# Patient Record
Sex: FEMALE | Race: WHITE | NOT HISPANIC OR LATINO | Employment: OTHER | URBAN - METROPOLITAN AREA
[De-identification: names, ages, dates, MRNs, and addresses within clinical notes are randomized per-mention and may not be internally consistent; named-entity substitution may affect disease eponyms.]

---

## 2018-11-15 ENCOUNTER — OFFICE VISIT (OUTPATIENT)
Dept: URGENT CARE | Facility: CLINIC | Age: 58
End: 2018-11-15
Payer: COMMERCIAL

## 2018-11-15 VITALS
SYSTOLIC BLOOD PRESSURE: 132 MMHG | WEIGHT: 234 LBS | OXYGEN SATURATION: 94 % | BODY MASS INDEX: 31.69 KG/M2 | DIASTOLIC BLOOD PRESSURE: 88 MMHG | TEMPERATURE: 99 F | HEART RATE: 72 BPM | HEIGHT: 72 IN

## 2018-11-15 DIAGNOSIS — K21.00 GASTROESOPHAGEAL REFLUX DISEASE WITH ESOPHAGITIS: ICD-10-CM

## 2018-11-15 DIAGNOSIS — J45.51 SEVERE PERSISTENT ASTHMA WITH EXACERBATION: ICD-10-CM

## 2018-11-15 PROCEDURE — 99204 OFFICE O/P NEW MOD 45 MIN: CPT | Performed by: NURSE PRACTITIONER

## 2018-11-15 PROCEDURE — 94760 N-INVAS EAR/PLS OXIMETRY 1: CPT | Performed by: NURSE PRACTITIONER

## 2018-11-15 RX ORDER — FOLIC ACID 1 MG/1
1 TABLET ORAL EVERY MORNING
COMMUNITY
Start: 2017-05-16 | End: 2019-04-19 | Stop reason: SDUPTHER

## 2018-11-15 RX ORDER — ROSUVASTATIN CALCIUM 10 MG/1
10 TABLET, COATED ORAL
COMMUNITY
Start: 2017-03-28 | End: 2020-01-13 | Stop reason: SDUPTHER

## 2018-11-15 RX ORDER — IPRATROPIUM BROMIDE AND ALBUTEROL SULFATE 2.5; .5 MG/3ML; MG/3ML
3 SOLUTION RESPIRATORY (INHALATION) ONCE
Status: COMPLETED | OUTPATIENT
Start: 2018-11-15 | End: 2018-11-15

## 2018-11-15 RX ORDER — PREDNISONE 10 MG/1
40 TABLET ORAL DAILY
Qty: 20 TAB | Refills: 0 | Status: SHIPPED | OUTPATIENT
Start: 2018-11-15 | End: 2018-11-20

## 2018-11-15 RX ORDER — MONTELUKAST SODIUM 10 MG/1
10 TABLET ORAL
COMMUNITY
Start: 2017-04-28 | End: 2019-03-27

## 2018-11-15 RX ORDER — IPRATROPIUM BROMIDE AND ALBUTEROL SULFATE 2.5; .5 MG/3ML; MG/3ML
SOLUTION RESPIRATORY (INHALATION)
COMMUNITY
Start: 2017-03-15 | End: 2019-03-27

## 2018-11-15 RX ORDER — ALBUTEROL SULFATE 90 UG/1
2 AEROSOL, METERED RESPIRATORY (INHALATION)
COMMUNITY
End: 2018-11-30

## 2018-11-15 RX ORDER — ETODOLAC 500 MG/1
500 TABLET, FILM COATED ORAL 2 TIMES DAILY
COMMUNITY
Start: 2017-03-29 | End: 2020-01-13 | Stop reason: SDUPTHER

## 2018-11-15 RX ORDER — OMEPRAZOLE 20 MG/1
20 CAPSULE, DELAYED RELEASE ORAL DAILY
Qty: 30 CAP | Refills: 0 | Status: SHIPPED | OUTPATIENT
Start: 2018-11-15 | End: 2018-11-30

## 2018-11-15 RX ORDER — CETIRIZINE HYDROCHLORIDE 10 MG/1
10 TABLET ORAL
Status: ON HOLD | COMMUNITY
End: 2023-07-03

## 2018-11-15 RX ORDER — LAMOTRIGINE 100 MG/1
100 TABLET ORAL EVERY MORNING
COMMUNITY
Start: 2017-05-21 | End: 2019-05-08

## 2018-11-15 RX ORDER — ESCITALOPRAM OXALATE 10 MG/1
10 TABLET ORAL EVERY MORNING
COMMUNITY
Start: 2017-02-26 | End: 2019-03-27

## 2018-11-15 RX ADMIN — IPRATROPIUM BROMIDE AND ALBUTEROL SULFATE 3 ML: 2.5; .5 SOLUTION RESPIRATORY (INHALATION) at 17:39

## 2018-11-15 ASSESSMENT — ENCOUNTER SYMPTOMS
FEVER: 0
CHILLS: 0
MYALGIAS: 0
CHEST TIGHTNESS: 1
SORE THROAT: 0
SHORTNESS OF BREATH: 1
FREQUENT THROAT CLEARING: 1
SPUTUM PRODUCTION: 1
VOMITING: 0
COUGH: 1
WHEEZING: 1
NAUSEA: 0
TROUBLE SWALLOWING: 0
DYSPNEA ON EXERTION: 0
ORTHOPNEA: 0
DIZZINESS: 0
HEADACHES: 0
EYE PAIN: 0
DIFFICULTY BREATHING: 1

## 2018-11-16 NOTE — PROGRESS NOTES
Subjective:     Arely Haynes is a 57 y.o. female who presents for Shortness of Breath (Tightness in chest/asthma x11/12 )  Patient presents clinic today for evaluation of asthma exacerbation with shortness of breath, chest tightness for the past 3 days.  Patient has had been in respiratory failure due to her asthma in the past.  She has been doing breathing treatments with mild relief in symptoms.  She denies any fevers, chills. She denies any chest pain or palpitations.Patient also requesting referral for pulmonologist as [place]  Patient also having increase in acid reflux that she has stopped her omeprazole due to recommendation by the gastroenterologist due to a procedure she was going to have done for evaluation of polyps however patient has now moved to the [facility] and will not be seeing gastroenterology until establishing care here.  Patient will restart omeprazole at this time.      Asthma    She complains of chest tightness, cough, difficulty breathing, frequent throat clearing, shortness of breath, sputum production and wheezing. This is a new problem. Episode onset: 4 daus.  The problem occurs constantly. The problem has been gradually worsening. The cough is productive of sputum. Pertinent negatives include no chest pain, dyspnea on exertion, ear congestion, fever, headaches, malaise/fatigue, myalgias, nasal congestion, orthopnea, postnasal drip, sneezing, sore throat or trouble swallowing. Her symptoms are aggravated by nothing. Her symptoms are alleviated by nothing. She reports no improvement on treatment. Her symptoms are not alleviated by beta-agonist and ipratropium. There are no known risk factors for lung disease. Her past medical history is significant for asthma.     PMH:  has a past medical history of Asthma and GERD (gastroesophageal reflux disease).  MEDS:   Current Outpatient Prescriptions:   •  ipratropium-albuterol (DUONEB) 0.5-2.5 (3)  MG/3ML nebulizer solution, USE 3 ML FOUR TIMES A DAY VIA NEBULIZER BY INHALATION 30 DAYS, Disp: , Rfl:   •  Abatacept 125 MG/ML Solution Auto-injector, by Subdermal route., Disp: , Rfl:   •  fluticasone-salmeterol (ADVAIR DISKUS) 500-50 MCG/DOSE AEROSOL POWDER, BREATH ACTIVATED, 1 Puff by Nebulization route., Disp: , Rfl:   •  albuterol 108 (90 Base) MCG/ACT Aero Soln inhalation aerosol, 2 Puffs by Nebulization route., Disp: , Rfl:   •  cetirizine (ZYRTEC) 10 MG Tab, Take 10 mg by mouth., Disp: , Rfl:   •  escitalopram (LEXAPRO) 10 MG Tab, Take 30 mg by mouth., Disp: , Rfl:   •  etodolac (LODINE) 500 MG tablet, Take 500 mg by mouth., Disp: , Rfl:   •  folic acid (FOLVITE) 1 MG Tab, Take 1 mg by mouth., Disp: , Rfl:   •  lamoTRIgine (LAMICTAL) 100 MG Tab, Take 100 mg by mouth., Disp: , Rfl:   •  montelukast (SINGULAIR) 10 MG Tab, Take 10 mg by mouth., Disp: , Rfl:   •  rosuvastatin (CRESTOR) 10 MG Tab, Take 10 mg by mouth., Disp: , Rfl:   •  predniSONE (DELTASONE) 10 MG Tab, Take 4 Tabs by mouth every day for 5 days., Disp: 20 Tab, Rfl: 0  •  omeprazole (PRILOSEC) 20 MG delayed-release capsule, Take 1 Cap by mouth every day., Disp: 30 Cap, Rfl: 0  •  Methotrexate, Anti-Rheumatic, (METHOTREXATE, PF, SC), 0.8 mg by Intramuscular route., Disp: , Rfl:   ALLERGIES:   Allergies   Allergen Reactions   • Ampicillin-Sulbactam Sodium Hives     Tolerates cefazolin  Pt does not feel this is an active allergy.    • Shellfish-Derived Products Anaphylaxis     lobster   • Iodine      Contrast- Oral And Iv Dye     SURGHX: History reviewed. No pertinent surgical history.  SOCHX:  reports that she has never smoked. She has never used smokeless tobacco. She reports that she drinks alcohol. She reports that she does not use drugs.  FH: Family history was reviewed, no pertinent findings to report         Review of Systems   Constitutional: Negative for chills, fever and malaise/fatigue.   HENT: Negative for postnasal drip, sneezing, sore  throat and trouble swallowing.    Eyes: Negative for pain.   Respiratory: Positive for cough, sputum production, shortness of breath and wheezing.    Cardiovascular: Negative for chest pain and dyspnea on exertion.   Gastrointestinal: Negative for nausea and vomiting.   Genitourinary: Negative for hematuria.   Musculoskeletal: Negative for myalgias.   Skin: Negative for rash.   Neurological: Negative for dizziness and headaches.     Allergies   Allergen Reactions   • Ampicillin-Sulbactam Sodium Hives     Tolerates cefazolin  Pt does not feel this is an active allergy.    • Shellfish-Derived Products Anaphylaxis     lobster   • Iodine      Contrast- Oral And Iv Dye      Objective:   /88   Pulse 72   Temp 37.2 °C (99 °F)   Ht 1.829 m (6')   Wt 106.1 kg (234 lb)   SpO2 94%   BMI 31.74 kg/m²    Physical Exam   Constitutional: She is oriented to person, place, and time. She appears well-developed and well-nourished. No distress.   HENT:   Head: Normocephalic and atraumatic.   Right Ear: Tympanic membrane normal.   Left Ear: Tympanic membrane normal.   Nose: Nose normal. Right sinus exhibits no maxillary sinus tenderness and no frontal sinus tenderness. Left sinus exhibits no maxillary sinus tenderness and no frontal sinus tenderness.   Mouth/Throat: Uvula is midline, oropharynx is clear and moist and mucous membranes are normal. No posterior oropharyngeal edema, posterior oropharyngeal erythema or tonsillar abscesses. No tonsillar exudate.   Eyes: Pupils are equal, round, and reactive to light. Conjunctivae and EOM are normal. Right eye exhibits no discharge. Left eye exhibits no discharge.   Cardiovascular: Normal rate and regular rhythm.    No murmur heard.  Pulmonary/Chest: Effort normal. No respiratory distress. She has decreased breath sounds. She has wheezes. She has no rhonchi. She has no rales.   Abdominal: Soft. She exhibits no distension. There is no tenderness.   Neurological: She is alert and  oriented to person, place, and time. She has normal reflexes. No sensory deficit.   Skin: Skin is warm, dry and intact.   Psychiatric: She has a normal mood and affect.         Assessment/Plan:   Assessment      1. Severe persistent asthma with exacerbation  ipratropium-albuterol (DUONEB) nebulizer solution    predniSONE (DELTASONE) 10 MG Tab    REFERRAL TO PULMONOLOGY    CANCELED: DX-CHEST-2 VIEWS   2. Gastroesophageal reflux disease with esophagitis  REFERRAL TO GASTROENTEROLOGY    omeprazole (PRILOSEC) 20 MG delayed-release capsule     Lung sounds improved with breathing treatment. PO2 reassessed and adequate.  No infectious etiology suspected as patient has no fevers, nonproductive cough.  Significant improvement post breathing treatment.Patient will continue to do breathing treatments at home in combination to oral steroids.  Patient will restart omeprazole at previous dose for acid reflux until she is evaluated by a gastroenterologist.    Patient given precautionary s/sx that mandate immediate follow up and evaluation in the ED. Advised of risks of not doing so.    DDX, Supportive care, and indications for immediate follow-up discussed with patient.    Instructed to return to clinic or nearest emergency department if we are not available for any change in condition, further concerns, or worsening of symptoms.    The patient demonstrated a good understanding and agreed with the treatment plan.

## 2018-11-29 ENCOUNTER — APPOINTMENT (OUTPATIENT)
Dept: RADIOLOGY | Facility: MEDICAL CENTER | Age: 58
DRG: 871 | End: 2018-11-29
Attending: EMERGENCY MEDICINE
Payer: COMMERCIAL

## 2018-11-29 ENCOUNTER — HOSPITAL ENCOUNTER (INPATIENT)
Facility: MEDICAL CENTER | Age: 58
LOS: 1 days | DRG: 871 | End: 2018-12-01
Attending: EMERGENCY MEDICINE | Admitting: HOSPITALIST
Payer: COMMERCIAL

## 2018-11-29 DIAGNOSIS — J18.9 PNEUMONIA OF RIGHT MIDDLE LOBE DUE TO INFECTIOUS ORGANISM: ICD-10-CM

## 2018-11-29 DIAGNOSIS — A41.9 SEPSIS, DUE TO UNSPECIFIED ORGANISM: ICD-10-CM

## 2018-11-29 DIAGNOSIS — J45.41 MODERATE PERSISTENT ASTHMA WITH ACUTE EXACERBATION: ICD-10-CM

## 2018-11-29 LAB
ALBUMIN SERPL BCP-MCNC: 4.6 G/DL (ref 3.2–4.9)
ALBUMIN/GLOB SERPL: 1.4 G/DL
ALP SERPL-CCNC: 89 U/L (ref 30–99)
ALT SERPL-CCNC: 23 U/L (ref 2–50)
ANION GAP SERPL CALC-SCNC: 11 MMOL/L (ref 0–11.9)
AST SERPL-CCNC: 21 U/L (ref 12–45)
BASOPHILS # BLD AUTO: 0.7 % (ref 0–1.8)
BASOPHILS # BLD: 0.08 K/UL (ref 0–0.12)
BILIRUB SERPL-MCNC: 0.8 MG/DL (ref 0.1–1.5)
BUN SERPL-MCNC: 24 MG/DL (ref 8–22)
CALCIUM SERPL-MCNC: 9.6 MG/DL (ref 8.5–10.5)
CHLORIDE SERPL-SCNC: 106 MMOL/L (ref 96–112)
CO2 SERPL-SCNC: 19 MMOL/L (ref 20–33)
CREAT SERPL-MCNC: 0.85 MG/DL (ref 0.5–1.4)
EOSINOPHIL # BLD AUTO: 0.6 K/UL (ref 0–0.51)
EOSINOPHIL NFR BLD: 5.1 % (ref 0–6.9)
ERYTHROCYTE [DISTWIDTH] IN BLOOD BY AUTOMATED COUNT: 44.3 FL (ref 35.9–50)
GLOBULIN SER CALC-MCNC: 3.2 G/DL (ref 1.9–3.5)
GLUCOSE SERPL-MCNC: 124 MG/DL (ref 65–99)
HCT VFR BLD AUTO: 41.9 % (ref 37–47)
HGB BLD-MCNC: 14.1 G/DL (ref 12–16)
IMM GRANULOCYTES # BLD AUTO: 0.09 K/UL (ref 0–0.11)
IMM GRANULOCYTES NFR BLD AUTO: 0.8 % (ref 0–0.9)
LACTATE BLD-SCNC: 1.7 MMOL/L (ref 0.5–2)
LYMPHOCYTES # BLD AUTO: 2.2 K/UL (ref 1–4.8)
LYMPHOCYTES NFR BLD: 18.8 % (ref 22–41)
MCH RBC QN AUTO: 31.6 PG (ref 27–33)
MCHC RBC AUTO-ENTMCNC: 33.7 G/DL (ref 33.6–35)
MCV RBC AUTO: 93.9 FL (ref 81.4–97.8)
MONOCYTES # BLD AUTO: 0.98 K/UL (ref 0–0.85)
MONOCYTES NFR BLD AUTO: 8.4 % (ref 0–13.4)
NEUTROPHILS # BLD AUTO: 7.73 K/UL (ref 2–7.15)
NEUTROPHILS NFR BLD: 66.2 % (ref 44–72)
NRBC # BLD AUTO: 0 K/UL
NRBC BLD-RTO: 0 /100 WBC
PLATELET # BLD AUTO: 335 K/UL (ref 164–446)
PMV BLD AUTO: 8.9 FL (ref 9–12.9)
POTASSIUM SERPL-SCNC: 4 MMOL/L (ref 3.6–5.5)
PROT SERPL-MCNC: 7.8 G/DL (ref 6–8.2)
RBC # BLD AUTO: 4.46 M/UL (ref 4.2–5.4)
SODIUM SERPL-SCNC: 136 MMOL/L (ref 135–145)
WBC # BLD AUTO: 11.7 K/UL (ref 4.8–10.8)

## 2018-11-29 PROCEDURE — 94640 AIRWAY INHALATION TREATMENT: CPT

## 2018-11-29 PROCEDURE — 700111 HCHG RX REV CODE 636 W/ 250 OVERRIDE (IP): Performed by: EMERGENCY MEDICINE

## 2018-11-29 PROCEDURE — 36415 COLL VENOUS BLD VENIPUNCTURE: CPT

## 2018-11-29 PROCEDURE — 71046 X-RAY EXAM CHEST 2 VIEWS: CPT

## 2018-11-29 PROCEDURE — 80053 COMPREHEN METABOLIC PANEL: CPT

## 2018-11-29 PROCEDURE — 83605 ASSAY OF LACTIC ACID: CPT

## 2018-11-29 PROCEDURE — 94760 N-INVAS EAR/PLS OXIMETRY 1: CPT

## 2018-11-29 PROCEDURE — 700101 HCHG RX REV CODE 250: Performed by: EMERGENCY MEDICINE

## 2018-11-29 PROCEDURE — 99285 EMERGENCY DEPT VISIT HI MDM: CPT

## 2018-11-29 PROCEDURE — 87502 INFLUENZA DNA AMP PROBE: CPT

## 2018-11-29 PROCEDURE — 93005 ELECTROCARDIOGRAM TRACING: CPT | Performed by: EMERGENCY MEDICINE

## 2018-11-29 PROCEDURE — 304561 HCHG STAT O2

## 2018-11-29 PROCEDURE — 85025 COMPLETE CBC W/AUTO DIFF WBC: CPT

## 2018-11-29 PROCEDURE — 84145 PROCALCITONIN (PCT): CPT

## 2018-11-29 PROCEDURE — 87040 BLOOD CULTURE FOR BACTERIA: CPT | Mod: 91

## 2018-11-29 RX ORDER — IPRATROPIUM BROMIDE AND ALBUTEROL SULFATE 2.5; .5 MG/3ML; MG/3ML
9 SOLUTION RESPIRATORY (INHALATION) ONCE
Status: COMPLETED | OUTPATIENT
Start: 2018-11-29 | End: 2018-11-29

## 2018-11-29 RX ORDER — PREDNISONE 20 MG/1
40 TABLET ORAL ONCE
Status: COMPLETED | OUTPATIENT
Start: 2018-11-29 | End: 2018-11-29

## 2018-11-29 RX ADMIN — PREDNISONE 40 MG: 20 TABLET ORAL at 23:06

## 2018-11-29 RX ADMIN — IPRATROPIUM BROMIDE AND ALBUTEROL SULFATE 9 ML: .5; 3 SOLUTION RESPIRATORY (INHALATION) at 22:42

## 2018-11-29 ASSESSMENT — ENCOUNTER SYMPTOMS
WHEEZING: 1
COUGH: 1
FEVER: 1
DIARRHEA: 0
VOMITING: 0
SHORTNESS OF BREATH: 1

## 2018-11-29 ASSESSMENT — COPD QUESTIONNAIRES
COPD SCREENING SCORE: 4
DO YOU EVER COUGH UP ANY MUCUS OR PHLEGM?: YES, A FEW DAYS A WEEK OR MONTH
DURING THE PAST 4 WEEKS HOW MUCH DID YOU FEEL SHORT OF BREATH: SOME OF THE TIME
HAVE YOU SMOKED AT LEAST 100 CIGARETTES IN YOUR ENTIRE LIFE: NO/DON'T KNOW

## 2018-11-29 ASSESSMENT — LIFESTYLE VARIABLES: EVER_SMOKED: NEVER

## 2018-11-29 ASSESSMENT — PAIN SCALES - GENERAL: PAINLEVEL_OUTOF10: 0

## 2018-11-30 ENCOUNTER — APPOINTMENT (OUTPATIENT)
Dept: PULMONOLOGY | Facility: HOSPICE | Age: 58
End: 2018-11-30
Payer: COMMERCIAL

## 2018-11-30 PROBLEM — J18.9 CAP (COMMUNITY ACQUIRED PNEUMONIA): Status: ACTIVE | Noted: 2018-11-30

## 2018-11-30 PROBLEM — J45.40 MODERATE PERSISTENT ASTHMA WITHOUT COMPLICATION: Status: ACTIVE | Noted: 2018-11-30

## 2018-11-30 PROBLEM — J96.21 ACUTE ON CHRONIC RESPIRATORY FAILURE WITH HYPOXIA (HCC): Status: ACTIVE | Noted: 2018-11-30

## 2018-11-30 PROBLEM — L40.50 PSORIATIC ARTHRITIS (HCC): Status: ACTIVE | Noted: 2018-11-30

## 2018-11-30 PROBLEM — E78.01 FAMILIAL HYPERCHOLESTEROLEMIA: Status: ACTIVE | Noted: 2018-11-30

## 2018-11-30 PROBLEM — A41.9 SEPSIS (HCC): Status: ACTIVE | Noted: 2018-11-30

## 2018-11-30 PROBLEM — F32.A DEPRESSION: Status: ACTIVE | Noted: 2018-11-30

## 2018-11-30 LAB
FLUAV RNA SPEC QL NAA+PROBE: NEGATIVE
FLUBV RNA SPEC QL NAA+PROBE: NEGATIVE
LACTATE BLD-SCNC: 1.3 MMOL/L (ref 0.5–2)
LACTATE BLD-SCNC: 2.1 MMOL/L (ref 0.5–2)
PROCALCITONIN SERPL-MCNC: <0.05 NG/ML

## 2018-11-30 PROCEDURE — 700111 HCHG RX REV CODE 636 W/ 250 OVERRIDE (IP): Performed by: EMERGENCY MEDICINE

## 2018-11-30 PROCEDURE — 36415 COLL VENOUS BLD VENIPUNCTURE: CPT

## 2018-11-30 PROCEDURE — 700102 HCHG RX REV CODE 250 W/ 637 OVERRIDE(OP)

## 2018-11-30 PROCEDURE — 94640 AIRWAY INHALATION TREATMENT: CPT

## 2018-11-30 PROCEDURE — A9270 NON-COVERED ITEM OR SERVICE: HCPCS | Performed by: HOSPITALIST

## 2018-11-30 PROCEDURE — 83605 ASSAY OF LACTIC ACID: CPT

## 2018-11-30 PROCEDURE — 700102 HCHG RX REV CODE 250 W/ 637 OVERRIDE(OP): Performed by: HOSPITALIST

## 2018-11-30 PROCEDURE — A9270 NON-COVERED ITEM OR SERVICE: HCPCS

## 2018-11-30 PROCEDURE — 99223 1ST HOSP IP/OBS HIGH 75: CPT | Mod: AI | Performed by: HOSPITALIST

## 2018-11-30 PROCEDURE — 700101 HCHG RX REV CODE 250: Performed by: HOSPITALIST

## 2018-11-30 PROCEDURE — 700111 HCHG RX REV CODE 636 W/ 250 OVERRIDE (IP): Performed by: FAMILY MEDICINE

## 2018-11-30 PROCEDURE — 700105 HCHG RX REV CODE 258: Performed by: HOSPITALIST

## 2018-11-30 PROCEDURE — 96365 THER/PROPH/DIAG IV INF INIT: CPT

## 2018-11-30 PROCEDURE — 700105 HCHG RX REV CODE 258: Performed by: EMERGENCY MEDICINE

## 2018-11-30 PROCEDURE — A9270 NON-COVERED ITEM OR SERVICE: HCPCS | Performed by: FAMILY MEDICINE

## 2018-11-30 PROCEDURE — 770001 HCHG ROOM/CARE - MED/SURG/GYN PRIV*

## 2018-11-30 PROCEDURE — 700111 HCHG RX REV CODE 636 W/ 250 OVERRIDE (IP): Performed by: HOSPITALIST

## 2018-11-30 PROCEDURE — 96367 TX/PROPH/DG ADDL SEQ IV INF: CPT

## 2018-11-30 PROCEDURE — 700102 HCHG RX REV CODE 250 W/ 637 OVERRIDE(OP): Performed by: FAMILY MEDICINE

## 2018-11-30 RX ORDER — CLONIDINE HYDROCHLORIDE 0.1 MG/1
0.1 TABLET ORAL EVERY 6 HOURS PRN
Status: DISCONTINUED | OUTPATIENT
Start: 2018-11-30 | End: 2018-12-01 | Stop reason: HOSPADM

## 2018-11-30 RX ORDER — ALBUTEROL SULFATE 90 UG/1
AEROSOL, METERED RESPIRATORY (INHALATION)
Status: COMPLETED
Start: 2018-11-30 | End: 2018-11-30

## 2018-11-30 RX ORDER — MONTELUKAST SODIUM 10 MG/1
10 TABLET ORAL
Status: DISCONTINUED | OUTPATIENT
Start: 2018-11-30 | End: 2018-12-01 | Stop reason: HOSPADM

## 2018-11-30 RX ORDER — CETIRIZINE HYDROCHLORIDE 10 MG/1
10 TABLET ORAL DAILY
Status: DISCONTINUED | OUTPATIENT
Start: 2018-11-30 | End: 2018-12-01 | Stop reason: HOSPADM

## 2018-11-30 RX ORDER — ONDANSETRON 4 MG/1
4 TABLET, ORALLY DISINTEGRATING ORAL EVERY 4 HOURS PRN
Status: DISCONTINUED | OUTPATIENT
Start: 2018-11-30 | End: 2018-12-01 | Stop reason: HOSPADM

## 2018-11-30 RX ORDER — OMEPRAZOLE 40 MG/1
40 CAPSULE, DELAYED RELEASE ORAL 2 TIMES DAILY
COMMUNITY
End: 2019-03-27

## 2018-11-30 RX ORDER — ACETAMINOPHEN 325 MG/1
650 TABLET ORAL EVERY 6 HOURS PRN
Status: DISCONTINUED | OUTPATIENT
Start: 2018-11-30 | End: 2018-12-01 | Stop reason: HOSPADM

## 2018-11-30 RX ORDER — ONDANSETRON 2 MG/ML
4 INJECTION INTRAMUSCULAR; INTRAVENOUS EVERY 4 HOURS PRN
Status: DISCONTINUED | OUTPATIENT
Start: 2018-11-30 | End: 2018-12-01 | Stop reason: HOSPADM

## 2018-11-30 RX ORDER — SODIUM CHLORIDE 9 MG/ML
1000 INJECTION, SOLUTION INTRAVENOUS ONCE
Status: COMPLETED | OUTPATIENT
Start: 2018-11-30 | End: 2018-11-30

## 2018-11-30 RX ORDER — SODIUM CHLORIDE 9 MG/ML
INJECTION, SOLUTION INTRAVENOUS CONTINUOUS
Status: DISCONTINUED | OUTPATIENT
Start: 2018-11-30 | End: 2018-11-30

## 2018-11-30 RX ORDER — OMEPRAZOLE 20 MG/1
40 CAPSULE, DELAYED RELEASE ORAL 2 TIMES DAILY
Status: DISCONTINUED | OUTPATIENT
Start: 2018-11-30 | End: 2018-12-01 | Stop reason: HOSPADM

## 2018-11-30 RX ORDER — ESCITALOPRAM OXALATE 10 MG/1
30 TABLET ORAL EVERY MORNING
Status: DISCONTINUED | OUTPATIENT
Start: 2018-11-30 | End: 2018-12-01 | Stop reason: HOSPADM

## 2018-11-30 RX ORDER — POLYETHYLENE GLYCOL 3350 17 G/17G
1 POWDER, FOR SOLUTION ORAL
Status: DISCONTINUED | OUTPATIENT
Start: 2018-11-30 | End: 2018-12-01 | Stop reason: HOSPADM

## 2018-11-30 RX ORDER — ALBUTEROL SULFATE 90 UG/1
2 AEROSOL, METERED RESPIRATORY (INHALATION) EVERY 6 HOURS PRN
COMMUNITY
End: 2019-03-27

## 2018-11-30 RX ORDER — BISACODYL 10 MG
10 SUPPOSITORY, RECTAL RECTAL
Status: DISCONTINUED | OUTPATIENT
Start: 2018-11-30 | End: 2018-12-01 | Stop reason: HOSPADM

## 2018-11-30 RX ORDER — ESCITALOPRAM OXALATE 20 MG/1
20 TABLET ORAL EVERY MORNING
COMMUNITY
End: 2019-05-08

## 2018-11-30 RX ORDER — PREDNISONE 20 MG/1
40 TABLET ORAL DAILY
Status: DISCONTINUED | OUTPATIENT
Start: 2018-11-30 | End: 2018-12-01 | Stop reason: HOSPADM

## 2018-11-30 RX ORDER — SODIUM CHLORIDE 9 MG/ML
500 INJECTION, SOLUTION INTRAVENOUS
Status: DISCONTINUED | OUTPATIENT
Start: 2018-11-30 | End: 2018-12-01 | Stop reason: HOSPADM

## 2018-11-30 RX ORDER — ALBUTEROL SULFATE 90 UG/1
2 AEROSOL, METERED RESPIRATORY (INHALATION) EVERY 6 HOURS PRN
Status: DISCONTINUED | OUTPATIENT
Start: 2018-11-30 | End: 2018-12-01 | Stop reason: HOSPADM

## 2018-11-30 RX ORDER — LAMOTRIGINE 100 MG/1
100 TABLET ORAL EVERY MORNING
Status: DISCONTINUED | OUTPATIENT
Start: 2018-11-30 | End: 2018-12-01 | Stop reason: HOSPADM

## 2018-11-30 RX ORDER — AMOXICILLIN 250 MG
2 CAPSULE ORAL 2 TIMES DAILY
Status: DISCONTINUED | OUTPATIENT
Start: 2018-11-30 | End: 2018-12-01 | Stop reason: HOSPADM

## 2018-11-30 RX ORDER — PROMETHAZINE HYDROCHLORIDE 25 MG/1
12.5-25 TABLET ORAL EVERY 4 HOURS PRN
Status: DISCONTINUED | OUTPATIENT
Start: 2018-11-30 | End: 2018-12-01 | Stop reason: HOSPADM

## 2018-11-30 RX ORDER — ROSUVASTATIN CALCIUM 10 MG/1
10 TABLET, COATED ORAL
Status: DISCONTINUED | OUTPATIENT
Start: 2018-11-30 | End: 2018-12-01 | Stop reason: HOSPADM

## 2018-11-30 RX ORDER — IPRATROPIUM BROMIDE AND ALBUTEROL SULFATE 2.5; .5 MG/3ML; MG/3ML
3 SOLUTION RESPIRATORY (INHALATION)
Status: DISCONTINUED | OUTPATIENT
Start: 2018-11-30 | End: 2018-12-01 | Stop reason: HOSPADM

## 2018-11-30 RX ORDER — BUDESONIDE AND FORMOTEROL FUMARATE DIHYDRATE 160; 4.5 UG/1; UG/1
2 AEROSOL RESPIRATORY (INHALATION) 2 TIMES DAILY
Status: DISCONTINUED | OUTPATIENT
Start: 2018-11-30 | End: 2018-12-01 | Stop reason: HOSPADM

## 2018-11-30 RX ORDER — AZITHROMYCIN 250 MG/1
500 TABLET, FILM COATED ORAL
Status: DISCONTINUED | OUTPATIENT
Start: 2018-11-30 | End: 2018-12-01 | Stop reason: HOSPADM

## 2018-11-30 RX ORDER — PROMETHAZINE HYDROCHLORIDE 25 MG/1
12.5-25 SUPPOSITORY RECTAL EVERY 4 HOURS PRN
Status: DISCONTINUED | OUTPATIENT
Start: 2018-11-30 | End: 2018-12-01 | Stop reason: HOSPADM

## 2018-11-30 RX ADMIN — ESCITALOPRAM OXALATE 30 MG: 10 TABLET ORAL at 06:45

## 2018-11-30 RX ADMIN — ETODOLAC 500 MG: 300 CAPSULE ORAL at 18:00

## 2018-11-30 RX ADMIN — ENOXAPARIN SODIUM 40 MG: 100 INJECTION SUBCUTANEOUS at 06:45

## 2018-11-30 RX ADMIN — AZITHROMYCIN 500 MG: 250 TABLET, FILM COATED ORAL at 21:11

## 2018-11-30 RX ADMIN — PREDNISONE 40 MG: 20 TABLET ORAL at 11:36

## 2018-11-30 RX ADMIN — ALBUTEROL SULFATE 2 PUFF: 90 AEROSOL, METERED RESPIRATORY (INHALATION) at 14:42

## 2018-11-30 RX ADMIN — BUDESONIDE AND FORMOTEROL FUMARATE DIHYDRATE 2 PUFF: 160; 4.5 AEROSOL RESPIRATORY (INHALATION) at 09:10

## 2018-11-30 RX ADMIN — BUDESONIDE AND FORMOTEROL FUMARATE DIHYDRATE 2 PUFF: 160; 4.5 AEROSOL RESPIRATORY (INHALATION) at 18:04

## 2018-11-30 RX ADMIN — LAMOTRIGINE 100 MG: 100 TABLET ORAL at 06:45

## 2018-11-30 RX ADMIN — AZITHROMYCIN FOR INJECTION INJECTION, POWDER, LYOPHILIZED, FOR SOLUTION 500 MG: 500 INJECTION INTRAVENOUS at 02:41

## 2018-11-30 RX ADMIN — CETIRIZINE HYDROCHLORIDE 10 MG: 10 TABLET, FILM COATED ORAL at 06:43

## 2018-11-30 RX ADMIN — ROSUVASTATIN CALCIUM 10 MG: 10 TABLET, FILM COATED ORAL at 21:11

## 2018-11-30 RX ADMIN — OMEPRAZOLE 40 MG: 20 CAPSULE, DELAYED RELEASE ORAL at 18:05

## 2018-11-30 RX ADMIN — MONTELUKAST SODIUM 10 MG: 10 TABLET, FILM COATED ORAL at 21:11

## 2018-11-30 RX ADMIN — IPRATROPIUM BROMIDE AND ALBUTEROL SULFATE 3 ML: .5; 3 SOLUTION RESPIRATORY (INHALATION) at 10:49

## 2018-11-30 RX ADMIN — ETODOLAC 500 MG: 300 CAPSULE ORAL at 09:00

## 2018-11-30 RX ADMIN — CEFEPIME 2 G: 2 INJECTION, POWDER, FOR SOLUTION INTRAVENOUS at 00:20

## 2018-11-30 RX ADMIN — CEFTRIAXONE SODIUM 2 G: 2 INJECTION, POWDER, FOR SOLUTION INTRAMUSCULAR; INTRAVENOUS at 01:39

## 2018-11-30 RX ADMIN — ALBUTEROL SULFATE 2 PUFF: 90 AEROSOL, METERED RESPIRATORY (INHALATION) at 23:18

## 2018-11-30 RX ADMIN — SODIUM CHLORIDE: 9 INJECTION, SOLUTION INTRAVENOUS at 01:42

## 2018-11-30 RX ADMIN — OMEPRAZOLE 40 MG: 20 CAPSULE, DELAYED RELEASE ORAL at 06:43

## 2018-11-30 RX ADMIN — SODIUM CHLORIDE 1000 ML: 9 INJECTION, SOLUTION INTRAVENOUS at 00:18

## 2018-11-30 ASSESSMENT — LIFESTYLE VARIABLES
ALCOHOL_USE: NO
EVER_SMOKED: NEVER
EVER_SMOKED: NEVER

## 2018-11-30 ASSESSMENT — ENCOUNTER SYMPTOMS
MYALGIAS: 0
SORE THROAT: 0
WHEEZING: 1
NAUSEA: 0
NECK PAIN: 0
BLURRED VISION: 0
VOMITING: 0
FEVER: 0
PALPITATIONS: 0
FEVER: 1
CHILLS: 1
PHOTOPHOBIA: 0
CARDIOVASCULAR NEGATIVE: 1
CHILLS: 0
CLAUDICATION: 0
EYES NEGATIVE: 1
HEADACHES: 0
DIZZINESS: 0
HEARTBURN: 0
WEIGHT LOSS: 0
DEPRESSION: 1
BACK PAIN: 0
NEUROLOGICAL NEGATIVE: 1
SPUTUM PRODUCTION: 1
GASTROINTESTINAL NEGATIVE: 1
HEMOPTYSIS: 0
SHORTNESS OF BREATH: 1
TINGLING: 0
DOUBLE VISION: 0
COUGH: 1

## 2018-11-30 ASSESSMENT — PAIN SCALES - GENERAL
PAINLEVEL_OUTOF10: 0

## 2018-11-30 ASSESSMENT — COPD QUESTIONNAIRES
DO YOU EVER COUGH UP ANY MUCUS OR PHLEGM?: NO/ONLY WITH OCCASIONAL COLDS OR INFECTIONS
COPD SCREENING SCORE: 1
DURING THE PAST 4 WEEKS HOW MUCH DID YOU FEEL SHORT OF BREATH: NONE/LITTLE OF THE TIME
DURING THE PAST 4 WEEKS HOW MUCH DID YOU FEEL SHORT OF BREATH: NONE/LITTLE OF THE TIME
IN THE PAST 12 MONTHS DO YOU DO LESS THAN YOU USED TO BECAUSE OF YOUR BREATHING PROBLEMS: DISAGREE/UNSURE
DO YOU EVER COUGH UP ANY MUCUS OR PHLEGM?: NO/ONLY WITH OCCASIONAL COLDS OR INFECTIONS
COPD SCREENING SCORE: 1
HAVE YOU SMOKED AT LEAST 100 CIGARETTES IN YOUR ENTIRE LIFE: NO/DON'T KNOW
HAVE YOU SMOKED AT LEAST 100 CIGARETTES IN YOUR ENTIRE LIFE: NO/DON'T KNOW

## 2018-11-30 ASSESSMENT — PATIENT HEALTH QUESTIONNAIRE - PHQ9
SUM OF ALL RESPONSES TO PHQ9 QUESTIONS 1 AND 2: 0
1. LITTLE INTEREST OR PLEASURE IN DOING THINGS: NOT AT ALL
2. FEELING DOWN, DEPRESSED, IRRITABLE, OR HOPELESS: NOT AT ALL

## 2018-11-30 NOTE — PROGRESS NOTES
[facility] Hospitalist Progress Note    Date of Service: 2018    Chief Complaint  57 y.o. female admitted 2018 with acute resp failure and   brohnchitis with sepsis and asthma exacerbation.    Interval Problem Update  none    Consultants/Specialty  none    Disposition  pending        Review of Systems   Constitutional: Negative for chills, fever and weight loss.   HENT: Negative for ear pain, hearing loss and tinnitus.    Eyes: Negative for blurred vision, double vision and photophobia.   Respiratory: Positive for cough and sputum production. Negative for hemoptysis.    Cardiovascular: Negative for chest pain, palpitations and claudication.   Gastrointestinal: Negative for heartburn, nausea and vomiting.   Genitourinary: Negative for dysuria, frequency and urgency.   Musculoskeletal: Negative for back pain, myalgias and neck pain.   Skin: Negative for itching and rash.   Neurological: Negative for dizziness, tingling and headaches.      Physical Exam  Laboratory/Imaging   Hemodynamics  Temp (24hrs), Av.6 °C (97.8 °F), Min:36.4 °C (97.6 °F), Max:36.7 °C (98.1 °F)   Temperature: 36.4 °C (97.6 °F)  Pulse  Av.8  Min: 70  Max: 100 Heart Rate (Monitored): 87  Blood Pressure: 136/65, NIBP: 112/57      Respiratory      Respiration: 18, Pulse Oximetry: 93 %, O2 Daily Delivery Respiratory : Silicone Nasal Cannula     Given By:: Mouthpiece, Work Of Breathing / Effort: Mild  RUL Breath Sounds: Clear, RML Breath Sounds: Clear, RLL Breath Sounds: Diminished, MARY ANN Breath Sounds: Clear, LLL Breath Sounds: Diminished    Fluids    Intake/Output Summary (Last 24 hours) at 18 0944  Last data filed at 18 0600   Gross per 24 hour   Intake              790 ml   Output                0 ml   Net              790 ml       Nutrition  Orders Placed This Encounter   Procedures   • Diet Order Regular     Standing Status:   Standing     Number of Occurrences:   1     Order Specific Question:   Diet:     Answer:    Regular [1]     Physical Exam   Constitutional: She is oriented to person, place, and time. She appears well-developed and well-nourished.   HENT:   Head: Normocephalic and atraumatic.   Eyes: Pupils are equal, round, and reactive to light. Conjunctivae are normal.   Neck: Normal range of motion. Neck supple.   Cardiovascular: Normal rate and regular rhythm.    Pulmonary/Chest: No respiratory distress. She has no wheezes.   Abdominal: Soft. Bowel sounds are normal.   Musculoskeletal: She exhibits no edema or tenderness.   Neurological: She is alert and oriented to person, place, and time.   Skin: Skin is warm and dry.       Recent Labs      11/29/18   2210   WBC  11.7*   RBC  4.46   HEMOGLOBIN  14.1   HEMATOCRIT  41.9   MCV  93.9   MCH  31.6   MCHC  33.7   RDW  44.3   PLATELETCT  335   MPV  8.9*     Recent Labs      11/29/18   2210   SODIUM  136   POTASSIUM  4.0   CHLORIDE  106   CO2  19*   GLUCOSE  124*   BUN  24*   CREATININE  0.85   CALCIUM  9.6                      Assessment/Plan     * CAP (community acquired pneumonia)   Assessment & Plan    Is ruled out with negative procalcitonin  Dc rocephin  Most likely pt has brohnchitis  Continue with zitromax     Moderate persistent asthma without complication   Assessment & Plan    Continue current medication regimen, respiratory therapy as needed.      Acute on chronic respiratory failure with hypoxia (HCC)   Assessment & Plan    Oxygen supplementation as needed.   2nd to asthma  On symbicort  On singuliar  On prednisone       Familial hypercholesterolemia   Assessment & Plan    Continue statin therapy      Psoriatic arthritis (HCC)   Assessment & Plan    On injectable medication regimen which is not currently due.  Monitor, outpatient follow-up     Depression   Assessment & Plan    stable     Sepsis (HCC)   Assessment & Plan    This is severe sepsis with the following associated acute organ dysfunction(s): acute respiratory failure.   2nd to brohnchitis  Continue  with zoitromax  Has resolved       Quality-Core Measures   Hu catheter::  No Hu  DVT prophylaxis pharmacological::  Enoxaparin (Lovenox)

## 2018-11-30 NOTE — ED PROVIDER NOTES
ED Provider Note    Scribed for KUSH Seth II* by Radha Bucio. 11/29/2018  10:05 PM    Means of Arrival: walk-in  History obtained by: patient  Limitations: none    CHIEF COMPLAINT  Chief Complaint   Patient presents with   • Shortness of Breath   • Asthma       HPI  Arely Haynes is a 57 y.o. female with a history of asthma who presents to the Emergency Department for evaluation of shortness of breath. She reports being admitted to the ICU before for asthma exacerbation. She states she has had a fever, wheezing, and a cough for the past 5 days.  She is tried using nebulizer at home but no relief.  She endorses being immunocompromised secondary to taking methotrexate and another unspecified immunosuppressant drug for her psoriasis. She denies vomiting or diarrhea.  Denies any chest pain or leg swelling.    REVIEW OF SYSTEMS  Review of Systems   Constitutional: Positive for chills, fever and malaise/fatigue.   HENT: Positive for congestion. Negative for sore throat.    Respiratory: Positive for cough, shortness of breath and wheezing.    Cardiovascular: Negative for chest pain and palpitations.   Gastrointestinal: Negative for diarrhea and vomiting.   Musculoskeletal: Negative for back pain and neck pain.   Skin: Negative for rash.   Neurological: Negative for dizziness and headaches.   All other systems reviewed and are negative.    See HPI for further details.    PAST MEDICAL HISTORY   has a past medical history of Asthma and GERD (gastroesophageal reflux disease).    SOCIAL HISTORY  Social History     Social History Main Topics   • Smoking status: Never Smoker   • Smokeless tobacco: Never Used   • Alcohol use Yes      Comment: once or twice a month    • Drug use: No       SURGICAL HISTORY  patient denies any surgical history    CURRENT MEDICATIONS    Current Outpatient Prescriptions on File Prior to Encounter   Medication Sig Dispense Refill   • ipratropium-albuterol (DUONEB) 0.5-2.5 (3) MG/3ML  nebulizer solution USE 3 ML FOUR TIMES A DAY VIA NEBULIZER BY INHALATION 30 DAYS     • Abatacept 125 MG/ML Solution Auto-injector by Subdermal route.     • fluticasone-salmeterol (ADVAIR DISKUS) 500-50 MCG/DOSE AEROSOL POWDER, BREATH ACTIVATED 1 Puff by Nebulization route.     • albuterol 108 (90 Base) MCG/ACT Aero Soln inhalation aerosol 2 Puffs by Nebulization route.     • cetirizine (ZYRTEC) 10 MG Tab Take 10 mg by mouth.     • escitalopram (LEXAPRO) 10 MG Tab Take 30 mg by mouth.     • etodolac (LODINE) 500 MG tablet Take 500 mg by mouth.     • folic acid (FOLVITE) 1 MG Tab Take 1 mg by mouth.     • lamoTRIgine (LAMICTAL) 100 MG Tab Take 100 mg by mouth.     • Methotrexate, Anti-Rheumatic, (METHOTREXATE, PF, SC) 0.8 mg by Intramuscular route.     • montelukast (SINGULAIR) 10 MG Tab Take 10 mg by mouth.     • rosuvastatin (CRESTOR) 10 MG Tab Take 10 mg by mouth.     • omeprazole (PRILOSEC) 20 MG delayed-release capsule Take 1 Cap by mouth every day. 30 Cap 0       ALLERGIES  Allergies   Allergen Reactions   • Ampicillin-Sulbactam Sodium Hives     Tolerates cefazolin  Pt does not feel this is an active allergy.    • Shellfish-Derived Products Anaphylaxis     lobster   • Iodine      Contrast- Oral And Iv Dye       PHYSICAL EXAM  VITAL SIGNS: BP (!) 167/79   Pulse 100   Temp 36.7 °C (98.1 °F) (Temporal)   Resp 20   Ht 1.829 m (6')   SpO2 94%   BMI 31.74 kg/m²    Pulse ox interpretation: I interpret this pulse ox as normal.  Constitutional: High BMI 57 year old woman. Appears to be in moderate distress.  HENT: Oral mucous membranes moist. No signs of trauma, Bilateral external ears normal, Nose normal.   Eyes: Pupils are equal, Conjunctiva normal, Non-icteric.   Neck: No JVD, no stridor. Normal range of motion, No tenderness, Supple, No stridor.   Cardiovascular: Increased heart rate, no murmurs. Symmetric distal pulses. No cyanosis of extremities. No peripheral edema of extremities.  Thorax & Lungs: Increased  respiratory rate. Diminished breath sounds, No wheezing, No chest tenderness.   Abdomen: Bowel sounds normal, Soft, No tenderness, No masses, No pulsatile masses. No peritoneal signs.  Skin: Warm, Dry, No erythema, diaphoretic, No rash.   Musculoskeletal: Good range of motion in all major joints. No tenderness to palpation or major deformities noted.   Neurologic: Alert , Normal motor function, Normal sensory function, No focal deficits noted.   Psychiatric: Affect normal, Judgment normal, Mood normal.     DIAGNOSTIC STUDIES / PROCEDURES    EKG Interpretation:  Interpreted by me  Rhythm:  Normal sinus rhythm   Rate: 87  Ectopy: none  Conduction: normal  Intervals: normal  ST Segments: no ST elevation or depression  T waves:  flattened T waves diffusely   Q Waves: none  Clinical Impression: Sinus rhythm EKG with nonspecific T wave changes     LABS  Pertinent Labs & Imaging studies reviewed. (See chart for details)    RADIOLOGY  Pertinent Labs & Imaging studies reviewed. (See chart for details)    COURSE & MEDICAL DECISION MAKING  Pertinent Labs & Imaging studies reviewed. (See chart for details)    10:05 PM This is a 57 y.o. female who presents with concerns of fever, cough, worsening dyspnea gradually over the course of 5 days and the differential diagnosis includes but is not limited to pneumonia, asthma exacerbation, influenza, low suspicion for PE or MI. Ordered for chest x-ray, lactic acid, CBC, CMP, blood culture, EKG to evaluate.    10:35 PM - Ordered Duoneb nebulizer solution and prednisone tablet for asthma exacerbation. Ordered influenza by PCR.    11:25 PM - Breathing has improved, still no wheezing, more prominent lung sounds. She is now feeling less anxious.    12:09 AM - Fluid bolus initiated for dehydration, elevated BUN, bicarb 19, and infection . She has a WBC of 11.7, bicarb of 19, BUN of 24, lactic acid of 1.7, influenza is negative, and chest x-ray shows likely right sided pneumonia.     12:10 AM  - I spoke with the patient and informed her of lab results. I explained risks and benefits of staying in the hospital for admission.  I thought hospital admission would be appropriate given she is immunocompromised, has had serious asthma exacerbations in the past which led to ICU admissions, also initially with increased heart rate, white blood cell count 11.7 with left shift, bicarb 19, elevated BUN.  I believe he would benefit from further monitoring and treatment in the hospital.  This is including IV antibiotics.  Patient was comfortable with admission to hospital. On reassessment Ms. Haynes's heart rate has improved to 85.     12:14 I spoke with pharmacist about antibiotics and will treat with cefepime.    12:17 AM - Paged Dr. Worthington.    12:20 AM - I discussed the patient's case and the above findings with Dr. Worthington (hospitalist) who agreed to admit the patient.       DISPOSITION:  Patient will be admitted to Dr. Worthington in guarded condition.    FINAL IMPRESSION  1. Pneumonia of right middle lobe due to infectious organism (HCC)          IRadha (Tano), am scribing for, and in the presence of, SNEHA Seth II.    Electronically signed by: Radha Bucio (Tano), 11/29/2018    ITrevor II, M* personally performed the services described in this documentation, as scribed by Radha Bucio in my presence, and it is both accurate and complete. C.    The note accurately reflects work and decisions made by me.  Trevor Nuñez II  11/30/2018  5:00 AM

## 2018-11-30 NOTE — ED NOTES
Pt states she feel better after breathing treatment, not as tight. Sitting up in bed, less diaphoresis.

## 2018-11-30 NOTE — ED NOTES
Pt resting right side down, moist cough noted. Extra pillow and blanket provided, Pt denies other needs at this time.

## 2018-11-30 NOTE — PROGRESS NOTES
Patient arrived to unit. A&Ox4, denies pain, reports SOB with exertion but oxygen saturation returns to normal with rest.  in place and maintaining O2 on RA.  Lungs clear in all lobes despite CXR results. + productive cough and SOB.  LBM 11/29, + bowel sounds.   Denies numbness and tingling, strength equal bilaterally, PERRLA.  POC discussed, no further needs at this time, call light within reach.

## 2018-11-30 NOTE — ED TRIAGE NOTES
Arely Haynes    Chief Complaint   Patient presents with   • Shortness of Breath   • Asthma       Pt ambulatory to triage with above complaint.  Pt speaking 2-3 word sentences, tripod, audible exp wheezing noted. +cough with clear sputum +fevers at home per pt.  SOB started x4 days, increasing more today.     Pt to Red 6.     Blood Pressure: (!) 167/79, Pulse: 100, Respiration: 20, Temperature: 36.7 °C (98.1 °F), Height: 182.9 cm (6'), Pulse Oximetry: 94 %, O2 (LPM): 0.5, O2 Delivery: None (Room Air)

## 2018-11-30 NOTE — ED NOTES
Med rec completed per pt's weekly pill box- identified by pharmacist. Returned to pt. Agrees to not take her own medications during admission

## 2018-11-30 NOTE — ED NOTES
Pt to room via wheel chair, agree with triage notes. Pt diaphoretic, sitting up in bed, speaking 1-2 word sentences. ERP at bedside.

## 2018-11-30 NOTE — ED NOTES
Pt ambulate to restroom with steady gait. Increased SOB upon exertion, Pt O2 sat after restroom trip 94% on RA.

## 2018-11-30 NOTE — ASSESSMENT & PLAN NOTE
This is severe sepsis with the following associated acute organ dysfunction(s): acute respiratory failure.   2nd to brohnchitis  Continue with zoitromax  Has resolved

## 2018-11-30 NOTE — PROGRESS NOTES
Received bedside report from night shift RN. Assumed care of patient. Pt assessed and stable. VSS. Patient reports 0/10 pain at this time.   Discussed plan of care for day with patient and received verbal understanding. Call light within reach, bed in low position.  Educated pt re fall precautions and received verbal understanding.  However, pt continues to refuse bed alarms.  Pt is alert, oriented, steady on feet and calls appropriately.

## 2018-11-30 NOTE — ASSESSMENT & PLAN NOTE
Is ruled out with negative procalcitonin  Dc rocephin  Most likely pt has brohnchitis  Continue with zitromax

## 2018-11-30 NOTE — H&P
Hospital Medicine History & Physical Note    Date of Service  11/30/2018    Primary Care Physician  Pcp Pt States None    Consultants  none    Code Status  Full code     Chief Complaint  Shortness of breath     History of Presenting Illness  57 y.o. female with history persistent asthma on controlled and as needed medications, familial hypercholesterolemia on statin therapy, and psoriatic arthritis on immunomodulatory therapy, was in her usual state of health until 10 days prior to admission when she began to have shortness of breath.  She initially increased her frequency of nebulizer therapy, and when this did not improve, she went to [facility].  She was given a course of steroids, which she took, and alleviated her symptoms almost entirely.  After the final dose, she again began to have cough, fever, chills, shortness of breath with exertion, and decreased amount of normal activity due to this.  She subsequently presents to the emergency department with these symptoms.  She has no other complaints.     Review of Systems  Review of Systems   Constitutional: Positive for chills and fever.   HENT: Negative.    Eyes: Negative.    Respiratory: Positive for cough, sputum production, shortness of breath and wheezing. Negative for hemoptysis.    Cardiovascular: Negative.    Gastrointestinal: Negative.    Genitourinary: Negative.    Musculoskeletal: Positive for joint pain.   Skin: Negative.    Neurological: Negative.    Endo/Heme/Allergies: Negative.    Psychiatric/Behavioral: Positive for depression.       Past Medical History   has a past medical history of Asthma and GERD (gastroesophageal reflux disease).    Surgical History   has no past surgical history on file.     Family History  family history includes Cancer in her father; Heart Attack in her mother; Hyperlipidemia in her father, mother, and sister; Lung Disease in her father.     Social History   reports that she has never smoked. She has never used  smokeless tobacco. She reports that she drinks alcohol. She reports that she does not use drugs.    Allergies  Allergies   Allergen Reactions   • Ampicillin-Sulbactam Sodium Hives     Tolerates cefazolin  Pt does not feel this is an active allergy.    • Shellfish-Derived Products Anaphylaxis     lobster   • Iodine      Contrast- Oral And Iv Dye       Medications  Prior to Admission Medications   Prescriptions Last Dose Informant Patient Reported? Taking?   Abatacept 125 MG/ML Solution Auto-injector 2018 at Unknown time  Yes No   Si mg by Subdermal route every Saturday.   Methotrexate Sodium (METHOTREXATE PF) 25 MG/ML Solution inj 2018 at Unknown time  Yes Yes   Si mg by Injection route every Wednesday.   albuterol 108 (90 Base) MCG/ACT Aero Soln inhalation aerosol unknown at Unknown time  Yes Yes   Sig: Inhale 2 Puffs by mouth every 6 hours as needed for Shortness of Breath.   cetirizine (ZYRTEC) 10 MG Tab 2018 at Unknown time  Yes No   Sig: Take 10 mg by mouth.   escitalopram (LEXAPRO) 10 MG Tab 2018 at Unknown time  Yes No   Sig: Take 10 mg by mouth every morning. = 30 mg daily   escitalopram (LEXAPRO) 20 MG tablet 2018 at Unknown time  Yes Yes   Sig: Take 20 mg by mouth every morning. = 30 mg daily   etodolac (LODINE) 500 MG tablet 2018 at Unknown time  Yes No   Sig: Take 500 mg by mouth 2 times a day.   fluticasone-salmeterol (ADVAIR DISKUS) 500-50 MCG/DOSE AEROSOL POWDER, BREATH ACTIVATED 2018 at hs  Yes No   Sig: Inhale 1 Puff by mouth 2 times a day.   folic acid (FOLVITE) 1 MG Tab 2018 at Unknown time  Yes No   Sig: Take 1 mg by mouth every morning.   ipratropium-albuterol (DUONEB) 0.5-2.5 (3) MG/3ML nebulizer solution unknown at Unknown time  Yes No   Sig: USE 3 ML FOUR TIMES A DAY VIA NEBULIZER BY INHALATION 30 DAYS   lamoTRIgine (LAMICTAL) 100 MG Tab 2018 at Unknown time  Yes No   Sig: Take 100 mg by mouth every morning.   montelukast  (SINGULAIR) 10 MG Tab 11/29/2018 at Unknown time  Yes No   Sig: Take 10 mg by mouth every bedtime.   omeprazole (PRILOSEC) 40 MG delayed-release capsule 11/29/2018 at Unknown time  Yes Yes   Sig: Take 40 mg by mouth 2 times a day.   rosuvastatin (CRESTOR) 10 MG Tab 11/29/2018 at Unknown time  Yes No   Sig: Take 10 mg by mouth every bedtime.      Facility-Administered Medications: None       Physical Exam  Temp:  [36.7 °C (98.1 °F)] 36.7 °C (98.1 °F)  Pulse:  [] 90  Resp:  [18-24] 18  BP: (167)/(79) 167/79    Physical Exam   Constitutional: She is oriented to person, place, and time. She appears well-developed and well-nourished. No distress.   HENT:   Head: Normocephalic and atraumatic.   Eyes: Pupils are equal, round, and reactive to light. Conjunctivae are normal.   Neck: Normal range of motion. Neck supple. No tracheal deviation present. No thyromegaly present.   Cardiovascular: Normal rate, regular rhythm and normal heart sounds.  Exam reveals no gallop and no friction rub.    No murmur heard.  Pulmonary/Chest: She is in respiratory distress. She has wheezes. She has rales.   Abdominal: Soft. Bowel sounds are normal. She exhibits no distension. There is no tenderness. There is no rebound.   Musculoskeletal: Normal range of motion. She exhibits no edema.   Lymphadenopathy:     She has no cervical adenopathy.   Neurological: She is alert and oriented to person, place, and time. No cranial nerve deficit.   Skin: Skin is warm and dry. She is not diaphoretic.   Psychiatric: She has a normal mood and affect.   Nursing note and vitals reviewed.      Laboratory:  Recent Labs      11/29/18   2210   WBC  11.7*   RBC  4.46   HEMOGLOBIN  14.1   HEMATOCRIT  41.9   MCV  93.9   MCH  31.6   MCHC  33.7   RDW  44.3   PLATELETCT  335   MPV  8.9*     Recent Labs      11/29/18   2210   SODIUM  136   POTASSIUM  4.0   CHLORIDE  106   CO2  19*   GLUCOSE  124*   BUN  24*   CREATININE  0.85   CALCIUM  9.6     Recent Labs       11/29/18   2210   ALTSGPT  23   ASTSGOT  21   ALKPHOSPHAT  89   TBILIRUBIN  0.8   GLUCOSE  124*                 No results for input(s): TROPONINI in the last 72 hours.    Urinalysis:    No results found     Imaging:  DX-CHEST-2 VIEWS   Final Result         1.  Hazy right infrahilar infiltrate            Assessment/Plan:  I anticipate this patient will require at least two midnights for appropriate medical management, necessitating inpatient admission.    * CAP (community acquired pneumonia)   Assessment & Plan    Start IV antibiotic therapy, monitor culture results.  Oxygen supplementation and respiratory therapy as needed.  Monitor.      Moderate persistent asthma without complication   Assessment & Plan    Continue current medication regimen, respiratory therapy as needed.      Acute on chronic respiratory failure with hypoxia (HCC)   Assessment & Plan    Oxygen supplementation as needed.      Familial hypercholesterolemia   Assessment & Plan    Continue statin therapy      Psoriatic arthritis (HCC)   Assessment & Plan    On injectable medication regimen which is not currently due.  Monitor, outpatient follow-up     Depression   Assessment & Plan    Continue current medication regimen, outpatient follow-up     Sepsis (HCC)   Assessment & Plan    This is severe sepsis with the following associated acute organ dysfunction(s): acute respiratory failure.         VTE prophylaxis: SCD, lovenox

## 2018-11-30 NOTE — CARE PLAN
"Problem: Respiratory:  Goal: Respiratory status will improve  Patient states: \"My breathing seems better this morning.\"  Patient on room air saturating at 97%.  Does desaturate in the mid 80's when ambulating but recovers quickly.    Problem: Fluid Volume:  Goal: Will maintain balanced intake and output  Patient receiving IV antibiotics.  Oral intake is less than 50% of meals at this time.  Educated patient to drink water.      "

## 2018-12-01 VITALS
SYSTOLIC BLOOD PRESSURE: 123 MMHG | BODY MASS INDEX: 31.69 KG/M2 | HEIGHT: 72 IN | DIASTOLIC BLOOD PRESSURE: 56 MMHG | RESPIRATION RATE: 20 BRPM | HEART RATE: 69 BPM | OXYGEN SATURATION: 95 % | TEMPERATURE: 98 F | WEIGHT: 234 LBS

## 2018-12-01 LAB
ANION GAP SERPL CALC-SCNC: 10 MMOL/L (ref 0–11.9)
BASOPHILS # BLD AUTO: 0.4 % (ref 0–1.8)
BASOPHILS # BLD: 0.04 K/UL (ref 0–0.12)
BUN SERPL-MCNC: 15 MG/DL (ref 8–22)
CALCIUM SERPL-MCNC: 9.1 MG/DL (ref 8.5–10.5)
CHLORIDE SERPL-SCNC: 110 MMOL/L (ref 96–112)
CO2 SERPL-SCNC: 21 MMOL/L (ref 20–33)
CREAT SERPL-MCNC: 0.58 MG/DL (ref 0.5–1.4)
EOSINOPHIL # BLD AUTO: 0.03 K/UL (ref 0–0.51)
EOSINOPHIL NFR BLD: 0.3 % (ref 0–6.9)
ERYTHROCYTE [DISTWIDTH] IN BLOOD BY AUTOMATED COUNT: 45.6 FL (ref 35.9–50)
GLUCOSE SERPL-MCNC: 121 MG/DL (ref 65–99)
HCT VFR BLD AUTO: 36.9 % (ref 37–47)
HGB BLD-MCNC: 12.3 G/DL (ref 12–16)
IMM GRANULOCYTES # BLD AUTO: 0.06 K/UL (ref 0–0.11)
IMM GRANULOCYTES NFR BLD AUTO: 0.5 % (ref 0–0.9)
LYMPHOCYTES # BLD AUTO: 1.54 K/UL (ref 1–4.8)
LYMPHOCYTES NFR BLD: 13.8 % (ref 22–41)
MCH RBC QN AUTO: 31.7 PG (ref 27–33)
MCHC RBC AUTO-ENTMCNC: 33.3 G/DL (ref 33.6–35)
MCV RBC AUTO: 95.1 FL (ref 81.4–97.8)
MONOCYTES # BLD AUTO: 0.84 K/UL (ref 0–0.85)
MONOCYTES NFR BLD AUTO: 7.5 % (ref 0–13.4)
NEUTROPHILS # BLD AUTO: 8.62 K/UL (ref 2–7.15)
NEUTROPHILS NFR BLD: 77.5 % (ref 44–72)
NRBC # BLD AUTO: 0 K/UL
NRBC BLD-RTO: 0 /100 WBC
PLATELET # BLD AUTO: 299 K/UL (ref 164–446)
PMV BLD AUTO: 9.2 FL (ref 9–12.9)
POTASSIUM SERPL-SCNC: 3.9 MMOL/L (ref 3.6–5.5)
RBC # BLD AUTO: 3.88 M/UL (ref 4.2–5.4)
SODIUM SERPL-SCNC: 141 MMOL/L (ref 135–145)
WBC # BLD AUTO: 11.1 K/UL (ref 4.8–10.8)

## 2018-12-01 PROCEDURE — 99239 HOSP IP/OBS DSCHRG MGMT >30: CPT | Performed by: FAMILY MEDICINE

## 2018-12-01 PROCEDURE — 700111 HCHG RX REV CODE 636 W/ 250 OVERRIDE (IP): Performed by: HOSPITALIST

## 2018-12-01 PROCEDURE — 36415 COLL VENOUS BLD VENIPUNCTURE: CPT

## 2018-12-01 PROCEDURE — 700102 HCHG RX REV CODE 250 W/ 637 OVERRIDE(OP): Performed by: HOSPITALIST

## 2018-12-01 PROCEDURE — 85025 COMPLETE CBC W/AUTO DIFF WBC: CPT

## 2018-12-01 PROCEDURE — 80048 BASIC METABOLIC PNL TOTAL CA: CPT

## 2018-12-01 PROCEDURE — A9270 NON-COVERED ITEM OR SERVICE: HCPCS | Performed by: HOSPITALIST

## 2018-12-01 PROCEDURE — 700111 HCHG RX REV CODE 636 W/ 250 OVERRIDE (IP): Performed by: FAMILY MEDICINE

## 2018-12-01 RX ORDER — PREDNISONE 10 MG/1
10 TABLET ORAL DAILY
Qty: 12 TAB | Refills: 0 | Status: SHIPPED | OUTPATIENT
Start: 2018-12-01 | End: 2019-03-27

## 2018-12-01 RX ORDER — AZITHROMYCIN 500 MG/1
500 TABLET, FILM COATED ORAL DAILY
Qty: 3 TAB | Refills: 0 | Status: SHIPPED | OUTPATIENT
Start: 2018-12-01 | End: 2018-12-11

## 2018-12-01 RX ADMIN — CETIRIZINE HYDROCHLORIDE 10 MG: 10 TABLET, FILM COATED ORAL at 05:29

## 2018-12-01 RX ADMIN — PREDNISONE 40 MG: 20 TABLET ORAL at 05:26

## 2018-12-01 RX ADMIN — ETODOLAC 500 MG: 300 CAPSULE ORAL at 05:28

## 2018-12-01 RX ADMIN — ESCITALOPRAM OXALATE 30 MG: 10 TABLET ORAL at 05:26

## 2018-12-01 RX ADMIN — LAMOTRIGINE 100 MG: 100 TABLET ORAL at 05:26

## 2018-12-01 RX ADMIN — BUDESONIDE AND FORMOTEROL FUMARATE DIHYDRATE 2 PUFF: 160; 4.5 AEROSOL RESPIRATORY (INHALATION) at 05:27

## 2018-12-01 RX ADMIN — ENOXAPARIN SODIUM 40 MG: 100 INJECTION SUBCUTANEOUS at 05:26

## 2018-12-01 ASSESSMENT — PAIN SCALES - GENERAL
PAINLEVEL_OUTOF10: 0

## 2018-12-01 NOTE — PROGRESS NOTES
Discharge orders received.  IV discontinued.  Instructions, prescriptions and education given to patient.  Follow up appointments discussed.  Patient verbalized understanding of dc instructions and prescriptions.  Patient signed discharge instructions.  Patient verbalized she had all belongings with her.  Patient left via walking to ER [employer] parking and then home in her personal vehicle.  Wished patient a pleasant day.

## 2018-12-01 NOTE — RESPIRATORY CARE
" COPD EDUCATION by COPD CLINICAL EDUCATOR  12/1/2018 at 7:01 AM by Ricarda Shepard     Patient reviewed by COPD education team. Per chart the patient denies COPD, states she has \"asthma\", she is a never smoker and does not have a PFT to diagnose COPD. Therefore this patient does not qualify for COPD program.  "

## 2018-12-01 NOTE — DISCHARGE INSTRUCTIONS
Discharge Instructions    Discharged to home by car with self. Discharged via walking, hospital escort: Yes.  Special equipment needed: Not Applicable    Be sure to schedule a follow-up appointment with your primary care doctor or any specialists as instructed.     Discharge Plan:   Diet Plan: Discussed  Activity Level: Discussed  Confirmed Follow up Appointment: Patient to Call and Schedule Appointment  Confirmed Symptoms Management: Discussed  Medication Reconciliation Updated: Yes  Influenza Vaccine Indication: Not indicated: Previously immunized this influenza season and > 8 years of age    I understand that a diet low in cholesterol, fat, and sodium is recommended for good health. Unless I have been given specific instructions below for another diet, I accept this instruction as my diet prescription.   Other diet: Regular, healthy diet    Special Instructions: See Jes Instructions attached.    · Is patient discharged on Warfarin / Coumadin?   No     Depression / Suicide Risk    As you are discharged from this [facility] facility, it is important to learn how to keep safe from harming yourself.    Recognize the warning signs:  · Abrupt changes in personality, positive or negative- including increase in energy   · Giving away possessions  · Change in eating patterns- significant weight changes-  positive or negative  · Change in sleeping patterns- unable to sleep or sleeping all the time   · Unwillingness or inability to communicate  · Depression  · Unusual sadness, discouragement and loneliness  · Talk of wanting to die  · Neglect of personal appearance   · Rebelliousness- reckless behavior  · Withdrawal from people/activities they love  · Confusion- inability to concentrate     If you or a loved one observes any of these behaviors or has concerns about self-harm, here's what you can do:  · Talk about it- your feelings and reasons for harming yourself  · Remove any means that you might use to hurt yourself  (examples: pills, rope, extension cords, firearm)  · Get professional help from the community (Mental Health, Substance Abuse, psychological counseling)  · Do not be alone:Call your Safe Contact- someone whom you trust who will be there for you.  · Call your local CRISIS HOTLINE 761-5296 or 360-406-7426  · Call your local Children's Mobile Crisis Response Team [place] (503) 031-3478 or www.sliceX  · Call the toll free National Suicide Prevention Hotlines   · National Suicide Prevention Lifeline 130-276-LJLV (7926)  · National Hope Line Network 800-SUICIDE (539-7711)

## 2018-12-01 NOTE — DISCHARGE SUMMARY
Discharge Summary    CHIEF COMPLAINT ON ADMISSION  Chief Complaint   Patient presents with   • Shortness of Breath   • Asthma       Reason for Admission  Asthma     Admission Date  11/29/2018    CODE STATUS  Full Code    HPI & HOSPITAL COURSE  This is a 57 y.o. female here with history persistent asthma on controlled and as needed medications, familial hypercholesterolemia on statin therapy, and psoriatic arthritis on immunomodulatory therapy, was in her usual state of health until 10 days prior to admission when she began to have shortness of breath.  She initially increased her frequency of nebulizer therapy, and when this did not improve, she went to [facility].  She was given a course of steroids, which she took, and alleviated her symptoms almost entirely.  After the final dose, she again began to have cough, fever, chills, shortness of breath with exertion, and decreased amount of normal activity due to this.  She subsequently presents to the emergency department with these symptoms.  She has no other complaints.    She was admitted with pneumonia and was started on rocephin and zitromax.however procalcitonin came back and it was negative and pneumonia was ruled out and pt was noted to have brohnchitis with asthma exacerbation.her rocephin was dced and she was started on prednisone.she is seen today at the bed side and feels better and will be discharged with 3 more days of zitromax and 6 days of prednisone taper.       Therefore, she is discharged in good and stable condition to home with close outpatient follow-up.    The patient met 2-midnight criteria for an inpatient stay at the time of discharge.    Discharge Date  12/1/2018    FOLLOW UP ITEMS POST DISCHARGE  pcp next week    DISCHARGE DIAGNOSES  Principal Problem:    CAP (community acquired pneumonia) POA: Unknown  Active Problems:    Sepsis (HCC) POA: Unknown    Depression POA: Unknown    Psoriatic arthritis (HCC) POA: Unknown    Familial  hypercholesterolemia POA: Unknown    Acute on chronic respiratory failure with hypoxia (HCC) POA: Unknown    Moderate persistent asthma without complication POA: Unknown  Resolved Problems:    * No resolved hospital problems. *      FOLLOW UP  No future appointments.  Primary Care Provider    Schedule an appointment as soon as possible for a visit      Rheumatologist    Schedule an appointment as soon as possible for a visit        MEDICATIONS ON DISCHARGE     Medication List      START taking these medications      Instructions   azithromycin 500 MG tablet  Commonly known as:  ZITHROMAX   Take 1 Tab by mouth every day.  Dose:  500 mg     predniSONE 10 MG Tabs  Commonly known as:  DELTASONE   Doctor's comments:  30mg daily 1st 2 days  20mg daily 2nd 2days  10mg daily 3rd 2days  Take 1 Tab by mouth every day.  Dose:  10 mg        CONTINUE taking these medications      Instructions   Abatacept 125 MG/ML Soaj   125 mg by Subdermal route every Saturday.  Dose:  125 mg     ADVAIR DISKUS 500-50 MCG/DOSE Aepb  Generic drug:  fluticasone-salmeterol   Inhale 1 Puff by mouth 2 times a day.  Dose:  1 Puff     albuterol 108 (90 Base) MCG/ACT Aers inhalation aerosol   Inhale 2 Puffs by mouth every 6 hours as needed for Shortness of Breath.  Dose:  2 Puff     cetirizine 10 MG Tabs  Commonly known as:  ZYRTEC   Take 10 mg by mouth.  Dose:  10 mg     * LEXAPRO 20 MG tablet  Generic drug:  escitalopram   Take 20 mg by mouth every morning. = 30 mg daily  Dose:  20 mg     * escitalopram 10 MG Tabs  Commonly known as:  LEXAPRO   Take 10 mg by mouth every morning. = 30 mg daily  Dose:  10 mg     etodolac 500 MG tablet  Commonly known as:  LODINE   Take 500 mg by mouth 2 times a day.  Dose:  500 mg     folic acid 1 MG Tabs  Commonly known as:  FOLVITE   Take 1 mg by mouth every morning.  Dose:  1 mg     ipratropium-albuterol 0.5-2.5 (3) MG/3ML nebulizer solution  Commonly known as:  DUONEB   USE 3 ML FOUR TIMES A DAY VIA NEBULIZER BY  INHALATION 30 DAYS     lamoTRIgine 100 MG Tabs  Commonly known as:  LAMICTAL   Take 100 mg by mouth every morning.  Dose:  100 mg     methotrexate PF 25 MG/ML Soln inj   20 mg by Injection route every Wednesday.  Dose:  20 mg     montelukast 10 MG Tabs  Commonly known as:  SINGULAIR   Take 10 mg by mouth every bedtime.  Dose:  10 mg     PRILOSEC 40 MG delayed-release capsule  Generic drug:  omeprazole   Take 40 mg by mouth 2 times a day.  Dose:  40 mg     rosuvastatin 10 MG Tabs  Commonly known as:  CRESTOR   Take 10 mg by mouth every bedtime.  Dose:  10 mg        * This list has 2 medication(s) that are the same as other medications prescribed for you. Read the directions carefully, and ask your doctor or other care provider to review them with you.                Allergies  Allergies   Allergen Reactions   • Ampicillin-Sulbactam Sodium Hives     Tolerates cefazolin  Pt does not feel this is an active allergy.    • Shellfish-Derived Products Anaphylaxis     lobster   • Iodine      Contrast- Oral And Iv Dye       DIET  Orders Placed This Encounter   Procedures   • Diet Order Regular     Standing Status:   Standing     Number of Occurrences:   1     Order Specific Question:   Diet:     Answer:   Regular [1]       ACTIVITY  As tolerated.  Weight bearing as tolerated    CONSULTATIONS  none    PROCEDURES  none    LABORATORY  Lab Results   Component Value Date    SODIUM 141 12/01/2018    POTASSIUM 3.9 12/01/2018    CHLORIDE 110 12/01/2018    CO2 21 12/01/2018    GLUCOSE 121 (H) 12/01/2018    BUN 15 12/01/2018    CREATININE 0.58 12/01/2018        Lab Results   Component Value Date    WBC 11.1 (H) 12/01/2018    HEMOGLOBIN 12.3 12/01/2018    HEMATOCRIT 36.9 (L) 12/01/2018    PLATELETCT 299 12/01/2018        Total time of the discharge process exceeds 35 minutes.

## 2018-12-01 NOTE — CARE PLAN
Problem: Communication  Goal: The ability to communicate needs accurately and effectively will improve  Patient able to communicate her needs verbally and in English.  Patient is alert and oriented.    Problem: Infection  Goal: Will remain free from infection  Patient admitted with community acquired pneumonia.  Being discharged today with oral antibiotics. No additional s/s of infection at this time.    Problem: Bowel/Gastric:  Goal: Normal bowel function is maintained or improved  Patient reports normal bowel function.    Problem: Discharge Barriers/Planning  Goal: Patient's continuum of care needs will be met  Continuum of care needs are met. Patient does not require additional assistance in the outpatient setting.  Intervention: Assess potential discharge barriers on admission and throughout hospital stay  Potential discharge barriers assessed on admission and throughout hospital stay.  No discharge barriers at this time.  Intervention: Involve patient and significant other/support system in setting and prioritizing goals for hospital stay and discharge  Patient involved in goals.  Intervention: Explain discharge instructions and medication reconcilliation to patient and significant other/support system  Discharge instructions and medications explained to patient.  Received verbal understanding.

## 2018-12-01 NOTE — PROGRESS NOTES
Report received, assumed pt care, assessment complete. VSS, A&O X4, no complaints of pain. Discussed POC, pt verbalizes understanding. No further concerns at this time. Bed in low position, call light in reach.

## 2018-12-02 ENCOUNTER — PATIENT OUTREACH (OUTPATIENT)
Dept: HEALTH INFORMATION MANAGEMENT | Facility: OTHER | Age: 58
End: 2018-12-02

## 2018-12-02 NOTE — PROGRESS NOTES
Placed discharge outreach phone call to pt s/p hospital discharge 12/1/18.  Left voicemail providing my contact information and instructions to call with any questions or concerns.

## 2018-12-05 LAB
BACTERIA BLD CULT: NORMAL
BACTERIA BLD CULT: NORMAL
SIGNIFICANT IND 70042: NORMAL
SIGNIFICANT IND 70042: NORMAL
SITE SITE: NORMAL
SITE SITE: NORMAL
SOURCE SOURCE: NORMAL
SOURCE SOURCE: NORMAL

## 2018-12-11 ENCOUNTER — OFFICE VISIT (OUTPATIENT)
Dept: URGENT CARE | Facility: MEDICAL CENTER | Age: 58
End: 2018-12-11
Payer: COMMERCIAL

## 2018-12-11 ENCOUNTER — HOSPITAL ENCOUNTER (OUTPATIENT)
Dept: RADIOLOGY | Facility: MEDICAL CENTER | Age: 58
End: 2018-12-11
Attending: NURSE PRACTITIONER
Payer: COMMERCIAL

## 2018-12-11 ENCOUNTER — HOSPITAL ENCOUNTER (OUTPATIENT)
Facility: MEDICAL CENTER | Age: 58
End: 2018-12-12
Attending: EMERGENCY MEDICINE | Admitting: HOSPITALIST
Payer: COMMERCIAL

## 2018-12-11 VITALS
HEIGHT: 72 IN | HEART RATE: 69 BPM | DIASTOLIC BLOOD PRESSURE: 68 MMHG | SYSTOLIC BLOOD PRESSURE: 124 MMHG | RESPIRATION RATE: 18 BRPM | WEIGHT: 234 LBS | OXYGEN SATURATION: 98 % | TEMPERATURE: 97.4 F | BODY MASS INDEX: 31.69 KG/M2

## 2018-12-11 DIAGNOSIS — R61 DIAPHORESIS: ICD-10-CM

## 2018-12-11 DIAGNOSIS — R05.9 COUGH: ICD-10-CM

## 2018-12-11 DIAGNOSIS — R07.2 PRECORDIAL PAIN: ICD-10-CM

## 2018-12-11 DIAGNOSIS — R06.02 SHORTNESS OF BREATH: ICD-10-CM

## 2018-12-11 PROBLEM — R07.9 CHEST PAIN: Status: ACTIVE | Noted: 2018-12-11

## 2018-12-11 LAB
ALBUMIN SERPL BCP-MCNC: 4 G/DL (ref 3.2–4.9)
ALBUMIN/GLOB SERPL: 1.2 G/DL
ALP SERPL-CCNC: 77 U/L (ref 30–99)
ALT SERPL-CCNC: 25 U/L (ref 2–50)
ANION GAP SERPL CALC-SCNC: 9 MMOL/L (ref 0–11.9)
APTT PPP: 26 SEC (ref 24.7–36)
AST SERPL-CCNC: 22 U/L (ref 12–45)
BASOPHILS # BLD AUTO: 0.2 % (ref 0–1.8)
BASOPHILS # BLD: 0.03 K/UL (ref 0–0.12)
BILIRUB SERPL-MCNC: 0.8 MG/DL (ref 0.1–1.5)
BNP SERPL-MCNC: 17 PG/ML (ref 0–100)
BUN SERPL-MCNC: 26 MG/DL (ref 8–22)
CALCIUM SERPL-MCNC: 9.2 MG/DL (ref 8.4–10.2)
CHLORIDE SERPL-SCNC: 106 MMOL/L (ref 96–112)
CO2 SERPL-SCNC: 20 MMOL/L (ref 20–33)
CREAT SERPL-MCNC: 0.72 MG/DL (ref 0.5–1.4)
D DIMER PPP IA.FEU-MCNC: 0.43 UG/ML (FEU) (ref 0–0.5)
EOSINOPHIL # BLD AUTO: 0.01 K/UL (ref 0–0.51)
EOSINOPHIL NFR BLD: 0.1 % (ref 0–6.9)
ERYTHROCYTE [DISTWIDTH] IN BLOOD BY AUTOMATED COUNT: 45.5 FL (ref 35.9–50)
GLOBULIN SER CALC-MCNC: 3.4 G/DL (ref 1.9–3.5)
GLUCOSE SERPL-MCNC: 101 MG/DL (ref 65–99)
HCT VFR BLD AUTO: 39.5 % (ref 37–47)
HGB BLD-MCNC: 13.4 G/DL (ref 12–16)
IMM GRANULOCYTES # BLD AUTO: 0.09 K/UL (ref 0–0.11)
IMM GRANULOCYTES NFR BLD AUTO: 0.7 % (ref 0–0.9)
INR PPP: 1.04 (ref 0.87–1.13)
LIPASE SERPL-CCNC: 37 U/L (ref 7–58)
LYMPHOCYTES # BLD AUTO: 2.7 K/UL (ref 1–4.8)
LYMPHOCYTES NFR BLD: 21.7 % (ref 22–41)
MCH RBC QN AUTO: 31.7 PG (ref 27–33)
MCHC RBC AUTO-ENTMCNC: 33.9 G/DL (ref 33.6–35)
MCV RBC AUTO: 93.4 FL (ref 81.4–97.8)
MONOCYTES # BLD AUTO: 0.66 K/UL (ref 0–0.85)
MONOCYTES NFR BLD AUTO: 5.3 % (ref 0–13.4)
NEUTROPHILS # BLD AUTO: 8.97 K/UL (ref 2–7.15)
NEUTROPHILS NFR BLD: 72 % (ref 44–72)
NRBC # BLD AUTO: 0 K/UL
NRBC BLD-RTO: 0 /100 WBC
PLATELET # BLD AUTO: 322 K/UL (ref 164–446)
PMV BLD AUTO: 9 FL (ref 9–12.9)
POTASSIUM SERPL-SCNC: 3.6 MMOL/L (ref 3.6–5.5)
PROT SERPL-MCNC: 7.4 G/DL (ref 6–8.2)
PROTHROMBIN TIME: 13.5 SEC (ref 12–14.6)
RBC # BLD AUTO: 4.23 M/UL (ref 4.2–5.4)
SODIUM SERPL-SCNC: 135 MMOL/L (ref 135–145)
TROPONIN I SERPL-MCNC: <0.02 NG/ML (ref 0–0.04)
WBC # BLD AUTO: 12.5 K/UL (ref 4.8–10.8)

## 2018-12-11 PROCEDURE — 84484 ASSAY OF TROPONIN QUANT: CPT

## 2018-12-11 PROCEDURE — 93000 ELECTROCARDIOGRAM COMPLETE: CPT | Performed by: NURSE PRACTITIONER

## 2018-12-11 PROCEDURE — 93005 ELECTROCARDIOGRAM TRACING: CPT

## 2018-12-11 PROCEDURE — 93005 ELECTROCARDIOGRAM TRACING: CPT | Performed by: EMERGENCY MEDICINE

## 2018-12-11 PROCEDURE — 71046 X-RAY EXAM CHEST 2 VIEWS: CPT

## 2018-12-11 PROCEDURE — A9270 NON-COVERED ITEM OR SERVICE: HCPCS | Performed by: EMERGENCY MEDICINE

## 2018-12-11 PROCEDURE — 99219 PR INITIAL OBSERVATION CARE,LEVL II: CPT | Performed by: HOSPITALIST

## 2018-12-11 PROCEDURE — 99285 EMERGENCY DEPT VISIT HI MDM: CPT

## 2018-12-11 PROCEDURE — 85379 FIBRIN DEGRADATION QUANT: CPT

## 2018-12-11 PROCEDURE — 80053 COMPREHEN METABOLIC PANEL: CPT

## 2018-12-11 PROCEDURE — 700102 HCHG RX REV CODE 250 W/ 637 OVERRIDE(OP): Performed by: EMERGENCY MEDICINE

## 2018-12-11 PROCEDURE — 99214 OFFICE O/P EST MOD 30 MIN: CPT | Performed by: NURSE PRACTITIONER

## 2018-12-11 PROCEDURE — G0378 HOSPITAL OBSERVATION PER HR: HCPCS

## 2018-12-11 PROCEDURE — 85610 PROTHROMBIN TIME: CPT

## 2018-12-11 PROCEDURE — 85730 THROMBOPLASTIN TIME PARTIAL: CPT

## 2018-12-11 PROCEDURE — 85025 COMPLETE CBC W/AUTO DIFF WBC: CPT

## 2018-12-11 PROCEDURE — 83690 ASSAY OF LIPASE: CPT

## 2018-12-11 PROCEDURE — 83880 ASSAY OF NATRIURETIC PEPTIDE: CPT

## 2018-12-11 RX ORDER — POLYETHYLENE GLYCOL 3350 17 G/17G
1 POWDER, FOR SOLUTION ORAL
Status: DISCONTINUED | OUTPATIENT
Start: 2018-12-11 | End: 2018-12-12 | Stop reason: HOSPADM

## 2018-12-11 RX ORDER — ROSUVASTATIN CALCIUM 10 MG/1
10 TABLET, COATED ORAL
Status: DISCONTINUED | OUTPATIENT
Start: 2018-12-11 | End: 2018-12-12 | Stop reason: HOSPADM

## 2018-12-11 RX ORDER — PROMETHAZINE HYDROCHLORIDE 25 MG/1
12.5-25 TABLET ORAL EVERY 4 HOURS PRN
Status: DISCONTINUED | OUTPATIENT
Start: 2018-12-11 | End: 2018-12-12 | Stop reason: HOSPADM

## 2018-12-11 RX ORDER — PROMETHAZINE HYDROCHLORIDE 25 MG/1
12.5-25 SUPPOSITORY RECTAL EVERY 4 HOURS PRN
Status: DISCONTINUED | OUTPATIENT
Start: 2018-12-11 | End: 2018-12-12 | Stop reason: HOSPADM

## 2018-12-11 RX ORDER — ESCITALOPRAM OXALATE 10 MG/1
10 TABLET ORAL EVERY MORNING
Status: DISCONTINUED | OUTPATIENT
Start: 2018-12-12 | End: 2018-12-12 | Stop reason: HOSPADM

## 2018-12-11 RX ORDER — OMEPRAZOLE 20 MG/1
40 CAPSULE, DELAYED RELEASE ORAL 2 TIMES DAILY
Status: DISCONTINUED | OUTPATIENT
Start: 2018-12-12 | End: 2018-12-12 | Stop reason: HOSPADM

## 2018-12-11 RX ORDER — MONTELUKAST SODIUM 10 MG/1
10 TABLET ORAL
Status: DISCONTINUED | OUTPATIENT
Start: 2018-12-12 | End: 2018-12-12 | Stop reason: HOSPADM

## 2018-12-11 RX ORDER — ASPIRIN 81 MG/1
324 TABLET, CHEWABLE ORAL DAILY
Status: DISCONTINUED | OUTPATIENT
Start: 2018-12-12 | End: 2018-12-12 | Stop reason: HOSPADM

## 2018-12-11 RX ORDER — FOLIC ACID 1 MG/1
1 TABLET ORAL EVERY MORNING
Status: DISCONTINUED | OUTPATIENT
Start: 2018-12-12 | End: 2018-12-12 | Stop reason: HOSPADM

## 2018-12-11 RX ORDER — BISACODYL 10 MG
10 SUPPOSITORY, RECTAL RECTAL
Status: DISCONTINUED | OUTPATIENT
Start: 2018-12-11 | End: 2018-12-12 | Stop reason: HOSPADM

## 2018-12-11 RX ORDER — ASPIRIN 600 MG/1
300 SUPPOSITORY RECTAL ONCE
Status: COMPLETED | OUTPATIENT
Start: 2018-12-11 | End: 2018-12-11

## 2018-12-11 RX ORDER — PREDNISONE 20 MG/1
10 TABLET ORAL DAILY
Status: DISCONTINUED | OUTPATIENT
Start: 2018-12-12 | End: 2018-12-12 | Stop reason: HOSPADM

## 2018-12-11 RX ORDER — ASPIRIN 81 MG/1
324 TABLET, CHEWABLE ORAL ONCE
Status: COMPLETED | OUTPATIENT
Start: 2018-12-11 | End: 2018-12-11

## 2018-12-11 RX ORDER — AMOXICILLIN 250 MG
2 CAPSULE ORAL 2 TIMES DAILY
Status: DISCONTINUED | OUTPATIENT
Start: 2018-12-11 | End: 2018-12-12 | Stop reason: HOSPADM

## 2018-12-11 RX ORDER — ASPIRIN 325 MG
325 TABLET ORAL DAILY
Status: DISCONTINUED | OUTPATIENT
Start: 2018-12-12 | End: 2018-12-12 | Stop reason: HOSPADM

## 2018-12-11 RX ORDER — ONDANSETRON 4 MG/1
4 TABLET, ORALLY DISINTEGRATING ORAL EVERY 4 HOURS PRN
Status: DISCONTINUED | OUTPATIENT
Start: 2018-12-11 | End: 2018-12-12 | Stop reason: HOSPADM

## 2018-12-11 RX ORDER — LAMOTRIGINE 100 MG/1
100 TABLET ORAL EVERY MORNING
Status: DISCONTINUED | OUTPATIENT
Start: 2018-12-12 | End: 2018-12-12 | Stop reason: HOSPADM

## 2018-12-11 RX ORDER — ACETAMINOPHEN 325 MG/1
650 TABLET ORAL EVERY 6 HOURS PRN
Status: DISCONTINUED | OUTPATIENT
Start: 2018-12-11 | End: 2018-12-12 | Stop reason: HOSPADM

## 2018-12-11 RX ORDER — ASPIRIN 600 MG/1
300 SUPPOSITORY RECTAL DAILY
Status: DISCONTINUED | OUTPATIENT
Start: 2018-12-12 | End: 2018-12-12 | Stop reason: HOSPADM

## 2018-12-11 RX ORDER — CETIRIZINE HYDROCHLORIDE 10 MG/1
10 TABLET ORAL DAILY
Status: DISCONTINUED | OUTPATIENT
Start: 2018-12-12 | End: 2018-12-12 | Stop reason: HOSPADM

## 2018-12-11 RX ORDER — ONDANSETRON 2 MG/ML
4 INJECTION INTRAMUSCULAR; INTRAVENOUS EVERY 4 HOURS PRN
Status: DISCONTINUED | OUTPATIENT
Start: 2018-12-11 | End: 2018-12-12 | Stop reason: HOSPADM

## 2018-12-11 RX ORDER — ESCITALOPRAM OXALATE 10 MG/1
20 TABLET ORAL EVERY MORNING
Status: DISCONTINUED | OUTPATIENT
Start: 2018-12-12 | End: 2018-12-11

## 2018-12-11 RX ADMIN — ASPIRIN 81 MG CHEWABLE TABLET 324 MG: 81 TABLET CHEWABLE at 20:50

## 2018-12-11 ASSESSMENT — ENCOUNTER SYMPTOMS
FEVER: 0
BACK PAIN: 0
DIZZINESS: 0
SORE THROAT: 0
INSOMNIA: 0
EYE PAIN: 0
COUGH: 1
ABDOMINAL PAIN: 0
HEADACHES: 0
BLURRED VISION: 0
SHORTNESS OF BREATH: 0
PALPITATIONS: 0
VOMITING: 0
DEPRESSION: 0
TINGLING: 0
CHILLS: 0
NECK PAIN: 0
NAUSEA: 0

## 2018-12-11 ASSESSMENT — LIFESTYLE VARIABLES
ALCOHOL_USE: NO
EVER_SMOKED: NEVER

## 2018-12-11 ASSESSMENT — PAIN SCALES - GENERAL
PAINLEVEL_OUTOF10: 0
PAINLEVEL_OUTOF10: 4

## 2018-12-12 ENCOUNTER — PATIENT OUTREACH (OUTPATIENT)
Dept: HEALTH INFORMATION MANAGEMENT | Facility: OTHER | Age: 58
End: 2018-12-12

## 2018-12-12 ENCOUNTER — APPOINTMENT (OUTPATIENT)
Dept: RADIOLOGY | Facility: MEDICAL CENTER | Age: 58
End: 2018-12-12
Attending: HOSPITALIST
Payer: COMMERCIAL

## 2018-12-12 VITALS
TEMPERATURE: 97.7 F | HEART RATE: 52 BPM | BODY MASS INDEX: 33.5 KG/M2 | DIASTOLIC BLOOD PRESSURE: 65 MMHG | SYSTOLIC BLOOD PRESSURE: 116 MMHG | WEIGHT: 247.36 LBS | HEIGHT: 72 IN | RESPIRATION RATE: 18 BRPM | OXYGEN SATURATION: 92 %

## 2018-12-12 PROBLEM — Z96.641 HISTORY OF TOTAL RIGHT HIP ARTHROPLASTY: Status: ACTIVE | Noted: 2017-09-22

## 2018-12-12 PROBLEM — Z96.641 HISTORY OF HIP REPLACEMENT, TOTAL, RIGHT: Status: ACTIVE | Noted: 2017-07-28

## 2018-12-12 PROBLEM — R07.9 CHEST PAIN: Status: RESOLVED | Noted: 2018-12-11 | Resolved: 2018-12-12

## 2018-12-12 LAB
ALBUMIN SERPL BCP-MCNC: 3.5 G/DL (ref 3.2–4.9)
ALBUMIN/GLOB SERPL: 1.3 G/DL
ALP SERPL-CCNC: 67 U/L (ref 30–99)
ALT SERPL-CCNC: 24 U/L (ref 2–50)
ANION GAP SERPL CALC-SCNC: 5 MMOL/L (ref 0–11.9)
AST SERPL-CCNC: 21 U/L (ref 12–45)
BASOPHILS # BLD AUTO: 0.4 % (ref 0–1.8)
BASOPHILS # BLD: 0.04 K/UL (ref 0–0.12)
BILIRUB SERPL-MCNC: 0.8 MG/DL (ref 0.1–1.5)
BUN SERPL-MCNC: 20 MG/DL (ref 8–22)
CALCIUM SERPL-MCNC: 9.2 MG/DL (ref 8.4–10.2)
CHLORIDE SERPL-SCNC: 107 MMOL/L (ref 96–112)
CO2 SERPL-SCNC: 27 MMOL/L (ref 20–33)
CREAT SERPL-MCNC: 0.65 MG/DL (ref 0.5–1.4)
EKG IMPRESSION: NORMAL
EOSINOPHIL # BLD AUTO: 0.09 K/UL (ref 0–0.51)
EOSINOPHIL NFR BLD: 0.9 % (ref 0–6.9)
ERYTHROCYTE [DISTWIDTH] IN BLOOD BY AUTOMATED COUNT: 46.5 FL (ref 35.9–50)
GLOBULIN SER CALC-MCNC: 2.8 G/DL (ref 1.9–3.5)
GLUCOSE SERPL-MCNC: 116 MG/DL (ref 65–99)
HCT VFR BLD AUTO: 38.7 % (ref 37–47)
HGB BLD-MCNC: 13.1 G/DL (ref 12–16)
IMM GRANULOCYTES # BLD AUTO: 0.08 K/UL (ref 0–0.11)
IMM GRANULOCYTES NFR BLD AUTO: 0.8 % (ref 0–0.9)
LYMPHOCYTES # BLD AUTO: 3.7 K/UL (ref 1–4.8)
LYMPHOCYTES NFR BLD: 35 % (ref 22–41)
MCH RBC QN AUTO: 32 PG (ref 27–33)
MCHC RBC AUTO-ENTMCNC: 33.9 G/DL (ref 33.6–35)
MCV RBC AUTO: 94.4 FL (ref 81.4–97.8)
MONOCYTES # BLD AUTO: 0.81 K/UL (ref 0–0.85)
MONOCYTES NFR BLD AUTO: 7.7 % (ref 0–13.4)
NEUTROPHILS # BLD AUTO: 5.86 K/UL (ref 2–7.15)
NEUTROPHILS NFR BLD: 55.2 % (ref 44–72)
NRBC # BLD AUTO: 0 K/UL
NRBC BLD-RTO: 0 /100 WBC
PLATELET # BLD AUTO: 293 K/UL (ref 164–446)
PMV BLD AUTO: 8.9 FL (ref 9–12.9)
POTASSIUM SERPL-SCNC: 3.2 MMOL/L (ref 3.6–5.5)
PROT SERPL-MCNC: 6.3 G/DL (ref 6–8.2)
RBC # BLD AUTO: 4.1 M/UL (ref 4.2–5.4)
SODIUM SERPL-SCNC: 139 MMOL/L (ref 135–145)
TROPONIN I SERPL-MCNC: <0.02 NG/ML (ref 0–0.04)
TROPONIN I SERPL-MCNC: <0.02 NG/ML (ref 0–0.04)
WBC # BLD AUTO: 10.6 K/UL (ref 4.8–10.8)

## 2018-12-12 PROCEDURE — A9502 TC99M TETROFOSMIN: HCPCS

## 2018-12-12 PROCEDURE — 84484 ASSAY OF TROPONIN QUANT: CPT | Mod: 91

## 2018-12-12 PROCEDURE — 93010 ELECTROCARDIOGRAM REPORT: CPT | Performed by: INTERNAL MEDICINE

## 2018-12-12 PROCEDURE — A9270 NON-COVERED ITEM OR SERVICE: HCPCS | Performed by: HOSPITALIST

## 2018-12-12 PROCEDURE — 700102 HCHG RX REV CODE 250 W/ 637 OVERRIDE(OP): Performed by: INTERNAL MEDICINE

## 2018-12-12 PROCEDURE — 85025 COMPLETE CBC W/AUTO DIFF WBC: CPT

## 2018-12-12 PROCEDURE — G0378 HOSPITAL OBSERVATION PER HR: HCPCS

## 2018-12-12 PROCEDURE — 700111 HCHG RX REV CODE 636 W/ 250 OVERRIDE (IP): Performed by: HOSPITALIST

## 2018-12-12 PROCEDURE — A9270 NON-COVERED ITEM OR SERVICE: HCPCS | Performed by: INTERNAL MEDICINE

## 2018-12-12 PROCEDURE — 700111 HCHG RX REV CODE 636 W/ 250 OVERRIDE (IP)

## 2018-12-12 PROCEDURE — 80053 COMPREHEN METABOLIC PANEL: CPT

## 2018-12-12 PROCEDURE — 93005 ELECTROCARDIOGRAM TRACING: CPT | Performed by: HOSPITALIST

## 2018-12-12 PROCEDURE — 93018 CV STRESS TEST I&R ONLY: CPT | Performed by: INTERNAL MEDICINE

## 2018-12-12 PROCEDURE — 700102 HCHG RX REV CODE 250 W/ 637 OVERRIDE(OP): Performed by: HOSPITALIST

## 2018-12-12 PROCEDURE — 78452 HT MUSCLE IMAGE SPECT MULT: CPT | Mod: 26 | Performed by: INTERNAL MEDICINE

## 2018-12-12 PROCEDURE — 99217 PR OBSERVATION CARE DISCHARGE: CPT | Performed by: INTERNAL MEDICINE

## 2018-12-12 RX ORDER — BUDESONIDE AND FORMOTEROL FUMARATE DIHYDRATE 160; 4.5 UG/1; UG/1
2 AEROSOL RESPIRATORY (INHALATION)
Status: DISCONTINUED | OUTPATIENT
Start: 2018-12-12 | End: 2018-12-12 | Stop reason: HOSPADM

## 2018-12-12 RX ORDER — REGADENOSON 0.08 MG/ML
INJECTION, SOLUTION INTRAVENOUS
Status: COMPLETED
Start: 2018-12-12 | End: 2018-12-12

## 2018-12-12 RX ORDER — ETODOLAC 200 MG/1
CAPSULE ORAL
Status: COMPLETED
Start: 2018-12-12 | End: 2018-12-12

## 2018-12-12 RX ORDER — BUDESONIDE AND FORMOTEROL FUMARATE DIHYDRATE 160; 4.5 UG/1; UG/1
2 AEROSOL RESPIRATORY (INHALATION)
Status: DISCONTINUED | OUTPATIENT
Start: 2018-12-12 | End: 2018-12-12

## 2018-12-12 RX ORDER — POTASSIUM CHLORIDE 20 MEQ/1
40 TABLET, EXTENDED RELEASE ORAL ONCE
Status: COMPLETED | OUTPATIENT
Start: 2018-12-12 | End: 2018-12-12

## 2018-12-12 RX ADMIN — ESCITALOPRAM OXALATE 10 MG: 10 TABLET ORAL at 05:39

## 2018-12-12 RX ADMIN — OMEPRAZOLE 40 MG: 20 CAPSULE, DELAYED RELEASE ORAL at 05:39

## 2018-12-12 RX ADMIN — FOLIC ACID 1 MG: 1 TABLET ORAL at 05:39

## 2018-12-12 RX ADMIN — LAMOTRIGINE 100 MG: 100 TABLET ORAL at 05:41

## 2018-12-12 RX ADMIN — ETODOLAC 500 MG: 300 CAPSULE ORAL at 11:43

## 2018-12-12 RX ADMIN — ASPIRIN 325 MG ORAL TABLET 325 MG: 325 PILL ORAL at 05:38

## 2018-12-12 RX ADMIN — POTASSIUM CHLORIDE 40 MEQ: 1500 TABLET, EXTENDED RELEASE ORAL at 11:09

## 2018-12-12 RX ADMIN — PREDNISONE 10 MG: 20 TABLET ORAL at 05:40

## 2018-12-12 RX ADMIN — ROSUVASTATIN CALCIUM 10 MG: 10 TABLET, FILM COATED ORAL at 00:36

## 2018-12-12 RX ADMIN — REGADENOSON 0.4 MG: 0.08 INJECTION, SOLUTION INTRAVENOUS at 09:28

## 2018-12-12 RX ADMIN — CETIRIZINE HYDROCHLORIDE 10 MG: 10 TABLET, FILM COATED ORAL at 05:39

## 2018-12-12 RX ADMIN — MONTELUKAST SODIUM 10 MG: 10 TABLET, FILM COATED ORAL at 00:36

## 2018-12-12 ASSESSMENT — PATIENT HEALTH QUESTIONNAIRE - PHQ9
SUM OF ALL RESPONSES TO PHQ9 QUESTIONS 1 AND 2: 0
2. FEELING DOWN, DEPRESSED, IRRITABLE, OR HOPELESS: NOT AT ALL
2. FEELING DOWN, DEPRESSED, IRRITABLE, OR HOPELESS: NOT AT ALL
1. LITTLE INTEREST OR PLEASURE IN DOING THINGS: NOT AT ALL
1. LITTLE INTEREST OR PLEASURE IN DOING THINGS: NOT AT ALL
SUM OF ALL RESPONSES TO PHQ9 QUESTIONS 1 AND 2: 0

## 2018-12-12 ASSESSMENT — ENCOUNTER SYMPTOMS
VOMITING: 0
SINUS PAIN: 0
MYALGIAS: 0
BLURRED VISION: 0
CONSTIPATION: 0
WHEEZING: 0
PHOTOPHOBIA: 0
WEIGHT LOSS: 0
WEAKNESS: 1
SORE THROAT: 0
PALPITATIONS: 0
DIAPHORESIS: 1
ABDOMINAL PAIN: 0
DOUBLE VISION: 0
DIARRHEA: 0
HEADACHES: 0
FEVER: 0
DIZZINESS: 0
NAUSEA: 0
CHILLS: 0
SHORTNESS OF BREATH: 1
COUGH: 1

## 2018-12-12 ASSESSMENT — COGNITIVE AND FUNCTIONAL STATUS - GENERAL
DAILY ACTIVITIY SCORE: 24
SUGGESTED CMS G CODE MODIFIER DAILY ACTIVITY: CH
SUGGESTED CMS G CODE MODIFIER MOBILITY: CH
MOBILITY SCORE: 24

## 2018-12-12 ASSESSMENT — PAIN SCALES - GENERAL: PAINLEVEL_OUTOF10: 0

## 2018-12-12 NOTE — PROGRESS NOTES
Received report from eSrena REYES. Discussed plan of care and safety measures in place. No further needs at this time.

## 2018-12-12 NOTE — PROGRESS NOTES
Patient presents to NM suite for cardiac stress test with MPI. Nursing goals identified: knowledge deficit, potential for anxiety r/t stress test, potential for compromised cardiac output. Care plan includes educating patient, reassurance and access to ACLS cart/team. Labs and ECG reviewed. No caffeine and NPO confirmed. Resting images attained and patient prepped for pharmacological stress study. Lexiscan given while patient ambulated on TM x 2 mins. Patient reported these symptoms: Dyspnea. Caffeinated beverage provided. Symptoms resolved.

## 2018-12-12 NOTE — PROGRESS NOTES
· 2 RN skin check complete. With Christine REYES. Skin intact.  · Devices in place : None.  · Skin assessed under devices: None.  · Confirmed pressure ulcers found on: None.  · New potential pressure ulcers noted on: None.   · The following interventions in place draw sheet for positioning, encouraged ambulation and moving in bed.

## 2018-12-12 NOTE — PROGRESS NOTES
NPO since MN. Pt medicated. Warm blankets provided for comfort. Denies pain overnight, no diaphoresis. Called  per pt request and transferred to room. Seems less anxious and states she was able to sleep overnight. Declines needs at this time.

## 2018-12-12 NOTE — PROGRESS NOTES
Tele summary  Rhythm: SR/SB  Rate: high 40's-70's  Ectopy: Occ PVC's  NH: 0.20  QRS: 0.08  QT: 0.44

## 2018-12-12 NOTE — ED TRIAGE NOTES
Chief Complaint   Patient presents with   • Shortness of Breath     Recent TX for Pna, pt reports started to feel more short of breath today   • Fatigue     /84   Pulse 67   Temp 37.1 °C (98.8 °F) (Temporal)   Resp 18   Ht 1.829 m (6')   Wt 113.1 kg (249 lb 5.4 oz)   SpO2 92%   BMI 33.82 kg/m²     Pt did c/o CP earlier this AM, feels it may be r/t recent PNA

## 2018-12-12 NOTE — ED PROVIDER NOTES
ED Provider Note    CHIEF COMPLAINT  Chief Complaint   Patient presents with   • Shortness of Breath     Recent TX for Pna, pt reports started to feel more short of breath today   • Fatigue       HPI  Arely Haynes is a 58 y.o. female who presents to the Emergency Department with shortness of breath, cough and diaphoresis.  Recently she was admitted for persistent asthma, she does have psoriatic arthritis on immunomodulatory therapy.  At that time she was admitted with pneumonia and treated with IV Rocephin and Zithromax.  Pneumonia was ruled out and pro-calcitonin was negative.  She was treated with prednisone which was tapered and discharged home in stable condition.  However she returned to the urgent care today secondary to recurrence of shortness of breath.  The urgent care provider was concerned that had EKG changes and appeared diaphoretic.  She does have risk factor for heart disease    CAD Risk factor review: No prior known CAD, no dislipidemia, positive family history with mother and sister with MI in 50's, no diabetes, nosmoking, no obesity, no hypertension, no cocaine or methamphetamines.    Pulmonary Embolist risk factor review:  no recent surgery, no history of DVT or PE, no hemoptysis, no unilateral leg swelling, no malignancy, no BCP or hormone therapy. Prior work-up for PE negative.    REVIEW OF SYSTEMS   As above all other systems are negative.    PAST MEDICAL HISTORY   has a past medical history of Asthma; GERD (gastroesophageal reflux disease); Immunocompromised (HCC); Psoriasis; and Rheumatoid arthritis (HCC).    FAMILY HISTORY  Family History   Problem Relation Age of Onset   • Hyperlipidemia Mother    • Heart Attack Mother    • Lung Disease Father    • Cancer Father    • Hyperlipidemia Father    • Hyperlipidemia Sister         SOCIAL HISTORY  Social History     Social History Main Topics   • Smoking status: Never Smoker   • Smokeless tobacco: Never Used   • Alcohol use Yes   • Drug use: No    • Sexual activity: Not on file       SURGICAL HISTORY   has a past surgical history that includes lumbar fusion posterior and hip replacement, total.    CURRENT MEDICATIONS  Reviewed.  See Encounter Summary.  Include   Current Facility-Administered Medications:     Current Outpatient Prescriptions:   •  azithromycin (ZITHROMAX) 500 MG tablet, Take 1 Tab by mouth every day., Disp: 3 Tab, Rfl: 0  •  predniSONE (DELTASONE) 10 MG Tab, Take 1 Tab by mouth every day., Disp: 12 Tab, Rfl: 0  •  albuterol 108 (90 Base) MCG/ACT Aero Soln inhalation aerosol, Inhale 2 Puffs by mouth every 6 hours as needed for Shortness of Breath., Disp: , Rfl:   •  Methotrexate Sodium (METHOTREXATE PF) 25 MG/ML Solution inj, 20 mg by Injection route every Wednesday., Disp: , Rfl:   •  omeprazole (PRILOSEC) 40 MG delayed-release capsule, Take 40 mg by mouth 2 times a day., Disp: , Rfl:   •  escitalopram (LEXAPRO) 20 MG tablet, Take 20 mg by mouth every morning. = 30 mg daily, Disp: , Rfl:   •  ipratropium-albuterol (DUONEB) 0.5-2.5 (3) MG/3ML nebulizer solution, USE 3 ML FOUR TIMES A DAY VIA NEBULIZER BY INHALATION 30 DAYS, Disp: , Rfl:   •  Abatacept 125 MG/ML Solution Auto-injector, 125 mg by Subdermal route every Saturday., Disp: , Rfl:   •  fluticasone-salmeterol (ADVAIR DISKUS) 500-50 MCG/DOSE AEROSOL POWDER, BREATH ACTIVATED, Inhale 1 Puff by mouth 2 times a day., Disp: , Rfl:   •  cetirizine (ZYRTEC) 10 MG Tab, Take 10 mg by mouth., Disp: , Rfl:   •  escitalopram (LEXAPRO) 10 MG Tab, Take 10 mg by mouth every morning. = 30 mg daily, Disp: , Rfl:   •  etodolac (LODINE) 500 MG tablet, Take 500 mg by mouth 2 times a day., Disp: , Rfl:   •  folic acid (FOLVITE) 1 MG Tab, Take 1 mg by mouth every morning., Disp: , Rfl:   •  lamoTRIgine (LAMICTAL) 100 MG Tab, Take 100 mg by mouth every morning., Disp: , Rfl:   •  montelukast (SINGULAIR) 10 MG Tab, Take 10 mg by mouth every bedtime., Disp: , Rfl:   •  rosuvastatin (CRESTOR) 10 MG Tab, Take 10  mg by mouth every bedtime., Disp: , Rfl:       ALLERGIES  Allergies   Allergen Reactions   • Ampicillin-Sulbactam Sodium Hives     Tolerates cefazolin  Pt does not feel this is an active allergy.    • Shellfish-Derived Products Anaphylaxis     lobster   • Iodine      Contrast- Oral And Iv Dye       PHYSICAL EXAM  VITAL SIGNS: /84   Pulse 67   Temp 37.1 °C (98.8 °F) (Temporal)   Resp 18   Ht 1.829 m (6')   Wt 113.1 kg (249 lb 5.4 oz)   SpO2 92%   BMI 33.82 kg/m²   Constitutional:  Pleasant , able to answer questions  HENT: Nose is normal in appearance, external ears are normal,  moist mucous membranes  Eyes: Anicteric,  pupils are equal round and reactive, there is no conjunctival drainage or pallor   Neck: The trachea is midline, there is no obvious mass or meningeal signs  Cardiovascular: Good perfusion,  regular rate and rhythm without murmurs gallops or rubs  Thorax & Lungs: Respiratory rate and effort are normal. There is normal chest excursion with respiration.  No wheezes rhonchi or rales noted.  Abdomen: Abdomen is normal in appearance, no gross peritoneal signs  normal bowel sounds, no pain with cough  :   No CVA tenderness to palpation  Musculoskeletal: No deformities noted in all 4 extremities.   Skin: Visualized skin is warm without rash.  Neurologic:  Cranial nerves II through XII are intact there is no focal abnormality noted.  Psychiatric: Normal mood and mentation    RADIOLOGY/PROCEDURES  Imaging Studies:  CXR at  normal.    Pertinent Labs:    Results for orders placed or performed during the hospital encounter of 12/11/18   CBC with Differential   Result Value Ref Range    WBC 12.5 (H) 4.8 - 10.8 K/uL    RBC 4.23 4.20 - 5.40 M/uL    Hemoglobin 13.4 12.0 - 16.0 g/dL    Hematocrit 39.5 37.0 - 47.0 %    MCV 93.4 81.4 - 97.8 fL    MCH 31.7 27.0 - 33.0 pg    MCHC 33.9 33.6 - 35.0 g/dL    RDW 45.5 35.9 - 50.0 fL    Platelet Count 322 164 - 446 K/uL    MPV 9.0 9.0 - 12.9 fL     Neutrophils-Polys 72.00 44.00 - 72.00 %    Lymphocytes 21.70 (L) 22.00 - 41.00 %    Monocytes 5.30 0.00 - 13.40 %    Eosinophils 0.10 0.00 - 6.90 %    Basophils 0.20 0.00 - 1.80 %    Immature Granulocytes 0.70 0.00 - 0.90 %    Nucleated RBC 0.00 /100 WBC    Neutrophils (Absolute) 8.97 (H) 2.00 - 7.15 K/uL    Lymphs (Absolute) 2.70 1.00 - 4.80 K/uL    Monos (Absolute) 0.66 0.00 - 0.85 K/uL    Eos (Absolute) 0.01 0.00 - 0.51 K/uL    Baso (Absolute) 0.03 0.00 - 0.12 K/uL    Immature Granulocytes (abs) 0.09 0.00 - 0.11 K/uL    NRBC (Absolute) 0.00 K/uL   Complete Metabolic Panel (CMP)   Result Value Ref Range    Sodium 135 135 - 145 mmol/L    Potassium 3.6 3.6 - 5.5 mmol/L    Chloride 106 96 - 112 mmol/L    Co2 20 20 - 33 mmol/L    Anion Gap 9.0 0.0 - 11.9    Glucose 101 (H) 65 - 99 mg/dL    Bun 26 (H) 8 - 22 mg/dL    Creatinine 0.72 0.50 - 1.40 mg/dL    Calcium 9.2 8.4 - 10.2 mg/dL    AST(SGOT) 22 12 - 45 U/L    ALT(SGPT) 25 2 - 50 U/L    Alkaline Phosphatase 77 30 - 99 U/L    Total Bilirubin 0.8 0.1 - 1.5 mg/dL    Albumin 4.0 3.2 - 4.9 g/dL    Total Protein 7.4 6.0 - 8.2 g/dL    Globulin 3.4 1.9 - 3.5 g/dL    A-G Ratio 1.2 g/dL   Btype Natriuretic Peptide (BNP)   Result Value Ref Range    B Natriuretic Peptide 17 0 - 100 pg/mL   Prothrombin Time (PT/INR)   Result Value Ref Range    PT 13.5 12.0 - 14.6 sec    INR 1.04 0.87 - 1.13   APTT   Result Value Ref Range    APTT 26.0 24.7 - 36.0 sec   Lipase   Result Value Ref Range    Lipase 37 7 - 58 U/L   Troponin STAT   Result Value Ref Range    Troponin I <0.02 0.00 - 0.04 ng/mL   D-Dimer   Result Value Ref Range    D-Dimer Screen 0.43 0.00 - 0.50 ug/mL (FEU)   ESTIMATED GFR   Result Value Ref Range    GFR If African American >60 >60 mL/min/1.73 m 2    GFR If Non African American >60 >60 mL/min/1.73 m 2   EKG   Result Value Ref Range    Report       [facility] Emergency Dept.    Test Date:  2018-12-11  Pt Name:    VIOLA CORONADO               Department: [facility]  MRN:        [id number]                      Room:  Gender:     Female                       Technician: IRA  :        [id number]                   Requested By:ER TRIAGE PROTOCOL  Order #:    [id number]                    Reading MD:    Measurements  Intervals                                Axis  Rate:       60                           P:          53  MN:         172                          QRS:        -19  QRSD:       90                           T:          49  QT:         436  QTc:        436    Interpretive Statements  SINUS RHYTHM  PROBABLE INFERIOR INFARCT, OLD  Compared to ECG 2018 00:26:27  Myocardial infarct finding now present  T-wave abnormality no longer present           I interpreted this EKG myself.  This is a 12-lead study.  The rhythm is sinus with a rate of 60.  There are no ST segment changes, but q wave in lead 3 and t wave inversions noted in septal leads.  Interpretation: No ST segment elevation myocardial infarction.        COURSE & MEDICAL DECISION MAKING  Nursing notes and vital signs were reviewed. (See chart for details)  The patients nursing records were reviewed, history was obtained from the patient;     The patient presents with chest pain, and the differential diagnosis includes but is not limited to  acute coronary syndrome, anxiety disorder, musculoskeletal chest pain,  peptic ulcer disease, pericarditis, pneumothorax, or pulmonary embolism.   I am most concerned about ACS as the patient has EKG changes in septal distribution and shortness of breath with diaphoresis with strong family history and dyslipidemia     Initial orders in the Emergency Department included  CBC, CMP, troponin BNP, PCXR,  Ddimer and initial treatment in the Emergency Department included ASA and the patient received IV  Saline lock    ED testing reveals negative DDimer, elevated DDimer likely from prednisone.    Additional work-up planned includes inpatient admission for stress test.   This was discussed with patient    FINAL IMPRESSION  1. Chest Pain  2. Shortness of Breath     DISPOSITION  Admit    Electronically signed by: Sugey Campbell, 12/11/2018 8:21 PM

## 2018-12-12 NOTE — ASSESSMENT & PLAN NOTE
Atypical and I suspect pleuritic vs chest wall pain from recent pneumonia.   however- She has ekg changes and a significant family history.   She is also immunocompromised.   Admit  Serial trops  Tele  Chemical stress in the am with imaging

## 2018-12-12 NOTE — PROGRESS NOTES
Telemetry Shift Summary    Rhythm SR/SB  HR Range 48-70  Ectopy Rare PVC  Measurements 0.18/0.08/0.42        Normal Values  Rhythm SR  HR Range    Measurements 0.12-0.20 / 0.06-0.10  / 0.30-0.52

## 2018-12-12 NOTE — DISCHARGE SUMMARY
Discharge Summary    CHIEF COMPLAINT ON ADMISSION  Chief Complaint   Patient presents with   • Shortness of Breath     Recent TX for Pna, pt reports started to feel more short of breath today   • Fatigue       Reason for Admission  sent by MD; abd EKG     Admission Date  12/11/2018    CODE STATUS  Full Code    HPI & HOSPITAL COURSE  This is a 58 y.o. female here with chest pain.  Patient stated that it was substernal, started the morning of admission around 10 AM.  Was resolved upon arrival and remained resolved.  Patient does have a history of psoriatic arthritis, does have occasional pains but generally not in her chest.  Patient was admitted, monitored on telemetry, EKGs were repeated, troponins were trended.  No change so patient went for stress test which was normal.  At this point in time patient is asymptomatic and deemed stable for discharge.       Therefore, she is discharged in good and stable condition to home with close outpatient follow-up.      Discharge Date  12/12/18    FOLLOW UP ITEMS POST DISCHARGE  Follow-up with primary care provider within 1 week  Emergency department or 911 in case of emergency    DISCHARGE DIAGNOSES  Active Problems:    Psoriatic arthritis (HCC) POA: Yes    Moderate persistent asthma without complication POA: Yes  Resolved Problems:    Chest pain POA: Unknown      FOLLOW UP  Future Appointments  Date Time Provider Department Center   1/10/2019 3:30 PM Isabel Young M.D. [facility] None     No follow-up provider specified.    MEDICATIONS ON DISCHARGE     Medication List      CHANGE how you take these medications      Instructions   predniSONE 10 MG Tabs  What changed:  · how much to take  · additional instructions  Commonly known as:  DELTASONE   Doctor's comments:  30mg daily 1st 2 days  20mg daily 2nd 2days  10mg daily 3rd 2days  Take 1 Tab by mouth every day.  Dose:  10 mg        CONTINUE taking these medications      Instructions   Abatacept 125 MG/ML Soaj   125 mg by Subdermal  route every Saturday.  Dose:  125 mg     ADVAIR DISKUS 500-50 MCG/DOSE Aepb  Generic drug:  fluticasone-salmeterol   Inhale 1 Puff by mouth 2 times a day.  Dose:  1 Puff     albuterol 108 (90 Base) MCG/ACT Aers inhalation aerosol   Inhale 2 Puffs by mouth every 6 hours as needed for Shortness of Breath.  Dose:  2 Puff     cetirizine 10 MG Tabs  Commonly known as:  ZYRTEC   Take 10 mg by mouth.  Dose:  10 mg     * LEXAPRO 20 MG tablet  Generic drug:  escitalopram   Take 20 mg by mouth every morning. = 30 mg daily  Dose:  20 mg     * escitalopram 10 MG Tabs  Commonly known as:  LEXAPRO   Take 10 mg by mouth every morning. = 30 mg daily  Dose:  10 mg     etodolac 500 MG tablet  Commonly known as:  LODINE   Take 500 mg by mouth 2 times a day.  Dose:  500 mg     folic acid 1 MG Tabs  Commonly known as:  FOLVITE   Take 1 mg by mouth every morning.  Dose:  1 mg     ipratropium-albuterol 0.5-2.5 (3) MG/3ML nebulizer solution  Commonly known as:  DUONEB   USE 3 ML FOUR TIMES A DAY VIA NEBULIZER BY INHALATION 30 DAYS     lamoTRIgine 100 MG Tabs  Commonly known as:  LAMICTAL   Take 100 mg by mouth every morning.  Dose:  100 mg     methotrexate PF 25 MG/ML Soln inj   20 mg by Injection route every Wednesday.  Dose:  20 mg     montelukast 10 MG Tabs  Commonly known as:  SINGULAIR   Take 10 mg by mouth every bedtime.  Dose:  10 mg     PRILOSEC 40 MG delayed-release capsule  Generic drug:  omeprazole   Take 40 mg by mouth 2 times a day.  Dose:  40 mg     rosuvastatin 10 MG Tabs  Commonly known as:  CRESTOR   Take 10 mg by mouth every bedtime.  Dose:  10 mg        * This list has 2 medication(s) that are the same as other medications prescribed for you. Read the directions carefully, and ask your doctor or other care provider to review them with you.                Allergies  Allergies   Allergen Reactions   • Ampicillin-Sulbactam Sodium Hives     Tolerates cefazolin  Pt does not feel this is an active allergy.    •  Shellfish-Derived Products Anaphylaxis     lobster   • Iodine      Contrast- Oral And Iv Dye       DIET  Orders Placed This Encounter   Procedures   • Diet Order Regular     Standing Status:   Standing     Number of Occurrences:   1     Order Specific Question:   Diet:     Answer:   Regular [1]     Order Specific Question:   Miscellaneous modifications:     Answer:   No Decaf, No Caffeine(for test) [11]     Comments:   Protocol 1313 Patient to have no caffeine for 12 hours prior to exam (decaf, coffee, cola, tea, chocolate)       ACTIVITY  As tolerated.  Weight bearing as tolerated    CONSULTATIONS  None    PROCEDURES  None    LABORATORY  Lab Results   Component Value Date    SODIUM 139 12/12/2018    POTASSIUM 3.2 (L) 12/12/2018    CHLORIDE 107 12/12/2018    CO2 27 12/12/2018    GLUCOSE 116 (H) 12/12/2018    BUN 20 12/12/2018    CREATININE 0.65 12/12/2018        Lab Results   Component Value Date    WBC 10.6 12/12/2018    HEMOGLOBIN 13.1 12/12/2018    HEMATOCRIT 38.7 12/12/2018    PLATELETCT 293 12/12/2018

## 2018-12-12 NOTE — PROGRESS NOTES
Pt is back from stress test. Pt is A&Ox4, resting comfortably in bed. Assessment completed. Due medication have been given. Bed is in lowest postion, and side rails up x2, pt calls when needs assistance and call light within reach.

## 2018-12-12 NOTE — PROGRESS NOTES
Assessment complete, medicated per MAR. Discussed POC and pain, allowed time to ask questions. Pt resting in bed, RR even and unlabored, dry non productive cough present. Denies pain at this time. Not diaphoretic. Pt educated to call for assistance, if there is any pain or diaphoresis. Declines needs at this time. Safety precautions in place.

## 2018-12-12 NOTE — H&P
Hospital Medicine History & Physical Note    Date of Service  12/11/2018    Primary Care Physician  Pcp Pt States None    Consultants  none    Code Status  full    Chief Complaint  Chest pain    History of Presenting Illness  58 y.o. female who presented 12/11/2018 with chest pain. It was substernal and started this AM around 10. It is gone now. It did not radiate and she identifies no exacerbating or alleviating factors. She has had a recent pneumonia and was treated with antibiotics and is now being maintained on prednisone given her history of asthma. She says she has had a mild cough now and intermittent chills but has generally been improving.     Review of Systems  Review of Systems   Constitutional: Negative for chills and fever.   HENT: Negative for sore throat.    Eyes: Negative for blurred vision and pain.   Respiratory: Positive for cough. Negative for shortness of breath.    Cardiovascular: Positive for chest pain. Negative for palpitations.   Gastrointestinal: Negative for abdominal pain, nausea and vomiting.   Genitourinary: Negative for dysuria and urgency.   Musculoskeletal: Negative for back pain and neck pain.   Skin: Negative for itching and rash.   Neurological: Negative for dizziness, tingling and headaches.   Psychiatric/Behavioral: Negative for depression. The patient does not have insomnia.    All other systems reviewed and are negative.      Past Medical History   has a past medical history of Asthma; GERD (gastroesophageal reflux disease); Immunocompromised (HCC); Psoriasis; and Rheumatoid arthritis (HCC).    Surgical History   has a past surgical history that includes lumbar fusion posterior and hip replacement, total.     Family History  family history includes Cancer in her father; Heart Attack in her mother; Hyperlipidemia in her father, mother, and sister; Lung Disease in her father.     Social History   reports that she has never smoked. She has never used smokeless tobacco. She  reports that she drinks alcohol. She reports that she does not use drugs.    Allergies  Allergies   Allergen Reactions   • Ampicillin-Sulbactam Sodium Hives     Tolerates cefazolin  Pt does not feel this is an active allergy.    • Shellfish-Derived Products Anaphylaxis     lobster   • Iodine      Contrast- Oral And Iv Dye       Medications  Prior to Admission Medications   Prescriptions Last Dose Informant Patient Reported? Taking?   Abatacept 125 MG/ML Solution Auto-injector 2018 at Unknown time  Yes No   Si mg by Subdermal route every Saturday.   Methotrexate Sodium (METHOTREXATE PF) 25 MG/ML Solution inj 2018 at Unknown time  Yes No   Si mg by Injection route every Wednesday.   albuterol 108 (90 Base) MCG/ACT Aero Soln inhalation aerosol 2018 at Unknown time  Yes No   Sig: Inhale 2 Puffs by mouth every 6 hours as needed for Shortness of Breath.   cetirizine (ZYRTEC) 10 MG Tab 2018 at Unknown time  Yes No   Sig: Take 10 mg by mouth.   escitalopram (LEXAPRO) 10 MG Tab 2018 at Unknown time  Yes No   Sig: Take 10 mg by mouth every morning. = 30 mg daily   escitalopram (LEXAPRO) 20 MG tablet 2018 at Unknown time  Yes No   Sig: Take 20 mg by mouth every morning. = 30 mg daily   etodolac (LODINE) 500 MG tablet 2018 at Unknown time  Yes No   Sig: Take 500 mg by mouth 2 times a day.   fluticasone-salmeterol (ADVAIR DISKUS) 500-50 MCG/DOSE AEROSOL POWDER, BREATH ACTIVATED 2018 at hs  Yes No   Sig: Inhale 1 Puff by mouth 2 times a day.   folic acid (FOLVITE) 1 MG Tab 2018 at Unknown time  Yes No   Sig: Take 1 mg by mouth every morning.   ipratropium-albuterol (DUONEB) 0.5-2.5 (3) MG/3ML nebulizer solution 2018 at Unknown time  Yes No   Sig: USE 3 ML FOUR TIMES A DAY VIA NEBULIZER BY INHALATION 30 DAYS   lamoTRIgine (LAMICTAL) 100 MG Tab 2018 at Unknown time  Yes No   Sig: Take 100 mg by mouth every morning.   montelukast (SINGULAIR) 10 MG Tab  12/11/2018 at Unknown time  Yes No   Sig: Take 10 mg by mouth every bedtime.   omeprazole (PRILOSEC) 40 MG delayed-release capsule 12/11/2018 at Unknown time  Yes No   Sig: Take 40 mg by mouth 2 times a day.   predniSONE (DELTASONE) 10 MG Tab   No No   Sig: Take 1 Tab by mouth every day.   Patient taking differently: Take 20 mg by mouth every day. Med to be stopped on saturday   rosuvastatin (CRESTOR) 10 MG Tab 12/11/2018 at Unknown time  Yes No   Sig: Take 10 mg by mouth every bedtime.      Facility-Administered Medications: None       Physical Exam  Temp:  [37.1 °C (98.8 °F)] 37.1 °C (98.8 °F)  Pulse:  [67] 67  Resp:  [18] 18  BP: (149)/(84) 149/84    Physical Exam   Constitutional: She is oriented to person, place, and time. She appears well-developed and well-nourished. No distress.   Patient seen and examined  Discussed plan with RN   BRITTONT:   Right Ear: External ear normal.   Left Ear: External ear normal.   Nose: Nose normal.   Eyes: Conjunctivae are normal. Right eye exhibits no discharge. Left eye exhibits no discharge.   Neck: No JVD present.   Cardiovascular: Regular rhythm and normal heart sounds.    No murmur heard.  Cap refill 2sec  Pulses 2+ throughout     Pulmonary/Chest: Effort normal and breath sounds normal. No stridor. No respiratory distress. She has no wheezes. She has no rales.   Abdominal: Soft. Bowel sounds are normal. She exhibits no distension. There is no tenderness.   Musculoskeletal: She exhibits no edema or tenderness.   Neurological: She is alert and oriented to person, place, and time.   Skin: Skin is warm and dry. She is not diaphoretic. No erythema.   Normal skin  Color.    Psychiatric: She has a normal mood and affect. Her behavior is normal.   Nursing note and vitals reviewed.      Laboratory:  Recent Labs      12/11/18 2030   WBC  12.5*   RBC  4.23   HEMOGLOBIN  13.4   HEMATOCRIT  39.5   MCV  93.4   MCH  31.7   MCHC  33.9   RDW  45.5   PLATELETCT  322   MPV  9.0     Recent Labs       12/11/18 2030   SODIUM  135   POTASSIUM  3.6   CHLORIDE  106   CO2  20   GLUCOSE  101*   BUN  26*   CREATININE  0.72   CALCIUM  9.2     Recent Labs      12/11/18 2030   ALTSGPT  25   ASTSGOT  22   ALKPHOSPHAT  77   TBILIRUBIN  0.8   LIPASE  37   GLUCOSE  101*     Recent Labs      12/11/18 2030   APTT  26.0   INR  1.04     Recent Labs      12/11/18 2030   BNPBTYPENAT  17         Recent Labs      12/11/18 2030   TROPONINI  <0.02       Urinalysis:    No results found     Imaging:  NM-CARDIAC STRESS TEST    (Results Pending)         Assessment/Plan:  I anticipate this patient is appropriate for observation status at this time.    Chest pain   Assessment & Plan    Atypical and I suspect pleuritic vs chest wall pain from recent pneumonia.   however- She has ekg changes and a significant family history.   She is also immunocompromised.   Admit  Serial trops  Tele  Chemical stress in the am with imaging       Moderate persistent asthma without complication- (present on admission)   Assessment & Plan    On steroids. Continue. No flare. Rt protocol     Psoriatic arthritis (HCC)- (present on admission)   Assessment & Plan    Continue meds  No flare.          VTE prophylaxis: none

## 2018-12-12 NOTE — PROGRESS NOTES
Subjective:   Arely Haynes is a 58 y.o. female who presents for Asthma (diaphoretic, SOB, fatigue, she is immunocompromised from meds from arthritis)        HPI   Patient presents with new onset SOB, cough, diaphoresis and fatigue that started today. She is on arthritis medications and wonders if she has developed pneumonia or bronchitis. She was recently discharged from the hospital earlier this month.  She states the cough is non productive and dry, no worsening with timing. She has had intermittent diaphoresis today. Denies alleviating or aggravating factors. States that she is seen by a Pulmonologist, but recently moved to the area. She has been taking 20mg of Prednisone daily as prescribed by her pulmonologist.  Patient with hx of asthma.  Patient with family hx mother with heart attack.    Review of Systems   Constitutional: Positive for diaphoresis and malaise/fatigue. Negative for chills, fever and weight loss.   HENT: Negative for congestion, ear pain, sinus pain and sore throat.    Eyes: Negative for blurred vision, double vision and photophobia.   Respiratory: Positive for cough and shortness of breath. Negative for wheezing.    Cardiovascular: Negative for chest pain and palpitations.   Gastrointestinal: Negative for abdominal pain, constipation, diarrhea, nausea and vomiting.   Musculoskeletal: Negative for myalgias.   Skin: Negative for itching and rash.   Neurological: Positive for weakness. Negative for dizziness and headaches.     PMH:  has a past medical history of Asthma; GERD (gastroesophageal reflux disease); Immunocompromised (HCC); Psoriasis; and Rheumatoid arthritis (HCC).  MEDS: No current facility-administered medications for this visit.   No current outpatient prescriptions on file.    Facility-Administered Medications Ordered in Other Visits:   •  budesonide-formoterol (SYMBICORT) 160-4.5 MCG/ACT inhaler 2 Puff, 2 Puff, Inhalation, BID (RT), Taye Blackwell M.D., Stopped at 12/12/18  0800  •  cetirizine (ZYRTEC) tablet 10 mg, 10 mg, Oral, DAILY, Taye Blackwell M.D., 10 mg at 12/12/18 0539  •  escitalopram (LEXAPRO) tablet 10 mg, 10 mg, Oral, QAM, Taye Blackwell M.D., 10 mg at 12/12/18 0539  •  etodolac (LODINE) capsule 500 mg, 500 mg, Oral, BID, Taye Blackwell M.D., Stopped at 12/12/18 0600  •  folic acid (FOLVITE) tablet 1 mg, 1 mg, Oral, QAKUSH, Taye Blackwell M.D., 1 mg at 12/12/18 0539  •  lamoTRIgine (LAMICTAL) tablet 100 mg, 100 mg, Oral, QAM, Taye Blackwell M.D., 100 mg at 12/12/18 0541  •  [START ON 12/19/2018] methotrexate PF inj 20 mg, 20 mg, Injection, Q WEDNESDAY, Taye Blackwell M.D.  •  montelukast (SINGULAIR) tablet 10 mg, 10 mg, Oral, QHS, Taye Blackwell M.D., 10 mg at 12/12/18 0036  •  omeprazole (PRILOSEC) capsule 40 mg, 40 mg, Oral, BID, Taye Blackwell M.D., 40 mg at 12/12/18 0539  •  predniSONE (DELTASONE) tablet 10 mg, 10 mg, Oral, DAILY, Taye Blackwell M.D., 10 mg at 12/12/18 0540  •  rosuvastatin (CRESTOR) tablet 10 mg, 10 mg, Oral, QHS, Taye Blackwell M.D., 10 mg at 12/12/18 0036  •  aspirin (ASA) tablet 325 mg, 325 mg, Oral, DAILY, 325 mg at 12/12/18 0538 **OR** aspirin (ASA) chewable tab 324 mg, 324 mg, Oral, DAILY **OR** aspirin (ASA) suppository 300 mg, 300 mg, Rectal, DAILY, Taye Blackwell M.D.  •  senna-docusate (PERICOLACE or SENOKOT S) 8.6-50 MG per tablet 2 Tab, 2 Tab, Oral, BID **AND** polyethylene glycol/lytes (MIRALAX) PACKET 1 Packet, 1 Packet, Oral, QDAY PRN **AND** magnesium hydroxide (MILK OF MAGNESIA) suspension 30 mL, 30 mL, Oral, QDAY PRN **AND** bisacodyl (DULCOLAX) suppository 10 mg, 10 mg, Rectal, QDAY PRN, Taye Blackwell M.D.  •  acetaminophen (TYLENOL) tablet 650 mg, 650 mg, Oral, Q6HRS PRN, Taye Blackwell M.D.  •  ondansetron (ZOFRAN) syringe/vial injection 4 mg, 4 mg, Intravenous, Q4HRS PRN, Taye Blackwell M.D.  •  ondansetron (ZOFRAN ODT) dispertab 4 mg, 4 mg, Oral, Q4HRS PRN, Taye Blackwell M.D.  •  promethazine  (PHENERGAN) tablet 12.5-25 mg, 12.5-25 mg, Oral, Q4HRS PRN, Taye Blackwell M.D.  •  promethazine (PHENERGAN) suppository 12.5-25 mg, 12.5-25 mg, Rectal, Q4HRS PRN, Taye Blackwell M.D.  •  prochlorperazine (COMPAZINE) injection 5-10 mg, 5-10 mg, Intravenous, Q4HRS PRN, Taye Blackwell M.D.  ALLERGIES:   Allergies   Allergen Reactions   • Ampicillin-Sulbactam Sodium Hives     Tolerates cefazolin  Pt does not feel this is an active allergy.    • Shellfish-Derived Products Anaphylaxis     lobster   • Iodine      Contrast- Oral And Iv Dye     SURGHX:   Past Surgical History:   Procedure Laterality Date   • HIP REPLACEMENT, TOTAL      Right   • LUMBAR FUSION POSTERIOR       SOCHX:  reports that she has never smoked. She has never used smokeless tobacco. She reports that she drinks alcohol. She reports that she does not use drugs.  FH: Family history was reviewed, no pertinent findings to report     Objective:   /68   Pulse 69   Temp 36.3 °C (97.4 °F)   Resp 18   Ht 1.829 m (6')   Wt 106.1 kg (234 lb)   SpO2 98%   BMI 31.74 kg/m²   Physical Exam   Constitutional: She is oriented to person, place, and time. She appears well-developed and well-nourished.  Non-toxic appearance. She does not have a sickly appearance. She appears ill. No distress.   HENT:   Head: Normocephalic.   Right Ear: Hearing and tympanic membrane normal.   Left Ear: Hearing and tympanic membrane normal.   Nose: Nose normal. No rhinorrhea. Right sinus exhibits no maxillary sinus tenderness and no frontal sinus tenderness. Left sinus exhibits no maxillary sinus tenderness and no frontal sinus tenderness.   Mouth/Throat: Oropharynx is clear and moist. No posterior oropharyngeal erythema. No tonsillar exudate.   Cardiovascular: Normal rate, regular rhythm and normal heart sounds.    No leg swelling   Pulmonary/Chest: Effort normal and breath sounds normal. No accessory muscle usage. No apnea, no tachypnea and no bradypnea. No respiratory  distress. She has no decreased breath sounds. She has no wheezes. She has no rales.   Abdominal: Soft. Normal appearance and bowel sounds are normal. There is no tenderness.   Lymphadenopathy:        Head (right side): No submandibular and no tonsillar adenopathy present.        Head (left side): No submandibular and no tonsillar adenopathy present.     She has no cervical adenopathy.   Neurological: She is alert and oriented to person, place, and time.   Skin: Skin is warm. No rash noted. She is diaphoretic.   Psychiatric: She has a normal mood and affect. Her speech is normal and behavior is normal. Judgment and thought content normal.   Vitals reviewed.        Assessment/Plan:   Assessment    1. Cough  - EKG - Clinic Performed  - DX-CHEST-2 VIEWS; Future    2. Diaphoresis    3. Shortness of breath    Xray negative; EKG with sinus rhythm    Due to patient's family hx, recent hospital discharge, and complaints of SOB and diaphoresis in office, will send to ER for further evaluation    Spoke to Dr Campbell in ER and aware patient is arriving    Differential diagnosis, natural history, supportive care, and indications for immediate follow-up discussed.

## 2018-12-12 NOTE — DISCHARGE INSTRUCTIONS
Discharge Instructions    Discharged to home by car with friend. Discharged via wheelchair, hospital escort: Yes.  Special equipment needed: Not Applicable    Be sure to schedule a follow-up appointment with your primary care doctor or any specialists as instructed.     Discharge Plan:   Diet Plan: Discussed  Activity Level: Discussed  Confirmed Follow up Appointment: Appointment Scheduled  Confirmed Symptoms Management: Discussed  Medication Reconciliation Updated: Yes  Influenza Vaccine Indication: Not indicated: Previously immunized this influenza season and > 8 years of age    I understand that a diet low in cholesterol, fat, and sodium is recommended for good health. Unless I have been given specific instructions below for another diet, I accept this instruction as my diet prescription.   Other diet: Regular    Special Instructions: None    · Is patient discharged on Warfarin / Coumadin?   No       Angina Pectoris  Angina pectoris is a very bad feeling in the chest, neck, or arm. Your doctor may call it angina. There are four types of angina. Angina is caused by a lack of blood in the middle and thickest layer of the heart wall (myocardium). Angina may feel like a crushing or squeezing pain in the chest. It may feel like tightness or heavy pressure in the chest. Some people say it feels like gas, heartburn, or indigestion. Some people have symptoms other than pain. These include:  · Shortness of breath.  · Cold sweats.  · Feeling sick to your stomach (nausea).  · Feeling light-headed.  Many women have chest discomfort and some of the other symptoms. However, women often have different symptoms, such as:  · Feeling tired (fatigue).  · Feeling nervous for no reason.  · Feeling weak for no reason.  · Dizziness or fainting.  Women may have angina without any symptoms.  Follow these instructions at home:  · Take medicines only as told by your doctor.  · Take care of other health issues as told by your doctor. These  include:  ¨ High blood pressure (hypertension).  ¨ Diabetes.  · Follow a heart-healthy diet. Your doctor can help you to choose healthy food options and make changes.  · Talk to your doctor to learn more about healthy cooking methods and use them. These include:  ¨ Roasting.  ¨ Grilling.  ¨ Broiling.  ¨ Baking.  ¨ Poaching.  ¨ Steaming.  ¨ Stir-frying.  · Follow an exercise program approved by your doctor.  · Keep a healthy weight. Lose weight as told by your doctor.  · Rest when you are tired.  · Learn to manage stress.  · Do not use any tobacco, such as cigarettes, chewing tobacco, or electronic cigarettes. If you need help quitting, ask your doctor.  · If you drink alcohol, and your doctor says it is okay, limit yourself to no more than 1 drink per day. One drink equals 12 ounces of beer, 5 ounces of wine, or 1½ ounces of hard liquor.  · Stop illegal drug use.  · Keep all follow-up visits as told by your doctor. This is important.  Do not take these medicines unless your doctor says that you can:  · Nonsteroidal anti-inflammatory drugs (NSAIDs). These include:  ¨ Ibuprofen.  ¨ Naproxen.  ¨ Celecoxib.  · Vitamin supplements that have vitamin A, vitamin E, or both.  · Hormone therapy that contains estrogen with or without progestin.  Get help right away if:  · You have pain in your chest, neck, arm, jaw, stomach, or back that:  ¨ Lasts more than a few minutes.  ¨ Comes back.  ¨ Does not get better after you take medicine under your tongue (sublingual nitroglycerin).  · You have any of these symptoms for no reason:  ¨ Gas, heartburn, or indigestion.  ¨ Sweating a lot.  ¨ Shortness of breath or trouble breathing.  ¨ Feeling sick to your stomach or throwing up.  ¨ Feeling more tired than usual.  ¨ Feeling nervous or worrying more than usual.  ¨ Feeling weak.  ¨ Diarrhea.  · You are suddenly dizzy or light-headed.  · You faint or pass out.  These symptoms may be an emergency. Do not wait to see if the symptoms will go  away. Get medical help right away. Call your local emergency services (911 in the [place] Do not drive yourself to the hospital.   This information is not intended to replace advice given to you by your health care provider. Make sure you discuss any questions you have with your health care provider.  Document Released: 06/05/2009 Document Revised: 05/25/2017 Document Reviewed: 04/21/2015  [employer] Interactive Patient Education © 2017 [employer]      Depression / Suicide Risk    As you are discharged from this [facility] facility, it is important to learn how to keep safe from harming yourself.    Recognize the warning signs:  · Abrupt changes in personality, positive or negative- including increase in energy   · Giving away possessions  · Change in eating patterns- significant weight changes-  positive or negative  · Change in sleeping patterns- unable to sleep or sleeping all the time   · Unwillingness or inability to communicate  · Depression  · Unusual sadness, discouragement and loneliness  · Talk of wanting to die  · Neglect of personal appearance   · Rebelliousness- reckless behavior  · Withdrawal from people/activities they love  · Confusion- inability to concentrate     If you or a loved one observes any of these behaviors or has concerns about self-harm, here's what you can do:  · Talk about it- your feelings and reasons for harming yourself  · Remove any means that you might use to hurt yourself (examples: pills, rope, extension cords, firearm)  · Get professional help from the community (Mental Health, Substance Abuse, psychological counseling)  · Do not be alone:Call your Safe Contact- someone whom you trust who will be there for you.  · Call your local CRISIS HOTLINE 842-1660 or 040-003-7989  · Call your local Children's Mobile Crisis Response Team [place] (597) 755-3111 or www.[employer]  · Call the toll free National Suicide Prevention Hotlines   · National Suicide Prevention  Lifeline 373-373-SYZG (8102)  · National [place] Line Network 800-SUICIDE (874-7307)

## 2018-12-12 NOTE — PROGRESS NOTES
Pt discharged to home. Discharge instructions provided to pt. Pt verbalizes understanding. Pt states all questions have been answered. Signed copy in chart. Pt states that all personal belongings are in possession. Pt left unit by self and walked out.

## 2018-12-13 ENCOUNTER — PATIENT OUTREACH (OUTPATIENT)
Dept: HEALTH INFORMATION MANAGEMENT | Facility: OTHER | Age: 58
End: 2018-12-13

## 2018-12-13 LAB — EKG IMPRESSION: NORMAL

## 2018-12-22 ENCOUNTER — OFFICE VISIT (OUTPATIENT)
Dept: PULMONOLOGY | Facility: HOSPICE | Age: 58
End: 2018-12-22
Payer: COMMERCIAL

## 2018-12-22 VITALS
HEIGHT: 72 IN | RESPIRATION RATE: 16 BRPM | TEMPERATURE: 96.6 F | BODY MASS INDEX: 32.51 KG/M2 | OXYGEN SATURATION: 97 % | WEIGHT: 240 LBS | DIASTOLIC BLOOD PRESSURE: 74 MMHG | HEART RATE: 69 BPM | SYSTOLIC BLOOD PRESSURE: 130 MMHG

## 2018-12-22 DIAGNOSIS — J45.30 MILD PERSISTENT ASTHMA, UNSPECIFIED WHETHER COMPLICATED: ICD-10-CM

## 2018-12-22 DIAGNOSIS — R06.02 SHORTNESS OF BREATH: ICD-10-CM

## 2018-12-22 DIAGNOSIS — J34.89 POSTERIOR RHINORRHEA: ICD-10-CM

## 2018-12-22 PROCEDURE — 99204 OFFICE O/P NEW MOD 45 MIN: CPT | Performed by: INTERNAL MEDICINE

## 2018-12-22 RX ORDER — IPRATROPIUM BROMIDE 21 UG/1
1-2 SPRAY, METERED NASAL 3 TIMES DAILY PRN
Qty: 1 BOTTLE | Refills: 6 | Status: SHIPPED | OUTPATIENT
Start: 2018-12-22 | End: 2019-01-03 | Stop reason: SDUPTHER

## 2018-12-22 ASSESSMENT — ENCOUNTER SYMPTOMS
SINUS PAIN: 0
BRUISES/BLEEDS EASILY: 0
SENSORY CHANGE: 0
ABDOMINAL PAIN: 0
COUGH: 0
HEARTBURN: 1
SHORTNESS OF BREATH: 0
WHEEZING: 0
ORTHOPNEA: 0
DIZZINESS: 0
DIARRHEA: 0
CHILLS: 0
BACK PAIN: 0
PHOTOPHOBIA: 0
FEVER: 0
SPUTUM PRODUCTION: 0
WEAKNESS: 0
VOMITING: 0
BLURRED VISION: 0
NECK PAIN: 1
CONSTIPATION: 0
STRIDOR: 0
SEIZURES: 0
NAUSEA: 0
WEIGHT LOSS: 0
SORE THROAT: 0
TREMORS: 0
SPEECH CHANGE: 0
NERVOUS/ANXIOUS: 0
INSOMNIA: 0
MEMORY LOSS: 0
HEMOPTYSIS: 0
PALPITATIONS: 0
DOUBLE VISION: 0
HEADACHES: 0

## 2018-12-22 ASSESSMENT — PATIENT HEALTH QUESTIONNAIRE - PHQ9
4. FEELING TIRED OR HAVING LITTLE ENERGY: NOT AT ALL
SUM OF ALL RESPONSES TO PHQ QUESTIONS 1-9: 0
9. THOUGHTS THAT YOU WOULD BE BETTER OFF DEAD, OR OF HURTING YOURSELF: NOT AT ALL
7. TROUBLE CONCENTRATING ON THINGS, SUCH AS READING THE NEWSPAPER OR WATCHING TELEVISION: NOT AT ALL
3. TROUBLE FALLING OR STAYING ASLEEP OR SLEEPING TOO MUCH: NOT AT ALL
SUM OF ALL RESPONSES TO PHQ9 QUESTIONS 1 AND 2: 0
1. LITTLE INTEREST OR PLEASURE IN DOING THINGS: NOT AT ALL
2. FEELING DOWN, DEPRESSED, IRRITABLE, OR HOPELESS: NOT AT ALL
6. FEELING BAD ABOUT YOURSELF - OR THAT YOU ARE A FAILURE OR HAVE LET YOURSELF OR YOUR FAMILY DOWN: NOT AL ALL
5. POOR APPETITE OR OVEREATING: NOT AT ALL
8. MOVING OR SPEAKING SO SLOWLY THAT OTHER PEOPLE COULD HAVE NOTICED. OR THE OPPOSITE, BEING SO FIGETY OR RESTLESS THAT YOU HAVE BEEN MOVING AROUND A LOT MORE THAN USUAL: NOT AT ALL

## 2018-12-22 NOTE — PROGRESS NOTES
Subjective:      Arely Haynes is a 58 y.o. female who presents with Establish Care            HPI  Patient is 58 years old female with past medical history of psoriatic arthritis, mild persistent asthma currently controlled with Advair 500, and GERD with erosive esophagitis presents today to establish pulmonology care.  The patient just moved recently from [place], she had a pulmonologist who has been managing her asthma.  The patient most recently developed right lower lobe/right middle lobe community-acquired pneumonia on November 29 and was admitted to the hospital.  Her pro-calcitonin was negative as well as absence of leukocytosis.  She had cough productive of tannish sputum and fever.  She responded to azithromycin and ceftriaxone.  Her chest x-ray infiltrate improved afterwards.  The patient is back to her baseline level of exercise capacity.  She is able to walk or hike for 4 miles without stopping.  She is a lifetime non-smoker however she was exposed to secondhand smoking as a child.  The patient has no chronic cough or sputum production.  She has no nocturnal symptoms.  She uses pro-air on average around twice a month.  She had PFTs last year.  The obvious triggers for asthma are usually exposure to animals.  She did to have eosinophilia on admission to the hospital at 600.  The patient is on methotrexate 25 mg weekly for several years for psoriasis in addition to biological therapy.  By reviewing her chest x-ray from the hospital it appears that she has mild interstitial lung disease.  The patient states that this was discussed with her before by the rheumatologist and pulmonologist and there was a thought to start decreasing the methotrexate or discontinue it.  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  She complains of associated posterior rhinorrhea that is partially controlled with Nasonex as well as known history of GERD with erosive esophagitis.   Peak flow best is  500, peak flow yesterday was 450.      Review of Systems   Constitutional: Negative for chills, fever, malaise/fatigue and weight loss.   HENT: Positive for congestion. Negative for hearing loss, sinus pain and sore throat.    Eyes: Negative for blurred vision, double vision and photophobia.   Respiratory: Negative for cough, hemoptysis, sputum production, shortness of breath, wheezing and stridor.    Cardiovascular: Negative for chest pain, palpitations and orthopnea.   Gastrointestinal: Positive for heartburn. Negative for abdominal pain, constipation, diarrhea, nausea and vomiting.   Musculoskeletal: Negative for back pain, joint pain and neck pain.   Skin: Negative for itching and rash.   Neurological: Negative for dizziness, tremors, sensory change, speech change, seizures, weakness and headaches.   Endo/Heme/Allergies: Positive for environmental allergies. Does not bruise/bleed easily.   Psychiatric/Behavioral: Negative for memory loss. The patient is not nervous/anxious and does not have insomnia.      Past Medical History:   Diagnosis Date   • Asthma    • GERD (gastroesophageal reflux disease)    • Immunocompromised (HCC)    • Psoriasis    • Rheumatoid arthritis (HCC)      Past Surgical History:   Procedure Laterality Date   • HIP REPLACEMENT, TOTAL      Right   • LUMBAR FUSION POSTERIOR       Social History     Social History   • Marital status:      Spouse name: N/A   • Number of children: N/A   • Years of education: N/A     Occupational History   • Not on file.     Social History Main Topics   • Smoking status: Never Smoker   • Smokeless tobacco: Never Used   • Alcohol use Yes   • Drug use: No   • Sexual activity: Not on file     Other Topics Concern   • Not on file     Social History Narrative   • No narrative on file     Family History   Problem Relation Age of Onset   • Hyperlipidemia Mother    • Heart Attack Mother    • Lung Disease Father    • Cancer Father    • Hyperlipidemia Father    •  Hyperlipidemia Sister             Objective:     /74 (BP Location: Left arm, Patient Position: Sitting, BP Cuff Size: Adult)   Pulse 69   Temp 35.9 °C (96.6 °F) (Temporal)   Resp 16   Ht 1.829 m (6')   Wt 108.9 kg (240 lb)   SpO2 97%   BMI 32.55 kg/m²      Physical Exam   Constitutional: She is oriented to person, place, and time. She appears well-developed and well-nourished. No distress.   HENT:   Head: Normocephalic and atraumatic.   Eyes: Conjunctivae are normal. Right eye exhibits no discharge. Left eye exhibits no discharge. No scleral icterus.   Neck: Normal range of motion. Neck supple. No JVD present. No tracheal deviation present. No thyromegaly present.   Cardiovascular: Normal rate, regular rhythm, normal heart sounds and intact distal pulses.  Exam reveals no gallop and no friction rub.    No murmur heard.  Pulmonary/Chest: Effort normal. No stridor. No respiratory distress. She has no wheezes. She has no rales. She exhibits no tenderness.   Abdominal: Soft. She exhibits no distension. There is no tenderness.   Musculoskeletal: She exhibits no edema, tenderness or deformity.   Lymphadenopathy:     She has no cervical adenopathy.   Neurological: She is alert and oriented to person, place, and time.   Skin: Skin is warm and dry. No rash noted. She is not diaphoretic. No erythema. No pallor.   Psychiatric: She has a normal mood and affect. Her behavior is normal. Judgment and thought content normal.   Nursing note and vitals reviewed.         Chest x-ray 12/11/2018  Mild interstitial markings noted bilaterally    Chest x-ray 11/21/2018  Right middle lobe/right lower lobe infiltrate    Cardiac stress test negative in early December 2018.       Assessment/Plan:     1. Suspected ILD- methotrexate induced vs psoriatic lung  - PULMONARY FUNCTION TESTS -Test requested: Complete Pulmonary Function Test; Future  - CT-CHEST, HIGH RESOLUTION LUNG; Future  - Discuss with [place] rheumatologist who is  still managing the patient's immunosuppressant regimen to either reduce methotrexate dose or discontinue it.    2. Mild persistent asthma, not-complicated  Patient is not back to baseline, reduce Advair from 500 down to 250;  - fluticasone-salmeterol (ADVAIR DISKUS) 250-50 MCG/DOSE AEROSOL POWDER, BREATH ACTIVATED; Inhale 1 Puff by mouth 2 times a day. Rinse mouth after each use.  Dispense: 1 Inhaler; Refill: 0    3. Posterior rhinorrhea  -Rx ipratropium (ATROVENT) 0.03 % Solution; Spray 1-2 Sprays in nose 3 times a day as needed. Each nostril.  Dispense: 1 Bottle; Refill: 6  Continue Nasonex as needed    4.  GERD with erosive esophagitis  Strict GERD precautions provided  Patient is establishing a new gastroenterologist    5.  Recent community acquired pneumonia November 2018- RML/RLL  Clinically and by laboratory testing it appears that it was an episode of viral pneumonia  Resolved both clinically and radiologically

## 2018-12-28 ENCOUNTER — NON-PROVIDER VISIT (OUTPATIENT)
Dept: PULMONOLOGY | Facility: HOSPICE | Age: 58
End: 2018-12-28
Attending: INTERNAL MEDICINE
Payer: COMMERCIAL

## 2018-12-28 VITALS — BODY MASS INDEX: 34.72 KG/M2 | WEIGHT: 248 LBS | HEIGHT: 71 IN

## 2018-12-28 DIAGNOSIS — R06.02 SHORTNESS OF BREATH: ICD-10-CM

## 2018-12-28 PROCEDURE — 94060 EVALUATION OF WHEEZING: CPT | Performed by: INTERNAL MEDICINE

## 2018-12-28 PROCEDURE — 94729 DIFFUSING CAPACITY: CPT | Performed by: INTERNAL MEDICINE

## 2018-12-28 PROCEDURE — 94726 PLETHYSMOGRAPHY LUNG VOLUMES: CPT | Performed by: INTERNAL MEDICINE

## 2018-12-28 ASSESSMENT — PULMONARY FUNCTION TESTS
FEV1_PREDICTED: 3.28
FVC: 4.05
FEV1_LLN: 2.74
FEV1/FVC_PERCENT_CHANGE: 0
FEV1/FVC: 79
FVC_PERCENT_PREDICTED: 97
FEV1/FVC_PERCENT_PREDICTED: 102
FEV1_PERCENT_PREDICTED: 97
FEV1_PERCENT_CHANGE: 0
FVC_PREDICTED: 4.23
FVC_PERCENT_PREDICTED: 95
FEV1/FVC_PERCENT_PREDICTED: 101
FEV1/FVC_PERCENT_PREDICTED: 101
FEV1: 3.21
FEV1/FVC_PERCENT_LLN: 65
FEV1_PERCENT_PREDICTED: 97
FEV1/FVC: 79.26
FEV1: 3.21
FVC_LLN: 3.53
FEV1/FVC: 79
FEV1_PERCENT_CHANGE: 0
FVC: 4.05
FEV1/FVC: 79
FEV1/FVC_PREDICTED: 78
FEV1/FVC_PERCENT_PREDICTED: 78
FEV1/FVC_PERCENT_PREDICTED: 100

## 2018-12-28 NOTE — PROCEDURES
Technician: ALY Fregoso    Technician Comment:  Good patient effort & cooperation.  The results of this test meet the ATS/ERS standards for acceptability & reproducibility.  Test was performed on the [employer] Body Plethysmograph-Elite DX system.  Predicted values were NHanes-3 for spirometry, [place] for DLCO, ITS for Lung Volumes.  The DLCO was uncorrected for Hgb.  A bronchodilator of Ventolin HFA -2puffs via spacer administered.  DLCO performed during dilation period.    Interpretation:    Lung function testing was completed on December 28, 2018.  Spirometry was normal.  No bronchodilator response.  No significant restriction or hyperinflation noted.  Oxygen transfer was normal.  Flow volume loop looks normal as well with good effort noted

## 2018-12-30 LAB — EKG IMPRESSION: NORMAL

## 2019-01-03 ENCOUNTER — HOSPITAL ENCOUNTER (OUTPATIENT)
Dept: RADIOLOGY | Facility: MEDICAL CENTER | Age: 59
End: 2019-01-03
Attending: INTERNAL MEDICINE
Payer: COMMERCIAL

## 2019-01-03 ENCOUNTER — TELEPHONE (OUTPATIENT)
Dept: PULMONOLOGY | Facility: HOSPICE | Age: 59
End: 2019-01-03

## 2019-01-03 DIAGNOSIS — K21.9 GASTROESOPHAGEAL REFLUX DISEASE WITHOUT ESOPHAGITIS: ICD-10-CM

## 2019-01-03 DIAGNOSIS — R06.02 SHORTNESS OF BREATH: ICD-10-CM

## 2019-01-03 DIAGNOSIS — J34.89 POSTERIOR RHINORRHEA: ICD-10-CM

## 2019-01-03 DIAGNOSIS — J45.30 MILD PERSISTENT ASTHMA, UNSPECIFIED WHETHER COMPLICATED: ICD-10-CM

## 2019-01-03 PROCEDURE — 71250 CT THORAX DX C-: CPT

## 2019-01-03 RX ORDER — IPRATROPIUM BROMIDE 21 UG/1
1-2 SPRAY, METERED NASAL 3 TIMES DAILY PRN
Qty: 3 BOTTLE | Refills: 3 | Status: SHIPPED | OUTPATIENT
Start: 2019-01-03 | End: 2019-05-08

## 2019-01-03 RX ORDER — OMEPRAZOLE 40 MG/1
40 CAPSULE, DELAYED RELEASE ORAL 2 TIMES DAILY
Qty: 90 CAP | Refills: 3 | Status: CANCELLED | OUTPATIENT
Start: 2019-01-03

## 2019-01-03 NOTE — TELEPHONE ENCOUNTER
Per Dr. Ruvalcaba - CT looks good, no ILD.    Called and advised patient of his message. She is also needed a 90 day supply of advair, atrovent and Omeprazole sent to mail order [facility] Please sign     Have we ever prescribed this med? Yes.  If yes, what date? 12/22/2018    Last OV: 12/22/2018 - Dr. Ruvalcaba    Next OV: 07/15/2019 - Dr. Ruvalcaba    DX: asthma    Medications: omeprazole, atrovent, advair

## 2019-01-03 NOTE — TELEPHONE ENCOUNTER
Please clarify prilosec dose with Dr Ruvalcaba if he is willing to fill. That is twice the recommended dose and should be prescribed by GI if needed.    I will sign for advair and atrovent

## 2019-01-03 NOTE — TELEPHONE ENCOUNTER
Patient calling requesting CT scan results she had done today, she is pretty anxious, Message sent to Dr. Ruvalcaba.

## 2019-01-04 NOTE — TELEPHONE ENCOUNTER
I will text Dr. Ruvalcaba he is rounding within the ICU. She is pending abppointment from GI. She has  Been referred in November

## 2019-01-29 ENCOUNTER — OFFICE VISIT (OUTPATIENT)
Dept: PULMONOLOGY | Facility: HOSPICE | Age: 59
End: 2019-01-29
Payer: COMMERCIAL

## 2019-01-29 VITALS
HEART RATE: 75 BPM | DIASTOLIC BLOOD PRESSURE: 70 MMHG | TEMPERATURE: 97 F | OXYGEN SATURATION: 98 % | WEIGHT: 258.4 LBS | RESPIRATION RATE: 16 BRPM | HEIGHT: 72 IN | BODY MASS INDEX: 35 KG/M2 | SYSTOLIC BLOOD PRESSURE: 126 MMHG

## 2019-01-29 DIAGNOSIS — L40.50 PSORIATIC ARTHRITIS (HCC): ICD-10-CM

## 2019-01-29 DIAGNOSIS — Z23 NEED FOR PNEUMOCOCCAL VACCINATION: ICD-10-CM

## 2019-01-29 DIAGNOSIS — J45.30 MILD PERSISTENT ASTHMA, UNSPECIFIED WHETHER COMPLICATED: ICD-10-CM

## 2019-01-29 PROCEDURE — 90732 PPSV23 VACC 2 YRS+ SUBQ/IM: CPT | Performed by: INTERNAL MEDICINE

## 2019-01-29 PROCEDURE — 99214 OFFICE O/P EST MOD 30 MIN: CPT | Mod: 25 | Performed by: INTERNAL MEDICINE

## 2019-01-29 PROCEDURE — 90471 IMMUNIZATION ADMIN: CPT | Performed by: INTERNAL MEDICINE

## 2019-01-29 RX ORDER — BUDESONIDE AND FORMOTEROL FUMARATE DIHYDRATE 160; 4.5 UG/1; UG/1
2 AEROSOL RESPIRATORY (INHALATION) 2 TIMES DAILY
Qty: 3 INHALER | Refills: 3 | Status: SHIPPED | OUTPATIENT
Start: 2019-01-29 | End: 2021-04-29

## 2019-01-29 RX ORDER — ESTRADIOL 0.5 MG/1
0.5 TABLET ORAL DAILY
COMMUNITY
End: 2019-07-05

## 2019-01-29 NOTE — PROGRESS NOTES
Chief Complaint   Patient presents with   • Results     CT,PFT results       HPI: This patient is a 58 y.o. Female who returns for follow-up of asthma and psoriatic arthritis.  She is accompanied by her  who is a physician.  They had moved from [place] over the past year, where she was followed by rheumatology for psoriatic arthritis and pulmonology for asthma.  She has been on Orencia and methotrexate.  She was seen by my colleague  last month.  Please refer to consultation dated December 22, 2018 for details.  Briefly she had community-acquired pneumonia requiring hospitalization November 2018.  Follow-up chest x-ray in December 11, 2018 showed no acute process.  She was advised to discontinue her immunosuppressants.  There was concern of potential underlying interstitial lung disease and she consequently had HRCT chest on January 3, 2019, which was personally reviewed and showed no evidence of interstitial lung disease or residual pneumonia.  Her pulmonary function testing and DLCO are normal.  She denies exertional dyspnea, cough, wheezing, chest tightness.  She has been on Advair discus 250/50 for many years.  She works as a [employer] nurse, admits to an annual URI, which will typically trigger her asthma.  Asthma symptoms are otherwise typically under good control.  She has been concerned of being off Orencia, as it has been highly effective fin treating her psoriatic arthritis.  Medical records from her rheumatologist in [place] were reviewed.    Past Medical History:   Diagnosis Date   • Asthma    • GERD (gastroesophageal reflux disease)    • Immunocompromised (HCC)    • Psoriasis    • Rheumatoid arthritis (HCC)        Social History     Social History   • Marital status:      Spouse name: N/A   • Number of children: N/A   • Years of education: N/A     Occupational History   • Not on file.     Social History Main Topics   • Smoking status: Never Smoker   • Smokeless tobacco:  Never Used   • Alcohol use Yes   • Drug use: No   • Sexual activity: Not on file     Other Topics Concern   • Not on file     Social History Narrative   • No narrative on file       Family History   Problem Relation Age of Onset   • Hyperlipidemia Mother    • Heart Attack Mother    • Lung Disease Father    • Cancer Father    • Hyperlipidemia Father    • Hyperlipidemia Sister        Current Outpatient Prescriptions on File Prior to Visit   Medication Sig Dispense Refill   • fluticasone-salmeterol (ADVAIR DISKUS) 250-50 MCG/DOSE AEROSOL POWDER, BREATH ACTIVATED Inhale 1 Puff by mouth 2 times a day. Rinse mouth after each use. 3 Inhaler 3   • ipratropium (ATROVENT) 0.03 % Solution Spray 1-2 Sprays in nose 3 times a day as needed. Each nostril. 3 Bottle 3   • albuterol 108 (90 Base) MCG/ACT Aero Soln inhalation aerosol Inhale 2 Puffs by mouth every 6 hours as needed for Shortness of Breath.     • omeprazole (PRILOSEC) 40 MG delayed-release capsule Take 40 mg by mouth 2 times a day.     • escitalopram (LEXAPRO) 20 MG tablet Take 20 mg by mouth every morning. = 30 mg daily     • ipratropium-albuterol (DUONEB) 0.5-2.5 (3) MG/3ML nebulizer solution USE 3 ML FOUR TIMES A DAY VIA NEBULIZER BY INHALATION 30 DAYS     • cetirizine (ZYRTEC) 10 MG Tab Take 10 mg by mouth.     • escitalopram (LEXAPRO) 10 MG Tab Take 10 mg by mouth every morning. = 30 mg daily     • etodolac (LODINE) 500 MG tablet Take 500 mg by mouth 2 times a day.     • folic acid (FOLVITE) 1 MG Tab Take 1 mg by mouth every morning.     • lamoTRIgine (LAMICTAL) 100 MG Tab Take 100 mg by mouth every morning.     • montelukast (SINGULAIR) 10 MG Tab Take 10 mg by mouth every bedtime.     • rosuvastatin (CRESTOR) 10 MG Tab Take 10 mg by mouth every bedtime.     • predniSONE (DELTASONE) 10 MG Tab Take 1 Tab by mouth every day. (Patient not taking: Reported on 1/29/2019) 12 Tab 0   • Methotrexate Sodium (METHOTREXATE PF) 25 MG/ML Solution inj 20 mg by Injection route  every Wednesday.     • Abatacept 125 MG/ML Solution Auto-injector 125 mg by Subdermal route every Saturday.       No current facility-administered medications on file prior to visit.        Allergies: Ampicillin-sulbactam sodium; Shellfish-derived products; and Iodine    ROS:   Constitutional: Denies fevers, chills, night sweats, fatigue or weight loss  Eyes: Denies vision loss, pain, drainage, double vision  Ears, Nose, Throat: Denies earache, difficulty hearing, tinnitus, nasal congestion, hoarseness  Cardiovascular: Denies chest pain, tightness, palpitations, orthopnea or edema  Respiratory: Denies shortness of breath, cough, wheezing, hemoptysis  Sleep: Denies daytime sleepiness, snoring, apneas, insomnia, morning headaches  GI: Denies heartburn, dysphagia, nausea, abdominal pain, diarrhea or constipation  : Denies frequent urination, hematuria, discharge or painful urination  Musculoskeletal: Denies back pain, painful joints, sore muscles  Neurological: Denies weakness or headaches  Skin: No rashes    Blood pressure 126/70, pulse 75, temperature 36.1 °C (97 °F), temperature source Temporal, resp. rate 16, height 1.829 m (6'), weight 117.2 kg (258 lb 6.4 oz), SpO2 98 %, not currently breastfeeding.    Physical Exam:  Appearance: Well-nourished, well-developed, in no acute distress  HEENT: Normocephalic, atraumatic, white sclera, PERRLA, oropharynx clear  Neck: No adenopathy or masses  Respiratory: no intercostal retractions or accessory muscle use  Lungs auscultation: Clear to auscultation bilaterally  Cardiovascular: Regular rate rhythm. No murmurs, rubs or gallops.  No LE edema  Abdomen: soft, nondistended  Gait: Normal  Digits: No clubbing, cyanosis  Motor: No focal deficits  Orientation: Oriented to time, person and place    Diagnosis:  1. Mild persistent asthma, unspecified whether complicated  budesonide-formoterol (SYMBICORT) 160-4.5 MCG/ACT Aerosol   2. Psoriatic arthritis (HCC)  IMMUNOGLOBULINS  A/E/G/M, SERUM    Pneumococal Polysaccharide Vaccine 23-Valent =>3YO SQ/IM    CBC WITH DIFFERENTIAL    ALT + AST    REFERRAL TO RHEUMATOLOGY    REFERRAL TO FOLLOW-UP WITH PRIMARY CARE   3. Need for pneumococcal vaccination         Plan:  The patient's pneumonia has resolved.  There is no evidence of interstitial lung disease by HRCT Chest.  Her pulmonary function is normal.  She may resume her immunosuppressant therapy with Orencia and methotrexate.  We will continue to monitor.  Pneumococcal vaccine provided.  Establish with local rheumatologist.  Monitor CBC and LFTs every 3 months.  For asthma, switch Advair to Symbicort 160 mcg at 2 puffs twice daily with the use of a spacer.  Use Albuterol HFA as needed.  Verify immunoglobulin levels.  Return in about 3 months (around 4/29/2019) for follow up visit with Caryl Erickson MD.

## 2019-01-31 ENCOUNTER — TELEPHONE (OUTPATIENT)
Dept: SCHEDULING | Facility: IMAGING CENTER | Age: 59
End: 2019-01-31

## 2019-02-22 ENCOUNTER — HOSPITAL ENCOUNTER (OUTPATIENT)
Dept: LAB | Facility: MEDICAL CENTER | Age: 59
End: 2019-02-22
Attending: INTERNAL MEDICINE
Payer: COMMERCIAL

## 2019-02-22 DIAGNOSIS — L40.50 PSORIATIC ARTHRITIS (HCC): ICD-10-CM

## 2019-02-22 PROCEDURE — 82785 ASSAY OF IGE: CPT

## 2019-02-22 PROCEDURE — 84450 TRANSFERASE (AST) (SGOT): CPT

## 2019-02-22 PROCEDURE — 85025 COMPLETE CBC W/AUTO DIFF WBC: CPT

## 2019-02-22 PROCEDURE — 36415 COLL VENOUS BLD VENIPUNCTURE: CPT

## 2019-02-22 PROCEDURE — 82784 ASSAY IGA/IGD/IGG/IGM EACH: CPT

## 2019-02-22 PROCEDURE — 84460 ALANINE AMINO (ALT) (SGPT): CPT

## 2019-02-23 LAB
ALT SERPL-CCNC: 13 U/L (ref 2–50)
AST SERPL-CCNC: 20 U/L (ref 12–45)
BASOPHILS # BLD AUTO: 0.6 % (ref 0–1.8)
BASOPHILS # BLD: 0.06 K/UL (ref 0–0.12)
EOSINOPHIL # BLD AUTO: 0.24 K/UL (ref 0–0.51)
EOSINOPHIL NFR BLD: 2.3 % (ref 0–6.9)
ERYTHROCYTE [DISTWIDTH] IN BLOOD BY AUTOMATED COUNT: 48.3 FL (ref 35.9–50)
HCT VFR BLD AUTO: 40.8 % (ref 37–47)
HGB BLD-MCNC: 13.7 G/DL (ref 12–16)
IMM GRANULOCYTES # BLD AUTO: 0.02 K/UL (ref 0–0.11)
IMM GRANULOCYTES NFR BLD AUTO: 0.2 % (ref 0–0.9)
LYMPHOCYTES # BLD AUTO: 2.13 K/UL (ref 1–4.8)
LYMPHOCYTES NFR BLD: 20.2 % (ref 22–41)
MCH RBC QN AUTO: 32.5 PG (ref 27–33)
MCHC RBC AUTO-ENTMCNC: 33.6 G/DL (ref 33.6–35)
MCV RBC AUTO: 96.7 FL (ref 81.4–97.8)
MONOCYTES # BLD AUTO: 0.58 K/UL (ref 0–0.85)
MONOCYTES NFR BLD AUTO: 5.5 % (ref 0–13.4)
NEUTROPHILS # BLD AUTO: 7.49 K/UL (ref 2–7.15)
NEUTROPHILS NFR BLD: 71.2 % (ref 44–72)
NRBC # BLD AUTO: 0 K/UL
NRBC BLD-RTO: 0 /100 WBC
PLATELET # BLD AUTO: 280 K/UL (ref 164–446)
PMV BLD AUTO: 10.2 FL (ref 9–12.9)
RBC # BLD AUTO: 4.22 M/UL (ref 4.2–5.4)
WBC # BLD AUTO: 10.5 K/UL (ref 4.8–10.8)

## 2019-02-24 LAB
IGA SERPL-MCNC: 279 MG/DL (ref 68–408)
IGE SERPL-ACNC: 17 KU/L
IGG SERPL-MCNC: 893 MG/DL (ref 768–1632)
IGM SERPL-MCNC: 70 MG/DL (ref 35–263)

## 2019-03-26 ENCOUNTER — APPOINTMENT (OUTPATIENT)
Dept: MEDICAL GROUP | Facility: MEDICAL CENTER | Age: 59
End: 2019-03-26
Payer: COMMERCIAL

## 2019-03-26 RX ORDER — OLOPATADINE HYDROCHLORIDE 2 MG/ML
1 SOLUTION/ DROPS OPHTHALMIC
COMMUNITY
Start: 2019-01-25 | End: 2021-01-18

## 2019-03-26 RX ORDER — MEDROXYPROGESTERONE ACETATE 10 MG/1
TABLET ORAL
COMMUNITY
Start: 2019-03-21 | End: 2019-03-27

## 2019-03-26 RX ORDER — METHOTREXATE 25 MG/ML
INJECTION, SOLUTION INTRA-ARTERIAL; INTRAMUSCULAR; INTRAVENOUS
COMMUNITY
Start: 2019-01-31 | End: 2019-06-25

## 2019-03-26 RX ORDER — ABATACEPT 125 MG/ML
INJECTION, SOLUTION SUBCUTANEOUS
COMMUNITY
Start: 2019-01-31 | End: 2019-06-25

## 2019-03-26 RX ORDER — TRETINOIN 0.5 MG/G
CREAM TOPICAL
COMMUNITY
Start: 2018-02-13 | End: 2019-03-27

## 2019-03-26 RX ORDER — EPINEPHRINE 0.3 MG/.3ML
INJECTION SUBCUTANEOUS
COMMUNITY
Start: 2017-03-15 | End: 2019-03-27

## 2019-03-26 RX ORDER — PREDNISONE 10 MG/1
10 TABLET ORAL
COMMUNITY
End: 2019-03-27

## 2019-03-26 RX ORDER — MEDROXYPROGESTERONE ACETATE 10 MG/1
10 TABLET ORAL
COMMUNITY
Start: 2016-08-04 | End: 2019-03-27

## 2019-03-26 RX ORDER — OMEPRAZOLE 40 MG/1
40 CAPSULE, DELAYED RELEASE ORAL
COMMUNITY
Start: 2017-05-11 | End: 2019-03-27

## 2019-03-26 RX ORDER — ESTRADIOL 2 MG/1
2 TABLET ORAL
COMMUNITY
Start: 2017-05-01 | End: 2019-05-08

## 2019-03-26 RX ORDER — CYCLOBENZAPRINE HCL 10 MG
10 TABLET ORAL
COMMUNITY
Start: 2017-07-14 | End: 2019-03-27

## 2019-03-26 RX ORDER — ALBUTEROL SULFATE 90 UG/1
AEROSOL, METERED RESPIRATORY (INHALATION)
COMMUNITY
Start: 2019-03-01 | End: 2019-03-27

## 2019-03-26 RX ORDER — ESTRADIOL 2 MG/1
TABLET ORAL
COMMUNITY
Start: 2019-03-21 | End: 2019-05-08

## 2019-03-26 RX ORDER — FERROUS SULFATE 325(65) MG
325 TABLET ORAL
COMMUNITY
Start: 2017-07-14 | End: 2019-03-27

## 2019-03-27 ENCOUNTER — OFFICE VISIT (OUTPATIENT)
Dept: MEDICAL GROUP | Facility: MEDICAL CENTER | Age: 59
End: 2019-03-27
Payer: COMMERCIAL

## 2019-03-27 VITALS
TEMPERATURE: 97.8 F | HEART RATE: 67 BPM | BODY MASS INDEX: 34.94 KG/M2 | OXYGEN SATURATION: 95 % | SYSTOLIC BLOOD PRESSURE: 130 MMHG | WEIGHT: 257.94 LBS | HEIGHT: 72 IN | DIASTOLIC BLOOD PRESSURE: 70 MMHG

## 2019-03-27 DIAGNOSIS — Z00.00 HEALTH CARE MAINTENANCE: ICD-10-CM

## 2019-03-27 DIAGNOSIS — Z12.31 ENCOUNTER FOR SCREENING MAMMOGRAM FOR MALIGNANT NEOPLASM OF BREAST: ICD-10-CM

## 2019-03-27 DIAGNOSIS — D84.9 IMMUNOSUPPRESSION (HCC): ICD-10-CM

## 2019-03-27 DIAGNOSIS — L40.50 PSORIATIC ARTHRITIS (HCC): ICD-10-CM

## 2019-03-27 DIAGNOSIS — K21.9 GASTROESOPHAGEAL REFLUX DISEASE, ESOPHAGITIS PRESENCE NOT SPECIFIED: ICD-10-CM

## 2019-03-27 DIAGNOSIS — Z76.89 ENCOUNTER TO ESTABLISH CARE: ICD-10-CM

## 2019-03-27 DIAGNOSIS — Z79.890 HORMONE REPLACEMENT THERAPY (HRT): ICD-10-CM

## 2019-03-27 DIAGNOSIS — Z78.0 MENOPAUSE: ICD-10-CM

## 2019-03-27 DIAGNOSIS — E78.01 FAMILIAL HYPERCHOLESTEROLEMIA: ICD-10-CM

## 2019-03-27 DIAGNOSIS — R53.83 FATIGUE, UNSPECIFIED TYPE: ICD-10-CM

## 2019-03-27 DIAGNOSIS — J30.2 SEASONAL ALLERGIES: ICD-10-CM

## 2019-03-27 DIAGNOSIS — N95.1 MENOPAUSAL HOT FLUSHES: ICD-10-CM

## 2019-03-27 DIAGNOSIS — E66.9 OBESITY (BMI 30.0-34.9): ICD-10-CM

## 2019-03-27 DIAGNOSIS — L40.9 PSORIASIS: ICD-10-CM

## 2019-03-27 DIAGNOSIS — J45.40 MODERATE PERSISTENT ASTHMA WITHOUT COMPLICATION: ICD-10-CM

## 2019-03-27 DIAGNOSIS — Z12.11 SCREENING FOR MALIGNANT NEOPLASM OF COLON: ICD-10-CM

## 2019-03-27 PROBLEM — Z96.641 HISTORY OF HIP REPLACEMENT, TOTAL, RIGHT: Status: RESOLVED | Noted: 2017-07-28 | Resolved: 2019-03-27

## 2019-03-27 PROBLEM — A41.9 SEPSIS (HCC): Status: RESOLVED | Noted: 2018-11-30 | Resolved: 2019-03-27

## 2019-03-27 PROBLEM — E66.811 OBESITY (BMI 30.0-34.9): Status: ACTIVE | Noted: 2019-03-27

## 2019-03-27 PROBLEM — J96.21 ACUTE ON CHRONIC RESPIRATORY FAILURE WITH HYPOXIA (HCC): Status: RESOLVED | Noted: 2018-11-30 | Resolved: 2019-03-27

## 2019-03-27 PROBLEM — J18.9 CAP (COMMUNITY ACQUIRED PNEUMONIA): Status: RESOLVED | Noted: 2018-11-30 | Resolved: 2019-03-27

## 2019-03-27 PROCEDURE — 99215 OFFICE O/P EST HI 40 MIN: CPT | Performed by: INTERNAL MEDICINE

## 2019-03-27 NOTE — PROGRESS NOTES
CHIEF COMPLAINT  Chief Complaint   Patient presents with   • Establish Care     new pt   Psoriasis, psoriatic arthritis    HPI  Patient is a 58 y.o. female patient who presents today for the following     Psoriasis,psoriatic arthritis  Immunosuppression  Skin rash since [facility].  Psoriasis: started in mid 50, with plaques on both hands, hands joints swelling / pain.  Controlled with:  Mtx 20 mg sc every week with folic acid, orencia/Abatacept   She has been followed up by rheumatology.    Asthma, allergies  Onset: early 50'  Denies cough, wheezing, chest tightness.  No recent albuterol/duoneb use.  Prophylaxis: Singulair, Advair, Symbicort  FH> nephew.    She moved from [place] in 2018, developed community-acquired pneumonia 6 months ago;    - Evaluated by pulmonology on 1/29/19, note was reviewed with the following:  · No evidence of ILD  · Advised to continue immunosuppressive medications  · To monitor CBC and LFTs every 3 months  · Which from Advair to Symbicort  · Use albuterol as needed  · Verified immunoglobulins levels      Hypercholesterolemia.  Meds:  Rosuvastatin 10 mg QD   - taking daily, as prescribed. No myalgias, muscle cramps or pain.   Diet / Exercise/BMI:  As above  FH:     GERD  On omeprazole, 20 mg QD; takes medication as prescribed, that controls sx.   Denies:   - heartburn, epigastric pain.   -  nausea, vomiting, or diarrhea  - dysphagia  - abnormal cough  - blood in the stool or dark tarry stools.  - early satiety  - tobacco use.    Menopausal hot flashes, HRT, fatigue  Controlled.    The patient has been on hormone replacement therapy with estradiol.  Complains of fatigue.  Denies hot flushes, sweating, vaginal dryness, mood swings.     OBESITY, Body mass index is 34.98 kg/m².  Onset: since mid 50'  Diet: regular  Exercise: decrased  No temperature intolerance. No change in hair/skin quality, BMs.   No HTN, buffalo hump, purple striae, flushing.  FH of obesity: neg    Reviewed PMH, PSH,  FH, SH, ALL, HCM/IMM, no changes  Reviewed MEDS, no changes    Patient Active Problem List    Diagnosis Date Noted   • Psoriasis 03/27/2019   • Immunosuppression ([facility]) 03/27/2019   • Obesity (BMI 30.0-34.9) 03/27/2019   • Depression 11/30/2018   • Psoriatic arthritis ([facility]) 11/30/2018   • Familial hypercholesterolemia 11/30/2018   • Moderate persistent asthma without complication 11/30/2018   • History of total right hip arthroplasty 09/22/2017     CURRENT MEDICATIONS  Current Outpatient Prescriptions   Medication Sig Dispense Refill   • albuterol 108 (90 Base) MCG/ACT Aero Soln inhalation aerosol      • ORENCIA 125 MG/ML Solution Prefilled Syringe      • Crisaborole (EUCRISA) 2 % Ointment Apply  to affected area(s).     • cyclobenzaprine (FLEXERIL) 10 MG Tab Take 10 mg by mouth.     • EPINEPHrine (EPIPEN 2-NATALIA) 0.3 MG/0.3ML Solution Auto-injector solution for injection Use as directed by MD as needed.     • ERGOCALCIFEROL PO Take  by mouth.     • estradiol (ESTRACE) 2 MG Tab      • ferrous sulfate 325 (65 Fe) MG tablet Take 325 mg by mouth.     • fluocinonide (LIDEX) 0.05 % Cream Apply  to affected area(s).     • medroxyPROGESTERone (PROVERA) 10 MG Tab Take 10 mg by mouth.     • Etanercept (ENBREL) 50 MG/ML Solution Prefilled Syringe 50 mg by Subdermal route.     • medroxyPROGESTERone (PROVERA) 10 MG Tab      • Methotrexate Sodium 50 MG/2ML Solution      • Misc. Devices (JOURNEY SERIES ROLLING WALKER) Misc Rolling walker for recovery from left hip replacement     • Neomycin-Polymyxin-Dexameth (MAXITROL) 0.1 % Ointment Place over incision site and in eye three times a day and at bedtime.     • Olopatadine HCl 0.2 % Solution      • tretinoin (RETIN-A) 0.05 % cream Apply nightly to affected areas on the face     • estradiol (ESTRACE) 2 MG Tab Take 2 mg by mouth.     • omeprazole (PRILOSEC) 40 MG delayed-release capsule Take 40 mg by mouth.     • predniSONE (DELTASONE) 10 MG Tab Take 10 mg by mouth.     • estradiol  (ESTRACE) 0.5 MG tablet Take 0.5 mg by mouth every day.     • budesonide-formoterol (SYMBICORT) 160-4.5 MCG/ACT Aerosol Inhale 2 Puffs by mouth 2 Times a Day. Use spacer. Rinse mouth after each use. 3 Inhaler 3   • fluticasone-salmeterol (ADVAIR DISKUS) 250-50 MCG/DOSE AEROSOL POWDER, BREATH ACTIVATED Inhale 1 Puff by mouth 2 times a day. Rinse mouth after each use. 3 Inhaler 3   • ipratropium (ATROVENT) 0.03 % Solution Spray 1-2 Sprays in nose 3 times a day as needed. Each nostril. 3 Bottle 3   • predniSONE (DELTASONE) 10 MG Tab Take 1 Tab by mouth every day. (Patient not taking: Reported on 1/29/2019) 12 Tab 0   • albuterol 108 (90 Base) MCG/ACT Aero Soln inhalation aerosol Inhale 2 Puffs by mouth every 6 hours as needed for Shortness of Breath.     • Methotrexate Sodium (METHOTREXATE PF) 25 MG/ML Solution inj 20 mg by Injection route every Wednesday.     • omeprazole (PRILOSEC) 40 MG delayed-release capsule Take 40 mg by mouth 2 times a day.     • escitalopram (LEXAPRO) 20 MG tablet Take 20 mg by mouth every morning. = 30 mg daily     • ipratropium-albuterol (DUONEB) 0.5-2.5 (3) MG/3ML nebulizer solution USE 3 ML FOUR TIMES A DAY VIA NEBULIZER BY INHALATION 30 DAYS     • Abatacept 125 MG/ML Solution Auto-injector 125 mg by Subdermal route every Saturday.     • cetirizine (ZYRTEC) 10 MG Tab Take 10 mg by mouth.     • escitalopram (LEXAPRO) 10 MG Tab Take 10 mg by mouth every morning. = 30 mg daily     • etodolac (LODINE) 500 MG tablet Take 500 mg by mouth 2 times a day.     • folic acid (FOLVITE) 1 MG Tab Take 1 mg by mouth every morning.     • lamoTRIgine (LAMICTAL) 100 MG Tab Take 100 mg by mouth every morning.     • montelukast (SINGULAIR) 10 MG Tab Take 10 mg by mouth every bedtime.     • rosuvastatin (CRESTOR) 10 MG Tab Take 10 mg by mouth every bedtime.       No current facility-administered medications for this visit.      ALLERGIES  Allergies: Ampicillin-sulbactam sodium; Levofloxacin; Shellfish-derived  products; and Iodine  PAST MEDICAL HISTORY  Past Medical History:   Diagnosis Date   • Asthma    • Depression     Daughter passed few years ago   • Erosive esophagitis    • GERD (gastroesophageal reflux disease)    • Immunocompromised (HCC)    • Psoriasis    • Psoriatic arthritis (HCC)      SURGICAL HISTORY  She  has a past surgical history that includes lumbar fusion posterior; hip replacement, total; and cystectomy.  SOCIAL HISTORY  Social History   Substance Use Topics   • Smoking status: Never Smoker   • Smokeless tobacco: Never Used   • Alcohol use 0.6 - 1.2 oz/week     1 - 2 Glasses of wine per week     Social History     Social History Narrative   • No narrative on file     FAMILY HISTORY  Family History   Problem Relation Age of Onset   • Hyperlipidemia Mother    • Heart Attack Mother    • Lung Disease Father    • Cancer Father    • Hyperlipidemia Father    • Hyperlipidemia Sister      Family Status   Relation Status   • Mo    • Fa    • Sis (Not Specified)     ROS   Constitutional: Negative for fever, chills.  HENT: Negative for congestion, sore throat.  Eyes: Negative for blurred vision.   Respiratory: Negative for cough, shortness of breath.  Cardiovascular: Negative for chest pain, palpitations.   Gastrointestinal: As above.  Genitourinary: As above.  Musculoskeletal: As above.  Skin: Negative for current rash.   Neuro: Negative for dizziness,  headaches.   Endo/Heme/Allergies: Does not bruise/bleed easily.   Psychiatric/Behavioral: On the Lexapro 20 mg daily    PHYSICAL EXAM   Blood pressure 130/70, pulse 67, temperature 36.6 °C (97.8 °F), temperature source Temporal, height 1.829 m (6'), weight 117 kg (257 lb 15 oz), SpO2 95 %, not currently breastfeeding. Body mass index is 34.98 kg/m².  General:  NAD, well appearing  HEENT:   NC/AT, PERRLA, EOMI, TMs are clear. Oropharyngeal mucosa is pink,  without lesions;  no cervical / supraclavicular  lymphadenopathy, no thyromegaly.     Cardiovascular: RRR.   No m/r/g. No carotid bruits .      Lungs:   CTAB, no w/r/r, no respiratory distress.  Abdomen: Soft, NT/ND + BS; unable to palpate liver/spleen due to obesity.  Extremities:  2+ DP and radial pulses bilaterally.  No c/c/e.   Skin:  Warm, dry.  No erythema. No rash.   Neurologic: Alert & oriented x 3. CN II-XII grossly intact. No focal deficits.  Psychiatric:  Affect normal, mood normal, judgment normal.    LABS     Labs are reviewed and discussed with a patient  No results found for: CHOLSTRLTOT, LDL, HDL, TRIGLYCERIDE    Lab Results   Component Value Date/Time    SODIUM 139 12/12/2018 03:05 AM    POTASSIUM 3.2 (L) 12/12/2018 03:05 AM    CHLORIDE 107 12/12/2018 03:05 AM    CO2 27 12/12/2018 03:05 AM    GLUCOSE 116 (H) 12/12/2018 03:05 AM    BUN 20 12/12/2018 03:05 AM    CREATININE 0.65 12/12/2018 03:05 AM     Lab Results   Component Value Date/Time    ALKPHOSPHAT 67 12/12/2018 03:05 AM    ASTSGOT 20 02/22/2019 03:22 PM    ALTSGPT 13 02/22/2019 03:22 PM    TBILIRUBIN 0.8 12/12/2018 03:05 AM      Lab Results   Component Value Date/Time    WBC 10.5 02/22/2019 03:22 PM    RBC 4.22 02/22/2019 03:22 PM    HEMOGLOBIN 13.7 02/22/2019 03:22 PM    HEMATOCRIT 40.8 02/22/2019 03:22 PM    MCV 96.7 02/22/2019 03:22 PM    MCH 32.5 02/22/2019 03:22 PM    MCHC 33.6 02/22/2019 03:22 PM    MPV 10.2 02/22/2019 03:22 PM    NEUTSPOLYS 71.20 02/22/2019 03:22 PM    LYMPHOCYTES 20.20 (L) 02/22/2019 03:22 PM    MONOCYTES 5.50 02/22/2019 03:22 PM    EOSINOPHILS 2.30 02/22/2019 03:22 PM    BASOPHILS 0.60 02/22/2019 03:22 PM      IMAGING     Reviewed the following imaging and discussed with patient:    CT chest, 1/3/19:  Hyperexpanded lungs. Apical blebs.  No evidence of interstitial lung disease.  No airspace consolidation or pleural effusions.    Chest x-ray, 12/11/18:  No acute cardiopulmonary disease.    NM stress test 12/12/18:   NUCLEAR IMAGING INTERPRETATION   Normal myocardial perfusion with no  ischemia.   Normal left ventricular wall motion.  LV ejection fraction = 54%.   ECG INTERPRETATION   Negative stress ECG for ischemia.    ASSESMENT AND PLAN        1. Psoriatic arthritis (HCC)  Controlled, continue current treatment and rheumatology follow-up  - CBC WITH DIFFERENTIAL; Future  - WESTERGREN SED RATE; Future  - CRP QUANTITIVE (NON-CARDIAC); Future    2. Psoriasis  As above    3. Immunosuppression (HCC)  The patient has been on biological agent, immunosuppression, that is a Risk for infection including TB.  Pending the records to be sure that she has had all immunizations.    4. Seasonal allergies  Controlled, continue current treatment    5. Moderate persistent asthma without complication  Controlled, continue current treatment    6. Familial hypercholesterolemia  Pending labs, continue current treatment  - Comp Metabolic Panel; Future  - Lipid Profile; Future    7. Gastroesophageal reflux disease, esophagitis presence not specified  Requested referral for endoscopy  - REFERRAL TO GASTROENTEROLOGY    8. Menopausal hot flushes  9. Hormone replacement therapy (HRT)  Controlled, continue current treatment    10. Fatigue, unspecified type  Advised to take 2000 units of vitamin D daily, OTC  - VITAMIN D,25 HYDROXY; Future    11. Obesity (BMI 30.0-34.9)  - OBESITY COUNSELING (No Charge): Patient identified as having weight management issue.  Appropriate orders and counseling given.    12. Health care maintenance  13. Encounter to establish care  Reviewed PMH, PSH, FH, SH, ALL, MEDS, HCM/IMM.   Release form for old records: signed    14. Encounter for screening mammogram for malignant neoplasm of breast  - MA-SCREEN MAMMO W/CAD-BILAT; Future    15. Screening for malignant neoplasm of colon  - REFERRAL TO GASTROENTEROLOGY    16. Menopause  - DS-BONE DENSITY STUDY (DEXA); Future    Counseling:   - Smoking:  Nonsmoker    Followup: Return in about 3 months (around 6/27/2019), or if symptoms worsen or fail to  improve.    All questions are answered.    Time spent 40 minutes face to face, with > 50% spent counseling and coordinating care.    Please note that this dictation was created using voice recognition software, and that there might be errors of hussain and possibly content.

## 2019-04-19 RX ORDER — FOLIC ACID 1 MG/1
1 TABLET ORAL EVERY MORNING
Qty: 90 TAB | Refills: 0 | Status: SHIPPED | OUTPATIENT
Start: 2019-04-19 | End: 2019-07-07 | Stop reason: SDUPTHER

## 2019-05-03 ENCOUNTER — APPOINTMENT (OUTPATIENT)
Dept: RADIOLOGY | Facility: MEDICAL CENTER | Age: 59
End: 2019-05-03
Attending: INTERNAL MEDICINE
Payer: COMMERCIAL

## 2019-05-06 ENCOUNTER — APPOINTMENT (OUTPATIENT)
Dept: PULMONOLOGY | Facility: HOSPICE | Age: 59
End: 2019-05-06
Payer: COMMERCIAL

## 2019-05-08 ENCOUNTER — OFFICE VISIT (OUTPATIENT)
Dept: MEDICAL GROUP | Facility: MEDICAL CENTER | Age: 59
End: 2019-05-08
Payer: COMMERCIAL

## 2019-05-08 VITALS
HEART RATE: 67 BPM | OXYGEN SATURATION: 97 % | WEIGHT: 252.65 LBS | TEMPERATURE: 97.8 F | SYSTOLIC BLOOD PRESSURE: 142 MMHG | HEIGHT: 72 IN | DIASTOLIC BLOOD PRESSURE: 82 MMHG | BODY MASS INDEX: 34.22 KG/M2

## 2019-05-08 DIAGNOSIS — D84.9 IMMUNOSUPPRESSION (HCC): ICD-10-CM

## 2019-05-08 DIAGNOSIS — Z79.890 HORMONE REPLACEMENT THERAPY (HRT): ICD-10-CM

## 2019-05-08 DIAGNOSIS — R93.89 ABNORMAL MRI: ICD-10-CM

## 2019-05-08 DIAGNOSIS — N83.201 RIGHT OVARIAN CYST: ICD-10-CM

## 2019-05-08 PROCEDURE — 99214 OFFICE O/P EST MOD 30 MIN: CPT | Performed by: INTERNAL MEDICINE

## 2019-05-08 RX ORDER — TRAMADOL HYDROCHLORIDE 50 MG/1
TABLET ORAL
Status: ON HOLD | COMMUNITY
Start: 2018-11-20 | End: 2019-07-11

## 2019-05-08 RX ORDER — ROSUVASTATIN CALCIUM 10 MG/1
TABLET, COATED ORAL
COMMUNITY
Start: 2019-03-27 | End: 2019-05-08

## 2019-05-08 RX ORDER — IPRATROPIUM BROMIDE 21 UG/1
SPRAY, METERED NASAL
COMMUNITY
Start: 2019-04-18 | End: 2019-05-08

## 2019-05-08 RX ORDER — ETODOLAC 500 MG/1
TABLET, FILM COATED ORAL
COMMUNITY
Start: 2019-02-18 | End: 2019-07-05

## 2019-05-08 RX ORDER — OLOPATADINE HYDROCHLORIDE 2 MG/ML
SOLUTION/ DROPS OPHTHALMIC
COMMUNITY
Start: 2019-01-25 | End: 2019-05-08

## 2019-05-08 RX ORDER — PREDNISONE 10 MG/1
10 TABLET ORAL
COMMUNITY
End: 2019-05-08

## 2019-05-08 RX ORDER — ESCITALOPRAM OXALATE 10 MG/1
TABLET ORAL
COMMUNITY
Start: 2019-02-18 | End: 2019-07-05

## 2019-05-08 RX ORDER — IPRATROPIUM BROMIDE AND ALBUTEROL SULFATE 2.5; .5 MG/3ML; MG/3ML
SOLUTION RESPIRATORY (INHALATION)
Status: ON HOLD | COMMUNITY
Start: 2017-03-15 | End: 2019-07-11

## 2019-05-08 RX ORDER — METHOTREXATE 25 MG/ML
INJECTION, SOLUTION INTRA-ARTERIAL; INTRAMUSCULAR; INTRAVENOUS
COMMUNITY
Start: 2019-01-31 | End: 2019-05-08

## 2019-05-08 RX ORDER — ALBUTEROL SULFATE 90 UG/1
AEROSOL, METERED RESPIRATORY (INHALATION)
COMMUNITY
Start: 2019-03-01 | End: 2019-06-25

## 2019-05-08 RX ORDER — ESCITALOPRAM OXALATE 10 MG/1
30 TABLET ORAL
COMMUNITY
Start: 2017-02-26 | End: 2019-07-05

## 2019-05-08 RX ORDER — ROSUVASTATIN CALCIUM 10 MG/1
TABLET, COATED ORAL
COMMUNITY
Start: 2017-03-28 | End: 2019-05-08

## 2019-05-08 RX ORDER — OMEPRAZOLE 40 MG/1
40 CAPSULE, DELAYED RELEASE ORAL DAILY
COMMUNITY
Start: 2018-11-15 | End: 2022-01-06 | Stop reason: SDUPTHER

## 2019-05-08 RX ORDER — ESTRADIOL 0.5 MG/1
0.5 TABLET ORAL
COMMUNITY
End: 2019-07-05

## 2019-05-08 RX ORDER — MEDROXYPROGESTERONE ACETATE 10 MG/1
10 TABLET ORAL
COMMUNITY
Start: 2016-08-04 | End: 2019-07-05

## 2019-05-08 RX ORDER — MONTELUKAST SODIUM 10 MG/1
10 TABLET ORAL
COMMUNITY
Start: 2017-04-28 | End: 2020-01-13 | Stop reason: SDUPTHER

## 2019-05-08 RX ORDER — ESTRADIOL 2 MG/1
TABLET ORAL
COMMUNITY
Start: 2019-03-21 | End: 2019-07-05

## 2019-05-08 RX ORDER — MONTELUKAST SODIUM 10 MG/1
TABLET ORAL
COMMUNITY
Start: 2019-04-19 | End: 2019-07-05

## 2019-05-08 RX ORDER — PREDNISONE 10 MG/1
TABLET ORAL
COMMUNITY
Start: 2018-12-01 | End: 2019-06-25

## 2019-05-08 RX ORDER — EPINEPHRINE 0.3 MG/.3ML
INJECTION SUBCUTANEOUS
Status: ON HOLD | COMMUNITY
Start: 2018-12-18 | End: 2019-07-11

## 2019-05-08 RX ORDER — METHOTREXATE 25 MG/ML
INJECTION, SOLUTION INTRA-ARTERIAL; INTRAMUSCULAR; INTRAVENOUS
COMMUNITY
Start: 2017-07-28 | End: 2019-05-08

## 2019-05-08 RX ORDER — IPRATROPIUM BROMIDE 21 UG/1
1-2 SPRAY, METERED NASAL
COMMUNITY
Start: 2019-01-03 | End: 2019-05-08

## 2019-05-08 RX ORDER — FOLIC ACID 1 MG/1
TABLET ORAL
COMMUNITY
Start: 2019-01-23 | End: 2019-05-08

## 2019-05-08 RX ORDER — EPINEPHRINE 0.3 MG/.3ML
INJECTION SUBCUTANEOUS
COMMUNITY
Start: 2017-03-15 | End: 2019-06-25

## 2019-05-08 RX ORDER — FOLIC ACID 1 MG/1
TABLET ORAL
COMMUNITY
Start: 2019-04-20 | End: 2019-05-08

## 2019-05-08 RX ORDER — ESTRADIOL 2 MG/1
2 TABLET ORAL
COMMUNITY
Start: 2017-05-01 | End: 2019-07-05

## 2019-05-08 RX ORDER — CYCLOBENZAPRINE HCL 10 MG
10 TABLET ORAL
Status: ON HOLD | COMMUNITY
Start: 2017-07-14 | End: 2019-07-11

## 2019-05-08 RX ORDER — ESCITALOPRAM OXALATE 20 MG/1
20 TABLET ORAL
COMMUNITY
End: 2019-05-08

## 2019-05-08 RX ORDER — AZITHROMYCIN 500 MG/1
TABLET, FILM COATED ORAL
COMMUNITY
Start: 2018-12-01 | End: 2019-05-08

## 2019-05-08 RX ORDER — ETODOLAC 500 MG/1
500 TABLET, FILM COATED ORAL
COMMUNITY
Start: 2017-03-29 | End: 2019-07-05

## 2019-05-08 RX ORDER — TRETINOIN 0.5 MG/G
CREAM TOPICAL
COMMUNITY
Start: 2018-02-13 | End: 2019-07-05

## 2019-05-08 RX ORDER — FOLIC ACID 1 MG/1
1 TABLET ORAL
COMMUNITY
Start: 2017-05-16 | End: 2019-05-08

## 2019-05-08 RX ORDER — MEDROXYPROGESTERONE ACETATE 10 MG/1
TABLET ORAL
COMMUNITY
Start: 2019-03-21 | End: 2019-07-05

## 2019-05-08 RX ORDER — LAMOTRIGINE 100 MG/1
100 TABLET ORAL DAILY
COMMUNITY
Start: 2019-01-23 | End: 2020-01-13 | Stop reason: SDUPTHER

## 2019-05-08 RX ORDER — MONTELUKAST SODIUM 10 MG/1
TABLET ORAL
COMMUNITY
Start: 2019-01-23 | End: 2019-07-05

## 2019-05-08 RX ORDER — BUDESONIDE AND FORMOTEROL FUMARATE DIHYDRATE 160; 4.5 UG/1; UG/1
AEROSOL RESPIRATORY (INHALATION)
COMMUNITY
Start: 2019-04-12 | End: 2019-05-08

## 2019-05-08 RX ORDER — LAMOTRIGINE 100 MG/1
100 TABLET ORAL
COMMUNITY
Start: 2017-05-21 | End: 2019-05-08

## 2019-05-08 RX ORDER — BUDESONIDE AND FORMOTEROL FUMARATE DIHYDRATE 160; 4.5 UG/1; UG/1
2 AEROSOL RESPIRATORY (INHALATION)
COMMUNITY
Start: 2019-01-29 | End: 2019-06-25

## 2019-05-08 RX ORDER — CETIRIZINE HYDROCHLORIDE 10 MG/1
10 TABLET ORAL
COMMUNITY
End: 2019-07-05

## 2019-05-08 RX ORDER — LAMOTRIGINE 100 MG/1
TABLET ORAL
COMMUNITY
Start: 2019-04-19 | End: 2019-05-08

## 2019-05-08 RX ORDER — FERROUS SULFATE 325(65) MG
325 TABLET ORAL
COMMUNITY
Start: 2017-07-14 | End: 2019-07-05

## 2019-05-08 RX ORDER — OMEPRAZOLE 40 MG/1
40 CAPSULE, DELAYED RELEASE ORAL
COMMUNITY
Start: 2017-05-11 | End: 2019-07-05

## 2019-05-08 RX ORDER — ESCITALOPRAM OXALATE 20 MG/1
TABLET ORAL
COMMUNITY
Start: 2019-02-18 | End: 2019-07-05

## 2019-05-08 RX ORDER — ALBUTEROL SULFATE 90 UG/1
2 AEROSOL, METERED RESPIRATORY (INHALATION)
Status: ON HOLD | COMMUNITY
End: 2019-07-11

## 2019-05-08 RX ORDER — OLOPATADINE HYDROCHLORIDE 2 MG/ML
SOLUTION/ DROPS OPHTHALMIC
COMMUNITY
Start: 2019-04-11 | End: 2019-05-08

## 2019-05-08 RX ORDER — OMEPRAZOLE 40 MG/1
CAPSULE, DELAYED RELEASE ORAL
COMMUNITY
Start: 2019-02-18 | End: 2019-07-05

## 2019-05-08 NOTE — PROGRESS NOTES
CHIEF COMPLAINT  Chief Complaint   Patient presents with   • Cyst of the R ovary   • Orders Needed     Pelvic Ultrasound     HPI  Patient is a 58 y.o. female patient who presents today for the following     R ovarian cyst/abnormal MRI, Imunosuuppression/HRT  Incidental MRI finding.  The patient is a 58-year-old, female, on immunosuppression with MTX and orencia/Abatacept, hormone replacement therapy  She did MRI of the left hip on 5/6/2019, with incidental finding of the 5.4X 4.4 cm right complex ovarian cyst.  Complains of discomfort in the right lower quadrant for the last month.  She is postmenopausal.  Denies: Anorexia, weight loss, change in BM habits.  Family history of GYN cancer: negative, except sister with in situ breast cancer.      Reviewed PMH, PSH, FH, SH, ALL, HCM/IMM, no changes  Reviewed MEDS, no changes    Patient Active Problem List    Diagnosis Date Noted   • Psoriasis 03/27/2019   • Immunosuppression (HCC) 03/27/2019   • Obesity (BMI 30.0-34.9) 03/27/2019   • Hormone replacement therapy (HRT) 03/27/2019   • Menopausal hot flushes 03/27/2019   • Health care maintenance 03/27/2019   • Depression 11/30/2018   • Psoriatic arthritis (HCC) 11/30/2018   • Familial hypercholesterolemia 11/30/2018   • Moderate persistent asthma without complication 11/30/2018   • History of total right hip arthroplasty 09/22/2017     CURRENT MEDICATIONS  Current Outpatient Prescriptions   Medication Sig Dispense Refill   • albuterol (VENTOLIN HFA) 108 (90 Base) MCG/ACT Aero Soln inhalation aerosol 2 Puffs by Nebulization route.     • medroxyPROGESTERone (PROVERA) 10 MG Tab      • montelukast (SINGULAIR) 10 MG Tab Take 10 mg by mouth.     • omeprazole (PRILOSEC) 40 MG delayed-release capsule Take 40 mg by mouth.     • Abatacept (ORENCIA CLICKJECT) 125 MG/ML Solution Auto-injector 125 mg by Subdermal route.     • escitalopram (LEXAPRO) 10 MG Tab Take 30 mg by mouth.     • estradiol (ESTRACE) 2 MG Tab Take 2 mg by mouth.      • lamoTRIgine (LAMICTAL) 100 MG Tab      • folic acid (FOLVITE) 1 MG Tab Take 1 Tab by mouth every morning. 90 Tab 0   • ERGOCALCIFEROL PO Take  by mouth.     • Methotrexate Sodium 50 MG/2ML Solution      • Olopatadine HCl 0.2 % Solution      • estradiol (ESTRACE) 0.5 MG tablet Take 0.5 mg by mouth every day.     • budesonide-formoterol (SYMBICORT) 160-4.5 MCG/ACT Aerosol Inhale 2 Puffs by mouth 2 Times a Day. Use spacer. Rinse mouth after each use. 3 Inhaler 3   • escitalopram (LEXAPRO) 20 MG tablet Take 20 mg by mouth every morning. = 30 mg daily     • cetirizine (ZYRTEC) 10 MG Tab Take 10 mg by mouth.     • etodolac (LODINE) 500 MG tablet Take 500 mg by mouth 2 times a day.     • rosuvastatin (CRESTOR) 10 MG Tab Take 10 mg by mouth every bedtime.     • albuterol (VENTOLIN HFA) 108 (90 Base) MCG/ACT Aero Soln inhalation aerosol      • azithromycin (ZITHROMAX) 500 MG tablet      • Crisaborole (EUCRISA) 2 % Ointment Apply  to affected area(s).     • cyclobenzaprine (FLEXERIL) 10 MG Tab Take 10 mg by mouth.     • EPINEPHrine (EPIPEN 2-NATALIA) 0.3 MG/0.3ML Solution Auto-injector solution for injection Use as directed by MD as needed.     • EPINEPHrine (EPIPEN 2-NATALIA) 0.3 MG/0.3ML Solution Auto-injector solution for injection      • Etanercept (ENBREL) 50 MG/ML Solution Prefilled Syringe 50 mg by Subdermal route.     • ferrous sulfate 325 (65 Fe) MG tablet Take 325 mg by mouth.     • ipratropium-albuterol (DUONEB) 0.5-2.5 (3) MG/3ML nebulizer solution USE 3 ML FOUR TIMES A DAY VIA NEBULIZER BY INHALATION 30 DAYS     • medroxyPROGESTERone (PROVERA) 10 MG Tab Take 10 mg by mouth.     • montelukast (SINGULAIR) 10 MG Tab      • montelukast (SINGULAIR) 10 MG Tab      • Neomycin-Polymyxin-Dexameth (MAXITROL) 0.1 % Ointment Place over incision site and in eye three times a day and at bedtime.     • omeprazole (PRILOSEC) 20 MG delayed-release capsule      • omeprazole (PRILOSEC) 40 MG delayed-release capsule      • predniSONE  (DELTASONE) 10 MG Tab Take 10 mg by mouth.     • predniSONE (DELTASONE) 10 MG Tab      • tramadol (ULTRAM) 50 MG Tab      • tretinoin (RETIN-A) 0.05 % cream Apply nightly to affected areas on the face     • Abatacept (ORENCIA) 125 MG/ML Solution Prefilled Syringe      • Abatacept (ORENCIA) 125 MG/ML Solution Prefilled Syringe by Subdermal route.     • fluticasone-salmeterol (ADVAIR DISKUS) 250-50 MCG/DOSE AEROSOL POWDER, BREATH ACTIVATED      • budesonide-formoterol (SYMBICORT) 160-4.5 MCG/ACT Aerosol 2 Puffs by Nebulization route.     • cetirizine (ZYRTEC) 10 MG Tab Take 10 mg by mouth.     • escitalopram (LEXAPRO) 10 MG Tab      • escitalopram (LEXAPRO) 20 MG tablet Take 20 mg by mouth.     • escitalopram (LEXAPRO) 20 MG tablet      • estradiol (ESTRACE) 0.5 MG tablet Take 0.5 mg by mouth.     • estradiol (ESTRACE) 2 MG Tab      • etodolac (LODINE) 500 MG tablet Take 500 mg by mouth.     • etodolac (LODINE) 500 MG tablet      • fluticasone-salmeterol (ADVAIR DISKUS) 250-50 MCG/DOSE AEROSOL POWDER, BREATH ACTIVATED 1 Puff by Nebulization route.     • fluticasone-salmeterol (ADVAIR DISKUS) 500-50 MCG/DOSE AEROSOL POWDER, BREATH ACTIVATED 1 Puff by Nebulization route.     • folic acid (FOLVITE) 1 MG Tab Take 1 mg by mouth.     • folic acid (FOLVITE) 1 MG Tab      • folic acid (FOLVITE) 1 MG Tab TAKE 1 TABLET DAILY     • ipratropium (ATROVENT) 0.03 % Solution Spray 1-2 Sprays in nose.     • ipratropium (ATROVENT) 0.03 % Solution      • lamoTRIgine (LAMICTAL) 100 MG Tab TAKE 1 TABLET DAILY     • lamoTRIgine (LAMICTAL) 100 MG Tab Take 100 mg by mouth.     • Methotrexate Sodium 50 MG/2ML Solution Indications: 20 mg.      • Methotrexate Sodium 50 MG/2ML Solution      • Misc. Devices (JOURNEY SERIES ROLLING WALKER) Misc Rolling walker for recovery from left hip replacement     • Olopatadine HCl 0.2 % Solution      • Olopatadine HCl 0.2 % Solution      • Abatacept (ORENCIA) 125 MG/ML Solution Prefilled Syringe      •  rosuvastatin (CRESTOR) 10 MG Tab TAKE 1 TABLET DAILY     • rosuvastatin (CRESTOR) 10 MG Tab TAKE 1 TABLET DAILY     • rosuvastatin (CRESTOR) 10 MG Tab      • budesonide-formoterol (SYMBICORT) 160-4.5 MCG/ACT Aerosol      • ORENCIA 125 MG/ML Solution Prefilled Syringe      • estradiol (ESTRACE) 2 MG Tab      • Misc. Devices (JOURNEY SERIES ROLLING WALKER) Misc Rolling walker for recovery from left hip replacement     • estradiol (ESTRACE) 2 MG Tab Take 2 mg by mouth.     • fluticasone-salmeterol (ADVAIR DISKUS) 250-50 MCG/DOSE AEROSOL POWDER, BREATH ACTIVATED Inhale 1 Puff by mouth 2 times a day. Rinse mouth after each use. 3 Inhaler 3   • ipratropium (ATROVENT) 0.03 % Solution Spray 1-2 Sprays in nose 3 times a day as needed. Each nostril. 3 Bottle 3   • Abatacept 125 MG/ML Solution Auto-injector 125 mg by Subdermal route every Saturday.     • lamoTRIgine (LAMICTAL) 100 MG Tab Take 100 mg by mouth every morning.       No current facility-administered medications for this visit.      ALLERGIES  Allergies: Ampicillin-sulbactam sodium; Levofloxacin; Shellfish-derived products; and Iodine  PAST MEDICAL HISTORY  Past Medical History:   Diagnosis Date   • Asthma    • Depression     Daughter passed few years ago   • Erosive esophagitis    • GERD (gastroesophageal reflux disease)    • Immunocompromised (HCC)    • Psoriasis    • Psoriatic arthritis (HCC)      SURGICAL HISTORY  She  has a past surgical history that includes lumbar fusion posterior; hip replacement, total; and cystectomy.  SOCIAL HISTORY  Social History   Substance Use Topics   • Smoking status: Never Smoker   • Smokeless tobacco: Never Used   • Alcohol use 0.6 - 1.2 oz/week     1 - 2 Glasses of wine per week     Social History     Social History Narrative   • No narrative on file     FAMILY HISTORY  Family History   Problem Relation Age of Onset   • Hyperlipidemia Mother    • Heart Attack Mother    • Psychiatry Mother    • Heart Disease Mother    • Arthritis  Mother    • Lung Disease Father    • Cancer Father    • Hyperlipidemia Father    • Hyperlipidemia Sister    • Cancer Sister         breast   • Heart Disease Paternal Grandfather    • Hypertension Neg Hx    • Diabetes Neg Hx      Family Status   Relation Status   • Mo    • Fa    • Sis (Not Specified)   • PGFa (Not Specified)   • Neg Hx (Not Specified)       ROS   Constitutional: As above.  Respiratory: Negative for cough, shortness of breath.  Cardiovascular: Negative for chest pain, palpitations.   Gastrointestinal: Negative for heartburn, and as above.   Genitourinary: as above.  Musculoskeletal: He has hip pain.   Skin: Negative for rash.   Neuro: Negative for dizziness, weakness and headaches.   Endo/Heme/Allergies: Does not bruise/bleed easily.   Psychiatric/Behavioral: Negative for depression.    PHYSICAL EXAM   /82 (BP Location: Left arm, Patient Position: Sitting, BP Cuff Size: Adult)   Pulse 67   Temp 36.6 °C (97.8 °F) (Temporal)   Ht 1.829 m (6')   Wt 114.6 kg (252 lb 10.4 oz)   SpO2 97%  Body mass index is 34.27 kg/m².  General:  NAD, well appearing  HEENT:   NC/AT, PERRLA, EOMI.    Cardiovascular: RRR.   No m/r/g. No carotid bruits .      Lungs:   CTAB, no w/r/r, no respiratory distress.  Abdomen: Soft, NT/ND; unable to palpate liver/spleen due to WT.  Extremities:  2+ DP and radial pulses bilaterally.  No c/c/e.   Skin:  Warm, dry.  No erythema. No rash.   Neurologic: Alert & oriented x 3.   No focal deficits.  Psychiatric:  Affect normal, mood normal, judgment normal.    LABS     Labs are reviewed and discussed with a patient  No results found for: CHOLSTRLTOT, LDL, HDL, TRIGLYCERIDE    Lab Results   Component Value Date/Time    SODIUM 139 2018 03:05 AM    POTASSIUM 3.2 (L) 2018 03:05 AM    CHLORIDE 107 2018 03:05 AM    CO2 27 2018 03:05 AM    GLUCOSE 116 (H) 2018 03:05 AM    BUN 20 2018 03:05 AM    CREATININE 0.65 2018 03:05 AM      Lab Results   Component Value Date/Time    ALKPHOSPHAT 67 12/12/2018 03:05 AM    ASTSGOT 20 02/22/2019 03:22 PM    ALTSGPT 13 02/22/2019 03:22 PM    TBILIRUBIN 0.8 12/12/2018 03:05 AM      Lab Results   Component Value Date/Time    WBC 10.5 02/22/2019 03:22 PM    RBC 4.22 02/22/2019 03:22 PM    HEMOGLOBIN 13.7 02/22/2019 03:22 PM    HEMATOCRIT 40.8 02/22/2019 03:22 PM    MCV 96.7 02/22/2019 03:22 PM    MCH 32.5 02/22/2019 03:22 PM    MCHC 33.6 02/22/2019 03:22 PM    MPV 10.2 02/22/2019 03:22 PM    NEUTSPOLYS 71.20 02/22/2019 03:22 PM    LYMPHOCYTES 20.20 (L) 02/22/2019 03:22 PM    MONOCYTES 5.50 02/22/2019 03:22 PM    EOSINOPHILS 2.30 02/22/2019 03:22 PM    BASOPHILS 0.60 02/22/2019 03:22 PM      IMAGING     Reviewed and discuss MRI of the left hip from 5/6/2019:  Complex cystic mass in the right adnexa, measuring 5.4X 4.4 cm.  No free fluid or lymphadenopathy    ASSESMENT AND PLAN        1. Right ovarian cyst  - US-PELVIC TRANSVAGINAL ONLY; Future  2. Abnormal MRI  - US-PELVIC TRANSVAGINAL ONLY; Future    3. Immunosuppression (HCC)  - US-PELVIC TRANSVAGINAL ONLY; Future  4. Hormone replacement therapy (HRT)  Risk for malignancy    Counseling:   - Smoking:  Nonsmoker    Followup: Return if symptoms worsen or fail to improve.    All questions are answered.    Please note that this dictation was created using voice recognition software, and that there might be errors of hussain and possibly content.

## 2019-05-14 ENCOUNTER — TELEPHONE (OUTPATIENT)
Dept: MEDICAL GROUP | Facility: MEDICAL CENTER | Age: 59
End: 2019-05-14

## 2019-05-15 ENCOUNTER — TELEPHONE (OUTPATIENT)
Dept: MEDICAL GROUP | Facility: MEDICAL CENTER | Age: 59
End: 2019-05-15

## 2019-05-15 DIAGNOSIS — N83.8 OVARIAN MASS: ICD-10-CM

## 2019-05-22 ENCOUNTER — APPOINTMENT (OUTPATIENT)
Dept: RADIOLOGY | Facility: MEDICAL CENTER | Age: 59
End: 2019-05-22
Attending: INTERNAL MEDICINE
Payer: COMMERCIAL

## 2019-06-04 ENCOUNTER — HOSPITAL ENCOUNTER (OUTPATIENT)
Dept: RADIOLOGY | Facility: MEDICAL CENTER | Age: 59
End: 2019-06-04

## 2019-06-21 ENCOUNTER — APPOINTMENT (OUTPATIENT)
Dept: MEDICAL GROUP | Facility: MEDICAL CENTER | Age: 59
End: 2019-06-21
Payer: COMMERCIAL

## 2019-06-25 ENCOUNTER — OFFICE VISIT (OUTPATIENT)
Dept: PULMONOLOGY | Facility: HOSPICE | Age: 59
End: 2019-06-25
Payer: COMMERCIAL

## 2019-06-25 VITALS
HEIGHT: 72 IN | RESPIRATION RATE: 17 BRPM | HEART RATE: 71 BPM | SYSTOLIC BLOOD PRESSURE: 122 MMHG | BODY MASS INDEX: 33.21 KG/M2 | WEIGHT: 245.2 LBS | OXYGEN SATURATION: 95 % | DIASTOLIC BLOOD PRESSURE: 72 MMHG

## 2019-06-25 DIAGNOSIS — L40.50 PSORIATIC ARTHRITIS (HCC): ICD-10-CM

## 2019-06-25 DIAGNOSIS — J45.40 MODERATE PERSISTENT ASTHMA WITHOUT COMPLICATION: ICD-10-CM

## 2019-06-25 DIAGNOSIS — N83.209 CYST OF OVARY, UNSPECIFIED LATERALITY: ICD-10-CM

## 2019-06-25 DIAGNOSIS — D84.9 IMMUNOSUPPRESSION (HCC): ICD-10-CM

## 2019-06-25 PROCEDURE — 99214 OFFICE O/P EST MOD 30 MIN: CPT | Performed by: NURSE PRACTITIONER

## 2019-06-25 RX ORDER — OMEPRAZOLE 40 MG/1
40 CAPSULE, DELAYED RELEASE ORAL
COMMUNITY
Start: 2017-05-11 | End: 2019-07-05

## 2019-06-25 RX ORDER — ETODOLAC 500 MG/1
500 TABLET, FILM COATED ORAL
COMMUNITY
Start: 2019-02-18 | End: 2019-07-05

## 2019-06-25 RX ORDER — TRIAMCINOLONE ACETONIDE 1 MG/G
1 CREAM TOPICAL 2 TIMES DAILY PRN
COMMUNITY
Start: 2019-05-30 | End: 2021-01-18

## 2019-06-25 RX ORDER — ETODOLAC 500 MG/1
TABLET, FILM COATED ORAL
COMMUNITY
Start: 2019-05-20 | End: 2019-07-05

## 2019-06-25 RX ORDER — BUDESONIDE AND FORMOTEROL FUMARATE DIHYDRATE 160; 4.5 UG/1; UG/1
2 AEROSOL RESPIRATORY (INHALATION)
COMMUNITY
Start: 2019-01-29 | End: 2019-06-25

## 2019-06-25 RX ORDER — OLOPATADINE HYDROCHLORIDE 2 MG/ML
1 SOLUTION/ DROPS OPHTHALMIC
COMMUNITY
Start: 2019-01-25 | End: 2019-07-05

## 2019-06-25 RX ORDER — OMEPRAZOLE 40 MG/1
CAPSULE, DELAYED RELEASE ORAL
COMMUNITY
Start: 2019-05-20 | End: 2019-07-05

## 2019-06-25 RX ORDER — ESCITALOPRAM OXALATE 20 MG/1
TABLET ORAL
COMMUNITY
Start: 2019-05-20 | End: 2020-01-07 | Stop reason: SDUPTHER

## 2019-06-25 RX ORDER — TRAMADOL HYDROCHLORIDE 50 MG/1
50 TABLET ORAL
COMMUNITY
Start: 2018-11-20 | End: 2019-07-05

## 2019-06-25 RX ORDER — OLOPATADINE HYDROCHLORIDE 2 MG/ML
1 SOLUTION/ DROPS OPHTHALMIC DAILY
Qty: 3 BOTTLE | Refills: 1 | Status: SHIPPED | OUTPATIENT
Start: 2019-06-25 | End: 2019-07-05

## 2019-06-25 RX ORDER — TRIAMCINOLONE ACETONIDE 1 MG/G
OINTMENT TOPICAL
Qty: 3 TUBE | Refills: 0 | Status: SHIPPED | OUTPATIENT
Start: 2019-06-25 | End: 2019-07-05

## 2019-06-25 RX ORDER — TRIAMCINOLONE ACETONIDE 1 MG/ML
LOTION TOPICAL
Qty: 3 BOTTLE | Refills: 0 | Status: SHIPPED | OUTPATIENT
Start: 2019-06-25 | End: 2019-12-30

## 2019-06-25 RX ORDER — DOXYCYCLINE HYCLATE 100 MG/1
CAPSULE ORAL
COMMUNITY
Start: 2019-05-30 | End: 2019-06-25

## 2019-06-25 RX ORDER — ESCITALOPRAM OXALATE 10 MG/1
TABLET ORAL
COMMUNITY
Start: 2019-05-20 | End: 2020-01-07 | Stop reason: SDUPTHER

## 2019-06-25 ASSESSMENT — ENCOUNTER SYMPTOMS
EYE DISCHARGE: 0
GASTROINTESTINAL NEGATIVE: 1
RESPIRATORY NEGATIVE: 1
CONSTITUTIONAL NEGATIVE: 1
BRUISES/BLEEDS EASILY: 0
NEUROLOGICAL NEGATIVE: 1
MUSCULOSKELETAL NEGATIVE: 1
EYE PAIN: 0
PSYCHIATRIC NEGATIVE: 1
CARDIOVASCULAR NEGATIVE: 1

## 2019-06-25 NOTE — PROGRESS NOTES
Chief Complaint   Patient presents with   • Follow-Up     Asthma         HPI: This patient is a 58 y.o. female, who presents for follow-up asthma.  She has a history of psoriatic arthritis.  She is accompanied by her  who is a physician.      They had moved from [place] over the past year, where she was followed by rheumatology for psoriatic arthritis and pulmonology for asthma.  She has been on Orencia and methotrexate.  To reiterate, she had community-acquired pneumonia requiring hospitalization November 2018.  Follow-up chest x-ray in December 11, 2018 showed no acute process.  She was advised to discontinue her immunosuppressants.  There was concern of potential underlying interstitial lung disease and she consequently had HRCT chest on January 3, 2019, which was personally reviewed by Dr Erickson and showed no evidence of interstitial lung disease or residual pneumonia.  Her pulmonary function testing and DLCO are normal.    She works as a [employer] nurse, admits to an annual URI, which will typically trigger her asthma.    Immunoglobulin levels were checked and are normal.  LFTs are normal.    She is using Symbicort 160/82 puffs twice a day.  She was referred to rheumatology at her last visit.  She was unable to get in with rheumatology here in [place] and she has seen a rheumatologist in [place].  She was briefly restarted on immunosuppression but had incidental finding on MRI of 5.4 x 4.4 right complex ovarian cyst.  She is been referred to Dr. Bernal and is pending surgery on 11 July.  She has been taken back off of immunosuppression.  Her psoriatic arthritis is flaring up and she is requesting a refill of triamcinolone cream and Pataday.  Refill provided.  Her breathing's been stable.  She denies use of her YONAS.  She has been feeling well.    Past Medical History:   Diagnosis Date   • Asthma    • Depression     Daughter passed few years ago   • Erosive esophagitis    • GERD (gastroesophageal reflux  disease)    • Immunocompromised (HCC)    • Psoriasis    • Psoriatic arthritis (HCC)        Social History   Substance Use Topics   • Smoking status: Never Smoker   • Smokeless tobacco: Never Used   • Alcohol use 0.6 - 1.2 oz/week     1 - 2 Glasses of wine per week       Family History   Problem Relation Age of Onset   • Hyperlipidemia Mother    • Heart Attack Mother    • Psychiatry Mother    • Heart Disease Mother    • Arthritis Mother    • Lung Disease Father    • Cancer Father    • Hyperlipidemia Father    • Hyperlipidemia Sister    • Cancer Sister         breast   • Heart Disease Paternal Grandfather    • Hypertension Neg Hx    • Diabetes Neg Hx        Immunization History   Administered Date(s) Administered   • Influenza Vaccine Quad Inj (Pf) 10/15/2018   • Pneumococcal Conjugate Vaccine (Prevnar/PCV-13) 07/16/2018   • Pneumococcal Vaccine (PCV7) Historical Data 02/22/2016   • Pneumococcal polysaccharide vaccine (PPSV-23) 01/29/2019   • Tdap Vaccine 01/01/2014       Current medications as of today   Current Outpatient Prescriptions   Medication Sig Dispense Refill   • escitalopram (LEXAPRO) 10 MG Tab TAKE 1 TABLET DAILY (TAKEN TOGETHER WITH 20MG FOR A   TOTAL OF 30MG DAILY)     • escitalopram (LEXAPRO) 20 MG tablet TAKE 1 TABLET DAILY (TAKEN TOGETHER WITH 10MG FOR A   TOTAL OF 30MG DAILY)     • etodolac (LODINE) 500 MG tablet TAKE 1 TABLET TWICE A DAY     • omeprazole (PRILOSEC) 40 MG delayed-release capsule TAKE 1 CAPSULE AT BEDTIME     • etodolac (LODINE) 500 MG tablet Take 500 mg by mouth.     • Olopatadine HCl 0.2 % Solution 1 Drop by Ophthalmic route.     • omeprazole (PRILOSEC) 40 MG delayed-release capsule Take 40 mg by mouth.     • tramadol (ULTRAM) 50 MG Tab Take 50 mg by mouth.     • Diclofenac Sodium 1 % Gel      • triamcinolone acetonide (KENALOG) 0.1 % Cream      • albuterol (VENTOLIN HFA) 108 (90 Base) MCG/ACT Aero Soln inhalation aerosol 2 Puffs by Nebulization route.     • cyclobenzaprine  (FLEXERIL) 10 MG Tab Take 10 mg by mouth.     • Etanercept (ENBREL) 50 MG/ML Solution Prefilled Syringe 50 mg by Subdermal route.     • ferrous sulfate 325 (65 Fe) MG tablet Take 325 mg by mouth.     • ipratropium-albuterol (DUONEB) 0.5-2.5 (3) MG/3ML nebulizer solution USE 3 ML FOUR TIMES A DAY VIA NEBULIZER BY INHALATION 30 DAYS     • medroxyPROGESTERone (PROVERA) 10 MG Tab Take 10 mg by mouth.     • montelukast (SINGULAIR) 10 MG Tab Take 10 mg by mouth.     • Neomycin-Polymyxin-Dexameth (MAXITROL) 0.1 % Ointment Place over incision site and in eye three times a day and at bedtime.     • omeprazole (PRILOSEC) 20 MG delayed-release capsule      • tramadol (ULTRAM) 50 MG Tab      • tretinoin (RETIN-A) 0.05 % cream Apply nightly to affected areas on the face     • escitalopram (LEXAPRO) 10 MG Tab Take 30 mg by mouth.     • lamoTRIgine (LAMICTAL) 100 MG Tab      • folic acid (FOLVITE) 1 MG Tab Take 1 Tab by mouth every morning. 90 Tab 0   • ERGOCALCIFEROL PO Take  by mouth.     • Olopatadine HCl 0.2 % Solution      • estradiol (ESTRACE) 0.5 MG tablet Take 0.5 mg by mouth every day.     • budesonide-formoterol (SYMBICORT) 160-4.5 MCG/ACT Aerosol Inhale 2 Puffs by mouth 2 Times a Day. Use spacer. Rinse mouth after each use. 3 Inhaler 3   • cetirizine (ZYRTEC) 10 MG Tab Take 10 mg by mouth.     • etodolac (LODINE) 500 MG tablet Take 500 mg by mouth 2 times a day.     • rosuvastatin (CRESTOR) 10 MG Tab Take 10 mg by mouth every bedtime.     • budesonide-formoterol (SYMBICORT) 160-4.5 MCG/ACT Aerosol 2 Puffs by Nebulization route.     • doxycycline (VIBRAMYCIN) 100 MG Cap      • albuterol (VENTOLIN HFA) 108 (90 Base) MCG/ACT Aero Soln inhalation aerosol      • Crisaborole (EUCRISA) 2 % Ointment Apply  to affected area(s).     • EPINEPHrine (EPIPEN 2-NATALIA) 0.3 MG/0.3ML Solution Auto-injector solution for injection Use as directed by MD as needed.     • EPINEPHrine (EPIPEN 2-NATALIA) 0.3 MG/0.3ML Solution Auto-injector solution  for injection      • medroxyPROGESTERone (PROVERA) 10 MG Tab      • montelukast (SINGULAIR) 10 MG Tab      • montelukast (SINGULAIR) 10 MG Tab      • omeprazole (PRILOSEC) 40 MG delayed-release capsule Take 40 mg by mouth.     • omeprazole (PRILOSEC) 40 MG delayed-release capsule      • predniSONE (DELTASONE) 10 MG Tab      • Abatacept (ORENCIA) 125 MG/ML Solution Prefilled Syringe      • Abatacept (ORENCIA CLICKJECT) 125 MG/ML Solution Auto-injector 125 mg by Subdermal route.     • fluticasone-salmeterol (ADVAIR DISKUS) 250-50 MCG/DOSE AEROSOL POWDER, BREATH ACTIVATED      • budesonide-formoterol (SYMBICORT) 160-4.5 MCG/ACT Aerosol 2 Puffs by Nebulization route.     • cetirizine (ZYRTEC) 10 MG Tab Take 10 mg by mouth.     • escitalopram (LEXAPRO) 10 MG Tab      • escitalopram (LEXAPRO) 20 MG tablet      • estradiol (ESTRACE) 0.5 MG tablet Take 0.5 mg by mouth.     • estradiol (ESTRACE) 2 MG Tab Take 2 mg by mouth.     • estradiol (ESTRACE) 2 MG Tab      • etodolac (LODINE) 500 MG tablet Take 500 mg by mouth.     • etodolac (LODINE) 500 MG tablet      • fluticasone-salmeterol (ADVAIR DISKUS) 250-50 MCG/DOSE AEROSOL POWDER, BREATH ACTIVATED 1 Puff by Nebulization route.     • ORENCIA 125 MG/ML Solution Prefilled Syringe      • Methotrexate Sodium 50 MG/2ML Solution        No current facility-administered medications for this visit.        Allergies: Ampicillin-sulbactam sodium; Levofloxacin; Shellfish-derived products; and Iodine    /72 (BP Location: Left arm, Patient Position: Sitting, BP Cuff Size: Adult)   Pulse 71   Resp 17   Ht 1.829 m (6')   Wt 111.2 kg (245 lb 3.2 oz)   SpO2 95%       Review of Systems   Constitutional: Negative.    HENT: Negative.    Eyes: Negative for pain and discharge.   Respiratory: Negative.    Cardiovascular: Negative.    Gastrointestinal: Negative.    Musculoskeletal: Negative.    Skin: Positive for itching and rash.   Neurological: Negative.    Endo/Heme/Allergies: Negative  for environmental allergies. Does not bruise/bleed easily.   Psychiatric/Behavioral: Negative.        Physical Exam   Constitutional: She is oriented to person, place, and time and well-developed, well-nourished, and in no distress.   HENT:   Head: Normocephalic and atraumatic.   Mouth/Throat: Oropharynx is clear and moist.   Eyes: Pupils are equal, round, and reactive to light.   Neck: Normal range of motion. Neck supple. No tracheal deviation present.   Cardiovascular: Normal rate, regular rhythm and normal heart sounds.    Pulmonary/Chest: Effort normal and breath sounds normal. No respiratory distress. She has no wheezes. She has no rales.   Musculoskeletal: Normal range of motion.   Neurological: She is alert and oriented to person, place, and time. Gait normal.   Skin: Skin is warm and dry. Rash noted.   Psychiatric: Mood, memory, affect and judgment normal.   Vitals reviewed.      Diagnoses/Plan:    1. Psoriatic arthritis (HCC)  She will follow-up with rheumatology as scheduled.  - Olopatadine HCl 0.2 % Solution; 1 Drop by Ophthalmic route every day.  Dispense: 3 Bottle; Refill: 1  - triamcinolone acetonide (KENALOG) 0.1 % Ointment; Apply cream to affected area twice daily  Dispense: 3 Tube; Refill: 0  - triamcinolone (KENALOG) 0.1 % lotion; Apply to affected area daily  Dispense: 3 Bottle; Refill: 0    2. Moderate persistent asthma without complication  Stable, continue current bronchodilators including Symbicort twice daily and albuterol as needed.    3. Immunosuppression (HCC)  We will restart immunosuppressive therapy after surgery with Dr. Bernal next week.  This is managed by rheumatology.    4. Cyst of ovary, unspecified laterality      She will follow-up in 6 months at the pulmonary clinic, sooner if necessary      This dictation was created using voice recognition software. The accuracy of the dictation is limited to the abilities of the software. I expect there may be some errors of grammar and  possibly content.

## 2019-07-05 ENCOUNTER — HOSPITAL ENCOUNTER (OUTPATIENT)
Dept: RADIOLOGY | Facility: MEDICAL CENTER | Age: 59
End: 2019-07-05
Attending: SPECIALIST | Admitting: SPECIALIST
Payer: COMMERCIAL

## 2019-07-05 DIAGNOSIS — Z01.811 PRE-OPERATIVE RESPIRATORY EXAMINATION: ICD-10-CM

## 2019-07-05 DIAGNOSIS — Z01.812 PRE-OPERATIVE LABORATORY EXAMINATION: ICD-10-CM

## 2019-07-05 DIAGNOSIS — Z01.810 PRE-OPERATIVE CARDIOVASCULAR EXAMINATION: ICD-10-CM

## 2019-07-05 LAB
ABO GROUP BLD: NORMAL
ALBUMIN SERPL BCP-MCNC: 4.3 G/DL (ref 3.2–4.9)
ALBUMIN/GLOB SERPL: 1.4 G/DL
ALP SERPL-CCNC: 71 U/L (ref 30–99)
ALT SERPL-CCNC: 20 U/L (ref 2–50)
ANION GAP SERPL CALC-SCNC: 8 MMOL/L (ref 0–11.9)
APTT PPP: 29.3 SEC (ref 24.7–36)
AST SERPL-CCNC: 24 U/L (ref 12–45)
BASOPHILS # BLD AUTO: 0.7 % (ref 0–1.8)
BASOPHILS # BLD: 0.05 K/UL (ref 0–0.12)
BILIRUB SERPL-MCNC: 0.7 MG/DL (ref 0.1–1.5)
BLD GP AB SCN SERPL QL: NORMAL
BUN SERPL-MCNC: 33 MG/DL (ref 8–22)
CALCIUM SERPL-MCNC: 10 MG/DL (ref 8.5–10.5)
CHLORIDE SERPL-SCNC: 107 MMOL/L (ref 96–112)
CO2 SERPL-SCNC: 23 MMOL/L (ref 20–33)
CREAT SERPL-MCNC: 0.64 MG/DL (ref 0.5–1.4)
EKG IMPRESSION: NORMAL
EOSINOPHIL # BLD AUTO: 0.3 K/UL (ref 0–0.51)
EOSINOPHIL NFR BLD: 4 % (ref 0–6.9)
ERYTHROCYTE [DISTWIDTH] IN BLOOD BY AUTOMATED COUNT: 41.3 FL (ref 35.9–50)
GLOBULIN SER CALC-MCNC: 3 G/DL (ref 1.9–3.5)
GLUCOSE SERPL-MCNC: 111 MG/DL (ref 65–99)
HCT VFR BLD AUTO: 41.2 % (ref 37–47)
HGB BLD-MCNC: 14 G/DL (ref 12–16)
IMM GRANULOCYTES # BLD AUTO: 0.02 K/UL (ref 0–0.11)
IMM GRANULOCYTES NFR BLD AUTO: 0.3 % (ref 0–0.9)
INR PPP: 1.04 (ref 0.87–1.13)
LYMPHOCYTES # BLD AUTO: 2.51 K/UL (ref 1–4.8)
LYMPHOCYTES NFR BLD: 33.8 % (ref 22–41)
MCH RBC QN AUTO: 32.1 PG (ref 27–33)
MCHC RBC AUTO-ENTMCNC: 34 G/DL (ref 33.6–35)
MCV RBC AUTO: 94.5 FL (ref 81.4–97.8)
MONOCYTES # BLD AUTO: 0.56 K/UL (ref 0–0.85)
MONOCYTES NFR BLD AUTO: 7.5 % (ref 0–13.4)
NEUTROPHILS # BLD AUTO: 3.99 K/UL (ref 2–7.15)
NEUTROPHILS NFR BLD: 53.7 % (ref 44–72)
NRBC # BLD AUTO: 0 K/UL
NRBC BLD-RTO: 0 /100 WBC
PLATELET # BLD AUTO: 276 K/UL (ref 164–446)
PMV BLD AUTO: 10.3 FL (ref 9–12.9)
POTASSIUM SERPL-SCNC: 3.9 MMOL/L (ref 3.6–5.5)
PROT SERPL-MCNC: 7.3 G/DL (ref 6–8.2)
PROTHROMBIN TIME: 13.8 SEC (ref 12–14.6)
RBC # BLD AUTO: 4.36 M/UL (ref 4.2–5.4)
RH BLD: NORMAL
SODIUM SERPL-SCNC: 138 MMOL/L (ref 135–145)
WBC # BLD AUTO: 7.4 K/UL (ref 4.8–10.8)

## 2019-07-05 PROCEDURE — 85025 COMPLETE CBC W/AUTO DIFF WBC: CPT

## 2019-07-05 PROCEDURE — 86850 RBC ANTIBODY SCREEN: CPT

## 2019-07-05 PROCEDURE — 71045 X-RAY EXAM CHEST 1 VIEW: CPT

## 2019-07-05 PROCEDURE — 86900 BLOOD TYPING SEROLOGIC ABO: CPT

## 2019-07-05 PROCEDURE — 85610 PROTHROMBIN TIME: CPT

## 2019-07-05 PROCEDURE — 93010 ELECTROCARDIOGRAM REPORT: CPT | Performed by: INTERNAL MEDICINE

## 2019-07-05 PROCEDURE — 86901 BLOOD TYPING SEROLOGIC RH(D): CPT

## 2019-07-05 PROCEDURE — 36415 COLL VENOUS BLD VENIPUNCTURE: CPT

## 2019-07-05 PROCEDURE — 80053 COMPREHEN METABOLIC PANEL: CPT

## 2019-07-05 PROCEDURE — 85730 THROMBOPLASTIN TIME PARTIAL: CPT

## 2019-07-05 PROCEDURE — 93005 ELECTROCARDIOGRAM TRACING: CPT

## 2019-07-08 RX ORDER — FOLIC ACID 1 MG/1
TABLET ORAL
Qty: 90 TAB | Refills: 0 | Status: ON HOLD | OUTPATIENT
Start: 2019-07-08 | End: 2019-07-11

## 2019-07-11 ENCOUNTER — ANESTHESIA (OUTPATIENT)
Dept: SURGERY | Facility: MEDICAL CENTER | Age: 59
End: 2019-07-11
Payer: COMMERCIAL

## 2019-07-11 ENCOUNTER — HOSPITAL ENCOUNTER (OUTPATIENT)
Facility: MEDICAL CENTER | Age: 59
End: 2019-07-11
Attending: SPECIALIST | Admitting: SPECIALIST
Payer: COMMERCIAL

## 2019-07-11 ENCOUNTER — APPOINTMENT (OUTPATIENT)
Dept: RADIOLOGY | Facility: MEDICAL CENTER | Age: 59
End: 2019-07-11
Attending: SPECIALIST
Payer: COMMERCIAL

## 2019-07-11 ENCOUNTER — ANESTHESIA EVENT (OUTPATIENT)
Dept: SURGERY | Facility: MEDICAL CENTER | Age: 59
End: 2019-07-11
Payer: COMMERCIAL

## 2019-07-11 VITALS
OXYGEN SATURATION: 94 % | HEART RATE: 64 BPM | TEMPERATURE: 98.8 F | RESPIRATION RATE: 16 BRPM | HEIGHT: 72 IN | WEIGHT: 239.86 LBS | BODY MASS INDEX: 32.49 KG/M2

## 2019-07-11 DIAGNOSIS — G89.18 POST-OPERATIVE PAIN: ICD-10-CM

## 2019-07-11 LAB
ABO GROUP BLD: NORMAL
ANION GAP SERPL CALC-SCNC: 13 MMOL/L (ref 0–11.9)
BLD GP AB SCN SERPL QL: NORMAL
BUN SERPL-MCNC: 16 MG/DL (ref 8–22)
CALCIUM SERPL-MCNC: 8.8 MG/DL (ref 8.5–10.5)
CHLORIDE SERPL-SCNC: 107 MMOL/L (ref 96–112)
CO2 SERPL-SCNC: 24 MMOL/L (ref 20–33)
CREAT SERPL-MCNC: 0.81 MG/DL (ref 0.5–1.4)
GLUCOSE SERPL-MCNC: 116 MG/DL (ref 65–99)
MAGNESIUM SERPL-MCNC: 1.9 MG/DL (ref 1.5–2.5)
POTASSIUM SERPL-SCNC: 3.6 MMOL/L (ref 3.6–5.5)
RH BLD: NORMAL
SODIUM SERPL-SCNC: 144 MMOL/L (ref 135–145)

## 2019-07-11 PROCEDURE — 502779 HCHG SUTURE, QUILL: Performed by: SPECIALIST

## 2019-07-11 PROCEDURE — 500854 HCHG NEEDLE, INSUFFLATION FOR STEP: Performed by: SPECIALIST

## 2019-07-11 PROCEDURE — 700101 HCHG RX REV CODE 250: Performed by: SPECIALIST

## 2019-07-11 PROCEDURE — 83735 ASSAY OF MAGNESIUM: CPT

## 2019-07-11 PROCEDURE — 700105 HCHG RX REV CODE 258: Performed by: SPECIALIST

## 2019-07-11 PROCEDURE — A4338 INDWELLING CATHETER LATEX: HCPCS | Performed by: SPECIALIST

## 2019-07-11 PROCEDURE — 160036 HCHG PACU - EA ADDL 30 MINS PHASE I: Performed by: SPECIALIST

## 2019-07-11 PROCEDURE — 88305 TISSUE EXAM BY PATHOLOGIST: CPT

## 2019-07-11 PROCEDURE — 86901 BLOOD TYPING SEROLOGIC RH(D): CPT

## 2019-07-11 PROCEDURE — 88307 TISSUE EXAM BY PATHOLOGIST: CPT

## 2019-07-11 PROCEDURE — 700111 HCHG RX REV CODE 636 W/ 250 OVERRIDE (IP): Performed by: ANESTHESIOLOGY

## 2019-07-11 PROCEDURE — 501838 HCHG SUTURE GENERAL: Performed by: SPECIALIST

## 2019-07-11 PROCEDURE — 74018 RADEX ABDOMEN 1 VIEW: CPT

## 2019-07-11 PROCEDURE — 160048 HCHG OR STATISTICAL LEVEL 1-5: Performed by: SPECIALIST

## 2019-07-11 PROCEDURE — 86850 RBC ANTIBODY SCREEN: CPT

## 2019-07-11 PROCEDURE — 80048 BASIC METABOLIC PNL TOTAL CA: CPT

## 2019-07-11 PROCEDURE — 500864 HCHG NEEDLE, SPINAL 18G: Performed by: SPECIALIST

## 2019-07-11 PROCEDURE — 160031 HCHG SURGERY MINUTES - 1ST 30 MINS LEVEL 5: Performed by: SPECIALIST

## 2019-07-11 PROCEDURE — 86900 BLOOD TYPING SEROLOGIC ABO: CPT

## 2019-07-11 PROCEDURE — 160035 HCHG PACU - 1ST 60 MINS PHASE I: Performed by: SPECIALIST

## 2019-07-11 PROCEDURE — 700102 HCHG RX REV CODE 250 W/ 637 OVERRIDE(OP): Performed by: ANESTHESIOLOGY

## 2019-07-11 PROCEDURE — 36415 COLL VENOUS BLD VENIPUNCTURE: CPT

## 2019-07-11 PROCEDURE — 501582 HCHG TROCAR, THRD BLADED: Performed by: SPECIALIST

## 2019-07-11 PROCEDURE — A9270 NON-COVERED ITEM OR SERVICE: HCPCS | Performed by: ANESTHESIOLOGY

## 2019-07-11 PROCEDURE — 160002 HCHG RECOVERY MINUTES (STAT): Performed by: SPECIALIST

## 2019-07-11 PROCEDURE — 502714 HCHG ROBOTIC SURGERY SERVICES: Performed by: SPECIALIST

## 2019-07-11 PROCEDURE — 700101 HCHG RX REV CODE 250: Performed by: ANESTHESIOLOGY

## 2019-07-11 PROCEDURE — 160042 HCHG SURGERY MINUTES - EA ADDL 1 MIN LEVEL 5: Performed by: SPECIALIST

## 2019-07-11 PROCEDURE — 160009 HCHG ANES TIME/MIN: Performed by: SPECIALIST

## 2019-07-11 RX ORDER — OXYCODONE HCL 5 MG/5 ML
5 SOLUTION, ORAL ORAL
Status: COMPLETED | OUTPATIENT
Start: 2019-07-11 | End: 2019-07-11

## 2019-07-11 RX ORDER — HALOPERIDOL 5 MG/ML
1 INJECTION INTRAMUSCULAR
Status: DISCONTINUED | OUTPATIENT
Start: 2019-07-11 | End: 2019-07-11 | Stop reason: HOSPADM

## 2019-07-11 RX ORDER — BUPIVACAINE HYDROCHLORIDE AND EPINEPHRINE 2.5; 5 MG/ML; UG/ML
INJECTION, SOLUTION EPIDURAL; INFILTRATION; INTRACAUDAL; PERINEURAL
Status: DISCONTINUED | OUTPATIENT
Start: 2019-07-11 | End: 2019-07-11 | Stop reason: HOSPADM

## 2019-07-11 RX ORDER — ONDANSETRON 2 MG/ML
INJECTION INTRAMUSCULAR; INTRAVENOUS PRN
Status: DISCONTINUED | OUTPATIENT
Start: 2019-07-11 | End: 2019-07-11 | Stop reason: SURG

## 2019-07-11 RX ORDER — MEPERIDINE HYDROCHLORIDE 25 MG/ML
25 INJECTION INTRAMUSCULAR; INTRAVENOUS; SUBCUTANEOUS
Status: DISCONTINUED | OUTPATIENT
Start: 2019-07-11 | End: 2019-07-11 | Stop reason: HOSPADM

## 2019-07-11 RX ORDER — LABETALOL HYDROCHLORIDE 5 MG/ML
5 INJECTION, SOLUTION INTRAVENOUS
Status: DISCONTINUED | OUTPATIENT
Start: 2019-07-11 | End: 2019-07-11 | Stop reason: HOSPADM

## 2019-07-11 RX ORDER — OXYCODONE HCL 5 MG/5 ML
10 SOLUTION, ORAL ORAL
Status: COMPLETED | OUTPATIENT
Start: 2019-07-11 | End: 2019-07-11

## 2019-07-11 RX ORDER — DEXAMETHASONE SODIUM PHOSPHATE 4 MG/ML
INJECTION, SOLUTION INTRA-ARTICULAR; INTRALESIONAL; INTRAMUSCULAR; INTRAVENOUS; SOFT TISSUE PRN
Status: DISCONTINUED | OUTPATIENT
Start: 2019-07-11 | End: 2019-07-11 | Stop reason: SURG

## 2019-07-11 RX ORDER — SODIUM CHLORIDE, SODIUM LACTATE, POTASSIUM CHLORIDE, CALCIUM CHLORIDE 600; 310; 30; 20 MG/100ML; MG/100ML; MG/100ML; MG/100ML
INJECTION, SOLUTION INTRAVENOUS CONTINUOUS
Status: DISCONTINUED | OUTPATIENT
Start: 2019-07-11 | End: 2019-07-11 | Stop reason: HOSPADM

## 2019-07-11 RX ORDER — ACETAMINOPHEN 500 MG
1000 TABLET ORAL ONCE
Status: COMPLETED | OUTPATIENT
Start: 2019-07-11 | End: 2019-07-11

## 2019-07-11 RX ORDER — ONDANSETRON 2 MG/ML
4 INJECTION INTRAMUSCULAR; INTRAVENOUS
Status: DISCONTINUED | OUTPATIENT
Start: 2019-07-11 | End: 2019-07-11 | Stop reason: HOSPADM

## 2019-07-11 RX ORDER — OXYCODONE HYDROCHLORIDE AND ACETAMINOPHEN 5; 325 MG/1; MG/1
1 TABLET ORAL EVERY 4 HOURS PRN
Qty: 28 TAB | Refills: 0 | Status: SHIPPED | OUTPATIENT
Start: 2019-07-11 | End: 2019-07-18

## 2019-07-11 RX ORDER — MEPERIDINE HYDROCHLORIDE 25 MG/ML
INJECTION INTRAMUSCULAR; INTRAVENOUS; SUBCUTANEOUS PRN
Status: DISCONTINUED | OUTPATIENT
Start: 2019-07-11 | End: 2019-07-11 | Stop reason: SURG

## 2019-07-11 RX ORDER — KETOROLAC TROMETHAMINE 30 MG/ML
INJECTION, SOLUTION INTRAMUSCULAR; INTRAVENOUS PRN
Status: DISCONTINUED | OUTPATIENT
Start: 2019-07-11 | End: 2019-07-11 | Stop reason: SURG

## 2019-07-11 RX ORDER — MIDAZOLAM HYDROCHLORIDE 1 MG/ML
2 INJECTION INTRAMUSCULAR; INTRAVENOUS
Status: DISCONTINUED | OUTPATIENT
Start: 2019-07-11 | End: 2019-07-11 | Stop reason: HOSPADM

## 2019-07-11 RX ORDER — ACETAMINOPHEN 500 MG
500 TABLET ORAL ONCE
COMMUNITY
End: 2021-04-29

## 2019-07-11 RX ORDER — GABAPENTIN 300 MG/1
300 CAPSULE ORAL ONCE
Status: COMPLETED | OUTPATIENT
Start: 2019-07-11 | End: 2019-07-11

## 2019-07-11 RX ORDER — ROSUVASTATIN CALCIUM 10 MG/1
10 TABLET, COATED ORAL EVERY EVENING
COMMUNITY
End: 2021-01-18

## 2019-07-11 RX ORDER — ONDANSETRON 4 MG/1
4 TABLET, FILM COATED ORAL EVERY 6 HOURS PRN
Qty: 30 TAB | Refills: 0 | Status: SHIPPED | OUTPATIENT
Start: 2019-07-11 | End: 2019-12-30

## 2019-07-11 RX ORDER — POLYETHYLENE GLYCOL-3350 AND ELECTROLYTES WITH FLAVOR PACK 240; 5.84; 2.98; 6.72; 22.72 G/278.26G; G/278.26G; G/278.26G; G/278.26G; G/278.26G
4000 POWDER, FOR SOLUTION ORAL ONCE
Status: ON HOLD | COMMUNITY
Start: 2019-07-03 | End: 2019-09-19

## 2019-07-11 RX ORDER — DIPHENHYDRAMINE HYDROCHLORIDE 50 MG/ML
12.5 INJECTION INTRAMUSCULAR; INTRAVENOUS
Status: DISCONTINUED | OUTPATIENT
Start: 2019-07-11 | End: 2019-07-11 | Stop reason: HOSPADM

## 2019-07-11 RX ADMIN — DEXAMETHASONE SODIUM PHOSPHATE 8 MG: 4 INJECTION, SOLUTION INTRA-ARTICULAR; INTRALESIONAL; INTRAMUSCULAR; INTRAVENOUS; SOFT TISSUE at 17:54

## 2019-07-11 RX ADMIN — MEPERIDINE HYDROCHLORIDE 12.5 MG: 25 INJECTION INTRAMUSCULAR; INTRAVENOUS; SUBCUTANEOUS at 18:03

## 2019-07-11 RX ADMIN — MEPERIDINE HYDROCHLORIDE 25 MG: 25 INJECTION INTRAMUSCULAR; INTRAVENOUS; SUBCUTANEOUS at 19:26

## 2019-07-11 RX ADMIN — PROPOFOL 20 MG: 10 INJECTION, EMULSION INTRAVENOUS at 17:48

## 2019-07-11 RX ADMIN — ROCURONIUM BROMIDE 50 MG: 10 INJECTION, SOLUTION INTRAVENOUS at 17:50

## 2019-07-11 RX ADMIN — EPHEDRINE SULFATE 10 MG: 50 INJECTION INTRAMUSCULAR; INTRAVENOUS; SUBCUTANEOUS at 18:02

## 2019-07-11 RX ADMIN — MEPERIDINE HYDROCHLORIDE 12.5 MG: 25 INJECTION INTRAMUSCULAR; INTRAVENOUS; SUBCUTANEOUS at 18:05

## 2019-07-11 RX ADMIN — CEFOTETAN DISODIUM 2 G: 2 INJECTION, POWDER, FOR SOLUTION INTRAMUSCULAR; INTRAVENOUS at 17:45

## 2019-07-11 RX ADMIN — MEPERIDINE HYDROCHLORIDE 25 MG: 25 INJECTION INTRAMUSCULAR; INTRAVENOUS; SUBCUTANEOUS at 18:49

## 2019-07-11 RX ADMIN — OXYCODONE HYDROCHLORIDE 5 MG: 5 SOLUTION ORAL at 19:46

## 2019-07-11 RX ADMIN — KETOROLAC TROMETHAMINE 30 MG: 30 INJECTION, SOLUTION INTRAMUSCULAR at 17:53

## 2019-07-11 RX ADMIN — PROPOFOL 20 MG: 10 INJECTION, EMULSION INTRAVENOUS at 18:52

## 2019-07-11 RX ADMIN — LIDOCAINE HYDROCHLORIDE 160 MG: 20 INJECTION, SOLUTION INTRAVENOUS at 17:50

## 2019-07-11 RX ADMIN — ONDANSETRON 4 MG: 2 INJECTION INTRAMUSCULAR; INTRAVENOUS at 18:49

## 2019-07-11 RX ADMIN — ACETAMINOPHEN 1000 MG: 500 TABLET ORAL at 15:19

## 2019-07-11 RX ADMIN — SUGAMMADEX 150 MG: 100 INJECTION, SOLUTION INTRAVENOUS at 18:57

## 2019-07-11 RX ADMIN — ROCURONIUM BROMIDE 5 MG: 10 INJECTION, SOLUTION INTRAVENOUS at 17:48

## 2019-07-11 RX ADMIN — ALFENTANIL HYDROCHLORIDE 1000 MCG: 500 INJECTION, SOLUTION INTRAVENOUS at 17:48

## 2019-07-11 RX ADMIN — PROPOFOL 80 MG: 10 INJECTION, EMULSION INTRAVENOUS at 17:50

## 2019-07-11 RX ADMIN — GABAPENTIN 300 MG: 300 CAPSULE ORAL at 15:19

## 2019-07-11 RX ADMIN — SODIUM CHLORIDE, POTASSIUM CHLORIDE, SODIUM LACTATE AND CALCIUM CHLORIDE: 600; 310; 30; 20 INJECTION, SOLUTION INTRAVENOUS at 15:20

## 2019-07-11 ASSESSMENT — PAIN SCALES - GENERAL: PAIN_LEVEL: 0

## 2019-07-11 NOTE — ANESTHESIA PREPROCEDURE EVALUATION
Relevant Problems   (+) Moderate persistent asthma without complication       Physical Exam    Airway   Mallampati: II  TM distance: >3 FB  Neck ROM: full       Cardiovascular - normal exam  Rhythm: regular  Rate: normal     Dental - normal exam         Pulmonary - normal exam  Breath sounds clear to auscultation     Abdominal    Neurological - normal exam                 Anesthesia Plan    ASA 2       Plan - general       Airway plan will be ETT        Induction: intravenous    Postoperative Plan: Postoperative administration of opioids is intended.    Pertinent diagnostic labs and testing reviewed    Informed Consent:    Anesthetic plan and risks discussed with patient.    Use of blood products discussed with: patient whom consented to blood products.

## 2019-07-12 LAB — PATHOLOGY CONSULT NOTE: NORMAL

## 2019-07-12 NOTE — OP REPORT
DATE OF SERVICE:  07/11/2019    PREOPERATIVE DIAGNOSIS:  Cervical fibroid.    PREOPERATIVE DIAGNOSIS:  Cervical fibroid.    PROCEDURE PERFORMED:  Robotic-assisted hysterectomy with bilateral   salpingo-oophorectomy.    SURGEON:  Curly Bernal MD    ASSISTANT:  Deisy Ladd PA-C    ANESTHESIA:  General.    ANESTHESIOLOGIST:  Taye Abel MD    ESTIMATED BLOOD LOSS:  50 mL    FLUIDS:  Per Dr. Abel.    URINE OUTPUT:  As per Dr. Abel.    COMPLICATIONS:  None.    COUNTS:  Final sponge and needle counts correct.    INDICATIONS FOR SURGERY:  The patient is a pleasant 58-year-old female   referred to me because of a pelvic mass.  It was felt to be a cervical   fibroid.  The patient desires definitive surgical therapy.  Thus patient was   advised to undergo a surgical pathological evaluation.  Risks, benefits, and   rationale for procedures were reviewed with the patient in detail.  The   patient is understanding of these risks and wished to proceed with surgery as   planned.    INTRAOPERATIVE FINDINGS:  1.  No evidence of ascites.  2.  Normal right and left diaphragm.  Liver capsule smooth.  Stomach appeared   grossly normal.  Abdominal peritoneal surfaces were unremarkable.  Omentum   appeared grossly normal.  3.  In the pelvis, uterus was of normal size.  However, there was a 4 x 4 cm   attach to the cervix extending out to the right parametria.  It was not   compressing on the ureter.  It had displaced the ureter laterally.  There was   also noted an endometriosis implant along the epiploica of the sigmoid colon,   which was ultimately biopsied.  Otherwise, the adnexal structures were   unremarkable.  There was no seeding along the bladder, pelvic and cul-de-sac   peritoneum.  There was no evidence of endometriosis.    PROCEDURE NOTE:  Patient was given IV antibiotics prior to procedure.  The   patient was prepped and draped and placed in modified dorsal lithotomy   position.  We proceeded on with the robotic  portion of the procedure.    A 1 cm supraumbilical incision was made.  A Veress needle was inserted without   difficulty.  Pneumoperitoneum was achieved to the abdominal pressure of 15   mmHg.  A 12 mm trocar was inserted without difficulty.  After completion of   this, a 12 mm trocar was placed in the left lower quadrant two fingerbreadths   medial to the anterior superior iliac spine under direct laparoscopic   visualization.  After completion of this, a laparoscope was then placed in the   left lower quadrant port to assist in the placement of the remainder of the   da Hermilo ports.  Two 8 mm ports were placed in the right upper quadrant 8 cm   apart while one 8 mm port was placed in the left upper quadrant 8 cm apart.    After completion of this, the patient was placed in steep Trendelenburg   position.  The robotic system was then docked and after docking the robotic   system, the instrumentation was inserted under direct laparoscopic   visualization to insure that there was no injury to the abdominal contents.    Once this was completed, the robotic camera was then docked.  We then   proceeded with our da Hermilo portion of the procedure.    We then proceeded on with the hysterectomy with bilateral   salpingo-oophorectomy.    Initially, I performed the endometriosis implants along the epiploica.  This   was biopsied without compromising the colon.  It was sent for pathological   analysis.  After completion of this, a hysterectomy and bilateral   salpingo-oophorectomy was undertaken.    Initially, the right and left posterior broad ligament was incised.  The   ureters were identified.  The ovarian vessels were skeletonized and isolated.    Bipolar cautery was used to cauterize the ovarian vessels.  Subsequently,   divided on both the right and left side.  The medial leaf of the peritoneum   was incised down to the level of the uterosacral ligament.  The round ligament   was divided followed by the anterior broad  ligament followed by the bladder   peritoneum.  On the right side, the cervical fibroid was noted.  I easily   dissected the cervical fibroid mobilizing it out and mobilizing the right   ureter laterally without compromise.  The uterine vessels were skeletonized   and isolated.  They were cauterized just positioned to the fundus of the   uterus, subsequently divided.  The remainder of the low uterine segment was   subsequently cauterized with bipolar cautery and divided down to the level of   the uterosacral ligament.  The uterosacral ligament was then transected.    Bladder was further dissected away.  After assuring that the bladder was   mobilized off the paracervical fascia, anterior colpotomy was made without   compromising the bladder and the vagina was circumferentially incised to   complete the hysterectomy with bilateral salpingo-oophorectomy.  The specimen   was removed through the vaginal canal without difficulty.  The vaginal cuff   was then closed with 0 Quill PDS suture in a 2-layer standard closure fashion.    After completion of this, I checked the integrity of the sigmoid colon to   ensure that there was no evidence of compromise to the sigmoid colon where I   had performed the biopsy.  Thus 30 mL Hu catheter was placed in the rectal   reservoir and the proximal portion of the sigmoid colon was clamped up.  I   then injected methylene blue to the sigmoid rectum allowing the sigmoid rectum   to distend.  There was no evidence of spillage.  Once this was assured, we   counted for sponges, needles, and instrument counts.  Once this was accounted   for, pelvis was copiously irrigated with water.  Hemostasis established.  Once   this was established and the integrity of the ureters were intact, robotic   instrumentation was removed.  Robotic system was then de-docked.  The   pneumoperitoneum was allowed to escape through the 8 mm port.  Subcutaneous   fat was copiously irrigated with water.  The skin  was reapproximated with 3-0   Monocryl sutures.    Patient tolerated the procedure well without any difficulties.  Subsequently,   transferred to the PACU in stable condition, extubated.       ____________________________________     MD NAHID LOZANO / ESTEVAN    DD:  07/11/2019 19:18:49  DT:  07/12/2019 01:27:25    D#:  [id number]  Job#:  [id number]

## 2019-07-12 NOTE — OR NURSING
Pt ambulated to br. Steady.Voided. Dressed. returned to [facility].  Tolerated well. Denies pain. Denies nausea.     Waiting for labs results prior discharge. Lab called. Still running.

## 2019-07-12 NOTE — ANESTHESIA PROCEDURE NOTES
Airway  Date/Time: 7/11/2019 5:51 PM  Performed by: SUZI URIAS  Authorized by: SUZI URIAS     Location:  OR  Urgency:  Elective  Indications for Airway Management:  Anesthesia  Spontaneous Ventilation: absent    Sedation Level:  Deep  Preoxygenated: Yes    Patient Position:  Sniffing  Final Airway Type:  Endotracheal airway  Final Endotracheal Airway:  ETT  Cuffed: Yes    Technique Used for Successful ETT Placement:  Direct laryngoscopy  Insertion Site:  Oral  Blade Type:  Chris  Laryngoscope Blade/Videolaryngoscope Blade Size:  2  ETT Size (mm):  7.5  Measured from:  Lips  ETT to Lips (cm):  22  Placement Verified by: auscultation and capnometry    Cormack-Lehane Classification:  Grade I - full view of glottis  Number of Attempts at Approach:  1

## 2019-07-12 NOTE — DISCHARGE INSTRUCTIONS
ACTIVITY: Rest and take it easy for the first 24 hours.  A responsible adult is recommended to remain with you during that time.  It is normal to feel sleepy.  We encourage you to not do anything that requires balance, judgment or coordination.    MILD FLU-LIKE SYMPTOMS ARE NORMAL. YOU MAY EXPERIENCE GENERALIZED MUSCLE ACHES, THROAT IRRITATION, HEADACHE AND/OR SOME NAUSEA.    FOR 24 HOURS DO NOT:  Drive, operate machinery or run household appliances.  Drink beer or alcoholic beverages.   Make important decisions or sign legal documents.    SPECIAL INSTRUCTIONS:   1. No heavy lifting greater than 10 pounds for minimum 6 weeks  2. Nothing in vagina (ie no tampons, douching, intercourse) for minimum of 6 weeks  3. No driving while taking narcotics   4. Return to our office as directed and call to confirm appointment  Call our office [phone] if you develop any fevers, chills, nausea/vomiting, heavy vaginal bleeding, or redness, tenderness, and/or drainage from your wound, if you have persistent watery discharge while ambulating or stool draining from the vagina .  5. Showering is ok after shower make sure wound is dry.   6. You may keep the wound dressing and change everyday. After 2 weeks from surgery you may keep the wound dressing off.   7. You may have vaginal spotting or light bleeding which is normal.  8. If you have not had a bowel movement for 2 days, please take over the counter Milk of Magnesium, 1 tablespoon every 4 hours. After 4 doses and if you still have not had a bowel movement, please call your doctor.   9. You may eat soft diet, such as soup, liquid, for day #1 and if tolerating you may resume your regular diet.      DIET: To avoid nausea, slowly advance diet as tolerated, avoiding spicy or greasy foods for the first day.  Add more substantial food to your diet according to your physician's instructions. INCREASE FLUIDS AND FIBER TO AVOID CONSTIPATION.        FOLLOW-UP APPOINTMENT:  A follow-up  appointment should be arranged with your doctor in 1-2 weeks ; call to schedule.    You should CALL YOUR PHYSICIAN if you develop:  Fever greater than 101 degrees F.  Pain not relieved by medication, or persistent nausea or vomiting.  Excessive bleeding (blood soaking through dressing) or unexpected drainage from the wound.  Extreme redness or swelling around the incision site, drainage of pus or foul smelling drainage.  Inability to urinate or empty your bladder within 8 hours.  Problems with breathing or chest pain.    You should call 911 if you develop problems with breathing or chest pain.  If you are unable to contact your doctor or surgical center, you should go to the nearest emergency room or urgent care center.  Physician's telephone #: [phone]    If any questions arise, call your doctor.  If your doctor is not available, please feel free to call the Surgical Center at {Surgical Dept Numbers:[id number]}.  The Center is open Monday through Friday from 7AM to 7PM.  You can also call the [employer] HOTLINE open 24 hours/day, 7 days/week and speak to a nurse at [phone], or toll free at [phone].    A registered nurse may call you a few days after your surgery to see how you are doing after your procedure.    MEDICATIONS: Resume taking daily medication.  Take prescribed pain medication with food.  If no medication is prescribed, you may take non-aspirin pain medication if needed.  PAIN MEDICATION CAN BE VERY CONSTIPATING.  Take a stool softener or laxative such as senokot, pericolace, or milk of magnesia if needed.    Prescription given pre op.  Last pain medication given at 7:45.    If your physician has prescribed pain medication that includes Acetaminophen (Tylenol), do not take additional Acetaminophen (Tylenol) while taking the prescribed medication.    Depression / Suicide Risk    As you are discharged from this [facility] facility, it is important to learn how to keep safe from harming  yourself.    Recognize the warning signs:  · Abrupt changes in personality, positive or negative- including increase in energy   · Giving away possessions  · Change in eating patterns- significant weight changes-  positive or negative  · Change in sleeping patterns- unable to sleep or sleeping all the time   · Unwillingness or inability to communicate  · Depression  · Unusual sadness, discouragement and loneliness  · Talk of wanting to die  · Neglect of personal appearance   · Rebelliousness- reckless behavior  · Withdrawal from people/activities they love  · Confusion- inability to concentrate     If you or a loved one observes any of these behaviors or has concerns about self-harm, here's what you can do:  · Talk about it- your feelings and reasons for harming yourself  · Remove any means that you might use to hurt yourself (examples: pills, rope, extension cords, firearm)  · Get professional help from the community (Mental Health, Substance Abuse, psychological counseling)  · Do not be alone:Call your Safe Contact- someone whom you trust who will be there for you.  · Call your local CRISIS HOTLINE 718-2107 or 394-125-6547  · Call your local Children's Mobile Crisis Response Team [place] (418) 894-8959 or www.kSARIA  · Call the toll free National Suicide Prevention Hotlines   · National Suicide Prevention Lifeline 352-838-HBUE (7715)  · National Hope Line Network 800-SUICIDE (606-1077)

## 2019-07-12 NOTE — ANESTHESIA QCDR
2019 [facility] Clinical Data Registry (for Quality Improvement)     Postoperative nausea/vomiting risk protocol (Adult = 18 yrs and Pediatric 3-17 yrs)- (430 and 463)  General inhalation anesthetic (NOT TIVA) with PONV risk factors: Yes  Provision of anti-emetic therapy with at least 2 different classes of agents: Yes   Patient DID NOT receive anti-emetic therapy and reason is documented in Medical Record:  N/A    Multimodal Pain Management- (AQI59)  Patient undergoing Elective Surgery (i.e. Outpatient, or ASC, or Prescheduled Surgery prior to Hospital Admission): Yes  Use of Multimodal Pain Management, two or more drugs and/or interventions, NOT including systemic opioids: Yes   Exception: Documented allergy to multiple classes of analgesics:  N/A    PACU assessment of acute postoperative pain prior to Anesthesia Care End- Applies to Patients Age = 18- (ABG7)  Initial PACU pain score is which of the following: < 7/10  Patient unable to report pain score: N/A    Post-anesthetic transfer of care checklist/protocol to PACU/ICU- (426 and 427)  Upon conclusion of case, patient transferred to which of the following locations: PACU/Non-ICU  Use of transfer checklist/protocol: Yes  Exclusion: Service Performed in Patient Hospital Room (and thus did not require transfer): N/A    PACU Reintubation- (AQI31)  General anesthesia requiring endotracheal intubation (ETT) along with subsequent extubation in OR or PACU: Yes  Required reintubation in the PACU: No   Extubation was a planned trial documented in the medical record prior to removal of the original airway device:  N/A    Unplanned admission to ICU related to anesthesia service up through end of PACU care- (MD51)  Unplanned admission to ICU (not initially anticipated at anesthesia start time): No

## 2019-07-12 NOTE — ANESTHESIA POSTPROCEDURE EVALUATION
Patient: Arely Haynes    Procedure Summary     Date:  07/11/19 Room / Location:  [place] [address] / SURGERY [facility]    Anesthesia Start:  1745 Anesthesia Stop:  1906    Procedures:       HYSTERECTOMY, ROBOT-ASSISTED, USING DA SABINO XI (N/A Abdomen)      SALPINGO-OOPHORECTOMY (Bilateral )      SALPINGECTOMY Diagnosis:  (CERVICAL FIBROID)    Surgeon:  Curly Bernal M.D. Responsible Provider:  Taye Abel M.D.    Anesthesia Type:  general ASA Status:  2          Final Anesthesia Type: general  Last vitals  BP   NIBP: 102/74    Temp   36.6 °C (97.9 °F)    Pulse   Pulse: 68   Resp   17    SpO2   95 %      Anesthesia Post Evaluation    Patient location during evaluation: PACU  Patient participation: complete - patient participated  Level of consciousness: awake and alert  Pain score: 0    Airway patency: patent  Anesthetic complications: no  Cardiovascular status: hemodynamically stable  Respiratory status: acceptable  Hydration status: euvolemic    PONV: none           Nurse Pain Score: 2 (NPRS)

## 2019-07-12 NOTE — OR SURGEON
Immediate Post OP Note    PreOp Diagnosis: cervical fibroid    PostOp Diagnosis: same    Procedure(s):  HYSTERECTOMY, ROBOT-ASSISTED, USING DA SABINO XI  SALPINGO-OOPHORECTOMY  SALPINGECTOMY    Surgeon(s):  Curly Bernal M.D.    Anesthesiologist/Type of Anesthesia:  Anesthesiologist: Taye Abel M.D./* No anesthesia type entered *    Surgical Staff:  Assistant: QUEENIE Plasencia  Circulator: Ramón Escobar R.N.  Scrub Person: Ivon Petit; Mary Sorenson  Count Glasgow: Michael Montemayor R.N.    Specimens removed if any: uterus tubes and ovaries     Estimated Blood Loss: 50 cc    Findings: 4x4 cm cervical fibroid    Complications: none      7/11/2019 7:07 PM Curly Bernal M.D.

## 2019-07-12 NOTE — OR NURSING
Labs result within normal range.   VSS. AXOX4. Rivera. Strong equal. denies pain. Denies nausea.   staes he has prescription from dr dill.  Reviewed discharge  Instructions. All questions answered.    Pt transported to car via wheelchair

## 2019-07-12 NOTE — ANESTHESIA TIME REPORT
Anesthesia Start and Stop Event Times     Date Time Event    7/11/2019 1745 Anesthesia Start     1906 Anesthesia Stop        Responsible Staff  07/11/19    Name Role Begin End    Taye Abel M.D. Anesth 1745 1906        Preop Diagnosis (Free Text):  Pre-op Diagnosis     CERVICAL FIBROID        Preop Diagnosis (Codes):  Diagnosis Information     Diagnosis Code(s):         Post op Diagnosis  Fibroids      Premium Reason  A. 3PM - 7AM    Comments:

## 2019-09-16 ENCOUNTER — APPOINTMENT (OUTPATIENT)
Dept: RADIOLOGY | Facility: MEDICAL CENTER | Age: 59
End: 2019-09-16
Attending: EMERGENCY MEDICINE
Payer: COMMERCIAL

## 2019-09-16 ENCOUNTER — HOSPITAL ENCOUNTER (EMERGENCY)
Facility: MEDICAL CENTER | Age: 59
End: 2019-09-16
Attending: EMERGENCY MEDICINE
Payer: COMMERCIAL

## 2019-09-16 VITALS
HEART RATE: 56 BPM | WEIGHT: 240 LBS | BODY MASS INDEX: 32.51 KG/M2 | RESPIRATION RATE: 18 BRPM | SYSTOLIC BLOOD PRESSURE: 140 MMHG | HEIGHT: 72 IN | OXYGEN SATURATION: 94 % | DIASTOLIC BLOOD PRESSURE: 66 MMHG | TEMPERATURE: 98.5 F

## 2019-09-16 DIAGNOSIS — S89.92XA INJURY OF LEFT KNEE, INITIAL ENCOUNTER: ICD-10-CM

## 2019-09-16 PROCEDURE — 99285 EMERGENCY DEPT VISIT HI MDM: CPT

## 2019-09-16 PROCEDURE — 700111 HCHG RX REV CODE 636 W/ 250 OVERRIDE (IP): Performed by: EMERGENCY MEDICINE

## 2019-09-16 PROCEDURE — A9270 NON-COVERED ITEM OR SERVICE: HCPCS | Performed by: EMERGENCY MEDICINE

## 2019-09-16 PROCEDURE — 700102 HCHG RX REV CODE 250 W/ 637 OVERRIDE(OP): Performed by: EMERGENCY MEDICINE

## 2019-09-16 PROCEDURE — 73560 X-RAY EXAM OF KNEE 1 OR 2: CPT | Mod: LT

## 2019-09-16 RX ORDER — OXYCODONE HYDROCHLORIDE AND ACETAMINOPHEN 5; 325 MG/1; MG/1
1-2 TABLET ORAL EVERY 4 HOURS PRN
Qty: 15 TAB | Refills: 0 | Status: ON HOLD | OUTPATIENT
Start: 2019-09-16 | End: 2019-09-19

## 2019-09-16 RX ORDER — OXYCODONE HYDROCHLORIDE AND ACETAMINOPHEN 5; 325 MG/1; MG/1
1 TABLET ORAL ONCE
Status: COMPLETED | OUTPATIENT
Start: 2019-09-16 | End: 2019-09-16

## 2019-09-16 RX ADMIN — FENTANYL CITRATE 100 MCG: 50 INJECTION, SOLUTION INTRAMUSCULAR; INTRAVENOUS at 10:17

## 2019-09-16 RX ADMIN — OXYCODONE HYDROCHLORIDE AND ACETAMINOPHEN 1 TABLET: 5; 325 TABLET ORAL at 11:05

## 2019-09-16 NOTE — ED NOTES
Pt medicated as ordered.  Splint readjusted, swelling noted in Left Thigh.  [facility].

## 2019-09-16 NOTE — ED PROVIDER NOTES
ED Provider Note    CHIEF COMPLAINT  Chief Complaint   Patient presents with   • Knee Injury     Pt tripped and fell down three stairs, landed on Left Knee, (+) deformity noted.         HPI  Arely Haynes is a 58 y.o. female who presents with left knee pain.  The patient fell down approximately 3 stairs.  She presents with her  who is a physician and he felt like her kneecap was subluxed.  He reduce the kneecap but she presents with a large hematoma to the left knee.  She has diffuse swelling and pain to the left knee.  She does not have any left ankle nor left hip pain.  She did not strike her head.  She is unaware of any trauma to the torso.  The patient states the pain is moderate to severe in intensity.  It is worse with any attempted manipulation or movement of the left knee.    REVIEW OF SYSTEMS  No other musculoskeletal complaints    PHYSICAL EXAM  VITAL SIGNS: /64   Pulse (!) 55   Temp 36.9 °C (98.5 °F) (Temporal)   Resp 18   Ht 1.829 m (6')   Wt 108.9 kg (240 lb)   LMP  (LMP Unknown)   SpO2 95%   BMI 32.55 kg/m²   In general the patient appears uncomfortable but nontoxic    Extremities patient does have a large hematoma to the anterior aspect of the left knee.  She does not have any obvious deformities.  Ligamental stability was difficult to evaluate due to pain as well as the swelling.  She has a normal left ankle and left hip exam.    Skin no erythema nor induration    Neurovascular examination is intact of the left lower extremity    RADIOLOGY/PROCEDURES  DX-KNEE 2- LEFT   Final Result      Thickened quadriceps tendon with an ossific fragment superior to the superior patella. Findings are concerning for avulsion injury involving the quadriceps tendon. Knee MRI may be helpful for further evaluation.            COURSE & MEDICAL DECISION MAKING  Pertinent Labs & Imaging studies reviewed. (See chart for details)  This a 58-year-old female who presents the [facility] department with a  Spoke to pt and she is stopping premarin cream and will use vagifem tabs. Med sent in   knee injury after a fall.  I suspect that hematoma is mostly from a contusion.  There was some question if she had a subluxation or patellar dislocation in the field.  Her x-ray does show possible small avulsion fracture to the patella.  The patient does have significant difficulty with raising the left leg while keeping it straight which is concerning for a significant avulsion injury that may require surgical intervention.  The patient is aware she needs to follow-up with orthopedics in 5 to 7 days.  The patient will be placed in a knee immobilizer and she will be discharged home with crutches.  She received Percocet for acute pain control.  We did discuss the risk of narcotics and a narcotic check will be performed prior to discharge.  She will receive 2 days of the Percocet.  At the time of discharge she continued neurovascular intact to the left lower extremity.  The patient does not have any other evidence of injury    FINAL IMPRESSION  1.  Left knee contusion  2.  Avulsion fracture left patella         Disposition  The patient will be discharged in stable condition      Electronically signed by: Clyde Wolfe, 9/16/2019 10:13 AM

## 2019-09-16 NOTE — DISCHARGE INSTRUCTIONS
Utilize ice and Motrin as discussed    Utilize the knee immobilizer while ambulatory and use crutches and do not weight-bear on the left lower extremity    Follow-up with orthopedics in 4 to 5 days

## 2019-09-16 NOTE — ED NOTES
Reviewed discharge instructions and printed prescription x 1 w/ pt, verbalized understanding to information provided including follow up care, return precautions, Narcotic and use of splint and crutches.  Pt denied questions/concerns.  Narcotic consent signed and placed on chart.  Pt taken from ED by [facility] crew to home.

## 2019-09-16 NOTE — ED TRIAGE NOTES
Chief Complaint   Patient presents with   • Knee Injury     Pt tripped and fell down three stairs, landed on Left Knee, (+) deformity noted.       /64   Pulse (!) 55   Temp 36.9 °C (98.5 °F) (Temporal)   Resp 18   Ht 1.829 m (6')   Wt 108.9 kg (240 lb)   LMP  (LMP Unknown)   SpO2 95%   BMI 32.55 kg/m²     Pt BIB [facility] from home s/p trip and fall down three steps this am.  Pt denies LOC.  Pt declined PIV or pain medications PTA

## 2019-09-18 DIAGNOSIS — L40.50 PSORIATIC ARTHRITIS (HCC): ICD-10-CM

## 2019-09-18 NOTE — TELEPHONE ENCOUNTER
Have we ever prescribed this med? Yes.  If yes, what date? 06/25/19 Nagi APRN    Last OV: 06/25/19 Nagi KIRBY    Next OV: 12/30/19 Nagi KIRBY    DX: Psoriatic arthritis (HCC)     Medications: triamcinolone (KENALOG) 0.1 % lotion

## 2019-09-19 ENCOUNTER — ANESTHESIA EVENT (OUTPATIENT)
Dept: SURGERY | Facility: MEDICAL CENTER | Age: 59
End: 2019-09-19
Payer: COMMERCIAL

## 2019-09-19 ENCOUNTER — ANESTHESIA (OUTPATIENT)
Dept: SURGERY | Facility: MEDICAL CENTER | Age: 59
End: 2019-09-19
Payer: COMMERCIAL

## 2019-09-19 ENCOUNTER — HOSPITAL ENCOUNTER (OUTPATIENT)
Facility: MEDICAL CENTER | Age: 59
End: 2019-09-19
Attending: ORTHOPAEDIC SURGERY | Admitting: ORTHOPAEDIC SURGERY
Payer: COMMERCIAL

## 2019-09-19 VITALS
TEMPERATURE: 98.8 F | WEIGHT: 244.49 LBS | BODY MASS INDEX: 33.12 KG/M2 | RESPIRATION RATE: 18 BRPM | HEIGHT: 72 IN | OXYGEN SATURATION: 95 %

## 2019-09-19 DIAGNOSIS — J45.40 MODERATE PERSISTENT ASTHMA WITHOUT COMPLICATION: ICD-10-CM

## 2019-09-19 PROBLEM — S76.112A QUADRICEPS TENDON RUPTURE, LEFT, INITIAL ENCOUNTER: Status: ACTIVE | Noted: 2019-09-19

## 2019-09-19 PROCEDURE — 700111 HCHG RX REV CODE 636 W/ 250 OVERRIDE (IP): Performed by: ANESTHESIOLOGY

## 2019-09-19 PROCEDURE — 160022 HCHG BLOCK: Performed by: ORTHOPAEDIC SURGERY

## 2019-09-19 PROCEDURE — 160048 HCHG OR STATISTICAL LEVEL 1-5: Performed by: ORTHOPAEDIC SURGERY

## 2019-09-19 PROCEDURE — 700105 HCHG RX REV CODE 258: Performed by: ORTHOPAEDIC SURGERY

## 2019-09-19 PROCEDURE — 160035 HCHG PACU - 1ST 60 MINS PHASE I: Performed by: ORTHOPAEDIC SURGERY

## 2019-09-19 PROCEDURE — 160028 HCHG SURGERY MINUTES - 1ST 30 MINS LEVEL 3: Performed by: ORTHOPAEDIC SURGERY

## 2019-09-19 PROCEDURE — 700101 HCHG RX REV CODE 250: Performed by: ANESTHESIOLOGY

## 2019-09-19 PROCEDURE — 160009 HCHG ANES TIME/MIN: Performed by: ORTHOPAEDIC SURGERY

## 2019-09-19 PROCEDURE — A9270 NON-COVERED ITEM OR SERVICE: HCPCS | Performed by: ANESTHESIOLOGY

## 2019-09-19 PROCEDURE — 700111 HCHG RX REV CODE 636 W/ 250 OVERRIDE (IP)

## 2019-09-19 PROCEDURE — 160002 HCHG RECOVERY MINUTES (STAT): Performed by: ORTHOPAEDIC SURGERY

## 2019-09-19 PROCEDURE — 500589 HCHG GAUZE, OWENS 3X8: Performed by: ORTHOPAEDIC SURGERY

## 2019-09-19 PROCEDURE — 160036 HCHG PACU - EA ADDL 30 MINS PHASE I: Performed by: ORTHOPAEDIC SURGERY

## 2019-09-19 PROCEDURE — 700102 HCHG RX REV CODE 250 W/ 637 OVERRIDE(OP): Performed by: ANESTHESIOLOGY

## 2019-09-19 PROCEDURE — 500881 HCHG PACK, EXTREMITY: Performed by: ORTHOPAEDIC SURGERY

## 2019-09-19 PROCEDURE — 160046 HCHG PACU - 1ST 60 MINS PHASE II: Performed by: ORTHOPAEDIC SURGERY

## 2019-09-19 PROCEDURE — 501838 HCHG SUTURE GENERAL: Performed by: ORTHOPAEDIC SURGERY

## 2019-09-19 PROCEDURE — 160039 HCHG SURGERY MINUTES - EA ADDL 1 MIN LEVEL 3: Performed by: ORTHOPAEDIC SURGERY

## 2019-09-19 PROCEDURE — 500562 HCHG FIBERWIRE: Performed by: ORTHOPAEDIC SURGERY

## 2019-09-19 PROCEDURE — 160025 RECOVERY II MINUTES (STATS): Performed by: ORTHOPAEDIC SURGERY

## 2019-09-19 RX ORDER — OXYCODONE HCL 5 MG/5 ML
10 SOLUTION, ORAL ORAL
Status: COMPLETED | OUTPATIENT
Start: 2019-09-19 | End: 2019-09-19

## 2019-09-19 RX ORDER — CEFAZOLIN SODIUM 1 G/3ML
INJECTION, POWDER, FOR SOLUTION INTRAMUSCULAR; INTRAVENOUS PRN
Status: DISCONTINUED | OUTPATIENT
Start: 2019-09-19 | End: 2019-09-19 | Stop reason: SURG

## 2019-09-19 RX ORDER — ACETAMINOPHEN 500 MG
1000 TABLET ORAL ONCE
Status: COMPLETED | OUTPATIENT
Start: 2019-09-19 | End: 2019-09-19

## 2019-09-19 RX ORDER — HYDROMORPHONE HYDROCHLORIDE 1 MG/ML
0.4 INJECTION, SOLUTION INTRAMUSCULAR; INTRAVENOUS; SUBCUTANEOUS
Status: DISCONTINUED | OUTPATIENT
Start: 2019-09-19 | End: 2019-09-19 | Stop reason: HOSPADM

## 2019-09-19 RX ORDER — TRIAMCINOLONE ACETONIDE 1 MG/ML
LOTION TOPICAL
Qty: 3 BOTTLE | Refills: 0 | OUTPATIENT
Start: 2019-09-19

## 2019-09-19 RX ORDER — MEPERIDINE HYDROCHLORIDE 25 MG/ML
6.25 INJECTION INTRAMUSCULAR; INTRAVENOUS; SUBCUTANEOUS
Status: DISCONTINUED | OUTPATIENT
Start: 2019-09-19 | End: 2019-09-19 | Stop reason: HOSPADM

## 2019-09-19 RX ORDER — TRIAMCINOLONE ACETONIDE 1 MG/G
1 CREAM TOPICAL 2 TIMES DAILY PRN
Qty: 1 TUBE | Refills: 0 | OUTPATIENT
Start: 2019-09-19

## 2019-09-19 RX ORDER — DEXAMETHASONE SODIUM PHOSPHATE 4 MG/ML
INJECTION, SOLUTION INTRA-ARTICULAR; INTRALESIONAL; INTRAMUSCULAR; INTRAVENOUS; SOFT TISSUE PRN
Status: DISCONTINUED | OUTPATIENT
Start: 2019-09-19 | End: 2019-09-19 | Stop reason: SURG

## 2019-09-19 RX ORDER — MAGNESIUM OXIDE 400 MG/1
400 TABLET ORAL EVERY MORNING
COMMUNITY
End: 2022-01-06 | Stop reason: SDUPTHER

## 2019-09-19 RX ORDER — GABAPENTIN 300 MG/1
300 CAPSULE ORAL ONCE
Status: COMPLETED | OUTPATIENT
Start: 2019-09-19 | End: 2019-09-19

## 2019-09-19 RX ORDER — ONDANSETRON 2 MG/ML
INJECTION INTRAMUSCULAR; INTRAVENOUS PRN
Status: DISCONTINUED | OUTPATIENT
Start: 2019-09-19 | End: 2019-09-19 | Stop reason: SURG

## 2019-09-19 RX ORDER — SODIUM CHLORIDE, SODIUM LACTATE, POTASSIUM CHLORIDE, CALCIUM CHLORIDE 600; 310; 30; 20 MG/100ML; MG/100ML; MG/100ML; MG/100ML
INJECTION, SOLUTION INTRAVENOUS CONTINUOUS
Status: CANCELLED | OUTPATIENT
Start: 2019-09-19 | End: 2019-09-19

## 2019-09-19 RX ORDER — LIDOCAINE HYDROCHLORIDE 20 MG/ML
INJECTION, SOLUTION EPIDURAL; INFILTRATION; INTRACAUDAL; PERINEURAL PRN
Status: DISCONTINUED | OUTPATIENT
Start: 2019-09-19 | End: 2019-09-19 | Stop reason: SURG

## 2019-09-19 RX ORDER — MIDAZOLAM HYDROCHLORIDE 1 MG/ML
INJECTION INTRAMUSCULAR; INTRAVENOUS PRN
Status: DISCONTINUED | OUTPATIENT
Start: 2019-09-19 | End: 2019-09-19 | Stop reason: SURG

## 2019-09-19 RX ORDER — CELECOXIB 200 MG/1
200 CAPSULE ORAL ONCE
Status: COMPLETED | OUTPATIENT
Start: 2019-09-19 | End: 2019-09-19

## 2019-09-19 RX ORDER — HYDROMORPHONE HYDROCHLORIDE 1 MG/ML
0.2 INJECTION, SOLUTION INTRAMUSCULAR; INTRAVENOUS; SUBCUTANEOUS
Status: DISCONTINUED | OUTPATIENT
Start: 2019-09-19 | End: 2019-09-19 | Stop reason: HOSPADM

## 2019-09-19 RX ORDER — SODIUM CHLORIDE, SODIUM LACTATE, POTASSIUM CHLORIDE, CALCIUM CHLORIDE 600; 310; 30; 20 MG/100ML; MG/100ML; MG/100ML; MG/100ML
INJECTION, SOLUTION INTRAVENOUS CONTINUOUS
Status: DISCONTINUED | OUTPATIENT
Start: 2019-09-19 | End: 2019-09-19 | Stop reason: HOSPADM

## 2019-09-19 RX ORDER — BUPIVACAINE HYDROCHLORIDE 2.5 MG/ML
INJECTION, SOLUTION EPIDURAL; INFILTRATION; INTRACAUDAL PRN
Status: DISCONTINUED | OUTPATIENT
Start: 2019-09-19 | End: 2019-09-19 | Stop reason: SURG

## 2019-09-19 RX ORDER — ONDANSETRON 2 MG/ML
INJECTION INTRAMUSCULAR; INTRAVENOUS
Status: COMPLETED
Start: 2019-09-19 | End: 2019-09-19

## 2019-09-19 RX ORDER — HYDROMORPHONE HYDROCHLORIDE 1 MG/ML
0.1 INJECTION, SOLUTION INTRAMUSCULAR; INTRAVENOUS; SUBCUTANEOUS
Status: DISCONTINUED | OUTPATIENT
Start: 2019-09-19 | End: 2019-09-19 | Stop reason: HOSPADM

## 2019-09-19 RX ORDER — ONDANSETRON 2 MG/ML
4 INJECTION INTRAMUSCULAR; INTRAVENOUS
Status: COMPLETED | OUTPATIENT
Start: 2019-09-19 | End: 2019-09-19

## 2019-09-19 RX ORDER — OXYCODONE HCL 5 MG/5 ML
5 SOLUTION, ORAL ORAL
Status: COMPLETED | OUTPATIENT
Start: 2019-09-19 | End: 2019-09-19

## 2019-09-19 RX ORDER — HALOPERIDOL 5 MG/ML
1 INJECTION INTRAMUSCULAR
Status: DISCONTINUED | OUTPATIENT
Start: 2019-09-19 | End: 2019-09-19 | Stop reason: HOSPADM

## 2019-09-19 RX ORDER — DIPHENHYDRAMINE HYDROCHLORIDE 50 MG/ML
12.5 INJECTION INTRAMUSCULAR; INTRAVENOUS
Status: DISCONTINUED | OUTPATIENT
Start: 2019-09-19 | End: 2019-09-19 | Stop reason: HOSPADM

## 2019-09-19 RX ADMIN — ONDANSETRON 4 MG: 2 INJECTION INTRAMUSCULAR; INTRAVENOUS at 13:50

## 2019-09-19 RX ADMIN — DEXAMETHASONE SODIUM PHOSPHATE 8 MG: 4 INJECTION, SOLUTION INTRAMUSCULAR; INTRAVENOUS at 13:01

## 2019-09-19 RX ADMIN — FENTANYL CITRATE 25 MCG: 50 INJECTION, SOLUTION INTRAMUSCULAR; INTRAVENOUS at 14:27

## 2019-09-19 RX ADMIN — PROPOFOL 200 MG: 10 INJECTION, EMULSION INTRAVENOUS at 12:53

## 2019-09-19 RX ADMIN — FENTANYL CITRATE 50 MCG: 50 INJECTION, SOLUTION INTRAMUSCULAR; INTRAVENOUS at 12:52

## 2019-09-19 RX ADMIN — OXYCODONE HYDROCHLORIDE 10 MG: 5 SOLUTION ORAL at 14:57

## 2019-09-19 RX ADMIN — ONDANSETRON 4 MG: 2 INJECTION INTRAMUSCULAR; INTRAVENOUS at 14:33

## 2019-09-19 RX ADMIN — GABAPENTIN 300 MG: 300 CAPSULE ORAL at 11:02

## 2019-09-19 RX ADMIN — FENTANYL CITRATE 50 MCG: 50 INJECTION, SOLUTION INTRAMUSCULAR; INTRAVENOUS at 13:52

## 2019-09-19 RX ADMIN — FENTANYL CITRATE 50 MCG: 50 INJECTION, SOLUTION INTRAMUSCULAR; INTRAVENOUS at 12:34

## 2019-09-19 RX ADMIN — BUPIVACAINE HYDROCHLORIDE 25 ML: 2.5 INJECTION, SOLUTION EPIDURAL; INFILTRATION; INTRACAUDAL; PERINEURAL at 12:35

## 2019-09-19 RX ADMIN — LIDOCAINE HYDROCHLORIDE 60 MG: 20 INJECTION, SOLUTION EPIDURAL; INFILTRATION; INTRACAUDAL; PERINEURAL at 12:52

## 2019-09-19 RX ADMIN — SODIUM CHLORIDE, POTASSIUM CHLORIDE, SODIUM LACTATE AND CALCIUM CHLORIDE: 600; 310; 30; 20 INJECTION, SOLUTION INTRAVENOUS at 13:49

## 2019-09-19 RX ADMIN — FENTANYL CITRATE 50 MCG: 50 INJECTION, SOLUTION INTRAMUSCULAR; INTRAVENOUS at 13:48

## 2019-09-19 RX ADMIN — CEFAZOLIN 2 G: 1 INJECTION, POWDER, FOR SOLUTION INTRAVENOUS at 12:55

## 2019-09-19 RX ADMIN — FENTANYL CITRATE 25 MCG: 50 INJECTION, SOLUTION INTRAMUSCULAR; INTRAVENOUS at 15:15

## 2019-09-19 RX ADMIN — FENTANYL CITRATE 50 MCG: 50 INJECTION, SOLUTION INTRAMUSCULAR; INTRAVENOUS at 13:09

## 2019-09-19 RX ADMIN — EPHEDRINE SULFATE 10 MG: 50 INJECTION INTRAMUSCULAR; INTRAVENOUS; SUBCUTANEOUS at 12:59

## 2019-09-19 RX ADMIN — SODIUM CHLORIDE, POTASSIUM CHLORIDE, SODIUM LACTATE AND CALCIUM CHLORIDE: 600; 310; 30; 20 INJECTION, SOLUTION INTRAVENOUS at 11:06

## 2019-09-19 RX ADMIN — LIDOCAINE HYDROCHLORIDE 10 MG: 20 INJECTION, SOLUTION EPIDURAL; INFILTRATION; INTRACAUDAL; PERINEURAL at 12:35

## 2019-09-19 RX ADMIN — ACETAMINOPHEN 1000 MG: 500 TABLET, FILM COATED ORAL at 11:01

## 2019-09-19 RX ADMIN — CELECOXIB 200 MG: 200 CAPSULE ORAL at 11:02

## 2019-09-19 RX ADMIN — MIDAZOLAM HYDROCHLORIDE 2 MG: 1 INJECTION, SOLUTION INTRAMUSCULAR; INTRAVENOUS at 12:34

## 2019-09-19 RX ADMIN — FENTANYL CITRATE 25 MCG: 50 INJECTION, SOLUTION INTRAMUSCULAR; INTRAVENOUS at 14:39

## 2019-09-19 NOTE — ANESTHESIA TIME REPORT
Anesthesia Start and Stop Event Times     Date Time Event    9/19/2019 1233 Ready for Procedure     1247 Anesthesia Start     1410 Anesthesia Stop        Responsible Staff  09/19/19    Name Role Begin End    Forrest Post M.D. Anesth 1247 1410        Preop Diagnosis (Free Text):  Pre-op Diagnosis     TRAUMATIC RUPTURE OF QUADRICEPS TENDON        Preop Diagnosis (Codes):    Post op Diagnosis  Quadriceps tendon rupture      Premium Reason  Non-Premium    Comments: adductor canal block

## 2019-09-19 NOTE — ANESTHESIA PREPROCEDURE EVALUATION
Left quadricepts tendon rupture    Relevant Problems   PULMONARY   (+) Moderate persistent asthma without complication      Other   (+) Depression   (+) Obesity (BMI 30.0-34.9)   DLD  GERD-no symptoms    Cousin with history of MH, patient reports no issues with GA during any of her other surgeries.     Physical Exam    Airway   Mallampati: II  TM distance: >3 FB  Neck ROM: full       Cardiovascular - normal exam  Rhythm: regular  Rate: normal  (-) murmur     Dental - normal exam         Pulmonary - normal exam  Breath sounds clear to auscultation     Abdominal    Neurological - normal exam                 Anesthesia Plan    ASA 2       Plan - general and peripheral nerve block     Peripheral nerve block will be post-op pain control  Airway plan will be LMA        Induction: intravenous    Postoperative Plan: Postoperative administration of opioids is intended.    Pertinent diagnostic labs and testing reviewed    Informed Consent:    Anesthetic plan and risks discussed with patient.    Use of blood products discussed with: patient whom consented to blood products.

## 2019-09-19 NOTE — TELEPHONE ENCOUNTER
Per Meme Lorenz MA she denied Rx as she had previously advised pt it would be a one time fill. Pt was advised via vm to try and get a refill through Dermatology or PCP as she uses cream for psoriasis. Pt advised to call back with any questions or concerns.

## 2019-09-19 NOTE — TELEPHONE ENCOUNTER
Have we ever prescribed this med? Yes.  If yes, what date? 6/25/19    Last OV: 6/25/19 AIDE KIRBY     Next OV: 12/30/19 AIDE KIRBY     DX: Psoriatic arthritis (HCC) (L40.50)    Medications: triamcinolone (KENALOG) 0.1 % lotion      We received this Rx refill via fax from [facility]

## 2019-09-19 NOTE — OP REPORT
DATE OF SERVICE:  09/19/2019    PREOPERATIVE DIAGNOSIS:  Left quadriceps tendon rupture.    POSTOPERATIVE DIAGNOSIS:  Left quadriceps tendon rupture.    PROCEDURE:  Open left quadriceps tendon repair.    SURGEON:  Jayden Velázquez MD    ASSISTANT:  PIERO Paris.  Of note, the assistant played a crucial   role in this procedure by helping with the exposure and instrumentation for   this complex tendon repair.    ANESTHESIA:  General per LMA with adductor canal block.    ANESTHESIOLOGIST:  Forrest Post MD    OPERATIVE INDICATIONS:  The patient is a 58-year-old white female who   presented to the office yesterday after falling down some steps on Monday and   landing either on her left knee or with her left leg bent beneath her.  She   presented to the emergency room, was placed in a knee immobilizer and   presented to the office yesterday with an obvious quadriceps tendon rupture.    It was felt that a quadriceps tendon repair was indicated in this active   woman.    PROCEDURE IN DETAIL:  The patient was brought to the operating room and placed   on the table in supine position where a satisfactory general anesthetic agent   was administered per LMA.  Of note, an adductor canal block was performed by   Dr. Post in preoperative holding for perioperative pain control.  The   patient was given 2 g of Ancef IV prior to beginning the procedure.  Left   lower extremity was prepped with gel prep, draped free in a sterile fashion.    Leg was slightly flexed and a 10 cm longitudinal incision was made beginning   at the distal pole of the patella and extending proximally.  This was carried   sharply through skin and subcutaneous tissue and through the prepatellar   bursa.  The quadriceps tendon was immediately identified and was a complete   rupture with about 2 cm of extension down to medial and lateral retinaculums.    The patella was elevated and the joint was irrigated of the hematoma.  The   margins of  the quad tendon were resected to clean viable tissue with a knife.    The attachment of the quad tendon on the superior pole of the patella was   roughened with a curette.  Two #5 FiberWire sutures were placed using [place]   suture, one beginning along the medial edge to the center with double sutures   being woven through the tendon in a [place] stitch, the other beginning on the   lateral edge to the middle with the 2 limbs being woven to the end in a   [place]-type suture.  This gave an excellent bite on the tendon.  This gave us   4 suture ends.  A Beath needle was then drilled through the central portion of   the patella beginning at the attachment site of the quad tendon and exiting   at the anterior edge of the patellar tendon.  The 2 central sutures were   placed through the patella in this fashion.  The Beath needle was passed a   second time along the lateral aspect of the patella with a lateral suture   placed and finally was passed through the medial aspect of the patella with a   medial suture placed.  A longitudinal incision was made in the patellar tendon   for about 1.5 cm and the sutures were brought out through this incision   directly against the inferior pole of the patella.  The knee was placed in   full extension and the sutures were tied over the distal end of the patella   giving an excellent reapproximation of the quad tendon to the patella.  The   knee could be flexed to approximately 80 degrees with no significant stress on   the repair.  The wound was again copiously irrigated with normal saline.  The   medial and lateral retinaculum rents were closed with #1 Vicryl.  The   anterior fascia was closed over the patella with 2-0 Vicryl, subcutaneous   tissue closed with 2-0 Monocryl and skin with staples.  Sterile dressing of an   Aquacel dressing followed by cast padding and Ace wrap was applied.  Patient   was placed into a hinged knee brace locked in extension.  She was awakened,    extubated in the operating room, taken to recovery room in stable condition.    Sponge and needle counts were correct.  There were no identified   intraoperative complications.       ____________________________________     MD LEE Whitmore / ESTEVAN    DD:  09/19/2019 14:08:36  DT:  09/19/2019 15:41:57    D#:  [id number]  Job#:  [id number]    cc: Leela HARRY

## 2019-09-19 NOTE — DISCHARGE INSTRUCTIONS
ACTIVITY: Rest and take it easy for the first 24 hours.  A responsible adult is recommended to remain with you during that time.  It is normal to feel sleepy.  We encourage you to not do anything that requires balance, judgment or coordination.    MILD FLU-LIKE SYMPTOMS ARE NORMAL. YOU MAY EXPERIENCE GENERALIZED MUSCLE ACHES, THROAT IRRITATION, HEADACHE AND/OR SOME NAUSEA.    FOR 24 HOURS DO NOT:  Drive, operate machinery or run household appliances.  Drink beer or alcoholic beverages.   Make important decisions or sign legal documents.    SPECIAL INSTRUCTIONS:   Non weight bearing on left leg.  Hinged knee brace locked in extension.  May unlock and range from 0-30 several times daily.  Polar care to left knee.  Follow up in 2 weeks.    DIET: To avoid nausea, slowly advance diet as tolerated, avoiding spicy or greasy foods for the first day.  Add more substantial food to your diet according to your physician's instructions.  Babies can be fed formula or breast milk as soon as they are hungry.  INCREASE FLUIDS AND FIBER TO AVOID CONSTIPATION.    SURGICAL DRESSING/BATHING:   May shower post op day #2 (Saturday) with surgical site/dressing covered.    FOLLOW-UP APPOINTMENT:  A follow-up appointment should be arranged with your doctor in 2 weeks call to schedule.    You should CALL YOUR PHYSICIAN if you develop:  Fever greater than 101 degrees F.  Pain not relieved by medication, or persistent nausea or vomiting.  Excessive bleeding (blood soaking through dressing) or unexpected drainage from the wound.  Extreme redness or swelling around the incision site, drainage of pus or foul smelling drainage.  Inability to urinate or empty your bladder within 8 hours.  Problems with breathing or chest pain.    You should call 911 if you develop problems with breathing or chest pain.  If you are unable to contact your doctor or surgical center, you should go to the nearest emergency room or urgent care center.  Physician's  telephone #: [place]ber [phone]    If any questions arise, call your doctor.  If your doctor is not available, please feel free to call the Surgical Center at [phone].  The Center is open Monday through Friday from 7AM to 7PM.  You can also call the HEALTH HOTLINE open 24 hours/day, 7 days/week and speak to a nurse at [phone], or toll free at [phone].    A registered nurse may call you a few days after your surgery to see how you are doing after your procedure.    MEDICATIONS: Resume taking daily medication.  Take prescribed pain medication with food.  If no medication is prescribed, you may take non-aspirin pain medication if needed.  PAIN MEDICATION CAN BE VERY CONSTIPATING.  Take a stool softener or laxative such as senokot, pericolace, or milk of magnesia if needed.    Prescription given preoperatively.  Last pain medication given at  3:00 pm (10 mg oxycodone) .    If your physician has prescribed pain medication that includes Acetaminophen (Tylenol), do not take additional Acetaminophen (Tylenol) while taking the prescribed medication.      Depression / Suicide Risk    As you are discharged from this [facility] facility, it is important to learn how to keep safe from harming yourself.    Recognize the warning signs:  · Abrupt changes in personality, positive or negative- including increase in energy   · Giving away possessions  · Change in eating patterns- significant weight changes-  positive or negative  · Change in sleeping patterns- unable to sleep or sleeping all the time   · Unwillingness or inability to communicate  · Depression  · Unusual sadness, discouragement and loneliness  · Talk of wanting to die  · Neglect of personal appearance   · Rebelliousness- reckless behavior  · Withdrawal from people/activities they love  · Confusion- inability to concentrate     If you or a loved one observes any of these behaviors or has concerns about self-harm, here's what you can do:  · Talk  about it- your feelings and reasons for harming yourself  · Remove any means that you might use to hurt yourself (examples: pills, rope, extension cords, firearm)  · Get professional help from the community (Mental Health, Substance Abuse, psychological counseling)  · Do not be alone:Call your Safe Contact- someone whom you trust who will be there for you.  · Call your local CRISIS HOTLINE 122-4736 or 898-211-7789  · Call your local Children's Mobile Crisis Response Team [place] (066) 675-5225 or [employer]  · Call the toll free National Suicide Prevention Hotlines   · National Suicide Prevention Lifeline 221-058-UNPA (0780)  National [employer] Line Network 800-SUICIDE (514-3087)    Peripheral Nerve Block Discharge Instructions from Same Day Surgery and Inpatient :    What to Expect - Lower Extremity  · The block may cause you to experience numbness and weakness in your thigh and knee on the same side as your surgery  · Numbness, tingling and / or weakness are all normal. For some people, this may be an unpleasant sensation  · These issues will be resolved when the local anesthetic wears off   · You may experience numbness and tingling in your thigh on the same side as your surgery if the block medicine was injected at your groin area  · Numbness will make it difficult to walk  · You may have problems with balance and walking so be very careful   · Follow your surgeon's direction regarding weight bearing on your surgical limb  · Be very careful with your numb limb  Precautions  · The numbness may affect your balance  · Be careful when walking or moving around  · Your leg may be weak: be very careful putting weight on it  · If your surgeon did not specify a time, you should not bear weight for 24 hours  · Be sure to ask for help when you need it  · It is better to have help than to fall and hurt yourself

## 2019-09-19 NOTE — ANESTHESIA PROCEDURE NOTES
Airway  Date/Time: 9/19/2019 12:54 PM  Performed by: Forrest Post M.D.  Authorized by: Forrest Post M.D.     Location:  OR  Urgency:  Elective  Difficult Airway: No    Indications for Airway Management:  Anesthesia  Spontaneous Ventilation: absent    Sedation Level:  Deep  Preoxygenated: Yes    Mask Difficulty Assessment:  0 - not attempted  Final Airway Type:  Supraglottic airway  Final Supraglottic Airway:  Standard LMA  SGA Size:  4  Number of Attempts at Approach:  1

## 2019-09-19 NOTE — ANESTHESIA QCDR
2019 [facility] Clinical Data Registry (for Quality Improvement)     Postoperative nausea/vomiting risk protocol (Adult = 18 yrs and Pediatric 3-17 yrs)- (430 and 463)  General inhalation anesthetic (NOT TIVA) with PONV risk factors: Yes  Provision of anti-emetic therapy with at least 2 different classes of agents: Yes   Patient DID NOT receive anti-emetic therapy and reason is documented in Medical Record:  N/A    Multimodal Pain Management- (AQI59)  Patient undergoing Elective Surgery (i.e. Outpatient, or ASC, or Prescheduled Surgery prior to Hospital Admission): Yes  Use of Multimodal Pain Management, two or more drugs and/or interventions, NOT including systemic opioids: Yes   Exception: Documented allergy to multiple classes of analgesics:  N/A    PACU assessment of acute postoperative pain prior to Anesthesia Care End- Applies to Patients Age = 18- (ABG7)  Initial PACU pain score is which of the following: < 7/10  Patient unable to report pain score: N/A    Post-anesthetic transfer of care checklist/protocol to PACU/ICU- (426 and 427)  Upon conclusion of case, patient transferred to which of the following locations: PACU/Non-ICU  Use of transfer checklist/protocol: Yes  Exclusion: Service Performed in Patient Hospital Room (and thus did not require transfer): N/A    PACU Reintubation- (AQI31)  General anesthesia requiring endotracheal intubation (ETT) along with subsequent extubation in OR or PACU: No  Required reintubation in the PACU: N/A  Extubation was a planned trial documented in the medical record prior to removal of the original airway device: N/A    Unplanned admission to ICU related to anesthesia service up through end of PACU care- (MD51)  Unplanned admission to ICU (not initially anticipated at anesthesia start time): No

## 2019-09-19 NOTE — OR NURSING
1407: Pt arrived post L quadricep tendon repair, LMA in use/respirations unlabored/sats approp on blow by, Anesthesia report/OR RN hand off, Pt had add canal nerve block by Anesthesia, Immobilizer to LLE in place/dressing with ace wrap intact/+L pedal pulse, IVF infusing, SCD's in use  1412: LMA dc'd, NC oxygen  1420: Pt more awake/slightly groggy, Polar Care placed in PACU, Pt denies nausea/pain rating  1435: Pt having some nausea-medicated/see MAR  1455: Nausea improved, Pain cont-medicated with oral meds, Weaning oxygen as tolerated  1511: No nausea, Pain moderate currently-medicated again for pain, RA trial  1535: Pain is tolerable, No nausea, Using IS-sats drop to 86% and then rebound, Pt able to pull 3000  1540: Pt meets criteria for Stg II

## 2019-09-19 NOTE — ANESTHESIA PROCEDURE NOTES
Peripheral Block  Date/Time: 9/19/2019 12:34 PM  Performed by: Forrest Post M.D.  Authorized by: Forrest Post M.D.     Patient Location:  Pre-op  Start Time:  9/19/2019 12:34 PM  End Time:  9/19/2019 12:38 PM  Reason for Block: at surgeon's request and post-op pain management    patient identified, IV checked, site marked, risks and benefits discussed, surgical consent, monitors and equipment checked, pre-op evaluation and timeout performed    Patient Position:  Supine  Prep: ChloraPrep    Monitoring:  Heart rate and continuous pulse ox  Block Region:  Lower Extremity  Lower Extremity - Block Type:  Selective FEMORAL nerve block at the Adductor Canal    Laterality:  Left  Procedures: ultrasound guided  Image captured, interpreted and electronically stored.  Local Infiltration:  Lidocaine  Strength:  1 %  Dose:  3 ml  Block Type:  Single-shot  Needle Length:  100mm  Needle Gauge:  21 G  Needle Localization:  Ultrasound guidance  Injection Assessment:  Negative aspiration for heme, no paresthesia on injection, incremental injection and local visualized surrounding nerve on ultrasound  Evidence of intravascular injection: No     US Guided Selective Femoral Nerve Block at Adductor Canal:   US probe placed at mid-thigh level on externally rotated leg and femur identified.  Probe directed medially until Sartorius Muscle (SM), Femoral Artery (FA) and Saphenous Nerve (SN) identified in Adductor Canal (AC).  Needle inserted anterolateral to probe in an in plane approach into a subsartorial perivascular perineural position.  After negative aspiration LA injected with ease and visualized spreading within the AC.

## 2019-09-19 NOTE — OR SURGEON
Immediate Post OP Note    PreOp Diagnosis: Left quadriceps tendon rupture    PostOp Diagnosis: same    Procedure(s):  REPAIR, TENDON- QUADRICEPS RUTURE REPAIR AND MEYERS - Wound Class: Clean    Surgeon(s):  Jayden Velázquez M.D.  Assist [place]    Anesthesiologist/Type of Anesthesia:  Anesthesiologist: Forrest Post M.D./General    Surgical Staff:  Circulator: [facility] Educator  Scrub Person: Dayton Reyes Assist: Leela Golden R.N.    Specimens removed if any:  * No specimens in log *    Estimated Blood Loss: 100 cc    Findings: quad tendon rupture    Complications: none        9/19/2019 2:02 PM Jayden Velázquez M.D.

## 2019-09-20 NOTE — ANESTHESIA POSTPROCEDURE EVALUATION
Patient: Arely Haynes    Procedure Summary     Date:  09/19/19 Room / Location:   OR 02 / SURGERY [facility]    Anesthesia Start:  1247 Anesthesia Stop:  1410    Procedure:  REPAIR, TENDON- QUADRICEPS RUTURE REPAIR AND MEYERS (Left Knee) Diagnosis:  (TRAUMATIC RUPTURE OF QUADRICEPS TENDON)    Surgeon:  Jayden Velázquez M.D. Responsible Provider:  Forrest Post M.D.    Anesthesia Type:  general, peripheral nerve block ASA Status:  2          Final Anesthesia Type: general, peripheral nerve block  Last vitals  BP   NIBP: 138/69    Temp   37.1 °C (98.8 °F)    Pulse   Heart Rate (Monitored): 67   Resp   18    SpO2   95 %      Anesthesia Post Evaluation    Patient location during evaluation: PACU  Patient participation: complete - patient participated  Level of consciousness: awake and alert    Airway patency: patent  Anesthetic complications: no  Cardiovascular status: hemodynamically stable  Respiratory status: acceptable  Hydration status: euvolemic    PONV: none           Nurse Pain Score: 2 (NPRS)

## 2019-10-02 ENCOUNTER — OFFICE VISIT (OUTPATIENT)
Dept: MEDICAL GROUP | Facility: MEDICAL CENTER | Age: 59
End: 2019-10-02
Payer: COMMERCIAL

## 2019-10-02 VITALS
WEIGHT: 240 LBS | HEIGHT: 72 IN | HEART RATE: 70 BPM | SYSTOLIC BLOOD PRESSURE: 92 MMHG | OXYGEN SATURATION: 97 % | TEMPERATURE: 97.5 F | BODY MASS INDEX: 32.51 KG/M2 | DIASTOLIC BLOOD PRESSURE: 60 MMHG

## 2019-10-02 DIAGNOSIS — Z00.00 HEALTH CARE MAINTENANCE: ICD-10-CM

## 2019-10-02 DIAGNOSIS — Z23 NEED FOR VACCINATION: ICD-10-CM

## 2019-10-02 DIAGNOSIS — Z90.710 H/O: HYSTERECTOMY: ICD-10-CM

## 2019-10-02 DIAGNOSIS — Z12.12 SCREENING FOR COLORECTAL CANCER: ICD-10-CM

## 2019-10-02 DIAGNOSIS — Z12.11 SCREENING FOR COLORECTAL CANCER: ICD-10-CM
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
## 2019-10-02 DIAGNOSIS — Z11.59 NEED FOR HEPATITIS C SCREENING TEST: ICD-10-CM

## 2019-10-02 DIAGNOSIS — D25.9 UTERINE LEIOMYOMA, UNSPECIFIED LOCATION: ICD-10-CM

## 2019-10-02 DIAGNOSIS — Z98.890 STATUS POST TENDON REPAIR: ICD-10-CM

## 2019-10-02 DIAGNOSIS — S76.112S QUADRICEPS TENDON RUPTURE, LEFT, SEQUELA: ICD-10-CM

## 2019-10-02 PROCEDURE — 99214 OFFICE O/P EST MOD 30 MIN: CPT | Performed by: INTERNAL MEDICINE

## 2019-10-02 NOTE — PROGRESS NOTES
CHIEF COMPLAINT  Chief Complaint   Patient presents with   •  Hospital discharge follow-up     patella fx sugery  1 1/2 week ago   • Other     status post hysterectomy  1 month      HPI  Arely Haynes is a 58 y.o. female who presents today for the following    Hospital discharge follow-up, fall, left quadriceps tendon rupture/status post repair  The patient fell over 4 stairs, developed left quadriceps tendon rupture.  Repair was done on 09/19/2019, details of operation     PREOPERATIVE DIAGNOSIS:  Left quadriceps tendon rupture.  POSTOPERATIVE DIAGNOSIS:  Left quadriceps tendon rupture.  PROCEDURE:  Open left quadriceps tendon repair.     SURGEON:  Jayden Velázquez MD     ASSISTANT:  PIERO Paris.  Of note, the assistant played a crucial   role in this procedure by helping with the exposure and instrumentation for   this complex tendon repair.     ANESTHESIA:  General per LMA with adductor canal block.     ANESTHESIOLOGIST:  Forrest Post MD     OPERATIVE INDICATIONS:  The patient is a 58-year-old white female who   presented to the office yesterday after falling down some steps on Monday and   landing either on her left knee or with her left leg bent beneath her.  She   presented to the emergency room, was placed in a knee immobilizer and   presented to the office yesterday with an obvious quadriceps tendon rupture.    It was felt that a quadriceps tendon repair was indicated in this active   woman.     PROCEDURE IN DETAIL:  The patient was brought to the operating room and placed   on the table in supine position where a satisfactory general anesthetic agent   was administered per LMA.  Of note, an adductor canal block was performed by   Dr. Post in preoperative holding for perioperative pain control.  The   patient was given 2 g of Ancef IV prior to beginning the procedure.  Left   lower extremity was prepped with gel prep, draped free in a sterile fashion.    Leg was slightly flexed and  a 10 cm longitudinal incision was made beginning   at the distal pole of the patella and extending proximally.  This was carried   sharply through skin and subcutaneous tissue and through the prepatellar   bursa.  The quadriceps tendon was immediately identified and was a complete   rupture with about 2 cm of extension down to medial and lateral retinaculums.    The patella was elevated and the joint was irrigated of the hematoma.  The   margins of the quad tendon were resected to clean viable tissue with a knife.    The attachment of the quad tendon on the superior pole of the patella was   roughened with a curette.  Two #5 FiberWire sutures were placed using [place]   suture, one beginning along the medial edge to the center with double sutures   being woven through the tendon in a [place] stitch, the other beginning on the   lateral edge to the middle with the 2 limbs being woven to the end in a   [place]-type suture.  This gave an excellent bite on the tendon.  This gave us   4 suture ends.  A Beath needle was then drilled through the central portion of   the patella beginning at the attachment site of the quad tendon and exiting   at the anterior edge of the patellar tendon.  The 2 central sutures were   placed through the patella in this fashion.  The Beath needle was passed a   second time along the lateral aspect of the patella with a lateral suture   placed and finally was passed through the medial aspect of the patella with a   medial suture placed.  A longitudinal incision was made in the patellar tendon   for about 1.5 cm and the sutures were brought out through this incision   directly against the inferior pole of the patella.  The knee was placed in   full extension and the sutures were tied over the distal end of the patella   giving an excellent reapproximation of the quad tendon to the patella.  The   knee could be flexed to approximately 80 degrees with no significant stress on   the repair.  The  wound was again copiously irrigated with normal saline.  The   medial and lateral retinaculum rents were closed with #1 Vicryl.  The   anterior fascia was closed over the patella with 2-0 Vicryl, subcutaneous   tissue closed with 2-0 Monocryl and skin with staples.  Sterile dressing of an   Aquacel dressing followed by cast padding and Ace wrap was applied.  Patient   was placed into a hinged knee brace locked in extension.  She was awakened,   extubated in the operating room, taken to recovery room in stable condition.    Sponge and needle counts were correct.  There were no identified   intraoperative complications.    After the procedure, she was placed in the boot.   Complaints of improving pain, started to work.    Uterine fibroids, hysterectomy  The patient had uneventful hysterectomy on 7/11/2019 due to uterine fibroids;  -No postoperative complications.    Reviewed PMH, PSH, FH, SH, ALL, HCM/IMM, no changes  Reviewed MEDS, no changes    Patient Active Problem List    Diagnosis Date Noted   • Quadriceps tendon rupture, left, initial encounter 09/19/2019   • Ovarian cyst 06/25/2019   • Psoriasis 03/27/2019   • Immunosuppression (HCC) 03/27/2019   • Obesity (BMI 30.0-34.9) 03/27/2019   • Hormone replacement therapy (HRT) 03/27/2019   • Menopausal hot flushes 03/27/2019   • Health care maintenance 03/27/2019   • Depression 11/30/2018   • Psoriatic arthritis (HCC) 11/30/2018   • Familial hypercholesterolemia 11/30/2018   • Moderate persistent asthma without complication 11/30/2018   • History of total right hip arthroplasty 09/22/2017     CURRENT MEDICATIONS  Current Outpatient Medications   Medication Sig Dispense Refill   • magnesium oxide (MAG-OX) 400 MG Tab Take 400 mg by mouth every day.     • acetaminophen (TYLENOL) 500 MG Tab Take 500 mg by mouth Once.     • rosuvastatin (CRESTOR) 10 MG Tab Take 10 mg by mouth every evening.     • ondansetron (ZOFRAN) 4 MG Tab tablet Take 1 Tab by mouth every 6 hours as  needed. 30 Tab 0   • TURMERIC PO Take 1 Tab by mouth every day.     • escitalopram (LEXAPRO) 10 MG Tab TAKE 1 TABLET DAILY (TAKEN TOGETHER WITH 20MG FOR A   TOTAL OF 30MG DAILY)     • escitalopram (LEXAPRO) 20 MG tablet TAKE 1 TABLET DAILY (TAKEN TOGETHER WITH 10MG FOR A   TOTAL OF 30MG DAILY)     • montelukast (SINGULAIR) 10 MG Tab Take 10 mg by mouth.     • omeprazole (PRILOSEC) 40 MG delayed-release capsule Take 40 mg by mouth every day.     • lamoTRIgine (LAMICTAL) 100 MG Tab Take 100 mg by mouth every day.     • Olopatadine HCl 0.2 % Solution Place 1 Drop in both eyes 1 time daily as needed.     • budesonide-formoterol (SYMBICORT) 160-4.5 MCG/ACT Aerosol Inhale 2 Puffs by mouth 2 Times a Day. Use spacer. Rinse mouth after each use. 3 Inhaler 3   • cetirizine (ZYRTEC) 10 MG Tab Take 10 mg by mouth every evening.     • etodolac (LODINE) 500 MG tablet Take 500 mg by mouth 2 times a day.     • triamcinolone acetonide (KENALOG) 0.1 % Cream Apply 1 Each to affected area(s) 2 times a day as needed.     • triamcinolone (KENALOG) 0.1 % lotion Apply to affected area daily (Patient not taking: Reported on 10/2/2019) 3 Bottle 0   • rosuvastatin (CRESTOR) 10 MG Tab Take 10 mg by mouth every bedtime.       No current facility-administered medications for this visit.      ALLERGIES  Allergies: Ampicillin-sulbactam sodium; Iodine; Levofloxacin; and Shellfish-derived products  PAST MEDICAL HISTORY  Past Medical History:   Diagnosis Date   • Pneumonia 2018   • Anesthesia     1st cousin has malignant hyperthermia/pt has never had an issue or her sisters   • Arthritis     hips knees hands spine   • Asthma     prn inhalers   • Bowel habit changes     diarrhea   • Depression     Daughter passed few years ago   • Erosive esophagitis    • GERD (gastroesophageal reflux disease)    • Heart burn    • High cholesterol    • Immunocompromised (HCC)    • Pain     hips knees   • Psoriasis    • Psoriatic arthritis (HCC)      SURGICAL  HISTORY  She  has a past surgical history that includes lumbar fusion posterior; hip replacement, total; cystectomy; other abdominal surgery; gyn surgery (); other orthopedic surgery; other orthopedic surgery; hysterectomy robotic xi (N/A, 2019); salpingo oophorectomy (Bilateral, 2019); and tendon repair (Left, 2019).  SOCIAL HISTORY  Social History     Tobacco Use   • Smoking status: Never Smoker   • Smokeless tobacco: Never Used   Substance Use Topics   • Alcohol use: Yes     Alcohol/week: 0.6 - 1.2 oz     Types: 1 - 2 Glasses of wine per week   • Drug use: No     Social History     Social History Narrative   • Not on file     FAMILY HISTORY  Family History   Problem Relation Age of Onset   • Hyperlipidemia Mother    • Heart Attack Mother    • Psychiatric Illness Mother    • Heart Disease Mother    • Arthritis Mother    • Lung Disease Father    • Cancer Father    • Hyperlipidemia Father    • Hyperlipidemia Sister    • Cancer Sister         breast   • Heart Disease Paternal Grandfather    • Hypertension Neg Hx    • Diabetes Neg Hx      Family Status   Relation Name Status   • Mo     • Fa     • Sis  (Not Specified)   • PGFa  (Not Specified)   • Neg Hx  (Not Specified)     ROS   Constitutional: Negative for fever, chills, fatigue.  HENT: Negative for congestion, sore throat.  Eyes: Negative for vision problems.   Respiratory: Negative for cough, shortness of breath.  Cardiovascular: Negative for chest pain, palpitations.   Gastrointestinal: Negative for heartburn, nausea, abdominal pain.   Genitourinary: As above.  Musculoskeletal: As above.   Skin: Negative for rash.   Neuro: Negative for dizziness, weakness and headaches.   Endo/Heme/Allergies: Does not bruise/bleed easily.   Psychiatric/Behavioral: Negative for depression.    PHYSICAL EXAM   Blood Pressure (Abnormal) 92/60   Pulse 70   Temperature 36.4 °C (97.5 °F) (Temporal)   Height 1.829 m (6')   Weight 108.9 kg (240 lb)    Oxygen Saturation 97%  Body mass index is 32.55 kg/m².  General:  NAD, well appearing  HEENT:   NC/AT, PERRLA, EOMI, TMs are clear. Oropharyngeal mucosa is pink,  without lesions;  no cervical / supraclavicular  lymphadenopathy, no thyromegaly.    Cardiovascular: RRR.   No m/r/g.       Lungs:   CTAB, no w/r/r, no respiratory distress.  Abdomen: Soft, NT/ND; no hepatosplenomegaly.  Extremities: Left lower extremity in the boot.  Skin:  Warm, dry.  No erythema. No rash.   Neurologic: Alert & oriented x 3. CN II-XII grossly intact. No focal deficits.  Psychiatric:  Affect normal, mood normal, judgment normal.    Labs     Labs are reviewed and discussed with a patient  No results found for: CHOLSTRLTOT, LDL, HDL, TRIGLYCERIDE    Lab Results   Component Value Date/Time    SODIUM 144 07/11/2019 08:01 PM    POTASSIUM 3.6 07/11/2019 08:01 PM    CHLORIDE 107 07/11/2019 08:01 PM    CO2 24 07/11/2019 08:01 PM    GLUCOSE 116 (H) 07/11/2019 08:01 PM    BUN 16 07/11/2019 08:01 PM    CREATININE 0.81 07/11/2019 08:01 PM     Lab Results   Component Value Date/Time    ALKPHOSPHAT 71 07/05/2019 02:00 PM    ASTSGOT 24 07/05/2019 02:00 PM    ALTSGPT 20 07/05/2019 02:00 PM    TBILIRUBIN 0.7 07/05/2019 02:00 PM      Lab Results   Component Value Date/Time    WBC 7.4 07/05/2019 02:00 PM    RBC 4.36 07/05/2019 02:00 PM    HEMOGLOBIN 14.0 07/05/2019 02:00 PM    HEMATOCRIT 41.2 07/05/2019 02:00 PM    MCV 94.5 07/05/2019 02:00 PM    MCH 32.1 07/05/2019 02:00 PM    MCHC 34.0 07/05/2019 02:00 PM    MPV 10.3 07/05/2019 02:00 PM    NEUTSPOLYS 53.70 07/05/2019 02:00 PM    LYMPHOCYTES 33.80 07/05/2019 02:00 PM    MONOCYTES 7.50 07/05/2019 02:00 PM    EOSINOPHILS 4.00 07/05/2019 02:00 PM    BASOPHILS 0.70 07/05/2019 02:00 PM      Imaging     None    Assessment and Plan     Arely Haynes is a 58 y.o. female    1. Fall, subsequent encounter  2. Quadriceps tendon rupture, left, sequela  3. Status post tendon repair  Uneventful surgery, patient  will continue to have wound of the left lower extremity, follow-up by orthopedics    4. Uterine leiomyoma, unspecified location  5. H/O: hysterectomy  Uneventful hysterectomy    6. Need for hepatitis C screening test  - Hep C Virus Antibody; Future    7. Screening for colorectal cancer  - REFERRAL TO GI FOR COLONOSCOPY    8. Need for vaccination  - Zoster Vac Recomb Adjuvanted (SHINGRIX) 50 MCG/0.5ML Recon Susp; 0.5 mL by Intramuscular route Once for 1 dose.  Dispense: 0.5 mL; Refill: 0    9. Health care maintenance  As above.  Declined flu shot.    Counseling:   - Smoking:  Nonsmoker    Followup: Return in about 3 months (around 1/2/2020).    All questions are answered.    Please note that this dictation was created using voice recognition software, and that there might be errors of hussain and possibly content.

## 2019-10-07 ENCOUNTER — HOSPITAL ENCOUNTER (OUTPATIENT)
Dept: RADIOLOGY | Facility: MEDICAL CENTER | Age: 59
End: 2019-10-07

## 2019-10-11 ENCOUNTER — APPOINTMENT (OUTPATIENT)
Dept: RADIOLOGY | Facility: MEDICAL CENTER | Age: 59
End: 2019-10-11
Attending: INTERNAL MEDICINE
Payer: COMMERCIAL

## 2019-12-06 ENCOUNTER — APPOINTMENT (OUTPATIENT)
Dept: MEDICAL GROUP | Facility: MEDICAL CENTER | Age: 59
End: 2019-12-06
Payer: COMMERCIAL

## 2019-12-30 ENCOUNTER — OFFICE VISIT (OUTPATIENT)
Dept: PULMONOLOGY | Facility: HOSPICE | Age: 59
End: 2019-12-30
Payer: COMMERCIAL

## 2019-12-30 VITALS
RESPIRATION RATE: 16 BRPM | WEIGHT: 246.4 LBS | SYSTOLIC BLOOD PRESSURE: 132 MMHG | HEART RATE: 78 BPM | HEIGHT: 72 IN | OXYGEN SATURATION: 96 % | DIASTOLIC BLOOD PRESSURE: 64 MMHG | BODY MASS INDEX: 33.38 KG/M2

## 2019-12-30 DIAGNOSIS — J30.2 SEASONAL ALLERGIES: ICD-10-CM

## 2019-12-30 DIAGNOSIS — J45.40 MODERATE PERSISTENT ASTHMA WITHOUT COMPLICATION: ICD-10-CM

## 2019-12-30 PROCEDURE — 99213 OFFICE O/P EST LOW 20 MIN: CPT | Performed by: NURSE PRACTITIONER

## 2019-12-30 ASSESSMENT — ENCOUNTER SYMPTOMS
PSYCHIATRIC NEGATIVE: 1
BACK PAIN: 0
CONSTITUTIONAL NEGATIVE: 1
FALLS: 0
BRUISES/BLEEDS EASILY: 0
CARDIOVASCULAR NEGATIVE: 1
NEUROLOGICAL NEGATIVE: 1
EYE PAIN: 0
EYE DISCHARGE: 0
RESPIRATORY NEGATIVE: 1
NECK PAIN: 0
GASTROINTESTINAL NEGATIVE: 1
MYALGIAS: 0

## 2019-12-30 NOTE — PROGRESS NOTES
Chief Complaint   Patient presents with   • Follow-Up     Asthma         HPI: This patient is a 59 y.o. female, who presents for 6 month follow up moderate persistant asthma.     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  Her  is a physician.  Her and her  moved to [place] from [place] about a year ago.  She has a history of psoriatic arthritis.  She was unable to get in with a rheumatologist here in [place] so she now goes to [facility] for care.  Symptoms have been well controlled with etodolac. She has a follow up apt in March.    In regards to pulmonary history, she was initially seen after she had community-acquired pneumonia requiring hospitalization November 2018. Follow-up chest x-ray in December 11, 2018 showed no acute process. There was concern of potential underlying interstitial lung disease and she consequently had HRCT chest on January 3, 2019, which was personally reviewed by Dr Erickson and showed no evidence of interstitial lung disease or residual pneumonia.  Her pulmonary function testing and DLCO are normal.    Prior Immunoglobulin levels were checked and are normal.  LFTs are normal.   She is using Symbicort 160/4.5, 2 puffs twice a day.  She reports no asthma symptoms.  She has not required use of her albuterol.  She has not had any exacerbations or or URIs since last being seen.  She is wondering if she can taper off maintenance inhaler.    MRI  earlier this year showed 5.4 x 4.4 right complex ovarian cyst.    She had surgery with Dr. Bernal for removal of cyst.  She had recent GL F injured her left leg.  Now in a walking brace.  Because of this she was unable to follow-up with her rheumatologist in [facility].  Her prescription for etodolac has not been refilled. She has not received response from her PCP for refill. She is concerned about running out of this.     Past Medical History:   Diagnosis Date   • Anesthesia     1st cousin has malignant hyperthermia/pt has never had an issue or her  sisters   • Arthritis     hips knees hands spine   • Asthma     prn inhalers   • Bowel habit changes     diarrhea   • Depression     Daughter passed few years ago   • Erosive esophagitis    • GERD (gastroesophageal reflux disease)    • Heart burn    • High cholesterol    • Immunocompromised (HCC)    • Pain     hips knees   • Pneumonia 2018   • Psoriasis    • Psoriatic arthritis (HCC)        Social History     Tobacco Use   • Smoking status: Never Smoker   • Smokeless tobacco: Never Used   Substance Use Topics   • Alcohol use: Yes     Alcohol/week: 0.6 - 1.2 oz     Types: 1 - 2 Glasses of wine per week   • Drug use: No       Family History   Problem Relation Age of Onset   • Hyperlipidemia Mother    • Heart Attack Mother    • Psychiatric Illness Mother    • Heart Disease Mother    • Arthritis Mother    • Lung Disease Father    • Cancer Father    • Hyperlipidemia Father    • Hyperlipidemia Sister    • Cancer Sister         breast   • Heart Disease Paternal Grandfather    • Hypertension Neg Hx    • Diabetes Neg Hx        Immunization History   Administered Date(s) Administered   • Influenza TIV (IM) 09/20/2019   • Influenza Vaccine Quad Inj (Pf) 10/15/2018   • Influenza, Unspecified - Historical Data 09/18/2019   • Pneumococcal Conjugate Vaccine (Prevnar/PCV-13) 07/16/2018   • Pneumococcal Vaccine (PCV7) Historical Data 02/22/2016   • Pneumococcal polysaccharide vaccine (PPSV-23) 01/29/2019   • Tdap Vaccine 01/01/2014       Current medications as of today   Current Outpatient Medications   Medication Sig Dispense Refill   • magnesium oxide (MAG-OX) 400 MG Tab Take 400 mg by mouth every day.     • escitalopram (LEXAPRO) 10 MG Tab TAKE 1 TABLET DAILY (TAKEN TOGETHER WITH 20MG FOR A   TOTAL OF 30MG DAILY)     • escitalopram (LEXAPRO) 20 MG tablet TAKE 1 TABLET DAILY (TAKEN TOGETHER WITH 10MG FOR A   TOTAL OF 30MG DAILY)     • montelukast (SINGULAIR) 10 MG Tab Take 10 mg by mouth.     • omeprazole (PRILOSEC) 40 MG  delayed-release capsule Take 40 mg by mouth every day.     • lamoTRIgine (LAMICTAL) 100 MG Tab Take 100 mg by mouth every day.     • budesonide-formoterol (SYMBICORT) 160-4.5 MCG/ACT Aerosol Inhale 2 Puffs by mouth 2 Times a Day. Use spacer. Rinse mouth after each use. 3 Inhaler 3   • cetirizine (ZYRTEC) 10 MG Tab Take 10 mg by mouth every evening.     • etodolac (LODINE) 500 MG tablet Take 500 mg by mouth 2 times a day.     • rosuvastatin (CRESTOR) 10 MG Tab Take 10 mg by mouth every bedtime.     • acetaminophen (TYLENOL) 500 MG Tab Take 500 mg by mouth Once.     • rosuvastatin (CRESTOR) 10 MG Tab Take 10 mg by mouth every evening.     • ondansetron (ZOFRAN) 4 MG Tab tablet Take 1 Tab by mouth every 6 hours as needed. (Patient not taking: Reported on 12/30/2019) 30 Tab 0   • TURMERIC PO Take 1 Tab by mouth every day.     • triamcinolone acetonide (KENALOG) 0.1 % Cream Apply 1 Each to affected area(s) 2 times a day as needed.     • Olopatadine HCl 0.2 % Solution Place 1 Drop in both eyes 1 time daily as needed.       No current facility-administered medications for this visit.        Allergies: Ampicillin-sulbactam sodium; Iodine; Levofloxacin; and Shellfish-derived products    /64 (BP Location: Left arm, Patient Position: Sitting, BP Cuff Size: Adult)   Pulse 78   Resp 16   Ht 1.829 m (6')   Wt 111.8 kg (246 lb 6.4 oz)   SpO2 96%       Review of Systems   Constitutional: Negative.    HENT: Negative.    Eyes: Negative for pain and discharge.   Respiratory: Negative.    Cardiovascular: Negative.    Gastrointestinal: Negative.    Musculoskeletal: Positive for joint pain. Negative for back pain, falls, myalgias and neck pain.   Skin: Negative.    Neurological: Negative.    Endo/Heme/Allergies: Negative for environmental allergies. Does not bruise/bleed easily.   Psychiatric/Behavioral: Negative.        Physical Exam   Constitutional: She is oriented to person, place, and time and well-developed,  well-nourished, and in no distress.   HENT:   Head: Normocephalic and atraumatic.   Eyes: Pupils are equal, round, and reactive to light.   Neck: Normal range of motion. Neck supple. No tracheal deviation present.   Pulmonary/Chest: Effort normal.   Musculoskeletal:      Comments: Left leg in brace   Neurological: She is alert and oriented to person, place, and time.   Skin: Skin is warm and dry.   Psychiatric: Mood, memory, affect and judgment normal.   Vitals reviewed.      Diagnoses/Plan:    1. Moderate persistent asthma without complication  Clinically stable.  She will wean ICS/LABA as tolerated.  I recommended 1 puff of Symbicort twice daily.  If no symptoms she can discontinue Symbicort and use albuterol alone.  If she requires use of albuterol several times per week or has increased symptoms and dyspnea, cough or wheeze she is encouraged to restart Symbicort.    2. Seasonal allergies  Clinically stable, she continues with a daily Zyrtec and Singulair.    She will follow-up at the pulmonary clinic annually, sooner if needed.  She is trying to reestablish with a new primary care provider to get her medications refilled.  If she is unable to do so I have agreed to refill her etodolac for a 30 day supply until she can been seen again by rheumatology.      This dictation was created using voice recognition software. The accuracy of the dictation is limited to the abilities of the software. I expect there may be some errors of grammar and possibly content.

## 2020-01-07 ENCOUNTER — OFFICE VISIT (OUTPATIENT)
Dept: MEDICAL GROUP | Facility: MEDICAL CENTER | Age: 60
End: 2020-01-07
Payer: COMMERCIAL

## 2020-01-07 VITALS
HEIGHT: 72 IN | BODY MASS INDEX: 33.53 KG/M2 | TEMPERATURE: 98.7 F | WEIGHT: 247.58 LBS | HEART RATE: 77 BPM | SYSTOLIC BLOOD PRESSURE: 138 MMHG | DIASTOLIC BLOOD PRESSURE: 62 MMHG | OXYGEN SATURATION: 96 %

## 2020-01-07 DIAGNOSIS — F41.1 GAD (GENERALIZED ANXIETY DISORDER): ICD-10-CM

## 2020-01-07 DIAGNOSIS — Z00.00 HEALTH CARE MAINTENANCE: ICD-10-CM

## 2020-01-07 DIAGNOSIS — E66.9 OBESITY (BMI 30.0-34.9): ICD-10-CM

## 2020-01-07 DIAGNOSIS — L40.9 PSORIASIS: ICD-10-CM

## 2020-01-07 DIAGNOSIS — L40.50 PSORIATIC ARTHRITIS (HCC): ICD-10-CM

## 2020-01-07 DIAGNOSIS — D84.9 IMMUNOSUPPRESSION (HCC): ICD-10-CM

## 2020-01-07 DIAGNOSIS — F33.1 MAJOR DEPRESSIVE DISORDER, RECURRENT, MODERATE (HCC): ICD-10-CM

## 2020-01-07 DIAGNOSIS — E55.9 HYPOVITAMINOSIS D: ICD-10-CM

## 2020-01-07 DIAGNOSIS — Z12.11 SCREENING FOR MALIGNANT NEOPLASM OF COLON: ICD-10-CM

## 2020-01-07 PROBLEM — F32.A DEPRESSION: Status: RESOLVED | Noted: 2018-11-30 | Resolved: 2020-01-07

## 2020-01-07 PROBLEM — Z79.890 HORMONE REPLACEMENT THERAPY (HRT): Status: RESOLVED | Noted: 2019-03-27 | Resolved: 2020-01-07

## 2020-01-07 PROCEDURE — 99214 OFFICE O/P EST MOD 30 MIN: CPT | Performed by: INTERNAL MEDICINE

## 2020-01-07 RX ORDER — ESCITALOPRAM OXALATE 10 MG/1
TABLET ORAL
Qty: 90 TAB | Refills: 1 | Status: SHIPPED | OUTPATIENT
Start: 2020-01-07 | End: 2021-07-06

## 2020-01-07 RX ORDER — ESCITALOPRAM OXALATE 20 MG/1
TABLET ORAL
Qty: 90 TAB | Refills: 1 | Status: SHIPPED | OUTPATIENT
Start: 2020-01-07 | End: 2021-07-06

## 2020-01-07 ASSESSMENT — ANXIETY QUESTIONNAIRES
3. WORRYING TOO MUCH ABOUT DIFFERENT THINGS: NOT AT ALL
GAD7 TOTAL SCORE: 2
6. BECOMING EASILY ANNOYED OR IRRITABLE: NOT AT ALL
4. TROUBLE RELAXING: SEVERAL DAYS
2. NOT BEING ABLE TO STOP OR CONTROL WORRYING: NOT AT ALL
7. FEELING AFRAID AS IF SOMETHING AWFUL MIGHT HAPPEN: NOT AT ALL
5. BEING SO RESTLESS THAT IT IS HARD TO SIT STILL: NOT AT ALL
1. FEELING NERVOUS, ANXIOUS, OR ON EDGE: SEVERAL DAYS

## 2020-01-07 ASSESSMENT — PATIENT HEALTH QUESTIONNAIRE - PHQ9: CLINICAL INTERPRETATION OF PHQ2 SCORE: 0

## 2020-01-07 NOTE — PROGRESS NOTES
CC: Arely Haynes is a 59 y.o. female who presents for follow up of the following     Anxiety, depression  Onset:  Since her daughter passed away  Course: stable  Mood/anxiety currently does not affect: daily activities/sleep.  Current treatment: lexapro, 30 mg QD     Risk factors:   · Depression, anxiety  · H/o phobia: no  · H/o panic attacks: no  · H/o hypomanic or manic episode: no  · Substance abuse  (alcohol,  prescription drugs caffeine, tobacco): no  · Family support: yes  · Living alone:  no  · Family history of psych disorders: no  · Stress: no  · PMH of abuse (sexual, physical, emotional abuse; neglect): no   CHRISTY-7 Questionnaire  Feeling nervous, anxious, or on edge: Several days  Not being able to sop or control worrying: Not at all  Worrying too much about different things: Not at all  Trouble relaxing: Several days  Being so restless that it's hard to sit still: Not at all  Becoming easily annoyed or irritable: Not at all  Feeling afraid as if something awful might happen: Not at all  Total: 2    Depression Screen (PHQ-2/PHQ-9) 12/12/2018 12/22/2018 1/7/2020   PHQ-2 Total Score 0 0 -   PHQ-2 Total Score - - 0   PHQ-9 Total Score - 0 -     Psoriasis,psoriatic arthritis  Immunosuppression  Skin rash since [facility].  Psoriasis: started in mid 50, with plaques on both hands, hands joints swelling / pain.  Controlled with:  Mtx 20 mg sc every week with folic acid, orencia/Abatacept   She has been followed up by rheumatology.     Hypovitaminosis D  The patient had low vitamin D level.  Vitamin D supplement: OTC.    OBESITY, Body mass index is 33.58 kg/m².  Onset: mid 40'  Diet: regular  Exercise: limited due to LT leg pain/fall  No temperature intolerance. No change in hair/skin quality, BMs.   No HTN, buffalo hump, purple striae, flushing.  FH of obesity: neg    Current Outpatient Medications   Medication Sig Dispense Refill   • escitalopram (LEXAPRO) 10 MG Tab TAKE 1 TABLET DAILY (TAKEN TOGETHER WITH  20MG FOR A   TOTAL OF 30MG DAILY) 90 Tab 1   • escitalopram (LEXAPRO) 20 MG tablet TAKE 1 TABLET DAILY (TAKEN TOGETHER WITH 10MG FOR A   TOTAL OF 30MG DAILY) 90 Tab 1   • magnesium oxide (MAG-OX) 400 MG Tab Take 400 mg by mouth every day.     • montelukast (SINGULAIR) 10 MG Tab Take 10 mg by mouth.     • omeprazole (PRILOSEC) 40 MG delayed-release capsule Take 40 mg by mouth every day.     • lamoTRIgine (LAMICTAL) 100 MG Tab Take 100 mg by mouth every day.     • cetirizine (ZYRTEC) 10 MG Tab Take 10 mg by mouth every evening.     • etodolac (LODINE) 500 MG tablet Take 500 mg by mouth 2 times a day.     • rosuvastatin (CRESTOR) 10 MG Tab Take 10 mg by mouth every bedtime.     • acetaminophen (TYLENOL) 500 MG Tab Take 500 mg by mouth Once.     • rosuvastatin (CRESTOR) 10 MG Tab Take 10 mg by mouth every evening.     • TURMERIC PO Take 1 Tab by mouth every day.     • triamcinolone acetonide (KENALOG) 0.1 % Cream Apply 1 Each to affected area(s) 2 times a day as needed.     • Olopatadine HCl 0.2 % Solution Place 1 Drop in both eyes 1 time daily as needed.     • budesonide-formoterol (SYMBICORT) 160-4.5 MCG/ACT Aerosol Inhale 2 Puffs by mouth 2 Times a Day. Use spacer. Rinse mouth after each use. (Patient not taking: Reported on 1/7/2020) 3 Inhaler 3     No current facility-administered medications for this visit.      She  has a past medical history of Anesthesia, Arthritis, Asthma, Bowel habit changes, Depression, Erosive esophagitis, GERD (gastroesophageal reflux disease), Heart burn, High cholesterol, Immunocompromised (HCC), Pain, Pneumonia (2018), Psoriasis, and Psoriatic arthritis ([facility]).  She  has a past surgical history that includes lumbar fusion posterior; hip replacement, total; cystectomy; other abdominal surgery; gyn surgery (1999); other orthopedic surgery; other orthopedic surgery; hysterectomy robotic xi (N/A, 7/11/2019); salpingo oophorectomy (Bilateral, 7/11/2019); and tendon repair (Left,  2019).  Social History     Tobacco Use   • Smoking status: Never Smoker   • Smokeless tobacco: Never Used   Substance Use Topics   • Alcohol use: Yes     Alcohol/week: 0.6 - 1.2 oz     Types: 1 - 2 Glasses of wine per week   • Drug use: No     Social History     Patient does not qualify to have social determinant information on file (likely too young).   Social History Narrative   • Not on file     Family History   Problem Relation Age of Onset   • Hyperlipidemia Mother    • Heart Attack Mother    • Psychiatric Illness Mother    • Heart Disease Mother    • Arthritis Mother    • Lung Disease Father    • Cancer Father    • Hyperlipidemia Father    • Hyperlipidemia Sister    • Cancer Sister         breast   • Heart Disease Paternal Grandfather    • Hypertension Neg Hx    • Diabetes Neg Hx      Family Status   Relation Name Status   • Mo     • Fa     • Sis  (Not Specified)   • PGFa  (Not Specified)   • Neg Hx  (Not Specified)       ROS   Taking supplements to help mood or symptoms: yes  Using alcohol or other substances to help mood or symptoms: no  No polydipsia, polyuria.  No temperature intolerance.  No bowel changes  Denies symptoms of kathie: grandiosity, euphoria, need for little or no sleep, rapid pressured speech, spending sprees, reckless or risky behavior, hypersexual behavior.   No visual or auditory hallucinations.    Labs     None.     PHYSICAL EXAM   Blood Pressure 138/62 (BP Location: Left arm)   Pulse 77   Temperature 37.1 °C (98.7 °F) (Temporal)   Height 1.829 m (6')   Weight 112.3 kg (247 lb 9.2 oz)   Oxygen Saturation 96%   General: normal speech pattern and content   Clothing and grooming normal.  Behavior: no psychomotor abnormalities or impulsivity  Eye contact: good  Affect: normal  Though content: normal insight and reasoning, no evidence of psychotic process.   Neck/Thyroid: No adenopathy, no palpable thyroid nodules.  Lungs: CTAB  Heart: RRR no ectopy  Neuro: Gait  normal. Reflexes normal and symmetric. Sensation grossly intact        Abnormal findings: none      Labs     Labs are reviewed and discussed with a patient  No results found for: CHOLSTRLTOT, LDL, HDL, TRIGLYCERIDE    Lab Results   Component Value Date/Time    SODIUM 144 07/11/2019 08:01 PM    POTASSIUM 3.6 07/11/2019 08:01 PM    CHLORIDE 107 07/11/2019 08:01 PM    CO2 24 07/11/2019 08:01 PM    GLUCOSE 116 (H) 07/11/2019 08:01 PM    BUN 16 07/11/2019 08:01 PM    CREATININE 0.81 07/11/2019 08:01 PM     Lab Results   Component Value Date/Time    ALKPHOSPHAT 71 07/05/2019 02:00 PM    ASTSGOT 24 07/05/2019 02:00 PM    ALTSGPT 20 07/05/2019 02:00 PM    TBILIRUBIN 0.7 07/05/2019 02:00 PM      Lab Results   Component Value Date/Time    WBC 7.4 07/05/2019 02:00 PM    RBC 4.36 07/05/2019 02:00 PM    HEMOGLOBIN 14.0 07/05/2019 02:00 PM    HEMATOCRIT 41.2 07/05/2019 02:00 PM    MCV 94.5 07/05/2019 02:00 PM    MCH 32.1 07/05/2019 02:00 PM    MCHC 34.0 07/05/2019 02:00 PM    MPV 10.3 07/05/2019 02:00 PM    NEUTSPOLYS 53.70 07/05/2019 02:00 PM    LYMPHOCYTES 33.80 07/05/2019 02:00 PM    MONOCYTES 7.50 07/05/2019 02:00 PM    EOSINOPHILS 4.00 07/05/2019 02:00 PM    BASOPHILS 0.70 07/05/2019 02:00 PM      Imaging     None    ASSESMENT AND PLAN     1. CHRISTY (generalized anxiety disorder)  Controlled, continue current treatment  - escitalopram (LEXAPRO) 10 MG Tab; TAKE 1 TABLET DAILY (TAKEN TOGETHER WITH 20MG FOR A   TOTAL OF 30MG DAILY)  Dispense: 90 Tab; Refill: 1  - escitalopram (LEXAPRO) 20 MG tablet; TAKE 1 TABLET DAILY (TAKEN TOGETHER WITH 10MG FOR A   TOTAL OF 30MG DAILY)  Dispense: 90 Tab; Refill: 1  - Comp Metabolic Panel; Future  2. Major depressive disorder, recurrent, moderate (HCC)  - escitalopram (LEXAPRO) 10 MG Tab; TAKE 1 TABLET DAILY (TAKEN TOGETHER WITH 20MG FOR A   TOTAL OF 30MG DAILY)  Dispense: 90 Tab; Refill: 1  - escitalopram (LEXAPRO) 20 MG tablet; TAKE 1 TABLET DAILY (TAKEN TOGETHER WITH 10MG FOR A   TOTAL OF  30MG DAILY)  Dispense: 90 Tab; Refill: 1  - Comp Metabolic Panel; Future    3. Psoriasis  Controlled, continue current treatment, dermatology follow-up  - CBC WITH DIFFERENTIAL; Future  4. Psoriatic arthritis (HCC)  - CBC WITH DIFFERENTIAL; Future  5. Immunosuppression (HCC)  - CBC WITH DIFFERENTIAL; Future  - Comp Metabolic Panel; Future    6. Hypovitaminosis D  Pending labs, advised 2000 units of vitamin D daily, OTC  - VITAMIN D,25 HYDROXY; Future    7. Obesity (BMI 30.0-34.9)  - OBESITY COUNSELING (No Charge): Patient identified as having weight management issue.  Appropriate orders and counseling given.    8. Screening for malignant neoplasm of colon  - REFERRAL TO GASTROENTEROLOGY    9. Health care maintenance  Due Shingrix        - Reviewed effects and side effects of medication, the patient verbalized understanding     25 minutes face to face with 15 spent counseling and coordinating care. Reviewed the  pathophysiology, etiology, risks and principles of treatment.    Smoking Counseling: Nonsmoker    Follow up: in 3 months    Please note that this dictation was created using voice recognition software. Despite all efforts, some minor grammar mistakes are possible.

## 2020-01-13 ENCOUNTER — PATIENT MESSAGE (OUTPATIENT)
Dept: MEDICAL GROUP | Facility: MEDICAL CENTER | Age: 60
End: 2020-01-13

## 2020-01-13 RX ORDER — LAMOTRIGINE 100 MG/1
100 TABLET ORAL DAILY
Qty: 90 TAB | Refills: 0 | Status: SHIPPED | OUTPATIENT
Start: 2020-01-13 | End: 2020-04-13

## 2020-01-13 RX ORDER — MONTELUKAST SODIUM 10 MG/1
10 TABLET ORAL DAILY
Qty: 90 TAB | Refills: 1 | Status: SHIPPED | OUTPATIENT
Start: 2020-01-13 | End: 2022-01-06 | Stop reason: SDUPTHER

## 2020-01-13 RX ORDER — ETODOLAC 500 MG/1
500 TABLET, FILM COATED ORAL 2 TIMES DAILY
Qty: 90 TAB | Refills: 0 | Status: SHIPPED | OUTPATIENT
Start: 2020-01-13 | End: 2020-02-25

## 2020-01-13 RX ORDER — ROSUVASTATIN CALCIUM 10 MG/1
10 TABLET, COATED ORAL
Qty: 90 TAB | Refills: 0 | Status: SHIPPED | OUTPATIENT
Start: 2020-01-13 | End: 2022-01-06 | Stop reason: SDUPTHER

## 2020-01-13 NOTE — TELEPHONE ENCOUNTER
From: Arely Haynes  To: Corby Bacon M.D.  Sent: 1/13/2020 7:16 AM [facility]  Subject: Prescription Question    When I had my visit on 1/7/2020 I requested a new prescription for all of my medications as I have a new insurance in 2020. However, only my escitalopram was refilled. I need a new prescription for the following medications to be sent to [facility] or to [facility] on [address]. I would like a 90 day supply with 3 refills for a one year supply for the following medications:    Etodolac 500mg 1 tab BID #180  Lamotrignine 100 mg 1 tab QD. #90  Rosuvastatin 10 mg 1 tab QD. #90  Montelukast 10mg 1 tab QD. #90    If for some reason you are unwilling to prescribe these medications, please let me know. Also, please let me know where the prescriptions are sent.    Thank you    Arely Haynes

## 2020-02-25 RX ORDER — ETODOLAC 500 MG/1
TABLET, FILM COATED ORAL
Qty: 90 TAB | Refills: 0 | Status: SHIPPED | OUTPATIENT
Start: 2020-02-25 | End: 2020-04-06 | Stop reason: SDUPTHER

## 2020-03-04 ENCOUNTER — TELEPHONE (OUTPATIENT)
Dept: MEDICAL GROUP | Facility: MEDICAL CENTER | Age: 60
End: 2020-03-04

## 2020-03-04 NOTE — TELEPHONE ENCOUNTER
VOICEMAIL  1. Caller Name: Arely Haynes                        Call Back Number: [phone] (home)      2. Message: Patient called stating that she not heard from GI. I have called her and given her the phone number for GI. And I have stated I will fax over the referral again. She understood.     3. Patient approves office to leave a detailed voicemail/[place] message: N\A

## 2020-04-06 RX ORDER — ETODOLAC 500 MG/1
TABLET, FILM COATED ORAL
Qty: 60 TAB | Refills: 0 | Status: SHIPPED | OUTPATIENT
Start: 2020-04-06 | End: 2021-07-06

## 2020-04-08 DIAGNOSIS — J45.40 MODERATE PERSISTENT ASTHMA WITHOUT COMPLICATION: ICD-10-CM

## 2020-04-08 DIAGNOSIS — J45.30 MILD PERSISTENT ASTHMA, UNSPECIFIED WHETHER COMPLICATED: ICD-10-CM

## 2020-04-08 DIAGNOSIS — R06.2 WHEEZING: ICD-10-CM

## 2020-04-08 DIAGNOSIS — R06.00 DYSPNEA, UNSPECIFIED TYPE: ICD-10-CM

## 2020-04-08 NOTE — TELEPHONE ENCOUNTER
Have we ever prescribed this med? No.  If yes, what date? 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    Last OV: 12/30/19    Next OV: no pending    DX: Asthma    Medications: Duo Neb

## 2020-04-09 RX ORDER — IPRATROPIUM BROMIDE AND ALBUTEROL SULFATE 2.5; .5 MG/3ML; MG/3ML
3 SOLUTION RESPIRATORY (INHALATION) EVERY 4 HOURS PRN
Qty: 360 ML | Refills: 3 | Status: SHIPPED | OUTPATIENT
Start: 2020-04-09 | End: 2021-04-29

## 2020-04-13 RX ORDER — LAMOTRIGINE 100 MG/1
TABLET ORAL
Qty: 90 TAB | Refills: 0 | Status: SHIPPED | OUTPATIENT
Start: 2020-04-13 | End: 2021-07-06

## 2020-08-31 ENCOUNTER — HOSPITAL ENCOUNTER (OUTPATIENT)
Dept: RADIOLOGY | Facility: MEDICAL CENTER | Age: 60
End: 2020-08-31
Attending: FAMILY MEDICINE
Payer: COMMERCIAL

## 2020-08-31 DIAGNOSIS — R06.02 SHORTNESS OF BREATH: ICD-10-CM

## 2020-08-31 PROCEDURE — 71046 X-RAY EXAM CHEST 2 VIEWS: CPT

## 2020-10-28 ENCOUNTER — NURSE TRIAGE (OUTPATIENT)
Dept: HEALTH INFORMATION MANAGEMENT | Facility: OTHER | Age: 60
End: 2020-10-28

## 2020-10-28 NOTE — TELEPHONE ENCOUNTER
1. Caller Name: Arely Haynes                Call Back Number:   [facility] PCP or Specialty Provider: Yes         2.  In the last two weeks, has the patient had any new or worsening symptoms (not explained by alternative diagnosis)? No.    3.  Does patient have any comoribidities? None     4.  Has the patient traveled in the last 14 days OR had any known contact with someone who is suspected or confirmed to have COVID-19?  Yes, possible exposure, to family members    5. POTENTIAL PUI (LOW): Advised to perform self care, monitor for worsening symptoms and to call back in 3 days if no improvement. Instructed to self isolate and contact [facility] at [phone]         Note routed to [facility] Provider: [facility] only.

## 2020-12-09 ENCOUNTER — HOSPITAL ENCOUNTER (OUTPATIENT)
Facility: MEDICAL CENTER | Age: 60
End: 2020-12-09
Attending: PHYSICIAN ASSISTANT
Payer: COMMERCIAL

## 2020-12-09 ENCOUNTER — OFFICE VISIT (OUTPATIENT)
Dept: URGENT CARE | Facility: CLINIC | Age: 60
End: 2020-12-09

## 2020-12-09 VITALS
BODY MASS INDEX: 32.51 KG/M2 | DIASTOLIC BLOOD PRESSURE: 72 MMHG | TEMPERATURE: 97.3 F | SYSTOLIC BLOOD PRESSURE: 114 MMHG | WEIGHT: 240 LBS | OXYGEN SATURATION: 95 % | RESPIRATION RATE: 18 BRPM | HEIGHT: 72 IN | HEART RATE: 86 BPM

## 2020-12-09 DIAGNOSIS — J02.9 PHARYNGITIS, UNSPECIFIED ETIOLOGY: ICD-10-CM

## 2020-12-09 DIAGNOSIS — Z71.89 EDUCATED ABOUT COVID-19 VIRUS INFECTION: ICD-10-CM

## 2020-12-09 DIAGNOSIS — R43.2 LOSS OF TASTE: ICD-10-CM

## 2020-12-09 DIAGNOSIS — Z20.822 SUSPECTED COVID-19 VIRUS INFECTION: ICD-10-CM

## 2020-12-09 PROCEDURE — U0003 INFECTIOUS AGENT DETECTION BY NUCLEIC ACID (DNA OR RNA); SEVERE ACUTE RESPIRATORY SYNDROME CORONAVIRUS 2 (SARS-COV-2) (CORONAVIRUS DISEASE [COVID-19]), AMPLIFIED PROBE TECHNIQUE, MAKING USE OF HIGH THROUGHPUT TECHNOLOGIES AS DESCRIBED BY CMS-2020-01-R: HCPCS

## 2020-12-09 PROCEDURE — 99214 OFFICE O/P EST MOD 30 MIN: CPT | Mod: CS | Performed by: PHYSICIAN ASSISTANT

## 2020-12-09 RX ORDER — METHOTREXATE SODIUM 25 MG/ML
25 INJECTION, SOLUTION INTRA-ARTERIAL; INTRAMUSCULAR; INTRAVENOUS
Status: ON HOLD | COMMUNITY
Start: 2020-04-30 | End: 2021-07-10

## 2020-12-09 RX ORDER — PREDNISONE 10 MG/1
30 TABLET ORAL DAILY
COMMUNITY
Start: 2020-11-18 | End: 2021-01-18

## 2020-12-09 RX ORDER — ALBUTEROL SULFATE 90 UG/1
2 AEROSOL, METERED RESPIRATORY (INHALATION) PRN
COMMUNITY
End: 2021-04-29

## 2020-12-09 RX ORDER — APREMILAST 30 MG/1
TABLET, FILM COATED ORAL
COMMUNITY
End: 2021-04-29

## 2020-12-09 ASSESSMENT — FIBROSIS 4 INDEX: FIB4 SCORE: 1.19

## 2020-12-10 DIAGNOSIS — Z20.822 SUSPECTED COVID-19 VIRUS INFECTION: ICD-10-CM

## 2020-12-10 LAB
COVID ORDER STATUS COVID19: NORMAL
SARS-COV-2 RNA RESP QL NAA+PROBE: DETECTED
SPECIMEN SOURCE: ABNORMAL

## 2020-12-10 ASSESSMENT — ENCOUNTER SYMPTOMS
COUGH: 0
CHILLS: 1
BACK PAIN: 0
EYE PAIN: 0
FEVER: 0
SORE THROAT: 1
EYE REDNESS: 0
MYALGIAS: 1
WHEEZING: 0
DIZZINESS: 0
EYE DISCHARGE: 0
VOMITING: 0
NAUSEA: 1
DIARRHEA: 1
HEADACHES: 1
PALPITATIONS: 0
ABDOMINAL PAIN: 0
SPUTUM PRODUCTION: 0
NECK PAIN: 0
SHORTNESS OF BREATH: 0
CONSTIPATION: 0

## 2020-12-10 NOTE — PATIENT INSTRUCTIONS
INSTRUCTIONS FOR COVID-19 OR ANY OTHER INFECTIOUS RESPIRATORY ILLNESSES    The Centers for Disease Control and Prevention (CDC) states that early indications for COVID-19 include cough, shortness of breath, difficulty breathing, or at least two of the following symptoms: chills, shaking with chills, muscle pain, headache, sore throat, and loss of taste or smell. Symptoms can range from mild to severe and may appear up to two weeks after exposure to the virus.    The practice of self-isolation and quarantine helps protect the public and your family by  preventing exposure to people who have or may have a contagious disease. Please follow the prevention steps below as based on CDC guidelines:    WHEN TO STOP ISOLATION: Persons with COVID-19 or any other infectious respiratory illness who have symptoms and were advised to care for themselves at home may discontinue home isolation under the following conditions:  · At least 24 hours have passed since recovery defined as resolution of fever without the use of fever-reducing medications; AND,  · Improvement in respiratory symptoms (e.g., cough, shortness of breath); AND,  · At least 10 days have passed since symptoms first appeared and have had no subsequent illness.    MONITOR YOUR SYMPTOMS: If your illness is worsening, seek prompt medical attention. If you have a medical emergency and need to call 911, notify the dispatch personnel that you have, or are being evaluated for confirmed or suspected COVID-19 or another infectious respiratory illness. Wear a facemask if possible.    ACTIVITY RESTRICTION: restrict activities outside your home, except for getting medical care. Do not go to work, school, or public areas. Avoid using public transportation, ride-sharing, or taxis.    SCHEDULED MEDICAL APPOINTMENTS: Notify your provider that you have, or are being evaluated for, confirmed or suspected COVID-19 or another infectious respiratory. This will help the healthcare  provider’s office safely take care of you and keep other people from getting exposed or infected.    FACEMASKS, when to wear: Anytime you are away from your home or around other people or pets. If you are unable to wear one, maintain a minimum of 6 feet distancing from others.    LIVING ENVIRONMENT: Stay in a separate room from other people and pets. If possible, use a separate bathroom, have someone else care for your pets and avoid sharing household items. Any items used should be washed thoroughly with soap and water. Clean all “high-touch” surfaces every day. Use a household cleaning spray or wipe, according to the label instructions. High touch surfaces include (but are not limited to) counters, tabletops, doorknobs, bathroom fixtures, toilets, phones, keyboards, tablets, and bedside tables.     HAND WASHING: Frequently wash hands with soap and water for at least 20 seconds,  especially after blowing your nose, coughing, or sneezing; going to the bathroom; before and after interacting with pets; and before and after eating or preparing food. If hands are visibly dirty use soap and water. If soap and water are not available, use an alcohol-based hand  with at least 60% alcohol. Avoid touching your eyes, nose, and mouth with unwashed hands. Cover your coughs and sneezes with a tissue. Throw used tissues in a lined trash can. Immediately wash your hands.    ACTIVE/FACILITATED SELF-MONITORING: Follow instructions provided by your local health department or health professionals, as appropriate. When working with your local health department check their available hours.    [facility]   Phone Number   [facility] [phone]   [facility] [place] [phone]   [place] Call 211   [place] [phone]     IF YOU HAVE CONFIRMED POSITIVE COVID-19:    Those who have completely recovered from COVID-19 may have immune-boosting antibodies in their plasma--called “convalescent plasma”--that could be  used to treat critically ill COVID19 patients.    [facility] is excited to begin working with [facility] on collecting convalescent plasma from  people who have recovered from COVID-19 as part of a program to treat patients infected with the virus. This FDA-approved “emergency investigational new drug” is a special blood product containing antibodies that may give patients an extra boost to fight the virus.    To be eligible to donate convalescent plasma, you must have a prior COVID-19 diagnosis documented by a laboratory test (or a positive test result for SARS-CoV-2 antibodies) and meet additional eligibility requirements.    If you are interested in donating convalescent plasma or have any additional questions, please contact the [facility] Convalescent Plasma  at [phone] or via e-mail at [email or web address].

## 2020-12-10 NOTE — PROGRESS NOTES
Subjective:   Arely Haynes is a 60 y.o. female who presents for Sore Throat (x2 days), Headache, and Other (loss of taste )      HPI  60 y.o. female with history of psoriatic arthritis and immunocompromise secondary medication presents to urgent care with new problem to provider of throat, lost taste, fatigue, diarrhea, body aches, and headaches onset 2 to 3 days ago.  Patient denies confirmed exposure to COVID-19. 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  She denies cough, shortness of breath, or chest pain. Denies other associated aggravating or alleviating factors.     Review of Systems   Constitutional: Positive for chills and malaise/fatigue. Negative for fever.   HENT: Positive for sore throat. Negative for congestion.         Loss of taste   Eyes: Negative for pain, discharge and redness.   Respiratory: Negative for cough, sputum production, shortness of breath and wheezing.    Cardiovascular: Negative for chest pain and palpitations.   Gastrointestinal: Positive for diarrhea and nausea. Negative for abdominal pain, constipation and vomiting.   Genitourinary: Negative for dysuria.   Musculoskeletal: Positive for myalgias. Negative for back pain and neck pain.   Skin: Negative for rash.   Neurological: Positive for headaches. Negative for dizziness.   Endo/Heme/Allergies: Negative for environmental allergies.       Patient Active Problem List   Diagnosis   • Psoriatic arthritis (HCC)   • Familial hypercholesterolemia   • Moderate persistent asthma without complication   • History of total right hip arthroplasty   • Psoriasis   • Immunosuppression (HCC)   • Obesity (BMI 30.0-34.9)   • Menopausal hot flushes   • Health care maintenance   • Ovarian cyst   • Quadriceps tendon rupture, left, initial encounter   • H/O: hysterectomy   • Seasonal allergies   • Major depressive disorder, recurrent, moderate (HCC)   • CHRISTY (generalized anxiety disorder)     Past Surgical History:    Procedure Laterality Date   • TENDON REPAIR Left 9/19/2019    Procedure: REPAIR, TENDON- QUADRICEPS RUTURE REPAIR AND MEYERS;  Surgeon: Jayden Velázquez M.D.;  Location: SURGERY [facility];  Service: Orthopedics   • HYSTERECTOMY ROBOTIC XI N/A 7/11/2019    Procedure: HYSTERECTOMY, ROBOT-ASSISTED, USING DA SABINO XI;  Surgeon: Curly Bernal M.D.;  Location: SURGERY [facility];  Service: Gynecology   • SALPINGO OOPHORECTOMY Bilateral 7/11/2019    Procedure: SALPINGO-OOPHORECTOMY;  Surgeon: Curly Bernal M.D.;  Location: SURGERY [facility];  Service: Gynecology   • GYN SURGERY  1999    c sec   • CYSTECTOMY     • HIP REPLACEMENT, TOTAL      Right   • LUMBAR FUSION POSTERIOR     • OTHER ABDOMINAL SURGERY      appendix   • OTHER ORTHOPEDIC SURGERY      left knee   • OTHER ORTHOPEDIC SURGERY      right rotator cuff     Social History     Tobacco Use   • Smoking status: Never Smoker   • Smokeless tobacco: Never Used   Substance Use Topics   • Alcohol use: Yes     Alcohol/week: 0.6 - 1.2 oz     Types: 1 - 2 Glasses of wine per week   • Drug use: No      Family History   Problem Relation Age of Onset   • Hyperlipidemia Mother    • Heart Attack Mother    • Psychiatric Illness Mother    • Heart Disease Mother    • Arthritis Mother    • Lung Disease Father    • Cancer Father    • Hyperlipidemia Father    • Hyperlipidemia Sister    • Cancer Sister         breast   • Heart Disease Paternal Grandfather    • Hypertension Neg Hx    • Diabetes Neg Hx       (Allergies, Medications, & Tobacco/Substance Use were reconciled by the Medical Assistant and reviewed by myself. The family history is prepopulated)     Objective:     /72   Pulse 86   Temp 36.3 °C (97.3 °F) (Temporal)   Resp 18   Ht 1.829 m (6')   Wt 108.9 kg (240 lb)   LMP  (LMP Unknown)   SpO2 95%   BMI 32.55 kg/m²     Physical Exam  Vitals signs reviewed.   Constitutional:       General: She is not in acute distress.     Appearance: Normal  appearance. She is well-developed. She is not ill-appearing or diaphoretic.   HENT:      Head: Normocephalic and atraumatic.      Nose: Congestion present.      Mouth/Throat:      Mouth: Mucous membranes are moist.      Pharynx: Oropharynx is clear. Posterior oropharyngeal erythema present. No oropharyngeal exudate.   Eyes:      General: No scleral icterus.     Conjunctiva/sclera: Conjunctivae normal.   Neck:      Musculoskeletal: Normal range of motion and neck supple.   Cardiovascular:      Rate and Rhythm: Normal rate and regular rhythm.      Heart sounds: Murmur present.   Pulmonary:      Effort: Pulmonary effort is normal. No respiratory distress.      Breath sounds: Normal breath sounds. No wheezing, rhonchi or rales.   Musculoskeletal: Normal range of motion.   Lymphadenopathy:      Cervical: No cervical adenopathy.   Skin:     General: Skin is warm and dry.      Findings: No rash.   Neurological:      General: No focal deficit present.      Mental Status: She is alert and oriented to person, place, and time.   Psychiatric:         Mood and Affect: Mood normal.         Behavior: Behavior normal.         Thought Content: Thought content normal.         Judgment: Judgment normal.         Assessment/Plan:     1. Suspected COVID-19 virus infection  COVID/SARS COV-2 PCR   2. Loss of taste     3. Pharyngitis, unspecified etiology     4. Educated about COVID-19 virus infection       Patient instructed to self-isolate/quarantine per CDC guidelines.  I will follow-up pending COVID-19 testing. Discussed with patient may obtain hard copy of results on MyChart.   Advised patient symptoms are most likely viral in etiology. Increased fluids and rest. Discussed use of OTC cough and cold medication and Tylenol/Motrin for symptomatic relief.  Return for reevaluation or proceed to ED if symptoms persist or worsen. Supportive care, differential diagnoses, and indications for immediate follow-up discussed with patient. Patient  should to proceed to ED for development of symptoms including but not limited to shortness of breath breath, difficulty breathing, or worsening symptoms not manageable at home.   The patient demonstrated a good understanding and agreed with the treatment plan and has no further questions regarding care.   Vital signs stable, patient in no acute respiratory distress.  Discussed with patient at length importance of communal effort to help decrease the infection rate of COVID 19. Patient advised to avoid large gatherings of people and practice good hand hygiene and respiratory precautions. COVID-19 discharge instructions and CDC guidelines provided to patient in AVS.      This patient is evaluated under [facility] isolation protocols in urgent care.  Out of an abundance of caution I am wearing a N95 mask, protective eye gear, gloves and gown through all interaction with patient.    Please note that this dictation was created using voice recognition software. I have made a reasonable attempt to correct obvious errors, but I expect that there are errors of grammar and possibly content that I did not discover before finalizing the note.    This note was electronically signed by Bettina Rodriguez PA-C

## 2021-01-18 ENCOUNTER — OFFICE VISIT (OUTPATIENT)
Dept: CARDIOLOGY | Facility: MEDICAL CENTER | Age: 61
End: 2021-01-18
Payer: COMMERCIAL

## 2021-01-18 VITALS
OXYGEN SATURATION: 95 % | HEART RATE: 75 BPM | SYSTOLIC BLOOD PRESSURE: 130 MMHG | RESPIRATION RATE: 18 BRPM | HEIGHT: 72 IN | WEIGHT: 254 LBS | BODY MASS INDEX: 34.4 KG/M2 | DIASTOLIC BLOOD PRESSURE: 78 MMHG

## 2021-01-18 DIAGNOSIS — E66.9 OBESITY (BMI 30.0-34.9): ICD-10-CM

## 2021-01-18 DIAGNOSIS — R06.02 SHORTNESS OF BREATH: ICD-10-CM

## 2021-01-18 DIAGNOSIS — E78.01 FAMILIAL HYPERCHOLESTEROLEMIA: ICD-10-CM

## 2021-01-18 LAB — EKG IMPRESSION: NORMAL

## 2021-01-18 PROCEDURE — 93000 ELECTROCARDIOGRAM COMPLETE: CPT | Performed by: INTERNAL MEDICINE

## 2021-01-18 PROCEDURE — 99203 OFFICE O/P NEW LOW 30 MIN: CPT | Performed by: INTERNAL MEDICINE

## 2021-01-18 RX ORDER — FOLIC ACID 1 MG/1
1 TABLET ORAL DAILY
COMMUNITY
Start: 2020-04-30 | End: 2021-04-30

## 2021-01-18 SDOH — HEALTH STABILITY: MENTAL HEALTH: HOW OFTEN DO YOU HAVE 6 OR MORE DRINKS ON ONE OCCASION?: NEVER

## 2021-01-18 SDOH — HEALTH STABILITY: MENTAL HEALTH: HOW MANY STANDARD DRINKS CONTAINING ALCOHOL DO YOU HAVE ON A TYPICAL DAY?: 1 OR 2

## 2021-01-18 SDOH — HEALTH STABILITY: MENTAL HEALTH: HOW OFTEN DO YOU HAVE A DRINK CONTAINING ALCOHOL?: 4 OR MORE TIMES A WEEK

## 2021-01-18 ASSESSMENT — ENCOUNTER SYMPTOMS
PALPITATIONS: 0
ABDOMINAL PAIN: 0
CHILLS: 0
PND: 0
DIZZINESS: 0
CLAUDICATION: 0
BLURRED VISION: 0
HEARTBURN: 1
WEAKNESS: 0
COUGH: 0
SORE THROAT: 0
BRUISES/BLEEDS EASILY: 0
SHORTNESS OF BREATH: 1
FEVER: 0
FOCAL WEAKNESS: 0
NAUSEA: 0
DEPRESSION: 1
FALLS: 0
DIARRHEA: 1

## 2021-01-18 ASSESSMENT — FIBROSIS 4 INDEX: FIB4 SCORE: 1.19

## 2021-01-18 NOTE — PATIENT INSTRUCTIONS
A - Aspirin - Aspirin 81 mg daily or other antiplatelets (clopidogrel (PLAVIX), prasrugrel (EFFIENT), ticagrelor (BRILINTA)) reduce your risk   B - Blood Pressure Control - reduces your risk  C - Cholesterol Management - statins reduce your risk, for those that are intolerant to statins there are alternatives  D - Diet - Cardiac rehab diets, Cardiosmart.org  E - Exercise - at least 5 hours of moderate exercise weekly  (typical brisk walking or similar activity)  F - Fats - VASCEPA,  or Fish oil (if Vascepa too expensive) for elevated triglycerides (REDUCE IT trial showed reduction from 22% 5 year MACE to 17%).  G - Good Vibes - meditation, exercise, yoga, mindfulness, stress reduction  H - Heart Failure - betablockers, sucubatril (ENTRESTO) 16% less risk of dying over 3 years, spironolactone, dapagliflozin (FARXIGA) 17% less risk of dying over 2 years, CRT +/- ICD  R - Rehab - Cardiac rehab reduces risk of dying by 13-24% and need to go to the hospital by 30% within the first year  S - Smoking - never smoke, if you do smoke ask for help to quit for good  T - Type II Diabetes - pills empagliflozin (JARDIANCE) 38% less risk of dying over 4 years, and/or weekly injections liraglutide (Victoza), semaglutide (Ozempic), dulaglutide (Trulicity) ~26% less risk of MACE in 2 years  W - Weight - maintain a healthy weight    Work on at least 1.5 - 5 hours a week of moderate exercise    Please look into the following diets and incorporate them into your diet  LOW SALT DIET   KEEP YOUR SODIUM EQUAL TO CALORIES AND NO MORE THAN DOUBLE THE CALORIES FOR A LOW SALT DIET    Cardiosmart.org - great resource for American College of Cardiology on heart disease prevention and treatment    FOR TREATMENT OR PREVENTION OF CORONARY ARTERY DISEASE  These three programs are approved by Medicare/Insurers for those with heart disease  [facility] - [facility]  Dr. Bryant's Program for Reversing Heart Disease - Luis Daniel Salas's  Cardiologist vegetarian-based  [facility] - [facility]    This is a commonly referenced Program  Dr Brianda Ellis (book and documentary) - vegetarian-based    FOR TREATMENT OF BLOOD PRESSURE  DASH DIET - American Heart Association for treatment of HYPERTENSION    FOR TREATMENT OF BAD CHOLESTEROL/FATS  REDUCE PROCESSED SUGAR AS MUCH AS POSSIBLE  INCREASE WHOLE GRAINS/VEGETABLES  INCREASE FIBER    Lowering total cholesterol and LDL (bad) cholesterol:  - Eat leaner cuts of meat, or eliminate altogether if possible red meat, and frequently substitute fish or chicken.  - Limit saturated fat to no more than 7-10% of total calories no more than 10 g per day is recommended. Some sources of saturated fat include butter, animal fats, hydrogenated vegetable fats and oils, many desserts, whole milk dairy products.  - Replaced saturated fats with polyunsaturated fats and monounsaturated fats. Foods high in monounsaturated fat include nuts, canola oil, avocados, and olives.  - Limit trans fat (processed foods) and replaced with fresh fruits and vegetables  - Recommend nonfat dairy products  - Increase substantially the amount of soluble fiber intake (legumes such as beans, fruit, whole grains).  - Consider nutritional supplements: plant sterile spreads such as Benecol, fish oil,  flaxseed oil, omega-3 acids capsules 1000 mg twice a day, or viscous fiber such as Metamucil  - Attain ideal weight and regular exercise (at least 30 minutes per day of moderate exercise)  ASK ABOUT STATIN OR NON STATIN MEDICATION TO REDUCE YOUR LDL AND HEART RISK    Lowering triglycerides:  - Reduce intake of simple sugar: Desserts, candy, pastries, honey, sodas, sugared cereals, yogurt, Gatorade, sports bars, canned fruit, smoothies, fruit juice, coffee drinks  - Reduced intake of refined starches: Refined Pasta, most bread  - Reduce or abstain from alcohol  - Increase omega-3  fatty acids: [place], Trout, Mackerel, [place], [place] tuna and supplements  - Attain ideal weight and regular exercise (at least 30 minutes per day of moderate exercise)  ASK ABOUT PURIFIED OMEGA 3 EPA or FISH OIL TO REDUCE YOUR TG AND HEART RISK    Elevating HDL (good) cholesterol:  - Increase physical activity  - Increase omega-3 fatty acids and supplements as listed above  - Incorporating appropriate amounts of monounsaturated fats such as nuts, olive oil, canola oil, avocados, olives  - Stop smoking  - Attain ideal weight and regular exercise (at least 30 minutes per day of moderate exercise)

## 2021-01-19 NOTE — PROGRESS NOTES
Chief Complaint   Patient presents with   • Shortness of Breath       Subjective:   Arely Haynes is a 60 y.o. female who presents today in consultation from Dr. Kaz Sin M.D.  For evaluation of her heart history.  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[employer] and other places her  is a internist in primary care they have retired and moved to [place] from [place] where they have a farm she has long-term been involved in horses she is having some chest discomfort which she was not sure if it was due to arthritis in her back and has had multiple other health issues recent weight gain with Covid restrictions      Past Medical History:   Diagnosis Date   • Anesthesia     1st cousin has malignant hyperthermia/pt has never had an issue or her sisters   • Arthritis     hips knees hands spine   • Asthma     prn inhalers   • Bowel habit changes     diarrhea   • Depression     Daughter passed few years ago   • Erosive esophagitis    • GERD (gastroesophageal reflux disease)    • Heart burn    • High cholesterol    • Immunocompromised (HCC)    • Pain     hips knees   • Pneumonia 2018   • Psoriasis    • Psoriatic arthritis (HCC)      Past Surgical History:   Procedure Laterality Date   • TENDON REPAIR Left 9/19/2019    Procedure: REPAIR, TENDON- QUADRICEPS RUTURE REPAIR AND MEYERS;  Surgeon: Jayden Velázquez M.D.;  Location: SURGERY [facility];  Service: Orthopedics   • HYSTERECTOMY ROBOTIC XI N/A 7/11/2019    Procedure: HYSTERECTOMY, ROBOT-ASSISTED, USING DA SABINO XI;  Surgeon: Curly Bernal M.D.;  Location: SURGERY [facility];  Service: Gynecology   • SALPINGO OOPHORECTOMY Bilateral 7/11/2019    Procedure: SALPINGO-OOPHORECTOMY;  Surgeon: Curly Bernal M.D.;  Location: SURGERY [facility];  Service: Gynecology   • GYN SURGERY  1999 c sec   • CYSTECTOMY     • HIP REPLACEMENT, TOTAL      Right   • LUMBAR FUSION POSTERIOR     •  OTHER ABDOMINAL SURGERY      appendix   • OTHER ORTHOPEDIC SURGERY      left knee   • OTHER ORTHOPEDIC SURGERY      right rotator cuff     Family History   Problem Relation Age of Onset   • Hyperlipidemia Mother    • Heart Attack Mother    • Psychiatric Illness Mother    • Heart Disease Mother    • Arthritis Mother    • Lung Disease Father    • Cancer Father    • Hyperlipidemia Father    • Hyperlipidemia Sister    • Cancer Sister         breast   • Heart Disease Paternal Grandfather    • Hypertension Neg Hx    • Diabetes Neg Hx      Social History     Socioeconomic History   • Marital status:      Spouse name: Not on file   • Number of children: Not on file   • Years of education: Not on file   • Highest education level: Not on file   Occupational History   • Occupation: Flight Nurse/Educator   Social Needs   • Financial resource strain: Not on file   • Food insecurity     Worry: Not on file     Inability: Not on file   • Transportation needs     Medical: Not on file     Non-medical: Not on file   Tobacco Use   • Smoking status: Never Smoker   • Smokeless tobacco: Never Used   Substance and Sexual Activity   • Alcohol use: Yes     Alcohol/week: 0.6 - 1.2 oz     Types: 1 - 2 Glasses of wine per week     Frequency: 4 or more times a week     Drinks per session: 1 or 2     Binge frequency: Never     Comment: one glss of wine at night    • Drug use: No   • Sexual activity: Yes     Comment: Tubal ligation   Lifestyle   • Physical activity     Days per week: Not on file     Minutes per session: Not on file   • Stress: Not on file   Relationships   • Social connections     Talks on phone: Not on file     Gets together: Not on file     Attends Bahai service: Not on file     Active member of club or organization: Not on file     Attends meetings of clubs or organizations: Not on file     Relationship status: Not on file   • Intimate partner violence     Fear of current or ex partner: Not on file     Emotionally  abused: Not on file     Physically abused: Not on file     Forced sexual activity: Not on file   Other Topics Concern   • Not on file   Social History Narrative   • Not on file     Allergies   Allergen Reactions   • Ampicillin-Sulbactam Sodium Hives     Tolerates cefazolin  Pt does not feel this is an active allergy.    • Iodine Anaphylaxis     Contrast- Oral, topical, And Iv Dye   • Levofloxacin Anaphylaxis   • Shellfish-Derived Products Anaphylaxis     lobster     Outpatient Encounter Medications as of 1/18/2021   Medication Sig Dispense Refill   • folic acid (FOLVITE) 1 MG Tab Take 1 mg by mouth every day.     • albuterol 108 (90 Base) MCG/ACT Aero Soln inhalation aerosol Inhale 2 Puffs as needed.     • Methotrexate Sodium 250 MG/10ML Solution methotrexate sodium 25 mg/mL injection solution     • Apremilast (OTEZLA) 30 MG Tab Take  by mouth.     • lamoTRIgine (LAMICTAL) 100 MG Tab TAKE 1 TABLET BY MOUTH EVERY DAY 90 Tab 0   • ipratropium-albuterol (DUONEB) 0.5-2.5 (3) MG/3ML nebulizer solution 3 mL by Nebulization route every four hours as needed for Shortness of Breath. 360 mL 3   • etodolac (LODINE) 500 MG tablet TAKE 1 TABLET BY MOUTH TWICE A DAY (Patient taking differently: 400 mg. TAKE 1 TABLET BY MOUTH TWICE A DAY) 60 Tab 0   • rosuvastatin (CRESTOR) 10 MG Tab Take 1 Tab by mouth every bedtime. 90 Tab 0   • montelukast (SINGULAIR) 10 MG Tab Take 1 Tab by mouth every day. 90 Tab 1   • escitalopram (LEXAPRO) 10 MG Tab TAKE 1 TABLET DAILY (TAKEN TOGETHER WITH 20MG FOR A   TOTAL OF 30MG DAILY) 90 Tab 1   • escitalopram (LEXAPRO) 20 MG tablet TAKE 1 TABLET DAILY (TAKEN TOGETHER WITH 10MG FOR A   TOTAL OF 30MG DAILY) 90 Tab 1   • magnesium oxide (MAG-OX) 400 MG Tab Take 400 mg by mouth every day.     • acetaminophen (TYLENOL) 500 MG Tab Take 500 mg by mouth Once.     • omeprazole (PRILOSEC) 40 MG delayed-release capsule Take 40 mg by mouth every day.     • budesonide-formoterol (SYMBICORT) 160-4.5 MCG/ACT Aerosol  Inhale 2 Puffs by mouth 2 Times a Day. Use spacer. Rinse mouth after each use. 3 Inhaler 3   • cetirizine (ZYRTEC) 10 MG Tab Take 10 mg by mouth every evening.     • [DISCONTINUED] predniSONE (DELTASONE) 10 MG Tab Take 30 mg by mouth every day.     • [DISCONTINUED] rosuvastatin (CRESTOR) 10 MG Tab Take 10 mg by mouth every evening.     • [DISCONTINUED] TURMERIC PO Take 1 Tab by mouth every day.     • [DISCONTINUED] triamcinolone acetonide (KENALOG) 0.1 % Cream Apply 1 Each to affected area(s) 2 times a day as needed.     • [DISCONTINUED] Olopatadine HCl 0.2 % Solution Place 1 Drop in both eyes 1 time daily as needed.       No facility-administered encounter medications on file as of 1/18/2021.      Review of Systems   Constitutional: Negative for chills and fever.   HENT: Negative for sore throat.    Eyes: Negative for blurred vision.   Respiratory: Positive for shortness of breath. Negative for cough.    Cardiovascular: Positive for chest pain. Negative for palpitations, claudication, leg swelling and PND.   Gastrointestinal: Positive for diarrhea and heartburn. Negative for abdominal pain and nausea.   Musculoskeletal: Positive for joint pain. Negative for falls.   Skin: Negative for rash.   Neurological: Negative for dizziness, focal weakness and weakness.   Endo/Heme/Allergies: Positive for environmental allergies. Does not bruise/bleed easily.   Psychiatric/Behavioral: Positive for depression.        Objective:   /78 (BP Location: Left arm, Patient Position: Sitting, BP Cuff Size: Adult)   Pulse 75   Resp 18   Ht 1.829 m (6')   Wt 115.2 kg (254 lb)   LMP  (LMP Unknown)   SpO2 95%   BMI 34.45 kg/m²     Physical Exam   Constitutional: No distress.   HENT:   Patient wearing a mask due to COVID precautions   Eyes: No scleral icterus.   Neck: No JVD present.   Cardiovascular: Normal rate and normal heart sounds. Exam reveals no gallop and no friction rub.   No murmur heard.  Pulmonary/Chest: No  respiratory distress. She has no wheezes. She has no rales.   Abdominal: Soft. Bowel sounds are normal.   Musculoskeletal:         General: No edema.   Neurological: She is alert.   Skin: No rash noted. She is not diaphoretic.   Psychiatric: She has a normal mood and affect.     We reviewed in person the most recent labs  Recent Results (from the past 4032 hour(s))   COVID/SARS COV-2 PCR    Collection Time: 20  7:26 PM    Specimen: Nasal; Respirate   Result Value Ref Range    COVID Order Status Received    SARS-CoV-2, PCR (In-House)    Collection Time: 20  7:26 PM   Result Value Ref Range    SARS-CoV-2 Source Nasal Swab     SARS-CoV-2 by PCR DETECTED (AA)    EKG    Collection Time: 21 10:41 AM   Result Value Ref Range    Report       [facility]    Test Date:  2021  Pt Name:    VIOLA CORONADO              Department: [facility]  MRN:        [id number]                      Room:  Gender:     Female                       Technician:   :        1960                   Requested By:ALMA DELIA GALLO  Order #:    [id number]                    Reading MD: Alma Delia Gallo MD    Measurements  Intervals                                Axis  Rate:       64                           P:          48  RI:         180                          QRS:        49  QRSD:       86                           T:          81  QT:         412  QTc:        425    Interpretive Statements  SINUS RHYTHM  BORDERLINE T ABNORMALITIES, ANT-LAT LEADS  Compared to ECG 2019 14:00:39  NO SIGNIFICANT CHANGES  Electronically Signed On 2021 18:11:47 PST by Alma Delia Gallo MD       We reviewed the images of her CT scan which shows no coronary artery calcification  Assessment:     1. Shortness of breath  EKG    EC-ECHOCARDIOGRAM COMPLETE W/O CONT   2. Obesity (BMI 30.0-34.9)     3. Familial hypercholesterolemia         Medical Decision Making:  Today's Assessment / Status / Plan:     It was my  pleasure to meet with Ms. Haynes.    Blood pressure is well controlled.  We specifically assessed the labs on hypertension treatment    She is on appropriate statin.    Reasonable doing echocardiogram make sure there is no unusual effect    Long history of psoriatic arthritis fortunately no coronary calcifications which is good so do not think she needs any further stress testing    We discussed the importance of meaningful weight loss.    We will follow up with Ms. Haynes on the results of the testing over the phone. We will determine further follow-up from there.    It is my pleasure to participate in the care of Ms. Haynes.  Please do not hesitate to contact me with questions or concerns.    Stevo Gardner MD PhD FAC  Cardiologist [facility]    Please note that this dictation was created using voice recognition software. There may be errors I did not discover before finalizing the note.     1/18/2021  6:31 PM

## 2021-01-20 ENCOUNTER — HOSPITAL ENCOUNTER (OUTPATIENT)
Dept: CARDIOLOGY | Facility: MEDICAL CENTER | Age: 61
End: 2021-01-20
Attending: INTERNAL MEDICINE
Payer: COMMERCIAL

## 2021-01-20 DIAGNOSIS — R06.02 SHORTNESS OF BREATH: ICD-10-CM

## 2021-01-20 PROCEDURE — 93306 TTE W/DOPPLER COMPLETE: CPT

## 2021-01-21 LAB
LV EJECT FRACT  99904: 65
LV EJECT FRACT MOD 2C 99903: 45.4
LV EJECT FRACT MOD 4C 99902: 72.14
LV EJECT FRACT MOD BP 99901: 62.15

## 2021-01-21 PROCEDURE — 93306 TTE W/DOPPLER COMPLETE: CPT | Mod: 26 | Performed by: INTERNAL MEDICINE

## 2021-01-26 ENCOUNTER — HOSPITAL ENCOUNTER (OUTPATIENT)
Dept: RADIOLOGY | Facility: MEDICAL CENTER | Age: 61
End: 2021-01-26
Attending: FAMILY MEDICINE
Payer: COMMERCIAL

## 2021-01-26 DIAGNOSIS — Z12.31 VISIT FOR SCREENING MAMMOGRAM: ICD-10-CM

## 2021-01-26 PROCEDURE — 77063 BREAST TOMOSYNTHESIS BI: CPT

## 2021-02-12 ENCOUNTER — HOSPITAL ENCOUNTER (OUTPATIENT)
Dept: RADIOLOGY | Facility: MEDICAL CENTER | Age: 61
End: 2021-02-12
Payer: COMMERCIAL

## 2021-03-23 ENCOUNTER — TELEPHONE (OUTPATIENT)
Dept: SCHEDULING | Facility: IMAGING CENTER | Age: 61
End: 2021-03-23

## 2021-04-22 ENCOUNTER — TELEPHONE (OUTPATIENT)
Dept: MEDICAL GROUP | Facility: LAB | Age: 61
End: 2021-04-22

## 2021-04-22 RX ORDER — HYDROCODONE BITARTRATE AND ACETAMINOPHEN 10; 325 MG/1; MG/1
TABLET ORAL
COMMUNITY
End: 2021-04-29

## 2021-04-22 RX ORDER — APREMILAST 30 MG/1
TABLET, FILM COATED ORAL
COMMUNITY
Start: 2021-02-01 | End: 2021-04-29

## 2021-04-22 RX ORDER — HYDROCODONE BITARTRATE AND ACETAMINOPHEN 10; 325 MG/1; MG/1
TABLET ORAL
COMMUNITY
Start: 2021-02-03 | End: 2021-04-29

## 2021-04-22 RX ORDER — CELECOXIB 200 MG/1
CAPSULE ORAL
COMMUNITY
End: 2021-04-29

## 2021-04-22 NOTE — TELEPHONE ENCOUNTER
NEW PATIENT VISIT PRE-VISIT PLANNING    1.  EpicCare Patient is checked in Patient Demographics?Yes    2.  Immunizations were updated in Epic using Reconcile Outside Information activity? Yes         3.  Is this appointment scheduled as a Hospital Follow-Up? No    4.  Patient is due for the following Health Maintenance Topics:   Health Maintenance Due   Topic Date Due   • HEPATITIS C SCREENING  Never done   • IMM ZOSTER VACCINES (1 of 2) Never done       5.  Reviewed/Updated the following with patient:       •   Preferred Pharmacy? Yes       •   Preferred Lab? Yes       •   Preferred Communication? Yes       •   Allergies? Yes       •   Medications? YES. Was Abstract Encounter opened and chart updated? YES       6.  Updated Care Team?       •   DME Company (gait device, O2, CPAP, etc.) N\A       •   Other Specialists (eye doctor, derm, GYN, cardiology, endo, etc): YES    7.  AHA (Puls8) form printed for Provider? N/A

## 2021-04-29 ENCOUNTER — OFFICE VISIT (OUTPATIENT)
Dept: MEDICAL GROUP | Facility: LAB | Age: 61
End: 2021-04-29
Payer: COMMERCIAL

## 2021-04-29 VITALS
HEART RATE: 64 BPM | HEIGHT: 72 IN | TEMPERATURE: 97.8 F | BODY MASS INDEX: 32.78 KG/M2 | SYSTOLIC BLOOD PRESSURE: 134 MMHG | RESPIRATION RATE: 12 BRPM | DIASTOLIC BLOOD PRESSURE: 72 MMHG | WEIGHT: 242 LBS | OXYGEN SATURATION: 95 %

## 2021-04-29 DIAGNOSIS — F33.1 MAJOR DEPRESSIVE DISORDER, RECURRENT, MODERATE (HCC): ICD-10-CM

## 2021-04-29 DIAGNOSIS — L40.50 PSORIATIC ARTHRITIS (HCC): ICD-10-CM

## 2021-04-29 DIAGNOSIS — D84.9 IMMUNOSUPPRESSION (HCC): ICD-10-CM

## 2021-04-29 DIAGNOSIS — E66.9 OBESITY (BMI 30.0-34.9): ICD-10-CM

## 2021-04-29 DIAGNOSIS — J45.40 MODERATE PERSISTENT ASTHMA WITHOUT COMPLICATION: ICD-10-CM

## 2021-04-29 DIAGNOSIS — E78.01 FAMILIAL HYPERCHOLESTEROLEMIA: ICD-10-CM

## 2021-04-29 PROBLEM — Z00.00 HEALTH CARE MAINTENANCE: Status: RESOLVED | Noted: 2019-03-27 | Resolved: 2021-04-29

## 2021-04-29 PROCEDURE — 99214 OFFICE O/P EST MOD 30 MIN: CPT | Performed by: FAMILY MEDICINE

## 2021-04-29 RX ORDER — ADALIMUMAB 40MG/0.4ML
40 KIT SUBCUTANEOUS
Status: ON HOLD | COMMUNITY
Start: 2021-04-08 | End: 2021-07-10

## 2021-04-29 ASSESSMENT — FIBROSIS 4 INDEX: FIB4 SCORE: 1.19

## 2021-04-29 NOTE — PROGRESS NOTES
Arely Haynes is a 60 y.o. female here to establish care and discuss chronic conditions.    HPI:      Psoriatic arthritis  Follows with rheumatologist in [place].  She is currently on Humira and methotrexate.    Weight   Recently lost 20 pounds with Nutrisystem and increasing veggies.    Asthma  Has been well controlled recently with no inhalers, just using Singulair.    Depression  Stable on Lexapro and lamotrigine.    Cholesterol  Recently saw cardiology, plan to increase statin dose.  She has not done this yet but plans to.  She was holding off as she was having an arthritis flare.    Current medicines (including changes today)  Current Outpatient Medications   Medication Sig Dispense Refill   • Adalimumab (HUMIRA PEN) 40 MG/0.4ML Pen-injector Kit Inject 40 mg under the skin.     • folic acid (FOLVITE) 1 MG Tab Take 1 mg by mouth every day.     • Methotrexate Sodium 250 MG/10ML Solution methotrexate sodium 25 mg/mL injection solution     • lamoTRIgine (LAMICTAL) 100 MG Tab TAKE 1 TABLET BY MOUTH EVERY DAY 90 Tab 0   • etodolac (LODINE) 500 MG tablet TAKE 1 TABLET BY MOUTH TWICE A DAY (Patient taking differently: 400 mg. TAKE 1 TABLET BY MOUTH TWICE A DAY) 60 Tab 0   • rosuvastatin (CRESTOR) 10 MG Tab Take 1 Tab by mouth every bedtime. 90 Tab 0   • montelukast (SINGULAIR) 10 MG Tab Take 1 Tab by mouth every day. 90 Tab 1   • escitalopram (LEXAPRO) 10 MG Tab TAKE 1 TABLET DAILY (TAKEN TOGETHER WITH 20MG FOR A   TOTAL OF 30MG DAILY) 90 Tab 1   • escitalopram (LEXAPRO) 20 MG tablet TAKE 1 TABLET DAILY (TAKEN TOGETHER WITH 10MG FOR A   TOTAL OF 30MG DAILY) 90 Tab 1   • magnesium oxide (MAG-OX) 400 MG Tab Take 400 mg by mouth every day.     • omeprazole (PRILOSEC) 40 MG delayed-release capsule Take 40 mg by mouth every day.     • cetirizine (ZYRTEC) 10 MG Tab Take 10 mg by mouth every evening.       No current facility-administered medications for this visit.     She  has a past medical history of  Anesthesia, Arthritis, Asthma, Bowel habit changes, Depression, Erosive esophagitis, GERD (gastroesophageal reflux disease), Heart burn, High cholesterol, Immunocompromised (HCC), Pain, Pneumonia (2018), Psoriasis, and Psoriatic arthritis (HCC).  She  has a past surgical history that includes lumbar fusion posterior; hip replacement, total; cystectomy; other abdominal surgery; gyn surgery (); other orthopedic surgery; other orthopedic surgery; hysterectomy robotic xi (N/A, 2019); salpingo oophorectomy (Bilateral, 2019); and tendon repair (Left, 2019).  Social History     Tobacco Use   • Smoking status: Never Smoker   • Smokeless tobacco: Never Used   Substance Use Topics   • Alcohol use: Yes     Alcohol/week: 0.6 - 1.2 oz     Types: 1 - 2 Glasses of wine per week     Comment: one glss of wine at night    • Drug use: No     Social History     Social History Narrative   • Not on file     Family History   Problem Relation Age of Onset   • Hyperlipidemia Mother    • Heart Attack Mother    • Psychiatric Illness Mother    • Heart Disease Mother    • Arthritis Mother    • Lung Disease Father    • Cancer Father    • Hyperlipidemia Father    • Hyperlipidemia Sister    • Cancer Sister         breast   • Heart Disease Paternal Grandfather    • Hypertension Neg Hx    • Diabetes Neg Hx      Family Status   Relation Name Status   • Mo     • Fa     • Sis  (Not Specified)   • PGFa  (Not Specified)   • Neg Hx  (Not Specified)       ROS  See HPI     Objective:     Physical Exam:  /72 (BP Location: Right arm, Patient Position: Sitting, BP Cuff Size: Adult)   Pulse 64   Temp 36.6 °C (97.8 °F)   Resp 12   Ht 1.829 m (6')   Wt 110 kg (242 lb)   SpO2 95%  Body mass index is 32.82 kg/m².  Constitutional: Alert. Well appearing. No distress.  Skin: Warm, dry, good turgor, no visible rashes.  Eye: Equal, round and reactive to light, conjunctiva clear, lids normal.  ENMT: Moist mucous membranes.    Neck: Trachea midline, no masses, no thyromegaly.   Respiratory: Normal effort. Lungs are clear to auscultation bilaterally.  Cardiovascular: Regular rate and rhythm. Normal S1/S2. No murmurs, rubs or gallops.   Neuro: Moves all four extremities. No facial droop.  Psych: Answers questions appropriately. Normal affect and mood.    Assessment and Plan:     1. Psoriatic arthritis (HCC)  2. Immunosuppression (HCC  Follows with rheumatology in [place].  She is currently on Humira and methotrexate.  Labs as below.  - CBC WITH DIFFERENTIAL; Future  - Comp Metabolic Panel; Future     3. Obesity (BMI 30.0-34.9)  Recent 20 pound weight loss and she plans to continue with Nutrisystem and increased veggies.    4. Moderate persistent asthma without complication  Stable on Singulair.  She was using inhalers but has had very limited symptoms since moving to [place] so we will hold off on restarting anything.    5. Major depressive disorder, recurrent, moderate (HCC)  Stable on Lamictal and Lexapro.    6. Familial hypercholesterolemia  She plans to increase statin dose per cardiology recommendations.    Follow up: Return in about 6 months (around 10/29/2021).         PLEASE NOTE: This note was created using voice recognition software.

## 2021-06-07 ENCOUNTER — HOSPITAL ENCOUNTER (OUTPATIENT)
Dept: LAB | Facility: MEDICAL CENTER | Age: 61
End: 2021-06-07
Attending: FAMILY MEDICINE
Payer: COMMERCIAL

## 2021-06-07 ENCOUNTER — OFFICE VISIT (OUTPATIENT)
Dept: MEDICAL GROUP | Facility: LAB | Age: 61
End: 2021-06-07
Payer: COMMERCIAL

## 2021-06-07 ENCOUNTER — HOSPITAL ENCOUNTER (OUTPATIENT)
Dept: LAB | Facility: MEDICAL CENTER | Age: 61
End: 2021-06-07
Attending: PHYSICIAN ASSISTANT
Payer: COMMERCIAL

## 2021-06-07 VITALS
BODY MASS INDEX: 33.29 KG/M2 | DIASTOLIC BLOOD PRESSURE: 82 MMHG | OXYGEN SATURATION: 95 % | HEIGHT: 72 IN | WEIGHT: 245.8 LBS | RESPIRATION RATE: 16 BRPM | TEMPERATURE: 98.5 F | HEART RATE: 63 BPM | SYSTOLIC BLOOD PRESSURE: 132 MMHG

## 2021-06-07 DIAGNOSIS — R21 RASH AND NONSPECIFIC SKIN ERUPTION: ICD-10-CM

## 2021-06-07 DIAGNOSIS — Z11.59 SPECIAL SCREENING EXAMINATION FOR VIRAL DISEASE: ICD-10-CM

## 2021-06-07 DIAGNOSIS — L40.50 PSORIATIC ARTHRITIS (HCC): ICD-10-CM

## 2021-06-07 LAB
ALBUMIN SERPL BCP-MCNC: 4.6 G/DL (ref 3.2–4.9)
ALBUMIN/GLOB SERPL: 1.5 G/DL
ALP SERPL-CCNC: 99 U/L (ref 30–99)
ALT SERPL-CCNC: 35 U/L (ref 2–50)
ANION GAP SERPL CALC-SCNC: 13 MMOL/L (ref 7–16)
AST SERPL-CCNC: 43 U/L (ref 12–45)
BASOPHILS # BLD AUTO: 1 % (ref 0–1.8)
BASOPHILS # BLD: 0.05 K/UL (ref 0–0.12)
BILIRUB SERPL-MCNC: 0.9 MG/DL (ref 0.1–1.5)
BUN SERPL-MCNC: 27 MG/DL (ref 8–22)
CALCIUM SERPL-MCNC: 9.7 MG/DL (ref 8.5–10.5)
CHLORIDE SERPL-SCNC: 108 MMOL/L (ref 96–112)
CO2 SERPL-SCNC: 20 MMOL/L (ref 20–33)
CREAT SERPL-MCNC: 0.68 MG/DL (ref 0.5–1.4)
EOSINOPHIL # BLD AUTO: 0.32 K/UL (ref 0–0.51)
EOSINOPHIL NFR BLD: 6.3 % (ref 0–6.9)
ERYTHROCYTE [DISTWIDTH] IN BLOOD BY AUTOMATED COUNT: 47.5 FL (ref 35.9–50)
GLOBULIN SER CALC-MCNC: 3 G/DL (ref 1.9–3.5)
GLUCOSE SERPL-MCNC: 93 MG/DL (ref 65–99)
HCT VFR BLD AUTO: 42.4 % (ref 37–47)
HGB BLD-MCNC: 14.5 G/DL (ref 12–16)
IMM GRANULOCYTES # BLD AUTO: 0.01 K/UL (ref 0–0.11)
IMM GRANULOCYTES NFR BLD AUTO: 0.2 % (ref 0–0.9)
LYMPHOCYTES # BLD AUTO: 2.45 K/UL (ref 1–4.8)
LYMPHOCYTES NFR BLD: 48.1 % (ref 22–41)
MCH RBC QN AUTO: 32.3 PG (ref 27–33)
MCHC RBC AUTO-ENTMCNC: 34.2 G/DL (ref 33.6–35)
MCV RBC AUTO: 94.4 FL (ref 81.4–97.8)
MONOCYTES # BLD AUTO: 0.36 K/UL (ref 0–0.85)
MONOCYTES NFR BLD AUTO: 7.1 % (ref 0–13.4)
NEUTROPHILS # BLD AUTO: 1.9 K/UL (ref 2–7.15)
NEUTROPHILS NFR BLD: 37.3 % (ref 44–72)
NRBC # BLD AUTO: 0 K/UL
NRBC BLD-RTO: 0 /100 WBC
PLATELET # BLD AUTO: 296 K/UL (ref 164–446)
PMV BLD AUTO: 10.3 FL (ref 9–12.9)
POTASSIUM SERPL-SCNC: 4.1 MMOL/L (ref 3.6–5.5)
PROT SERPL-MCNC: 7.6 G/DL (ref 6–8.2)
RBC # BLD AUTO: 4.49 M/UL (ref 4.2–5.4)
SARS-COV-2 AB SERPL QL IA: REACTIVE
SODIUM SERPL-SCNC: 141 MMOL/L (ref 135–145)
WBC # BLD AUTO: 5.1 K/UL (ref 4.8–10.8)

## 2021-06-07 PROCEDURE — 80053 COMPREHEN METABOLIC PANEL: CPT

## 2021-06-07 PROCEDURE — 85025 COMPLETE CBC W/AUTO DIFF WBC: CPT

## 2021-06-07 PROCEDURE — 99213 OFFICE O/P EST LOW 20 MIN: CPT | Performed by: PHYSICIAN ASSISTANT

## 2021-06-07 PROCEDURE — 86769 SARS-COV-2 COVID-19 ANTIBODY: CPT

## 2021-06-07 PROCEDURE — 36415 COLL VENOUS BLD VENIPUNCTURE: CPT

## 2021-06-07 RX ORDER — ADALIMUMAB 40MG/0.4ML
40 KIT SUBCUTANEOUS
COMMUNITY
Start: 2021-05-14 | End: 2021-06-25

## 2021-06-07 RX ORDER — METHYLPREDNISOLONE 4 MG/1
TABLET ORAL
Qty: 21 TABLET | Refills: 0 | Status: SHIPPED | OUTPATIENT
Start: 2021-06-07 | End: 2021-06-07 | Stop reason: SDUPTHER

## 2021-06-07 RX ORDER — METHYLPREDNISOLONE 4 MG/1
TABLET ORAL
Qty: 21 TABLET | Refills: 0 | Status: SHIPPED | OUTPATIENT
Start: 2021-06-07 | End: 2021-06-25

## 2021-06-07 RX ORDER — TRIAMCINOLONE ACETONIDE 1 MG/G
CREAM TOPICAL
Qty: 80 G | Refills: 0 | Status: SHIPPED | OUTPATIENT
Start: 2021-06-07 | End: 2021-06-07 | Stop reason: SDUPTHER

## 2021-06-07 RX ORDER — TRIAMCINOLONE ACETONIDE 1 MG/G
CREAM TOPICAL
Qty: 80 G | Refills: 0 | Status: SHIPPED | OUTPATIENT
Start: 2021-06-07 | End: 2021-06-25 | Stop reason: SDUPTHER

## 2021-06-07 ASSESSMENT — PATIENT HEALTH QUESTIONNAIRE - PHQ9
6. FEELING BAD ABOUT YOURSELF - OR THAT YOU ARE A FAILURE OR HAVE LET YOURSELF OR YOUR FAMILY DOWN: NOT AL ALL
1. LITTLE INTEREST OR PLEASURE IN DOING THINGS: NOT AT ALL
SUM OF ALL RESPONSES TO PHQ QUESTIONS 1-9: 0
2. FEELING DOWN, DEPRESSED, IRRITABLE, OR HOPELESS: NOT AT ALL
4. FEELING TIRED OR HAVING LITTLE ENERGY: NOT AT ALL
5. POOR APPETITE OR OVEREATING: NOT AT ALL
3. TROUBLE FALLING OR STAYING ASLEEP OR SLEEPING TOO MUCH: NOT AT ALL
8. MOVING OR SPEAKING SO SLOWLY THAT OTHER PEOPLE COULD HAVE NOTICED. OR THE OPPOSITE, BEING SO FIGETY OR RESTLESS THAT YOU HAVE BEEN MOVING AROUND A LOT MORE THAN USUAL: NOT AT ALL
9. THOUGHTS THAT YOU WOULD BE BETTER OFF DEAD, OR OF HURTING YOURSELF: NOT AT ALL
SUM OF ALL RESPONSES TO PHQ9 QUESTIONS 1 AND 2: 0
7. TROUBLE CONCENTRATING ON THINGS, SUCH AS READING THE NEWSPAPER OR WATCHING TELEVISION: NOT AT ALL

## 2021-06-07 ASSESSMENT — FIBROSIS 4 INDEX: FIB4 SCORE: 1.19

## 2021-06-07 NOTE — PROGRESS NOTES
Subjective:   Arely Haynes is a 60 y.o. female here today for rash     Rash and nonspecific skin eruption  New concern, pt presents with a new rash on her arms, legs and buttocks x2 days s/p hiking.   She denies any known bites, was wearing long sleeves and pants.   Her  does have a similar rash   Unsure if her dogs have fleas/ticks, though they both take flea and tick medications regularly.     Pt has been using Clobetasol cream on areas that are the most itchy.        Current medicines (including changes today)  Current Outpatient Medications   Medication Sig Dispense Refill   • Adalimumab (HUMIRA PEN) 40 MG/0.4ML Pen-injector Kit Inject 40 mg under the skin.     • methylPREDNISolone (MEDROL DOSEPAK) 4 MG Tablet Therapy Pack As directed on the packaging label. 21 tablet 0   • triamcinolone acetonide (KENALOG) 0.1 % Cream Apply to affected area BID x14 days 80 g 0   • Adalimumab (HUMIRA PEN) 40 MG/0.4ML Pen-injector Kit Inject 40 mg under the skin.     • Methotrexate Sodium 250 MG/10ML Solution methotrexate sodium 25 mg/mL injection solution     • lamoTRIgine (LAMICTAL) 100 MG Tab TAKE 1 TABLET BY MOUTH EVERY DAY 90 Tab 0   • etodolac (LODINE) 500 MG tablet TAKE 1 TABLET BY MOUTH TWICE A DAY (Patient taking differently: 400 mg. TAKE 1 TABLET BY MOUTH TWICE A DAY) 60 Tab 0   • rosuvastatin (CRESTOR) 10 MG Tab Take 1 Tab by mouth every bedtime. 90 Tab 0   • montelukast (SINGULAIR) 10 MG Tab Take 1 Tab by mouth every day. 90 Tab 1   • escitalopram (LEXAPRO) 10 MG Tab TAKE 1 TABLET DAILY (TAKEN TOGETHER WITH 20MG FOR A   TOTAL OF 30MG DAILY) 90 Tab 1   • escitalopram (LEXAPRO) 20 MG tablet TAKE 1 TABLET DAILY (TAKEN TOGETHER WITH 10MG FOR A   TOTAL OF 30MG DAILY) 90 Tab 1   • magnesium oxide (MAG-OX) 400 MG Tab Take 400 mg by mouth every day.     • omeprazole (PRILOSEC) 40 MG delayed-release capsule Take 40 mg by mouth every day.     • cetirizine (ZYRTEC) 10 MG Tab Take 10 mg by mouth every evening.        No current facility-administered medications for this visit.     She  has a past medical history of Anesthesia, Arthritis, Asthma, Bowel habit changes, Depression, Erosive esophagitis, GERD (gastroesophageal reflux disease), Heart burn, High cholesterol, Immunocompromised (HCC), Pain, Pneumonia (2018), Psoriasis, and Psoriatic arthritis ([facility]).    ROS   +rash   No chest pain, no shortness of breath, no abdominal pain       Objective:     /82 (BP Location: Left arm, Patient Position: Sitting, BP Cuff Size: Adult)   Pulse 63   Temp 36.9 °C (98.5 °F) (Temporal)   Resp 16   Ht 1.829 m (6')   Wt 111 kg (245 lb 12.8 oz)   SpO2 95%  Body mass index is 33.34 kg/m².   Physical Exam:  Constitutional: Alert, no distress.  Skin: Warm, dry, good turgor, circular, raised erythematous rashes on b/l UE, LE.   See images   Eye: Equal, round. conjunctiva clear, lids normal.  Neck: Trachea midline,   Respiratory: Unlabored respiratory effort,               Assessment and Plan:   The following treatment plan was discussed    1. Rash and nonspecific skin eruption  Unclear etiology at this time.   Rx as written.   Pt was educated on use and SEs of medication.  Continue to monitor symptoms.   Stop clobetasol, start Triamcinolone as written.   Pt was educated on use and SEs of medication.  F/u prn.   - methylPREDNISolone (MEDROL DOSEPAK) 4 MG Tablet Therapy Pack; As directed on the packaging label.  Dispense: 21 tablet; Refill: 0  - triamcinolone acetonide (KENALOG) 0.1 % Cream; Apply to affected area BID x14 days  Dispense: 80 g; Refill: 0    2. Special screening examination for viral disease  Had COVID 12/2020  To check antibodies.   - SARS CoV-2 Ab, Total; Future      Followup: Return if symptoms worsen or fail to improve.       Tiffany Yanes P.A.-C.  Supervising MD: Dr. Joe Espinal MD  06/07/21

## 2021-06-07 NOTE — ASSESSMENT & PLAN NOTE
New concern, pt presents with a new rash on her arms, legs and buttocks x2 days s/p hiking.   She denies any known bites, was wearing long sleeves and pants.   Her  does have a similar rash   Unsure if her dogs have fleas/ticks, though they both take flea and tick medications regularly.     Pt has been using Clobetasol cream on areas that are the most itchy.

## 2021-06-08 DIAGNOSIS — L40.50 PSORIATIC ARTHRITIS (HCC): ICD-10-CM

## 2021-06-18 ENCOUNTER — OFFICE VISIT (OUTPATIENT)
Dept: MEDICAL GROUP | Facility: LAB | Age: 61
End: 2021-06-18
Payer: COMMERCIAL

## 2021-06-18 VITALS
DIASTOLIC BLOOD PRESSURE: 62 MMHG | TEMPERATURE: 97.2 F | OXYGEN SATURATION: 96 % | WEIGHT: 247 LBS | BODY MASS INDEX: 33.46 KG/M2 | SYSTOLIC BLOOD PRESSURE: 128 MMHG | RESPIRATION RATE: 14 BRPM | HEIGHT: 72 IN | HEART RATE: 62 BPM

## 2021-06-18 DIAGNOSIS — K64.8 BLEEDING INTERNAL HEMORRHOIDS: ICD-10-CM

## 2021-06-18 DIAGNOSIS — L21.9 SEBORRHEIC DERMATITIS: ICD-10-CM

## 2021-06-18 DIAGNOSIS — K52.9 CHRONIC DIARRHEA: ICD-10-CM

## 2021-06-18 PROCEDURE — 99214 OFFICE O/P EST MOD 30 MIN: CPT | Performed by: FAMILY MEDICINE

## 2021-06-18 RX ORDER — KETOCONAZOLE 20 MG/G
1 CREAM TOPICAL 2 TIMES DAILY
Qty: 30 G | Refills: 0 | Status: SHIPPED | OUTPATIENT
Start: 2021-06-18 | End: 2021-07-06

## 2021-06-18 ASSESSMENT — FIBROSIS 4 INDEX: FIB4 SCORE: 1.5

## 2021-06-18 NOTE — PROGRESS NOTES
Subjective:     CC: Hemorrhoids, diarrhea    HPI:   Arely presents today with:    Hemorrhoids  Intermittently painful and bleeding hemorrhoids present for years.  These were noted on colonoscopy in 2020.  Her colonoscopy was otherwise normal besides about benign-appearing polyp (do not have path records but she was told to repeat in 10 years).  The bleeding is most often painless has worsened recently and she will have some dripping into the toilet after bowel movements.    She has had chronic diarrhea for many years.  Reports watery diarrhea most days of the week along with significant gas.  She is worked on higher fiber diet, fatty foods do seem to worsen this.  She did previously discuss this with gastroenterology, they did think her magnesium supplement could be contributing.  She ended up following up with a colonoscopy but did not work-up further after that.  No fevers or chills.  No nausea or vomiting.    Rash on nose  Erythema to the left side of her nose, she did try some triamcinolone cream for this and it seemed to slightly improved. Not painful or itchy.    Health Maintenance: Completed    Medications, past medical history, allergies, and social history have been reviewed and updated.    ROS:  See HPI    Objective:       Exam:  /62   Pulse 62   Temp 36.2 °C (97.2 °F)   Resp 14   Ht 1.829 m (6')   Wt 112 kg (247 lb)   LMP  (LMP Unknown)   SpO2 96%   BMI 33.50 kg/m²  Body mass index is 33.5 kg/m².    Constitutional: Alert. Well appearing. No distress.  Skin: Warm, dry, good turgor, no visible rashes.  To the left of the nose and into the left nasolabial fold there is an erythematous rash  Eye: Equal, round and reactive to light, conjunctiva clear, lids normal.  ENMT: Moist mucous membranes.   Respiratory: Normal effort.   Neuro: Moves all four extremities. No facial droop.  Psych: Answers questions appropriately. Normal affect and mood.      Assessment & Plan:     60 y.o. female with the  following -     1. Seborrheic dermatitis  Erythematous rash to the left nose and nasolabial fold.  We will try ketoconazole rash, could add topical corticosteroid if not improving.  - ketoconazole (NIZORAL) 2 % Cream; Apply 1 Application topically 2 times a day.  Dispense: 30 g; Refill: 0    2. Chronic diarrhea  Going on for years.  Reassuring colonoscopy last year.  She previously saw gastroenterology but follow-up has been fragmented.  They did think that magnesium supplement was possibly contributing.  Will check labs as below.  Referral to reestablish with gastroenterology.  - TSH WITH REFLEX TO FT4; Future  - CELIAC DISEASE AB PANEL; Future  - REFERRAL TO GASTROENTEROLOGY  - MAGNESIUM; Future    3. Bleeding internal hemorrhoids  Chronic, internal hemorrhoids visualized on colonoscopy last year.  Bleeding is worsening.  She will discuss treatment with gastroenterology.  - REFERRAL TO GASTROENTEROLOGY      Please note that this note was created using voice recognition software.

## 2021-06-25 ENCOUNTER — OFFICE VISIT (OUTPATIENT)
Dept: MEDICAL GROUP | Facility: LAB | Age: 61
End: 2021-06-25
Payer: COMMERCIAL

## 2021-06-25 ENCOUNTER — TELEPHONE (OUTPATIENT)
Dept: MEDICAL GROUP | Facility: LAB | Age: 61
End: 2021-06-25

## 2021-06-25 VITALS
DIASTOLIC BLOOD PRESSURE: 66 MMHG | BODY MASS INDEX: 33.05 KG/M2 | TEMPERATURE: 98.2 F | RESPIRATION RATE: 12 BRPM | HEART RATE: 62 BPM | SYSTOLIC BLOOD PRESSURE: 140 MMHG | WEIGHT: 244 LBS | HEIGHT: 72 IN | OXYGEN SATURATION: 95 %

## 2021-06-25 DIAGNOSIS — R21 RASH AND NONSPECIFIC SKIN ERUPTION: ICD-10-CM

## 2021-06-25 DIAGNOSIS — B00.1 RECURRENT COLD SORES: ICD-10-CM

## 2021-06-25 PROCEDURE — 99214 OFFICE O/P EST MOD 30 MIN: CPT | Performed by: FAMILY MEDICINE

## 2021-06-25 RX ORDER — DOCOSANOL 100 MG/G
1 CREAM TOPICAL
Qty: 2 G | Refills: 1 | Status: SHIPPED | OUTPATIENT
Start: 2021-06-25 | End: 2021-07-06

## 2021-06-25 RX ORDER — ACYCLOVIR 50 MG/G
1 OINTMENT TOPICAL
Qty: 15 G | Refills: 1 | Status: SHIPPED | OUTPATIENT
Start: 2021-06-25 | End: 2021-07-06

## 2021-06-25 RX ORDER — TRIAMCINOLONE ACETONIDE 1 MG/G
CREAM TOPICAL
Qty: 80 G | Refills: 0 | Status: SHIPPED | OUTPATIENT
Start: 2021-06-25 | End: 2021-07-06

## 2021-06-25 RX ORDER — PREDNISONE 20 MG/1
40 TABLET ORAL DAILY
Qty: 10 TABLET | Refills: 0 | Status: SHIPPED | OUTPATIENT
Start: 2021-06-25 | End: 2021-06-30

## 2021-06-25 RX ORDER — FOLIC ACID 1 MG/1
2 TABLET ORAL EVERY MORNING
COMMUNITY
End: 2022-01-06 | Stop reason: SDUPTHER

## 2021-06-25 ASSESSMENT — FIBROSIS 4 INDEX: FIB4 SCORE: 1.5

## 2021-06-25 ASSESSMENT — ENCOUNTER SYMPTOMS
SHORTNESS OF BREATH: 0
DIARRHEA: 0
COUGH: 0
CHILLS: 0
FEVER: 0
VOMITING: 0
NAUSEA: 0

## 2021-06-25 NOTE — TELEPHONE ENCOUNTER
1. Caller Name: Arely Haynes                          Call Back Number: [phone] (home)         How would the patient prefer to be contacted with a response:     Pt LVM stating she requested Aciclovir during her appt. Please send to pharmacy.

## 2021-06-30 ENCOUNTER — PATIENT MESSAGE (OUTPATIENT)
Dept: MEDICAL GROUP | Facility: LAB | Age: 61
End: 2021-06-30

## 2021-06-30 DIAGNOSIS — R21 RASH AND NONSPECIFIC SKIN ERUPTION: ICD-10-CM

## 2021-07-01 ENCOUNTER — OFFICE VISIT (OUTPATIENT)
Dept: MEDICAL GROUP | Facility: LAB | Age: 61
End: 2021-07-01
Payer: COMMERCIAL

## 2021-07-01 ENCOUNTER — HOSPITAL ENCOUNTER (OUTPATIENT)
Facility: MEDICAL CENTER | Age: 61
End: 2021-07-01
Attending: FAMILY MEDICINE
Payer: COMMERCIAL

## 2021-07-01 VITALS
OXYGEN SATURATION: 96 % | RESPIRATION RATE: 12 BRPM | HEIGHT: 72 IN | DIASTOLIC BLOOD PRESSURE: 62 MMHG | BODY MASS INDEX: 33.09 KG/M2 | SYSTOLIC BLOOD PRESSURE: 134 MMHG | HEART RATE: 68 BPM | TEMPERATURE: 97 F

## 2021-07-01 DIAGNOSIS — R21 RASH AND NONSPECIFIC SKIN ERUPTION: ICD-10-CM

## 2021-07-01 LAB — PATHOLOGY CONSULT NOTE: NORMAL

## 2021-07-01 PROCEDURE — 88312 SPECIAL STAINS GROUP 1: CPT

## 2021-07-01 PROCEDURE — 99213 OFFICE O/P EST LOW 20 MIN: CPT | Mod: 25 | Performed by: FAMILY MEDICINE

## 2021-07-01 PROCEDURE — 11104 PUNCH BX SKIN SINGLE LESION: CPT | Performed by: FAMILY MEDICINE

## 2021-07-01 PROCEDURE — 88305 TISSUE EXAM BY PATHOLOGIST: CPT

## 2021-07-01 NOTE — LETTER
[facility]  Joe Espinal M.D.  [address]  [place] [address]  Fax: [phone]   Authorization for Release/Disclosure of   Protected Health Information   Name: ARELY CORONADO : 1960 SSN: [id number]   Address: [address] [place] [address] Phone:    [phone] (home)    I authorize the entity listed below to release/disclose the PHI below to:   [facility]/Joe Espinal M.D. and Joe Espinal M.D.   Provider or Entity Name:  Dr. Joyce Pacheco    Address   City, State, Zip   Phone:  [phone]    Fax:     Reason for request: continuity of care   Information to be released:    [  ] LAST COLONOSCOPY,  including any PATH REPORT and follow-up  [  ] LAST FIT/COLOGUARD RESULT [  ] LAST DEXA  [  ] LAST MAMMOGRAM  [  ] LAST PAP  [  ] LAST LABS [  ] RETINA EXAM REPORT  [  ] IMMUNIZATION RECORDS  [  ] Release all info      [  ] Check here and initial the line next to each item to release ALL health information INCLUDING  _____ Care and treatment for drug and / or alcohol abuse  _____ HIV testing, infection status, or AIDS  _____ Genetic Testing    DATES OF SERVICE OR TIME PERIOD TO BE DISCLOSED: _____________  I understand and acknowledge that:  * This Authorization may be revoked at any time by you in writing, except if your health information has already been used or disclosed.  * Your health information that will be used or disclosed as a result of you signing this authorization could be re-disclosed by the recipient. If this occurs, your re-disclosed health information may no longer be protected by State or Federal laws.  * You may refuse to sign this Authorization. Your refusal will not affect your ability to obtain treatment.  * This Authorization becomes effective upon signing and will  on (date) __________.      If no date is indicated, this Authorization will  one (1) year from the signature date.    Name: Arely HERNANDEZ  Ivy    Signature:   Date:     7/1/2021       PLEASE FAX REQUESTED RECORDS BACK TO: [phone]

## 2021-07-01 NOTE — PROGRESS NOTES
Subjective:     CC: Rash    HPI:   Arely presents today to follow-up on rash.  Initially seen seen for new diffuse, pruritic, maculopapular rash to her trunk and arms about 1 week ago.  Concerned about a possible drug reaction secondary to Humira which she had recently started.  She has since seen her rheumatologist who recommended punch biopsy and dermatology follow-up.  She has seen significant improvement with prednisone burst and topical triamcinolone.  Rash is now mostly located to the trunk and is more papular or pustular.    She did have some labs drawn with rheumatology which were notable for mild elevation of eosinophils.    Medications, past medical history, allergies, and social history have been reviewed and updated.    ROS:  See HPI      Objective:       Exam:  /62 (BP Location: Right arm, Patient Position: Sitting, BP Cuff Size: Large adult)   Pulse 68   Temp 36.1 °C (97 °F)   Resp 12   Ht 1.829 m (6')   LMP  (LMP Unknown)   SpO2 96%   BMI 33.09 kg/m²  Body mass index is 33.09 kg/m².    Constitutional: Alert. Well appearing. No distress.  Skin: Warm, dry, good turgor.  Scattered erythematous, papulopustular lesions to the trunk.  Psych: Answers questions appropriately. Normal affect and mood.      Assessment & Plan:     60 y.o. female with the following -     1. Rash and nonspecific skin eruption  Unclear etiology, this was initially diffuse, pruritic, and maculopapular and has improved with mostly papulopustular lesions to the trunk.  Did complete a prednisone burst and has been using topical triamcinolone cream.  Recently started on Humira for psoriatic arthritis, unclear if this could possibly related to this.  Rheumatology did recommend punch biopsy and referral to Derm.  Punch biopsy completed today and referral was previously placed to dermatology.  - Pathology Specimen; Future  - Skin Lesn Bx       Please note that this note was created using voice recognition software.

## 2021-07-01 NOTE — PROCEDURES
Skin Lesn Bx    Date/Time: 7/1/2021 3:27 PM  Performed by: Joe Espinal M.D.  Authorized by: Joe Espinal M.D.     Procedure Details - Skin Biopsy:     Body area: trunk    Trunk location: L flank    Initial size (mm): 4    Destruction method: punch biopsy      Comments:   Closed with two 4-0 Ethilon sutures.

## 2021-07-04 ENCOUNTER — HOSPITAL ENCOUNTER (EMERGENCY)
Facility: MEDICAL CENTER | Age: 61
End: 2021-07-04
Attending: EMERGENCY MEDICINE
Payer: COMMERCIAL

## 2021-07-04 ENCOUNTER — APPOINTMENT (OUTPATIENT)
Dept: RADIOLOGY | Facility: MEDICAL CENTER | Age: 61
End: 2021-07-04
Attending: EMERGENCY MEDICINE
Payer: COMMERCIAL

## 2021-07-04 VITALS
SYSTOLIC BLOOD PRESSURE: 122 MMHG | DIASTOLIC BLOOD PRESSURE: 69 MMHG | RESPIRATION RATE: 14 BRPM | BODY MASS INDEX: 32.7 KG/M2 | HEIGHT: 72 IN | OXYGEN SATURATION: 93 % | TEMPERATURE: 99.7 F | WEIGHT: 241.4 LBS | HEART RATE: 67 BPM

## 2021-07-04 DIAGNOSIS — R21 RASH: ICD-10-CM

## 2021-07-04 DIAGNOSIS — R50.81 FEVER IN OTHER DISEASES: ICD-10-CM

## 2021-07-04 DIAGNOSIS — R10.11 RUQ PAIN: ICD-10-CM

## 2021-07-04 LAB
ALBUMIN SERPL BCP-MCNC: 4.3 G/DL (ref 3.2–4.9)
ALBUMIN/GLOB SERPL: 1.4 G/DL
ALP SERPL-CCNC: 111 U/L (ref 30–99)
ALT SERPL-CCNC: 21 U/L (ref 2–50)
ANION GAP SERPL CALC-SCNC: 12 MMOL/L (ref 7–16)
AST SERPL-CCNC: 24 U/L (ref 12–45)
BASOPHILS # BLD AUTO: 0.9 % (ref 0–1.8)
BASOPHILS # BLD: 0.07 K/UL (ref 0–0.12)
BILIRUB SERPL-MCNC: 0.5 MG/DL (ref 0.1–1.5)
BUN SERPL-MCNC: 20 MG/DL (ref 8–22)
CALCIUM SERPL-MCNC: 9.3 MG/DL (ref 8.4–10.2)
CHLORIDE SERPL-SCNC: 104 MMOL/L (ref 96–112)
CO2 SERPL-SCNC: 18 MMOL/L (ref 20–33)
CREAT SERPL-MCNC: 0.72 MG/DL (ref 0.5–1.4)
CRP SERPL HS-MCNC: 0.62 MG/DL (ref 0–0.75)
EOSINOPHIL # BLD AUTO: 0.59 K/UL (ref 0–0.51)
EOSINOPHIL NFR BLD: 7.5 % (ref 0–6.9)
ERYTHROCYTE [DISTWIDTH] IN BLOOD BY AUTOMATED COUNT: 46.7 FL (ref 35.9–50)
GLOBULIN SER CALC-MCNC: 3 G/DL (ref 1.9–3.5)
GLUCOSE SERPL-MCNC: 117 MG/DL (ref 65–99)
HCT VFR BLD AUTO: 39.2 % (ref 37–47)
HGB BLD-MCNC: 13.5 G/DL (ref 12–16)
IMM GRANULOCYTES # BLD AUTO: 0.05 K/UL (ref 0–0.11)
IMM GRANULOCYTES NFR BLD AUTO: 0.6 % (ref 0–0.9)
LACTATE BLD-SCNC: 2.1 MMOL/L (ref 0.5–2)
LIPASE SERPL-CCNC: 26 U/L (ref 7–58)
LYMPHOCYTES # BLD AUTO: 1.95 K/UL (ref 1–4.8)
LYMPHOCYTES NFR BLD: 24.8 % (ref 22–41)
MCH RBC QN AUTO: 32.5 PG (ref 27–33)
MCHC RBC AUTO-ENTMCNC: 34.4 G/DL (ref 33.6–35)
MCV RBC AUTO: 94.2 FL (ref 81.4–97.8)
MONOCYTES # BLD AUTO: 0.66 K/UL (ref 0–0.85)
MONOCYTES NFR BLD AUTO: 8.4 % (ref 0–13.4)
NEUTROPHILS # BLD AUTO: 4.54 K/UL (ref 2–7.15)
NEUTROPHILS NFR BLD: 57.8 % (ref 44–72)
NRBC # BLD AUTO: 0 K/UL
NRBC BLD-RTO: 0 /100 WBC
PLATELET # BLD AUTO: 276 K/UL (ref 164–446)
PMV BLD AUTO: 8.8 FL (ref 9–12.9)
POTASSIUM SERPL-SCNC: 4.2 MMOL/L (ref 3.6–5.5)
PROT SERPL-MCNC: 7.3 G/DL (ref 6–8.2)
RBC # BLD AUTO: 4.16 M/UL (ref 4.2–5.4)
S PYO DNA SPEC NAA+PROBE: NOT DETECTED
SARS-COV-2 RNA RESP QL NAA+PROBE: NOTDETECTED
SODIUM SERPL-SCNC: 134 MMOL/L (ref 135–145)
SPECIMEN SOURCE: NORMAL
TROPONIN T SERPL-MCNC: <6 NG/L (ref 6–19)
WBC # BLD AUTO: 7.9 K/UL (ref 4.8–10.8)

## 2021-07-04 PROCEDURE — 83605 ASSAY OF LACTIC ACID: CPT

## 2021-07-04 PROCEDURE — 86140 C-REACTIVE PROTEIN: CPT

## 2021-07-04 PROCEDURE — 85025 COMPLETE CBC W/AUTO DIFF WBC: CPT

## 2021-07-04 PROCEDURE — 36415 COLL VENOUS BLD VENIPUNCTURE: CPT

## 2021-07-04 PROCEDURE — 700105 HCHG RX REV CODE 258: Performed by: EMERGENCY MEDICINE

## 2021-07-04 PROCEDURE — 71045 X-RAY EXAM CHEST 1 VIEW: CPT

## 2021-07-04 PROCEDURE — 84484 ASSAY OF TROPONIN QUANT: CPT

## 2021-07-04 PROCEDURE — U0005 INFEC AGEN DETEC AMPLI PROBE: HCPCS

## 2021-07-04 PROCEDURE — 99285 EMERGENCY DEPT VISIT HI MDM: CPT

## 2021-07-04 PROCEDURE — 83690 ASSAY OF LIPASE: CPT

## 2021-07-04 PROCEDURE — 80053 COMPREHEN METABOLIC PANEL: CPT

## 2021-07-04 PROCEDURE — 700111 HCHG RX REV CODE 636 W/ 250 OVERRIDE (IP): Performed by: EMERGENCY MEDICINE

## 2021-07-04 PROCEDURE — 96374 THER/PROPH/DIAG INJ IV PUSH: CPT

## 2021-07-04 PROCEDURE — U0003 INFECTIOUS AGENT DETECTION BY NUCLEIC ACID (DNA OR RNA); SEVERE ACUTE RESPIRATORY SYNDROME CORONAVIRUS 2 (SARS-COV-2) (CORONAVIRUS DISEASE [COVID-19]), AMPLIFIED PROBE TECHNIQUE, MAKING USE OF HIGH THROUGHPUT TECHNOLOGIES AS DESCRIBED BY CMS-2020-01-R: HCPCS

## 2021-07-04 PROCEDURE — 87651 STREP A DNA AMP PROBE: CPT

## 2021-07-04 RX ORDER — SODIUM CHLORIDE, SODIUM LACTATE, POTASSIUM CHLORIDE, CALCIUM CHLORIDE 600; 310; 30; 20 MG/100ML; MG/100ML; MG/100ML; MG/100ML
1000 INJECTION, SOLUTION INTRAVENOUS ONCE
Status: COMPLETED | OUTPATIENT
Start: 2021-07-04 | End: 2021-07-04

## 2021-07-04 RX ORDER — ONDANSETRON 2 MG/ML
4 INJECTION INTRAMUSCULAR; INTRAVENOUS ONCE
Status: COMPLETED | OUTPATIENT
Start: 2021-07-04 | End: 2021-07-04

## 2021-07-04 RX ADMIN — ONDANSETRON 4 MG: 2 INJECTION INTRAMUSCULAR; INTRAVENOUS at 01:59

## 2021-07-04 RX ADMIN — SODIUM CHLORIDE, POTASSIUM CHLORIDE, SODIUM LACTATE AND CALCIUM CHLORIDE 1000 ML: 600; 310; 30; 20 INJECTION, SOLUTION INTRAVENOUS at 01:59

## 2021-07-04 ASSESSMENT — FIBROSIS 4 INDEX: FIB4 SCORE: 1.5

## 2021-07-04 ASSESSMENT — PAIN DESCRIPTION - PAIN TYPE
TYPE: ACUTE PAIN
TYPE: ACUTE PAIN

## 2021-07-04 NOTE — ED PROVIDER NOTES
"ED Provider Note    CHIEF COMPLAINT  Chief Complaint   Patient presents with   • Fever     Patient states fever yesterday evening of 101.3 F, it lowered at night but then today fose again to 102.9 F.    • Rash     Patient states rash on trunk & bilateral arms, was biopsied by Dr. Espinal on Friday.   • Cold Sores     In mouth & R nare. Patient on acyclovir.   • RUQ Pain     Intermittent RUQ pain x3 weeks.       HPI  Arely Haynes is a 60 y.o. female who presents presents for evaluation of a fever which started sometime yesterday and continued throughout the night but went away during the morning hours.  Patient notes he came back later this afternoon around three but was higher and the patient became concerned as she is on both methotrexate and Humira.  She also notes incidentally that she has been treated recently for cold sores on the right lower lip and notes that she has had intermittent right upper quadrant pain which is been rather indolent not associated with any nausea, vomiting, or radiation of symptoms.    REVIEW OF SYSTEMS  Constitutional: Fevers and chills noted  Skin: Recent rash biopsy however rash has \"disappeared.\"  HEENT: Mild sore throat.  No runny nose, sores, trouble swallowing, or trouble speaking.  Neck: No neck pain, stiffness, or masses.  Chest: No pain   Pulm: No shortness of breath, cough, wheezing, stridor, or pain with inspiration/expiration  Gastrointestinal: No nausea, vomiting, diarrhea, constipation, bloating, melena, or hematochezia  Genitourinary: No dysuria or hematuria  Musculoskeletal: No pain, swelling, weakness  Neurologic: No sensory or motor changes. No confusion or disorientation.  Heme: No bleeding or bruising problems.   Immuno: On Humira and methotrexate for psoriatic arthritis      PAST MEDICAL HISTORY   has a past medical history of Anesthesia, Arthritis, Asthma, Bowel habit changes, Depression, Erosive esophagitis, GERD (gastroesophageal reflux disease), Heart " burn, High cholesterol, Immunocompromised (HCC), Pain, Pneumonia (2018), Psoriasis, and Psoriatic arthritis (HCC).    SOCIAL HISTORY  Social History     Tobacco Use   • Smoking status: Never Smoker   • Smokeless tobacco: Never Used   Vaping Use   • Vaping Use: Never used   Substance and Sexual Activity   • Alcohol use: Yes     Alcohol/week: 0.6 - 1.2 oz     Types: 1 - 2 Glasses of wine per week     Comment: one glss of wine at night    • Drug use: No   • Sexual activity: Yes     Comment: Tubal ligation       SURGICAL HISTORY   has a past surgical history that includes lumbar fusion posterior; hip replacement, total; cystectomy; other abdominal surgery; gyn surgery (1999); other orthopedic surgery; other orthopedic surgery; hysterectomy robotic xi (N/A, 7/11/2019); salpingo oophorectomy (Bilateral, 7/11/2019); and tendon repair (Left, 9/19/2019).    CURRENT MEDICATIONS  Home Medications     Reviewed by Gena Esposito R.N. (Registered Nurse) on 07/04/21 at 0106  Med List Status: Complete   Medication Last Dose Status   acyclovir (ZOVIRAX) 5 % Ointment  Active   Adalimumab (HUMIRA PEN) 40 MG/0.4ML Pen-injector Kit  Active   cetirizine (ZYRTEC) 10 MG Tab  Active   docosanol (ABREVA) 10 % Cream  Active   escitalopram (LEXAPRO) 10 MG Tab  Active   escitalopram (LEXAPRO) 20 MG tablet  Active   etodolac (LODINE) 500 MG tablet  Active   folic acid (FOLVITE) 1 MG Tab  Active   ketoconazole (NIZORAL) 2 % Cream  Active   lamoTRIgine (LAMICTAL) 100 MG Tab  Active   magnesium oxide (MAG-OX) 400 MG Tab  Active   Methotrexate Sodium 250 MG/10ML Solution  Active   montelukast (SINGULAIR) 10 MG Tab  Active   omeprazole (PRILOSEC) 40 MG delayed-release capsule  Active   rosuvastatin (CRESTOR) 10 MG Tab  Active   triamcinolone acetonide (KENALOG) 0.1 % Cream  Active                ALLERGIES  Allergies   Allergen Reactions   • Ampicillin-Sulbactam Sodium Hives     Tolerates cefazolin  Pt does not feel this is an active allergy.    •  Iodine Anaphylaxis     Contrast- Oral, topical, And Iv Dye   • Levofloxacin Anaphylaxis   • Shellfish-Derived Products Anaphylaxis     lobster   • Hydrocodone Itching   • Unipen [Nafcillin] Hives       PHYSICAL EXAM  VITAL SIGNS: /64   Pulse 67   Temp (!) 35.8 °C (96.4 °F) (Temporal)   Resp 14   Ht 1.829 m (6')   Wt 110 kg (241 lb 6.5 oz)   LMP  (LMP Unknown)   SpO2 92%   BMI 32.74 kg/m²    Gen: Appears tired, otherwise no apparent distress.  HEENT: No signs of trauma, Bilateral external ears normal, Nose normal. Conjunctiva normal, Non-icteric.  Pupils equal.  Mild to moderate posterior pharynx erythema without edema or asymmetry.  There is no exudate noted.  No other intraoral lesions.  Neck:  No tenderness, Supple, No masses.  No distress or limitation with range of motion in rotation, flexion, or extension.  Lymphatic: No cervical lymphadenopathy noted.   Cardiovascular: Regular rate and rhythm, no murmurs.  Capillary refill less than 3 seconds to all extremities, 2+ distal pulses.  Thorax & Lungs: Normal breath sounds, No respiratory distress, No wheezing bilateral chest rise  Abdomen: Bowel sounds normal, Soft, No significant tenderness, No masses, No pulsatile masses. No Guarding or rebound  Skin: Warm, Dry, No erythema, No rash noted to exposed areas.   Back: No bony tenderness, No CVA tenderness.   Extremities: Intact distal pulses, No pitting edema      LABS  Results for orders placed or performed during the hospital encounter of 07/04/21   CBC WITH DIFFERENTIAL   Result Value Ref Range    WBC 7.9 4.8 - 10.8 K/uL    RBC 4.16 (L) 4.20 - 5.40 M/uL    Hemoglobin 13.5 12.0 - 16.0 g/dL    Hematocrit 39.2 37.0 - 47.0 %    MCV 94.2 81.4 - 97.8 fL    MCH 32.5 27.0 - 33.0 pg    MCHC 34.4 33.6 - 35.0 g/dL    RDW 46.7 35.9 - 50.0 fL    Platelet Count 276 164 - 446 K/uL    MPV 8.8 (L) 9.0 - 12.9 fL    Neutrophils-Polys 57.80 44.00 - 72.00 %    Lymphocytes 24.80 22.00 - 41.00 %    Monocytes 8.40 0.00 -  13.40 %    Eosinophils 7.50 (H) 0.00 - 6.90 %    Basophils 0.90 0.00 - 1.80 %    Immature Granulocytes 0.60 0.00 - 0.90 %    Nucleated RBC 0.00 /100 WBC    Neutrophils (Absolute) 4.54 2.00 - 7.15 K/uL    Lymphs (Absolute) 1.95 1.00 - 4.80 K/uL    Monos (Absolute) 0.66 0.00 - 0.85 K/uL    Eos (Absolute) 0.59 (H) 0.00 - 0.51 K/uL    Baso (Absolute) 0.07 0.00 - 0.12 K/uL    Immature Granulocytes (abs) 0.05 0.00 - 0.11 K/uL    NRBC (Absolute) 0.00 K/uL   COMP METABOLIC PANEL   Result Value Ref Range    Sodium 134 (L) 135 - 145 mmol/L    Potassium 4.2 3.6 - 5.5 mmol/L    Chloride 104 96 - 112 mmol/L    Co2 18 (L) 20 - 33 mmol/L    Anion Gap 12.0 7.0 - 16.0    Glucose 117 (H) 65 - 99 mg/dL    Bun 20 8 - 22 mg/dL    Creatinine 0.72 0.50 - 1.40 mg/dL    Calcium 9.3 8.4 - 10.2 mg/dL    AST(SGOT) 24 12 - 45 U/L    ALT(SGPT) 21 2 - 50 U/L    Alkaline Phosphatase 111 (H) 30 - 99 U/L    Total Bilirubin 0.5 0.1 - 1.5 mg/dL    Albumin 4.3 3.2 - 4.9 g/dL    Total Protein 7.3 6.0 - 8.2 g/dL    Globulin 3.0 1.9 - 3.5 g/dL    A-G Ratio 1.4 g/dL   LIPASE   Result Value Ref Range    Lipase 26 7 - 58 U/L   TROPONIN   Result Value Ref Range    Troponin T <6 6 - 19 ng/L   LACTIC ACID   Result Value Ref Range    Lactic Acid 2.1 (H) 0.5 - 2.0 mmol/L   CRP QUANTITIVE (NON-CARDIAC)   Result Value Ref Range    Stat C-Reactive Protein 0.62 0.00 - 0.75 mg/dL   Group A Strep by PCR    Specimen: Throat   Result Value Ref Range    Group A Strep by PCR Not Detected Not Detected   ESTIMATED GFR   Result Value Ref Range    GFR If African American >60 >60 mL/min/1.73 m 2    GFR If Non African American >60 >60 mL/min/1.73 m 2       RADIOLOGY  DX-CHEST-PORTABLE (1 VIEW)   Final Result      No acute cardiac or pulmonary abnormalities are identified.          HYDRATION: Based on the patient's presentation of Abnormal Fluid Loss the patient was given IV fluids. IV Hydration was used because oral hydration was not adequate alone. Upon recheck following  hydration, the patient was stable.    COURSE & MEDICAL DECISION MAKING  Patient arrives for evaluation of reported fever at home in the setting of immunosuppression for her psoriatic arthritis.  Patient notes intermittent fever for 2 days but no other localizing symptoms to suggest a source.  She does not have any respiratory difficulty and no neck stiffness or pain.  She does note a headache however this was gradual in onset and not associated with any new neurologic changes.  She notes no significant nausea or vomiting but feels very thirsty and thinks she may be dehydrated due to the fever.  Will empirically hydrate her as she cannot orally rehydrate in an adequate period of time.  She declined pain medications at this time.  We will perform a broad spectrum screening evaluation including CBC, CMP, CRP, troponin, EKG, and chest x-ray.  Additionally, although she is fully immunized, I will swab her for Covid.  Likely the patient will be dischargeable provided there is no significant evidence to suggest a neutropenic fever.  Patient states understanding of this.    Patient work-up was reassuring and is notable that she does not have an elevated or decreased white blood cell count and her CRP appears reassuring.  She does have a mild eosinophilia however this is not new and is not specific.  There are no findings to suggest a bacterial source or the need for antibiotics and I feel she is safe for watchful waiting at home as both she and her  are medically trained.  Did state understanding of the symptoms worsen or change she would need to return for reevaluation otherwise treat symptoms at home.    FINAL IMPRESSION  1. Fever in other diseases    2. RUQ pain    3. Rash        Electronically signed by: Alec Adams M.D., 7/4/2021 2:40 AM

## 2021-07-04 NOTE — ED NOTES
Discharge instructions reviewed with patient. AVS signed by patient. PIV removed. No new prescriptions. Patient understands need for follow-up appointment with healthcare team and to return to ED for worsening symptoms. All questions answered at this time. Patient ambulated to exit with belongings & spouse. Patient in stable condition with no signs of distress. Patient agreeable to discharge instructions.

## 2021-07-04 NOTE — ED TRIAGE NOTES
Chief Complaint   Patient presents with   • Fever     Patient states fever yesterday evening of 101.3 F, it lowered at night but then today fose again to 102.9 F.    • Rash     Patient states rash on trunk & bilateral arms, was biopsied by Dr. Espinal on Friday.   • Cold Sores     In mouth & R nare. Patient on acyclovir.   • RUQ Pain     Intermittent RUQ pain x3 weeks.

## 2021-07-05 ENCOUNTER — NURSE TRIAGE (OUTPATIENT)
Dept: HEALTH INFORMATION MANAGEMENT | Facility: OTHER | Age: 61
End: 2021-07-05

## 2021-07-05 NOTE — TELEPHONE ENCOUNTER
Pt began with fever and rigors on Friday 101 temp. Saturday 102.9 temp, Sunday 103.9, Monday temp 101.4.  Pt was seen in ED on Sunday morning for symptoms.  Pt has history of psoriatic arthritis and is taking humira and methotrexate.  Also has c/o intermittent RIGHT upper quadrant pain  Diarrhea on Friday x1.  Treated for oral herpes a few weeks ago.    Pt  is requesting Blood and urine cultures please.    Pt to be seen in ED again if symptoms continue or worsen.      PCP appt scheduled for 0730 on 07/07.    Reason for Disposition  • [1] Fever > 101 F (38.3 C) AND [2] age > 60    Additional Information  • Negative: Shock suspected (e.g., cold/pale/clammy skin, too weak to stand, low BP, rapid pulse)  • Negative: Difficult to awaken or acting confused (e.g., disoriented, slurred speech)  • Negative: [1] Difficulty breathing AND [2] bluish lips, tongue or face  • Negative: New onset rash with multiple purple (or blood-colored) spots or dots  • Negative: Sounds like a life-threatening emergency to the triager  • Negative: Fever in a cancer patient who is currently (or recently) receiving chemotherapy or radiation therapy, or cancer patient who has metastatic or end-stage cancer and is receiving palliative care  • Negative: Pregnant  • Negative: Postpartum (from 0 to 6 weeks after delivery)  • Negative: Fever onset within 24 hours of receiving vaccine  • Negative: [1] Fever AND [2] within 21 days of Ebola EXPOSURE  • Negative: Other symptom is present, see that guideline  (e.g., symptoms of cough, runny nose, sore throat, earache, abdominal pain, diarrhea, vomiting)  • Negative: [1] Headache AND [2] stiff neck (can't touch chin to chest)  • Negative: Difficulty breathing  • Negative: IV drug abuse  • Negative: [1] Drinking very little AND [2] dehydration suspected (e.g., no urine > 12 hours, very dry mouth, very lightheaded)  • Negative: Patient sounds very sick or weak to the triager  (Exception: mild weakness  "and hasn't taken fever medicine)  • Negative: Fever > 104 F (40 C)    Answer Assessment - Initial Assessment Questions  1. TEMPERATURE: \"What is the most recent temperature?\"  \"How was it measured?\"       digital  2. ONSET: \"When did the fever start?\"    Friday  3. SYMPTOMS: \"Do you have any other symptoms besides the fever?\"  (e.g., colds, headache, sore throat, earache, cough, rash, diarrhea, vomiting, abdominal pain)      Headache, nausea, fever, Diarrhea x 1 on Friday  4. CAUSE: If there are no symptoms, ask: \"What do you think is causing the fever?\"       unknown  5. CONTACTS: \"Does anyone else in the family have an infection?\"      no  6. TREATMENT: \"What have you done so far to treat this fever?\" (e.g., medications)      Acetaminophen 1000 mg every 4 to 6 hours  7. IMMUNOCOMPROMISE: \"Do you have of the following: diabetes, HIV positive, splenectomy, cancer chemotherapy, chronic steroid treatment, transplant patient, etc.\"      Yes, humira and methotrexate.  8. PREGNANCY: \"Is there any chance you are pregnant?\" \"When was your last menstrual period?\"      no  9. TRAVEL: \"Have you traveled out of the country in the last month?\" (e.g., travel history, exposures)      no    Protocols used: FEVER-A-AH      "

## 2021-07-06 ENCOUNTER — HOSPITAL ENCOUNTER (EMERGENCY)
Facility: MEDICAL CENTER | Age: 61
End: 2021-07-06
Attending: EMERGENCY MEDICINE
Payer: COMMERCIAL

## 2021-07-06 ENCOUNTER — OFFICE VISIT (OUTPATIENT)
Dept: MEDICAL GROUP | Facility: LAB | Age: 61
End: 2021-07-06
Payer: COMMERCIAL

## 2021-07-06 ENCOUNTER — APPOINTMENT (OUTPATIENT)
Dept: RADIOLOGY | Facility: MEDICAL CENTER | Age: 61
End: 2021-07-06
Attending: EMERGENCY MEDICINE
Payer: COMMERCIAL

## 2021-07-06 VITALS
HEART RATE: 78 BPM | TEMPERATURE: 99.4 F | HEIGHT: 72 IN | BODY MASS INDEX: 31.97 KG/M2 | DIASTOLIC BLOOD PRESSURE: 58 MMHG | WEIGHT: 236 LBS | OXYGEN SATURATION: 91 % | SYSTOLIC BLOOD PRESSURE: 102 MMHG | RESPIRATION RATE: 12 BRPM

## 2021-07-06 VITALS
DIASTOLIC BLOOD PRESSURE: 67 MMHG | HEART RATE: 65 BPM | BODY MASS INDEX: 32.28 KG/M2 | TEMPERATURE: 100.8 F | SYSTOLIC BLOOD PRESSURE: 134 MMHG | HEIGHT: 72 IN | OXYGEN SATURATION: 92 % | WEIGHT: 238.32 LBS | RESPIRATION RATE: 20 BRPM

## 2021-07-06 DIAGNOSIS — R50.9 FEVER, UNSPECIFIED FEVER CAUSE: ICD-10-CM

## 2021-07-06 DIAGNOSIS — R10.816 EPIGASTRIC ABDOMINAL TENDERNESS WITHOUT REBOUND TENDERNESS: ICD-10-CM

## 2021-07-06 LAB
ALBUMIN SERPL BCP-MCNC: 4.3 G/DL (ref 3.2–4.9)
ALBUMIN/GLOB SERPL: 1.2 G/DL
ALP SERPL-CCNC: 119 U/L (ref 30–99)
ALT SERPL-CCNC: 17 U/L (ref 2–50)
ANION GAP SERPL CALC-SCNC: 14 MMOL/L (ref 7–16)
APPEARANCE UR: ABNORMAL
AST SERPL-CCNC: 22 U/L (ref 12–45)
BACTERIA #/AREA URNS HPF: NEGATIVE /HPF
BASOPHILS # BLD AUTO: 0.7 % (ref 0–1.8)
BASOPHILS # BLD: 0.05 K/UL (ref 0–0.12)
BILIRUB SERPL-MCNC: 0.8 MG/DL (ref 0.1–1.5)
BILIRUB UR QL STRIP.AUTO: ABNORMAL
BUN SERPL-MCNC: 18 MG/DL (ref 8–22)
CALCIUM SERPL-MCNC: 9.6 MG/DL (ref 8.4–10.2)
CHLORIDE SERPL-SCNC: 101 MMOL/L (ref 96–112)
CO2 SERPL-SCNC: 21 MMOL/L (ref 20–33)
COLOR UR: ABNORMAL
CREAT SERPL-MCNC: 0.83 MG/DL (ref 0.5–1.4)
EOSINOPHIL # BLD AUTO: 0.71 K/UL (ref 0–0.51)
EOSINOPHIL NFR BLD: 9.6 % (ref 0–6.9)
EPI CELLS #/AREA URNS HPF: NORMAL /HPF
ERYTHROCYTE [DISTWIDTH] IN BLOOD BY AUTOMATED COUNT: 46.8 FL (ref 35.9–50)
GLOBULIN SER CALC-MCNC: 3.6 G/DL (ref 1.9–3.5)
GLUCOSE SERPL-MCNC: 103 MG/DL (ref 65–99)
GLUCOSE UR STRIP.AUTO-MCNC: NEGATIVE MG/DL
HCT VFR BLD AUTO: 41.7 % (ref 37–47)
HGB BLD-MCNC: 14.4 G/DL (ref 12–16)
HYALINE CASTS #/AREA URNS LPF: NORMAL /LPF
IMM GRANULOCYTES # BLD AUTO: 0.05 K/UL (ref 0–0.11)
IMM GRANULOCYTES NFR BLD AUTO: 0.7 % (ref 0–0.9)
KETONES UR STRIP.AUTO-MCNC: ABNORMAL MG/DL
LACTATE BLD-SCNC: 1.6 MMOL/L (ref 0.5–2)
LEUKOCYTE ESTERASE UR QL STRIP.AUTO: ABNORMAL
LYMPHOCYTES # BLD AUTO: 1.53 K/UL (ref 1–4.8)
LYMPHOCYTES NFR BLD: 20.6 % (ref 22–41)
MCH RBC QN AUTO: 33 PG (ref 27–33)
MCHC RBC AUTO-ENTMCNC: 34.5 G/DL (ref 33.6–35)
MCV RBC AUTO: 95.4 FL (ref 81.4–97.8)
MICRO URNS: ABNORMAL
MONOCYTES # BLD AUTO: 0.61 K/UL (ref 0–0.85)
MONOCYTES NFR BLD AUTO: 8.2 % (ref 0–13.4)
MUCOUS THREADS #/AREA URNS HPF: NORMAL /HPF
NEUTROPHILS # BLD AUTO: 4.46 K/UL (ref 2–7.15)
NEUTROPHILS NFR BLD: 60.2 % (ref 44–72)
NITRITE UR QL STRIP.AUTO: NEGATIVE
NRBC # BLD AUTO: 0 K/UL
NRBC BLD-RTO: 0 /100 WBC
PH UR STRIP.AUTO: 5 [PH] (ref 5–8)
PLATELET # BLD AUTO: 266 K/UL (ref 164–446)
PMV BLD AUTO: 9 FL (ref 9–12.9)
POTASSIUM SERPL-SCNC: 3.9 MMOL/L (ref 3.6–5.5)
PROT SERPL-MCNC: 7.9 G/DL (ref 6–8.2)
PROT UR QL STRIP: 30 MG/DL
RBC # BLD AUTO: 4.37 M/UL (ref 4.2–5.4)
RBC # URNS HPF: NORMAL /HPF
RBC UR QL AUTO: NEGATIVE
SODIUM SERPL-SCNC: 136 MMOL/L (ref 135–145)
SP GR UR STRIP.AUTO: 1.02
WBC # BLD AUTO: 7.4 K/UL (ref 4.8–10.8)
WBC #/AREA URNS HPF: NORMAL /HPF

## 2021-07-06 PROCEDURE — 81001 URINALYSIS AUTO W/SCOPE: CPT

## 2021-07-06 PROCEDURE — 83605 ASSAY OF LACTIC ACID: CPT

## 2021-07-06 PROCEDURE — 80053 COMPREHEN METABOLIC PANEL: CPT

## 2021-07-06 PROCEDURE — 99284 EMERGENCY DEPT VISIT MOD MDM: CPT

## 2021-07-06 PROCEDURE — 700105 HCHG RX REV CODE 258: Performed by: EMERGENCY MEDICINE

## 2021-07-06 PROCEDURE — 76705 ECHO EXAM OF ABDOMEN: CPT

## 2021-07-06 PROCEDURE — 700111 HCHG RX REV CODE 636 W/ 250 OVERRIDE (IP): Performed by: EMERGENCY MEDICINE

## 2021-07-06 PROCEDURE — 85025 COMPLETE CBC W/AUTO DIFF WBC: CPT

## 2021-07-06 PROCEDURE — 87086 URINE CULTURE/COLONY COUNT: CPT

## 2021-07-06 PROCEDURE — 36415 COLL VENOUS BLD VENIPUNCTURE: CPT

## 2021-07-06 PROCEDURE — 96374 THER/PROPH/DIAG INJ IV PUSH: CPT

## 2021-07-06 PROCEDURE — 74176 CT ABD & PELVIS W/O CONTRAST: CPT

## 2021-07-06 PROCEDURE — 87040 BLOOD CULTURE FOR BACTERIA: CPT | Mod: 91

## 2021-07-06 PROCEDURE — 99213 OFFICE O/P EST LOW 20 MIN: CPT | Performed by: FAMILY MEDICINE

## 2021-07-06 RX ORDER — VALGANCICLOVIR 450 MG/1
450 TABLET, FILM COATED ORAL DAILY
Status: SHIPPED | COMMUNITY
End: 2021-07-06

## 2021-07-06 RX ORDER — LAMOTRIGINE 100 MG/1
100 TABLET ORAL EVERY MORNING
Status: SHIPPED | COMMUNITY
End: 2022-01-06 | Stop reason: SDUPTHER

## 2021-07-06 RX ORDER — AZITHROMYCIN 250 MG/1
TABLET, FILM COATED ORAL
Qty: 6 TABLET | Refills: 0 | Status: ON HOLD | OUTPATIENT
Start: 2021-07-06 | End: 2021-07-10

## 2021-07-06 RX ORDER — ACETAMINOPHEN 325 MG/1
650 TABLET ORAL EVERY 6 HOURS PRN
Status: SHIPPED | COMMUNITY
End: 2023-06-08

## 2021-07-06 RX ORDER — ONDANSETRON 2 MG/ML
4 INJECTION INTRAMUSCULAR; INTRAVENOUS ONCE
Status: COMPLETED | OUTPATIENT
Start: 2021-07-06 | End: 2021-07-06

## 2021-07-06 RX ORDER — ESCITALOPRAM OXALATE 20 MG/1
20 TABLET ORAL EVERY MORNING
Status: SHIPPED | COMMUNITY
End: 2022-01-06 | Stop reason: SDUPTHER

## 2021-07-06 RX ORDER — ETODOLAC 500 MG/1
500 TABLET, FILM COATED ORAL 2 TIMES DAILY
Status: SHIPPED | COMMUNITY
End: 2021-07-29

## 2021-07-06 RX ORDER — SODIUM CHLORIDE, SODIUM LACTATE, POTASSIUM CHLORIDE, AND CALCIUM CHLORIDE .6; .31; .03; .02 G/100ML; G/100ML; G/100ML; G/100ML
30 INJECTION, SOLUTION INTRAVENOUS ONCE
Status: COMPLETED | OUTPATIENT
Start: 2021-07-06 | End: 2021-07-06

## 2021-07-06 RX ORDER — ESCITALOPRAM OXALATE 10 MG/1
10 TABLET ORAL EVERY MORNING
Status: SHIPPED | COMMUNITY
End: 2022-01-06 | Stop reason: SDUPTHER

## 2021-07-06 RX ORDER — VALACYCLOVIR HYDROCHLORIDE 500 MG/1
500 TABLET, FILM COATED ORAL 2 TIMES DAILY
Status: ON HOLD | COMMUNITY
Start: 2021-06-25 | End: 2021-07-10

## 2021-07-06 RX ADMIN — ONDANSETRON 4 MG: 2 INJECTION INTRAMUSCULAR; INTRAVENOUS at 11:32

## 2021-07-06 RX ADMIN — SODIUM CHLORIDE, POTASSIUM CHLORIDE, SODIUM LACTATE AND CALCIUM CHLORIDE 1000 ML: 600; 310; 30; 20 INJECTION, SOLUTION INTRAVENOUS at 11:31

## 2021-07-06 ASSESSMENT — FIBROSIS 4 INDEX
FIB4 SCORE: 1.16
FIB4 SCORE: 1.16

## 2021-07-06 NOTE — ED TRIAGE NOTES
Arely Haynes  60 y.o.  Chief Complaint   Patient presents with   • Fever   • Shortness of Breath   • Abdominal Pain   • Sent by MD     Pt sent to the ER by her PCP.  Pt was seen in the ER on Saturday night with the same complaints.  Pt continues to have fever, is having upper right abdominal pain and is feeling fatigued.

## 2021-07-06 NOTE — PROGRESS NOTES
Subjective:     CC: Fevers    HPI:   Arely is a 61 yo female with a history of psoriatic arthritis on Humira and methotrexate who presents today with concern for continued fevers.  She has had fevers for 5 days, initially up to 103-104F, have responded to Tylenol and decreased.  Preceded by watery diarrhea 5 days ago.  She has continued severe nausea and intermittent, mild RUQ pain as well. .  She reports poor p.o. intake secondary to the nausea, is using Zofran at home.    She was seen in the ER for this 2 days ago with reassuring labs and unremarkable chest x-ray.  Covid testing was negative.    Intermittent headache but no neck stiffness.  She reports generalized weakness and fatigue, as well as rigors. No chest pain or shortness of breath.    She recently had a punch biopsy done due to a rash on extremities and trunk.  This was most consistent with allergic contact dermatitis.  She has had elevated eosinophils on recent blood counts.    Medications, past medical history, allergies, and social history have been reviewed and updated.    ROS:  See HPI    Objective:       Exam:  /58 (BP Location: Left arm, Patient Position: Sitting, BP Cuff Size: Adult)   Pulse 78   Temp 37.4 °C (99.4 °F)   Resp 12   Ht 1.829 m (6')   Wt 107 kg (236 lb)   LMP  (LMP Unknown)   SpO2 91%   BMI 32.01 kg/m²  Body mass index is 32.01 kg/m².    Constitutional: Alert.  Ill-appearing.  Skin: Warm, dry.  Eye: Equal, round and reactive to light, conjunctiva clear, lids normal.  ENMT: Moist mucous membranes. Normal dentition.  Oropharynx is clear.  Bilateral tympanic membranes are clear.  No cervical adenopathy.  Respiratory: Normal effort. Lungs are clear to auscultation bilaterally.  Cardiovascular: Regular rate and rhythm. Normal S1/S2. No murmurs, rubs or gallops.   Abdomen: Soft, nondistended.  There is tenderness with guarding present to the epigastrium and right upper quadrant.  No rebound tenderness.  Positive bowel  sounds.  Neuro: Moves all four extremities. No facial droop.  Psych: Answers questions appropriately. Normal affect and mood.    Assessment & Plan:     60 y.o. female with a history of psoriatic arthritis on methotrexate and Humira who presents with following -     1. Fever, unspecified fever cause  2. Epigastric abdominal tenderness without rebound tenderness  Now with 5 days of continued fevers and general malaise.  ER visit 2 days ago with reassuring labs, normal chest x-ray and negative Covid testing.  However, she has significant epigastric and right upper quadrant tenderness with guarding present on exam today.  She also reports very little p.o. intake secondary to nausea.  Concern for infectious intra-abdominal process including cholecystitis.  Discussed options with patient and  and do feel that further work-up in the ER is warranted, they will plan to go directly to the emergency department.      Please note that this note was created using voice recognition software.

## 2021-07-06 NOTE — DISCHARGE INSTRUCTIONS
"Fever   Fever is a higher-than-normal body temperature. A normal temperature varies with:  · Age.   · How it is measured (mouth, underarm, rectal, or ear).   · Time of day.   In an adult, an oral temperature around 98.6° Fahrenheit (F) or 37° Celsius (C) is considered normal. A rise in temperature of about 1.8° F or 1° C is generally considered a fever (100.4° F or 38° C). In an infant age 28 days or less, a rectal temperature of 100.4° F (38° C) generally is regarded as fever. Fever is not a disease but can be a symptom of illness.  CAUSES   · Fever is most commonly caused by infection.   · Some non-infectious problems can cause fever. For example:   · Some arthritis problems.   · Problems with the thyroid or adrenal glands.   · Immune system problems.   · Some kinds of cancer.   · A reaction to certain medicines.   · Occasionally, the source of a fever cannot be determined. This is sometimes called a \"Fever of Unknown Origin\" (FUO).   · Some situations may lead to a temporary rise in body temperature that may go away on its own. Examples are:   · Childbirth.   · Surgery.   · Some situations may cause a rise in body temperature but these are not considered \"true fever\". Examples are:   · Intense exercise.   · Dehydration.   · Exposure to high outside or room temperatures.   SYMPTOMS   · Feeling warm or hot.   · Fatigue or feeling exhausted.   · Aching all over.   · Chills.   · Shivering.   · Sweats.   DIAGNOSIS   A fever can be suspected by your caregiver feeling that your skin is unusually warm. The fever is confirmed by taking a temperature with a thermometer. Temperatures can be taken different ways. Some methods are accurate and some are not:  With adults, adolescents, and children:   · An oral temperature is used most commonly.   · An ear thermometer will only be accurate if it is positioned as recommended by the .   · Under the arm temperatures are not accurate and not recommended.   · Most " electronic thermometers are fast and accurate.   Infants and Toddlers:  · Rectal temperatures are recommended and most accurate.   · Ear temperatures are not accurate in this age group and are not recommended.   · Skin thermometers are not accurate.   RISKS AND COMPLICATIONS   · During a fever, the body uses more oxygen, so a person with a fever may develop rapid breathing or shortness of breath. This can be dangerous especially in people with heart or lung disease.   · The sweats that occur following a fever can cause dehydration.   · High fever can cause seizures in infants and children.   · Older persons can develop confusion during a fever.   TREATMENT   · Medications may be used to control temperature.   · Do not give aspirin to children with fevers. There is an association with Reye's syndrome. Reye's syndrome is a rare but potentially deadly disease.   · If an infection is present and medications have been prescribed, take them as directed. Finish the full course of medications until they are gone.   · Sponging or bathing with room-temperature water may help reduce body temperature. Do not use ice water or alcohol sponge baths.   · Do not over-bundle children in blankets or heavy clothes.   · Drinking adequate fluids during an illness with fever is important to prevent dehydration.   HOME CARE INSTRUCTIONS   · For adults, rest and adequate fluid intake are important. Dress according to how you feel, but do not over-bundle.   · Drink enough water and/or fluids to keep your urine clear or pale yellow.   · For infants over 3 months and children, giving medication as directed by your caregiver to control fever can help with comfort. The amount to be given is based on the child's weight. Do NOT give more than is recommended.   SEEK MEDICAL CARE IF:   · You or your child are unable to keep fluids down.   · Vomiting or diarrhea develops.   · You develop a skin rash.   · An oral temperature above 102° F (38.9° C)  develops, or a fever which persists for over 3 days.   · You develop excessive weakness, dizziness, fainting or extreme thirst.   · Fevers keep coming back after 3 days.   SEEK IMMEDIATE MEDICAL CARE IF:   · Shortness of breath or trouble breathing develops   · You pass out.   · You feel you are making little or no urine.   · New pain develops that was not there before (such as in the head, neck, chest, back, or abdomen).   · You cannot hold down fluids.   · Vomiting and diarrhea persist for more than a day or two.   · You develop a stiff neck and/or your eyes become sensitive to light.   · An unexplained temperature above 102° F (38.9° C) develops.   Document Released: 12/18/2006 Document Revised: 03/11/2013 Document Reviewed: 12/03/2009  [employer]® Patient Information ©2013 [employer].

## 2021-07-06 NOTE — ED NOTES
Dc instructions and prescription reviewed with pt. To f/u with pcp, start po antibx today, return for worsening s/s

## 2021-07-06 NOTE — ED NOTES
Assumed care of pt from lio downing s/p iv with blood draw. Dr canas aware of wbc and lactic results. Orders modified per same

## 2021-07-06 NOTE — ED NOTES
ivf and meds given after second blood culture drawn. Pt states 3/10 pain while laying on right side. Vs as charted, call lightin reach, aware of pending u/s

## 2021-07-06 NOTE — ED NOTES
Med rec updated and complete  Allergies reviewed  Pt reports no antibiotics in the last 30 days.    No current facility-administered medications on file prior to encounter.     Current Outpatient Medications on File Prior to Encounter   Medication Sig Dispense Refill   • valACYclovir (VALTREX) 500 MG Tab Take 500 mg by mouth 2 times a day. Pt started on 6/25/2021 for 4 day course     • escitalopram (LEXAPRO) 10 MG Tab Take 10 mg by mouth every day. Pt also has an RX for 20MG, pt takes 30MG total Qday     • escitalopram (LEXAPRO) 20 MG tablet Take 20 mg by mouth every day. Pt also has an RX for 10MG, pt takes 30MG total Qday     • etodolac (LODINE) 500 MG tablet Take 500 mg by mouth 2 times a day.     • lamoTRIgine (LAMICTAL) 100 MG Tab Take 100 mg by mouth every day.     • acetaminophen (TYLENOL) 500 MG Tab Take 1,000 mg by mouth every 6 hours as needed for Moderate Pain.     • folic acid (FOLVITE) 1 MG Tab Take 2 mg by mouth every day.     • Adalimumab (HUMIRA PEN) 40 MG/0.4ML Pen-injector Kit Inject 40 mg under the skin every 14 days.     • Methotrexate Sodium 250 MG/10ML Solution Inject 25 mg into the shoulder, thigh, or buttocks every 7 days. On Sat     • rosuvastatin (CRESTOR) 10 MG Tab Take 1 Tab by mouth every bedtime. 90 Tab 0   • montelukast (SINGULAIR) 10 MG Tab Take 1 Tab by mouth every day. 90 Tab 1   • magnesium oxide (MAG-OX) 400 MG Tab Take 400 mg by mouth every day.     • omeprazole (PRILOSEC) 40 MG delayed-release capsule Take 40 mg by mouth every day.     • cetirizine (ZYRTEC) 10 MG Tab Take 10 mg by mouth every day.

## 2021-07-07 ENCOUNTER — HOSPITAL ENCOUNTER (INPATIENT)
Facility: MEDICAL CENTER | Age: 61
LOS: 3 days | DRG: 189 | End: 2021-07-11
Attending: EMERGENCY MEDICINE | Admitting: INTERNAL MEDICINE
Payer: COMMERCIAL

## 2021-07-07 ENCOUNTER — APPOINTMENT (OUTPATIENT)
Dept: RADIOLOGY | Facility: MEDICAL CENTER | Age: 61
DRG: 189 | End: 2021-07-07
Attending: EMERGENCY MEDICINE
Payer: COMMERCIAL

## 2021-07-07 DIAGNOSIS — J96.01 ACUTE RESPIRATORY FAILURE WITH HYPOXIA (HCC): ICD-10-CM

## 2021-07-07 DIAGNOSIS — R50.9 FEVER, UNSPECIFIED FEVER CAUSE: ICD-10-CM

## 2021-07-07 DIAGNOSIS — R09.02 HYPOXIA: ICD-10-CM

## 2021-07-07 DIAGNOSIS — R93.89 ABNORMAL CT OF THE CHEST: ICD-10-CM

## 2021-07-07 DIAGNOSIS — R06.00 DYSPNEA, UNSPECIFIED TYPE: ICD-10-CM

## 2021-07-07 LAB
ALBUMIN SERPL BCP-MCNC: 4.4 G/DL (ref 3.2–4.9)
ALBUMIN/GLOB SERPL: 1.2 G/DL
ALP SERPL-CCNC: 121 U/L (ref 30–99)
ALT SERPL-CCNC: 15 U/L (ref 2–50)
ANION GAP SERPL CALC-SCNC: 15 MMOL/L (ref 7–16)
APPEARANCE UR: ABNORMAL
AST SERPL-CCNC: 21 U/L (ref 12–45)
BACTERIA #/AREA URNS HPF: ABNORMAL /HPF
BASOPHILS # BLD AUTO: 0.6 % (ref 0–1.8)
BASOPHILS # BLD: 0.05 K/UL (ref 0–0.12)
BILIRUB SERPL-MCNC: 0.9 MG/DL (ref 0.1–1.5)
BILIRUB UR QL STRIP.AUTO: ABNORMAL
BUN SERPL-MCNC: 18 MG/DL (ref 8–22)
CALCIUM SERPL-MCNC: 9.7 MG/DL (ref 8.4–10.2)
CAOX CRY #/AREA URNS HPF: ABNORMAL /HPF
CHLORIDE SERPL-SCNC: 103 MMOL/L (ref 96–112)
CO2 SERPL-SCNC: 21 MMOL/L (ref 20–33)
COLOR UR: ABNORMAL
CREAT SERPL-MCNC: 0.77 MG/DL (ref 0.5–1.4)
EOSINOPHIL # BLD AUTO: 0.9 K/UL (ref 0–0.51)
EOSINOPHIL NFR BLD: 10.6 % (ref 0–6.9)
EPI CELLS #/AREA URNS HPF: ABNORMAL /HPF
ERYTHROCYTE [DISTWIDTH] IN BLOOD BY AUTOMATED COUNT: 45 FL (ref 35.9–50)
FLUAV RNA SPEC QL NAA+PROBE: NEGATIVE
FLUBV RNA SPEC QL NAA+PROBE: NEGATIVE
GLOBULIN SER CALC-MCNC: 3.7 G/DL (ref 1.9–3.5)
GLUCOSE SERPL-MCNC: 106 MG/DL (ref 65–99)
GLUCOSE UR STRIP.AUTO-MCNC: NEGATIVE MG/DL
HCT VFR BLD AUTO: 41.2 % (ref 37–47)
HGB BLD-MCNC: 14.2 G/DL (ref 12–16)
IMM GRANULOCYTES # BLD AUTO: 0.06 K/UL (ref 0–0.11)
IMM GRANULOCYTES NFR BLD AUTO: 0.7 % (ref 0–0.9)
KETONES UR STRIP.AUTO-MCNC: ABNORMAL MG/DL
LACTATE BLD-SCNC: 2 MMOL/L (ref 0.5–2)
LEUKOCYTE ESTERASE UR QL STRIP.AUTO: NEGATIVE
LYMPHOCYTES # BLD AUTO: 1.52 K/UL (ref 1–4.8)
LYMPHOCYTES NFR BLD: 17.9 % (ref 22–41)
MCH RBC QN AUTO: 32.1 PG (ref 27–33)
MCHC RBC AUTO-ENTMCNC: 34.5 G/DL (ref 33.6–35)
MCV RBC AUTO: 93.2 FL (ref 81.4–97.8)
MICRO URNS: ABNORMAL
MONOCYTES # BLD AUTO: 0.49 K/UL (ref 0–0.85)
MONOCYTES NFR BLD AUTO: 5.8 % (ref 0–13.4)
MUCOUS THREADS #/AREA URNS HPF: ABNORMAL /HPF
NEUTROPHILS # BLD AUTO: 5.45 K/UL (ref 2–7.15)
NEUTROPHILS NFR BLD: 64.4 % (ref 44–72)
NITRITE UR QL STRIP.AUTO: NEGATIVE
NRBC # BLD AUTO: 0 K/UL
NRBC BLD-RTO: 0 /100 WBC
PH UR STRIP.AUTO: 5 [PH] (ref 5–8)
PLATELET # BLD AUTO: 282 K/UL (ref 164–446)
PMV BLD AUTO: 9.2 FL (ref 9–12.9)
POTASSIUM SERPL-SCNC: 3.7 MMOL/L (ref 3.6–5.5)
PROCALCITONIN SERPL-MCNC: 0.09 NG/ML
PROT SERPL-MCNC: 8.1 G/DL (ref 6–8.2)
PROT UR QL STRIP: 30 MG/DL
RBC # BLD AUTO: 4.42 M/UL (ref 4.2–5.4)
RBC UR QL AUTO: NEGATIVE
RSV RNA SPEC QL NAA+PROBE: NEGATIVE
SARS-COV-2 RNA RESP QL NAA+PROBE: NOTDETECTED
SODIUM SERPL-SCNC: 139 MMOL/L (ref 135–145)
SP GR UR STRIP.AUTO: >=1.03
SPECIMEN SOURCE: NORMAL
TRANS CELLS URNS QL MICRO: ABNORMAL /HPF
TROPONIN T SERPL-MCNC: 8 NG/L (ref 6–19)
WBC # BLD AUTO: 8.5 K/UL (ref 4.8–10.8)
WBC #/AREA URNS HPF: ABNORMAL /HPF

## 2021-07-07 PROCEDURE — 700111 HCHG RX REV CODE 636 W/ 250 OVERRIDE (IP): Performed by: EMERGENCY MEDICINE

## 2021-07-07 PROCEDURE — 87486 CHLMYD PNEUM DNA AMP PROBE: CPT

## 2021-07-07 PROCEDURE — 84145 PROCALCITONIN (PCT): CPT

## 2021-07-07 PROCEDURE — 83605 ASSAY OF LACTIC ACID: CPT

## 2021-07-07 PROCEDURE — 71275 CT ANGIOGRAPHY CHEST: CPT

## 2021-07-07 PROCEDURE — 84484 ASSAY OF TROPONIN QUANT: CPT

## 2021-07-07 PROCEDURE — 87798 DETECT AGENT NOS DNA AMP: CPT

## 2021-07-07 PROCEDURE — 93005 ELECTROCARDIOGRAM TRACING: CPT | Performed by: EMERGENCY MEDICINE

## 2021-07-07 PROCEDURE — 81001 URINALYSIS AUTO W/SCOPE: CPT

## 2021-07-07 PROCEDURE — 96374 THER/PROPH/DIAG INJ IV PUSH: CPT

## 2021-07-07 PROCEDURE — 87186 SC STD MICRODIL/AGAR DIL: CPT

## 2021-07-07 PROCEDURE — 700102 HCHG RX REV CODE 250 W/ 637 OVERRIDE(OP)

## 2021-07-07 PROCEDURE — 80053 COMPREHEN METABOLIC PANEL: CPT

## 2021-07-07 PROCEDURE — 0241U HCHG SARS-COV-2 COVID-19 NFCT DS RESP RNA 4 TRGT MIC: CPT

## 2021-07-07 PROCEDURE — 700105 HCHG RX REV CODE 258: Performed by: EMERGENCY MEDICINE

## 2021-07-07 PROCEDURE — 71045 X-RAY EXAM CHEST 1 VIEW: CPT

## 2021-07-07 PROCEDURE — C9803 HOPD COVID-19 SPEC COLLECT: HCPCS | Performed by: EMERGENCY MEDICINE

## 2021-07-07 PROCEDURE — 87077 CULTURE AEROBIC IDENTIFY: CPT

## 2021-07-07 PROCEDURE — 87581 M.PNEUMON DNA AMP PROBE: CPT

## 2021-07-07 PROCEDURE — 87040 BLOOD CULTURE FOR BACTERIA: CPT

## 2021-07-07 PROCEDURE — A9270 NON-COVERED ITEM OR SERVICE: HCPCS

## 2021-07-07 PROCEDURE — 85025 COMPLETE CBC W/AUTO DIFF WBC: CPT

## 2021-07-07 PROCEDURE — 87086 URINE CULTURE/COLONY COUNT: CPT

## 2021-07-07 PROCEDURE — 96375 TX/PRO/DX INJ NEW DRUG ADDON: CPT

## 2021-07-07 PROCEDURE — 87633 RESP VIRUS 12-25 TARGETS: CPT

## 2021-07-07 PROCEDURE — 700117 HCHG RX CONTRAST REV CODE 255: Performed by: EMERGENCY MEDICINE

## 2021-07-07 PROCEDURE — 99285 EMERGENCY DEPT VISIT HI MDM: CPT

## 2021-07-07 RX ORDER — SODIUM CHLORIDE, SODIUM LACTATE, POTASSIUM CHLORIDE, CALCIUM CHLORIDE 600; 310; 30; 20 MG/100ML; MG/100ML; MG/100ML; MG/100ML
1000 INJECTION, SOLUTION INTRAVENOUS ONCE
Status: COMPLETED | OUTPATIENT
Start: 2021-07-07 | End: 2021-07-08

## 2021-07-07 RX ORDER — METHYLPREDNISOLONE SODIUM SUCCINATE 125 MG/2ML
125 INJECTION, POWDER, LYOPHILIZED, FOR SOLUTION INTRAMUSCULAR; INTRAVENOUS ONCE
Status: COMPLETED | OUTPATIENT
Start: 2021-07-07 | End: 2021-07-07

## 2021-07-07 RX ORDER — DIPHENHYDRAMINE HYDROCHLORIDE 50 MG/ML
25 INJECTION INTRAMUSCULAR; INTRAVENOUS ONCE
Status: COMPLETED | OUTPATIENT
Start: 2021-07-07 | End: 2021-07-07

## 2021-07-07 RX ORDER — ACETAMINOPHEN 325 MG/1
650 TABLET ORAL ONCE
Status: COMPLETED | OUTPATIENT
Start: 2021-07-07 | End: 2021-07-07

## 2021-07-07 RX ADMIN — DIPHENHYDRAMINE HYDROCHLORIDE 25 MG: 50 INJECTION INTRAMUSCULAR; INTRAVENOUS at 21:32

## 2021-07-07 RX ADMIN — ACETAMINOPHEN 650 MG: 325 TABLET, FILM COATED ORAL at 23:11

## 2021-07-07 RX ADMIN — IOHEXOL 64 ML: 350 INJECTION, SOLUTION INTRAVENOUS at 23:37

## 2021-07-07 RX ADMIN — SODIUM CHLORIDE, POTASSIUM CHLORIDE, SODIUM LACTATE AND CALCIUM CHLORIDE 1000 ML: 600; 310; 30; 20 INJECTION, SOLUTION INTRAVENOUS at 21:32

## 2021-07-07 RX ADMIN — METHYLPREDNISOLONE SODIUM SUCCINATE 125 MG: 125 INJECTION, POWDER, FOR SOLUTION INTRAMUSCULAR; INTRAVENOUS at 21:32

## 2021-07-07 ASSESSMENT — FIBROSIS 4 INDEX: FIB4 SCORE: 1.22

## 2021-07-08 PROBLEM — J96.01 ACUTE RESPIRATORY FAILURE WITH HYPOXIA (HCC): Status: ACTIVE | Noted: 2018-11-30

## 2021-07-08 PROBLEM — R50.9 FEVER: Status: ACTIVE | Noted: 2021-07-08

## 2021-07-08 PROBLEM — K21.9 GERD (GASTROESOPHAGEAL REFLUX DISEASE): Status: ACTIVE | Noted: 2021-07-08

## 2021-07-08 PROBLEM — R93.89 ABNORMAL CT OF THE CHEST: Status: ACTIVE | Noted: 2021-07-08

## 2021-07-08 LAB
ANION GAP SERPL CALC-SCNC: 12 MMOL/L (ref 7–16)
BACTERIA UR CULT: NORMAL
BASOPHILS # BLD AUTO: 0.3 % (ref 0–1.8)
BASOPHILS # BLD: 0.02 K/UL (ref 0–0.12)
BUN SERPL-MCNC: 17 MG/DL (ref 8–22)
CALCIUM SERPL-MCNC: 9.2 MG/DL (ref 8.4–10.2)
CHLORIDE SERPL-SCNC: 105 MMOL/L (ref 96–112)
CO2 SERPL-SCNC: 22 MMOL/L (ref 20–33)
CREAT SERPL-MCNC: 0.62 MG/DL (ref 0.5–1.4)
CRP SERPL HS-MCNC: 5.83 MG/DL (ref 0–0.75)
EKG IMPRESSION: NORMAL
EOSINOPHIL # BLD AUTO: 0 K/UL (ref 0–0.51)
EOSINOPHIL NFR BLD: 0 % (ref 0–6.9)
ERYTHROCYTE [DISTWIDTH] IN BLOOD BY AUTOMATED COUNT: 44.6 FL (ref 35.9–50)
ERYTHROCYTE [DISTWIDTH] IN BLOOD BY AUTOMATED COUNT: 44.6 FL (ref 35.9–50)
ERYTHROCYTE [SEDIMENTATION RATE] IN BLOOD BY WESTERGREN METHOD: 62 MM/HOUR (ref 0–25)
GLUCOSE SERPL-MCNC: 163 MG/DL (ref 65–99)
HCT VFR BLD AUTO: 34.8 % (ref 37–47)
HCT VFR BLD AUTO: 36.1 % (ref 37–47)
HGB BLD-MCNC: 12 G/DL (ref 12–16)
HGB BLD-MCNC: 12.5 G/DL (ref 12–16)
IMM GRANULOCYTES # BLD AUTO: 0.04 K/UL (ref 0–0.11)
IMM GRANULOCYTES NFR BLD AUTO: 0.6 % (ref 0–0.9)
LACTATE BLD-SCNC: 1.3 MMOL/L (ref 0.5–2)
LDH SERPL L TO P-CCNC: 420 U/L (ref 107–266)
LYMPHOCYTES # BLD AUTO: 1.65 K/UL (ref 1–4.8)
LYMPHOCYTES NFR BLD: 25.4 % (ref 22–41)
MCH RBC QN AUTO: 32 PG (ref 27–33)
MCH RBC QN AUTO: 32.3 PG (ref 27–33)
MCHC RBC AUTO-ENTMCNC: 34.5 G/DL (ref 33.6–35)
MCHC RBC AUTO-ENTMCNC: 34.6 G/DL (ref 33.6–35)
MCV RBC AUTO: 92.3 FL (ref 81.4–97.8)
MCV RBC AUTO: 93.5 FL (ref 81.4–97.8)
MONOCYTES # BLD AUTO: 0.25 K/UL (ref 0–0.85)
MONOCYTES NFR BLD AUTO: 3.8 % (ref 0–13.4)
NEUTROPHILS # BLD AUTO: 4.54 K/UL (ref 2–7.15)
NEUTROPHILS NFR BLD: 69.9 % (ref 44–72)
NRBC # BLD AUTO: 0 K/UL
NRBC BLD-RTO: 0 /100 WBC
NT-PROBNP SERPL IA-MCNC: 48 PG/ML (ref 0–125)
PLATELET # BLD AUTO: 262 K/UL (ref 164–446)
PLATELET # BLD AUTO: 278 K/UL (ref 164–446)
PMV BLD AUTO: 9.2 FL (ref 9–12.9)
PMV BLD AUTO: 9.2 FL (ref 9–12.9)
POTASSIUM SERPL-SCNC: 4.2 MMOL/L (ref 3.6–5.5)
RBC # BLD AUTO: 3.72 M/UL (ref 4.2–5.4)
RBC # BLD AUTO: 3.91 M/UL (ref 4.2–5.4)
SIGNIFICANT IND 70042: NORMAL
SITE SITE: NORMAL
SODIUM SERPL-SCNC: 139 MMOL/L (ref 135–145)
SOURCE SOURCE: NORMAL
WBC # BLD AUTO: 5.1 K/UL (ref 4.8–10.8)
WBC # BLD AUTO: 6.5 K/UL (ref 4.8–10.8)

## 2021-07-08 PROCEDURE — 700111 HCHG RX REV CODE 636 W/ 250 OVERRIDE (IP): Performed by: INTERNAL MEDICINE

## 2021-07-08 PROCEDURE — 99223 1ST HOSP IP/OBS HIGH 75: CPT | Performed by: INTERNAL MEDICINE

## 2021-07-08 PROCEDURE — 87641 MR-STAPH DNA AMP PROBE: CPT

## 2021-07-08 PROCEDURE — 700102 HCHG RX REV CODE 250 W/ 637 OVERRIDE(OP): Performed by: HOSPITALIST

## 2021-07-08 PROCEDURE — 85652 RBC SED RATE AUTOMATED: CPT

## 2021-07-08 PROCEDURE — 700101 HCHG RX REV CODE 250: Performed by: INTERNAL MEDICINE

## 2021-07-08 PROCEDURE — 87899 AGENT NOS ASSAY W/OPTIC: CPT

## 2021-07-08 PROCEDURE — 700102 HCHG RX REV CODE 250 W/ 637 OVERRIDE(OP): Performed by: INTERNAL MEDICINE

## 2021-07-08 PROCEDURE — 83516 IMMUNOASSAY NONANTIBODY: CPT

## 2021-07-08 PROCEDURE — 82306 VITAMIN D 25 HYDROXY: CPT

## 2021-07-08 PROCEDURE — 94640 AIRWAY INHALATION TREATMENT: CPT

## 2021-07-08 PROCEDURE — 770001 HCHG ROOM/CARE - MED/SURG/GYN PRIV*

## 2021-07-08 PROCEDURE — 83615 LACTATE (LD) (LDH) ENZYME: CPT

## 2021-07-08 PROCEDURE — 85025 COMPLETE CBC W/AUTO DIFF WBC: CPT

## 2021-07-08 PROCEDURE — A9270 NON-COVERED ITEM OR SERVICE: HCPCS | Performed by: HOSPITALIST

## 2021-07-08 PROCEDURE — 94760 N-INVAS EAR/PLS OXIMETRY 1: CPT

## 2021-07-08 PROCEDURE — 700105 HCHG RX REV CODE 258: Performed by: INTERNAL MEDICINE

## 2021-07-08 PROCEDURE — 86200 CCP ANTIBODY: CPT

## 2021-07-08 PROCEDURE — A9270 NON-COVERED ITEM OR SERVICE: HCPCS | Performed by: INTERNAL MEDICINE

## 2021-07-08 PROCEDURE — 87449 NOS EACH ORGANISM AG IA: CPT | Mod: 91

## 2021-07-08 PROCEDURE — 86431 RHEUMATOID FACTOR QUANT: CPT

## 2021-07-08 PROCEDURE — 80048 BASIC METABOLIC PNL TOTAL CA: CPT

## 2021-07-08 PROCEDURE — 83605 ASSAY OF LACTIC ACID: CPT

## 2021-07-08 PROCEDURE — 86235 NUCLEAR ANTIGEN ANTIBODY: CPT | Mod: 91

## 2021-07-08 PROCEDURE — 85027 COMPLETE CBC AUTOMATED: CPT

## 2021-07-08 PROCEDURE — 87389 HIV-1 AG W/HIV-1&-2 AB AG IA: CPT

## 2021-07-08 PROCEDURE — 86140 C-REACTIVE PROTEIN: CPT

## 2021-07-08 PROCEDURE — 83880 ASSAY OF NATRIURETIC PEPTIDE: CPT

## 2021-07-08 PROCEDURE — 86635 COCCIDIOIDES ANTIBODY: CPT | Mod: 91

## 2021-07-08 RX ORDER — CETIRIZINE HYDROCHLORIDE 10 MG/1
10 TABLET ORAL DAILY
Status: DISCONTINUED | OUTPATIENT
Start: 2021-07-08 | End: 2021-07-11 | Stop reason: HOSPADM

## 2021-07-08 RX ORDER — DEXAMETHASONE 4 MG/1
4 TABLET ORAL EVERY 12 HOURS
Status: DISCONTINUED | OUTPATIENT
Start: 2021-07-08 | End: 2021-07-08

## 2021-07-08 RX ORDER — LAMOTRIGINE 100 MG/1
100 TABLET ORAL DAILY
Status: DISCONTINUED | OUTPATIENT
Start: 2021-07-08 | End: 2021-07-11 | Stop reason: HOSPADM

## 2021-07-08 RX ORDER — CLONIDINE HYDROCHLORIDE 0.1 MG/1
0.1 TABLET ORAL EVERY 6 HOURS PRN
Status: DISCONTINUED | OUTPATIENT
Start: 2021-07-08 | End: 2021-07-11 | Stop reason: HOSPADM

## 2021-07-08 RX ORDER — LORAZEPAM 2 MG/ML
0.5 INJECTION INTRAMUSCULAR EVERY 4 HOURS PRN
Status: DISCONTINUED | OUTPATIENT
Start: 2021-07-08 | End: 2021-07-11 | Stop reason: HOSPADM

## 2021-07-08 RX ORDER — PROCHLORPERAZINE EDISYLATE 5 MG/ML
5-10 INJECTION INTRAMUSCULAR; INTRAVENOUS EVERY 4 HOURS PRN
Status: DISCONTINUED | OUTPATIENT
Start: 2021-07-08 | End: 2021-07-11 | Stop reason: HOSPADM

## 2021-07-08 RX ORDER — ONDANSETRON 2 MG/ML
4 INJECTION INTRAMUSCULAR; INTRAVENOUS EVERY 4 HOURS PRN
Status: DISCONTINUED | OUTPATIENT
Start: 2021-07-08 | End: 2021-07-11 | Stop reason: HOSPADM

## 2021-07-08 RX ORDER — ROSUVASTATIN CALCIUM 10 MG/1
10 TABLET, COATED ORAL
Status: DISCONTINUED | OUTPATIENT
Start: 2021-07-08 | End: 2021-07-11 | Stop reason: HOSPADM

## 2021-07-08 RX ORDER — OMEPRAZOLE 20 MG/1
40 CAPSULE, DELAYED RELEASE ORAL DAILY
Status: DISCONTINUED | OUTPATIENT
Start: 2021-07-08 | End: 2021-07-11 | Stop reason: HOSPADM

## 2021-07-08 RX ORDER — PROMETHAZINE HYDROCHLORIDE 25 MG/1
12.5-25 TABLET ORAL EVERY 4 HOURS PRN
Status: DISCONTINUED | OUTPATIENT
Start: 2021-07-08 | End: 2021-07-11 | Stop reason: HOSPADM

## 2021-07-08 RX ORDER — ESCITALOPRAM OXALATE 10 MG/1
30 TABLET ORAL DAILY
Status: DISCONTINUED | OUTPATIENT
Start: 2021-07-08 | End: 2021-07-11 | Stop reason: HOSPADM

## 2021-07-08 RX ORDER — IPRATROPIUM BROMIDE AND ALBUTEROL SULFATE 2.5; .5 MG/3ML; MG/3ML
3 SOLUTION RESPIRATORY (INHALATION)
Status: DISCONTINUED | OUTPATIENT
Start: 2021-07-08 | End: 2021-07-11 | Stop reason: HOSPADM

## 2021-07-08 RX ORDER — LINEZOLID 600 MG/1
600 TABLET, FILM COATED ORAL EVERY 12 HOURS
Status: DISCONTINUED | OUTPATIENT
Start: 2021-07-08 | End: 2021-07-10

## 2021-07-08 RX ORDER — FOLIC ACID 1 MG/1
2 TABLET ORAL DAILY
Status: DISCONTINUED | OUTPATIENT
Start: 2021-07-08 | End: 2021-07-11 | Stop reason: HOSPADM

## 2021-07-08 RX ORDER — SODIUM CHLORIDE AND POTASSIUM CHLORIDE 150; 900 MG/100ML; MG/100ML
INJECTION, SOLUTION INTRAVENOUS CONTINUOUS
Status: DISCONTINUED | OUTPATIENT
Start: 2021-07-08 | End: 2021-07-08

## 2021-07-08 RX ORDER — MONTELUKAST SODIUM 10 MG/1
10 TABLET ORAL DAILY
Status: DISCONTINUED | OUTPATIENT
Start: 2021-07-08 | End: 2021-07-11 | Stop reason: HOSPADM

## 2021-07-08 RX ORDER — ENALAPRILAT 1.25 MG/ML
1.25 INJECTION INTRAVENOUS EVERY 6 HOURS PRN
Status: DISCONTINUED | OUTPATIENT
Start: 2021-07-08 | End: 2021-07-11 | Stop reason: HOSPADM

## 2021-07-08 RX ORDER — DOXYCYCLINE 100 MG/1
100 TABLET ORAL EVERY 12 HOURS
Status: DISCONTINUED | OUTPATIENT
Start: 2021-07-08 | End: 2021-07-11

## 2021-07-08 RX ORDER — ONDANSETRON 4 MG/1
4 TABLET, ORALLY DISINTEGRATING ORAL EVERY 4 HOURS PRN
Status: DISCONTINUED | OUTPATIENT
Start: 2021-07-08 | End: 2021-07-11 | Stop reason: HOSPADM

## 2021-07-08 RX ORDER — ESCITALOPRAM OXALATE 20 MG/1
20 TABLET ORAL DAILY
Status: DISCONTINUED | OUTPATIENT
Start: 2021-07-08 | End: 2021-07-08

## 2021-07-08 RX ORDER — ACETAMINOPHEN 325 MG/1
650 TABLET ORAL EVERY 6 HOURS PRN
Status: DISCONTINUED | OUTPATIENT
Start: 2021-07-08 | End: 2021-07-11 | Stop reason: HOSPADM

## 2021-07-08 RX ORDER — FUROSEMIDE 10 MG/ML
10 INJECTION INTRAMUSCULAR; INTRAVENOUS ONCE
Status: COMPLETED | OUTPATIENT
Start: 2021-07-08 | End: 2021-07-08

## 2021-07-08 RX ORDER — SULFAMETHOXAZOLE AND TRIMETHOPRIM 800; 160 MG/1; MG/1
1 TABLET ORAL EVERY 12 HOURS
Status: DISCONTINUED | OUTPATIENT
Start: 2021-07-08 | End: 2021-07-08

## 2021-07-08 RX ORDER — VITAMIN B COMPLEX
1000 TABLET ORAL DAILY
Status: DISCONTINUED | OUTPATIENT
Start: 2021-07-08 | End: 2021-07-11 | Stop reason: HOSPADM

## 2021-07-08 RX ORDER — VALACYCLOVIR HYDROCHLORIDE 500 MG/1
500 TABLET, FILM COATED ORAL 2 TIMES DAILY
Status: DISCONTINUED | OUTPATIENT
Start: 2021-07-08 | End: 2021-07-08

## 2021-07-08 RX ORDER — ETODOLAC 500 MG/1
500 TABLET, FILM COATED ORAL 2 TIMES DAILY
Status: DISCONTINUED | OUTPATIENT
Start: 2021-07-08 | End: 2021-07-08

## 2021-07-08 RX ORDER — LABETALOL HYDROCHLORIDE 5 MG/ML
10 INJECTION, SOLUTION INTRAVENOUS EVERY 4 HOURS PRN
Status: DISCONTINUED | OUTPATIENT
Start: 2021-07-08 | End: 2021-07-11 | Stop reason: HOSPADM

## 2021-07-08 RX ORDER — PROMETHAZINE HYDROCHLORIDE 25 MG/1
12.5-25 SUPPOSITORY RECTAL EVERY 4 HOURS PRN
Status: DISCONTINUED | OUTPATIENT
Start: 2021-07-08 | End: 2021-07-11 | Stop reason: HOSPADM

## 2021-07-08 RX ADMIN — MAGNESIUM OXIDE TAB 400 MG (241.3 MG ELEMENTAL MG) 400 MG: 400 (241.3 MG) TAB at 05:09

## 2021-07-08 RX ADMIN — ROSUVASTATIN 10 MG: 10 TABLET, FILM COATED ORAL at 03:20

## 2021-07-08 RX ADMIN — ENOXAPARIN SODIUM 40 MG: 40 INJECTION SUBCUTANEOUS at 05:08

## 2021-07-08 RX ADMIN — CEFEPIME 2 G: 2 INJECTION, POWDER, FOR SOLUTION INTRAVENOUS at 17:49

## 2021-07-08 RX ADMIN — MONTELUKAST 10 MG: 10 TABLET, FILM COATED ORAL at 05:09

## 2021-07-08 RX ADMIN — ROSUVASTATIN 10 MG: 10 TABLET, FILM COATED ORAL at 20:40

## 2021-07-08 RX ADMIN — ESCITALOPRAM OXALATE 30 MG: 10 TABLET ORAL at 05:09

## 2021-07-08 RX ADMIN — ONDANSETRON 4 MG: 4 TABLET, ORALLY DISINTEGRATING ORAL at 05:15

## 2021-07-08 RX ADMIN — CETIRIZINE HYDROCHLORIDE 10 MG: 10 TABLET, FILM COATED ORAL at 05:09

## 2021-07-08 RX ADMIN — POTASSIUM CHLORIDE AND SODIUM CHLORIDE: 900; 150 INJECTION, SOLUTION INTRAVENOUS at 13:03

## 2021-07-08 RX ADMIN — FUROSEMIDE 10 MG: 10 INJECTION, SOLUTION INTRAMUSCULAR; INTRAVENOUS at 15:32

## 2021-07-08 RX ADMIN — DOXYCYCLINE 100 MG: 100 TABLET, FILM COATED ORAL at 17:49

## 2021-07-08 RX ADMIN — OMEPRAZOLE 40 MG: 20 CAPSULE, DELAYED RELEASE ORAL at 05:09

## 2021-07-08 RX ADMIN — LAMOTRIGINE 100 MG: 100 TABLET ORAL at 05:09

## 2021-07-08 RX ADMIN — FOLIC ACID 2 MG: 1 TABLET ORAL at 05:09

## 2021-07-08 RX ADMIN — POTASSIUM CHLORIDE AND SODIUM CHLORIDE: 900; 150 INJECTION, SOLUTION INTRAVENOUS at 03:26

## 2021-07-08 RX ADMIN — IPRATROPIUM BROMIDE AND ALBUTEROL SULFATE 3 ML: .5; 2.5 SOLUTION RESPIRATORY (INHALATION) at 13:30

## 2021-07-08 RX ADMIN — LINEZOLID 600 MG: 600 TABLET, FILM COATED ORAL at 17:49

## 2021-07-08 RX ADMIN — Medication 1000 UNITS: at 13:02

## 2021-07-08 ASSESSMENT — PATIENT HEALTH QUESTIONNAIRE - PHQ9
2. FEELING DOWN, DEPRESSED, IRRITABLE, OR HOPELESS: NOT AT ALL
1. LITTLE INTEREST OR PLEASURE IN DOING THINGS: NOT AT ALL
SUM OF ALL RESPONSES TO PHQ9 QUESTIONS 1 AND 2: 0

## 2021-07-08 ASSESSMENT — ENCOUNTER SYMPTOMS
STRIDOR: 0
ORTHOPNEA: 1
DIAPHORESIS: 0
WHEEZING: 0
VOMITING: 0
DIARRHEA: 0
COUGH: 1
NAUSEA: 1
FEVER: 1
SHORTNESS OF BREATH: 1
BRUISES/BLEEDS EASILY: 0
TINGLING: 0
CHILLS: 0
WEAKNESS: 0
MYALGIAS: 0
CLAUDICATION: 0
MUSCULOSKELETAL NEGATIVE: 1
FOCAL WEAKNESS: 0
DIARRHEA: 1
BLURRED VISION: 0
CONSTIPATION: 0
NEUROLOGICAL NEGATIVE: 1
SORE THROAT: 0
SPUTUM PRODUCTION: 0
HEADACHES: 0
LOSS OF CONSCIOUSNESS: 0
WEIGHT LOSS: 0
HEMOPTYSIS: 0
GASTROINTESTINAL NEGATIVE: 1
COUGH: 0
ABDOMINAL PAIN: 0
NAUSEA: 0
PSYCHIATRIC NEGATIVE: 1
CHILLS: 1
CARDIOVASCULAR NEGATIVE: 1
FALLS: 0
DEPRESSION: 0
MYALGIAS: 1
EYES NEGATIVE: 1
PALPITATIONS: 0
DIZZINESS: 0

## 2021-07-08 ASSESSMENT — COGNITIVE AND FUNCTIONAL STATUS - GENERAL
DAILY ACTIVITIY SCORE: 24
SUGGESTED CMS G CODE MODIFIER MOBILITY: CH
MOBILITY SCORE: 24
SUGGESTED CMS G CODE MODIFIER DAILY ACTIVITY: CH

## 2021-07-08 ASSESSMENT — LIFESTYLE VARIABLES
SUBSTANCE_ABUSE: 0
CONSUMPTION TOTAL: NEGATIVE
ON A TYPICAL DAY WHEN YOU DRINK ALCOHOL HOW MANY DRINKS DO YOU HAVE: 1
EVER_SMOKED: NEVER
ALCOHOL_USE: YES
TOTAL SCORE: 0
EVER FELT BAD OR GUILTY ABOUT YOUR DRINKING: NO
EVER HAD A DRINK FIRST THING IN THE MORNING TO STEADY YOUR NERVES TO GET RID OF A HANGOVER: NO
TOTAL SCORE: 0
TOTAL SCORE: 0
HOW MANY TIMES IN THE PAST YEAR HAVE YOU HAD 5 OR MORE DRINKS IN A DAY: 0
HAVE PEOPLE ANNOYED YOU BY CRITICIZING YOUR DRINKING: NO
AVERAGE NUMBER OF DAYS PER WEEK YOU HAVE A DRINK CONTAINING ALCOHOL: 5
HAVE YOU EVER FELT YOU SHOULD CUT DOWN ON YOUR DRINKING: NO

## 2021-07-08 ASSESSMENT — COPD QUESTIONNAIRES
DO YOU EVER COUGH UP ANY MUCUS OR PHLEGM?: NO/ONLY WITH OCCASIONAL COLDS OR INFECTIONS
DURING THE PAST 4 WEEKS HOW MUCH DID YOU FEEL SHORT OF BREATH: SOME OF THE TIME
COPD SCREENING SCORE: 3
HAVE YOU SMOKED AT LEAST 100 CIGARETTES IN YOUR ENTIRE LIFE: NO/DON'T KNOW

## 2021-07-08 ASSESSMENT — PAIN DESCRIPTION - PAIN TYPE: TYPE: ACUTE PAIN;CHRONIC PAIN

## 2021-07-08 NOTE — ASSESSMENT & PLAN NOTE
Followed by rheum in [place]  See discussion above, will stop linezolid  No acute flair at this time  Off of methotrexate

## 2021-07-08 NOTE — ASSESSMENT & PLAN NOTE
-- Secondary to aforementioned differentials in abnormal CT chest section   -- Cont gentle diuresis to seek net negative fluid balance    -- Will stop abx given negative culture for 48 hours   -- Goal SpO2 > 88%   -- Encourage PT/OT and OOB as tolerated

## 2021-07-08 NOTE — FLOWSHEET NOTE
07/08/21 0255   Vital Signs   Pulse 60   Respiration 16   Pulse Oximetry 97 %   $ Pulse Oximetry (Spot Check) Yes   Chest Exam   Work Of Breathing / Effort Within Normal Limits   Secretions   Cough Dry   Oxygen   O2 (LPM) 3   O2 Delivery Device Nasal Cannula   Smoking History   Have you ever smoked Never

## 2021-07-08 NOTE — ED NOTES
Pt rounded on, aware that we are awaiting CT results and for admitting physician to assess her for bed assignment.

## 2021-07-08 NOTE — ASSESSMENT & PLAN NOTE
None overnight  No further diarrhea  Immunocompromised with Uti on empiric broad spec abx  Possible viral pneumonia - CT consistent with this  following  Procalcitonin is normal  BAL results pending

## 2021-07-08 NOTE — ASSESSMENT & PLAN NOTE
Inpatient with diffuse bilateral interstitial opacities, Covid is negative, procalcitonin is normal   Legionella pending  Query if autoimmune process contributing   Bronch with BAL  Clinically stable  See above  Continue supportive care

## 2021-07-08 NOTE — ED NOTES
Pt reports having 103 degree fever at home and taking tylenol to keep fever down. Pts temp normal on arrival but still has rigors and feels hot to the touch. Pt wrapped in a thick wool blanket. Pt educated and agreed to take wool blanket off in order to prevent fevers, sheet provided to pt.

## 2021-07-08 NOTE — ASSESSMENT & PLAN NOTE
-- Ddx includes drug induced pneumonitis vs inflammatory (psoriatic arthritis related ILD vs HP) vs infectious (ie, PCP vs fungal vs atypical vs bacterial) vs pulmonary edema   -- MRSA swab negative and resp cx negative to date   -- CRP and ESR mildly elevated   -- Pending connective tissue panel   -- Negative beta-D glucan, urinary legionella/strep   -- Pending cocci serology     -- TBBX showed chronic interstitial fibrosis with no evidence of malignancy   -- Discussed about bronchoscopy results finding with patient. Given negative culture data, infectious appears to be unlikely at this time. PCP PCR pending and will need close follow up upon discharge. Given ongoing hypoxemia with concern for inflammatory related to CT-ILD vs drug induced pneumonitis, will start prednisone 40 mg daily with 10 mg taper weekly.   -- Will need repeat CT chest in 4-6 weeks, PFTs and 6 minute walk test as outpatient

## 2021-07-08 NOTE — ED PROVIDER NOTES
ED Provider Note    CHIEF COMPLAINT  Chief Complaint   Patient presents with   • Shortness of Breath     was seen here yesterday for same  not getting better    • Nausea   • Diarrhea        [facility]    Primary care provider: Joe Espinal M.D.   History obtained from: Patient and   History limited by: None     Arely Haynes is a 60 y.o. female who presents to the ED with  complaining of continued fever and chills.  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  She reports her fever started on July 3 and has been ongoing since.  She has chills and diffuse body aches.  She reports continued shortness of breath and slight cough at times.  She has had negative COVID-19 tests and also has received her COVID-19 vaccinations.  She has had very poor appetite and oral intake.  She reports continued nausea without vomiting.  She also has had diarrhea described as liquid stools without gross blood noted.  She denies dysuria.  She continues to have intermittent right upper quadrant abdominal pain.  She was seen in this ED on July 4 and yesterday for her symptoms.  She was placed on Z-Samir without improvement and  reports that the Z-Samir may be causing patient to have worsening diarrhea.  Blood cultures from July 6, 2021 are negative so far.  Right upper quadrant ultrasound on July 6, 2021 with findings consistent with fatty infiltration.  CT abdomen/pelvis on July 6, 2021 without acute findings in the abdomen or pelvis but did show extensive groundglass and interstitial opacities in bilateral lungs most in the lung bases.  She also reports that she had rash on her arms and had biopsy performed which was unremarkable and her rash has resolved.   is concerned because patient is immunocompromised since she is on methotrexate and Humira for history of psoriatic arthritis.    REVIEW OF SYSTEMS  Please see HPI for pertinent positives/negatives.  All other systems reviewed and are  negative.     PAST MEDICAL HISTORY  Past Medical History:   Diagnosis Date   • Pneumonia 2018   • Anesthesia     1st cousin has malignant hyperthermia/pt has never had an issue or her sisters   • Arthritis     hips knees hands spine   • Asthma     prn inhalers   • Bowel habit changes     diarrhea   • Depression     Daughter passed few years ago   • Erosive esophagitis    • GERD (gastroesophageal reflux disease)    • Heart burn    • High cholesterol    • Immunocompromised (HCC)    • Pain     hips knees   • Psoriasis    • Psoriatic arthritis (HCC)         SURGICAL HISTORY  Past Surgical History:   Procedure Laterality Date   • TENDON REPAIR Left 9/19/2019    Procedure: REPAIR, TENDON- QUADRICEPS RUTURE REPAIR AND MEYERS;  Surgeon: Jayden Velázquez M.D.;  Location: SURGERY [facility];  Service: Orthopedics   • HYSTERECTOMY ROBOTIC XI N/A 7/11/2019    Procedure: HYSTERECTOMY, ROBOT-ASSISTED, USING DA SABINO XI;  Surgeon: Curly Bernal M.D.;  Location: SURGERY [facility];  Service: Gynecology   • SALPINGO OOPHORECTOMY Bilateral 7/11/2019    Procedure: SALPINGO-OOPHORECTOMY;  Surgeon: Curly Bernal M.D.;  Location: SURGERY [facility];  Service: Gynecology   • GYN SURGERY  1999 c sec   • CYSTECTOMY     • HIP REPLACEMENT, TOTAL      Right   • LUMBAR FUSION POSTERIOR     • OTHER ABDOMINAL SURGERY      appendix   • OTHER ORTHOPEDIC SURGERY      left knee   • OTHER ORTHOPEDIC SURGERY      right rotator cuff        SOCIAL HISTORY  Social History     Tobacco Use   • Smoking status: Never Smoker   • Smokeless tobacco: Never Used   Vaping Use   • Vaping Use: Never used   Substance and Sexual Activity   • Alcohol use: Yes     Alcohol/week: 0.6 - 1.2 oz     Types: 1 - 2 Glasses of wine per week     Comment: one glss of wine at night    • Drug use: No   • Sexual activity: Yes     Comment: Tubal ligation        FAMILY HISTORY  Family History   Problem Relation Age of Onset   • Hyperlipidemia Mother    • Heart Attack  Mother    • Psychiatric Illness Mother    • Heart Disease Mother    • Arthritis Mother    • Lung Disease Father    • Cancer Father    • Hyperlipidemia Father    • Hyperlipidemia Sister    • Cancer Sister         breast   • Heart Disease Paternal Grandfather    • Hypertension Neg Hx    • Diabetes Neg Hx         CURRENT MEDICATIONS  Home Medications     Reviewed by Citlalli Hernández R.N. (Registered Nurse) on 07/08/21 at 0211  Med List Status: Complete   Medication Last Dose Status   acetaminophen (TYLENOL) 500 MG Tab 7/8/2021 Active   Adalimumab (HUMIRA PEN) 40 MG/0.4ML Pen-injector Kit 7/1/2021 Active   azithromycin (ZITHROMAX) 250 MG Tab 7/7/2021 Active   cetirizine (ZYRTEC) 10 MG Tab 7/7/2021 Active   escitalopram (LEXAPRO) 10 MG Tab 7/7/2021 Active   escitalopram (LEXAPRO) 20 MG tablet 7/7/2021 Active   etodolac (LODINE) 500 MG tablet 7/7/2021 Active   folic acid (FOLVITE) 1 MG Tab 7/7/2021 Active   lamoTRIgine (LAMICTAL) 100 MG Tab 7/7/2021 Active   magnesium oxide (MAG-OX) 400 MG Tab 7/7/2021 Active   Methotrexate Sodium 250 MG/10ML Solution  Active   montelukast (SINGULAIR) 10 MG Tab 7/7/2021 Active   omeprazole (PRILOSEC) 40 MG delayed-release capsule 7/7/2021 Active   rosuvastatin (CRESTOR) 10 MG Tab 7/6/2021 Active   valACYclovir (VALTREX) 500 MG Tab Not Taking Active                 ALLERGIES  Allergies   Allergen Reactions   • Ampicillin-Sulbactam Sodium Hives     Tolerates cefazolin  Pt does not feel this is an active allergy.    • Iodine Anaphylaxis     Contrast- Oral, topical, And Iv Dye   • Levofloxacin Anaphylaxis   • Shellfish-Derived Products Anaphylaxis     lobster   • Hydrocodone Itching   • Unipen [Nafcillin] Hives        PHYSICAL EXAM  VITAL SIGNS: /53   Pulse 60   Temp 36.9 °C (98.5 °F) (Oral)   Resp 16   Ht 1.829 m (6')   Wt 108 kg (237 lb 4.5 oz)   LMP  (LMP Unknown)   SpO2 97%   BMI 32.18 kg/m²  @DAMIEN[[id number]::@     Pulse ox interpretation: 94% I interpret this pulse ox as normal      Cardiac monitor interpretation: Sinus rhythm with heart rate in the 70s as interpreted by me.  The patient presented with dyspnea and cardiac monitor was ordered to monitor for dysrhythmia.    Constitutional: Well developed, well nourished, alert in discomfort and mildly ill-appearing  HENT: No external signs of trauma, normocephalic, mask on due to COVID-19 pandemic  Eyes: PERRL, conjunctiva without erythema, no discharge, no icterus    Neck: Soft and supple, trachea midline, no stridor, no tenderness, no LAD, no JVD, good ROM    Cardiovascular: Regular rate and rhythm, no murmurs/rubs/gallops, strong distal pulses and good perfusion    Thorax & Lungs: No respiratory distress, CTAB   Abdomen: Soft, mild right upper quadrant tenderness to palpation, nondistended, no guarding, no rebound, normal BS    Back: No CVAT    Extremities: No cyanosis, no edema, no gross deformity, good ROM, no tenderness, intact distal pulses with brisk cap refill    Skin: Warm, dry, no pallor/cyanosis, no rash noted    Lymphatic: No lymphadenopathy noted    Neuro: A/O times 3, no focal deficits noted    Psychiatric: Cooperative, normal mood and affect, normal judgement, appropriate for clinical situation        DIAGNOSTIC STUDIES / PROCEDURES    EKG  12 Lead EKG obtained at 2059 and interpreted by me:   Rate: 67   Rhythm: Sinus rhythm   Ectopy: None  Intervals: Normal   Axis: Normal   QRS: Normal   ST segments: Minimal ST depression inferior and lateral leads  T Waves: Normal    Clinical Impression: Sinus rhythm with nonspecific ST changes  Compared to January 18, 2021      LABS  All labs reviewed by me.     Results for orders placed or performed during the hospital encounter of 07/07/21   CBC WITH DIFFERENTIAL   Result Value Ref Range    WBC 8.5 4.8 - 10.8 K/uL    RBC 4.42 4.20 - 5.40 M/uL    Hemoglobin 14.2 12.0 - 16.0 g/dL    Hematocrit 41.2 37.0 - 47.0 %    MCV 93.2 81.4 - 97.8 fL    MCH 32.1 27.0 - 33.0 pg    MCHC 34.5 33.6 - 35.0  g/dL    RDW 45.0 35.9 - 50.0 fL    Platelet Count 282 164 - 446 K/uL    MPV 9.2 9.0 - 12.9 fL    Neutrophils-Polys 64.40 44.00 - 72.00 %    Lymphocytes 17.90 (L) 22.00 - 41.00 %    Monocytes 5.80 0.00 - 13.40 %    Eosinophils 10.60 (H) 0.00 - 6.90 %    Basophils 0.60 0.00 - 1.80 %    Immature Granulocytes 0.70 0.00 - 0.90 %    Nucleated RBC 0.00 /100 WBC    Neutrophils (Absolute) 5.45 2.00 - 7.15 K/uL    Lymphs (Absolute) 1.52 1.00 - 4.80 K/uL    Monos (Absolute) 0.49 0.00 - 0.85 K/uL    Eos (Absolute) 0.90 (H) 0.00 - 0.51 K/uL    Baso (Absolute) 0.05 0.00 - 0.12 K/uL    Immature Granulocytes (abs) 0.06 0.00 - 0.11 K/uL    NRBC (Absolute) 0.00 K/uL   COMP METABOLIC PANEL   Result Value Ref Range    Sodium 139 135 - 145 mmol/L    Potassium 3.7 3.6 - 5.5 mmol/L    Chloride 103 96 - 112 mmol/L    Co2 21 20 - 33 mmol/L    Anion Gap 15.0 7.0 - 16.0    Glucose 106 (H) 65 - 99 mg/dL    Bun 18 8 - 22 mg/dL    Creatinine 0.77 0.50 - 1.40 mg/dL    Calcium 9.7 8.4 - 10.2 mg/dL    AST(SGOT) 21 12 - 45 U/L    ALT(SGPT) 15 2 - 50 U/L    Alkaline Phosphatase 121 (H) 30 - 99 U/L    Total Bilirubin 0.9 0.1 - 1.5 mg/dL    Albumin 4.4 3.2 - 4.9 g/dL    Total Protein 8.1 6.0 - 8.2 g/dL    Globulin 3.7 (H) 1.9 - 3.5 g/dL    A-G Ratio 1.2 g/dL   URINALYSIS    Specimen: Urine   Result Value Ref Range    Color Dark Yellow     Character Hazy (A)     Specific Gravity >=1.030 <1.035    Ph 5.0 5.0 - 8.0    Glucose Negative Negative mg/dL    Ketones Trace (A) Negative mg/dL    Protein 30 (A) Negative mg/dL    Bilirubin Large (A) Negative    Nitrite Negative Negative    Leukocyte Esterase Negative Negative    Occult Blood Negative Negative    Micro Urine Req Microscopic    LACTIC ACID   Result Value Ref Range    Lactic Acid 2.0 0.5 - 2.0 mmol/L   ESTIMATED GFR   Result Value Ref Range    GFR If African American >60 >60 mL/min/1.73 m 2    GFR If Non African American >60 >60 mL/min/1.73 m 2   PROCALCITONIN   Result Value Ref Range    Procalcitonin  0.09 <0.25 ng/mL   TROPONIN   Result Value Ref Range    Troponin T 8 6 - 19 ng/L   COV-2, FLU A/B, AND RSV BY PCR (2-4 HOURS CEPHEID): Collect NP swab in [facility]    Specimen: Nasopharyngeal; Respirate   Result Value Ref Range    Influenza virus A RNA Negative Negative    Influenza virus B, PCR Negative Negative    RSV, PCR Negative Negative    SARS-CoV-2 by PCR NotDetected     SARS-CoV-2 Source NP Swab    URINE MICROSCOPIC (W/UA)   Result Value Ref Range    WBC 10-20 (A) /hpf    Bacteria Few (A) None /hpf    Epithelial Cells Few Few /hpf    Trans Epithelial Cells Few /hpf    Mucous Threads Moderate /hpf    Ca Oxalate Crystal Few /hpf   EKG (NOW)   Result Value Ref Range    Report       [facility] Emergency Dept.    Test Date:  2021  Pt Name:    VIOLA CORONADO              Department: [facility]  MRN:        [id number]                      Room:       3307  Gender:     Female                       Technician: TCM  :        1960                   Requested By:GAEL DEVRIES  Order #:    [id number]                    Reading MD: Gael Devries    Measurements  Intervals                                Axis  Rate:       67                           P:          74  SC:         161                          QRS:        37  QRSD:       114                          T:          97  QT:         404  QTc:        427    Interpretive Statements  Sinus rhythm  Borderline intraventricular conduction delay  Borderline repolarization abnormality  Compared to ECG 2021 10:41:15  T-wave abnormality no longer present  Electronically Signed On 2021 2:50:18 PDT by Gael Devries          RADIOLOGY  The radiologist's interpretation of all radiological studies have been reviewed by me.     CT-CTA CHEST PULMONARY ARTERY W/ RECONS   Final Result      1.  No evidence of pulmonary embolus.      2.  Bilateral diffuse interstitial prominence with groundglass opacities could be due to infection such as Covid 19 pneumonia,  pulmonary edema or interstitial lung disease.      3.  Mildly prominent mediastinal hilar lymph nodes could be reactive if airspace disease is due to infection or perhaps related interstitial lung disease.            DX-CHEST-PORTABLE (1 VIEW)   Final Result      1.  Interstitial opacities could be due to pulmonary edema or pneumonitis possibly due to Covid 19.             COURSE & MEDICAL DECISION MAKING  Nursing notes, VS, PMSFHx reviewed in chart.     Review of past medical records shows the patient was seen in this ED July 4, 2021 and yesterday for same complaint.      Differential diagnoses considered include but are not limited to: Sepsis, pneumonia, pneumonitis, biliary colic, infectious diarrhea      0045: D/W Dr. Lund, hospitalist, who will admit patient      History and physical exam as above.  This is patient's third recent visit to this ED for her symptoms.  Patient is not feeling any better.  EKG showed sinus rhythm with nonspecific ST changes with a negative troponin and patient's symptoms are atypical for ACS.  This is unlikely to be myocarditis or pericarditis.  Unlikely to be bacterial sepsis given normal white count, lactic acid and procalcitonin.  COVID-19 testing again returned negative and patient has received full immunization against COVID-19.  UA without overt signs of infection.  Stool culture and C. difficile testing ordered.  Chest x-ray continues to show bilateral interstitial opacities and given concern for PE, CTPA was obtained with findings as above.  I discussed the findings with the patient.  She is noted to require supplemental oxygen via nasal cannula due to hypoxia in room air and is agreeable to admission for further evaluation.  She declined pain medicine during her ED stay but did receive acetaminophen for her fever.  She also requested Solu-Medrol and Benadryl prior to IV contrast for her CT scan due to past reaction.  She did not develop any significant adverse reaction  after the scan.  Discussed with Dr. Lund who graciously agreed to admit patient for further care.      HYDRATION: Based on the patient's presentation of Acute Diarrhea and Dehydration the patient was given IV fluids. IV Hydration was used because oral hydration was not as rapid as required. Upon recheck following hydration, the patient was unchanged.      FINAL IMPRESSION  1. Fever, unspecified fever cause Active   2. Hypoxia Active   3. Dyspnea, unspecified type Active          DISPOSITION  Patient will be admitted by hospitalist for further care      Electronically signed by: Dong Devries D.O., 7/7/2021 8:52 PM      Portions of this record were made with voice recognition software.  Despite my review, spelling/grammar/context errors may still remain.  Interpretation of this chart should be taken in this context.

## 2021-07-08 NOTE — PROGRESS NOTES
[facility] Medicine Daily Progress Note    Date of Service  7/8/2021    Chief Complaint  Arely Haynes is a 60 y.o. female admitted 7/7/2021 with shortness of breath, nausea and diarrhea    Hospital Course  Patient is a 60 year old female with history of asthma, depression, erosive esophagitis, gerd, chronic pain and psoriatic arthritis for which she is on methotrexate and humira and immunocompromised.  She presented to the ER on 7/7/21 with myalgias, fevers, chills, nausea and loose stool.  She also reported generalized fatigue and mild shortness of breath.  She is fully vaccinated for COVID and two recent tests were negative. She was found to have acute hypoxic respiratory failure with a room air saturation of 86%.  Procalcitonin is negative.    Interval Problem Update  She is axox3, speaking in full sentences without difficulty, states her breathing feels much better now that she is on oxygen, afebrile this am, no further diarrhea today, no abdominal pain.  She reports she is overdue for remicaide and has been off of methotrexate for several days.  No chest pain no dizziness.  ROS otherwise negative.  I updated her  per her request and consulted pulmonary    I have personally seen and examined the patient at bedside. I discussed the plan of care with patient, family, pulm, bedside nursing and case mgt    Consultants/Specialty  pulmonary    Code Status  Full Code    Disposition  Patient is not medically cleared.   Anticipate discharge to be determined    Review of Systems  Review of Systems   Constitutional: Positive for fever and malaise/fatigue. Negative for chills, diaphoresis and weight loss.   HENT: Negative.  Negative for sore throat.    Eyes: Negative.  Negative for blurred vision.   Respiratory: Positive for shortness of breath. Negative for cough.    Cardiovascular: Negative.  Negative for chest pain, palpitations and leg swelling.   Gastrointestinal: Negative.  Negative for abdominal pain,  nausea and vomiting.   Genitourinary: Negative.  Negative for dysuria.   Musculoskeletal: Negative.  Negative for myalgias.   Skin: Negative.  Negative for itching and rash.   Neurological: Negative.  Negative for dizziness, focal weakness, weakness and headaches.   Endo/Heme/Allergies: Negative.  Does not bruise/bleed easily.   Psychiatric/Behavioral: Negative.  Negative for depression, substance abuse and suicidal ideas.   All other systems reviewed and are negative.       Physical Exam  Temp:  [36.6 °C (97.8 °F)-39.1 °C (102.4 °F)] 36.7 °C (98 °F)  Pulse:  [53-75] 56  Resp:  [16-23] 16  BP: (100-151)/(48-75) 100/48  SpO2:  [87 %-97 %] 94 %    Physical Exam  Vitals and nursing note reviewed. Exam conducted with a chaperone present.   Constitutional:       General: She is not in acute distress.     Appearance: Normal appearance. She is not diaphoretic.   HENT:      Head: Normocephalic.      Nose: Nose normal.      Mouth/Throat:      Mouth: Mucous membranes are moist.   Eyes:      Pupils: Pupils are equal, round, and reactive to light.   Cardiovascular:      Rate and Rhythm: Normal rate and regular rhythm.      Pulses: Normal pulses.      Heart sounds: Normal heart sounds.   Pulmonary:      Effort: Pulmonary effort is normal.      Breath sounds: Normal breath sounds.      Comments: Good air movement  Abdominal:      General: Abdomen is flat. Bowel sounds are normal.      Palpations: Abdomen is soft.   Musculoskeletal:         General: No swelling or deformity. Normal range of motion.   Skin:     General: Skin is warm and dry.      Capillary Refill: Capillary refill takes less than 2 seconds.   Neurological:      General: No focal deficit present.      Mental Status: She is alert and oriented to person, place, and time.      Cranial Nerves: No cranial nerve deficit.   Psychiatric:         Mood and Affect: Mood normal.         Behavior: Behavior normal.         Fluids  No intake or output data in the 24 hours ending  07/08/21 1312    Laboratory  Recent Labs     07/06/21  1100 07/07/21 2013 07/08/21  0530   WBC 7.4 8.5 5.1   RBC 4.37 4.42 3.91*   HEMOGLOBIN 14.4 14.2 12.5   HEMATOCRIT 41.7 41.2 36.1*   MCV 95.4 93.2 92.3   MCH 33.0 32.1 32.0   MCHC 34.5 34.5 34.6   RDW 46.8 45.0 44.6   PLATELETCT 266 282 262   MPV 9.0 9.2 9.2     Recent Labs     07/06/21  1100 07/07/21 2013 07/08/21  0530   SODIUM 136 139 139   POTASSIUM 3.9 3.7 4.2   CHLORIDE 101 103 105   CO2 21 21 22   GLUCOSE 103* 106* 163*   BUN 18 18 17   CREATININE 0.83 0.77 0.62   CALCIUM 9.6 9.7 9.2                   Imaging  CT-CTA CHEST PULMONARY ARTERY W/ RECONS   Final Result      1.  No evidence of pulmonary embolus.      2.  Bilateral diffuse interstitial prominence with groundglass opacities could be due to infection such as Covid 19 pneumonia, pulmonary edema or interstitial lung disease.      3.  Mildly prominent mediastinal hilar lymph nodes could be reactive if airspace disease is due to infection or perhaps related interstitial lung disease.            DX-CHEST-PORTABLE (1 VIEW)   Final Result      1.  Interstitial opacities could be due to pulmonary edema or pneumonitis possibly due to Covid 19.           Assessment/Plan  Fever  Assessment & Plan  C dif and stool culture pending, however no further diarrhea today - following  Immunocompromised with Uti j- ceftriaxone started  Possible viral pneumonia - CT consistent with this  following  Procalcitonin is normal    GERD (gastroesophageal reflux disease)- (present on admission)  Assessment & Plan  Continue home PPI    Major depressive disorder, recurrent, moderate (HCC)- (present on admission)  Assessment & Plan  Continue home meds    Asthma- (present on admission)  Assessment & Plan  No evidence of acute exacerbation, lungs clear, good air movement    Acute respiratory failure with hypoxia (HCC)- (present on admission)  Assessment & Plan  Inpatient with diffuse bilateral interstitial opacities, Covid is  negative, procalcitonin is normal   Legionella pending  Query if autoimmune process contributing - empiric steroids started  Pulmonary to eval  following    Familial hypercholesterolemia- (present on admission)  Assessment & Plan  Continue home statin    Psoriatic arthritis (HCC)- (present on admission)  Assessment & Plan  Followed by rheum in [place]  Off of methotrexate, discussed with pulmonary - steorids started  No acute flair at this time       VTE prophylaxis: enoxaparin ppx    I have performed a physical exam and reviewed and updated ROS and Plan today (7/8/2021). In review of yesterday's note (7/7/2021), there are no changes except as documented above.

## 2021-07-08 NOTE — FLOWSHEET NOTE
07/08/21 0700   Events/Summary/Plan   Skin Inspection Respiratory Device Intact   Vital Signs   Pulse 64   Respiration 16   Pulse Oximetry 96 %   $ Pulse Oximetry (Spot Check) Yes   Breath Sounds   RUL Breath Sounds Clear   RML Breath Sounds Diminished;Fine Crackles   RLL Breath Sounds Diminished   MARY ANN Breath Sounds Clear   LLL Breath Sounds Diminished   Secretions   Cough Strong;Dry;Non Productive   How Sputum Obtained Spontaneous   Oxygen   O2 (LPM) 3   O2 Delivery Device Silicone Nasal Cannula

## 2021-07-08 NOTE — H&P
Hospital Medicine History & Physical Note    Date of Service  7/8/2021    Primary Care Physician  Joe Espinal M.D.    Consultants  None    Specialist Names: None    Code Status  Full Code    Chief Complaint  Chief Complaint   Patient presents with   • Shortness of Breath     was seen here yesterday for same  not getting better    • Nausea   • Diarrhea       History of Presenting Illness  Arely Haynes is a 60 y.o. female who presented 7/7/2021 with myalgias.  Patient states she had an episode loose stool once on Friday, that evening began having fever and chills.  She also complained of nausea.  She states she had no more episodes of loose stool until today after starting azithromycin.  Patient states she continued to have myalgias, fever, chills and nausea.  Because of the significant nausea, she has had decreased oral intake however she has been able to drink fluids.  She does complain of some mild shortness of breath but it is more significant fatigue.  Patient was recently working in [place] and spent a week in a hotel.  She has had multiple imaging studies, both have shown extensive groundglass and interstitial opacities, Covid has been negative twice and she is fully vaccinated.  Patient is immunocompromised, on methotrexate and Humira for psoriatic arthritis.   is a physician.  I did discuss the case including labs and imaging with the ER physician.    I discussed the plan of care with ERP.    Review of Systems  Review of Systems   Constitutional: Positive for chills, fever and malaise/fatigue.   HENT: Negative for congestion.    Respiratory: Positive for shortness of breath. Negative for cough, sputum production and stridor.    Cardiovascular: Negative for chest pain, palpitations and leg swelling.   Gastrointestinal: Positive for diarrhea and nausea. Negative for abdominal pain, constipation and vomiting.   Genitourinary: Negative for dysuria and urgency.   Musculoskeletal:  Positive for myalgias. Negative for falls.   Neurological: Negative for dizziness, tingling, loss of consciousness and headaches.   Psychiatric/Behavioral: Negative for depression and suicidal ideas.   All other systems reviewed and are negative.      Past Medical History   has a past medical history of Anesthesia, Arthritis, Asthma, Bowel habit changes, Depression, Erosive esophagitis, GERD (gastroesophageal reflux disease), Heart burn, High cholesterol, Immunocompromised (HCC), Pain, Pneumonia (2018), Psoriasis, and Psoriatic arthritis (HCC).    Surgical History   has a past surgical history that includes lumbar fusion posterior; hip replacement, total; cystectomy; other abdominal surgery; gyn surgery (1999); other orthopedic surgery; other orthopedic surgery; hysterectomy robotic xi (N/A, 7/11/2019); salpingo oophorectomy (Bilateral, 7/11/2019); and tendon repair (Left, 9/19/2019).     Family History  family history includes Arthritis in her mother; Cancer in her father and sister; Heart Attack in her mother; Heart Disease in her mother and paternal grandfather; Hyperlipidemia in her father, mother, and sister; Lung Disease in her father; Psychiatric Illness in her mother.   Family history reviewed with patient. There is no family history that is pertinent to the chief complaint.     Social History   reports that she has never smoked. She has never used smokeless tobacco. She reports current alcohol use of about 0.6 - 1.2 oz of alcohol per week. She reports that she does not use drugs.    Allergies  Allergies   Allergen Reactions   • Ampicillin-Sulbactam Sodium Hives     Tolerates cefazolin  Pt does not feel this is an active allergy.    • Iodine Anaphylaxis     Contrast- Oral, topical, And Iv Dye   • Levofloxacin Anaphylaxis   • Shellfish-Derived Products Anaphylaxis     lobster   • Hydrocodone Itching   • Unipen [Nafcillin] Hives       Medications  Prior to Admission Medications   Prescriptions Last Dose  Informant Patient Reported? Taking?   Adalimumab (HUMIRA PEN) 40 MG/0.4ML Pen-injector Kit  Patient Yes No   Sig: Inject 40 mg under the skin every 14 days.   Methotrexate Sodium 250 MG/10ML Solution  Patient Yes No   Sig: Inject 25 mg into the shoulder, thigh, or buttocks every 7 days. On Sat   acetaminophen (TYLENOL) 500 MG Tab  Patient Yes No   Sig: Take 1,000 mg by mouth every 6 hours as needed for Moderate Pain.   azithromycin (ZITHROMAX) 250 MG Tab   No No   Sig: As directed   cetirizine (ZYRTEC) 10 MG Tab  Patient Yes No   Sig: Take 10 mg by mouth every day.   escitalopram (LEXAPRO) 10 MG Tab  Patient Yes No   Sig: Take 10 mg by mouth every day. Pt also has an RX for 20MG, pt takes 30MG total Qday   escitalopram (LEXAPRO) 20 MG tablet  Patient Yes No   Sig: Take 20 mg by mouth every day. Pt also has an RX for 10MG, pt takes 30MG total Qday   etodolac (LODINE) 500 MG tablet  Patient Yes No   Sig: Take 500 mg by mouth 2 times a day.   folic acid (FOLVITE) 1 MG Tab  Patient Yes No   Sig: Take 2 mg by mouth every day.   lamoTRIgine (LAMICTAL) 100 MG Tab  Patient Yes No   Sig: Take 100 mg by mouth every day.   magnesium oxide (MAG-OX) 400 MG Tab  Patient Yes No   Sig: Take 400 mg by mouth every day.   montelukast (SINGULAIR) 10 MG Tab  Patient No No   Sig: Take 1 Tab by mouth every day.   omeprazole (PRILOSEC) 40 MG delayed-release capsule  Patient Yes No   Sig: Take 40 mg by mouth every day.   rosuvastatin (CRESTOR) 10 MG Tab  Patient No No   Sig: Take 1 Tab by mouth every bedtime.   valACYclovir (VALTREX) 500 MG Tab  Patient Yes No   Sig: Take 500 mg by mouth 2 times a day. Pt started on 6/25/2021 for 4 day course      Facility-Administered Medications: None       Physical Exam  Temp:  [36.6 °C (97.8 °F)-39.1 °C (102.4 °F)] 37.4 °C (99.4 °F)  Pulse:  [62-75] 72  Resp:  [17-23] 18  BP: (117-151)/(64-75) 151/67  SpO2:  [87 %-96 %] 94 %    Physical Exam  Vitals and nursing note reviewed.   Constitutional:        General: She is not in acute distress.     Appearance: She is well-developed. She is ill-appearing. She is not diaphoretic.   HENT:      Head: Normocephalic and atraumatic.      Right Ear: External ear normal.      Left Ear: External ear normal.      Nose: Nose normal. No congestion or rhinorrhea.      Mouth/Throat:      Mouth: Mucous membranes are dry.      Pharynx: No oropharyngeal exudate.   Eyes:      General:         Right eye: No discharge.         Left eye: No discharge.   Neck:      Trachea: No tracheal deviation.   Cardiovascular:      Rate and Rhythm: Normal rate and regular rhythm.      Heart sounds: Murmur heard.   No friction rub. No gallop.    Pulmonary:      Effort: Pulmonary effort is normal. No respiratory distress.      Breath sounds: No stridor. Rales present. No wheezing.   Chest:      Chest wall: No tenderness.   Abdominal:      General: Bowel sounds are normal. There is no distension.      Palpations: Abdomen is soft.      Tenderness: There is no abdominal tenderness.   Musculoskeletal:         General: No tenderness. Normal range of motion.      Cervical back: Normal range of motion and neck supple.      Right lower leg: No edema.      Left lower leg: No edema.   Lymphadenopathy:      Cervical: No cervical adenopathy.   Skin:     General: Skin is warm and dry.      Coloration: Skin is not jaundiced.      Findings: No erythema or rash.   Neurological:      General: No focal deficit present.      Mental Status: She is alert and oriented to person, place, and time.      Cranial Nerves: No cranial nerve deficit.   Psychiatric:         Mood and Affect: Mood normal.         Behavior: Behavior normal.         Thought Content: Thought content normal.         Judgment: Judgment normal.         Laboratory:  Recent Labs     07/06/21  1100 07/07/21 2013   WBC 7.4 8.5   RBC 4.37 4.42   HEMOGLOBIN 14.4 14.2   HEMATOCRIT 41.7 41.2   MCV 95.4 93.2   MCH 33.0 32.1   MCHC 34.5 34.5   RDW 46.8 45.0   PLATELETCT  266 282   MPV 9.0 9.2     Recent Labs     07/06/21  1100 07/07/21 2013   SODIUM 136 139   POTASSIUM 3.9 3.7   CHLORIDE 101 103   CO2 21 21   GLUCOSE 103* 106*   BUN 18 18   CREATININE 0.83 0.77   CALCIUM 9.6 9.7     Recent Labs     07/06/21  1100 07/07/21 2013   ALTSGPT 17 15   ASTSGOT 22 21   ALKPHOSPHAT 119* 121*   TBILIRUBIN 0.8 0.9   GLUCOSE 103* 106*         No results for input(s): NTPROBNP in the last 72 hours.      Recent Labs     07/07/21 2013   TROPONINT 8       Imaging:  CT-CTA CHEST PULMONARY ARTERY W/ RECONS   Final Result      1.  No evidence of pulmonary embolus.      2.  Bilateral diffuse interstitial prominence with groundglass opacities could be due to infection such as Covid 19 pneumonia, pulmonary edema or interstitial lung disease.      3.  Mildly prominent mediastinal hilar lymph nodes could be reactive if airspace disease is due to infection or perhaps related interstitial lung disease.            DX-CHEST-PORTABLE (1 VIEW)   Final Result      1.  Interstitial opacities could be due to pulmonary edema or pneumonitis possibly due to Covid 19.          I did personally review the CT chest, did note significant bilateral opacities    Assessment/Plan:  I anticipate this patient will require at least two midnights for appropriate medical management, necessitating inpatient admission.    Acute respiratory failure with hypoxia (HCC)- (present on admission)  Assessment & Plan  -Inpatient with diffuse bilateral interstitial opacities, Covid is negative, procalcitonin is normal  - was concerned about legionnaires, this will be ruled out  -No antibiotics at this time  -Start incentive spirometry  -Patient was satting 86% on room air, now improved with low flow oxygen    Fever  Assessment & Plan  -As well as multiple episodes of loose stool however today was post antibiotic, ERP did order C. difficile however it was quickly after initiating azithromycin, more likely due to the antibiotic or  gastroenteritis  -She also has persistent nausea, potentially just gastroenteritis, symptoms started on Friday  -She is however immunocompromise  -I am unsure why she has bilateral groundglass opacities, legionnaires is pending  -Procalcitonin is normal, as is WBC, not septic    GERD (gastroesophageal reflux disease)- (present on admission)  Assessment & Plan  -Continue home PPI    Major depressive disorder, recurrent, moderate (HCC)- (present on admission)  Assessment & Plan  -Continue home meds    Asthma- (present on admission)  Assessment & Plan  -While she is requiring oxygen, it is not an acute asthma exacerbation, she does move a good amount of air    Familial hypercholesterolemia- (present on admission)  Assessment & Plan  -Continue home statin    Psoriatic arthritis (HCC)- (present on admission)  Assessment & Plan  -Continue home meds      VTE prophylaxis: enoxaparin ppx

## 2021-07-08 NOTE — CARE PLAN
The patient is Stable - Low risk of patient condition declining or worsening    Shift Goals  Clinical Goals: monitor temperature  Patient Goals: sleep    Progress made toward(s) clinical / shift goals:    Problem: Hemodynamics  Goal: Patient's hemodynamics, fluid balance and neurologic status will be stable or improve  Outcome: Progressing  Note: Pt is currently afebrile. RN will continue to monitor temperature and give medication and or ice packs if indicated.      Problem: Bowel Elimination  Goal: Establish and maintain regular bowel function  Outcome: Progressing  Note: Pt has not had any loose bowel movements or n/v since being admitted. Pt is on a clear liquid diet. RN will continue to monitor bowel elimination.

## 2021-07-08 NOTE — ED TRIAGE NOTES
Pt comes in c/o continues illness  Fevers, SOB, diarrhea and generalized weakness   Is immunocompromised due to medication she is on    O2 87% on RA  Placed on O2 3L NC  Placed on oxygen in triage

## 2021-07-08 NOTE — CARE PLAN
The patient is Watcher - Medium risk of patient condition declining or worsening    Shift Goals  Clinical Goals: wean O2 demands, control fever  Patient Goals: trial decadron, sit in chair    Progress made toward(s) clinical / shift goals:  cardiac chair set up for pt use; wean O2 and plan for walking O2 today      Problem: Respiratory:  Goal: Respiratory status will improve  Outcome: Progressing  Note: Weaning O2 as deemed appropriate. Will discuss with pt the need for walking O2 for potential home O2 use.     Problem: Communication  Goal: The ability to communicate needs accurately and effectively will improve  Outcome: Progressing  Note: Pt communicates needs appropriately and demonstrates effective use of call light.

## 2021-07-08 NOTE — PROGRESS NOTES
4 Eyes Skin Assessment Completed by Citlalli RN and Whitney RN.    Head WDL  Ears WDL  Nose WDL  Mouth WDL  Neck WDL  Breast/Chest WDL  Shoulder Blades WDL  Spine WDL  (R) Arm/Elbow/Hand WDL  (L) Arm/Elbow/Hand WDL  Abdomen WDL  Groin WDL  Scrotum/Coccyx/Buttocks WDL  (R) Leg WDL  (L) Leg Scar  (R) Heel/Foot/Toe WDL  (L) Heel/Foot/Toe WDL          Devices In Places Nasal Cannula, PIV      Interventions In Place Pressure Redistribution Mattress    Possible Skin Injury No    Pictures Uploaded Into Epic N/A  Wound Consult Placed N/A  RN Wound Prevention Protocol Ordered No

## 2021-07-08 NOTE — CONSULTS
Pulmonary Consultation    Date of consult: 7/8/2021    Referring Physician  Linda Finley M.D.    Reason for Consultation  Abnormal CT chest      History of Presenting Illness  60 y.o. female who presented 7/7/2021 with acute hypoxemia respiratory failure. Patient with significant history GERD, asthma, and psoriatic arthritis on humira and methotrexate presents with worsening shortness of breath. She was out working in a [place] town close to [place], [place] three weeks ago and developed a rash around her abdomen and back. She had a skin biopsy which came back positive for eczema dermatitis. She then completed a course of steroids and noted resolution of her rash. Two weeks ago she developed progressive worsening shortness of breath and cough. Last Friday she spiked a temp up to 103. Associated with a nonproductive cough, orthopnea, and nausea. No recent sick contact. On presentation she was found to be hypoxemia requiring 3 liters NC. CTA chest personally reviewed -> diffuse reticular thickening with patchy GGO and mildly enlarged right hilar adenopathy. Pulmonary consulted for further evaluation.     Subjectively patient reports feeling about the same. She was started on bactrim for possible UTI. Patient is a never smoker. She has a history of asthma which she follow up in [facility] with Dr. Ericksno. In terms of her psoriatic arthritis she has been on methotrexate for over 8 hours and was recently started on humira 10 weeks ago due to worsening joint pain. She has two dogs at home. Had a remote history of chemical exposure back in [place].      Code Status  Full Code    Review of Systems  Review of Systems   Constitutional: Positive for chills, fever and malaise/fatigue. Negative for weight loss.   Respiratory: Positive for cough and shortness of breath. Negative for hemoptysis, sputum production and wheezing.    Cardiovascular: Positive for orthopnea and leg swelling. Negative for chest pain,  palpitations and claudication.   Gastrointestinal: Positive for nausea. Negative for abdominal pain, constipation, diarrhea and vomiting.   Genitourinary: Negative.    Musculoskeletal: Negative.    All other systems reviewed and are negative.      Past Medical History   has a past medical history of Anesthesia, Arthritis, Asthma, Bowel habit changes, Depression, Erosive esophagitis, GERD (gastroesophageal reflux disease), Heart burn, High cholesterol, Immunocompromised (HCC), Pain, Pneumonia (2018), Psoriasis, and Psoriatic arthritis (HCC).    Surgical History   has a past surgical history that includes lumbar fusion posterior; hip replacement, total; cystectomy; other abdominal surgery; gyn surgery (1999); other orthopedic surgery; other orthopedic surgery; hysterectomy robotic xi (N/A, 7/11/2019); salpingo oophorectomy (Bilateral, 7/11/2019); and tendon repair (Left, 9/19/2019).    Family History  family history includes Arthritis in her mother; Cancer in her father and sister; Heart Attack in her mother; Heart Disease in her mother and paternal grandfather; Hyperlipidemia in her father, mother, and sister; Lung Disease in her father; Psychiatric Illness in her mother.    Social History   reports that she has never smoked. She has never used smokeless tobacco. She reports current alcohol use of about 0.6 - 1.2 oz of alcohol per week. She reports that she does not use drugs.    Medications  Home Medications     Reviewed by Citlalli Hernández R.N. (Registered Nurse) on 07/08/21 at 0211  Med List Status: Complete   Medication Last Dose Status   acetaminophen (TYLENOL) 500 MG Tab 7/8/2021 Active   Adalimumab (HUMIRA PEN) 40 MG/0.4ML Pen-injector Kit 7/1/2021 Active   azithromycin (ZITHROMAX) 250 MG Tab 7/7/2021 Active   cetirizine (ZYRTEC) 10 MG Tab 7/7/2021 Active   escitalopram (LEXAPRO) 10 MG Tab 7/7/2021 Active   escitalopram (LEXAPRO) 20 MG tablet 7/7/2021 Active   etodolac (LODINE) 500 MG tablet 7/7/2021 Active    folic acid (FOLVITE) 1 MG Tab 7/7/2021 Active   lamoTRIgine (LAMICTAL) 100 MG Tab 7/7/2021 Active   magnesium oxide (MAG-OX) 400 MG Tab 7/7/2021 Active   Methotrexate Sodium 250 MG/10ML Solution  Active   montelukast (SINGULAIR) 10 MG Tab 7/7/2021 Active   omeprazole (PRILOSEC) 40 MG delayed-release capsule 7/7/2021 Active   rosuvastatin (CRESTOR) 10 MG Tab 7/6/2021 Active   valACYclovir (VALTREX) 500 MG Tab Not Taking Active              Current Facility-Administered Medications   Medication Dose Route Frequency Provider Last Rate Last Admin   • cetirizine (ZYRTEC) tablet 10 mg  10 mg Oral DAILY BHAVANI Lora.O.   10 mg at 07/08/21 0509   • escitalopram (Lexapro) tablet 30 mg  30 mg Oral DAILY NUHA LoraO.   30 mg at 07/08/21 0509   • folic acid (FOLVITE) tablet 2 mg  2 mg Oral DAILY BHAVANI Loar.O.   2 mg at 07/08/21 0509   • lamoTRIgine (LAMICTAL) tablet 100 mg  100 mg Oral DAILY BHAVANI Lora.O.   100 mg at 07/08/21 0509   • magnesium oxide (MAG-OX) tablet 400 mg  400 mg Oral DAILY BHAVANI Lora.O.   400 mg at 07/08/21 0509   • montelukast (SINGULAIR) tablet 10 mg  10 mg Oral DAILY NUHA LoraO.   10 mg at 07/08/21 0509   • omeprazole (PRILOSEC) capsule 40 mg  40 mg Oral DAILY BHAVANI Lora.O.   40 mg at 07/08/21 0509   • rosuvastatin (CRESTOR) tablet 10 mg  10 mg Oral QHS BHAVANI Lora.O.   10 mg at 07/08/21 0320   • Respiratory Therapy Consult   Nebulization Continuous RT NUHA LoraO.       • 0.9 % NaCl with KCl 20 mEq infusion   Intravenous Continuous NUHA LoraO. 100 mL/hr at 07/08/21 1303 New Bag at 07/08/21 1303   • enoxaparin (LOVENOX) inj 40 mg  40 mg Subcutaneous DAILY BHAVANI Lora.O.   40 mg at 07/08/21 0508   • acetaminophen (Tylenol) tablet 650 mg  650 mg Oral Q6HRS PRN Zachary Lund D.O.       • cloNIDine (CATAPRES) tablet 0.1 mg  0.1 mg Oral Q6HRS PRN NUHA LoraO.       • enalaprilat (VASOTEC) injection 1.25 mg   1.25 mg Intravenous Q6HRS PRN Zachary Lund D.O.       • labetalol (NORMODYNE/TRANDATE) injection 10 mg  10 mg Intravenous Q4HRS PRN Zachary Lund D.O.       • ondansetron (ZOFRAN) syringe/vial injection 4 mg  4 mg Intravenous Q4HRS PRN Zachary Lund D.O.       • ondansetron (ZOFRAN ODT) dispertab 4 mg  4 mg Oral Q4HRS PRN NUHA LoraO.   4 mg at 07/08/21 0515   • promethazine (PHENERGAN) tablet 12.5-25 mg  12.5-25 mg Oral Q4HRS PRN Zachary Lund D.O.       • promethazine (PHENERGAN) suppository 12.5-25 mg  12.5-25 mg Rectal Q4HRS PRN Zachary Lund D.O.       • prochlorperazine (COMPAZINE) injection 5-10 mg  5-10 mg Intravenous Q4HRS PRN Zachary Lund D.O.       • LORazepam (ATIVAN) injection 0.5 mg  0.5 mg Intravenous Q4HRS PRN Zachary Lund D.O.       • ipratropium-albuterol (DUONEB) nebulizer solution  3 mL Nebulization Q4H PRN (RT) Zachary Lund D.O.       • dexamethasone (DECADRON) tablet 4 mg  4 mg Oral Q12HRS Linda Finley M.D.       • sulfamethoxazole-trimethoprim (BACTRIM DS) 800-160 MG tablet 1 tablet  1 tablet Oral Q12HRS Linda Finley M.D.       • vitamin D (cholecalciferol) tablet 1,000 Units  1,000 Units Oral DAILY Linda Finley M.D.   1,000 Units at 07/08/21 1302   • Pharmacy Consult Request  1 Each Other PHARMACY TO DOSE Dong Devries D.O.           Allergies  Allergies   Allergen Reactions   • Ampicillin-Sulbactam Sodium Hives     Tolerates cefazolin  Pt does not feel this is an active allergy.    • Iodine Anaphylaxis     Contrast- Oral, topical, And Iv Dye   • Levofloxacin Anaphylaxis   • Shellfish-Derived Products Anaphylaxis     lobster   • Hydrocodone Itching   • Unipen [Nafcillin] Hives       Vital Signs last 24 hours  Temp:  [36.6 °C (97.8 °F)-39.1 °C (102.4 °F)] 36.7 °C (98 °F)  Pulse:  [53-75] 56  Resp:  [16-23] 16  BP: (100-151)/(48-75) 100/48  SpO2:  [87 %-97 %] 94 %    Physical Exam  Physical Exam  Constitutional:       Appearance: She is ill-appearing.    HENT:      Head: Normocephalic.      Nose: Nose normal.      Mouth/Throat:      Mouth: Mucous membranes are moist.   Eyes:      Extraocular Movements: Extraocular movements intact.      Pupils: Pupils are equal, round, and reactive to light.   Cardiovascular:      Rate and Rhythm: Normal rate and regular rhythm.      Pulses: Normal pulses.   Pulmonary:      Comments: Decrease BS bilaterally     Abdominal:      General: Abdomen is flat. Bowel sounds are normal.      Palpations: Abdomen is soft.   Musculoskeletal:         General: Normal range of motion.      Cervical back: Normal range of motion.      Comments: Trace of pitting edema      Skin:     General: Skin is warm.      Capillary Refill: Capillary refill takes more than 3 seconds.   Neurological:      Mental Status: She is alert and oriented to person, place, and time.      Cranial Nerves: No cranial nerve deficit.      Sensory: No sensory deficit.   Psychiatric:         Mood and Affect: Mood normal.         Fluids  No intake or output data in the 24 hours ending 07/08/21 1305    Laboratory  Recent Results (from the past 48 hour(s))   CBC WITH DIFFERENTIAL    Collection Time: 07/07/21  8:13 PM   Result Value Ref Range    WBC 8.5 4.8 - 10.8 K/uL    RBC 4.42 4.20 - 5.40 M/uL    Hemoglobin 14.2 12.0 - 16.0 g/dL    Hematocrit 41.2 37.0 - 47.0 %    MCV 93.2 81.4 - 97.8 fL    MCH 32.1 27.0 - 33.0 pg    MCHC 34.5 33.6 - 35.0 g/dL    RDW 45.0 35.9 - 50.0 fL    Platelet Count 282 164 - 446 K/uL    MPV 9.2 9.0 - 12.9 fL    Neutrophils-Polys 64.40 44.00 - 72.00 %    Lymphocytes 17.90 (L) 22.00 - 41.00 %    Monocytes 5.80 0.00 - 13.40 %    Eosinophils 10.60 (H) 0.00 - 6.90 %    Basophils 0.60 0.00 - 1.80 %    Immature Granulocytes 0.70 0.00 - 0.90 %    Nucleated RBC 0.00 /100 WBC    Neutrophils (Absolute) 5.45 2.00 - 7.15 K/uL    Lymphs (Absolute) 1.52 1.00 - 4.80 K/uL    Monos (Absolute) 0.49 0.00 - 0.85 K/uL    Eos (Absolute) 0.90 (H) 0.00 - 0.51 K/uL    Baso  (Absolute) 0.05 0.00 - 0.12 K/uL    Immature Granulocytes (abs) 0.06 0.00 - 0.11 K/uL    NRBC (Absolute) 0.00 K/uL   COMP METABOLIC PANEL    Collection Time: 07/07/21  8:13 PM   Result Value Ref Range    Sodium 139 135 - 145 mmol/L    Potassium 3.7 3.6 - 5.5 mmol/L    Chloride 103 96 - 112 mmol/L    Co2 21 20 - 33 mmol/L    Anion Gap 15.0 7.0 - 16.0    Glucose 106 (H) 65 - 99 mg/dL    Bun 18 8 - 22 mg/dL    Creatinine 0.77 0.50 - 1.40 mg/dL    Calcium 9.7 8.4 - 10.2 mg/dL    AST(SGOT) 21 12 - 45 U/L    ALT(SGPT) 15 2 - 50 U/L    Alkaline Phosphatase 121 (H) 30 - 99 U/L    Total Bilirubin 0.9 0.1 - 1.5 mg/dL    Albumin 4.4 3.2 - 4.9 g/dL    Total Protein 8.1 6.0 - 8.2 g/dL    Globulin 3.7 (H) 1.9 - 3.5 g/dL    A-G Ratio 1.2 g/dL   BLOOD CULTURE    Collection Time: 07/07/21  8:13 PM    Specimen: Peripheral; Blood   Result Value Ref Range    Significant Indicator NEG     Source BLD     Site PERIPHERAL     Culture Result       No Growth  Note: Blood cultures are incubated for 5 days and  are monitored continuously.Positive blood cultures  are called to the RN and reported as soon as  they are identified.     BLOOD CULTURE    Collection Time: 07/07/21  8:13 PM    Specimen: Peripheral; Blood   Result Value Ref Range    Significant Indicator NEG     Source BLD     Site PERIPHERAL     Culture Result       No Growth  Note: Blood cultures are incubated for 5 days and  are monitored continuously.Positive blood cultures  are called to the RN and reported as soon as  they are identified.     LACTIC ACID    Collection Time: 07/07/21  8:13 PM   Result Value Ref Range    Lactic Acid 2.0 0.5 - 2.0 mmol/L   ESTIMATED GFR    Collection Time: 07/07/21  8:13 PM   Result Value Ref Range    GFR If African American >60 >60 mL/min/1.73 m 2    GFR If Non African American >60 >60 mL/min/1.73 m 2   PROCALCITONIN    Collection Time: 07/07/21  8:13 PM   Result Value Ref Range    Procalcitonin 0.09 <0.25 ng/mL   TROPONIN    Collection Time:  21  8:13 PM   Result Value Ref Range    Troponin T 8 6 - 19 ng/L   EKG (NOW)    Collection Time: 21  8:59 PM   Result Value Ref Range    Report       [facility] Emergency Dept[facility]    Test Date:  2021  Pt Name:    VIOLA CORONADO              Department: [facility]  MRN:        [id number]                      Room:       3307  Gender:     Female                       Technician: TCM  :        1960                   Requested By:GAEL DEVRIES  Order #:    [id number]                    Reading MD: Gael Devries    Measurements  Intervals                                Axis  Rate:       67                           P:          74  AR:         161                          QRS:        37  QRSD:       114                          T:          97  QT:         404  QTc:        427    Interpretive Statements  Sinus rhythm  Borderline intraventricular conduction delay  Borderline repolarization abnormality  Compared to ECG 2021 10:41:15  T-wave abnormality no longer present  Electronically Signed On 2021 2:50:18 PDT by Gael Devries     COV-2, FLU A/B, AND RSV BY PCR (2-4 HOURS CEPHEID): Collect NP swab in VTM    Collection Time: 21  9:48 PM    Specimen: Nasopharyngeal; Respirate   Result Value Ref Range    Influenza virus A RNA Negative Negative    Influenza virus B, PCR Negative Negative    RSV, PCR Negative Negative    SARS-CoV-2 by PCR NotDetected     SARS-CoV-2 Source NP Swab    URINALYSIS    Collection Time: 21 11:04 PM    Specimen: Urine   Result Value Ref Range    Color Dark Yellow     Character Hazy (A)     Specific Gravity >=1.030 <1.035    Ph 5.0 5.0 - 8.0    Glucose Negative Negative mg/dL    Ketones Trace (A) Negative mg/dL    Protein 30 (A) Negative mg/dL    Bilirubin Large (A) Negative    Nitrite Negative Negative    Leukocyte Esterase Negative Negative    Occult Blood Negative Negative    Micro Urine Req Microscopic    URINE MICROSCOPIC (W/UA)    Collection  Time: 07/07/21 11:04 PM   Result Value Ref Range    WBC 10-20 (A) /hpf    Bacteria Few (A) None /hpf    Epithelial Cells Few Few /hpf    Trans Epithelial Cells Few /hpf    Mucous Threads Moderate /hpf    Ca Oxalate Crystal Few /hpf   CBC without Differential    Collection Time: 07/08/21  5:30 AM   Result Value Ref Range    WBC 5.1 4.8 - 10.8 K/uL    RBC 3.91 (L) 4.20 - 5.40 M/uL    Hemoglobin 12.5 12.0 - 16.0 g/dL    Hematocrit 36.1 (L) 37.0 - 47.0 %    MCV 92.3 81.4 - 97.8 fL    MCH 32.0 27.0 - 33.0 pg    MCHC 34.6 33.6 - 35.0 g/dL    RDW 44.6 35.9 - 50.0 fL    Platelet Count 262 164 - 446 K/uL    MPV 9.2 9.0 - 12.9 fL   Basic Metabolic Panel (BMP)    Collection Time: 07/08/21  5:30 AM   Result Value Ref Range    Sodium 139 135 - 145 mmol/L    Potassium 4.2 3.6 - 5.5 mmol/L    Chloride 105 96 - 112 mmol/L    Co2 22 20 - 33 mmol/L    Glucose 163 (H) 65 - 99 mg/dL    Bun 17 8 - 22 mg/dL    Creatinine 0.62 0.50 - 1.40 mg/dL    Calcium 9.2 8.4 - 10.2 mg/dL    Anion Gap 12.0 7.0 - 16.0   LACTIC ACID    Collection Time: 07/08/21  5:30 AM   Result Value Ref Range    Lactic Acid 1.3 0.5 - 2.0 mmol/L   ESTIMATED GFR    Collection Time: 07/08/21  5:30 AM   Result Value Ref Range    GFR If African American >60 >60 mL/min/1.73 m 2    GFR If Non African American >60 >60 mL/min/1.73 m 2       Imaging  CT-CTA CHEST PULMONARY ARTERY W/ RECONS   Final Result      1.  No evidence of pulmonary embolus.      2.  Bilateral diffuse interstitial prominence with groundglass opacities could be due to infection such as Covid 19 pneumonia, pulmonary edema or interstitial lung disease.      3.  Mildly prominent mediastinal hilar lymph nodes could be reactive if airspace disease is due to infection or perhaps related interstitial lung disease.            DX-CHEST-PORTABLE (1 VIEW)   Final Result      1.  Interstitial opacities could be due to pulmonary edema or pneumonitis possibly due to Covid 19.          Assessment/Plan  Abnormal CT of the  chest  Assessment & Plan  -- Ddx includes drug induced pneumonitis vs infectious (ie, PCP vs fungal vs atypical vs bacterial) vs pulmonary edema vs inflammatory (psoriatic arthritis related ILD vs CEP vs HP)    -- Recommend starting linezolid/cefepime   -- Check respiratory culture    -- Check HIV, LDH, CRP, and ESR   -- Check connective tissue panel, CCP, and RF   -- Check Beta-D glucan, urinary legionella/strep, and cocci serology     -- Discussed about risks and benefits of undergoing bronchoscopy with TBBX and BAL. Risks discussed include but were not limited to bleeding, pneumothorax, respiratory failure requiring ventilatory support, infection, and possible death. Questions were encouraged and answered. Patient endorsed understanding and agreed to proceed with it.   -- Further recommended pending bronchoscopy results            Asthma- (present on admission)  Assessment & Plan  -- Currently not in acute flare   -- Cont duoneb and singulair        Acute respiratory failure with hypoxia (HCC)- (present on admission)  Assessment & Plan  -- Secondary to aforementioned differentials in abnormal CT chest section   -- Start gentle diuresis to seek net negative fluid balance    -- On abx   -- Needs PT/OT and OOB as tolerated   -- Goal SpO2 > 88%         Discussed patient condition and risk of morbidity and/or mortality with Hospitalist, Family, RN, RT, Therapies, Pharmacy and Patient.

## 2021-07-08 NOTE — FLOWSHEET NOTE
07/08/21 0256   Patient History   Pulmonary Diagnosis asthma   Home O2 No   Nocturnal CPAP No   Home Treatments/Frequency Yes   SVN 1/Frequency Duoneb PRN   Sleep Apnea Screening   Have you had a sleep study? No   Have you been diagnosed with sleep apnea? No   S - Have you been told that you SNORE? 1   T - Are you often TIRED during the day? 0   O - Do you know if you stop breathing or has anyone witnessed you stop breathing while you were asleep? (OBSTRUCTION) 0   P - Do you have high blood PRESSURE or on medication to control high blood pressure? 0   B - Is your Body Mass Index greater than 35? (BMI) 0   A - Are you 50 years old or older? (AGE) 1   N - Are you a male with a NECK circumference greater than 17 inches, or a female with a neck circumference greater than 16 inches? 0   G - Are you male? (GENDER) 0   Stop Bang Total Score 2   COPD Risk Screening   Do you have a history of COPD? No   COPD Population Screener   During the past 4 weeks, how much did you feel short of breath? 1   Do you ever cough up any mucus or phlegm? 0   In the past 12 months, you do less than you used to because of your breathing problems 0   Have you smoked at least 100 cigarettes in your entire life? 0   How old are you? 2   COPD Screening Score 3   Protocol Pathways   Protocol Pathways Hyperinflation Protocol   Hyperinflation Protocol   Hyperinflation Protocol Indications Pneumonia

## 2021-07-08 NOTE — ASSESSMENT & PLAN NOTE
No evidence of acute exacerbation, lungs clear, good air movement  Discussed with pulmonology - continue to hold off steroids for now

## 2021-07-08 NOTE — PROGRESS NOTES
Assumed care of pt. Received report from ERIC Roth. Pt transported to unit via wheelchair. Bed locked and in lowest position, call light within reach.

## 2021-07-09 ENCOUNTER — APPOINTMENT (OUTPATIENT)
Dept: RADIOLOGY | Facility: MEDICAL CENTER | Age: 61
DRG: 189 | End: 2021-07-09
Attending: INTERNAL MEDICINE
Payer: COMMERCIAL

## 2021-07-09 ENCOUNTER — ANESTHESIA EVENT (OUTPATIENT)
Dept: SURGERY | Facility: MEDICAL CENTER | Age: 61
DRG: 189 | End: 2021-07-09
Payer: COMMERCIAL

## 2021-07-09 ENCOUNTER — ANESTHESIA (OUTPATIENT)
Dept: SURGERY | Facility: MEDICAL CENTER | Age: 61
DRG: 189 | End: 2021-07-09
Payer: COMMERCIAL

## 2021-07-09 LAB
25(OH)D3 SERPL-MCNC: 35 NG/ML (ref 30–100)
APPEARANCE FLD: CLEAR
B PARAP IS1001 DNA NPH QL NAA+NON-PROBE: NOT DETECTED
B PERT.PT PRMT NPH QL NAA+NON-PROBE: NOT DETECTED
BODY FLD TYPE: NORMAL
C PNEUM DNA NPH QL NAA+NON-PROBE: NOT DETECTED
COLOR FLD: NORMAL
CSF COMMENTS 1658: NORMAL
CYTOLOGY REG CYTOL: NORMAL
FLUAV RNA NPH QL NAA+NON-PROBE: NOT DETECTED
FLUBV RNA NPH QL NAA+NON-PROBE: NOT DETECTED
HADV DNA NPH QL NAA+NON-PROBE: NOT DETECTED
HCOV 229E RNA NPH QL NAA+NON-PROBE: NOT DETECTED
HCOV HKU1 RNA NPH QL NAA+NON-PROBE: NOT DETECTED
HCOV NL63 RNA NPH QL NAA+NON-PROBE: NOT DETECTED
HCOV OC43 RNA NPH QL NAA+NON-PROBE: NOT DETECTED
HIV 1+2 AB+HIV1 P24 AG SERPL QL IA: NORMAL
HMPV RNA NPH QL NAA+NON-PROBE: NOT DETECTED
HPIV1 RNA NPH QL NAA+NON-PROBE: NOT DETECTED
HPIV2 RNA NPH QL NAA+NON-PROBE: NOT DETECTED
HPIV3 RNA NPH QL NAA+NON-PROBE: NOT DETECTED
HPIV4 RNA NPH QL NAA+NON-PROBE: NOT DETECTED
LYMPHOCYTES NFR FLD: 40 %
M PNEUMO DNA NPH QL NAA+NON-PROBE: NOT DETECTED
MONONUC CELLS NFR FLD: 2 %
MRSA DNA SPEC QL NAA+PROBE: NORMAL
NEUTROPHILS NFR FLD: 50 %
PATHOLOGY CONSULT NOTE: NORMAL
PATHOLOGY CONSULT NOTE: NORMAL
RBC # FLD: NORMAL CELLS/UL
RHEUMATOID FACT SER IA-ACNC: <10 IU/ML (ref 0–14)
RSV RNA NPH QL NAA+NON-PROBE: NOT DETECTED
RV+EV RNA NPH QL NAA+NON-PROBE: NOT DETECTED
SARS-COV-2 RNA NPH QL NAA+NON-PROBE: NOTDETECTED
SIGNIFICANT IND 70042: NORMAL
SITE SITE: NORMAL
SOURCE SOURCE: NORMAL
WBC # FLD: NORMAL CELLS/UL
WBC OTHER NFR FLD: 8 %

## 2021-07-09 PROCEDURE — 99232 SBSQ HOSP IP/OBS MODERATE 35: CPT | Performed by: HOSPITALIST

## 2021-07-09 PROCEDURE — 87070 CULTURE OTHR SPECIMN AEROBIC: CPT

## 2021-07-09 PROCEDURE — 160041 HCHG SURGERY MINUTES - EA ADDL 1 MIN LEVEL 4: Performed by: INTERNAL MEDICINE

## 2021-07-09 PROCEDURE — 160048 HCHG OR STATISTICAL LEVEL 1-5: Performed by: INTERNAL MEDICINE

## 2021-07-09 PROCEDURE — 87206 SMEAR FLUORESCENT/ACID STAI: CPT

## 2021-07-09 PROCEDURE — 87281 PNEUMOCYSTIS CARINII AG IF: CPT

## 2021-07-09 PROCEDURE — 86356 MONONUCLEAR CELL ANTIGEN: CPT

## 2021-07-09 PROCEDURE — 700105 HCHG RX REV CODE 258: Performed by: INTERNAL MEDICINE

## 2021-07-09 PROCEDURE — 87205 SMEAR GRAM STAIN: CPT | Mod: 91

## 2021-07-09 PROCEDURE — 700102 HCHG RX REV CODE 250 W/ 637 OVERRIDE(OP): Performed by: HOSPITALIST

## 2021-07-09 PROCEDURE — 87116 MYCOBACTERIA CULTURE: CPT

## 2021-07-09 PROCEDURE — 160002 HCHG RECOVERY MINUTES (STAT): Performed by: INTERNAL MEDICINE

## 2021-07-09 PROCEDURE — 0BDD8ZX EXTRACTION OF RIGHT MIDDLE LUNG LOBE, VIA NATURAL OR ARTIFICIAL OPENING ENDOSCOPIC, DIAGNOSTIC: ICD-10-PCS | Performed by: INTERNAL MEDICINE

## 2021-07-09 PROCEDURE — 160029 HCHG SURGERY MINUTES - 1ST 30 MINS LEVEL 4: Performed by: INTERNAL MEDICINE

## 2021-07-09 PROCEDURE — A9270 NON-COVERED ITEM OR SERVICE: HCPCS | Performed by: INTERNAL MEDICINE

## 2021-07-09 PROCEDURE — 700111 HCHG RX REV CODE 636 W/ 250 OVERRIDE (IP): Performed by: ANESTHESIOLOGY

## 2021-07-09 PROCEDURE — 0B9D8ZX DRAINAGE OF RIGHT MIDDLE LUNG LOBE, VIA NATURAL OR ARTIFICIAL OPENING ENDOSCOPIC, DIAGNOSTIC: ICD-10-PCS | Performed by: INTERNAL MEDICINE

## 2021-07-09 PROCEDURE — 89051 BODY FLUID CELL COUNT: CPT

## 2021-07-09 PROCEDURE — 88305 TISSUE EXAM BY PATHOLOGIST: CPT

## 2021-07-09 PROCEDURE — 700102 HCHG RX REV CODE 250 W/ 637 OVERRIDE(OP): Performed by: INTERNAL MEDICINE

## 2021-07-09 PROCEDURE — 87102 FUNGUS ISOLATION CULTURE: CPT

## 2021-07-09 PROCEDURE — 770006 HCHG ROOM/CARE - MED/SURG/GYN SEMI*

## 2021-07-09 PROCEDURE — 31624 DX BRONCHOSCOPE/LAVAGE: CPT | Performed by: INTERNAL MEDICINE

## 2021-07-09 PROCEDURE — 160009 HCHG ANES TIME/MIN: Performed by: INTERNAL MEDICINE

## 2021-07-09 PROCEDURE — 700111 HCHG RX REV CODE 636 W/ 250 OVERRIDE (IP): Performed by: INTERNAL MEDICINE

## 2021-07-09 PROCEDURE — 94760 N-INVAS EAR/PLS OXIMETRY 1: CPT

## 2021-07-09 PROCEDURE — 31645 BRNCHSC W/THER ASPIR 1ST: CPT | Performed by: INTERNAL MEDICINE

## 2021-07-09 PROCEDURE — 87015 SPECIMEN INFECT AGNT CONCNTJ: CPT

## 2021-07-09 PROCEDURE — 88112 CYTOPATH CELL ENHANCE TECH: CPT

## 2021-07-09 PROCEDURE — 87305 ASPERGILLUS AG IA: CPT

## 2021-07-09 PROCEDURE — 87798 DETECT AGENT NOS DNA AMP: CPT

## 2021-07-09 PROCEDURE — 160035 HCHG PACU - 1ST 60 MINS PHASE I: Performed by: INTERNAL MEDICINE

## 2021-07-09 PROCEDURE — 700101 HCHG RX REV CODE 250: Performed by: ANESTHESIOLOGY

## 2021-07-09 PROCEDURE — 71045 X-RAY EXAM CHEST 1 VIEW: CPT

## 2021-07-09 PROCEDURE — A9270 NON-COVERED ITEM OR SERVICE: HCPCS | Performed by: HOSPITALIST

## 2021-07-09 RX ORDER — OXYCODONE HCL 5 MG/5 ML
10 SOLUTION, ORAL ORAL
Status: DISCONTINUED | OUTPATIENT
Start: 2021-07-09 | End: 2021-07-09 | Stop reason: HOSPADM

## 2021-07-09 RX ORDER — OXYCODONE HCL 5 MG/5 ML
5 SOLUTION, ORAL ORAL
Status: DISCONTINUED | OUTPATIENT
Start: 2021-07-09 | End: 2021-07-09 | Stop reason: HOSPADM

## 2021-07-09 RX ORDER — ONDANSETRON 2 MG/ML
4 INJECTION INTRAMUSCULAR; INTRAVENOUS
Status: DISCONTINUED | OUTPATIENT
Start: 2021-07-09 | End: 2021-07-09 | Stop reason: HOSPADM

## 2021-07-09 RX ORDER — HYDROMORPHONE HYDROCHLORIDE 1 MG/ML
0.4 INJECTION, SOLUTION INTRAMUSCULAR; INTRAVENOUS; SUBCUTANEOUS
Status: DISCONTINUED | OUTPATIENT
Start: 2021-07-09 | End: 2021-07-09 | Stop reason: HOSPADM

## 2021-07-09 RX ORDER — SODIUM CHLORIDE, SODIUM LACTATE, POTASSIUM CHLORIDE, CALCIUM CHLORIDE 600; 310; 30; 20 MG/100ML; MG/100ML; MG/100ML; MG/100ML
INJECTION, SOLUTION INTRAVENOUS CONTINUOUS
Status: DISCONTINUED | OUTPATIENT
Start: 2021-07-09 | End: 2021-07-09 | Stop reason: HOSPADM

## 2021-07-09 RX ORDER — LIDOCAINE HYDROCHLORIDE 20 MG/ML
INJECTION, SOLUTION EPIDURAL; INFILTRATION; INTRACAUDAL; PERINEURAL PRN
Status: DISCONTINUED | OUTPATIENT
Start: 2021-07-09 | End: 2021-07-09 | Stop reason: SURG

## 2021-07-09 RX ORDER — SODIUM CHLORIDE, SODIUM LACTATE, POTASSIUM CHLORIDE, CALCIUM CHLORIDE 600; 310; 30; 20 MG/100ML; MG/100ML; MG/100ML; MG/100ML
INJECTION, SOLUTION INTRAVENOUS CONTINUOUS
Status: DISCONTINUED | OUTPATIENT
Start: 2021-07-09 | End: 2021-07-09

## 2021-07-09 RX ORDER — HYDROMORPHONE HYDROCHLORIDE 1 MG/ML
0.2 INJECTION, SOLUTION INTRAMUSCULAR; INTRAVENOUS; SUBCUTANEOUS
Status: DISCONTINUED | OUTPATIENT
Start: 2021-07-09 | End: 2021-07-09 | Stop reason: HOSPADM

## 2021-07-09 RX ORDER — PHENYLEPHRINE HYDROCHLORIDE 10 MG/ML
INJECTION, SOLUTION INTRAMUSCULAR; INTRAVENOUS; SUBCUTANEOUS PRN
Status: DISCONTINUED | OUTPATIENT
Start: 2021-07-09 | End: 2021-07-09 | Stop reason: SURG

## 2021-07-09 RX ORDER — HALOPERIDOL 5 MG/ML
1 INJECTION INTRAMUSCULAR
Status: DISCONTINUED | OUTPATIENT
Start: 2021-07-09 | End: 2021-07-09 | Stop reason: HOSPADM

## 2021-07-09 RX ORDER — MIDAZOLAM HYDROCHLORIDE 1 MG/ML
1 INJECTION INTRAMUSCULAR; INTRAVENOUS
Status: DISCONTINUED | OUTPATIENT
Start: 2021-07-09 | End: 2021-07-09 | Stop reason: HOSPADM

## 2021-07-09 RX ORDER — DIPHENHYDRAMINE HYDROCHLORIDE 50 MG/ML
12.5 INJECTION INTRAMUSCULAR; INTRAVENOUS
Status: DISCONTINUED | OUTPATIENT
Start: 2021-07-09 | End: 2021-07-09 | Stop reason: HOSPADM

## 2021-07-09 RX ORDER — HYDROMORPHONE HYDROCHLORIDE 1 MG/ML
0.1 INJECTION, SOLUTION INTRAMUSCULAR; INTRAVENOUS; SUBCUTANEOUS
Status: DISCONTINUED | OUTPATIENT
Start: 2021-07-09 | End: 2021-07-09 | Stop reason: HOSPADM

## 2021-07-09 RX ORDER — FUROSEMIDE 10 MG/ML
10 INJECTION INTRAMUSCULAR; INTRAVENOUS
Status: DISCONTINUED | OUTPATIENT
Start: 2021-07-09 | End: 2021-07-11

## 2021-07-09 RX ADMIN — FOLIC ACID 2 MG: 1 TABLET ORAL at 06:15

## 2021-07-09 RX ADMIN — SODIUM CHLORIDE, POTASSIUM CHLORIDE, SODIUM LACTATE AND CALCIUM CHLORIDE: 600; 310; 30; 20 INJECTION, SOLUTION INTRAVENOUS at 07:42

## 2021-07-09 RX ADMIN — LINEZOLID 600 MG: 600 TABLET, FILM COATED ORAL at 06:16

## 2021-07-09 RX ADMIN — CETIRIZINE HYDROCHLORIDE 10 MG: 10 TABLET, FILM COATED ORAL at 06:17

## 2021-07-09 RX ADMIN — CEFEPIME 2 G: 2 INJECTION, POWDER, FOR SOLUTION INTRAVENOUS at 17:52

## 2021-07-09 RX ADMIN — MAGNESIUM OXIDE TAB 400 MG (241.3 MG ELEMENTAL MG) 400 MG: 400 (241.3 MG) TAB at 06:15

## 2021-07-09 RX ADMIN — CEFEPIME 2 G: 2 INJECTION, POWDER, FOR SOLUTION INTRAVENOUS at 09:33

## 2021-07-09 RX ADMIN — LAMOTRIGINE 100 MG: 100 TABLET ORAL at 06:16

## 2021-07-09 RX ADMIN — BENZOCAINE AND MENTHOL 1 LOZENGE: 15; 3.6 LOZENGE ORAL at 09:42

## 2021-07-09 RX ADMIN — FENTANYL CITRATE 50 MCG: 50 INJECTION, SOLUTION INTRAMUSCULAR; INTRAVENOUS at 07:50

## 2021-07-09 RX ADMIN — DOXYCYCLINE 100 MG: 100 TABLET, FILM COATED ORAL at 17:51

## 2021-07-09 RX ADMIN — OMEPRAZOLE 40 MG: 20 CAPSULE, DELAYED RELEASE ORAL at 06:17

## 2021-07-09 RX ADMIN — DOXYCYCLINE 100 MG: 100 TABLET, FILM COATED ORAL at 06:17

## 2021-07-09 RX ADMIN — PHENYLEPHRINE HYDROCHLORIDE 100 MCG: 10 INJECTION INTRAVENOUS at 08:00

## 2021-07-09 RX ADMIN — PROPOFOL 200 MG: 10 INJECTION, EMULSION INTRAVENOUS at 07:50

## 2021-07-09 RX ADMIN — FUROSEMIDE 10 MG: 10 INJECTION, SOLUTION INTRAMUSCULAR; INTRAVENOUS at 12:55

## 2021-07-09 RX ADMIN — MONTELUKAST 10 MG: 10 TABLET, FILM COATED ORAL at 06:19

## 2021-07-09 RX ADMIN — ACETAMINOPHEN 650 MG: 325 TABLET, FILM COATED ORAL at 21:17

## 2021-07-09 RX ADMIN — BENZOCAINE AND MENTHOL 1 LOZENGE: 15; 3.6 LOZENGE ORAL at 14:45

## 2021-07-09 RX ADMIN — ESCITALOPRAM OXALATE 30 MG: 10 TABLET ORAL at 06:16

## 2021-07-09 RX ADMIN — ROCURONIUM BROMIDE 50 MG: 10 INJECTION INTRAVENOUS at 07:50

## 2021-07-09 RX ADMIN — EPHEDRINE SULFATE 10 MG: 50 INJECTION INTRAMUSCULAR; INTRAVENOUS; SUBCUTANEOUS at 07:56

## 2021-07-09 RX ADMIN — LIDOCAINE HYDROCHLORIDE 80 MG: 20 INJECTION, SOLUTION EPIDURAL; INFILTRATION; INTRACAUDAL; PERINEURAL at 07:50

## 2021-07-09 RX ADMIN — ROSUVASTATIN 10 MG: 10 TABLET, FILM COATED ORAL at 21:15

## 2021-07-09 RX ADMIN — LINEZOLID 600 MG: 600 TABLET, FILM COATED ORAL at 17:52

## 2021-07-09 RX ADMIN — Medication 1000 UNITS: at 06:15

## 2021-07-09 ASSESSMENT — ENCOUNTER SYMPTOMS
COUGH: 0
GASTROINTESTINAL NEGATIVE: 1
PSYCHIATRIC NEGATIVE: 1
CARDIOVASCULAR NEGATIVE: 1
BLURRED VISION: 0
CHILLS: 0
BRUISES/BLEEDS EASILY: 0
WEIGHT LOSS: 0
MYALGIAS: 0
DEPRESSION: 0
HEADACHES: 0
PALPITATIONS: 0
NAUSEA: 0
SHORTNESS OF BREATH: 1
ABDOMINAL PAIN: 0
SORE THROAT: 0
FOCAL WEAKNESS: 0
VOMITING: 0
FEVER: 0
DIZZINESS: 0
DIAPHORESIS: 0
MUSCULOSKELETAL NEGATIVE: 1
NEUROLOGICAL NEGATIVE: 1
EYES NEGATIVE: 1
WEAKNESS: 0

## 2021-07-09 ASSESSMENT — COGNITIVE AND FUNCTIONAL STATUS - GENERAL
MOBILITY SCORE: 22
DAILY ACTIVITIY SCORE: 24
SUGGESTED CMS G CODE MODIFIER MOBILITY: CJ
SUGGESTED CMS G CODE MODIFIER DAILY ACTIVITY: CH
WALKING IN HOSPITAL ROOM: A LITTLE
CLIMB 3 TO 5 STEPS WITH RAILING: A LITTLE

## 2021-07-09 ASSESSMENT — FIBROSIS 4 INDEX: FIB4 SCORE: 1.19

## 2021-07-09 ASSESSMENT — PAIN DESCRIPTION - PAIN TYPE: TYPE: ACUTE PAIN

## 2021-07-09 ASSESSMENT — LIFESTYLE VARIABLES: SUBSTANCE_ABUSE: 0
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
## 2021-07-09 NOTE — ANESTHESIA PREPROCEDURE EVALUATION
No problems with anesthesia in the in past.    Relevant Problems   PULMONARY   (positive) Asthma   (positive) Moderate persistent asthma without complication      GI   (positive) GERD (gastroesophageal reflux disease)      Other   (positive) Psoriatic arthritis (HCC)       Physical Exam    Airway   Mallampati: II  TM distance: >3 FB  Neck ROM: full       Cardiovascular - normal exam  Rhythm: regular  Rate: normal  (-) murmur     Dental - normal exam           Pulmonary - normal exam  Breath sounds clear to auscultation     Abdominal    Neurological - normal exam                 Anesthesia Plan    ASA 2       Plan - general       Airway plan will be ETT          Induction: intravenous    Postoperative Plan: Postoperative administration of opioids is intended.    Pertinent diagnostic labs and testing reviewed    Informed Consent:    Anesthetic plan and risks discussed with patient.    Use of blood products discussed with: patient whom consented to blood products.

## 2021-07-09 NOTE — PROCEDURES
Fiberoptic Bronchoscopy/Endotracheal Ultrasound(EBUS)    Date/Time of Procedure: 7/9/2021    Indication(s): Abnormal CT chest     Consent: Informed, written consent was obtained prior to the procedure; risks, benefits, & alternatives were discussed.    Universal Protocol/Time Out: A Time Out with checklist was performed prior to the procedure to ensure correct identification of the patient and procedure.    Pre-Procedure Diagnosis: Abnormal CT chest     Sedation/Analgesia/Anesthesia: Sedation as per anesthesia.    Additional Modalities:    [X] Fluoroscopy  [_] Radial Ultrasound  [_] Linear Endobronchial Ultrasound  [_] Rapid On-Site Evaluation  [_] Other: _    Route of Entry:  [_] Nasal [_] Oral [X] ET tube [_] Trach [_] LMA     Findings: After the patient is adequately anesthetized (see procedure note from anesthesia), the flexible bronchoscope was passed through the endotracheal tube visualizing the distal trachea which is of normal caliber. The jackie is sharp. The tracheobronchial tree of the right lung was examined to at least the first subsegment level. Bronchial mucousa and anatomy in the right lung is normal; there are no endobronchial lesions or secretions seen. The bronchoscope was retracted back to the jackie and the left was examined to at least the first subsegment level. Bronchial mucousa and anatomy in the left lung is normal; there are no endobronchial lesions or secretions seen. The bronchoscope is wedged into RML and instilled 60 mL of sterile saline. Retrieved about 20 mL of cloudy BAL fluids. Transbronchial biopsy under fluoroscopy guidance was performed using forceps in RML X 6. Mild bleeding noted but hemostasis achieved without any interventions. Old blood suctioned and no active bleed noted.      Post procedure time out performed to confirmed specimen A (RML BAL) and specimen B (Transbonchial biopsy). BAL fluids will be send for cell counts, AFB, respiratory culture, fungal, cytology, CD4:CD8  ratio, PCP DFA, galactomannan, and PCP PCR.      Specimens: _  [X] Bronchoalveolar Lavage (BAL): RML   [_] Wash: _  [_] Brush: _  [_] Transbronchial Needle Aspiration (TBNA): _  [_] Endobronchial Biopsy (Endo): _  [X] Transbronchial Biopsy (TBBx): RML   [_] Other: _  [_] Veran Navigational Bronchoscopy:   [_] Endobronchial Ultrasound (EBUS) TBNA: _      Estimated Blood Loss in mL: Minimum     Patient Response to Procedure: [X] Patient tolerated the procedure well. [_] Other    Complications: None; pending post CXR procedure to rule out pneumothorax.     Post-Procedure Diagnosis: Abnormal CT chest     Post Procedure Follow Up/Comments: Pending BAL and transbronchial results. Continue current abx regimen. Called and updated Cedrick regarding bronchoscopy findings. Answered all questions.      Myles Vidal MD   Pulmonary Critical Care   [facility]

## 2021-07-09 NOTE — ANESTHESIA PROCEDURE NOTES
Airway    Date/Time: 7/9/2021 7:53 AM  Performed by: Stan Diggs M.D.  Authorized by: Stan Diggs M.D.     Location:  OR  Urgency:  Elective  Indications for Airway Management:  Anesthesia      Spontaneous Ventilation: absent    Sedation Level:  Deep  Preoxygenated: Yes    Patient Position:  Sniffing  Mask Difficulty Assessment:  2 - vent by mask + OA or adjuvant +/- NMBA  Final Airway Type:  Endotracheal airway  Final Endotracheal Airway:  ETT  Cuffed: Yes    Technique Used for Successful ETT Placement:  Direct laryngoscopy    Insertion Site:  Oral  Blade Type:  Robb  Laryngoscope Blade/Videolaryngoscope Blade Size:  3  ETT Size (mm):  8.0  Measured from:  Lips  ETT to Lips (cm):  22  Placement Verified by: auscultation and capnometry    Cormack-Lehane Classification:  Grade I - full view of glottis  Number of Attempts at Approach:  1

## 2021-07-09 NOTE — CARE PLAN
The patient is Watcher - Medium risk of patient condition declining or worsening    Shift Goals  Clinical Goals: wean o2  Patient Goals: wean o2    Progress made toward(s) clinical / shift goals:  obtain walking O2 today; continuous pulse ox      Problem: Fluid Volume  Goal: Fluid volume balance will be maintained  Outcome: Progressing  Note: IV fluids discontinued, s/p IV lasix administration yesterday

## 2021-07-09 NOTE — CARE PLAN
The patient is Stable - Low risk of patient condition declining or worsening    Shift Goals  Clinical Goals: Wean O2, BM   Patient Goals: Wean O2, improve I & O's     Progress made toward(s) clinical / shift goals:      Patient is not progressing towards the following goals:      Problem: Respiratory:  Goal: Respiratory status will improve  7/8/2021 2353 by Whitney Figueroa R.N.  Outcome: Not Progressing  Problem: Respiratory  Goal: Patient will achieve/maintain optimum respiratory ventilation and gas exchange  7/8/2021 2353 by Whitney Figueroa R.N.  Outcome: Not Progressing     Attempted to wean pt O2 to 2L. Pt initially was sating in the mid 90s. Once pt started to mobilize, pt de-sat to 88% and was dyspneic with exertion. Pt placed back to 3L, pt now sating upper 90s.  Problem: Fluid Volume  Goal: Fluid volume balance will be maintained  7/8/2021 2353 by Whitney Figueroa, R.N.  Outcome: Not Progressing     Pt reported being worried about output being less than intake. MD notified and D/C IV fluids.   Will continue to monitor I&Os.

## 2021-07-09 NOTE — OR NURSING
0732 Patient allergies and NPO status verified, home medication reconciliation completed and belongings secured. Surgical site verified with patient. Patient verbalizes understanding of pain scale, expected course of stay and plan of care; patient states verbal understanding at this time. IV access verified. Pt up to BR and becomes SOB quickly which is not baseline for her per her report. Remains on 3L NC. .

## 2021-07-09 NOTE — ANESTHESIA TIME REPORT
Anesthesia Start and Stop Event Times     Date Time Event    7/9/2021 0729 Ready for Procedure     0742 Anesthesia Start     0822 Anesthesia Stop        Responsible Staff  07/09/21    Name Role Begin End    Stan Diggs M.D. Anesth 0742 0822        Preop Diagnosis (Free Text):  Pre-op Diagnosis     Interstitial opacities        Preop Diagnosis (Codes):    Post op Diagnosis  Acute respiratory failure with hypoxia (HCC)      Premium Reason  Non-Premium    Comments:

## 2021-07-09 NOTE — FLOWSHEET NOTE
07/08/21 1930   Incentive Spirometry Treatment   Incentive Spirometer Volume 2000 mL  (self use)

## 2021-07-09 NOTE — PROGRESS NOTES
Ambulated with patient in hallways. Walking O2 done - 3L at rest and 3L ambulating. See flowsheet.

## 2021-07-10 PROBLEM — R50.9 FEVER: Status: RESOLVED | Noted: 2021-07-08 | Resolved: 2021-07-10

## 2021-07-10 PROBLEM — E87.6 HYPOKALEMIA: Status: ACTIVE | Noted: 2021-07-10

## 2021-07-10 LAB
ANION GAP SERPL CALC-SCNC: 11 MMOL/L (ref 7–16)
BACTERIA UR CULT: ABNORMAL
BACTERIA UR CULT: ABNORMAL
BUN SERPL-MCNC: 14 MG/DL (ref 8–22)
CALCIUM SERPL-MCNC: 8.9 MG/DL (ref 8.4–10.2)
CHLORIDE SERPL-SCNC: 98 MMOL/L (ref 96–112)
CO2 SERPL-SCNC: 25 MMOL/L (ref 20–33)
CREAT SERPL-MCNC: 0.75 MG/DL (ref 0.5–1.4)
FUNGUS SPEC FUNGUS STN: NORMAL
GLUCOSE SERPL-MCNC: 93 MG/DL (ref 65–99)
GRAM STN SPEC: NORMAL
L PNEUMO AG UR QL IA: NEGATIVE
MAGNESIUM SERPL-MCNC: 1.9 MG/DL (ref 1.5–2.5)
POTASSIUM SERPL-SCNC: 3.5 MMOL/L (ref 3.6–5.5)
RHODAMINE-AURAMINE STN SPEC: NORMAL
S PNEUM AG UR QL: NEGATIVE
SIGNIFICANT IND 70042: ABNORMAL
SIGNIFICANT IND 70042: NORMAL
SITE SITE: ABNORMAL
SITE SITE: NORMAL
SODIUM SERPL-SCNC: 134 MMOL/L (ref 135–145)
SOURCE SOURCE: ABNORMAL
SOURCE SOURCE: NORMAL

## 2021-07-10 PROCEDURE — 83735 ASSAY OF MAGNESIUM: CPT

## 2021-07-10 PROCEDURE — 94760 N-INVAS EAR/PLS OXIMETRY 1: CPT

## 2021-07-10 PROCEDURE — 86606 ASPERGILLUS ANTIBODY: CPT | Mod: 91

## 2021-07-10 PROCEDURE — 700105 HCHG RX REV CODE 258: Performed by: INTERNAL MEDICINE

## 2021-07-10 PROCEDURE — 80048 BASIC METABOLIC PNL TOTAL CA: CPT

## 2021-07-10 PROCEDURE — A9270 NON-COVERED ITEM OR SERVICE: HCPCS | Performed by: INTERNAL MEDICINE

## 2021-07-10 PROCEDURE — A9270 NON-COVERED ITEM OR SERVICE: HCPCS | Performed by: HOSPITALIST

## 2021-07-10 PROCEDURE — 770001 HCHG ROOM/CARE - MED/SURG/GYN PRIV*

## 2021-07-10 PROCEDURE — 700102 HCHG RX REV CODE 250 W/ 637 OVERRIDE(OP): Performed by: HOSPITALIST

## 2021-07-10 PROCEDURE — 700102 HCHG RX REV CODE 250 W/ 637 OVERRIDE(OP): Performed by: INTERNAL MEDICINE

## 2021-07-10 PROCEDURE — 99233 SBSQ HOSP IP/OBS HIGH 50: CPT | Performed by: INTERNAL MEDICINE

## 2021-07-10 PROCEDURE — 700111 HCHG RX REV CODE 636 W/ 250 OVERRIDE (IP): Performed by: INTERNAL MEDICINE

## 2021-07-10 PROCEDURE — 86331 IMMUNODIFFUSION OUCHTERLONY: CPT | Mod: 91

## 2021-07-10 PROCEDURE — 99232 SBSQ HOSP IP/OBS MODERATE 35: CPT | Performed by: HOSPITALIST

## 2021-07-10 PROCEDURE — 36415 COLL VENOUS BLD VENIPUNCTURE: CPT

## 2021-07-10 RX ORDER — POTASSIUM CHLORIDE 20 MEQ/1
20 TABLET, EXTENDED RELEASE ORAL DAILY
Status: DISCONTINUED | OUTPATIENT
Start: 2021-07-10 | End: 2021-07-11 | Stop reason: HOSPADM

## 2021-07-10 RX ORDER — FUROSEMIDE 40 MG/1
10 TABLET ORAL DAILY
Qty: 30 TABLET | Refills: 0 | Status: SHIPPED | OUTPATIENT
Start: 2021-07-10 | End: 2021-07-11

## 2021-07-10 RX ADMIN — CETIRIZINE HYDROCHLORIDE 10 MG: 10 TABLET, FILM COATED ORAL at 05:12

## 2021-07-10 RX ADMIN — DOXYCYCLINE 100 MG: 100 TABLET, FILM COATED ORAL at 17:22

## 2021-07-10 RX ADMIN — POTASSIUM CHLORIDE 20 MEQ: 1500 TABLET, EXTENDED RELEASE ORAL at 17:22

## 2021-07-10 RX ADMIN — MONTELUKAST 10 MG: 10 TABLET, FILM COATED ORAL at 05:11

## 2021-07-10 RX ADMIN — OMEPRAZOLE 40 MG: 20 CAPSULE, DELAYED RELEASE ORAL at 05:12

## 2021-07-10 RX ADMIN — LINEZOLID 600 MG: 600 TABLET, FILM COATED ORAL at 05:12

## 2021-07-10 RX ADMIN — CEFEPIME 2 G: 2 INJECTION, POWDER, FOR SOLUTION INTRAVENOUS at 05:11

## 2021-07-10 RX ADMIN — MAGNESIUM OXIDE TAB 400 MG (241.3 MG ELEMENTAL MG) 400 MG: 400 (241.3 MG) TAB at 05:13

## 2021-07-10 RX ADMIN — LAMOTRIGINE 100 MG: 100 TABLET ORAL at 05:12

## 2021-07-10 RX ADMIN — ESCITALOPRAM OXALATE 30 MG: 10 TABLET ORAL at 05:12

## 2021-07-10 RX ADMIN — Medication 1000 UNITS: at 05:12

## 2021-07-10 RX ADMIN — CEFEPIME 2 G: 2 INJECTION, POWDER, FOR SOLUTION INTRAVENOUS at 17:23

## 2021-07-10 RX ADMIN — FUROSEMIDE 10 MG: 10 INJECTION, SOLUTION INTRAMUSCULAR; INTRAVENOUS at 05:11

## 2021-07-10 RX ADMIN — ROSUVASTATIN 10 MG: 10 TABLET, FILM COATED ORAL at 20:33

## 2021-07-10 RX ADMIN — DOXYCYCLINE 100 MG: 100 TABLET, FILM COATED ORAL at 05:12

## 2021-07-10 RX ADMIN — FOLIC ACID 2 MG: 1 TABLET ORAL at 05:12

## 2021-07-10 ASSESSMENT — ENCOUNTER SYMPTOMS
COUGH: 0
CARDIOVASCULAR NEGATIVE: 1
CHILLS: 1
HEMOPTYSIS: 0
PSYCHIATRIC NEGATIVE: 1
WHEEZING: 0
WEAKNESS: 0
BLURRED VISION: 0
MYALGIAS: 0
SHORTNESS OF BREATH: 1
GASTROINTESTINAL NEGATIVE: 1
FEVER: 0
VOMITING: 0
WEIGHT LOSS: 0
DIZZINESS: 0
PALPITATIONS: 0
COUGH: 1
DEPRESSION: 0
DIAPHORESIS: 0
HEADACHES: 0
NEUROLOGICAL NEGATIVE: 1
BRUISES/BLEEDS EASILY: 0
CHILLS: 0
ABDOMINAL PAIN: 0
EYES NEGATIVE: 1
FOCAL WEAKNESS: 0
MUSCULOSKELETAL NEGATIVE: 1
SORE THROAT: 0
NAUSEA: 0
SPUTUM PRODUCTION: 0

## 2021-07-10 ASSESSMENT — PAIN DESCRIPTION - PAIN TYPE
TYPE: ACUTE PAIN
TYPE: ACUTE PAIN

## 2021-07-10 ASSESSMENT — LIFESTYLE VARIABLES: SUBSTANCE_ABUSE: 0

## 2021-07-10 ASSESSMENT — FIBROSIS 4 INDEX: FIB4 SCORE: 1.19

## 2021-07-10 NOTE — PROGRESS NOTES
Received bedside report from ERIC Olson. Assumed pt care. Pt sitting up at edge of bed, pt's  at bedside. Pt needs met at this time. POC discussed and pt verbalizes understanding. Call light within reach and all safety precautions in place.

## 2021-07-10 NOTE — DISCHARGE PLANNING
Anticipated Discharge Disposition: Home with O2    Action: Called [facility] to f/u on pt's O2. Spoke with Sharon who states referral was accepted and will be out for delivery today.     Pt's case discussed during IDT rounds. Pt anticipated to be cleared tomorrow 7/11.    Barriers to Discharge: None    Plan: D/C home when cleared

## 2021-07-10 NOTE — PROGRESS NOTES
Report given to Alexandr REYES POC and orders reviewed. Plan discussed for discharge tomorrow pending cx results from Bronchoscopy. Pt transferred via w/c on 3 liters n/c

## 2021-07-10 NOTE — DISCHARGE PLANNING
Anticipated Discharge Disposition: Home    Action: Met with pt at bedside to obtain choice for home O2.   Choice form signed and faxed to [facility]    Barriers to Discharge: DME acceptance/ delivery    Plan: F/U with [facility]

## 2021-07-10 NOTE — DISCHARGE PLANNING
Received Choice form at 1014  Agency/Facility Name: [facility]  Referral sent per Choice form @ 1014    LSW informed

## 2021-07-10 NOTE — FACE TO FACE
Face to Face Note  -  Durable Medical Equipment    Linda Finley M.D. - NPI: [id number]  I certify that this patient is under my care and that they had a durable medical equipment(DME)face to face encounter by myself that meets the physician DME face-to-face encounter requirements with this patient on:    Date of encounter:   Patient:                    MRN:                       YOB: 2022  Arely Haynes  [id number]  1960     The encounter with the patient was in whole, or in part, for the following medical condition, which is the primary reason for durable medical equipment:  Other - asthma, pneumonia    I certify that, based on my findings, the following durable medical equipment is medically necessary:  Oxygen.    HOME O2 Saturation Measurements:(Values must be present for Home Oxygen orders)  Room air sat at rest: 83  Room air sat with amb: 80  With liters of O2: 3, O2 sat at rest with O2: 93  With Liters of O2: 3, O2 sat with amb with O2 : 92  Is the patient mobile?: Yes    My Clinical findings support the need for the above equipment due to:  Hypoxia    Supporting Symptoms: no improvement despite IS and ambulation    If patient feels more short of breath, they can go up to 6 liters per minute and contact healthcare provider.

## 2021-07-10 NOTE — PROGRESS NOTES
Pulmonary Progress Note    Date of admission  7/7/2021    Chief Complaint  60 y.o. female admitted 7/7/2021 with acute hypoxemia respiratory failure.     Hospital Course  60 y.o. female who presented 7/7/2021 with acute hypoxemia respiratory failure. Patient with significant history GERD, asthma, and psoriatic arthritis on humira and methotrexate presents with worsening shortness of breath. She was out working in a [place] town close to [place], [place] three weeks ago and developed a rash around her abdomen and back. She had a skin biopsy which came back positive for eczema dermatitis. She then completed a course of steroids and noted resolution of her rash. Two weeks ago she developed progressive worsening shortness of breath and cough. Last Friday she spiked a temp up to 103. Associated with a nonproductive cough, orthopnea, and nausea. No recent sick contact. On presentation she was found to be hypoxemia requiring 3 liters NC. CTA chest personally reviewed -> diffuse reticular thickening with patchy GGO and mildly enlarged right hilar adenopathy. Pulmonary consulted for further evaluation.      Subjectively patient reports feeling about the same. She was started on bactrim for possible UTI. Patient is a never smoker. She has a history of asthma which she follow up in [facility] with Dr. Erickson. In terms of her psoriatic arthritis she has been on methotrexate for over 8 hours and was recently started on humira 10 weeks ago due to worsening joint pain. She has two dogs at home. Had a remote history of chemical exposure back in [place].    7/9: Underwent bronchoscopy w/ BAL/TBBX. BAL cell counts -> no eosinophils seen. ESR 62, , HIV negative, RF <10, and CRP 5.83.      7/10: Cx negative to date. MRSA swab negative. Had a low grade fever overnight. Net negative 1.8 liters. Remains on 3 liters NC. Subjectively patient reports feeling better overall. Denies chest pain, N/V, worsening cough or SOB.  Walking pulse oximetry remains above 91% on 3 liters NC.      Interval Problem Update  Reviewed last 24 hour events:  As above     Review of Systems  Review of Systems   Constitutional: Positive for chills and malaise/fatigue. Negative for fever and weight loss.   Respiratory: Positive for cough and shortness of breath. Negative for hemoptysis, sputum production and wheezing.    Cardiovascular: Negative.    Gastrointestinal: Negative.    Genitourinary: Negative.    Musculoskeletal: Negative.    All other systems reviewed and are negative.       Vital Signs for last 24 hours   Temp:  [36.9 °C (98.4 °F)-38.5 °C (101.3 °F)] 37.7 °C (99.9 °F)  Pulse:  [51-65] 65  Resp:  [16-20] 18  BP: ()/(38-57) 115/54  SpO2:  [92 %-97 %] 92 %    Hemodynamic parameters for last 24 hours       Respiratory Information for the last 24 hours       Physical Exam   Physical Exam  Constitutional:       Appearance: Normal appearance.   HENT:      Head: Normocephalic.      Nose: Nose normal.   Eyes:      Extraocular Movements: Extraocular movements intact.      Pupils: Pupils are equal, round, and reactive to light.   Cardiovascular:      Rate and Rhythm: Normal rate and regular rhythm.      Pulses: Normal pulses.   Pulmonary:      Comments: No crackles or wheezes noted. Good air entry.   Abdominal:      Comments: Obese, NT to palpation, + BS       Musculoskeletal:         General: Normal range of motion.      Cervical back: Normal range of motion.      Right lower leg: No edema.      Left lower leg: No edema.   Skin:     General: Skin is warm.   Neurological:      General: No focal deficit present.      Mental Status: She is alert and oriented to person, place, and time.   Psychiatric:         Mood and Affect: Mood normal.         Medications  Current Facility-Administered Medications   Medication Dose Route Frequency Provider Last Rate Last Admin   • benzocaine-menthol (CEPACOL) lozenge 1 Lozenge  1 Lozenge Mouth/Throat Q2HRS PRN Linda CURRIE  REGGIE Finley   1 Lozenge at 07/09/21 1445   • furosemide (LASIX) injection 10 mg  10 mg Intravenous Q DAY Myles Vidal M.D.   10 mg at 07/10/21 0511   • cetirizine (ZYRTEC) tablet 10 mg  10 mg Oral DAILY BHAVANI Lora.O.   10 mg at 07/10/21 0512   • escitalopram (Lexapro) tablet 30 mg  30 mg Oral DAILY BHAVANI Lora.O.   30 mg at 07/10/21 0512   • folic acid (FOLVITE) tablet 2 mg  2 mg Oral DAILY BHAVANI Lora.O.   2 mg at 07/10/21 0512   • lamoTRIgine (LAMICTAL) tablet 100 mg  100 mg Oral DAILY BHAVANI Lora.O.   100 mg at 07/10/21 0512   • magnesium oxide (MAG-OX) tablet 400 mg  400 mg Oral DAILY BHAVANI Lora.O.   400 mg at 07/10/21 0513   • montelukast (SINGULAIR) tablet 10 mg  10 mg Oral DAILY BHAVANI Lora.O.   10 mg at 07/10/21 0511   • omeprazole (PRILOSEC) capsule 40 mg  40 mg Oral DAILY BHAVANI Lora.O.   40 mg at 07/10/21 0512   • rosuvastatin (CRESTOR) tablet 10 mg  10 mg Oral QHS BHAVANI Lora.O.   10 mg at 07/09/21 2115   • Respiratory Therapy Consult   Nebulization Continuous RT BHAVANI Lora.O.       • acetaminophen (Tylenol) tablet 650 mg  650 mg Oral Q6HRS PRN BHAVANI Lora.O.   650 mg at 07/09/21 2117   • cloNIDine (CATAPRES) tablet 0.1 mg  0.1 mg Oral Q6HRS PRN BHAVANI Lora.O.       • enalaprilat (VASOTEC) injection 1.25 mg  1.25 mg Intravenous Q6HRS PRN BHAVANI Lora.O.       • labetalol (NORMODYNE/TRANDATE) injection 10 mg  10 mg Intravenous Q4HRS PRN BHAVANI Lora.O.       • ondansetron (ZOFRAN) syringe/vial injection 4 mg  4 mg Intravenous Q4HRS PRN Zachary Lund D.O.       • ondansetron (ZOFRAN ODT) dispertab 4 mg  4 mg Oral Q4HRS PRN Zachary Lund D.O.   4 mg at 07/08/21 0515   • promethazine (PHENERGAN) tablet 12.5-25 mg  12.5-25 mg Oral Q4HRS PRN Zachary Lund D.O.       • promethazine (PHENERGAN) suppository 12.5-25 mg  12.5-25 mg Rectal Q4HRS PRN Zachary Lund D.O.       • prochlorperazine (COMPAZINE) injection  5-10 mg  5-10 mg Intravenous Q4HRS PRN Zachary Lund D.O.       • LORazepam (ATIVAN) injection 0.5 mg  0.5 mg Intravenous Q4HRS PRN Zachary Lund D.O.       • ipratropium-albuterol (DUONEB) nebulizer solution  3 mL Nebulization Q4H PRN (RT) Zachary Lund D.O.   3 mL at 07/08/21 1330   • vitamin D (cholecalciferol) tablet 1,000 Units  1,000 Units Oral DAILY Linda Finley M.D.   1,000 Units at 07/10/21 0512   • linezolid (ZYVOX) tablet 600 mg  600 mg Oral Q12HRS Myles Vidal M.D.   600 mg at 07/10/21 0512   • doxycycline monohydrate (ADOXA) tablet 100 mg  100 mg Oral Q12HRS Myles Vidal M.D.   100 mg at 07/10/21 0512   • cefepime (Maxipime) 2 g in  mL IVPB  2 g Intravenous Q12HRS Myles Vidal M.D.   Stopped at 07/10/21 0541   • Pharmacy Consult Request  1 Each Other PHARMACY TO DOSE Dong Devries D.O.           Fluids    Intake/Output Summary (Last 24 hours) at 7/10/2021 0859  Last data filed at 7/10/2021 0800  Gross per 24 hour   Intake 896.67 ml   Output 4000 ml   Net -3103.33 ml       Laboratory          Recent Labs     07/07/21 2013 07/08/21  0530   SODIUM 139 139   POTASSIUM 3.7 4.2   CHLORIDE 103 105   CO2 21 22   BUN 18 17   CREATININE 0.77 0.62   CALCIUM 9.7 9.2     Recent Labs     07/07/21 2013 07/08/21  0530   ALTSGPT 15  --    ASTSGOT 21  --    ALKPHOSPHAT 121*  --    TBILIRUBIN 0.9  --    GLUCOSE 106* 163*     Recent Labs     07/07/21 2013 07/08/21  0530 07/08/21  1436   WBC 8.5 5.1 6.5   NEUTSPOLYS 64.40  --  69.90   LYMPHOCYTES 17.90*  --  25.40   MONOCYTES 5.80  --  3.80   EOSINOPHILS 10.60*  --  0.00   BASOPHILS 0.60  --  0.30   ASTSGOT 21  --   --    ALTSGPT 15  --   --    ALKPHOSPHAT 121*  --   --    TBILIRUBIN 0.9  --   --      Recent Labs     07/07/21 2013 07/08/21  0530 07/08/21  1436   RBC 4.42 3.91* 3.72*   HEMOGLOBIN 14.2 12.5 12.0   HEMATOCRIT 41.2 36.1* 34.8*   PLATELETCT 282 262 278       Imaging  X-Ray:  My impression is: Bilateral reticualr changes with no obvious PNTX      Assessment/Plan  Abnormal CT of the chest  Assessment & Plan  -- Ddx includes drug induced pneumonitis vs infectious (ie, PCP vs fungal vs atypical vs bacterial) vs pulmonary edema vs inflammatory (psoriatic arthritis related ILD vs CEP vs HP)    -- Consider CEP ruled out given no eosinophils seen on BAL cell counts    -- MRSA swab negative and resp cx negative to date   -- CRP and ESR mildly elevated   -- Pending connective tissue panel, CCP, and RF   -- Pending beta-D glucan, urinary legionella/strep, and cocci serology     -- Discussed about preliminary bronchoscopy results finding with patient. Differentials remain broad as above and results will take 3-5 days to come back. Patient requested to be discharge home with close follow up on results. Advised patient to seek ER visits if her respiratory or oxygen requirements worsen. In the meantime, I will follow up on BAL results and notify patient of final path report. Also discussed about the utility of empiric steroids if bronchoscopy results are negative for infectious causes with ongoing hypoxemia concerning for inflammatory vs drug induced pneumonitis. She verbalized understanding and agree with plan.     Asthma- (present on admission)  Assessment & Plan  -- Currently not in acute flare   -- Cont duoneb and singulair        Acute respiratory failure with hypoxia (HCC)- (present on admission)  Assessment & Plan  -- Secondary to aforementioned differentials in abnormal CT chest section   -- Cont gentle diuresis to seek net negative fluid balance    -- Recommend switching to oral abx (ie, moxifloxacin) when ready for discharge    -- Goal SpO2 > 88%   -- Need amb pulse oximetry when stable for discharge          VTE:  Lovenox  Ulcer: Not Indicated  Lines: None    I have performed a physical exam and reviewed and updated ROS and Plan today (7/10/2021). In review of yesterday's note (7/9/2021), there are no changes except as documented above.     Discussed  patient condition and risk of morbidity and/or mortality with Hospitalist, Family, RN, RT, Pharmacy and Patient

## 2021-07-10 NOTE — PROGRESS NOTES
Hospital Medicine Daily Progress Note    Date of Service  7/10/2021    Chief Complaint  Arely Haynes is a 60 y.o. female admitted 7/7/2021 with shortness of breath, nausea and diarrhea    Hospital Course  Patient is a 60 year old female with history of asthma, depression, erosive esophagitis, gerd, chronic pain and psoriatic arthritis for which she is on methotrexate and humira and immunocompromised.  She presented to the ER on 7/7/21 with myalgias, fevers, chills, nausea and loose stool.  She also reported generalized fatigue and mild shortness of breath.  She is fully vaccinated for COVID and two recent tests were negative. She was found to have acute hypoxic respiratory failure with a room air saturation of 86%.  Procalcitonin is negative.    Interval Problem Update  02 turned up to 5L last night but no reports of desaturation, communicated with nursing requesting that oxygen not be increased unless documented desaturations.  Currently stable on 3L NC. She states she is feeling better, some loose stool this am, no abdominal pain.  Discussed with pulm - was planning to d/c her home on oral quinolone however this unfortunately was not possible due to h/o anaphylaxis - now plan to d/c linezolid  - continue other meds and if cultures remain negative - possible d/c tomorrow off abx. ROS otherwise negative.  Vitamin D in low normal range - supplementation started    I have personally seen and examined the patient at bedside. I discussed the plan of care with patient, family, pulm, bedside nursing and case mgt    Consultants/Specialty  pulmonary    Code Status  Full Code    Disposition  Patient is not medically cleared.   Anticipate discharge to be determined    Review of Systems  Review of Systems   Constitutional: Positive for malaise/fatigue. Negative for chills, diaphoresis, fever and weight loss.   HENT: Negative.  Negative for sore throat.    Eyes: Negative.  Negative for blurred vision.   Respiratory:  Positive for shortness of breath. Negative for cough.    Cardiovascular: Negative.  Negative for chest pain, palpitations and leg swelling.   Gastrointestinal: Negative.  Negative for abdominal pain, nausea and vomiting.   Genitourinary: Negative.  Negative for dysuria.   Musculoskeletal: Negative.  Negative for myalgias.   Skin: Negative.  Negative for itching and rash.   Neurological: Negative.  Negative for dizziness, focal weakness, weakness and headaches.   Endo/Heme/Allergies: Negative.  Does not bruise/bleed easily.   Psychiatric/Behavioral: Negative.  Negative for depression, substance abuse and suicidal ideas.   All other systems reviewed and are negative.       Physical Exam  Temp:  [36.9 °C (98.4 °F)-38.5 °C (101.3 °F)] 37.7 °C (99.9 °F)  Pulse:  [51-65] 65  Resp:  [16-20] 18  BP: (110-124)/(52-57) 115/54  SpO2:  [92 %-97 %] 92 %    Physical Exam  Vitals and nursing note reviewed. Exam conducted with a chaperone present.   Constitutional:       General: She is not in acute distress.     Appearance: Normal appearance. She is not diaphoretic.   HENT:      Head: Normocephalic.      Nose: Nose normal.      Mouth/Throat:      Mouth: Mucous membranes are moist.   Eyes:      Pupils: Pupils are equal, round, and reactive to light.   Cardiovascular:      Rate and Rhythm: Normal rate and regular rhythm.      Pulses: Normal pulses.      Heart sounds: Normal heart sounds.   Pulmonary:      Effort: Pulmonary effort is normal.      Breath sounds: Normal breath sounds.      Comments: Good air movement  Abdominal:      General: Abdomen is flat. Bowel sounds are normal.      Palpations: Abdomen is soft.   Musculoskeletal:         General: No swelling or deformity. Normal range of motion.   Skin:     General: Skin is warm and dry.      Capillary Refill: Capillary refill takes less than 2 seconds.   Neurological:      General: No focal deficit present.      Mental Status: She is alert and oriented to person, place, and  time.      Cranial Nerves: No cranial nerve deficit.   Psychiatric:         Mood and Affect: Mood normal.         Behavior: Behavior normal.         Fluids    Intake/Output Summary (Last 24 hours) at 7/10/2021 1315  Last data filed at 7/10/2021 1245  Gross per 24 hour   Intake 1136.67 ml   Output 4000 ml   Net -2863.33 ml       Laboratory  Recent Labs     07/07/21 2013 07/08/21  0530 07/08/21  1436   WBC 8.5 5.1 6.5   RBC 4.42 3.91* 3.72*   HEMOGLOBIN 14.2 12.5 12.0   HEMATOCRIT 41.2 36.1* 34.8*   MCV 93.2 92.3 93.5   MCH 32.1 32.0 32.3   MCHC 34.5 34.6 34.5   RDW 45.0 44.6 44.6   PLATELETCT 282 262 278   MPV 9.2 9.2 9.2     Recent Labs     07/07/21 2013 07/08/21  0530 07/10/21  1028   SODIUM 139 139 134*   POTASSIUM 3.7 4.2 3.5*   CHLORIDE 103 105 98   CO2 21 22 25   GLUCOSE 106* 163* 93   BUN 18 17 14   CREATININE 0.77 0.62 0.75   CALCIUM 9.7 9.2 8.9                   Imaging  DX-CHEST-LIMITED (1 VIEW)   Final Result      No pneumothorax identified following bronchoscopy      Stable diffuse interstitial opacity could be from edema or interstitial lung disease favored over infection      DX-PORTABLE FLUOROSCOPY < 1 HOUR Is the patient pregnant? Performing stat test regardless of pregnancy status   Final Result      Indeterminate metallic density over the right hemidiaphragm, possibly representing the metal support for a face mask      CT-CTA CHEST PULMONARY ARTERY W/ RECONS   Final Result      1.  No evidence of pulmonary embolus.      2.  Bilateral diffuse interstitial prominence with groundglass opacities could be due to infection such as Covid 19 pneumonia, pulmonary edema or interstitial lung disease.      3.  Mildly prominent mediastinal hilar lymph nodes could be reactive if airspace disease is due to infection or perhaps related interstitial lung disease.            DX-CHEST-PORTABLE (1 VIEW)   Final Result      1.  Interstitial opacities could be due to pulmonary edema or pneumonitis possibly due to Covid  19.           Assessment/Plan  Hypokalemia  Assessment & Plan  Mild  Replacing  Will check mag    GERD (gastroesophageal reflux disease)- (present on admission)  Assessment & Plan  Continue home PPI    Major depressive disorder, recurrent, moderate (HCC)- (present on admission)  Assessment & Plan  Continue home meds    Asthma- (present on admission)  Assessment & Plan  No evidence of acute exacerbation, lungs clear, good air movement  Discussed with pulmonology - continue to hold off steroids for now    Acute respiratory failure with hypoxia (HCC)- (present on admission)  Assessment & Plan  Inpatient with diffuse bilateral interstitial opacities, Covid is negative, procalcitonin is normal   Legionella pending  Query if autoimmune process contributing   Bronch with BAL  Clinically stable  See above  Continue supportive care    Familial hypercholesterolemia- (present on admission)  Assessment & Plan  Continue home statin    Psoriatic arthritis (HCC)- (present on admission)  Assessment & Plan  Followed by rheum in [place]  See discussion above, will stop linezolid  No acute flair at this time  Off of methotrexate       VTE prophylaxis: enoxaparin ppx    I have performed a physical exam and reviewed and updated ROS and Plan today (7/10/2021). In review of yesterday's note (7/9/2021), there are no changes except as documented above.

## 2021-07-10 NOTE — CARE PLAN
The patient is Stable - Low risk of patient condition declining or worsening    Shift Goals  Clinical Goals: wean O2, monitor breath sounds  Patient Goals: wean o2    Progress made toward(s) clinical / shift goals:  Continuing to monitor oxygen therapy and breath sounds. Pt motivated to do incentive spirometry throughout shift    Patient is not progressing towards the following goals:      Problem: Knowledge Deficit - Standard  Goal: Patient and family/care givers will demonstrate understanding of plan of care, disease process/condition, diagnostic tests and medications  Outcome: Progressing     Problem: Respiratory:  Goal: Respiratory status will improve  Outcome: Progressing     Problem: Psychosocial  Goal: Patient's level of anxiety will decrease  Outcome: Progressing  Goal: Patient's ability to verbalize feelings about condition will improve  Outcome: Progressing  Goal: Patient's ability to re-evaluate and adapt role responsibilities will improve  Outcome: Progressing  Goal: Patient and family will demonstrate ability to cope with life altering diagnosis and/or procedure  Outcome: Progressing  Goal: Spiritual and cultural needs incorporated into hospitalization  Outcome: Progressing     Problem: Communication  Goal: The ability to communicate needs accurately and effectively will improve  Outcome: Progressing     Problem: Discharge Barriers/Planning  Goal: Patient's continuum of care needs are met  Outcome: Progressing     Problem: Hemodynamics  Goal: Patient's hemodynamics, fluid balance and neurologic status will be stable or improve  Outcome: Progressing     Problem: Respiratory  Goal: Patient will achieve/maintain optimum respiratory ventilation and gas exchange  Outcome: Progressing     Problem: Chest Tube Management  Goal: Complications related to chest tube will be avoided or minimized  Outcome: Progressing     Problem: Fluid Volume  Goal: Fluid volume balance will be maintained  Outcome: Progressing      Problem: Mechanical Ventilation  Goal: Safe management of artificial airway and ventilation  Outcome: Progressing  Goal: Successful weaning off mechanical ventilator, spontaneously maintains adequate gas exchange  Outcome: Progressing  Goal: Patient will be able to express needs and understand communication  Outcome: Progressing     Problem: Dysphagia  Goal: Dysphagia will improve  Outcome: Progressing     Problem: Risk for Aspiration  Goal: Patient's risk for aspiration will be absent or decrease  Outcome: Progressing     Problem: Nutrition  Goal: Patient's nutritional and fluid intake will be adequate or improve  Outcome: Progressing  Goal: Enteral nutrition will be maintained or improve  Outcome: Progressing  Goal: Enteral nutrition will be maintained or improve  Outcome: Progressing     Problem: Urinary Elimination  Goal: Establish and maintain regular urinary output  Outcome: Progressing     Problem: Bowel Elimination  Goal: Establish and maintain regular bowel function  Outcome: Progressing     Problem: Gastrointestinal Irritability  Goal: Nausea and vomiting will be absent or improve  Outcome: Progressing  Goal: Diarrhea will be absent or improved  Outcome: Progressing     Problem: Rectal Tube  Goal: Fecal output will be contained and skin will remain free from irritation  Outcome: Progressing     Problem: Mobility  Goal: Patient's capacity to carry out activities will improve  Outcome: Progressing     Problem: Self Care  Goal: Patient will have the ability to perform ADLs independently or with assistance (bathe, groom, dress, toilet and feed)  Outcome: Progressing     Problem: Infection - Standard  Goal: Patient will remain free from infection  Outcome: Progressing     Problem: Wound/ / Incision Healing  Goal: Patient's wound/surgical incision will decrease in size and heals properly  Outcome: Progressing     Problem: Pain - Standard  Goal: Alleviation of pain or a reduction in pain to the patient’s  comfort goal  Outcome: Progressing

## 2021-07-10 NOTE — CARE PLAN
The patient is tolerating O2 titration and on 3 liters    Shift Goals  Clinical Goals: wean O2, monitor breath sounds  Patient Goals: wean o2    Progress made toward(s) clinical / shift goals: decreasing O2 demand     Patient is not progressing towards the following goals:

## 2021-07-10 NOTE — FLOWSHEET NOTE
07/09/21 2000   Events/Summary/Plan   Events/Summary/Plan IS tx and eval   Vital Signs   Pulse 60   Respiration 16   Pulse Oximetry 97 %   $ Pulse Oximetry (Spot Check) Yes   O2 Alarms Set & Reviewed Yes   Respiratory Assessment   Respiratory Pattern Within Normal Limits   Level of Consciousness Alert   Chest Exam   Work Of Breathing / Effort Within Normal Limits   Breath Sounds   RUL Breath Sounds Clear   RML Breath Sounds Clear   RLL Breath Sounds Clear   MARY ANN Breath Sounds Clear   LLL Breath Sounds Clear   Secretions   Cough Dry;Non Productive   How Sputum Obtained Spontaneous   Sputum Amount Unable to Evaluate   Sputum Color Unable to Evaluate   Sputum Consistency Unable to Evaluate

## 2021-07-11 VITALS
HEIGHT: 72 IN | RESPIRATION RATE: 16 BRPM | HEART RATE: 52 BPM | SYSTOLIC BLOOD PRESSURE: 108 MMHG | TEMPERATURE: 98.2 F | OXYGEN SATURATION: 93 % | WEIGHT: 239.2 LBS | DIASTOLIC BLOOD PRESSURE: 62 MMHG | BODY MASS INDEX: 32.4 KG/M2

## 2021-07-11 PROBLEM — E87.6 HYPOKALEMIA: Status: RESOLVED | Noted: 2021-07-10 | Resolved: 2021-07-11

## 2021-07-11 LAB
1 3 BETA D GLUCAN INTERP Q4483: NEGATIVE
1,3 BETA GLUCAN SER-MCNC: <31 PG/ML
ANION GAP SERPL CALC-SCNC: 13 MMOL/L (ref 7–16)
BACTERIA BLD CULT: NORMAL
BACTERIA BLD CULT: NORMAL
BACTERIA SPEC RESP CULT: NORMAL
BASOPHILS # BLD AUTO: 0 % (ref 0–1.8)
BASOPHILS # BLD: 0 K/UL (ref 0–0.12)
BUN SERPL-MCNC: 13 MG/DL (ref 8–22)
CALCIUM SERPL-MCNC: 9.2 MG/DL (ref 8.4–10.2)
CCP IGG SERPL-ACNC: 4 UNITS (ref 0–19)
CHLORIDE SERPL-SCNC: 105 MMOL/L (ref 96–112)
CO2 SERPL-SCNC: 22 MMOL/L (ref 20–33)
CREAT SERPL-MCNC: 0.58 MG/DL (ref 0.5–1.4)
EOSINOPHIL # BLD AUTO: 0.92 K/UL (ref 0–0.51)
EOSINOPHIL NFR BLD: 14 % (ref 0–6.9)
ERYTHROCYTE [DISTWIDTH] IN BLOOD BY AUTOMATED COUNT: 44.7 FL (ref 35.9–50)
GLUCOSE SERPL-MCNC: 97 MG/DL (ref 65–99)
GRAM STN SPEC: NORMAL
HCT VFR BLD AUTO: 35 % (ref 37–47)
HGB BLD-MCNC: 12 G/DL (ref 12–16)
LYMPHOCYTES # BLD AUTO: 2.11 K/UL (ref 1–4.8)
LYMPHOCYTES NFR BLD: 32 % (ref 22–41)
MAGNESIUM SERPL-MCNC: 2.1 MG/DL (ref 1.5–2.5)
MANUAL DIFF BLD: NORMAL
MCH RBC QN AUTO: 32.3 PG (ref 27–33)
MCHC RBC AUTO-ENTMCNC: 34.3 G/DL (ref 33.6–35)
MCV RBC AUTO: 94.1 FL (ref 81.4–97.8)
MISCELLANEOUS LAB RESULT MISCLAB: NORMAL
MONOCYTES # BLD AUTO: 0.59 K/UL (ref 0–0.85)
MONOCYTES NFR BLD AUTO: 9 % (ref 0–13.4)
NEUTROPHILS # BLD AUTO: 2.97 K/UL (ref 2–7.15)
NEUTROPHILS NFR BLD: 45 % (ref 44–72)
NRBC # BLD AUTO: 0 K/UL
NRBC BLD-RTO: 0 /100 WBC
PLATELET # BLD AUTO: 324 K/UL (ref 164–446)
PLATELET BLD QL SMEAR: NORMAL
PMV BLD AUTO: 9.1 FL (ref 9–12.9)
POTASSIUM SERPL-SCNC: 3.8 MMOL/L (ref 3.6–5.5)
RBC # BLD AUTO: 3.72 M/UL (ref 4.2–5.4)
RBC BLD AUTO: NORMAL
SIGNIFICANT IND 70042: NORMAL
SITE SITE: NORMAL
SODIUM SERPL-SCNC: 140 MMOL/L (ref 135–145)
SOURCE SOURCE: NORMAL
VARIANT LYMPHS BLD QL SMEAR: NORMAL
WBC # BLD AUTO: 6.6 K/UL (ref 4.8–10.8)

## 2021-07-11 PROCEDURE — A9270 NON-COVERED ITEM OR SERVICE: HCPCS | Performed by: HOSPITALIST

## 2021-07-11 PROCEDURE — 36415 COLL VENOUS BLD VENIPUNCTURE: CPT

## 2021-07-11 PROCEDURE — A9270 NON-COVERED ITEM OR SERVICE: HCPCS | Performed by: INTERNAL MEDICINE

## 2021-07-11 PROCEDURE — 99239 HOSP IP/OBS DSCHRG MGMT >30: CPT | Performed by: HOSPITALIST

## 2021-07-11 PROCEDURE — 85007 BL SMEAR W/DIFF WBC COUNT: CPT

## 2021-07-11 PROCEDURE — 94760 N-INVAS EAR/PLS OXIMETRY 1: CPT

## 2021-07-11 PROCEDURE — 99233 SBSQ HOSP IP/OBS HIGH 50: CPT | Performed by: INTERNAL MEDICINE

## 2021-07-11 PROCEDURE — 700111 HCHG RX REV CODE 636 W/ 250 OVERRIDE (IP): Performed by: INTERNAL MEDICINE

## 2021-07-11 PROCEDURE — 700105 HCHG RX REV CODE 258: Performed by: INTERNAL MEDICINE

## 2021-07-11 PROCEDURE — 85027 COMPLETE CBC AUTOMATED: CPT

## 2021-07-11 PROCEDURE — 80048 BASIC METABOLIC PNL TOTAL CA: CPT

## 2021-07-11 PROCEDURE — 700102 HCHG RX REV CODE 250 W/ 637 OVERRIDE(OP): Performed by: INTERNAL MEDICINE

## 2021-07-11 PROCEDURE — 83735 ASSAY OF MAGNESIUM: CPT

## 2021-07-11 PROCEDURE — 700102 HCHG RX REV CODE 250 W/ 637 OVERRIDE(OP): Performed by: HOSPITALIST

## 2021-07-11 RX ORDER — PREDNISONE 10 MG/1
10 TABLET ORAL
Status: DISCONTINUED | OUTPATIENT
Start: 2021-08-01 | End: 2021-07-11 | Stop reason: HOSPADM

## 2021-07-11 RX ORDER — PREDNISONE 20 MG/1
20 TABLET ORAL
Status: DISCONTINUED | OUTPATIENT
Start: 2021-07-25 | End: 2021-07-11 | Stop reason: HOSPADM

## 2021-07-11 RX ORDER — FUROSEMIDE 20 MG/1
10 TABLET ORAL
Qty: 60 TABLET | Refills: 0 | Status: SHIPPED | OUTPATIENT
Start: 2021-07-11 | End: 2021-07-22

## 2021-07-11 RX ORDER — FUROSEMIDE 20 MG/1
10 TABLET ORAL
Status: DISCONTINUED | OUTPATIENT
Start: 2021-07-11 | End: 2021-07-11

## 2021-07-11 RX ORDER — PREDNISONE 20 MG/1
40 TABLET ORAL
Status: DISCONTINUED | OUTPATIENT
Start: 2021-07-11 | End: 2021-07-11 | Stop reason: HOSPADM

## 2021-07-11 RX ORDER — PREDNISONE 10 MG/1
10 TABLET ORAL DAILY
Qty: 70 TABLET | Refills: 0 | Status: SHIPPED | OUTPATIENT
Start: 2021-07-11 | End: 2021-11-23

## 2021-07-11 RX ORDER — FUROSEMIDE 20 MG/1
10 TABLET ORAL
Status: DISCONTINUED | OUTPATIENT
Start: 2021-07-11 | End: 2021-07-11 | Stop reason: HOSPADM

## 2021-07-11 RX ADMIN — FOLIC ACID 2 MG: 1 TABLET ORAL at 06:42

## 2021-07-11 RX ADMIN — Medication 1000 UNITS: at 06:45

## 2021-07-11 RX ADMIN — MAGNESIUM OXIDE TAB 400 MG (241.3 MG ELEMENTAL MG) 400 MG: 400 (241.3 MG) TAB at 06:42

## 2021-07-11 RX ADMIN — DOXYCYCLINE 100 MG: 100 TABLET, FILM COATED ORAL at 06:41

## 2021-07-11 RX ADMIN — ESCITALOPRAM OXALATE 30 MG: 10 TABLET ORAL at 06:41

## 2021-07-11 RX ADMIN — OMEPRAZOLE 40 MG: 20 CAPSULE, DELAYED RELEASE ORAL at 06:42

## 2021-07-11 RX ADMIN — MONTELUKAST 10 MG: 10 TABLET, FILM COATED ORAL at 06:46

## 2021-07-11 RX ADMIN — FUROSEMIDE 10 MG: 10 INJECTION, SOLUTION INTRAMUSCULAR; INTRAVENOUS at 06:32

## 2021-07-11 RX ADMIN — POTASSIUM CHLORIDE 20 MEQ: 1500 TABLET, EXTENDED RELEASE ORAL at 06:41

## 2021-07-11 RX ADMIN — CETIRIZINE HYDROCHLORIDE 10 MG: 10 TABLET, FILM COATED ORAL at 06:41

## 2021-07-11 RX ADMIN — LAMOTRIGINE 100 MG: 100 TABLET ORAL at 06:41

## 2021-07-11 RX ADMIN — PREDNISONE 40 MG: 20 TABLET ORAL at 08:47

## 2021-07-11 RX ADMIN — CEFEPIME 2 G: 2 INJECTION, POWDER, FOR SOLUTION INTRAVENOUS at 06:32

## 2021-07-11 ASSESSMENT — PAIN DESCRIPTION - PAIN TYPE
TYPE: ACUTE PAIN
TYPE: ACUTE PAIN

## 2021-07-11 ASSESSMENT — ENCOUNTER SYMPTOMS
WEIGHT LOSS: 0
COUGH: 1
CHILLS: 1
FEVER: 0
MUSCULOSKELETAL NEGATIVE: 1
WHEEZING: 0
GASTROINTESTINAL NEGATIVE: 1
CARDIOVASCULAR NEGATIVE: 1
SHORTNESS OF BREATH: 1
SPUTUM PRODUCTION: 0
HEMOPTYSIS: 0

## 2021-07-11 NOTE — PROGRESS NOTES
Pulmonary Progress Note    Date of admission  7/7/2021    Chief Complaint  60 y.o. female admitted 7/7/2021 with acute hypoxemia respiratory failure.     Hospital Course  60 y.o. female who presented 7/7/2021 with acute hypoxemia respiratory failure. Patient with significant history GERD, asthma, and psoriatic arthritis on humira and methotrexate presents with worsening shortness of breath. She was out working in a [place] town close to [place], [place] three weeks ago and developed a rash around her abdomen and back. She had a skin biopsy which came back positive for eczema dermatitis. She then completed a course of steroids and noted resolution of her rash. Two weeks ago she developed progressive worsening shortness of breath and cough. Last Friday she spiked a temp up to 103. Associated with a nonproductive cough, orthopnea, and nausea. No recent sick contact. On presentation she was found to be hypoxemia requiring 3 liters NC. CTA chest personally reviewed -> diffuse reticular thickening with patchy GGO and mildly enlarged right hilar adenopathy. Pulmonary consulted for further evaluation.      Subjectively patient reports feeling about the same. She was started on bactrim for possible UTI. Patient is a never smoker. She has a history of asthma which she follow up in [facility] with Dr. Erickson. In terms of her psoriatic arthritis she has been on methotrexate for over 8 hours and was recently started on humira 10 weeks ago due to worsening joint pain. She has two dogs at home. Had a remote history of chemical exposure back in [place].    7/9: Underwent bronchoscopy w/ BAL/TBBX. BAL cell counts -> no eosinophils seen. ESR 62, , HIV negative, RF <10, and CRP 5.83.      7/10: Cx negative to date. MRSA swab negative. Had a low grade fever overnight. Net negative 1.8 liters. Remains on 3 liters NC. Subjectively patient reports feeling better overall. Denies chest pain, N/V, worsening cough or SOB.  Walking pulse oximetry remains above 91% on 3 liters NC.    7/11: Cx negative to date. BAL/TBBX -> mild interstitial fibrosis. Beta D glucan negative. Urinary legionella/strep negative. On 3 liters NC. Anxious to go home today. Denies cough, fever/chills, chest pain, N/V, or abdominal pain.       Interval Problem Update  Reviewed last 24 hour events:  As above     Review of Systems  Review of Systems   Constitutional: Positive for chills and malaise/fatigue. Negative for fever and weight loss.   Respiratory: Positive for cough and shortness of breath. Negative for hemoptysis, sputum production and wheezing.    Cardiovascular: Negative.    Gastrointestinal: Negative.    Genitourinary: Negative.    Musculoskeletal: Negative.    All other systems reviewed and are negative.       Vital Signs for last 24 hours   Temp:  [36.9 °C (98.4 °F)-37.7 °C (99.9 °F)] 36.9 °C (98.4 °F)  Pulse:  [56-60] 60  Resp:  [17-20] 20  BP: (113-127)/(55-62) 119/55  SpO2:  [92 %-96 %] 96 %    Hemodynamic parameters for last 24 hours       Respiratory Information for the last 24 hours       Physical Exam   Physical Exam  Constitutional:       Appearance: Normal appearance.   HENT:      Head: Normocephalic.      Nose: Nose normal.   Eyes:      Extraocular Movements: Extraocular movements intact.      Pupils: Pupils are equal, round, and reactive to light.   Cardiovascular:      Rate and Rhythm: Normal rate and regular rhythm.      Pulses: Normal pulses.   Pulmonary:      Comments: No crackles or wheezes noted. Good air entry.   Abdominal:      Comments: Obese, NT to palpation, + BS       Musculoskeletal:         General: Normal range of motion.      Cervical back: Normal range of motion.      Right lower leg: No edema.      Left lower leg: No edema.   Skin:     General: Skin is warm.   Neurological:      General: No focal deficit present.      Mental Status: She is alert and oriented to person, place, and time.   Psychiatric:         Mood and  Affect: Mood normal.         Medications  Current Facility-Administered Medications   Medication Dose Route Frequency Provider Last Rate Last Admin   • potassium chloride SA (Kdur) tablet 20 mEq  20 mEq Oral DAILY Linda Finley M.D.   20 mEq at 07/11/21 0641   • benzocaine-menthol (CEPACOL) lozenge 1 Lozenge  1 Lozenge Mouth/Throat Q2HRS PRN Linda Finley M.D.   1 Lozenge at 07/09/21 1445   • furosemide (LASIX) injection 10 mg  10 mg Intravenous Q DAY Myles Vidal M.D.   10 mg at 07/11/21 0632   • cetirizine (ZYRTEC) tablet 10 mg  10 mg Oral DAILY Zachary Lund D.O.   10 mg at 07/11/21 0641   • escitalopram (Lexapro) tablet 30 mg  30 mg Oral DAILY Zachary Lund D.O.   30 mg at 07/11/21 0641   • folic acid (FOLVITE) tablet 2 mg  2 mg Oral DAILY Zachary Lund D.O.   2 mg at 07/11/21 0642   • lamoTRIgine (LAMICTAL) tablet 100 mg  100 mg Oral DAILY Zachary Lund D.O.   100 mg at 07/11/21 0641   • magnesium oxide (MAG-OX) tablet 400 mg  400 mg Oral DAILY Zachary Lund D.O.   400 mg at 07/11/21 0642   • montelukast (SINGULAIR) tablet 10 mg  10 mg Oral DAILY BHAVANI Lora.O.   10 mg at 07/11/21 0646   • omeprazole (PRILOSEC) capsule 40 mg  40 mg Oral DAILY Zachary Lund D.O.   40 mg at 07/11/21 0642   • rosuvastatin (CRESTOR) tablet 10 mg  10 mg Oral QHS BHAVANI Lora.O.   10 mg at 07/10/21 2033   • Respiratory Therapy Consult   Nebulization Continuous RT BHAVANI Lora.O.       • acetaminophen (Tylenol) tablet 650 mg  650 mg Oral Q6HRS PRN BHAVANI Lora.O.   650 mg at 07/09/21 2117   • cloNIDine (CATAPRES) tablet 0.1 mg  0.1 mg Oral Q6HRS PRN BHAVANI Lora.O.       • enalaprilat (VASOTEC) injection 1.25 mg  1.25 mg Intravenous Q6HRS PRN Zachary Lund D.O.       • labetalol (NORMODYNE/TRANDATE) injection 10 mg  10 mg Intravenous Q4HRS PRN NUHA LoraO.       • ondansetron (ZOFRAN) syringe/vial injection 4 mg  4 mg Intravenous Q4HRS PRN Zachary Lund D.O.       •  ondansetron (ZOFRAN ODT) dispertab 4 mg  4 mg Oral Q4HRS PRN NUHA LoraOCami   4 mg at 07/08/21 0515   • promethazine (PHENERGAN) tablet 12.5-25 mg  12.5-25 mg Oral Q4HRS PRN Zachary Lund D.O.       • promethazine (PHENERGAN) suppository 12.5-25 mg  12.5-25 mg Rectal Q4HRS PRN Zachary Lund D.O.       • prochlorperazine (COMPAZINE) injection 5-10 mg  5-10 mg Intravenous Q4HRS PRN Zachary Lund D.O.       • LORazepam (ATIVAN) injection 0.5 mg  0.5 mg Intravenous Q4HRS PRN Zachary Lund D.O.       • ipratropium-albuterol (DUONEB) nebulizer solution  3 mL Nebulization Q4H PRN (RT) Zachary Lund D.O.   3 mL at 07/08/21 1330   • vitamin D (cholecalciferol) tablet 1,000 Units  1,000 Units Oral DAILY Linda Finley M.D.   1,000 Units at 07/11/21 0645   • doxycycline monohydrate (ADOXA) tablet 100 mg  100 mg Oral Q12HRS Myles Vidal M.D.   100 mg at 07/11/21 0641   • cefepime (Maxipime) 2 g in  mL IVPB  2 g Intravenous Q12HRS Myles Vidal M.D. 200 mL/hr at 07/11/21 0632 2 g at 07/11/21 0632   • Pharmacy Consult Request  1 Each Other PHARMACY TO DOSE Dong Devries D.O.           Fluids    Intake/Output Summary (Last 24 hours) at 7/11/2021 0755  Last data filed at 7/10/2021 1245  Gross per 24 hour   Intake 580 ml   Output --   Net 580 ml       Laboratory          Recent Labs     07/10/21  1028 07/11/21  0324   SODIUM 134* 140   POTASSIUM 3.5* 3.8   CHLORIDE 98 105   CO2 25 22   BUN 14 13   CREATININE 0.75 0.58   MAGNESIUM 1.9 2.1   CALCIUM 8.9 9.2     Recent Labs     07/10/21  1028 07/11/21  0324   GLUCOSE 93 97     Recent Labs     07/08/21  1436 07/11/21  0324   WBC 6.5 6.6   NEUTSPOLYS 69.90 45.00   LYMPHOCYTES 25.40 32.00   MONOCYTES 3.80 9.00   EOSINOPHILS 0.00 14.00*   BASOPHILS 0.30 0.00     Recent Labs     07/08/21  1436 07/11/21  0324   RBC 3.72* 3.72*   HEMOGLOBIN 12.0 12.0   HEMATOCRIT 34.8* 35.0*   PLATELETCT 278 324       Imaging  X-Ray:  My impression is: Bilateral reticualr changes with  no obvious PNTX     Assessment/Plan  Abnormal CT of the chest  Assessment & Plan  -- Ddx includes drug induced pneumonitis vs inflammatory (psoriatic arthritis related ILD vs HP) vs infectious (ie, PCP vs fungal vs atypical vs bacterial) vs pulmonary edema   -- MRSA swab negative and resp cx negative to date   -- CRP and ESR mildly elevated   -- Pending connective tissue panel   -- Negative beta-D glucan, urinary legionella/strep   -- Pending cocci serology     -- TBBX showed chronic interstitial fibrosis with no evidence of malignancy   -- Discussed about bronchoscopy results finding with patient. Given negative culture data, infectious appears to be unlikely at this time. PCP PCR pending and will need close follow up upon discharge. Given ongoing hypoxemia with concern for inflammatory related to CT-ILD vs drug induced pneumonitis, will start prednisone 40 mg daily with 10 mg taper weekly.   -- Will need repeat CT chest in 4-6 weeks, PFTs and 6 minute walk test as outpatient       Asthma- (present on admission)  Assessment & Plan  -- Currently not in acute flare   -- Cont duoneb and singulair        Acute respiratory failure with hypoxia (HCC)- (present on admission)  Assessment & Plan  -- Secondary to aforementioned differentials in abnormal CT chest section   -- Cont gentle diuresis to seek net negative fluid balance    -- Will stop abx given negative culture for 48 hours   -- Goal SpO2 > 88%   -- Encourage PT/OT and OOB as tolerated          VTE:  Lovenox  Ulcer: Not Indicated  Lines: None    I have performed a physical exam and reviewed and updated ROS and Plan today (7/11/2021). In review of yesterday's note (7/10/2021), there are no changes except as documented above.     Discussed patient condition and risk of morbidity and/or mortality with Hospitalist, Family, RN, RT, Pharmacy and Patient

## 2021-07-11 NOTE — DISCHARGE SUMMARY
Discharge Summary    CHIEF COMPLAINT ON ADMISSION  Chief Complaint   Patient presents with   • Shortness of Breath     was seen here yesterday for same  not getting better    • Nausea   • Diarrhea       Reason for Admission  Fever; Shortness of Breath     Admission Date  7/7/2021    CODE STATUS  Full Code    HPI & HOSPITAL COURSE  Patient is a 60 year old female with history of asthma, depression, erosive esophagitis, gerd, chronic pain and psoriatic arthritis for which she is on methotrexate and humira and immunocompromised.  She presented to the ER on 7/7/21 with myalgias, fevers, chills, nausea and loose stool.  She also reported generalized fatigue and mild shortness of breath.  She is fully vaccinated for COVID and two recent tests were negative. She was found to have acute hypoxic respiratory failure with a room air saturation of 86% and febrile up to a fever or 103. Procalcitonin was negative.  Non specific findings were noted on a CTA of her chest with mild right sided hilar adenopathy and diffuse ground glass appearance.  Her GI symptoms resolved on their own.  She was seen by pulmonology and they started her on broad spectrum antibiotics.  These were discontinued after BAL from diagnostic broch were found to be unremarkable.    Her fevers resolved and her hypoxia improved some.   She will be discharged to home with 3L of supplemental oxygen on a slow prednisone taper.    Legionella, HIV, Pneumococcal ag, strep and beta glucan are negative.  MRSA swab was also negative.  Her chronic methotrexate was held and she will coordinate with her rheumatologist in [place].  Cocci serology and connective tissue panel results are pending at this time and will be reviewed in pulmonary clinic.  She agrees to return to the Er if needed.    Therefore, she is discharged in good and stable condition to home with close outpatient follow-up.    The patient met 2-midnight criteria for an inpatient stay at the time of  discharge.    Discharge Date  7/11/21    FOLLOW UP ITEMS POST DISCHARGE  Pcp  Rheumatology  Pulmonology    DISCHARGE DIAGNOSES  Active Problems:    Psoriatic arthritis (HCC) POA: Yes    Familial hypercholesterolemia POA: Yes    Acute respiratory failure with hypoxia (HCC) POA: Yes    Asthma POA: Yes    Major depressive disorder, recurrent, moderate (HCC) POA: Yes    GERD (gastroesophageal reflux disease) POA: Yes    Abnormal CT of the chest POA: Unknown  Resolved Problems:    Fever POA: Unknown    Hypokalemia POA: Unknown      FOLLOW UP  Future Appointments   Date Time Provider Department Center   7/23/2021  1:00 PM Lauren Angela P.A.-C. [facility] None   11/5/2021  7:00 AM Joe Espinal M.D. [facility] None     Myles Vidal M.D.  [address] [facility] [address]  [facility] [address]  [phone]    In 1 week        MEDICATIONS ON DISCHARGE     Medication List      START taking these medications      Instructions   furosemide 20 MG Tabs  Commonly known as: LASIX   Take 0.5 Tablets by mouth 1 time a day as needed (swelling or weight gain).  Dose: 10 mg     predniSONE 10 MG Tabs  Commonly known as: DELTASONE   Take 1 tablet by mouth every day. 40 mg po daily x 7 days, then 30 mg po daily x 7 days, then 20 mg po x 7 days, then 10 mg po daily x 7 days then stop  Dose: 10 mg     vitamin D 1000 UNIT Tabs  Commonly known as: VITAMIND D3   Take 1 tablet by mouth 2 times a day.  Dose: 1,000 Units        CONTINUE taking these medications      Instructions   acetaminophen 500 MG Tabs  Commonly known as: TYLENOL   Take 1,000 mg by mouth every 6 hours as needed for Moderate Pain.  Dose: 1,000 mg     cetirizine 10 MG Tabs  Commonly known as: ZYRTEC   Take 10 mg by mouth every day.  Dose: 10 mg     * escitalopram 10 MG Tabs  Commonly known as: Lexapro   Take 10 mg by mouth every day. Pt also has an RX for 20MG, pt takes 30MG total Qday  Dose: 10 mg     * Lexapro 20 MG tablet  Generic drug: escitalopram   Take 20 mg by mouth every day. Pt also  has an RX for 10MG, pt takes 30MG total Qday  Dose: 20 mg     etodolac 500 MG tablet  Commonly known as: LODINE   Take 500 mg by mouth 2 times a day.  Dose: 500 mg     folic acid 1 MG Tabs  Commonly known as: FOLVITE   Take 2 mg by mouth every day.  Dose: 2 mg     lamoTRIgine 100 MG Tabs  Commonly known as: LAMICTAL   Take 100 mg by mouth every day.  Dose: 100 mg     magnesium oxide 400 MG Tabs tablet  Commonly known as: MAG-OX   Take 400 mg by mouth every day.  Dose: 400 mg     montelukast 10 MG Tabs  Commonly known as: SINGULAIR   Take 1 Tab by mouth every day.  Dose: 10 mg     omeprazole 40 MG delayed-release capsule  Commonly known as: PRILOSEC   Take 40 mg by mouth every day.  Dose: 40 mg     rosuvastatin 10 MG Tabs  Commonly known as: CRESTOR   Take 1 Tab by mouth every bedtime.  Dose: 10 mg         * This list has 2 medication(s) that are the same as other medications prescribed for you. Read the directions carefully, and ask your doctor or other care provider to review them with you.            STOP taking these medications    azithromycin 250 MG Tabs  Commonly known as: ZITHROMAX     Humira Pen 40 MG/0.4ML Pnkt  Generic drug: Adalimumab     Methotrexate Sodium 250 MG/10ML Soln     valACYclovir 500 MG Tabs  Commonly known as: VALTREX            Allergies  Allergies   Allergen Reactions   • Ampicillin-Sulbactam Sodium Hives     Tolerates cefazolin  Pt does not feel this is an active allergy.    • Iodine Anaphylaxis     Contrast- Oral, topical, And Iv Dye   • Levofloxacin Anaphylaxis   • Shellfish-Derived Products Anaphylaxis     lobster   • Hydrocodone Itching   • Unipen [Nafcillin] Hives       DIET  Orders Placed This Encounter   Procedures   • Diet Order Diet: Regular (NON DAIRY)     Standing Status:   Standing     Number of Occurrences:   1     Order Specific Question:   Diet:     Answer:   Regular [1]     Comments:   NON DAIRY       ACTIVITY  As tolerated.  Weight bearing as  tolerated    CONSULTATIONS  Pulmonology     PROCEDURES  DX-CHEST-LIMITED (1 VIEW)   Final Result      No pneumothorax identified following bronchoscopy      Stable diffuse interstitial opacity could be from edema or interstitial lung disease favored over infection      DX-PORTABLE FLUOROSCOPY < 1 HOUR Is the patient pregnant? Performing stat test regardless of pregnancy status   Final Result      Indeterminate metallic density over the right hemidiaphragm, possibly representing the metal support for a face mask      CT-CTA CHEST PULMONARY ARTERY W/ RECONS   Final Result      1.  No evidence of pulmonary embolus.      2.  Bilateral diffuse interstitial prominence with groundglass opacities could be due to infection such as Covid 19 pneumonia, pulmonary edema or interstitial lung disease.      3.  Mildly prominent mediastinal hilar lymph nodes could be reactive if airspace disease is due to infection or perhaps related interstitial lung disease.            DX-CHEST-PORTABLE (1 VIEW)   Final Result      1.  Interstitial opacities could be due to pulmonary edema or pneumonitis possibly due to Covid 19.            LABORATORY  Lab Results   Component Value Date    SODIUM 140 07/11/2021    POTASSIUM 3.8 07/11/2021    CHLORIDE 105 07/11/2021    CO2 22 07/11/2021    GLUCOSE 97 07/11/2021    BUN 13 07/11/2021    CREATININE 0.58 07/11/2021        Lab Results   Component Value Date    WBC 6.6 07/11/2021    HEMOGLOBIN 12.0 07/11/2021    HEMATOCRIT 35.0 (L) 07/11/2021    PLATELETCT 324 07/11/2021        Total time of the discharge process exceeds 45 minutes.

## 2021-07-11 NOTE — DISCHARGE PLANNING
Anticipated Discharge Disposition: Home with O2    Action: PT discussed in morning rounds, Per MD Pt's O2 was delivered at bedside and she is medically clear to d/c.     Barriers to Discharge: None    Plan: Pt to d/c today.

## 2021-07-11 NOTE — PROGRESS NOTES
Discharging patient home per MD orders. Patient demonstrated understanding of discharge instructions and educational materials, as well as, follow up appointments, medications, prescriptions and home care with oxygen. Patient is voiding without difficulty, pain is well controlled, tolerating oral medications. O2 is greater than 90%. All questions have been answered. Patient has been discharged off the unit with a hospital escort.

## 2021-07-11 NOTE — DISCHARGE INSTRUCTIONS
Acute Respiratory Failure, Adult    Acute respiratory failure occurs when there is not enough oxygen passing from your lungs to your body. When this happens, your lungs have trouble removing carbon dioxide from the blood. This causes your blood oxygen level to drop too low as carbon dioxide builds up.  Acute respiratory failure is a medical emergency. It can develop quickly, but it is temporary if treated promptly. Your lung capacity, or how much air your lungs can hold, may improve with time, exercise, and treatment.  What are the causes?  There are many possible causes of acute respiratory failure, including:  · Lung injury.  · Chest injury or damage to the ribs or tissues near the lungs.  · Lung conditions that affect the flow of air and blood into and out of the lungs, such as pneumonia, acute respiratory distress syndrome, and cystic fibrosis.  · Medical conditions, such as strokes or spinal cord injuries, that affect the muscles and nerves that control breathing.  · Blood infection (sepsis).  · Inflammation of the pancreas (pancreatitis).  · A blood clot in the lungs (pulmonary embolism).  · A large-volume blood transfusion.  · Burns.  · Near-drowning.  · Seizure.  · Smoke inhalation.  · Reaction to medicines.  · Alcohol or drug overdose.  What increases the risk?  This condition is more likely to develop in people who have:  · A blocked airway.  · Asthma.  · A condition or disease that damages or weakens the muscles, nerves, bones, or tissues that are involved in breathing.  · A serious infection.  · A health problem that blocks the unconscious reflex that is involved in breathing, such as hypothyroidism or sleep apnea.  · A lung injury or trauma.  What are the signs or symptoms?  Trouble breathing is the main symptom of acute respiratory failure. Symptoms may also include:  · Rapid breathing.  · Restlessness or anxiety.  · Skin, lips, or fingernails that appear blue (cyanosis).  · Rapid heart  rate.  · Abnormal heart rhythms (arrhythmias).  · Confusion or changes in behavior.  · Tiredness or loss of energy.  · Feeling sleepy or having a loss of consciousness.  How is this diagnosed?  Your health care provider can diagnose acute respiratory failure with a medical history and physical exam. During the exam, your health care provider will listen to your heart and check for crackling or wheezing sounds in your lungs. Your may also have tests to confirm the diagnosis and determine what is causing respiratory failure. These tests may include:  · Measuring the amount of oxygen in your blood (pulse oximetry). The measurement comes from a small device that is placed on your finger, earlobe, or toe.  · Other blood tests to measure blood gases and to look for signs of infection.  · Sampling your cerebral spinal fluid or tracheal fluid to check for infections.  · Chest X-ray to look for fluid in spaces that should be filled with air.  · Electrocardiogram (ECG) to look at the heart's electrical activity.  How is this treated?  Treatment for this condition usually takes places in a hospital intensive care unit (ICU). Treatment depends on what is causing the condition. It may include one or more treatments until your symptoms improve. Treatment may include:  · Supplemental oxygen. Extra oxygen is given through a tube in the nose, a face mask, or a landeros.  · A device such as a continuous positive airway pressure (CPAP) or bi-level positive airway pressure (BiPAP or BPAP) machine. This treatment uses mild air pressure to keep the airways open. A mask or other device will be placed over your nose or mouth. A tube that is connected to a motor will deliver oxygen through the mask.  · Ventilator. This treatment helps move air into and out of the lungs. This may be done with a bag and mask or a machine. For this treatment, a tube is placed in your windpipe (trachea) so air and oxygen can flow to the lungs.  · Extracorporeal  membrane oxygenation (ECMO). This treatment temporarily takes over the function of the heart and lungs, supplying oxygen and removing carbon dioxide. ECMO gives the lungs a chance to recover. It may be used if a ventilator is not effective.  · Tracheostomy. This is a procedure that creates a hole in the neck to insert a breathing tube.  · Receiving fluids and medicines.  · Rocking the bed to help breathing.  Follow these instructions at home:  · Take over-the-counter and prescription medicines only as told by your health care provider.  · Return to normal activities as told by your health care provider. Ask your health care provider what activities are safe for you.  · Keep all follow-up visits as told by your health care provider. This is important.  How is this prevented?  Treating infections and medical conditions that may lead to acute respiratory failure can help prevent the condition from developing.  Contact a health care provider if:  · You have a fever.  · Your symptoms do not improve or they get worse.  Get help right away if:  · You are having trouble breathing.  · You lose consciousness.  · Your have cyanosis or turn blue.  · You develop a rapid heart rate.  · You are confused.  These symptoms may represent a serious problem that is an emergency. Do not wait to see if the symptoms will go away. Get medical help right away. Call your local emergency services (911 in the [place] Do not drive yourself to the hospital.  This information is not intended to replace advice given to you by your health care provider. Make sure you discuss any questions you have with your health care provider.  Document Released: 12/23/2014 Document Revised: 11/30/2018 Document Reviewed: 07/05/2017  Elsevier Patient Education © 2020 [employer]    Community-Acquired Pneumonia, Adult  Pneumonia is an infection of the lungs. It causes swelling in the airways of the lungs. Mucus and fluid may also build up inside the airways.  One  type of pneumonia can happen while a person is in a hospital. A different type can happen when a person is not in a hospital (community-acquired pneumonia).   What are the causes?    This condition is caused by germs (viruses, bacteria, or fungi). Some types of germs can be passed from one person to another. This can happen when you breathe in droplets from the cough or sneeze of an infected person.  What increases the risk?  You are more likely to develop this condition if you:  · Have a long-term (chronic) disease, such as:  ? Chronic obstructive pulmonary disease (COPD).  ? Asthma.  ? Cystic fibrosis.  ? Congestive heart failure.  ? Diabetes.  ? Kidney disease.  · Have HIV.  · Have sickle cell disease.  · Have had your spleen removed.  · Do not take good care of your teeth and mouth (poor dental hygiene).  · Have a medical condition that increases the risk of breathing in droplets from your own mouth and nose.  · Have a weakened body defense system (immune system).  · Are a smoker.  · Travel to areas where the germs that cause this illness are common.  · Are around certain animals or the places they live.  What are the signs or symptoms?  · A dry cough.  · A wet (productive) cough.  · Fever.  · Sweating.  · Chest pain. This often happens when breathing deeply or coughing.  · Fast breathing or trouble breathing.  · Shortness of breath.  · Shaking chills.  · Feeling tired (fatigue).  · Muscle aches.  How is this treated?  Treatment for this condition depends on many things. Most adults can be treated at home. In some cases, treatment must happen in a hospital. Treatment may include:  · Medicines given by mouth or through an IV tube.  · Being given extra oxygen.  · Respiratory therapy.  In rare cases, treatment for very bad pneumonia may include:  · Using a machine to help you breathe.  · Having a procedure to remove fluid from around your lungs.  Follow these instructions at home:  Medicines  · Take  over-the-counter and prescription medicines only as told by your doctor.  ? Only take cough medicine if you are losing sleep.  · If you were prescribed an antibiotic medicine, take it as told by your doctor. Do not stop taking the antibiotic even if you start to feel better.  General instructions    · Sleep with your head and neck raised (elevated). You can do this by sleeping in a recliner or by putting a few pillows under your head.  · Rest as needed. Get at least 8 hours of sleep each night.  · Drink enough water to keep your pee (urine) pale yellow.  · Eat a healthy diet that includes plenty of vegetables, fruits, whole grains, low-fat dairy products, and lean protein.  · Do not use any products that contain nicotine or tobacco. These include cigarettes, e-cigarettes, and chewing tobacco. If you need help quitting, ask your doctor.  · Keep all follow-up visits as told by your doctor. This is important.  How is this prevented?  A shot (vaccine) can help prevent pneumonia. Shots are often suggested for:  · People older than 65 years of age.  · People older than 19 years of age who:  ? Are having cancer treatment.  ? Have long-term (chronic) lung disease.  ? Have problems with their body's defense system.  You may also prevent pneumonia if you take these actions:  · Get the flu (influenza) shot every year.  · Go to the dentist as often as told.  · Wash your hands often. If you cannot use soap and water, use hand .  Contact a doctor if:  · You have a fever.  · You lose sleep because your cough medicine does not help.  Get help right away if:  · You are short of breath and it gets worse.  · You have more chest pain.  · Your sickness gets worse. This is very serious if:  ? You are an older adult.  ? Your body's defense system is weak.  · You cough up blood.  Summary  · Pneumonia is an infection of the lungs.  · Most adults can be treated at home. Some will need treatment in a hospital.  · Drink enough water  to keep your pee pale yellow.  · Get at least 8 hours of sleep each night.  This information is not intended to replace advice given to you by your health care provider. Make sure you discuss any questions you have with your health care provider.  Document Released: 06/05/2009 Document Revised: 04/08/2020 Document Reviewed: 08/15/2019  [employer] Patient Education © 2020 [employer]    Discharge Instructions    Discharged to home by car with relative. Discharged via wheelchair, hospital escort: Yes.  Special equipment needed: Oxygen    Be sure to schedule a follow-up appointment with your primary care doctor or any specialists as instructed.     Discharge Plan:   Diet Plan: Discussed  Activity Level: Discussed  Confirmed Follow up Appointment: Patient to Call and Schedule Appointment  Confirmed Symptoms Management: Discussed  Medication Reconciliation Updated: Yes    I understand that a diet low in cholesterol, fat, and sodium is recommended for good health. Unless I have been given specific instructions below for another diet, I accept this instruction as my diet prescription.   Other diet: Prior to arrival    Special Instructions: None    · Is patient discharged on Warfarin / Coumadin?   No     Depression / Suicide Risk    As you are discharged from this [facility] facility, it is important to learn how to keep safe from harming yourself.    Recognize the warning signs:  · Abrupt changes in personality, positive or negative- including increase in energy   · Giving away possessions  · Change in eating patterns- significant weight changes-  positive or negative  · Change in sleeping patterns- unable to sleep or sleeping all the time   · Unwillingness or inability to communicate  · Depression  · Unusual sadness, discouragement and loneliness  · Talk of wanting to die  · Neglect of personal appearance   · Rebelliousness- reckless behavior  · Withdrawal from people/activities they love  · Confusion- inability to  concentrate     If you or a loved one observes any of these behaviors or has concerns about self-harm, here's what you can do:  · Talk about it- your feelings and reasons for harming yourself  · Remove any means that you might use to hurt yourself (examples: pills, rope, extension cords, firearm)  · Get professional help from the community (Mental Health, Substance Abuse, psychological counseling)  · Do not be alone:Call your Safe Contact- someone whom you trust who will be there for you.  · Call your local CRISIS HOTLINE 445-7194 or 890-116-6871  · Call your local Children's Mobile Crisis Response Team [place] (989) 309-6583 or www.[employer]  · Call the toll free National Suicide Prevention Hotlines   · National Suicide Prevention Lifeline 161-601-LLJX (2879)  · National Hope Line Network 800-SUICIDE (911-9266)

## 2021-07-11 NOTE — PROGRESS NOTES
Received report from night shift RN. Patient A&Ox4, denies pain, N/V, SOB. Patient on 3L O2 via NC. Chart check complete. VSS. Call light and belongings in reach. Patient denies further needs at this time.

## 2021-07-11 NOTE — CARE PLAN
The patient is Watcher - Medium risk of patient condition declining or worsening    Shift Goals  Clinical Goals: Wean O2, monitor oxygen sats, breath sounds  Patient Goals: Wean O2  Family Goals: N/A    Progress made toward(s) clinical / shift goals:  yes    Patient is not progressing towards the following goals:

## 2021-07-11 NOTE — CARE PLAN
The patient is Stable - Low risk of patient condition declining or worsening    Shift Goals  Clinical Goals: Monitor temperature  Patient Goals: Wean O2  Family Goals: N/A    Progress made toward(s) clinical / shift goals: Patient's temperature is stable at this time.     Patient is not progressing towards the following goals: N/A

## 2021-07-12 LAB
% CD3 - BAL Q5871: 86 %
% CD4 - BAL Q5872: 63 %
% CD8 - BAL Q5873: 22 %
BACTERIA BLD CULT: NORMAL
BACTERIA BLD CULT: NORMAL
CD4:CD8 RATIO - BAL Q5874: 2.86 RATIO
P JIROVECII AG SPEC QL IF: NEGATIVE
SIGNIFICANT IND 70042: NORMAL
SIGNIFICANT IND 70042: NORMAL
SITE SITE: NORMAL
SITE SITE: NORMAL
SOURCE SOURCE: NORMAL
SOURCE SOURCE: NORMAL
SPECIMEN SOURCE: NORMAL
SPECIMEN SOURCE: NORMAL

## 2021-07-13 ENCOUNTER — PATIENT MESSAGE (OUTPATIENT)
Dept: MEDICAL GROUP | Facility: LAB | Age: 61
End: 2021-07-13

## 2021-07-13 ENCOUNTER — HOSPITAL ENCOUNTER (OUTPATIENT)
Facility: MEDICAL CENTER | Age: 61
End: 2021-07-13
Attending: FAMILY MEDICINE
Payer: COMMERCIAL

## 2021-07-13 ENCOUNTER — OFFICE VISIT (OUTPATIENT)
Dept: MEDICAL GROUP | Facility: LAB | Age: 61
End: 2021-07-13
Payer: COMMERCIAL

## 2021-07-13 VITALS
WEIGHT: 233 LBS | SYSTOLIC BLOOD PRESSURE: 102 MMHG | BODY MASS INDEX: 31.56 KG/M2 | HEART RATE: 57 BPM | HEIGHT: 72 IN | OXYGEN SATURATION: 96 % | TEMPERATURE: 97 F | DIASTOLIC BLOOD PRESSURE: 58 MMHG | RESPIRATION RATE: 12 BRPM

## 2021-07-13 DIAGNOSIS — J96.01 ACUTE RESPIRATORY FAILURE WITH HYPOXIA (HCC): ICD-10-CM

## 2021-07-13 DIAGNOSIS — L40.50 PSORIATIC ARTHRITIS (HCC): ICD-10-CM

## 2021-07-13 DIAGNOSIS — R35.0 URINARY FREQUENCY: ICD-10-CM

## 2021-07-13 DIAGNOSIS — R00.1 BRADYCARDIA: ICD-10-CM

## 2021-07-13 LAB
APPEARANCE UR: NORMAL
BILIRUB UR STRIP-MCNC: NORMAL MG/DL
CENTROMERE IGG TITR SER IF: 0 AU/ML (ref 0–40)
COLOR UR AUTO: NORMAL
ENA JO1 AB TITR SER: 0 AU/ML (ref 0–40)
ENA SCL70 IGG SER QL: 0 AU/ML (ref 0–40)
ENA SM IGG SER-ACNC: 1 AU/ML (ref 0–40)
ENA SS-B IGG SER IA-ACNC: 0 AU/ML (ref 0–40)
GLUCOSE UR STRIP.AUTO-MCNC: NORMAL MG/DL
KETONES UR STRIP.AUTO-MCNC: NORMAL MG/DL
LEUKOCYTE ESTERASE UR QL STRIP.AUTO: NORMAL
NITRITE UR QL STRIP.AUTO: NORMAL
P JIROVECII DNA SPEC QL NAA+PROBE: DETECTED
PH UR STRIP.AUTO: 5.5 [PH] (ref 5–8)
PROT UR QL STRIP: NORMAL MG/DL
RBC UR QL AUTO: NORMAL
RIBOSOMAL P AB SER-ACNC: 1 AU/ML (ref 0–40)
SP GR UR STRIP.AUTO: 1.02
SPECIMEN SOURCE: ABNORMAL
SSA52 R0ENA AB IGG Q0420: 5 AU/ML (ref 0–40)
SSA60 R0ENA AB IGG Q0419: 0 AU/ML (ref 0–40)
U1 SNRNP IGG SER QL: NORMAL
UROBILINOGEN UR STRIP-MCNC: 0.2 MG/DL

## 2021-07-13 PROCEDURE — 99214 OFFICE O/P EST MOD 30 MIN: CPT | Performed by: FAMILY MEDICINE

## 2021-07-13 PROCEDURE — 81002 URINALYSIS NONAUTO W/O SCOPE: CPT | Performed by: FAMILY MEDICINE

## 2021-07-13 PROCEDURE — 87086 URINE CULTURE/COLONY COUNT: CPT

## 2021-07-13 ASSESSMENT — PAIN SCALES - GENERAL: PAIN_LEVEL: 0

## 2021-07-13 ASSESSMENT — FIBROSIS 4 INDEX: FIB4 SCORE: 1.02

## 2021-07-13 NOTE — ANESTHESIA POSTPROCEDURE EVALUATION
Patient: Arely Haynes    Procedure Summary     Date: 07/09/21 Room / Location:  PROCEDURE ROOM / SURGERY [facility]    Anesthesia Start: 0742 Anesthesia Stop: 0822    Procedure: BRONCHOSCOPY (Chest) Diagnosis: (Interstitial opacities; pending pathology)    Surgeons: Myles Vidal M.D. Responsible Provider: Stan Diggs M.D.    Anesthesia Type: general ASA Status: 2          Final Anesthesia Type: general  Last vitals  BP WNL       Temp WNL   Pulse WNL   Resp WNL   SPO2 WNL     Anesthesia Post Evaluation    Patient location during evaluation: PACU  Patient participation: complete - patient participated  Level of consciousness: awake and alert  Pain score: 0    Airway patency: patent  Anesthetic complications: no  Cardiovascular status: hemodynamically stable  Respiratory status: acceptable  Hydration status: euvolemic    PONV: none          There were no known complications for this encounter.     Nurse Pain Score: 0 (NPRS)

## 2021-07-13 NOTE — PROGRESS NOTES
Subjective:     Chief Complaint   Patient presents with   • Follow-Up     from \[facility] [place] [facility]\"" Placard         HPI:   Arely presents today for hospital follow up. Started with fevers for 6 days, couldn't drink enough, couldn't breathe, then went to ED twice before she was admitted.  Extremely extensive testing was conducted to rule out source for lung infection and nothing has been found.  She reports the current belief is that this might be a reaction with the methotrexate and Humira that she was taking for psoriatic arthritis.  CT scan does show diffuse severe ground glass opacities.  Covid's been negative.  She did have an interesting history of some shortness of breath randomly in the past without much seen on chest x-ray except for 1 in 2019 with mention of a hazy opacity in the right lobe.    Holding on MTX and humira right now. May be humira as well. Psoriatic arthritis.     Has pulm and Rheum follow ups.     On O2 now. On 2 L now, goes up to 3 L at night, pulse dropping at night, possibly due to steroids.   Vision a bit fuzzy.   She is working on weaning down on her oxygen use but would really like a portable so that she does not have to pull the giant tank around with her while she is try to go to appointments and be active.        Current Outpatient Medications Ordered in Epic   Medication Sig Dispense Refill   • furosemide (LASIX) 20 MG Tab Take 0.5 Tablets by mouth 1 time a day as needed (swelling or weight gain). 60 tablet 0   • predniSONE (DELTASONE) 10 MG Tab Take 1 tablet by mouth every day. 40 mg po daily x 7 days, then 30 mg po daily x 7 days, then 20 mg po x 7 days, then 10 mg po daily x 7 days then stop 70 tablet 0   • vitamin D (VITAMIND D3) 1000 UNIT Tab Take 1 tablet by mouth 2 times a day. 60 tablet 0   • escitalopram (LEXAPRO) 10 MG Tab Take 10 mg by mouth every day. Pt also has an RX for 20MG, pt takes 30MG total Qday     • escitalopram (LEXAPRO) 20 MG tablet Take 20 mg by mouth every day. Pt  also has an RX for 10MG, pt takes 30MG total Qday     • etodolac (LODINE) 500 MG tablet Take 500 mg by mouth 2 times a day.     • lamoTRIgine (LAMICTAL) 100 MG Tab Take 100 mg by mouth every day.     • acetaminophen (TYLENOL) 500 MG Tab Take 1,000 mg by mouth every 6 hours as needed for Moderate Pain.     • folic acid (FOLVITE) 1 MG Tab Take 2 mg by mouth every day.     • rosuvastatin (CRESTOR) 10 MG Tab Take 1 Tab by mouth every bedtime. 90 Tab 0   • montelukast (SINGULAIR) 10 MG Tab Take 1 Tab by mouth every day. 90 Tab 1   • magnesium oxide (MAG-OX) 400 MG Tab Take 400 mg by mouth every day.     • omeprazole (PRILOSEC) 40 MG delayed-release capsule Take 40 mg by mouth every day.     • cetirizine (ZYRTEC) 10 MG Tab Take 10 mg by mouth every day.       No current [facility]-ordered facility-administered medications on file.           ROS:  Gen: no fevers/chills, no changes in weight  Eyes: no changes in vision  ENT: no sore throat, no hearing loss, no bloody nose  Pulm: no sob, no cough  CV: no chest pain, no palpitations  GI: no nausea/vomiting, no diarrhea  : no dysuria  MSk: no myalgias  Skin: no rash        Objective:     Exam:  /58 (BP Location: Right arm, Patient Position: Sitting, BP Cuff Size: Adult)   Pulse (!) 57   Temp 36.1 °C (97 °F)   Resp 12   Ht 1.829 m (6')   Wt 106 kg (233 lb)   LMP  (LMP Unknown)   SpO2 96%   BMI 31.60 kg/m²  Body mass index is 31.6 kg/m².    Gen: Alert and oriented, No apparent distress.  Neck: Neck is supple without lymphadenopathy.  Lungs: Normal effort, CTA bilaterally, no wheezes, rhonchi, or rales  CV: Regular rate and rhythm. No murmurs, rubs, or gallops.  Ext: No clubbing, cyanosis, edema.      Labs: Reviewed her hospital course, labs, imaging.  CT of her lungs is shown to her today.  She is a nurse relatively familiar with radiological studies.    Assessment & Plan:     60 y.o. female with the following -     1. Psoriatic arthritis (HCC)  Chronic, stable at this  point on prednisone.  Continue to follow with rheumatology per recommendations.  She is thinking potentially about visiting [facility] to see if they have a different opinion or could recommend other treatments.  Prior to this issue she was very active hiking biking swimming running etc.    2. Urinary frequency  We will check a urine today.  She was treated for UTI in the hospital but was not sent home on any antibiotics.  We will treat as necessary.  - POCT urinalysis docked device    3. Bradycardia  Bradycardia seems to be a new symptom for her, worsening overnight into the 40s.  We will get an echo.  She does report this was supposed to be done in the hospital but just never wants.  - EC-ECHOCARDIOGRAM COMPLETE W/O CONT; Future    4. Acute respiratory failure with hypoxia (HCC)  We will try to get a home oxygen portable for her so that she is not having to drag of the large tank around with her.  Disability placard is filled out for her today as well.  - REFERRAL TO HOME OXYGEN  - DME Portable Oxygen Concentrator        No follow-ups on file.    Please note that this dictation was created using voice recognition software. I have made every reasonable attempt to correct obvious errors, but I expect that there are errors of grammar and possibly content that I did not discover before finalizing the note.

## 2021-07-14 ENCOUNTER — PATIENT MESSAGE (OUTPATIENT)
Dept: MEDICAL GROUP | Facility: LAB | Age: 61
End: 2021-07-14

## 2021-07-14 DIAGNOSIS — R35.0 URINARY FREQUENCY: ICD-10-CM

## 2021-07-14 DIAGNOSIS — J96.01 ACUTE RESPIRATORY FAILURE WITH HYPOXIA (HCC): ICD-10-CM

## 2021-07-14 LAB
C IMMITIS AB SER QL ID: NORMAL
COCCIDIOIDES AB TITR SER CF: NORMAL {TITER}
COCCIDIOIDES IGG SER-ACNC: 0.5 IV
COCCIDIOIDES IGM SERPL-ACNC: 0.1 IV

## 2021-07-14 NOTE — PATIENT COMMUNICATION
Set up script for home O2. Routing to available provider to sign as PCP out today.     ERIC Mason

## 2021-07-15 ENCOUNTER — APPOINTMENT (OUTPATIENT)
Dept: CARDIOLOGY | Facility: MEDICAL CENTER | Age: 61
DRG: 177 | End: 2021-07-15
Attending: HOSPITALIST
Payer: COMMERCIAL

## 2021-07-15 ENCOUNTER — HOSPITAL ENCOUNTER (INPATIENT)
Facility: MEDICAL CENTER | Age: 61
LOS: 1 days | DRG: 177 | End: 2021-07-15
Attending: EMERGENCY MEDICINE | Admitting: HOSPITALIST
Payer: COMMERCIAL

## 2021-07-15 ENCOUNTER — APPOINTMENT (OUTPATIENT)
Dept: RADIOLOGY | Facility: MEDICAL CENTER | Age: 61
DRG: 177 | End: 2021-07-15
Attending: EMERGENCY MEDICINE
Payer: COMMERCIAL

## 2021-07-15 VITALS
WEIGHT: 237.44 LBS | HEART RATE: 56 BPM | RESPIRATION RATE: 16 BRPM | BODY MASS INDEX: 32.16 KG/M2 | DIASTOLIC BLOOD PRESSURE: 55 MMHG | HEIGHT: 72 IN | OXYGEN SATURATION: 93 % | SYSTOLIC BLOOD PRESSURE: 107 MMHG | TEMPERATURE: 98.6 F

## 2021-07-15 DIAGNOSIS — R07.9 ACUTE CHEST PAIN: ICD-10-CM

## 2021-07-15 PROBLEM — R00.1 BRADYCARDIA: Status: ACTIVE | Noted: 2021-07-15

## 2021-07-15 PROBLEM — B59 PCP (PNEUMOCYSTIS CARINII PNEUMONIA) (HCC): Status: ACTIVE | Noted: 2021-07-15

## 2021-07-15 LAB
ALBUMIN SERPL BCP-MCNC: 4 G/DL (ref 3.2–4.9)
ALBUMIN/GLOB SERPL: 1.1 G/DL
ALP SERPL-CCNC: 108 U/L (ref 30–99)
ALT SERPL-CCNC: 21 U/L (ref 2–50)
ANION GAP SERPL CALC-SCNC: 12 MMOL/L (ref 7–16)
AST SERPL-CCNC: 24 U/L (ref 12–45)
BASOPHILS # BLD AUTO: 0.5 % (ref 0–1.8)
BASOPHILS # BLD: 0.05 K/UL (ref 0–0.12)
BILIRUB SERPL-MCNC: 0.3 MG/DL (ref 0.1–1.5)
BUN SERPL-MCNC: 23 MG/DL (ref 8–22)
CALCIUM SERPL-MCNC: 9.4 MG/DL (ref 8.4–10.2)
CHLORIDE SERPL-SCNC: 102 MMOL/L (ref 96–112)
CO2 SERPL-SCNC: 22 MMOL/L (ref 20–33)
CREAT SERPL-MCNC: 0.56 MG/DL (ref 0.5–1.4)
EOSINOPHIL # BLD AUTO: 0.03 K/UL (ref 0–0.51)
EOSINOPHIL NFR BLD: 0.3 % (ref 0–6.9)
ERYTHROCYTE [DISTWIDTH] IN BLOOD BY AUTOMATED COUNT: 45.6 FL (ref 35.9–50)
GLOBULIN SER CALC-MCNC: 3.5 G/DL (ref 1.9–3.5)
GLUCOSE SERPL-MCNC: 114 MG/DL (ref 65–99)
HCT VFR BLD AUTO: 37.2 % (ref 37–47)
HGB BLD-MCNC: 12.6 G/DL (ref 12–16)
IMM GRANULOCYTES # BLD AUTO: 0.17 K/UL (ref 0–0.11)
IMM GRANULOCYTES NFR BLD AUTO: 1.6 % (ref 0–0.9)
LV EJECT FRACT  99904: 65
LV EJECT FRACT MOD 2C 99903: 63.26
LV EJECT FRACT MOD 4C 99902: 61.3
LV EJECT FRACT MOD BP 99901: 61.18
LYMPHOCYTES # BLD AUTO: 3.05 K/UL (ref 1–4.8)
LYMPHOCYTES NFR BLD: 28.3 % (ref 22–41)
MCH RBC QN AUTO: 32.1 PG (ref 27–33)
MCHC RBC AUTO-ENTMCNC: 33.9 G/DL (ref 33.6–35)
MCV RBC AUTO: 94.9 FL (ref 81.4–97.8)
MONOCYTES # BLD AUTO: 0.93 K/UL (ref 0–0.85)
MONOCYTES NFR BLD AUTO: 8.6 % (ref 0–13.4)
NEUTROPHILS # BLD AUTO: 6.54 K/UL (ref 2–7.15)
NEUTROPHILS NFR BLD: 60.7 % (ref 44–72)
NRBC # BLD AUTO: 0 K/UL
NRBC BLD-RTO: 0 /100 WBC
PLATELET # BLD AUTO: 431 K/UL (ref 164–446)
PMV BLD AUTO: 9.1 FL (ref 9–12.9)
POTASSIUM SERPL-SCNC: 4.1 MMOL/L (ref 3.6–5.5)
PROT SERPL-MCNC: 7.5 G/DL (ref 6–8.2)
RBC # BLD AUTO: 3.92 M/UL (ref 4.2–5.4)
SODIUM SERPL-SCNC: 136 MMOL/L (ref 135–145)
TROPONIN T SERPL-MCNC: <6 NG/L (ref 6–19)
WBC # BLD AUTO: 10.8 K/UL (ref 4.8–10.8)

## 2021-07-15 PROCEDURE — 700105 HCHG RX REV CODE 258: Performed by: HOSPITALIST

## 2021-07-15 PROCEDURE — 700102 HCHG RX REV CODE 250 W/ 637 OVERRIDE(OP): Performed by: EMERGENCY MEDICINE

## 2021-07-15 PROCEDURE — 93306 TTE W/DOPPLER COMPLETE: CPT | Mod: 26 | Performed by: INTERNAL MEDICINE

## 2021-07-15 PROCEDURE — 99223 1ST HOSP IP/OBS HIGH 75: CPT | Performed by: INTERNAL MEDICINE

## 2021-07-15 PROCEDURE — 99235 HOSP IP/OBS SAME DATE MOD 70: CPT | Performed by: HOSPITALIST

## 2021-07-15 PROCEDURE — 700101 HCHG RX REV CODE 250: Performed by: HOSPITALIST

## 2021-07-15 PROCEDURE — A9270 NON-COVERED ITEM OR SERVICE: HCPCS | Performed by: EMERGENCY MEDICINE

## 2021-07-15 PROCEDURE — 770001 HCHG ROOM/CARE - MED/SURG/GYN PRIV*

## 2021-07-15 PROCEDURE — 84484 ASSAY OF TROPONIN QUANT: CPT

## 2021-07-15 PROCEDURE — 71045 X-RAY EXAM CHEST 1 VIEW: CPT

## 2021-07-15 PROCEDURE — 93005 ELECTROCARDIOGRAM TRACING: CPT | Performed by: EMERGENCY MEDICINE

## 2021-07-15 PROCEDURE — 700102 HCHG RX REV CODE 250 W/ 637 OVERRIDE(OP): Performed by: INTERNAL MEDICINE

## 2021-07-15 PROCEDURE — 93306 TTE W/DOPPLER COMPLETE: CPT

## 2021-07-15 PROCEDURE — 85025 COMPLETE CBC W/AUTO DIFF WBC: CPT

## 2021-07-15 PROCEDURE — A9270 NON-COVERED ITEM OR SERVICE: HCPCS | Performed by: INTERNAL MEDICINE

## 2021-07-15 PROCEDURE — 80053 COMPREHEN METABOLIC PANEL: CPT

## 2021-07-15 PROCEDURE — 99285 EMERGENCY DEPT VISIT HI MDM: CPT

## 2021-07-15 PROCEDURE — 36415 COLL VENOUS BLD VENIPUNCTURE: CPT

## 2021-07-15 RX ORDER — PREDNISONE 20 MG/1
40 TABLET ORAL 2 TIMES DAILY
Status: DISCONTINUED | OUTPATIENT
Start: 2021-07-15 | End: 2021-07-15 | Stop reason: HOSPADM

## 2021-07-15 RX ORDER — PREDNISONE 20 MG/1
20 TABLET ORAL DAILY
Status: DISCONTINUED | OUTPATIENT
Start: 2021-07-23 | End: 2021-07-15 | Stop reason: HOSPADM

## 2021-07-15 RX ORDER — BISACODYL 10 MG
10 SUPPOSITORY, RECTAL RECTAL
Status: DISCONTINUED | OUTPATIENT
Start: 2021-07-15 | End: 2021-07-15 | Stop reason: HOSPADM

## 2021-07-15 RX ORDER — ESCITALOPRAM OXALATE 10 MG/1
10 TABLET ORAL DAILY
Status: DISCONTINUED | OUTPATIENT
Start: 2021-07-15 | End: 2021-07-15 | Stop reason: HOSPADM

## 2021-07-15 RX ORDER — VITAMIN B COMPLEX
1000 TABLET ORAL 2 TIMES DAILY
Status: DISCONTINUED | OUTPATIENT
Start: 2021-07-15 | End: 2021-07-15 | Stop reason: HOSPADM

## 2021-07-15 RX ORDER — PROMETHAZINE HYDROCHLORIDE 25 MG/1
12.5-25 TABLET ORAL EVERY 4 HOURS PRN
Status: DISCONTINUED | OUTPATIENT
Start: 2021-07-15 | End: 2021-07-15 | Stop reason: HOSPADM

## 2021-07-15 RX ORDER — ASPIRIN 81 MG/1
324 TABLET, CHEWABLE ORAL ONCE
Status: COMPLETED | OUTPATIENT
Start: 2021-07-15 | End: 2021-07-15

## 2021-07-15 RX ORDER — PREDNISONE 20 MG/1
40 TABLET ORAL DAILY
Status: DISCONTINUED | OUTPATIENT
Start: 2021-07-18 | End: 2021-07-15 | Stop reason: HOSPADM

## 2021-07-15 RX ORDER — FUROSEMIDE 20 MG/1
10 TABLET ORAL
Status: DISCONTINUED | OUTPATIENT
Start: 2021-07-15 | End: 2021-07-15 | Stop reason: HOSPADM

## 2021-07-15 RX ORDER — CETIRIZINE HYDROCHLORIDE 10 MG/1
10 TABLET ORAL DAILY
Status: DISCONTINUED | OUTPATIENT
Start: 2021-07-15 | End: 2021-07-15 | Stop reason: HOSPADM

## 2021-07-15 RX ORDER — PROMETHAZINE HYDROCHLORIDE 25 MG/1
12.5-25 SUPPOSITORY RECTAL EVERY 4 HOURS PRN
Status: DISCONTINUED | OUTPATIENT
Start: 2021-07-15 | End: 2021-07-15 | Stop reason: HOSPADM

## 2021-07-15 RX ORDER — ESCITALOPRAM OXALATE 20 MG/1
20 TABLET ORAL DAILY
Status: DISCONTINUED | OUTPATIENT
Start: 2021-07-15 | End: 2021-07-15

## 2021-07-15 RX ORDER — AZITHROMYCIN 250 MG/1
250-500 TABLET, FILM COATED ORAL DAILY
Status: ON HOLD | COMMUNITY
End: 2021-07-15

## 2021-07-15 RX ORDER — AMOXICILLIN 250 MG
2 CAPSULE ORAL 2 TIMES DAILY
Status: DISCONTINUED | OUTPATIENT
Start: 2021-07-15 | End: 2021-07-15 | Stop reason: HOSPADM

## 2021-07-15 RX ORDER — GUAIFENESIN/DEXTROMETHORPHAN 100-10MG/5
10 SYRUP ORAL EVERY 6 HOURS PRN
Qty: 840 ML | Refills: 1 | Status: SHIPPED | OUTPATIENT
Start: 2021-07-15 | End: 2021-07-29

## 2021-07-15 RX ORDER — ACYCLOVIR 50 MG/G
1 OINTMENT TOPICAL 3 TIMES DAILY PRN
COMMUNITY
End: 2022-01-06 | Stop reason: SDUPTHER

## 2021-07-15 RX ORDER — POLYETHYLENE GLYCOL 3350 17 G/17G
1 POWDER, FOR SOLUTION ORAL
Status: DISCONTINUED | OUTPATIENT
Start: 2021-07-15 | End: 2021-07-15 | Stop reason: HOSPADM

## 2021-07-15 RX ORDER — SULFAMETHOXAZOLE AND TRIMETHOPRIM 800; 160 MG/1; MG/1
2 TABLET ORAL EVERY 8 HOURS
Status: DISCONTINUED | OUTPATIENT
Start: 2021-07-15 | End: 2021-07-15 | Stop reason: HOSPADM

## 2021-07-15 RX ORDER — PREDNISONE 10 MG/1
40 TABLET ORAL DAILY
Status: DISCONTINUED | OUTPATIENT
Start: 2021-07-15 | End: 2021-07-15

## 2021-07-15 RX ORDER — GUAIFENESIN/DEXTROMETHORPHAN 100-10MG/5
10 SYRUP ORAL EVERY 6 HOURS PRN
Status: DISCONTINUED | OUTPATIENT
Start: 2021-07-15 | End: 2021-07-15 | Stop reason: HOSPADM

## 2021-07-15 RX ORDER — ACETAMINOPHEN 325 MG/1
650 TABLET ORAL EVERY 6 HOURS PRN
Status: DISCONTINUED | OUTPATIENT
Start: 2021-07-15 | End: 2021-07-15 | Stop reason: HOSPADM

## 2021-07-15 RX ORDER — ROSUVASTATIN CALCIUM 10 MG/1
10 TABLET, COATED ORAL
Status: DISCONTINUED | OUTPATIENT
Start: 2021-07-15 | End: 2021-07-15 | Stop reason: HOSPADM

## 2021-07-15 RX ORDER — LAMOTRIGINE 100 MG/1
100 TABLET ORAL DAILY
Status: DISCONTINUED | OUTPATIENT
Start: 2021-07-15 | End: 2021-07-15 | Stop reason: HOSPADM

## 2021-07-15 RX ORDER — ONDANSETRON 2 MG/ML
4 INJECTION INTRAMUSCULAR; INTRAVENOUS EVERY 4 HOURS PRN
Status: DISCONTINUED | OUTPATIENT
Start: 2021-07-15 | End: 2021-07-15 | Stop reason: HOSPADM

## 2021-07-15 RX ORDER — ONDANSETRON 4 MG/1
4 TABLET, ORALLY DISINTEGRATING ORAL EVERY 4 HOURS PRN
Status: DISCONTINUED | OUTPATIENT
Start: 2021-07-15 | End: 2021-07-15 | Stop reason: HOSPADM

## 2021-07-15 RX ORDER — ADALIMUMAB 40MG/0.4ML
40 KIT SUBCUTANEOUS
COMMUNITY
End: 2021-07-22

## 2021-07-15 RX ORDER — SULFAMETHOXAZOLE AND TRIMETHOPRIM 800; 160 MG/1; MG/1
2 TABLET ORAL EVERY 8 HOURS
Qty: 378 TABLET | Refills: 0 | Status: SHIPPED | OUTPATIENT
Start: 2021-07-15 | End: 2021-07-22

## 2021-07-15 RX ORDER — PROCHLORPERAZINE EDISYLATE 5 MG/ML
5-10 INJECTION INTRAMUSCULAR; INTRAVENOUS EVERY 4 HOURS PRN
Status: DISCONTINUED | OUTPATIENT
Start: 2021-07-15 | End: 2021-07-15 | Stop reason: HOSPADM

## 2021-07-15 RX ORDER — FOLIC ACID 1 MG/1
2 TABLET ORAL DAILY
Status: DISCONTINUED | OUTPATIENT
Start: 2021-07-15 | End: 2021-07-15 | Stop reason: HOSPADM

## 2021-07-15 RX ORDER — OMEPRAZOLE 20 MG/1
40 CAPSULE, DELAYED RELEASE ORAL DAILY
Status: DISCONTINUED | OUTPATIENT
Start: 2021-07-15 | End: 2021-07-15 | Stop reason: HOSPADM

## 2021-07-15 RX ORDER — MONTELUKAST SODIUM 10 MG/1
10 TABLET ORAL DAILY
Status: DISCONTINUED | OUTPATIENT
Start: 2021-07-15 | End: 2021-07-15 | Stop reason: HOSPADM

## 2021-07-15 RX ADMIN — SULFAMETHOXAZOLE AND TRIMETHOPRIM 544 MG OF TRIMETHOPRIM: 80; 16 INJECTION, SOLUTION, CONCENTRATE INTRAVENOUS at 07:38

## 2021-07-15 RX ADMIN — SULFAMETHOXAZOLE AND TRIMETHOPRIM 2 TABLET: 800; 160 TABLET ORAL at 13:43

## 2021-07-15 RX ADMIN — ASPIRIN 324 MG: 81 TABLET, CHEWABLE ORAL at 04:33

## 2021-07-15 ASSESSMENT — ENCOUNTER SYMPTOMS
DOUBLE VISION: 0
BACK PAIN: 0
VOMITING: 0
SPUTUM PRODUCTION: 0
HALLUCINATIONS: 0
GASTROINTESTINAL NEGATIVE: 1
HEADACHES: 0
CHILLS: 0
DIZZINESS: 0
COUGH: 1
EYES NEGATIVE: 1
CONSTITUTIONAL NEGATIVE: 1
ORTHOPNEA: 0
COUGH: 0
BLURRED VISION: 0
NECK PAIN: 0
FEVER: 0
NAUSEA: 0
FLANK PAIN: 0
CLAUDICATION: 0
WEIGHT LOSS: 0
ABDOMINAL PAIN: 0
DEPRESSION: 0
SHORTNESS OF BREATH: 1
SEIZURES: 0
FOCAL WEAKNESS: 0
PALPITATIONS: 0
SORE THROAT: 0
EYE PAIN: 0
MYALGIAS: 0
HEMOPTYSIS: 0
BRUISES/BLEEDS EASILY: 0
WEAKNESS: 0
BLOOD IN STOOL: 0
INSOMNIA: 0
MUSCULOSKELETAL NEGATIVE: 1

## 2021-07-15 ASSESSMENT — PATIENT HEALTH QUESTIONNAIRE - PHQ9
1. LITTLE INTEREST OR PLEASURE IN DOING THINGS: NOT AT ALL
SUM OF ALL RESPONSES TO PHQ9 QUESTIONS 1 AND 2: 0
2. FEELING DOWN, DEPRESSED, IRRITABLE, OR HOPELESS: NOT AT ALL

## 2021-07-15 ASSESSMENT — LIFESTYLE VARIABLES
TOTAL SCORE: 0
ALCOHOL_USE: YES
HOW MANY TIMES IN THE PAST YEAR HAVE YOU HAD 5 OR MORE DRINKS IN A DAY: 0
EVER HAD A DRINK FIRST THING IN THE MORNING TO STEADY YOUR NERVES TO GET RID OF A HANGOVER: NO
HAVE PEOPLE ANNOYED YOU BY CRITICIZING YOUR DRINKING: NO
EVER FELT BAD OR GUILTY ABOUT YOUR DRINKING: NO
HAVE YOU EVER FELT YOU SHOULD CUT DOWN ON YOUR DRINKING: NO
ON A TYPICAL DAY WHEN YOU DRINK ALCOHOL HOW MANY DRINKS DO YOU HAVE: 1
TOTAL SCORE: 0
CONSUMPTION TOTAL: NEGATIVE
TOTAL SCORE: 0
AVERAGE NUMBER OF DAYS PER WEEK YOU HAVE A DRINK CONTAINING ALCOHOL: 4

## 2021-07-15 ASSESSMENT — FIBROSIS 4 INDEX: FIB4 SCORE: 1.02

## 2021-07-15 NOTE — DISCHARGE INSTRUCTIONS
Pneumocystis Pneumonia    Pneumocystis pneumonia is an infection that affects the lungs. The infection is extremely rare in healthy people. It most frequently occurs in people whose natural disease-fighting system (immune system) is weak.  What are the causes?  This condition is caused by breathing in a fungus called Pneumocystis jiroveci.  What are the signs or symptoms?  Common symptoms of this condition include:  · Fever.  · Mild or dry cough.  · Shortness of breath, especially with any physical activity.  · Rapid breathing.  How is this diagnosed?  This condition may be diagnosed with tests, such as:  · A test in which a sample of fluid or tissue from your lungs is looked at under a microscope (immunologic stain). To get the sample, you may be asked to cough into a tissue. Or, a small, flexible tube will be guided to your lungs through your nose or mouth to get the sample (bronchoscopy).  · A chest X-ray.  · Blood tests.  How is this treated?  This condition is treated with antibiotic medicine. Sometimes it is also treated with a medicine to reduce inflammation (steroid). Treatment may begin before test results confirm your diagnosis. This is because the condition is very serious. Treatment may take several weeks.  Follow these instructions at home:    · Take over-the-counter and prescription medicines only as told by your health care provider.  · Take your antibiotic medicine as told by your health care provider. Do not stop taking the antibiotic even if you start to feel better.  · Do not smoke. Smoking can make your condition worse.  · Keep all follow-up visits as told by your health care provider. This is important.  How is this prevented?  If you are at high risk for getting this condition again, your health care provider may prescribe an antibiotic to prevent this from happening.  Contact a health care provider if:  · Any of your symptoms are getting worse instead of better.  · You have nausea or  vomiting.  · You have diarrhea.  · You have a skin rash.  This information is not intended to replace advice given to you by your health care provider. Make sure you discuss any questions you have with your health care provider.  Document Released: 03/09/2004 Document Revised: 04/10/2020 Document Reviewed: 09/29/2017  [employer] Patient Education © 2020 [employer]    Discharge Instructions    Discharged to home by car with relative. Discharged via wheelchair, hospital escort: Yes.  Special equipment needed: Not Applicable    Be sure to schedule a follow-up appointment with your primary care doctor or any specialists as instructed.     Discharge Plan:   Diet Plan: Discussed  Activity Level: Discussed  Confirmed Follow up Appointment: Patient to Call and Schedule Appointment  Confirmed Symptoms Management: Discussed    I understand that a diet low in cholesterol, fat, and sodium is recommended for good health. Unless I have been given specific instructions below for another diet, I accept this instruction as my diet prescription.   Other diet: resume home diet    Special Instructions: None    · Is patient discharged on Warfarin / Coumadin?   No     Depression / Suicide Risk    As you are discharged from this [facility] facility, it is important to learn how to keep safe from harming yourself.    Recognize the warning signs:  · Abrupt changes in personality, positive or negative- including increase in energy   · Giving away possessions  · Change in eating patterns- significant weight changes-  positive or negative  · Change in sleeping patterns- unable to sleep or sleeping all the time   · Unwillingness or inability to communicate  · Depression  · Unusual sadness, discouragement and loneliness  · Talk of wanting to die  · Neglect of personal appearance   · Rebelliousness- reckless behavior  · Withdrawal from people/activities they love  · Confusion- inability to concentrate     If you or a loved one observes any of  these behaviors or has concerns about self-harm, here's what you can do:  · Talk about it- your feelings and reasons for harming yourself  · Remove any means that you might use to hurt yourself (examples: pills, rope, extension cords, firearm)  · Get professional help from the community (Mental Health, Substance Abuse, psychological counseling)  · Do not be alone:Call your Safe Contact- someone whom you trust who will be there for you.  · Call your local CRISIS HOTLINE 367-9761 or 976-722-4897  · Call your local Children's Mobile Crisis Response Team [place] (245) 684-3294 or www.[employer]  · Call the toll free National Suicide Prevention Hotlines   · National Suicide Prevention Lifeline 956-908-OUVZ (0782)  · National Hope Line Network 800-SUICIDE (695-7961)

## 2021-07-15 NOTE — PROGRESS NOTES
Med tele informed this RN that pt is sustaining HR 47-48. Pt denies any chest pain, sob, lightheadedness or dizziness. Vitals taken BP: 110/46, HR 49 SPO2 91% on 2L. Dr. Garcia updated and aware. No new orders at this time.

## 2021-07-15 NOTE — PROGRESS NOTES
Received report from ER RN  0612 Pt on floor  Patient is currently AOx4, reporting no pain at this time. No N/V. VSS. Reporting no chest pain at this time, reports that it happened last night when she was sleeping. POC reviewed with patient, patient verbalized understanding. Pt reports being immunocompromised. Private room assigned. Bed locked and in lowest position, call light and belongings within reach. Chart check complete.

## 2021-07-15 NOTE — ASSESSMENT & PLAN NOTE
-On admission, not wheezing.  Give regular dose of Singulair and as needed albuterol for wheezing.

## 2021-07-15 NOTE — ASSESSMENT & PLAN NOTE
-- Secondary to PCP pneumonia given BAL results   -- Start PCP rx as above   -- Will need repeat CT chest in 4-6 weeks

## 2021-07-15 NOTE — PROGRESS NOTES
Telemetry called to inform me about patient's HR down to 43. Average HR noted to be between 43-49 at this time.

## 2021-07-15 NOTE — DISCHARGE PLANNING
Anticipated Discharge Disposition: Home     Action:  D/c order in place. Pt discussed during IDT rounds. No d/c needs.    Barriers to Discharge: None     Plan: LSW to assist as needed

## 2021-07-15 NOTE — PROGRESS NOTES
Med rec completed per Pt at bedside.  Allergies reviewed with Pt.  Pt started a Z-NATALIA on 7/6/2021; Pt was subsequently admitted to [facility] on 7/7/2021 and as a result did not finish this course of azithromycin.  Pt is currently on a prednisone taper started 7/12/2021 (Pt had also received prednisone while inpatient during previous admission).  Pt's pharmacy: [facility] [place], [place].

## 2021-07-15 NOTE — ED TRIAGE NOTES
Pt presents to the ED for CP. Pt states she has had a recent dx of acute respiratory failure. Pt on 2-3 lpm at home.

## 2021-07-15 NOTE — ASSESSMENT & PLAN NOTE
-- Confirmed on BAL PCP PCR    -- Start bactrim/prednisone therapy X 21 days    -- Will need repeat CT chest in 4-6 weeks

## 2021-07-15 NOTE — H&P
Hospital Medicine History & Physical Note    Date of Service  7/15/2021    Primary Care Physician  Joe Espinal M.D.    Consultants  pulmonary    Specialist Names: Dr. Vidal - will discuss case with him in am as he was following this patient on prior admission    Code Status  Full Code    Chief Complaint  Chief Complaint   Patient presents with   • Chest Pain       History of Presenting Illness  Arely Haynes is a 60 y.o. female, who is a retired flight nurse, with h/o Asthma, immunosuppression on Humira, methotrexate and prednisone due to Psoriatic Arthritis, fully vaccinated for COVID,  admitted to the hospital from 7/7/2021 and discharged 4 days ago on 7/11/2021, treated for hypoxic respiratory failure due to diffuse groundglass opacity pneumonia in right side hilar adenopathy.  She has been followed by pulmonology, Dr. Vidal who treated her with broad-spectrum antibiotic and had a Diagnostic BAL fone on 7/9/21.  She was discharged home on 3 L of oxygen.  Yesterday results on My Chart came positive for P. Jirovecii.  Patient reports that overnight, she woke up with shortness of breath and mild chest pain.  She noted having bradycardia and a heart rate around 40 bpm.  Her chest discomfort lasted about 10 minutes, and she describes more like a pressure type of pain.  She woke up her  and they decided to come to the ER on 7/15/2021 for further evaluation.    I discussed the plan of care with patient.    Review of Systems  Review of Systems   Constitutional: Positive for malaise/fatigue. Negative for fever.   HENT: Negative for congestion and sore throat.    Eyes: Negative for blurred vision and double vision.   Respiratory: Positive for shortness of breath. Negative for cough.    Cardiovascular: Positive for chest pain. Negative for palpitations.   Gastrointestinal: Negative for nausea and vomiting.   Genitourinary: Negative for dysuria and urgency.   Musculoskeletal: Negative for myalgias and  neck pain.   Skin: Negative for itching and rash.   Neurological: Negative for dizziness, weakness and headaches.   Endo/Heme/Allergies: Does not bruise/bleed easily.   Psychiatric/Behavioral: Negative for depression. The patient does not have insomnia.        Past Medical History   has a past medical history of Anesthesia, Arthritis, Asthma, Bowel habit changes, Depression, Erosive esophagitis, GERD (gastroesophageal reflux disease), Heart burn, High cholesterol, Immunocompromised (HCC), Pain, Pneumonia (2018), Psoriasis, and Psoriatic arthritis (HCC).    Surgical History   has a past surgical history that includes lumbar fusion posterior; hip replacement, total; cystectomy; other abdominal surgery; gyn surgery (1999); other orthopedic surgery; other orthopedic surgery; hysterectomy robotic xi (N/A, 7/11/2019); salpingo oophorectomy (Bilateral, 7/11/2019); tendon repair (Left, 9/19/2019); and pr bronchoscopy,diagnostic (7/9/2021).     Family History  family history includes Arthritis in her mother; Cancer in her father and sister; Heart Attack in her mother; Heart Disease in her mother and paternal grandfather; Hyperlipidemia in her father, mother, and sister; Lung Disease in her father; Psychiatric Illness in her mother.   Family history reviewed with patient. There is no family history that is pertinent to the chief complaint.     Social History   reports that she has never smoked. She has never used smokeless tobacco. She reports current alcohol use of about 0.6 - 1.2 oz of alcohol per week. She reports that she does not use drugs.    Allergies  Allergies   Allergen Reactions   • Ampicillin-Sulbactam Sodium Hives     Tolerates cefazolin  Pt does not feel this is an active allergy.    • Iodine Anaphylaxis     Contrast- Oral, topical, And Iv Dye   • Levofloxacin Anaphylaxis   • Shellfish-Derived Products Anaphylaxis     lobster   • Hydrocodone Itching   • Unipen [Nafcillin] Hives       Medications  Prior to  Admission Medications   Prescriptions Last Dose Informant Patient Reported? Taking?   NON SPECIFIED   No No   Sig: Portable home oxygen concentrator   acetaminophen (TYLENOL) 500 MG Tab  Patient Yes No   Sig: Take 1,000 mg by mouth every 6 hours as needed for Moderate Pain.   cetirizine (ZYRTEC) 10 MG Tab  Patient Yes No   Sig: Take 10 mg by mouth every day.   escitalopram (LEXAPRO) 10 MG Tab  Patient Yes No   Sig: Take 10 mg by mouth every day. Pt also has an RX for 20MG, pt takes 30MG total Qday   escitalopram (LEXAPRO) 20 MG tablet  Patient Yes No   Sig: Take 20 mg by mouth every day. Pt also has an RX for 10MG, pt takes 30MG total Qday   etodolac (LODINE) 500 MG tablet  Patient Yes No   Sig: Take 500 mg by mouth 2 times a day.   folic acid (FOLVITE) 1 MG Tab  Patient Yes No   Sig: Take 2 mg by mouth every day.   furosemide (LASIX) 20 MG Tab   No No   Sig: Take 0.5 Tablets by mouth 1 time a day as needed (swelling or weight gain).   lamoTRIgine (LAMICTAL) 100 MG Tab  Patient Yes No   Sig: Take 100 mg by mouth every day.   magnesium oxide (MAG-OX) 400 MG Tab  Patient Yes No   Sig: Take 400 mg by mouth every day.   montelukast (SINGULAIR) 10 MG Tab  Patient No No   Sig: Take 1 Tab by mouth every day.   omeprazole (PRILOSEC) 40 MG delayed-release capsule  Patient Yes No   Sig: Take 40 mg by mouth every day.   predniSONE (DELTASONE) 10 MG Tab   No No   Sig: Take 1 tablet by mouth every day. 40 mg po daily x 7 days, then 30 mg po daily x 7 days, then 20 mg po x 7 days, then 10 mg po daily x 7 days then stop   rosuvastatin (CRESTOR) 10 MG Tab  Patient No No   Sig: Take 1 Tab by mouth every bedtime.   vitamin D (VITAMIND D3) 1000 UNIT Tab   No No   Sig: Take 1 tablet by mouth 2 times a day.      Facility-Administered Medications: None       Physical Exam  Temp:  [36 °C (96.8 °F)] 36 °C (96.8 °F)  Pulse:  [52] 52  Resp:  [19] 19  BP: (126)/(72) 126/72  SpO2:  [99 %] 99 %    Physical Exam    Laboratory:  Recent Labs      07/15/21  0334   WBC 10.8   RBC 3.92*   HEMOGLOBIN 12.6   HEMATOCRIT 37.2   MCV 94.9   MCH 32.1   MCHC 33.9   RDW 45.6   PLATELETCT 431   MPV 9.1     Recent Labs     07/15/21  0334   SODIUM 136   POTASSIUM 4.1   CHLORIDE 102   CO2 22   GLUCOSE 114*   BUN 23*   CREATININE 0.56   CALCIUM 9.4     Recent Labs     07/15/21  0334   ALTSGPT 21   ASTSGOT 24   ALKPHOSPHAT 108*   TBILIRUBIN 0.3   GLUCOSE 114*         No results for input(s): NTPROBNP in the last 72 hours.      Recent Labs     07/15/21  0334   TROPONINT <6       Imaging:  DX-CHEST-PORTABLE (1 VIEW)   Final Result         1.  No acute cardiopulmonary disease.      EC-ECHOCARDIOGRAM COMPLETE W/ CONT    (Results Pending)       EKG:  My impression is: Sinus bradycardia at 67 bpm.  Normal axis and no acute ST elevation.    Assessment/Plan:  I anticipate this patient will require at least two midnights for appropriate medical management, necessitating inpatient admission.    * 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[facility])  Assessment & Plan  -Inpatient status to medical floor with cardiac monitoring.  -Patient is immunosuppressed due to treatment of psoriatic arthritis with Humira and methotrexate.  She was also started on prednisone since last admission.  -I am starting her on 15 mg/kg sulfamethoxazole/trimethoprim.  -We will notify pulmonology the patient is here.  She has been previously followed by Dr. Vidal.  I appreciate consult and recommendations.  -Additionally may need ID to follow.    Bradycardia  Assessment & Plan  -Heart rate around 47 here, sinus bradycardia on EKG without any blocks.  She reports also being bradycardic with heart rate of 44 bpm at home reason why I will start cardiac monitoring.  -Serial cardiac enzymes and echocardiogram was also ordered.    Psoriatic arthritis (HCC)- (present on admission)  Assessment & Plan  On Humira, methotrexate and prednisone.  Please clarify with rheumatology and multidisciplinary team if medications should be  continued.  I am continuing prednisone 40 mg for now.    Acute respiratory failure with hypoxia (HCC)- (present on admission)  Assessment & Plan  -Patient has been on 3 L of supplemental oxygen via nasal cannula since discharge.      Major depressive disorder, recurrent, moderate (HCC)- (present on admission)  Assessment & Plan  -Continue Lexapro.    Immunosuppression (HCC)- (present on admission)  Assessment & Plan  -As above.    Moderate persistent asthma without complication- (present on admission)  Assessment & Plan  -On admission, not wheezing.  Give regular dose of Singulair and as needed albuterol for wheezing.      VTE prophylaxis: SCDs/TEDs

## 2021-07-15 NOTE — CONSULTS
Pulmonary Consultation    Date of consult: 7/15/2021    Referring Physician  Jorge A Garcia M.D.    Reason for Consultation  PCP pneumonia     History of Presenting Illness  60 y.o. female who presented 7/15/2021 with PCP pneumonia. Patient with significant history of GERD, asthma, and psoriatic arthritis on humira and methotrexate who was recently discharged from [facility] for acute hypoxemia respiratory failure presumed to be secondary to drug induced pneumonitis vs CT-ILD. She was discharged home on 3 liters NC and prednisone taper therapy. Her initial PCP DFA came back negative on 7/13 but PCP PCR came back positive yesterday. She reports doing quite well until late evening yesterday she developed chest pressure and noticed her HR to be in the 40s. She presented to the ER for further evaluation. In ER, CXR showed improved reticular changes with no dense consolidation. Troponin negative X2. She was started on bactrim therapy for PCP treatment and pulmonary consulted for further evaluation.     At present, patient reports feeling better overall. No further chest pain but is worry about her HR in the 40s. Denies N/V, fever/chills, abdominal pain.     Code Status  Full Code    Review of Systems  Review of Systems   Constitutional: Negative for chills, fever, malaise/fatigue and weight loss.   Respiratory: Positive for cough and shortness of breath. Negative for hemoptysis and sputum production.    Cardiovascular: Positive for chest pain. Negative for palpitations, orthopnea, claudication and leg swelling.   Gastrointestinal: Negative.    Genitourinary: Negative.    Musculoskeletal: Negative.    All other systems reviewed and are negative.      Past Medical History   has a past medical history of Anesthesia, Arthritis, Asthma, Bowel habit changes, Depression, Erosive esophagitis, GERD (gastroesophageal reflux disease), Heart burn, High cholesterol, Immunocompromised (HCC), Pain, Pneumonia (2018), Psoriasis, and Psoriatic  arthritis (HCC).    Surgical History   has a past surgical history that includes lumbar fusion posterior; hip replacement, total; cystectomy; other abdominal surgery; gyn surgery (1999); other orthopedic surgery; other orthopedic surgery; hysterectomy robotic xi (N/A, 7/11/2019); salpingo oophorectomy (Bilateral, 7/11/2019); tendon repair (Left, 9/19/2019); and pr bronchoscopy,diagnostic (7/9/2021).    Family History  family history includes Arthritis in her mother; Cancer in her father and sister; Heart Attack in her mother; Heart Disease in her mother and paternal grandfather; Hyperlipidemia in her father, mother, and sister; Lung Disease in her father; Psychiatric Illness in her mother.    Social History   reports that she has never smoked. She has never used smokeless tobacco. She reports current alcohol use of about 0.6 - 1.2 oz of alcohol per week. She reports that she does not use drugs.    Medications  Home Medications     Reviewed by Shantell Aparicio R.N. (Registered Nurse) on 07/15/21 at 0730  Med List Status: Complete   Medication Last Dose Status   acetaminophen (TYLENOL) 500 MG Tab 7/13/2021 Active   cetirizine (ZYRTEC) 10 MG Tab 7/14/2021 Active   escitalopram (LEXAPRO) 10 MG Tab 7/15/2021 Active   escitalopram (LEXAPRO) 20 MG tablet 7/15/2021 Active   etodolac (LODINE) 500 MG tablet pt no longer taking Active   folic acid (FOLVITE) 1 MG Tab 7/15/2021 Active   furosemide (LASIX) 20 MG Tab 7/13/2021 Active   lamoTRIgine (LAMICTAL) 100 MG Tab 7/15/2021 Active   magnesium oxide (MAG-OX) 400 MG Tab 7/15/2021 Active   montelukast (SINGULAIR) 10 MG Tab 7/14/2021 Active   NON SPECIFIED 7/15/2021 Active   omeprazole (PRILOSEC) 40 MG delayed-release capsule 7/14/2021 Active   predniSONE (DELTASONE) 10 MG Tab 7/15/2021 Active   rosuvastatin (CRESTOR) 10 MG Tab 7/14/2021 Active   vitamin D (VITAMIND D3) 1000 UNIT Tab 7/14/2021 Active              Current Facility-Administered Medications   Medication Dose Route  Frequency Provider Last Rate Last Admin   • sulfamethoxazole-trimethoprim (SEPTRA) 544 mg of trimethoprim in dextrose 5% 250 mL IVPB  15 mg/kg/day of trimethoprim Intravenous Q8HR Cally Duarte M.D. 125 mL/hr at 07/15/21 0738 544 mg of trimethoprim at 07/15/21 0738   • cetirizine (ZYRTEC) tablet 10 mg  10 mg Oral DAILY Cally Duarte M.D.       • escitalopram (Lexapro) tablet 10 mg  10 mg Oral DAILY Cally Duarte M.D.       • folic acid (FOLVITE) tablet 2 mg  2 mg Oral DAILY Cally Duarte M.D.       • furosemide (LASIX) tablet 10 mg  10 mg Oral QDAY PRN Cally Duarte M.D.       • lamoTRIgine (LAMICTAL) tablet 100 mg  100 mg Oral DAILY Cally Duarte M.D.       • montelukast (SINGULAIR) tablet 10 mg  10 mg Oral DAILY Cally Duarte M.D.       • omeprazole (PRILOSEC) capsule 40 mg  40 mg Oral DAILY Cally Duarte M.D.       • predniSONE (DELTASONE) tablet 40 mg  40 mg Oral DAILY Cally Duarte M.D.       • rosuvastatin (CRESTOR) tablet 10 mg  10 mg Oral QHS Cally Duarte M.D.       • vitamin D (cholecalciferol) tablet 1,000 Units  1,000 Units Oral BID Cally Duarte M.D.       • acetaminophen (Tylenol) tablet 650 mg  650 mg Oral Q6HRS PRN Cally Duarte M.D.       • ondansetron (ZOFRAN) syringe/vial injection 4 mg  4 mg Intravenous Q4HRS PRN Cally Duarte M.D.       • ondansetron (ZOFRAN ODT) dispertab 4 mg  4 mg Oral Q4HRS PRN Cally Duarte M.D.       • promethazine (PHENERGAN) tablet 12.5-25 mg  12.5-25 mg Oral Q4HRS PRN Cally Duarte M.D.       • promethazine (PHENERGAN) suppository 12.5-25 mg  12.5-25 mg Rectal Q4HRS PRN Cally Duarte M.D.       • prochlorperazine (COMPAZINE) injection 5-10 mg  5-10 mg Intravenous Q4HRS PRN Cally Duarte M.D.       • senna-docusate (PERICOLACE or SENOKOT S) 8.6-50 MG per tablet 2 tablet  2 tablet Oral BID Cally  REGGIE Duarte        And   • polyethylene glycol/lytes (MIRALAX) PACKET 1 Packet  1 Packet Oral QDAY PRN Cally Duarte M.D.        And   • magnesium hydroxide (MILK OF MAGNESIA) suspension 30 mL  30 mL Oral QDAY PRN Cally Duarte M.D.        And   • bisacodyl (DULCOLAX) suppository 10 mg  10 mg Rectal QDAY PRN Cally Duarte M.D.       • guaiFENesin dextromethorphan (ROBITUSSIN DM) 100-10 MG/5ML syrup 10 mL  10 mL Oral Q6HRS PRN Cally Duarte M.D.           Allergies  Allergies   Allergen Reactions   • Ampicillin-Sulbactam Sodium Hives     Tolerates cefazolin  Pt does not feel this is an active allergy.    • Iodine Anaphylaxis     Contrast- Oral, topical, And Iv Dye   • Levofloxacin Anaphylaxis   • Shellfish-Derived Products Anaphylaxis     lobster   • Hydrocodone Itching   • Unipen [Nafcillin] Hives       Vital Signs last 24 hours  Temp:  [36 °C (96.8 °F)-36.6 °C (97.8 °F)] 36.6 °C (97.8 °F)  Pulse:  [47-52] 47  Resp:  [16-19] 16  BP: (126-129)/(55-72) 129/55  SpO2:  [95 %-99 %] 95 %    Physical Exam  Physical Exam  Constitutional:       Appearance: Normal appearance.   HENT:      Head: Normocephalic.      Nose: Nose normal.   Eyes:      Pupils: Pupils are equal, round, and reactive to light.   Cardiovascular:      Rate and Rhythm: Regular rhythm. Bradycardia present.      Pulses: Normal pulses.   Pulmonary:      Effort: Pulmonary effort is normal. No respiratory distress.      Breath sounds: Normal breath sounds.   Abdominal:      General: Abdomen is flat. Bowel sounds are normal.      Palpations: Abdomen is soft.   Musculoskeletal:         General: Normal range of motion.      Cervical back: Normal range of motion.      Right lower leg: No edema.      Left lower leg: No edema.   Skin:     General: Skin is warm.   Neurological:      Mental Status: She is alert and oriented to person, place, and time.      Cranial Nerves: No cranial nerve deficit.   Psychiatric:          Mood and Affect: Mood normal.         Fluids  No intake or output data in the 24 hours ending 07/15/21 0756    Laboratory  Recent Results (from the past 48 hour(s))   POCT Urinalysis    Collection Time: 21  2:03 PM   Result Value Ref Range    POC Color other Negative    POC Appearance slighty cloudy Negative    POC Leukocyte Esterase neg Negative    POC Nitrites neg Negative    POC Urobiligen 0.2 Negative (0.2) mg/dL    POC Protein 30mg Negative mg/dL    POC Urine PH 5.5 5.0 - 8.0    POC Blood neg Negative    POC Specific Gravity 1.025 <1.005 - >1.030    POC Ketones trace Negative mg/dL    POC Bilirubin small Negative mg/dL    POC Glucose neg Negative mg/dL   EKG    Collection Time: 07/15/21  3:11 AM   Result Value Ref Range    Report       [facility] Emergency Dept.    Test Date:  2021-07-15  Pt Name:    VIOLA CORONADO              Department[facility]  MRN:        [id number]                      [facility]  Gender:     Female                       Technician:   :        1960                   Requested By:MARISABEL JEFF  Order #:    [id number]                    Reading MD:    Measurements  Intervals                                Axis  Rate:       47                           P:          33  DC:         205                          QRS:        -9  QRSD:       103                          T:          40  QT:         461  QTc:        408    Interpretive Statements  Sinus bradycardia  Compared to ECG 2021 20:59:05  Sinus rhythm no longer present     CBC WITH DIFFERENTIAL    Collection Time: 07/15/21  3:34 AM   Result Value Ref Range    WBC 10.8 4.8 - 10.8 K/uL    RBC 3.92 (L) 4.20 - 5.40 M/uL    Hemoglobin 12.6 12.0 - 16.0 g/dL    Hematocrit 37.2 37.0 - 47.0 %    MCV 94.9 81.4 - 97.8 fL    MCH 32.1 27.0 - 33.0 pg    MCHC 33.9 33.6 - 35.0 g/dL    RDW 45.6 35.9 - 50.0 fL    Platelet Count 431 164 - 446 K/uL    MPV 9.1 9.0 - 12.9 fL    Neutrophils-Polys 60.70 44.00 -  72.00 %    Lymphocytes 28.30 22.00 - 41.00 %    Monocytes 8.60 0.00 - 13.40 %    Eosinophils 0.30 0.00 - 6.90 %    Basophils 0.50 0.00 - 1.80 %    Immature Granulocytes 1.60 (H) 0.00 - 0.90 %    Nucleated RBC 0.00 /100 WBC    Neutrophils (Absolute) 6.54 2.00 - 7.15 K/uL    Lymphs (Absolute) 3.05 1.00 - 4.80 K/uL    Monos (Absolute) 0.93 (H) 0.00 - 0.85 K/uL    Eos (Absolute) 0.03 0.00 - 0.51 K/uL    Baso (Absolute) 0.05 0.00 - 0.12 K/uL    Immature Granulocytes (abs) 0.17 (H) 0.00 - 0.11 K/uL    NRBC (Absolute) 0.00 K/uL   CMP    Collection Time: 07/15/21  3:34 AM   Result Value Ref Range    Sodium 136 135 - 145 mmol/L    Potassium 4.1 3.6 - 5.5 mmol/L    Chloride 102 96 - 112 mmol/L    Co2 22 20 - 33 mmol/L    Anion Gap 12.0 7.0 - 16.0    Glucose 114 (H) 65 - 99 mg/dL    Bun 23 (H) 8 - 22 mg/dL    Creatinine 0.56 0.50 - 1.40 mg/dL    Calcium 9.4 8.4 - 10.2 mg/dL    AST(SGOT) 24 12 - 45 U/L    ALT(SGPT) 21 2 - 50 U/L    Alkaline Phosphatase 108 (H) 30 - 99 U/L    Total Bilirubin 0.3 0.1 - 1.5 mg/dL    Albumin 4.0 3.2 - 4.9 g/dL    Total Protein 7.5 6.0 - 8.2 g/dL    Globulin 3.5 1.9 - 3.5 g/dL    A-G Ratio 1.1 g/dL   TROPONIN    Collection Time: 07/15/21  3:34 AM   Result Value Ref Range    Troponin T <6 6 - 19 ng/L   ESTIMATED GFR    Collection Time: 07/15/21  3:34 AM   Result Value Ref Range    GFR If African American >60 >60 mL/min/1.73 m 2    GFR If Non African American >60 >60 mL/min/1.73 m 2   TROPONIN    Collection Time: 07/15/21  5:20 AM   Result Value Ref Range    Troponin T <6 6 - 19 ng/L       Imaging  DX-CHEST-PORTABLE (1 VIEW)   Final Result         1.  No acute cardiopulmonary disease.      EC-ECHOCARDIOGRAM COMPLETE W/ CONT    (Results Pending)       Assessment/Plan  * 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[facility])  Assessment & Plan  -- Confirmed on BAL PCP PCR    -- Start bactrim/prednisone therapy X 21 days    -- Will need repeat CT chest in 4-6 weeks        Abnormal CT of the chest- (present on  admission)  Assessment & Plan  -- Secondary to PCP pneumonia given BAL results   -- Start PCP rx as above   -- Will need repeat CT chest in 4-6 weeks        Acute respiratory failure with hypoxia (HCC)- (present on admission)  Assessment & Plan  -- Secondary to PCP pneumonia   -- On 2 liters NC at home    -- Cont PCP rx   -- Encourage IS and OOB as tolerated   -- Goal SpO2 > 88%   -- Will need ambulatory pulse oximetry upon clinic follow up     Psoriatic arthritis (HCC)- (present on admission)  Assessment & Plan  -- On humira and methotrexate    -- Recommend holding off on methotrexate therapy    -- Will defer to rheumatology regarding when to start humira while on PCP therapy         Discussed patient condition and risk of morbidity and/or mortality with Hospitalist, Family, RN, RT and Patient.

## 2021-07-15 NOTE — ASSESSMENT & PLAN NOTE
-Heart rate around 47 here, sinus bradycardia on EKG without any blocks.  She reports also being bradycardic with heart rate of 44 bpm at home reason why I will start cardiac monitoring.  -Serial cardiac enzymes and echocardiogram was also ordered.

## 2021-07-15 NOTE — ASSESSMENT & PLAN NOTE
On Humira, methotrexate and prednisone.  Please clarify with rheumatology and multidisciplinary team if medications should be continued.  I am continuing prednisone 40 mg for now.

## 2021-07-15 NOTE — ED PROVIDER NOTES
ED Provider Note    CHIEF COMPLAINT  Chief Complaint   Patient presents with   • Chest Pain       HPI  HPI       60 y.o. F p/w CC of lightheadedness and chest pain.   Pt reports chest pain and bradycardia  Pt reports that she is usually in the 60s  Pt reports HR 44 at this time.   Pt reports fuzzy vision during this episodes.     Pt denies any chest pain since leaving hospital.       REVIEW OF SYSTEMS  Review of Systems   Constitutional: Negative.  Negative for fever.   HENT: Negative.  Negative for ear pain and sore throat.    Eyes: Negative.  Negative for pain.   Respiratory: Positive for shortness of breath.    Cardiovascular: Positive for chest pain.   Gastrointestinal: Negative.  Negative for abdominal pain and blood in stool.   Genitourinary: Negative for dysuria and flank pain.   Musculoskeletal: Negative for back pain, myalgias and neck pain.   Skin: Negative.  Negative for rash.   Neurological: Negative for focal weakness, seizures, weakness and headaches.   Endo/Heme/Allergies: Does not bruise/bleed easily.   Psychiatric/Behavioral: Negative for hallucinations and suicidal ideas.   All other systems reviewed and are negative.      PAST MEDICAL HISTORY   has a past medical history of Anesthesia, Arthritis, Asthma, Bowel habit changes, Depression, Erosive esophagitis, GERD (gastroesophageal reflux disease), Heart burn, High cholesterol, Immunocompromised ([facility]), Pain, Pneumonia (2018), Psoriasis, and Psoriatic arthritis ([facility]).    SOCIAL HISTORY  Social History     Tobacco Use   • Smoking status: Never Smoker   • Smokeless tobacco: Never Used   Vaping Use   • Vaping Use: Never used   Substance and Sexual Activity   • Alcohol use: Yes     Alcohol/week: 0.6 - 1.2 oz     Types: 1 - 2 Glasses of wine per week     Comment: one glss of wine at night    • Drug use: No   • Sexual activity: Yes     Comment: Tubal ligation       SURGICAL HISTORY   has a past surgical history that includes lumbar fusion posterior; hip  replacement, total; cystectomy; other abdominal surgery; gyn surgery (1999); other orthopedic surgery; other orthopedic surgery; hysterectomy robotic xi (N/A, 7/11/2019); salpingo oophorectomy (Bilateral, 7/11/2019); tendon repair (Left, 9/19/2019); and bronchoscopy,diagnostic (7/9/2021).    CURRENT MEDICATIONS  Home Medications     Reviewed by Shantell Aparicio R.N. (Registered Nurse) on 07/15/21 at 0730  Med List Status: Complete   Medication Last Dose Status   acetaminophen (TYLENOL) 500 MG Tab 7/13/2021 Active   cetirizine (ZYRTEC) 10 MG Tab 7/14/2021 Active   escitalopram (LEXAPRO) 10 MG Tab 7/15/2021 Active   escitalopram (LEXAPRO) 20 MG tablet 7/15/2021 Active   etodolac (LODINE) 500 MG tablet pt no longer taking Active   folic acid (FOLVITE) 1 MG Tab 7/15/2021 Active   furosemide (LASIX) 20 MG Tab 7/13/2021 Active   lamoTRIgine (LAMICTAL) 100 MG Tab 7/15/2021 Active   magnesium oxide (MAG-OX) 400 MG Tab 7/15/2021 Active   montelukast (SINGULAIR) 10 MG Tab 7/14/2021 Active   NON SPECIFIED 7/15/2021 Active   omeprazole (PRILOSEC) 40 MG delayed-release capsule 7/14/2021 Active   predniSONE (DELTASONE) 10 MG Tab 7/15/2021 Active   rosuvastatin (CRESTOR) 10 MG Tab 7/14/2021 Active   vitamin D (VITAMIND D3) 1000 UNIT Tab 7/14/2021 Active                ALLERGIES  Allergies   Allergen Reactions   • Ampicillin-Sulbactam Sodium Hives     Tolerates cefazolin  Pt does not feel this is an active allergy.    • Iodine Anaphylaxis     Contrast- Oral, topical, And Iv Dye   • Levofloxacin Anaphylaxis   • Shellfish-Derived Products Anaphylaxis     lobster   • Hydrocodone Itching   • Unipen [Nafcillin] Hives       PHYSICAL EXAM  VITAL SIGNS: /55   Pulse (!) 47   Temp 36.6 °C (97.8 °F) (Temporal)   Resp 16   Ht 1.829 m (6')   Wt 108 kg (237 lb 7 oz)   LMP  (LMP Unknown)   SpO2 95%   BMI 32.20 kg/m²  @DAMIEN[[id number]::@  Pulse ox interpretation: I interpret this pulse ox as normal.    Physical Exam  HENT:      Head:  Normocephalic and atraumatic.      Right Ear: External ear normal.      Left Ear: External ear normal.   Eyes:      General: No scleral icterus.     Conjunctiva/sclera: Conjunctivae normal.   Cardiovascular:      Rate and Rhythm: Normal rate.   Pulmonary:      Effort: Pulmonary effort is normal. No respiratory distress.      Breath sounds: No stridor. No wheezing.   Abdominal:      General: There is no distension.      Tenderness: There is no abdominal tenderness.   Musculoskeletal:         General: No deformity. Normal range of motion.      Cervical back: Normal range of motion.   Skin:     General: Skin is warm and dry.      Findings: No erythema or rash.   Neurological:      Mental Status: She is alert and oriented to person, place, and time.      Coordination: Coordination normal.   Psychiatric:         Mood and Affect: Affect normal.         Judgment: Judgment normal.         DIAGNOSTIC STUDIES / PROCEDURES    LABS/EKG  Results for orders placed or performed during the hospital encounter of 07/15/21   CBC WITH DIFFERENTIAL   Result Value Ref Range    WBC 10.8 4.8 - 10.8 K/uL    RBC 3.92 (L) 4.20 - 5.40 M/uL    Hemoglobin 12.6 12.0 - 16.0 g/dL    Hematocrit 37.2 37.0 - 47.0 %    MCV 94.9 81.4 - 97.8 fL    MCH 32.1 27.0 - 33.0 pg    MCHC 33.9 33.6 - 35.0 g/dL    RDW 45.6 35.9 - 50.0 fL    Platelet Count 431 164 - 446 K/uL    MPV 9.1 9.0 - 12.9 fL    Neutrophils-Polys 60.70 44.00 - 72.00 %    Lymphocytes 28.30 22.00 - 41.00 %    Monocytes 8.60 0.00 - 13.40 %    Eosinophils 0.30 0.00 - 6.90 %    Basophils 0.50 0.00 - 1.80 %    Immature Granulocytes 1.60 (H) 0.00 - 0.90 %    Nucleated RBC 0.00 /100 WBC    Neutrophils (Absolute) 6.54 2.00 - 7.15 K/uL    Lymphs (Absolute) 3.05 1.00 - 4.80 K/uL    Monos (Absolute) 0.93 (H) 0.00 - 0.85 K/uL    Eos (Absolute) 0.03 0.00 - 0.51 K/uL    Baso (Absolute) 0.05 0.00 - 0.12 K/uL    Immature Granulocytes (abs) 0.17 (H) 0.00 - 0.11 K/uL    NRBC (Absolute) 0.00 K/uL   CMP   Result  Value Ref Range    Sodium 136 135 - 145 mmol/L    Potassium 4.1 3.6 - 5.5 mmol/L    Chloride 102 96 - 112 mmol/L    Co2 22 20 - 33 mmol/L    Anion Gap 12.0 7.0 - 16.0    Glucose 114 (H) 65 - 99 mg/dL    Bun 23 (H) 8 - 22 mg/dL    Creatinine 0.56 0.50 - 1.40 mg/dL    Calcium 9.4 8.4 - 10.2 mg/dL    AST(SGOT) 24 12 - 45 U/L    ALT(SGPT) 21 2 - 50 U/L    Alkaline Phosphatase 108 (H) 30 - 99 U/L    Total Bilirubin 0.3 0.1 - 1.5 mg/dL    Albumin 4.0 3.2 - 4.9 g/dL    Total Protein 7.5 6.0 - 8.2 g/dL    Globulin 3.5 1.9 - 3.5 g/dL    A-G Ratio 1.1 g/dL   TROPONIN   Result Value Ref Range    Troponin T <6 6 - 19 ng/L   ESTIMATED GFR   Result Value Ref Range    GFR If African American >60 >60 mL/min/1.73 m 2    GFR If Non African American >60 >60 mL/min/1.73 m 2   TROPONIN   Result Value Ref Range    Troponin T <6 6 - 19 ng/L           EKG   Result Value Ref Range    Report       [facility] Emergency Dept.    Test Date:  2021-07-15  Pt Name:    VIOLA CORONADO              [facility]  MRN:        [id number]                      [facility]   Gender:     Female                       Technician:   :        1960                   Requested By:MARISABEL CATES  Order #:    [id number]                    Reading MD: Marisabel Cates    Measurements  Intervals                                Axis  Rate:       47                           P:          33  LA:         205                          QRS:        -9  QRSD:       103                          T:          40  QT:         461  QTc:        408    Interpretive Statements  Sinus bradycardia  Compared to ECG 2021 20:59:05  Sinus rhythm no longer present         RADIOLOGY  DX-CHEST-PORTABLE (1 VIEW)   Final Result         1.  No acute cardiopulmonary disease.      EC-ECHOCARDIOGRAM COMPLETE W/ CONT    (Results Pending)        COURSE & MEDICAL DECISION MAKING  Pertinent Labs & Imaging studies reviewed by me. (See chart for details)  I  verified that the patient was wearing a mask and I was wearing appropriate PPE every time I entered the room. The patient's mask was on the patient at all times during my encounter except for a brief view of the oropharynx.     60 y.o. female PMH immunosuppression p/w shortness of breath and chest pain bradycardia.     Differential diagnosis includes but is not limited to:  Patient with chest pain and bradycardia  Is chest pain-free at time of my initial eval  Patient also reports being notified of positive PCP pna  Pt w/o thrush  Will defer to inpatient management of PCP and would consider continued cardiac monitoring while inpatient    Patient admitted to Dr. Stanley in guarded condition on 3 L of oxygen      FINAL IMPRESSION  Visit Diagnoses     ICD-10-CM   1. Acute chest pain  R07.9              Electronically signed by: Wyatt Cates M.D., 7/15/2021 4:24 AM

## 2021-07-15 NOTE — PROGRESS NOTES
Discharge paperwork reviewed with patient at bedside. Copy given. Questions encouraged and answered. IV removed. Education on pneumocystis carinii pneumonia provided. Patient escorted to ED entrance via wheelchair. Patient discharged to home.

## 2021-07-15 NOTE — CARE PLAN
The patient is Stable - Low risk of patient condition declining or worsening    Shift Goals  Clinical Goals: remain free from falls    Progress made toward(s) clinical / shift goals:  pt on 2L of oxygen via nasal cannula, fall precautions in place. Bed in lowest and locked position. Call light within reach.    Patient is not progressing towards the following goals:

## 2021-07-15 NOTE — PROGRESS NOTES
Received report from night shift RN. Pt is awake and alert. No signs of distress. Pt denies any chest pain or sob at this time. Pt denies any nausea. Pt denies any numbness or tingling. Pt reports pain 0/10. Pt is able to planter flex and dorsi flex ble, + pulses. fall precautions in place. Bed in lowest and locked position. Call light within reach. Pt educated to call when in need of assistance. Pt verbalized understanding.  chart check complete

## 2021-07-15 NOTE — ASSESSMENT & PLAN NOTE
-Inpatient status to medical floor with cardiac monitoring.  -Patient is immunosuppressed due to treatment of psoriatic arthritis with Humira and methotrexate.  She was also started on prednisone since last admission.  -I am starting her on 15 mg/kg sulfamethoxazole/trimethoprim.  -We will notify pulmonology the patient is here.  She has been previously followed by Dr. Vidal.  I appreciate consult and recommendations.  -Additionally may need ID to follow.

## 2021-07-15 NOTE — PROGRESS NOTES
4 Eyes Skin Assessment Completed by ERIC Stinson and ERIC Garcia.    Head WDL  Ears WDL  Nose WDL  Mouth WDL  Neck WDL  Breast/Chest WDL  Shoulder Blades WDL  Spine WDL  (R) Arm/Elbow/Hand WDL  (L) Arm/Elbow/Hand WDL  Abdomen WDL  Groin WDL  Scrotum/Coccyx/Buttocks WDL  (R) Leg WDL  (L) Leg WDL  (R) Heel/Foot/Toe WDL  (L) Heel/Foot/Toe WDL          Devices In Places Tele Box      Interventions In Place Gray Ear Foams    Possible Skin Injury No    Pictures Uploaded Into Epic N/A  Wound Consult Placed N/A  RN Wound Prevention Protocol Ordered No

## 2021-07-15 NOTE — ASSESSMENT & PLAN NOTE
-- On humira and methotrexate    -- Recommend holding off on methotrexate therapy    -- Will defer to rheumatology regarding when to start humira while on PCP therapy

## 2021-07-15 NOTE — ASSESSMENT & PLAN NOTE
-- Secondary to PCP pneumonia   -- On 2 liters NC at home    -- Cont PCP rx   -- Encourage IS and OOB as tolerated   -- Goal SpO2 > 88%   -- Will need ambulatory pulse oximetry upon clinic follow up

## 2021-07-15 NOTE — DISCHARGE SUMMARY
Discharge Summary    CHIEF COMPLAINT ON ADMISSION  Chief Complaint   Patient presents with   • Chest Pain       Reason for Admission  chest pain     Admission Date  7/15/2021    CODE STATUS  Full Code    HPI & HOSPITAL COURSE  This is a 60 y.o. female here with shortness of breath and the patient was recently diagnosed with PCP pneumonia.  The patient was thus admitted for evaluation and management.  The patient's oxygen saturations have been stable on 2 L by nasal cannula.  The patient was evaluated at my request by Dr. Vidal of pulmonary and he at this point has cleared the patient to go home with outpatient Bactrim for a total of 32 days twice daily dosing.  The patient was noted on telemetric monitoring to have heart rate anywhere from 40-60.  The patient is asymptomatic with this and the patient did undergo a twelve-lead EKG which shows sinus bradycardia, underwent serial cardiac markers which were normal, and echocardiogram which is unchanged compared to previous.  Suggestion is that the patient follow-up with cardiology as an outpatient for an event monitor versus Holter monitor.  The patient may end up with a pacemaker but this point she is asymptomatic and heart rate comes up into the 60-70 range when she is with any activity.  The patient otherwise at this point is quite stable and comfortable has no signs of distress.  She is alert awake oriented x3 pleasant cooperative and can be discharged home with outpatient follow-ups and monitoring.    Therefore, she is discharged in good and stable condition to home with close outpatient follow-up.    The patient recovered much more quickly than anticipated on admission.    Discharge Date  7/15/2021    FOLLOW UP ITEMS POST DISCHARGE  Follow-up with primary care physician in 7 to 10 days  Follow-up with cardiology as needed  Follow-up with pulmonology in 2 days    DISCHARGE DIAGNOSES  Principal Problem:    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[facility]) POA:  Unknown  Active Problems:    Psoriatic arthritis (HCC) POA: Yes    Acute respiratory failure with hypoxia (HCC) POA: Yes    Moderate persistent asthma without complication POA: Yes    Immunosuppression (HCC) POA: Yes    Major depressive disorder, recurrent, moderate (HCC) POA: Yes    Abnormal CT of the chest POA: Yes    Bradycardia POA: Unknown  Resolved Problems:    * No resolved hospital problems. *      FOLLOW UP  Future Appointments   Date Time Provider Department Center   7/23/2021  1:00 PM Lauren Angela P.A.-C. [facility] None   7/29/2021  3:00 PM Joe Espinal M.D. [facility] None   10/5/2021  7:15 AM [facility] EXAM 10 ECHO [facility] [address]   11/5/2021  7:00 AM Joe Espinal M.D. [facility] None     No follow-up provider specified.    MEDICATIONS ON DISCHARGE     Medication List      START taking these medications      Instructions   guaiFENesin dextromethorphan 100-10 MG/5ML Syrp syrup  Commonly known as: ROBITUSSIN DM   Take 10 mL by mouth every 6 hours as needed for Cough.  Dose: 10 mL     sulfamethoxazole-trimethoprim 800-160 MG tablet  Commonly known as: BACTRIM DS   Take 2 Tablets by mouth every 8 hours for 63 days.  Dose: 2 tablet        CONTINUE taking these medications      Instructions   acetaminophen 325 MG Tabs  Commonly known as: Tylenol   Take 650 mg by mouth every 6 hours as needed for Mild Pain or Fever. 2 tablets = 650 mg.  Dose: 650 mg     acyclovir 5 % Oint  Commonly known as: ZOVIRAX   Apply 1 Application topically 3 times a day as needed (Cold Sores).  Dose: 1 Application     cetirizine 10 MG Tabs  Commonly known as: ZYRTEC   Take 10 mg by mouth at bedtime.  Dose: 10 mg     * escitalopram 10 MG Tabs  Commonly known as: Lexapro   Take 10 mg by mouth every morning. Take with escitalopram 20 mg for a total dose of 30 mg.  Dose: 10 mg     * Lexapro 20 MG tablet  Generic drug: escitalopram   Take 20 mg by mouth every morning. Take with escitalopram 10 mg for a total dose of 30 mg.  Dose: 20 mg      etodolac 500 MG tablet  Commonly known as: LODINE   Take 500 mg by mouth 2 times a day.  Dose: 500 mg     folic acid 1 MG Tabs  Commonly known as: FOLVITE   Take 2 mg by mouth every morning. 2 tablets = 2 mg.  Dose: 2 mg     furosemide 20 MG Tabs  Commonly known as: LASIX   Take 0.5 Tablets by mouth 1 time a day as needed (swelling or weight gain).  Dose: 10 mg     Humira Pen 40 MG/0.4ML Pnkt  Generic drug: Adalimumab   Inject 40 mg under the skin every 14 days.  Dose: 40 mg     lamoTRIgine 100 MG Tabs  Commonly known as: LAMICTAL   Take 100 mg by mouth every morning.  Dose: 100 mg     magnesium oxide 400 MG Tabs tablet  Commonly known as: MAG-OX   Take 400 mg by mouth every morning.  Dose: 400 mg     montelukast 10 MG Tabs  Commonly known as: SINGULAIR   Take 1 Tab by mouth every day.  Dose: 10 mg     NON SPECIFIED   Portable home oxygen concentrator     omeprazole 40 MG delayed-release capsule  Commonly known as: PRILOSEC   Take 40 mg by mouth every day.  Dose: 40 mg     predniSONE 10 MG Tabs  Commonly known as: DELTASONE   Take 1 tablet by mouth every day. 40 mg po daily x 7 days, then 30 mg po daily x 7 days, then 20 mg po x 7 days, then 10 mg po daily x 7 days then stop  Dose: 10 mg     rosuvastatin 10 MG Tabs  Commonly known as: CRESTOR   Take 1 Tab by mouth every bedtime.  Dose: 10 mg     vitamin D 1000 UNIT Tabs  Commonly known as: VITAMIND D3   Take 1 tablet by mouth 2 times a day.  Dose: 1,000 Units         * This list has 2 medication(s) that are the same as other medications prescribed for you. Read the directions carefully, and ask your doctor or other care provider to review them with you.            STOP taking these medications    azithromycin 250 MG Tabs  Commonly known as: ZITHROMAX            Allergies  Allergies   Allergen Reactions   • Iodine Anaphylaxis and Rash     IV = anaphylaxis, topical = rash   • Levofloxacin Anaphylaxis   • Shellfish-Derived Products Anaphylaxis     LOBSTER   • Unasyn  [Ampicillin-Sulbactam Sodium] Hives and Rash     Tolerates cefazolin   • Hydrocodone Itching       DIET  Orders Placed This Encounter   Procedures   • Diet Order Diet: Cardiac     Standing Status:   Standing     Number of Occurrences:   1     Order Specific Question:   Diet:     Answer:   Cardiac [6]       ACTIVITY  As tolerated.  Weight bearing as tolerated    CONSULTATIONS  Pulmonology Dr. Vidal    PROCEDURES  None    LABORATORY  Lab Results   Component Value Date    SODIUM 136 07/15/2021    POTASSIUM 4.1 07/15/2021    CHLORIDE 102 07/15/2021    CO2 22 07/15/2021    GLUCOSE 114 (H) 07/15/2021    BUN 23 (H) 07/15/2021    CREATININE 0.56 07/15/2021        Lab Results   Component Value Date    WBC 10.8 07/15/2021    HEMOGLOBIN 12.6 07/15/2021    HEMATOCRIT 37.2 07/15/2021    PLATELETCT 431 07/15/2021        Total time of the discharge process exceeds 45 minutes.

## 2021-07-15 NOTE — PROGRESS NOTES
1518: Med tele informed this RN that pt's HR is sustaining 45. This RN laid eyes on pt.  Pt denies any chest pain, sob, lightheadedness, or dizziness. Pt's vitals taken BP: 113/50, HR: 46 SPO2 94% on 2L.  Dr. Garcia updated and aware. No new orders at this time.

## 2021-07-16 LAB
BACTERIA UR CULT: NORMAL
SIGNIFICANT IND 70042: NORMAL
SITE SITE: NORMAL
SOURCE SOURCE: NORMAL

## 2021-07-17 LAB
A FLAVUS AB SER QL ID: NORMAL
A FUMIGATUS1 AB SER QL ID: NORMAL
A FUMIGATUS2 AB SER QL: NORMAL
A FUMIGATUS3 AB SER QL ID: NORMAL
A FUMIGATUS6 AB SER QL ID: NORMAL
A PULLULANS AB SER QL ID: NORMAL
EKG IMPRESSION: NORMAL
PIGEON SERUM AB QL ID: NORMAL
S RECTIVIRGULA AB SER QL ID: NORMAL
S VIRIDIS AB SER QL: NORMAL
T CANDIDUS AB SER QL: NORMAL
T VULGARIS AB SER QL ID: NORMAL

## 2021-07-22 ENCOUNTER — PATIENT MESSAGE (OUTPATIENT)
Dept: MEDICAL GROUP | Facility: LAB | Age: 61
End: 2021-07-22

## 2021-07-22 ENCOUNTER — OFFICE VISIT (OUTPATIENT)
Dept: MEDICAL GROUP | Facility: LAB | Age: 61
End: 2021-07-22
Payer: COMMERCIAL

## 2021-07-22 VITALS
TEMPERATURE: 97 F | SYSTOLIC BLOOD PRESSURE: 142 MMHG | BODY MASS INDEX: 30.88 KG/M2 | HEIGHT: 72 IN | OXYGEN SATURATION: 96 % | RESPIRATION RATE: 12 BRPM | HEART RATE: 72 BPM | WEIGHT: 228 LBS | DIASTOLIC BLOOD PRESSURE: 70 MMHG

## 2021-07-22 DIAGNOSIS — B59 PNEUMONIA DUE TO PNEUMOCYSTIS JIROVECII, UNSPECIFIED LATERALITY, UNSPECIFIED PART OF LUNG (HCC): ICD-10-CM

## 2021-07-22 DIAGNOSIS — B59 PNEUMONIA OF BOTH LUNGS DUE TO PNEUMOCYSTIS JIROVECII, UNSPECIFIED PART OF LUNG (HCC): ICD-10-CM

## 2021-07-22 PROCEDURE — 99213 OFFICE O/P EST LOW 20 MIN: CPT | Performed by: FAMILY MEDICINE

## 2021-07-22 RX ORDER — SULFAMETHOXAZOLE AND TRIMETHOPRIM 200; 40 MG/5ML; MG/5ML
SUSPENSION ORAL
Qty: 840 ML | Refills: 0 | Status: SHIPPED | OUTPATIENT
Start: 2021-07-22 | End: 2021-07-29

## 2021-07-22 ASSESSMENT — FIBROSIS 4 INDEX: FIB4 SCORE: 0.74

## 2021-07-22 NOTE — PROGRESS NOTES
Subjective:     Chief Complaint   Patient presents with   • Follow-Up     from ER  medication issue bactrim         HPI:   Arely presents today with problems with her bactrim Ds.  She was started on this recently after hospitalization in which she was diagnosed with PCP pneumonia.  There is also concern with possible interstitial lung disease.  She is currently off of her supplemental oxygen right now and doing okay but she is not tolerating her Bactrim.  She has been having some esophagitis with this medication.  She does have a small hiatal hernia but every time she takes the Bactrim she has horrible reflux and esophagitis symptoms all night long.  She will often have to sit up completely straight in bed still with symptoms after taking her last Bactrim dose for the day.  She is willing to even try an IV through PICC line at this point if it will help her tolerate this.  Current regimen is to be on the Bactrim 3 times daily for the next 40 some days. she is on prednisone as well, which seems ok.       On prednisone taper as well , going down by 10 mg every week.     She does have a visit with pulmonology tomorrow as well.  While admitted with this last hospitalization she did have an echo done for a very bradycardic heart rate, but this was felt to be normal.  Also her heart rate has improved to a rate of 72 today.  Her blood pressure has also increased a little bit but this also may be with the prednisone dosing and little sleep with this esophagitis.  Current Outpatient Medications Ordered in Epic   Medication Sig Dispense Refill   • Adalimumab (HUMIRA PEN) 40 MG/0.4ML Pen-injector Kit Inject 40 mg under the skin every 14 days.     • acyclovir (ZOVIRAX) 5 % Ointment Apply 1 Application topically 3 times a day as needed (Cold Sores).     • guaiFENesin dextromethorphan (ROBITUSSIN DM) 100-10 MG/5ML Syrup syrup Take 10 mL by mouth every 6 hours as needed for Cough. 840 mL 1   • sulfamethoxazole-trimethoprim  (BACTRIM DS) 800-160 MG tablet Take 2 Tablets by mouth every 8 hours for 63 days. 378 tablet 0   • NON SPECIFIED Portable home oxygen concentrator 1 Each 0   • furosemide (LASIX) 20 MG Tab Take 0.5 Tablets by mouth 1 time a day as needed (swelling or weight gain). 60 tablet 0   • predniSONE (DELTASONE) 10 MG Tab Take 1 tablet by mouth every day. 40 mg po daily x 7 days, then 30 mg po daily x 7 days, then 20 mg po x 7 days, then 10 mg po daily x 7 days then stop 70 tablet 0   • vitamin D (VITAMIND D3) 1000 UNIT Tab Take 1 tablet by mouth 2 times a day. 60 tablet 0   • escitalopram (LEXAPRO) 10 MG Tab Take 10 mg by mouth every morning. Take with escitalopram 20 mg for a total dose of 30 mg.     • escitalopram (LEXAPRO) 20 MG tablet Take 20 mg by mouth every morning. Take with escitalopram 10 mg for a total dose of 30 mg.     • etodolac (LODINE) 500 MG tablet Take 500 mg by mouth 2 times a day.     • lamoTRIgine (LAMICTAL) 100 MG Tab Take 100 mg by mouth every morning.     • acetaminophen (TYLENOL) 325 MG Tab Take 650 mg by mouth every 6 hours as needed for Mild Pain or Fever. 2 tablets = 650 mg.     • folic acid (FOLVITE) 1 MG Tab Take 2 mg by mouth every morning. 2 tablets = 2 mg.     • rosuvastatin (CRESTOR) 10 MG Tab Take 1 Tab by mouth every bedtime. 90 Tab 0   • montelukast (SINGULAIR) 10 MG Tab Take 1 Tab by mouth every day. 90 Tab 1   • magnesium oxide (MAG-OX) 400 MG Tab Take 400 mg by mouth every morning.     • omeprazole (PRILOSEC) 40 MG delayed-release capsule Take 40 mg by mouth every day.     • cetirizine (ZYRTEC) 10 MG Tab Take 10 mg by mouth at bedtime.       No current Epic-ordered facility-administered medications on file.         ROS:  Gen: no fevers/chills, no changes in weight  Eyes: no changes in vision  ENT: no sore throat, no hearing loss, no bloody nose  Pulm: no sob, no cough  CV: no chest pain, no palpitations        Objective:     Exam:  /70 (BP Location: Left arm, Patient Position:  Sitting, BP Cuff Size: Adult)   Pulse 72   Temp 36.1 °C (97 °F)   Resp 12   Ht 1.829 m (6')   Wt 103 kg (228 lb)   LMP  (LMP Unknown)   SpO2 96%   BMI 30.92 kg/m²  Body mass index is 30.92 kg/m².    Gen: AAOx3, NAD, well appearing  HEENT: NCAT, EOMI, Nares patent, Mucosa moist  Resp: Normal chest wall rise and fall, not SOB, no tachypnea  Skin: no rash or abnormality of visible skin.   Psych: normal speech, not slurred, good insight, affect full  MSK: Moves all four limbs equally and normally, gait normal        Labs: Reviewed imaging from recent hospitalization.    Assessment & Plan:     60 y.o. female with the following -     1. 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  This is a fair amount of liquid to be able to take at 1 time, 20 mls, but she is willing to try this if she tolerates it better than the oral tablets.   She is still suffering with side effects from this we may just have to go to a PICC line and IV use.  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  She will let us know how she is doing over the next 2 weeks and if she is tolerating this we will refill the prescription.    No follow-ups on file.    Please note that this dictation was created using voice recognition software. I have made every reasonable attempt to correct obvious errors, but I expect that there are errors of grammar and possibly content that I did not discover before finalizing the note.

## 2021-07-23 ENCOUNTER — SLEEP CENTER VISIT (OUTPATIENT)
Dept: SLEEP MEDICINE | Facility: MEDICAL CENTER | Age: 61
End: 2021-07-23
Payer: COMMERCIAL

## 2021-07-23 VITALS
SYSTOLIC BLOOD PRESSURE: 124 MMHG | BODY MASS INDEX: 30.88 KG/M2 | RESPIRATION RATE: 16 BRPM | HEIGHT: 72 IN | OXYGEN SATURATION: 94 % | HEART RATE: 63 BPM | WEIGHT: 228 LBS | DIASTOLIC BLOOD PRESSURE: 78 MMHG

## 2021-07-23 DIAGNOSIS — B37.0 ORAL THRUSH: ICD-10-CM

## 2021-07-23 DIAGNOSIS — J45.40 MODERATE PERSISTENT ASTHMA WITHOUT COMPLICATION: ICD-10-CM

## 2021-07-23 DIAGNOSIS — B59 PNEUMONIA DUE TO PNEUMOCYSTIS JIROVECII, UNSPECIFIED LATERALITY, UNSPECIFIED PART OF LUNG (HCC): ICD-10-CM

## 2021-07-23 PROCEDURE — 99214 OFFICE O/P EST MOD 30 MIN: CPT | Performed by: PHYSICIAN ASSISTANT

## 2021-07-23 RX ORDER — ALBUTEROL SULFATE 90 UG/1
2 AEROSOL, METERED RESPIRATORY (INHALATION) EVERY 4 HOURS PRN
Qty: 1 EACH | Refills: 1 | Status: SHIPPED | OUTPATIENT
Start: 2021-07-23 | End: 2021-08-23

## 2021-07-23 RX ORDER — CLOTRIMAZOLE 10 MG/1
10 LOZENGE ORAL; TOPICAL
Qty: 35 TROCHE | Refills: 0 | Status: SHIPPED | OUTPATIENT
Start: 2021-07-23 | End: 2021-07-30 | Stop reason: SDUPTHER

## 2021-07-23 ASSESSMENT — ENCOUNTER SYMPTOMS
ORTHOPNEA: 0
HEADACHES: 0
SINUS PAIN: 0
CHILLS: 0
WEIGHT LOSS: 1
FEVER: 0
ROS GI COMMENTS: NO DENTURES, NO DIFFICULTY SWALLOWING
HEARTBURN: 1
SORE THROAT: 0
SHORTNESS OF BREATH: 1
INSOMNIA: 0
WHEEZING: 0
COUGH: 1
DIZZINESS: 0
SPUTUM PRODUCTION: 0
TREMORS: 0
PALPITATIONS: 0

## 2021-07-23 ASSESSMENT — FIBROSIS 4 INDEX: FIB4 SCORE: 0.74

## 2021-07-23 NOTE — PATIENT INSTRUCTIONS
1-review Septra dosing with pharmacist and clarify length of time  2-obtain equivalent to six minute walk today, did not decrease her oxygen saturations  3-will need follow up HRCT, clarify timing with pulmonologist on Monday   4-patient requests testing for definitive diagnosis  5-monitor oxygen saturations with property site visit at 7500 ft above sea level  6-had covid vaccine, questions regarding need for alternative or booster based on immune compromised position  7-clearance to travel to [place] for granddaughter in NICU   8-short term follow up in 1 week

## 2021-07-23 NOTE — PROGRESS NOTES
CC; not tolerating Bactrim well    HPI:  Arely Haynes is a 60 y.o. year old female here today for follow-up on recent hospitalization with diagnosis of PCP.  She does have a history of moderate persistent asthma last seen in clinic on 12/30/2019 by MIRTA Prieto.  Pulmonary consult during hospitalization by Dr. Vidal.  Patient hospitalized 7/7/2020 21 x 4 days and again on 7/15/2021 discharge later that day.  She is a never smoker but did have secondhand exposure in childhood.    Pertinent past medical history includes psoriatic arthritis previously on Humira and methotrexate.  Also includes GERD with erosive esophagitis right lower lobe and right middle lobe CAP in November 2018, seasonal allergies, CHRISTY/MDD.     Reviewed in clinic vitals including /78, HR 63, O2 sat of 94% on room air. Patient's body mass index is 30.92 kg/m².  Patient reports 20 pounds weight loss due to stomach upset from antibiotics.    Reviewed home medication regimen including Bactrim, albuterol, long prednisone taper currently on 30 mg daily, montelukast, omeprazole, cetirizine.  They are very concerned that the Bactrim dosing is incorrect for treatment of pneumocystis.    Reviewed most recent imaging including echocardiogram obtained 7/15/2021 demonstrating normal left ventricular chamber size, wall thickness and systolic function, LVEF estimated 65%, normal diastolic function, normal right ventricular size and systolic function, trace mitral regurgitation, mild aortic insufficiency, trace tricuspid regurgitation, estimated RVSP of 20 mmHg.    CTA obtained 7/7/2021 demonstrated no evidence of PE, bilateral diffuse interstitial prominence with groundglass opacities, mildly prominent mediastinal hilar lymph nodes.    Tissue sample from bronchoscopy obtained 7/9/2021 demonstrated lung parenchyma with mild interstitial fibrosis and chronic inflammation.  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  Autoimmune lab  work reviewed.    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    Pulmonary function testing last obtained 12/31/2018 demonstrating FEV1 of 3.21 L or 97% predicted, FVC of 4.05 L or 95% predicted,  FEV1/FVC ratio 79, residual volume 115% predicted, % predicted, DLCO 98.  Per pulmonologist interpretation normal spirometry with no significant bronchodilator response.  No significant restriction or hyperinflation noted, O2 transfer was normal.    Review of Systems   Constitutional: Positive for malaise/fatigue and weight loss (intolerance to Septra ). Negative for chills and fever.   HENT: Negative for congestion, hearing loss, nosebleeds, sinus pain, sore throat and tinnitus.         Post nasal drip    Eyes:        Presc glasses    Respiratory: Positive for cough (dry cough ) and shortness of breath (with exertion ). Negative for sputum production and wheezing.    Cardiovascular: Negative for chest pain, palpitations, orthopnea and leg swelling (resolved on lasix ).   Gastrointestinal: Positive for heartburn (not controlled meds at present ).        No dentures, no difficulty swallowing    Neurological: Negative for dizziness, tremors and headaches.   Psychiatric/Behavioral: The patient does not have insomnia.        Past Medical History:   Diagnosis Date   • Anesthesia     1st cousin has malignant hyperthermia/pt has never had an issue or her sisters   • Arthritis     hips knees hands spine   • Asthma     prn inhalers   • Bowel habit changes     diarrhea   • Depression     Daughter passed few years ago   • Erosive esophagitis    • GERD (gastroesophageal reflux disease)    • Heart burn    • High cholesterol    • Immunocompromised (HCC)    • Pain     hips knees   • Pneumonia 2018   • Psoriasis    • Psoriatic arthritis (HCC)        Past Surgical History:   Procedure Laterality Date   • PB BRONCHOSCOPY,DIAGNOSTIC  7/9/2021    Procedure:  BRONCHOSCOPY;  Surgeon: Myles Vidal M.D.;  Location: SURGERY [facility];  Service: Ent   • TENDON REPAIR Left 9/19/2019    Procedure: REPAIR, TENDON- QUADRICEPS RUTURE REPAIR AND MEYERS;  Surgeon: Jayden Velázquez M.D.;  Location: SURGERY [facility];  Service: Orthopedics   • HYSTERECTOMY ROBOTIC XI N/A 7/11/2019    Procedure: HYSTERECTOMY, ROBOT-ASSISTED, USING DA SABINO XI;  Surgeon: Curly Bernal M.D.;  Location: SURGERY [facility];  Service: Gynecology   • SALPINGO OOPHORECTOMY Bilateral 7/11/2019    Procedure: SALPINGO-OOPHORECTOMY;  Surgeon: Curly Bernal M.D.;  Location: SURGERY [facility];  Service: Gynecology   • GYN SURGERY  1999 c sec   • CYSTECTOMY     • HIP REPLACEMENT, TOTAL      Right   • LUMBAR FUSION POSTERIOR     • OTHER ABDOMINAL SURGERY      appendix   • OTHER ORTHOPEDIC SURGERY      left knee   • OTHER ORTHOPEDIC SURGERY      right rotator cuff       Family History   Problem Relation Age of Onset   • Hyperlipidemia Mother    • Heart Attack Mother    • Psychiatric Illness Mother    • Heart Disease Mother    • Arthritis Mother    • Lung Disease Father    • Cancer Father    • Hyperlipidemia Father    • Hyperlipidemia Sister    • Cancer Sister         breast   • Heart Disease Paternal Grandfather    • Hypertension Neg Hx    • Diabetes Neg Hx        Social History     Socioeconomic History   • Marital status:      Spouse name: Not on file   • Number of children: Not on file   • Years of education: Not on file   • Highest education level: Not on file   Occupational History   • Occupation: Flight Nurse/Educator   Tobacco Use   • Smoking status: Never Smoker   • Smokeless tobacco: Never Used   Vaping Use   • Vaping Use: Never used   Substance and Sexual Activity   • Alcohol use: Yes     Alcohol/week: 0.6 - 1.2 oz     Types: 1 - 2 Glasses of wine per week     Comment: one glss of wine at night    • Drug use: No   • Sexual activity: Yes     Comment: Tubal ligation   Other Topics  Concern   • Not on file   Social History Narrative   • Not on file     Social Determinants of Health     Financial Resource Strain:    • Difficulty of Paying Living Expenses:    Food Insecurity:    • Worried About Running Out of Food in the Last Year:    • Ran Out of Food in the Last Year:    Transportation Needs:    • Lack of Transportation (Medical):    • Lack of Transportation (Non-Medical):    Physical Activity:    • Days of Exercise per Week:    • Minutes of Exercise per Session:    Stress:    • Feeling of Stress :    Social Connections:    • Frequency of Communication with Friends and Family:    • Frequency of Social Gatherings with Friends and Family:    • Attends [employer] Services:    • Active Member of Clubs or Organizations:    • Attends Club or Organization Meetings:    • Marital Status:    Intimate Partner Violence:    • Fear of Current or Ex-Partner:    • Emotionally Abused:    • Physically Abused:    • Sexually Abused:        Allergies as of 07/23/2021 - Reviewed 07/23/2021   Allergen Reaction Noted   • Iodine Anaphylaxis and Rash 06/20/2017   • Levofloxacin Anaphylaxis 06/18/2018   • Shellfish-derived products Anaphylaxis 04/09/2015   • Unasyn [ampicillin-sulbactam sodium] Hives and Rash 08/25/2013        @Vital signs for this encounter:  Vitals:    07/23/21 1255   Height: 1.829 m (6')   Weight: 103 kg (228 lb)   Weight % change since last entry.: 0 %   BP: 124/78   Pulse: 63   BMI (Calculated): 30.92   Resp: 16       Current medications as of today   Current Outpatient Medications   Medication Sig Dispense Refill   • sulfamethoxazole-trimethoprim 200-40 mg/5 mL (BACTRIM/SEPTRA) oral suspension 20 ml mixed in liquid of choice 3 times daily (every 8 hours). 840 mL 0   • acyclovir (ZOVIRAX) 5 % Ointment Apply 1 Application topically 3 times a day as needed (Cold Sores).     • guaiFENesin dextromethorphan (ROBITUSSIN DM) 100-10 MG/5ML Syrup syrup Take 10 mL by mouth every 6 hours as needed for Cough.  840 mL 1   • predniSONE (DELTASONE) 10 MG Tab Take 1 tablet by mouth every day. 40 mg po daily x 7 days, then 30 mg po daily x 7 days, then 20 mg po x 7 days, then 10 mg po daily x 7 days then stop 70 tablet 0   • vitamin D (VITAMIND D3) 1000 UNIT Tab Take 1 tablet by mouth 2 times a day. 60 tablet 0   • escitalopram (LEXAPRO) 10 MG Tab Take 10 mg by mouth every morning. Take with escitalopram 20 mg for a total dose of 30 mg.     • escitalopram (LEXAPRO) 20 MG tablet Take 20 mg by mouth every morning. Take with escitalopram 10 mg for a total dose of 30 mg.     • etodolac (LODINE) 500 MG tablet Take 500 mg by mouth 2 times a day.     • lamoTRIgine (LAMICTAL) 100 MG Tab Take 100 mg by mouth every morning.     • acetaminophen (TYLENOL) 325 MG Tab Take 650 mg by mouth every 6 hours as needed for Mild Pain or Fever. 2 tablets = 650 mg.     • folic acid (FOLVITE) 1 MG Tab Take 2 mg by mouth every morning. 2 tablets = 2 mg.     • rosuvastatin (CRESTOR) 10 MG Tab Take 1 Tab by mouth every bedtime. 90 Tab 0   • montelukast (SINGULAIR) 10 MG Tab Take 1 Tab by mouth every day. 90 Tab 1   • magnesium oxide (MAG-OX) 400 MG Tab Take 400 mg by mouth every morning.     • omeprazole (PRILOSEC) 40 MG delayed-release capsule Take 40 mg by mouth every day.     • cetirizine (ZYRTEC) 10 MG Tab Take 10 mg by mouth at bedtime.       No current facility-administered medications for this visit.         Physical Exam:   Gen:           Alert and oriented, No apparent distress. Mood and affect appropriate, normal interaction with provider.  Eyes:          sclere white, conjunctive moist.  Hearing:     Grossly intact.  Dentition:    Good dentition.  Oropharynx:   Tongue white coating, posterior pharynx with erythema, no exudate.  Neck:        Supple, trachea midline, no masses.  Respiratory Effort: No intercostal retractions or use of accessory muscles.   Lung Auscultation:      Fine bilateral lower lobe rales, decreased upper lobes, no rhonchi  or wheezing.  CV:            Regular rate and rhythm. No edema. No murmurs, rubs or gallops.  Digits, Nails, Ext: No clubbing, cyanosis, petechiae, or nodes.   Skin:        No rashes, lesions or ulcers noted on  exposed skin surfaces.                     Assessment:  1. Moderate persistent asthma without complication  PULMONARY FUNCTION TESTS -Test requested: Complete Pulmonary Function Test    albuterol (PROVENTIL HFA) 108 (90 Base) MCG/ACT Aero Soln inhalation aerosol   2. Oral thrush  clotrimazole (MYCELEX) 10 MG Bhumi bhumi   3. 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    Plan:   60 y.o. year old female here today for follow-up on recent hospitalization with diagnosis of PCP.  She does have a history of moderate persistent asthma last seen in clinic on 12/30/2019 by MIRTA Prieto.  Pulmonary consult during hospitalization by Dr. Vidal.  Patient hospitalized 7/7/2020 21 x 4 days and again on 7/15/2021 discharge later that day.  She is a never smoker but did have secondhand exposure in childhood.    Pertinent past medical history includes psoriatic arthritis previously on Humira and methotrexate.  Also includes GERD with erosive esophagitis right lower lobe and right middle lobe CAP in November 2018, seasonal allergies, CHRISTY/MDD.     Reviewed in clinic vitals including /78, HR 63, O2 sat of 94% on room air. Patient's body mass index is 30.92 kg/m².  Patient reports 20 pounds weight loss due to stomach upset from antibiotics.  Ambulating multi ox in clinic, did not demonstrate need for O2.     Reviewed home medication regimen including Bactrim, albuterol, long prednisone taper currently on 30 mg daily, montelukast, omeprazole, cetirizine.  They are very concerned that the  Bactrim dosing is incorrect for treatment of pneumocystis.    PCP:  Patient was on humira and methotrexate for psoriatic arthritis. +PCR 7/9/21.  Patient was started on Septra but dosing was incorrect per patient and .  This was reviewed for them by ID pharmacist Joyce Bacon with recommendation for at least 2 double strength tablets 3 times per day or 40 mL liquid ideally 4 times a day, minimum 3 times a day.  Length of treatment 21 days.  This was reviewed with .  On review with patient she is not tolerating liquid (instantly throwing up). She seems to be tolerating tablets and has enough to complete course of treatment.     Asthma:  Update PFT, only using albuterol which she rarely needs.  Ambulating multi ox did not demonstrate need for O2.    Oral thrush: Treat with Chlortrimazole lozenges.  Patient reports improvement seen when checked in on her 7/26/21.    Case reviewed with pulmonologist, obtain follow-up HRCT.  Short-term follow-up appointment for PFT and with Dr. Vidal who saw patient in consult hospitalization.  Patient updated and amenable to plan.      This dictation was created using voice recognition software. The accuracy of the dictation is limited to the abilities of the software. I expect there may be some errors of grammar and possibly content.

## 2021-07-26 ENCOUNTER — PATIENT MESSAGE (OUTPATIENT)
Dept: MEDICAL GROUP | Facility: LAB | Age: 61
End: 2021-07-26

## 2021-07-26 DIAGNOSIS — J84.9 INTERSTITIAL LUNG DISEASE (HCC): ICD-10-CM

## 2021-07-26 NOTE — TELEPHONE ENCOUNTER
From: Arely Haynes  To: Physician Willa Keenan  Sent: 7/26/2021 10:03 AM PDT  Subject: Non-Urgent Medical Question    Hi,    I need a referral to the [facility] to Dr Sugey Jeter pulmonologist. She is the director of the ILD clinic.   FAX: [phone]  Clinic: [phone]    There is no follow up for me here as I was told I could see a pulmonologist in January... thus I would like to go to [facility], where my rheumatologist is.     The pathology report showed some fibrosis and likely ILD. The referral has to state ILD for her to accept me.     Thank you again for all.... you have been amazing! As have all at the office!     Please let me know if you arora e questions, and when the referral was sent so I can track it.    Kenzie

## 2021-07-26 NOTE — PATIENT COMMUNICATION
Patient requesting referral to [facility] pulmonologist for ILD.     Referral order pended.     Please sign/amend if appropriate.

## 2021-07-29 ENCOUNTER — OFFICE VISIT (OUTPATIENT)
Dept: MEDICAL GROUP | Facility: LAB | Age: 61
End: 2021-07-29
Payer: COMMERCIAL

## 2021-07-29 VITALS
BODY MASS INDEX: 31.29 KG/M2 | HEIGHT: 72 IN | RESPIRATION RATE: 12 BRPM | OXYGEN SATURATION: 96 % | TEMPERATURE: 97.8 F | HEART RATE: 70 BPM | SYSTOLIC BLOOD PRESSURE: 120 MMHG | DIASTOLIC BLOOD PRESSURE: 72 MMHG | WEIGHT: 231 LBS

## 2021-07-29 DIAGNOSIS — B59 PNEUMONIA OF BOTH LUNGS DUE TO PNEUMOCYSTIS JIROVECII, UNSPECIFIED PART OF LUNG (HCC): ICD-10-CM

## 2021-07-29 DIAGNOSIS — R00.1 SINUS BRADYCARDIA: ICD-10-CM

## 2021-07-29 PROBLEM — J96.01 ACUTE RESPIRATORY FAILURE WITH HYPOXIA (HCC): Status: RESOLVED | Noted: 2018-11-30 | Resolved: 2021-07-29

## 2021-07-29 PROCEDURE — 99213 OFFICE O/P EST LOW 20 MIN: CPT | Performed by: FAMILY MEDICINE

## 2021-07-29 ASSESSMENT — FIBROSIS 4 INDEX: FIB4 SCORE: 0.74

## 2021-07-29 NOTE — PROGRESS NOTES
Subjective:     CC: Hospital follow up    HPI:   Arely presents today to follow-up recent diagnosis of PCP pneumonia.  Recent hospitalization after multiple ER visits with fever and worsening hypoxia.  He had been on methotrexate and Humira for psoriatic arthritis.  She is being treated with Bactrim and prednisone taper.  Following with pulmonology, there is also concern for cooccurring ILD.  She is looking to follow-up with ILD specialist in [place].  Symptomatically she is much improved, no longer needing supplemental oxygen, walking daily without shortness of breath, no fevers.  Does still have some mild cough with deep inspiration but continues to use incentive spirometer at home.  Initially was unable to tolerate the Bactrim and tried liquid but this was a large volume.  She is switch back to Bactrim tabs and is able to tolerate this.    Sinus bradycardia was also noted during hospitalization, she had a normal echo.  They recommended considering cardiology follow-up with Holter or event monitor in the future.  She does think this is improved but is still occurring occasionally.    Medications, past medical history, allergies, and social history have been reviewed and updated.    ROS:  See HPI    Objective:       Exam:  /72 (BP Location: Left arm, Patient Position: Sitting, BP Cuff Size: Adult)   Pulse 70   Temp 36.6 °C (97.8 °F)   Resp 12   Ht 1.829 m (6')   Wt 105 kg (231 lb)   LMP  (LMP Unknown)   SpO2 96%   BMI 31.33 kg/m²  Body mass index is 31.33 kg/m².    Constitutional: Alert. Well appearing. No distress.  Skin: Warm, dry, good turgor, no visible rashes.  Eye: Equal, round and reactive to light, conjunctiva clear, lids normal.  Respiratory: Normal effort. Lungs are clear to auscultation bilaterally.  Cardiovascular: Regular rate and rhythm. Normal S1/S2. No murmurs, rubs or gallops.   Neuro: Moves all four extremities. No facial droop.  Psych: Answers questions appropriately.  Normal affect and mood.    Assessment & Plan:     60 y.o. female with the following -     1. Pneumonia of both lungs due to Pneumocystis jirovecii, unspecified part of lung (HCC)  Recent diagnosis after hospitalized with fever and hypoxia.  Following with pulmonology, continues on Bactrim and prednisone.  Had been immunosuppressed with Humira and methotrexate for psoriatic arthritis.  Concern there may also be some underlying ILD and she may end up following up with ILD specialist in [place].  Symptomatically much improved and off of oxygen.    2. Sinus bradycardia  Episodically noted during hospitalization.  Normal echo.  Appears asymptomatic and normal heart rate today.  Could consider Holter or event monitor in the future but will hold off for now.      Please note that this note was created using voice recognition software.

## 2021-07-30 ENCOUNTER — SLEEP CENTER VISIT (OUTPATIENT)
Dept: SLEEP MEDICINE | Facility: MEDICAL CENTER | Age: 61
End: 2021-07-30
Payer: COMMERCIAL

## 2021-07-30 VITALS
OXYGEN SATURATION: 95 % | WEIGHT: 232 LBS | SYSTOLIC BLOOD PRESSURE: 136 MMHG | HEART RATE: 64 BPM | DIASTOLIC BLOOD PRESSURE: 78 MMHG | RESPIRATION RATE: 16 BRPM | BODY MASS INDEX: 31.42 KG/M2 | HEIGHT: 72 IN

## 2021-07-30 DIAGNOSIS — B59 PNEUMONIA OF BOTH LUNGS DUE TO PNEUMOCYSTIS JIROVECII, UNSPECIFIED PART OF LUNG (HCC): ICD-10-CM

## 2021-07-30 DIAGNOSIS — J45.40 MODERATE PERSISTENT ASTHMA WITHOUT COMPLICATION: ICD-10-CM

## 2021-07-30 DIAGNOSIS — B37.0 ORAL THRUSH: ICD-10-CM

## 2021-07-30 DIAGNOSIS — L40.9 PSORIASIS: ICD-10-CM

## 2021-07-30 PROCEDURE — 99214 OFFICE O/P EST MOD 30 MIN: CPT | Performed by: PHYSICIAN ASSISTANT

## 2021-07-30 RX ORDER — CLOTRIMAZOLE 10 MG/1
10 LOZENGE ORAL; TOPICAL
Qty: 35 TROCHE | Refills: 0 | Status: SHIPPED | OUTPATIENT
Start: 2021-07-30 | End: 2022-01-06

## 2021-07-30 ASSESSMENT — ENCOUNTER SYMPTOMS
SORE THROAT: 0
WEIGHT LOSS: 0
INSOMNIA: 1
SHORTNESS OF BREATH: 1
PALPITATIONS: 0
HEADACHES: 0
DIZZINESS: 0
FEVER: 0
COUGH: 1
HEARTBURN: 0
ROS GI COMMENTS: NO DENTURES, NO DIFFICULTY SWALLOWING
SINUS PAIN: 0
ORTHOPNEA: 0
TREMORS: 0
SPUTUM PRODUCTION: 0
CHILLS: 0
WHEEZING: 0

## 2021-07-30 ASSESSMENT — FIBROSIS 4 INDEX: FIB4 SCORE: 0.74

## 2021-07-30 NOTE — PATIENT INSTRUCTIONS
1-reviewed current meds  2-feeling better   3-needs to continue clotrimazole, refill sent   4-discussed vit D supplementation  5-was scheduled regular CT rather than HRCT  6-follow up with MD

## 2021-07-30 NOTE — PROGRESS NOTES
CC: Follow-up    HPI:  Arely Haynes is a 60 y.o. year old female here today for follow-up on moderate persistent asthma and recent hospitalization with pneumocystis pneumonia.  She was consulted by Dr. Vidal during her admission. Last seen in clinic 7/23/2021.  She is a never smoker with secondhand exposure in childhood.    Pertinent past medical history includes psoriatic arthritis previously on Humira and methotrexate, GERD with erosive esophagitis, right lower lobe and right middle lobe community-acquired pneumonia in November 2018, seasonal allergies, CHRISTY/MDD, bradycardia.    Reviewed in clinic vitals including /78, HR 64, O2 sat 95%. Patient's body mass index is 31.46 kg/m². Exercise and nutrition counseling were performed at this visit.    Reviewed home medication regimen including Clotrimazole troches, albuterol, prednisone taper currently at 20 mg, Bactrim x21-day course, Singulair, omeprazole, Zyrtec.  She is using Mucinex 1200 mg morning and evening.  She reports using her incentive spirometry around 3750 several times per day.  She does endorse feeling better.    Reviewed most recent imagingincluding echocardiogram obtained 7/15/2021 demonstrating normal left ventricular chamber size, wall thickness and systolic function, LVEF estimated 65%, normal diastolic function, normal right ventricular size and systolic function, trace mitral regurgitation, mild aortic insufficiency, trace tricuspid regurgitation, estimated RVSP of 20 mmHg.     CTA obtained 7/7/2021 demonstrated no evidence of PE, bilateral diffuse interstitial prominence with groundglass opacities, mildly prominent mediastinal hilar lymph nodes.     Tissue sample from bronchoscopy obtained 7/9/2021 demonstrated lung parenchyma with mild interstitial fibrosis and chronic inflammation.  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  Autoimmune lab work reviewed.     Patient is a former flight nurse.   is physician medical  Director, [employer], occupational medicine.     Pulmonary function testing last obtained 12/31/2018 demonstrating FEV1 of 3.21 L or 97% predicted, FVC of 4.05 L or 95% predicted,  FEV1/FVC ratio 79, residual volume 115% predicted, % predicted, DLCO 98.  Per pulmonologist interpretation normal spirometry with no significant bronchodilator response.  No significant restriction or hyperinflation noted, O2 transfer was normal.      Review of Systems   Constitutional: Positive for malaise/fatigue (late afternoon ). Negative for chills, fever and weight loss.   HENT: Negative for congestion, hearing loss, nosebleeds, sinus pain, sore throat and tinnitus.         Thick post nasal drip    Eyes:        Presc glasses   Respiratory: Positive for cough and shortness of breath (mild exertional ). Negative for sputum production and wheezing.    Cardiovascular: Negative for chest pain, palpitations, orthopnea and leg swelling.   Gastrointestinal: Negative for heartburn.        No dentures, no difficulty swallowing    Neurological: Negative for dizziness, tremors and headaches.   Psychiatric/Behavioral: The patient has insomnia (poor sleep quality ).        Past Medical History:   Diagnosis Date   • Anesthesia     1st cousin has malignant hyperthermia/pt has never had an issue or her sisters   • Arthritis     hips knees hands spine   • Asthma     prn inhalers   • Bowel habit changes     diarrhea   • Depression     Daughter passed few years ago   • Erosive esophagitis    • GERD (gastroesophageal reflux disease)    • Heart burn    • High cholesterol    • Immunocompromised (HCC)    • Pain     hips knees   • Pneumonia 2018   • Psoriasis    • Psoriatic arthritis (HCC)        Past Surgical History:   Procedure Laterality Date   • PB BRONCHOSCOPY,DIAGNOSTIC  7/9/2021    Procedure: BRONCHOSCOPY;  Surgeon: Myles Vidal M.D.;  Location: SURGERY [facility];  Service: Ent   • TENDON REPAIR Left 9/19/2019    Procedure: REPAIR,  TENDON- QUADRICEPS RUTURE REPAIR AND MEYERS;  Surgeon: Jayden Velázquez M.D.;  Location: SURGERY [facility];  Service: Orthopedics   • HYSTERECTOMY ROBOTIC XI N/A 7/11/2019    Procedure: HYSTERECTOMY, ROBOT-ASSISTED, USING DA SABINO XI;  Surgeon: Curly Bernal M.D.;  Location: SURGERY [facility];  Service: Gynecology   • SALPINGO OOPHORECTOMY Bilateral 7/11/2019    Procedure: SALPINGO-OOPHORECTOMY;  Surgeon: Curly Bernal M.D.;  Location: SURGERY [facility];  Service: Gynecology   • GYN SURGERY  1999    c sec   • CYSTECTOMY     • HIP REPLACEMENT, TOTAL      Right   • LUMBAR FUSION POSTERIOR     • OTHER ABDOMINAL SURGERY      appendix   • OTHER ORTHOPEDIC SURGERY      left knee   • OTHER ORTHOPEDIC SURGERY      right rotator cuff       Family History   Problem Relation Age of Onset   • Hyperlipidemia Mother    • Heart Attack Mother    • Psychiatric Illness Mother    • Heart Disease Mother    • Arthritis Mother    • Lung Disease Father    • Cancer Father    • Hyperlipidemia Father    • Hyperlipidemia Sister    • Cancer Sister         breast   • Heart Disease Paternal Grandfather    • Hypertension Neg Hx    • Diabetes Neg Hx        Social History     Socioeconomic History   • Marital status:      Spouse name: Not on file   • Number of children: Not on file   • Years of education: Not on file   • Highest education level: Not on file   Occupational History   • Occupation: Flight Nurse/Educator   Tobacco Use   • Smoking status: Never Smoker   • Smokeless tobacco: Never Used   Vaping Use   • Vaping Use: Never used   Substance and Sexual Activity   • Alcohol use: Yes     Alcohol/week: 0.6 - 1.2 oz     Types: 1 - 2 Glasses of wine per week     Comment: one glss of wine at night    • Drug use: No   • Sexual activity: Yes     Comment: Tubal ligation   Other Topics Concern   • Not on file   Social History Narrative   • Not on file     Social Determinants of Health     Financial Resource Strain:    •  Difficulty of Paying Living Expenses:    Food Insecurity:    • Worried About Running Out of Food in the Last Year:    • Ran Out of Food in the Last Year:    Transportation Needs:    • Lack of Transportation (Medical):    • Lack of Transportation (Non-Medical):    Physical Activity:    • Days of Exercise per Week:    • Minutes of Exercise per Session:    Stress:    • Feeling of Stress :    Social Connections:    • Frequency of Communication with Friends and Family:    • Frequency of Social Gatherings with Friends and Family:    • Attends [employer] Services:    • Active Member of Clubs or Organizations:    • Attends Club or Organization Meetings:    • Marital Status:    Intimate Partner Violence:    • Fear of Current or Ex-Partner:    • Emotionally Abused:    • Physically Abused:    • Sexually Abused:        Allergies as of 07/30/2021 - Reviewed 07/30/2021   Allergen Reaction Noted   • Iodine Anaphylaxis and Rash 06/20/2017   • Levofloxacin Anaphylaxis 06/18/2018   • Shellfish-derived products Anaphylaxis 04/09/2015   • Unasyn [ampicillin-sulbactam sodium] Hives and Rash 08/25/2013        @Vital signs for this encounter:  Vitals:    07/30/21 1120   Height: 1.829 m (6')   Weight: 105 kg (232 lb)   Weight % change since last entry.: 0 %   BP: 136/78   Pulse: 64   BMI (Calculated): 31.46   Resp: 16       Current medications as of today   Current Outpatient Medications   Medication Sig Dispense Refill   • clotrimazole (MYCELEX) 10 MG Bhumi bhumi Take 1 Bhumi by mouth 5 Times a Day. 35 Bhumi 0   • albuterol (PROVENTIL HFA) 108 (90 Base) MCG/ACT Aero Soln inhalation aerosol Inhale 2 Puffs every four hours as needed for Shortness of Breath (Wheezing). 1 Each 1   • acyclovir (ZOVIRAX) 5 % Ointment Apply 1 Application topically 3 times a day as needed (Cold Sores).     • predniSONE (DELTASONE) 10 MG Tab Take 1 tablet by mouth every day. 40 mg po daily x 7 days, then 30 mg po daily x 7 days, then 20 mg po x 7 days, then 10  mg po daily x 7 days then stop 70 tablet 0   • vitamin D (VITAMIND D3) 1000 UNIT Tab Take 1 tablet by mouth 2 times a day. 60 tablet 0   • escitalopram (LEXAPRO) 10 MG Tab Take 10 mg by mouth every morning. Take with escitalopram 20 mg for a total dose of 30 mg.     • escitalopram (LEXAPRO) 20 MG tablet Take 20 mg by mouth every morning. Take with escitalopram 10 mg for a total dose of 30 mg.     • lamoTRIgine (LAMICTAL) 100 MG Tab Take 100 mg by mouth every morning.     • acetaminophen (TYLENOL) 325 MG Tab Take 650 mg by mouth every 6 hours as needed for Mild Pain or Fever. 2 tablets = 650 mg.     • folic acid (FOLVITE) 1 MG Tab Take 2 mg by mouth every morning. 2 tablets = 2 mg.     • rosuvastatin (CRESTOR) 10 MG Tab Take 1 Tab by mouth every bedtime. 90 Tab 0   • montelukast (SINGULAIR) 10 MG Tab Take 1 Tab by mouth every day. 90 Tab 1   • magnesium oxide (MAG-OX) 400 MG Tab Take 400 mg by mouth every morning.     • omeprazole (PRILOSEC) 40 MG delayed-release capsule Take 40 mg by mouth every day.     • cetirizine (ZYRTEC) 10 MG Tab Take 10 mg by mouth at bedtime.       No current facility-administered medications for this visit.         Physical Exam:   Gen:           Alert and oriented, No apparent distress. Mood and affect appropriate, normal interaction with provider.  Eyes:          sclere white, conjunctive moist.  Hearing:     Grossly intact.  Dentition:    good dentition.  Oropharynx:   Tongue normal, posterior pharynx without erythema or exudate.  Neck:        Supple, trachea midline, no masses.  Respiratory Effort: No intercostal retractions or use of accessory muscles.   Lung Auscultation:      Fine scattered rales, no rhonchi or wheezing.  CV:            Regular rate and rhythm. No edema. No murmurs, rubs or gallops.  Digits, Nails, Ext: No clubbing, cyanosis, petechiae, or nodes.   Skin:        No rashes, lesions or ulcers noted on back or exposed skin urfaces.                     Assessment:  1.  Moderate persistent asthma without complication     2. 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 Psoriasis     4. Oral thrush  clotrimazole (MYCELEX) 10 MG Bhumi bhumi   5. BMI 31.0-31.9,adult         Immunizations:    Flu: 10/1/2020  Pneumovax 23: 1/29/2019  Prevnar 13: 7/16/2018    Plan:     60 y.o. year old female here today for follow-up on moderate persistent asthma and recent hospitalization with pneumocystis pneumonia.  She was consulted by Dr. Vidal during her admission. Last seen in clinic 7/23/2021.  She is a never smoker with secondhand exposure in childhood.    Pertinent past medical history includes psoriatic arthritis previously on Humira and methotrexate, GERD with erosive esophagitis, right lower lobe and right middle lobe community-acquired pneumonia in November 2018, seasonal allergies, CHRISTY/MDD, bradycardia.    Reviewed in clinic vitals including /78, HR 64, O2 sat 95%. Patient's body mass index is 31.46 kg/m².     Elevated BMI: Resume gentle exercise and nutrition intake monitoring.    Reviewed home medication regimen including Clotrimazole troches, albuterol, prednisone taper currently at 20 mg, Bactrim x21-day course, Singulair, omeprazole, Zyrtec.  She is using Mucinex 1200 mg morning and evening.  She reports using her incentive spirometry around 3750 several times per day.    Moderate persistent asthma: Continue current regimen, continue prednisone taper.  Update PFT when at baseline.    Pneumocystis pneumonia: Patient was on Humira and methotrexate for her psoriatic arthritis, this is on hold.  Positive PCR 7/9/2021.  Complete course of antibiotics.    Oral thrush: Stopped and started treatment, improved but not resolved, complete course.    Follow-up imaging HRCT, see biopsy results.  And follow-up with MD at [facility].    This dictation was created using voice recognition software. The accuracy of the dictation is limited to the  abilities of the software. I expect there may be some errors of grammar and possibly content.

## 2021-08-04 LAB
FUNGUS SPEC CULT: NORMAL
FUNGUS SPEC FUNGUS STN: NORMAL
SIGNIFICANT IND 70042: NORMAL
SITE SITE: NORMAL
SOURCE SOURCE: NORMAL

## 2021-08-06 RX ORDER — SULFAMETHOXAZOLE AND TRIMETHOPRIM 400; 80 MG/1; MG/1
1 TABLET ORAL DAILY
Qty: 30 TABLET | Refills: 3 | Status: SHIPPED | OUTPATIENT
Start: 2021-08-06 | End: 2021-11-01 | Stop reason: SDUPTHER

## 2021-08-09 ENCOUNTER — HOSPITAL ENCOUNTER (OUTPATIENT)
Dept: RADIOLOGY | Facility: MEDICAL CENTER | Age: 61
End: 2021-08-09
Attending: PHYSICIAN ASSISTANT
Payer: COMMERCIAL

## 2021-08-09 DIAGNOSIS — B59 PNEUMONIA DUE TO PNEUMOCYSTIS JIROVECII, UNSPECIFIED LATERALITY, UNSPECIFIED PART OF LUNG (HCC): ICD-10-CM

## 2021-08-09 PROCEDURE — 71250 CT THORAX DX C-: CPT

## 2021-08-16 ENCOUNTER — SLEEP CENTER VISIT (OUTPATIENT)
Dept: SLEEP MEDICINE | Facility: MEDICAL CENTER | Age: 61
End: 2021-08-16
Payer: COMMERCIAL

## 2021-08-16 VITALS
SYSTOLIC BLOOD PRESSURE: 130 MMHG | HEART RATE: 66 BPM | HEIGHT: 72 IN | DIASTOLIC BLOOD PRESSURE: 69 MMHG | TEMPERATURE: 98.2 F | BODY MASS INDEX: 32.23 KG/M2 | OXYGEN SATURATION: 93 % | WEIGHT: 238 LBS

## 2021-08-16 DIAGNOSIS — B59 PNEUMONIA OF BOTH LUNGS DUE TO PNEUMOCYSTIS JIROVECII, UNSPECIFIED PART OF LUNG (HCC): ICD-10-CM

## 2021-08-16 DIAGNOSIS — R91.8 PULMONARY NODULES: ICD-10-CM

## 2021-08-16 PROCEDURE — 99213 OFFICE O/P EST LOW 20 MIN: CPT | Performed by: INTERNAL MEDICINE

## 2021-08-16 ASSESSMENT — ENCOUNTER SYMPTOMS
CARDIOVASCULAR NEGATIVE: 1
GASTROINTESTINAL NEGATIVE: 1
EYES NEGATIVE: 1
SHORTNESS OF BREATH: 1
PSYCHIATRIC NEGATIVE: 1
NEUROLOGICAL NEGATIVE: 1
MUSCULOSKELETAL NEGATIVE: 1

## 2021-08-16 ASSESSMENT — FIBROSIS 4 INDEX: FIB4 SCORE: 0.74

## 2021-08-16 NOTE — ASSESSMENT & PLAN NOTE
Completed treatment  Reviewed CT scan of 8/9/21  All fibrosis has resolved compared to 7/7  Few areas of GG nodules b/l remaining but markedly improved from before  NO evidence of ILD  Reviewed CT with patient    Clear to return back to her immunosuppresants as advised by her rheumatologist in [facility]

## 2021-08-16 NOTE — PROGRESS NOTES
Pulmonary Clinic follow up    Date of Service: 8/16/2021    Reason for follow up:  Follow-Up (HRCT )      Problem List Items Addressed This Visit     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[facility])     Completed treatment  Reviewed CT scan of 8/9/21  All fibrosis has resolved compared to 7/7  Few areas of GG nodules b/l remaining but markedly improved from before  NO evidence of ILD  Reviewed CT with patient    Clear to return back to her immunosuppresants as advised by her rheumatologist in [facility]         Pulmonary nodules     Ground glass b/l  Likely resolving pna but will need to follow in 3 months  Regular CT  Low risk                 History of Present Illness: Arely Haynes is a 60 y.o. 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   HENT: Negative.    Eyes: Negative.    Respiratory: Positive for shortness of breath.         Improving   Cardiovascular: Negative.    Gastrointestinal: Negative.    Genitourinary: Negative.    Musculoskeletal: Negative.    Skin: Negative.    Neurological: Negative.    Endo/Heme/Allergies: Negative.    Psychiatric/Behavioral: Negative.        Current Outpatient Medications on File Prior to Visit   Medication Sig Dispense Refill   • albuterol (PROVENTIL HFA) 108 (90 Base) MCG/ACT Aero Soln inhalation aerosol Inhale 2 Puffs  every four hours as needed for Shortness of Breath (Wheezing). 1 Each 1   • acyclovir (ZOVIRAX) 5 % Ointment Apply 1 Application topically 3 times a day as needed (Cold Sores).     • vitamin D (VITAMIND D3) 1000 UNIT Tab Take 1 tablet by mouth 2 times a day. 60 tablet 0   • escitalopram (LEXAPRO) 10 MG Tab Take 10 mg by mouth every morning. Take with escitalopram 20 mg for a total dose of 30 mg.     • escitalopram (LEXAPRO) 20 MG tablet Take 20 mg by mouth every morning. Take with escitalopram 10 mg for a total dose of 30 mg.     • lamoTRIgine (LAMICTAL) 100 MG Tab Take 100 mg by mouth every morning.     • acetaminophen (TYLENOL) 325 MG Tab Take 650 mg by mouth every 6 hours as needed for Mild Pain or Fever. 2 tablets = 650 mg.     • folic acid (FOLVITE) 1 MG Tab Take 2 mg by mouth every morning. 2 tablets = 2 mg.     • rosuvastatin (CRESTOR) 10 MG Tab Take 1 Tab by mouth every bedtime. 90 Tab 0   • montelukast (SINGULAIR) 10 MG Tab Take 1 Tab by mouth every day. 90 Tab 1   • magnesium oxide (MAG-OX) 400 MG Tab Take 400 mg by mouth every morning.     • omeprazole (PRILOSEC) 40 MG delayed-release capsule Take 40 mg by mouth every day.     • cetirizine (ZYRTEC) 10 MG Tab Take 10 mg by mouth at bedtime.     • sulfamethoxazole-trimethoprim (BACTRIM) 400-80 MG Tab Take 1 tablet by mouth every day. (Patient not taking: Reported on 8/16/2021) 30 tablet 3   • clotrimazole (MYCELEX) 10 MG Bhumi bhumi Take 1 Bhumi by mouth 5 Times a Day. (Patient not taking: Reported on 8/16/2021) 35 Bhumi 0   • predniSONE (DELTASONE) 10 MG Tab Take 1 tablet by mouth every day. 40 mg po daily x 7 days, then 30 mg po daily x 7 days, then 20 mg po x 7 days, then 10 mg po daily x 7 days then stop (Patient not taking: Reported on 8/16/2021) 70 tablet 0     No current facility-administered medications on file prior to visit.       Social History     Tobacco Use   • Smoking status: Never Smoker   • Smokeless tobacco: Never Used   Vaping Use   •  Vaping Use: Never used   Substance Use Topics   • Alcohol use: Not Currently     Alcohol/week: 0.6 - 1.2 oz     Types: 1 - 2 Glasses of wine per week     Comment: one glss of wine at night    • Drug use: No        Past Medical History:   Diagnosis Date   • Anesthesia     1st cousin has malignant hyperthermia/pt has never had an issue or her sisters   • Arthritis     hips knees hands spine   • Asthma     prn inhalers   • Bowel habit changes     diarrhea   • Depression     Daughter passed few years ago   • Erosive esophagitis    • GERD (gastroesophageal reflux disease)    • Heart burn    • High cholesterol    • Immunocompromised (HCC)    • Pain     hips knees   • Pneumonia 2018   • Psoriasis    • Psoriatic arthritis (HCC)        Past Surgical History:   Procedure Laterality Date   • PB BRONCHOSCOPY,DIAGNOSTIC  7/9/2021    Procedure: BRONCHOSCOPY;  Surgeon: Myles Vidal M.D.;  Location: SURGERY [facility];  Service: Ent   • TENDON REPAIR Left 9/19/2019    Procedure: REPAIR, TENDON- QUADRICEPS RUTURE REPAIR AND MEYERS;  Surgeon: Jayden Velázquez M.D.;  Location: [facility];  Service: Orthopedics   • HYSTERECTOMY ROBOTIC XI N/A 7/11/2019    Procedure: HYSTERECTOMY, ROBOT-ASSISTED, USING DA SBAINO XI;  Surgeon: Curly Bernal M.D.;  Location: SURGERY [facility];  Service: Gynecology   • SALPINGO OOPHORECTOMY Bilateral 7/11/2019    Procedure: SALPINGO-OOPHORECTOMY;  Surgeon: Curly Bernal M.D.;  Location: SURGERY [facility];  Service: Gynecology   • GYN SURGERY  1999 c sec   • CYSTECTOMY     • HIP REPLACEMENT, TOTAL      Right   • LUMBAR FUSION POSTERIOR     • OTHER ABDOMINAL SURGERY      appendix   • OTHER ORTHOPEDIC SURGERY      left knee   • OTHER ORTHOPEDIC SURGERY      right rotator cuff       Allergies: Iodine, Levofloxacin, Shellfish-derived products, and Unasyn [ampicillin-sulbactam sodium]    Family History   Problem Relation Age of Onset   • Hyperlipidemia Mother    • Heart Attack Mother     • Psychiatric Illness Mother    • Heart Disease Mother    • Arthritis Mother    • Lung Disease Father    • Cancer Father    • Hyperlipidemia Father    • Hyperlipidemia Sister    • Cancer Sister         breast   • Heart Disease Paternal Grandfather    • Hypertension Neg Hx    • Diabetes Neg Hx        Vitals:    08/16/21 1254 08/16/21 1258   Height: 1.829 m (6')    Weight: 108 kg (238 lb)    Weight % change since last entry.: 0 %    BP: 130/69    Pulse: 66    BMI (Calculated): 32.28    Temp: 36.8 °C (98.2 °F)    TempSrc: Oral    O2 sat % room air:  93 %       Physical Examination  Physical Exam  Constitutional:       General: She is not in acute distress.     Appearance: She is not diaphoretic.   HENT:      Head: Normocephalic and atraumatic.   Eyes:      Conjunctiva/sclera: Conjunctivae normal.      Pupils: Pupils are equal, round, and reactive to light.   Neck:      Thyroid: No thyromegaly.      Vascular: No JVD.      Trachea: No tracheal deviation.   Cardiovascular:      Rate and Rhythm: Normal rate and regular rhythm.      Heart sounds: No murmur heard.   No friction rub. No gallop.    Pulmonary:      Effort: No respiratory distress.      Breath sounds: No stridor. No wheezing or rales.   Chest:      Chest wall: No tenderness.   Musculoskeletal:         General: No tenderness or deformity.      Cervical back: Normal range of motion and neck supple.   Skin:     General: Skin is warm and dry.      Findings: No rash.   Neurological:      Mental Status: She is alert and oriented to person, place, and time.      Cranial Nerves: No cranial nerve deficit.      Coordination: Coordination normal.      Gait: Gait is intact.   Psychiatric:         Mood and Affect: Mood and affect normal.         Judgment: Judgment normal.            Trinity Jackson M.D., MD MPH [facility]  [facility]

## 2021-08-22 DIAGNOSIS — J45.40 MODERATE PERSISTENT ASTHMA WITHOUT COMPLICATION: ICD-10-CM

## 2021-08-23 NOTE — TELEPHONE ENCOUNTER
Have we ever prescribed this med? Yes.  If yes, what date? 07/23/21    Last OV: 08/16/21 with Lauren naik PA-C    Next OV: No Pending appt.     DX: Moderate persistent asthma without complication (J45.40)    Medications:   Requested Prescriptions     Pending Prescriptions Disp Refills   • albuterol 108 (90 Base) MCG/ACT Aero Soln inhalation aerosol [Pharmacy Med Name: ALBUTEROL HFA INH (200 PUFFS)6.7GM] 6.7 g      Sig: INHALE 2 PUFFS BY MOUTH EVERY 4 HOURS AS NEEDED FOR SHORTNESS OF BREATH, WHEEZING

## 2021-08-24 LAB
MYCOBACTERIUM SPEC CULT: NORMAL
RHODAMINE-AURAMINE STN SPEC: NORMAL
SIGNIFICANT IND 70042: NORMAL
SITE SITE: NORMAL
SOURCE SOURCE: NORMAL

## 2021-08-25 RX ORDER — ALBUTEROL SULFATE 90 UG/1
AEROSOL, METERED RESPIRATORY (INHALATION)
Qty: 6.7 G | Refills: 11 | Status: SHIPPED | OUTPATIENT
Start: 2021-08-25 | End: 2022-01-06 | Stop reason: SDUPTHER

## 2021-09-02 ENCOUNTER — PATIENT MESSAGE (OUTPATIENT)
Dept: MEDICAL GROUP | Facility: LAB | Age: 61
End: 2021-09-02

## 2021-09-02 DIAGNOSIS — Z91.030 BEE STING ALLERGY: ICD-10-CM

## 2021-09-02 DIAGNOSIS — J84.9 INTERSTITIAL LUNG DISEASE (HCC): ICD-10-CM

## 2021-09-02 RX ORDER — EPINEPHRINE 0.3 MG/.3ML
INJECTION SUBCUTANEOUS
Qty: 1 EACH | Refills: 1 | Status: SHIPPED | OUTPATIENT
Start: 2021-09-02 | End: 2022-07-01 | Stop reason: SDUPTHER

## 2021-09-02 RX ORDER — INHALER, ASSIST DEVICES
1 SPACER (EA) MISCELLANEOUS ONCE
Qty: 1 EACH | Refills: 0 | Status: SHIPPED | OUTPATIENT
Start: 2021-09-02 | End: 2021-09-02

## 2021-09-02 NOTE — PATIENT COMMUNICATION
Received request via: Pharmacy    Was the patient seen in the last year in this department? Yes  7/29/2021  Does the patient have an active prescription (recently filled or refills available) for medication(s) requested? No

## 2021-09-07 ENCOUNTER — PATIENT MESSAGE (OUTPATIENT)
Dept: MEDICAL GROUP | Facility: LAB | Age: 61
End: 2021-09-07

## 2021-09-07 DIAGNOSIS — J84.9 INTERSTITIAL LUNG DISEASE (HCC): ICD-10-CM

## 2021-09-07 RX ORDER — INHALER, ASSIST DEVICES
1 SPACER (EA) MISCELLANEOUS PRN
Qty: 1 EACH | Refills: 0 | Status: SHIPPED
Start: 2021-09-07 | End: 2022-04-20 | Stop reason: SDUPTHER

## 2021-10-08 ENCOUNTER — PATIENT MESSAGE (OUTPATIENT)
Dept: MEDICAL GROUP | Facility: LAB | Age: 61
End: 2021-10-08

## 2021-10-08 DIAGNOSIS — K52.9 CHRONIC DIARRHEA: ICD-10-CM

## 2021-10-08 DIAGNOSIS — K64.8 BLEEDING INTERNAL HEMORRHOIDS: ICD-10-CM

## 2021-10-21 ENCOUNTER — PATIENT MESSAGE (OUTPATIENT)
Dept: MEDICAL GROUP | Facility: LAB | Age: 61
End: 2021-10-21

## 2021-10-21 DIAGNOSIS — L40.50 PSORIATIC ARTHRITIS (HCC): ICD-10-CM

## 2021-11-01 DIAGNOSIS — B59 PNEUMONIA DUE TO PNEUMOCYSTIS JIROVECII, UNSPECIFIED LATERALITY, UNSPECIFIED PART OF LUNG (HCC): ICD-10-CM

## 2021-11-01 RX ORDER — SULFAMETHOXAZOLE AND TRIMETHOPRIM 400; 80 MG/1; MG/1
1 TABLET ORAL DAILY
Qty: 30 TABLET | Refills: 3 | Status: SHIPPED | OUTPATIENT
Start: 2021-11-01 | End: 2021-11-02

## 2021-11-02 DIAGNOSIS — Z87.01 HISTORY OF PNEUMOCYSTIS PNEUMONIA: ICD-10-CM

## 2021-11-02 RX ORDER — SULFAMETHOXAZOLE AND TRIMETHOPRIM 800; 160 MG/1; MG/1
TABLET ORAL
Qty: 30 TABLET | Refills: 3 | Status: SHIPPED | OUTPATIENT
Start: 2021-11-02 | End: 2022-01-06 | Stop reason: SDUPTHER

## 2021-11-12 RX ORDER — METHYLPREDNISOLONE 4 MG/1
TABLET ORAL
Qty: 21 TABLET | Refills: 0 | Status: SHIPPED | OUTPATIENT
Start: 2021-11-12 | End: 2022-01-06

## 2021-11-12 RX ORDER — METHYLPREDNISOLONE 4 MG/1
TABLET ORAL
Qty: 21 TABLET | Refills: 0 | Status: CANCELLED | OUTPATIENT
Start: 2021-11-12

## 2021-11-23 ENCOUNTER — OFFICE VISIT (OUTPATIENT)
Dept: MEDICAL GROUP | Facility: LAB | Age: 61
End: 2021-11-23
Payer: COMMERCIAL

## 2021-11-23 VITALS
HEART RATE: 61 BPM | SYSTOLIC BLOOD PRESSURE: 108 MMHG | HEIGHT: 72 IN | WEIGHT: 242.6 LBS | OXYGEN SATURATION: 95 % | BODY MASS INDEX: 32.86 KG/M2 | DIASTOLIC BLOOD PRESSURE: 74 MMHG | TEMPERATURE: 97.4 F

## 2021-11-23 DIAGNOSIS — Z87.01 HISTORY OF PNEUMOCYSTIS PNEUMONIA: ICD-10-CM

## 2021-11-23 DIAGNOSIS — L40.50 PSORIATIC ARTHRITIS (HCC): ICD-10-CM

## 2021-11-23 DIAGNOSIS — M54.16 LUMBAR RADICULOPATHY: ICD-10-CM

## 2021-11-23 PROCEDURE — 99213 OFFICE O/P EST LOW 20 MIN: CPT | Performed by: FAMILY MEDICINE

## 2021-11-23 RX ORDER — ADALIMUMAB 40MG/0.4ML
KIT SUBCUTANEOUS
COMMUNITY
Start: 2021-11-15 | End: 2023-06-08

## 2021-11-23 ASSESSMENT — FIBROSIS 4 INDEX: FIB4 SCORE: 0.74

## 2021-11-23 NOTE — PROGRESS NOTES
Subjective:     CC: Back spasms, right leg pain    HPI:   Arely is a 59 yo female history of psoriatic arthritis and pneumocystis pneumonia who presents today with low back pain, pain down right leg.  She has some chronic pain to her right knee and right hip but more recently had onset of right low back spasms as well as pain going down the lateral right leg with numbness on the lateral right leg for about the last 3 weeks.  Does have a history of posterior lumbar fusion.  Symptoms did significantly improve with Medrol Dosepak.  No weakness, no bowel or bladder incontinence, no fevers or chills.  Rheumatologic meds were held earlier this year when she was diagnosed with PCP pneumonia, she is currently back on Humira, Bactrim prophylaxis, she has held on restarting methotrexate.  Does have follow-up with rheumatology in a few months as well as pulmonology in [place].    Medications, past medical history, allergies, and social history have been reviewed and updated.      Objective:       Exam:  /74 (BP Location: Left arm, Patient Position: Sitting, BP Cuff Size: Adult)   Pulse 61   Temp 36.3 °C (97.4 °F) (Temporal)   Ht 1.829 m (6')   Wt 110 kg (242 lb 9.6 oz)   LMP  (LMP Unknown)   SpO2 95%   BMI 32.90 kg/m²  Body mass index is 32.9 kg/m².    Constitutional: Alert. Well appearing. No distress.  Skin: Warm, dry, good turgor, no visible rashes.  Eye: Equal, round and reactive to light, conjunctiva clear, lids normal.  ENMT: Moist mucous membranes. Normal dentition.  Respiratory: Normal effort.   MSK: Antalgic gait.  Mildly positive straight leg test on the right.  There is right groin pain with internal/external rotation of the right hip.  Old midline lumbar and paraspinal tenderness.  Patellar DTRs are 2+ and symmetric.  Sensation to light touch is altered to the right lateral leg.  5/5 strength to both lower extremities.  Neuro: Moves all four extremities. No facial droop.  Psych: Answers questions  appropriately. Normal affect and mood.    Assessment & Plan:     60 y.o. female with the following -     1. Lumbar radiculopathy  Right-sided lumbar pain as well as radicular symptoms down the lateral right leg.  She does have history of posterior lumbar fusion.  We discussed proceeding to MRI but she preferred to treat conservatively first and will try physical therapy.  Follow-up if not improving and would consider MRI.  - Referral to Physical Therapy    2. Psoriatic arthritis (HCC)  Chronic, unclear if this is also contributing to some of her symptoms including hip pain and some possible SI joint pain.  She is on Humira, considering restarting methotrexate but has been hesitant due to recent PCP pneumonia.  She continues to follow with rheumatology.    3. History of pneumocystis pneumonia  Hospitalized in July 2021.  She was on Humira and methotrexate at the time.  She is currently on Bactrim prophylaxis.  Has follow-up with pulmonology.    Please note that this note was created using voice recognition software.

## 2021-11-28 DIAGNOSIS — J84.9 INTERSTITIAL LUNG DISEASE (HCC): ICD-10-CM

## 2021-12-15 ENCOUNTER — OFFICE VISIT (OUTPATIENT)
Dept: MEDICAL GROUP | Facility: LAB | Age: 61
End: 2021-12-15
Payer: COMMERCIAL

## 2021-12-15 VITALS
WEIGHT: 249 LBS | TEMPERATURE: 97.4 F | DIASTOLIC BLOOD PRESSURE: 68 MMHG | SYSTOLIC BLOOD PRESSURE: 126 MMHG | BODY MASS INDEX: 33.72 KG/M2 | OXYGEN SATURATION: 95 % | HEIGHT: 72 IN | HEART RATE: 72 BPM

## 2021-12-15 DIAGNOSIS — M25.561 CHRONIC PAIN OF RIGHT KNEE: ICD-10-CM

## 2021-12-15 DIAGNOSIS — M54.16 LUMBAR RADICULOPATHY: ICD-10-CM

## 2021-12-15 DIAGNOSIS — L40.50 PSORIATIC ARTHRITIS (HCC): ICD-10-CM

## 2021-12-15 DIAGNOSIS — G89.29 CHRONIC PAIN OF RIGHT KNEE: ICD-10-CM

## 2021-12-15 PROCEDURE — 20610 DRAIN/INJ JOINT/BURSA W/O US: CPT | Mod: RT | Performed by: FAMILY MEDICINE

## 2021-12-15 PROCEDURE — 99213 OFFICE O/P EST LOW 20 MIN: CPT | Mod: 25 | Performed by: FAMILY MEDICINE

## 2021-12-15 ASSESSMENT — FIBROSIS 4 INDEX: FIB4 SCORE: 0.75

## 2021-12-15 NOTE — PROGRESS NOTES
Subjective:     CC: Knee injection , back pain    HPI:   Arely a 61-year-old female with a history of psoriatic arthritis and pneumocystis pneumonia who presents today with concern for right knee pain as well as continued left low back and right-sided lumbar radicular symptoms.    She has recently been following with physical therapy with some improvement, however, she is having some continued pain which she localizes over her left SI joint is wondering if an injection of this would be warranted.  She would also like a corticosteroid injection to her right knee.  Right knee with chronic intermittent pain related to underlying psoriatic or osteoarthritis.  She reports she has responded well to steroid injections in the past although it has been at least a year since her last injection.  She does follow with rheumatology and has been hesitant to restart methotrexate after her pneumocystis pneumonia infection.    She also has a history of a posterior lumbar fusion.    Medications, past medical history, allergies, and social history have been reviewed and updated.      Objective:       Exam:  /68 (BP Location: Right arm, Patient Position: Sitting, BP Cuff Size: Adult)   Pulse 72   Temp 36.3 °C (97.4 °F) (Temporal)   Ht 1.829 m (6')   Wt 113 kg (249 lb)   LMP  (LMP Unknown)   SpO2 95%   BMI 33.77 kg/m²  Body mass index is 33.77 kg/m².    Constitutional: Alert. Well appearing. No distress.  Skin: Warm, dry, good turgor, no visible rashes.  Eye: Equal, round and reactive to light, conjunctiva clear, lids normal.  Respiratory: Normal effort.   MSK: Mild joint effusion to the right knee, active and passive range of motion is intact, there is mild crepitus on extension.  No obvious laxity.  No overlying erythema or tenderness.  Neuro: Moves all four extremities. No facial droop.  Psych: Answers questions appropriately. Normal affect and mood.    Assessment & Plan:     61 y.o. female with the following -     1.  Psoriatic arthritis (HCC)  2. Chronic pain of right knee  Recently flared chronic pain to the right knee is treated with joint injection today.  Encouraged her to continue discussed with rheumatology as she has been hesitant to restart methotrexate after pneumocystis pneumonia.  - Joint Inj - LG: knee, R knee  - Referral to Pain Clinic    3. Lumbar radiculopathy  Chronic issue that has been flared for about a month, mild improvement with physical therapy.  She does have a history of posterior lumbar fusion.  Given little improvement with conservative therapy will proceed to MRI and referral to physiatry.  - MR-LUMBAR SPINE-W/O; Future  - Referral to Pain Clinic      Please note that this note was created using voice recognition software.

## 2021-12-15 NOTE — PROCEDURES
Joint Inj - LG: knee, R knee on 12/15/2021 12:06 PM  Indications: pain  Details: 22 G needle, anteromedial approach  Medications: (Kenalog 40 mg, 2 cc Lidocaine 2%)  Outcome: tolerated well, no immediate complications  Procedure, treatment alternatives, risks and benefits explained, specific risks discussed. Consent was given by the patient. Patient was prepped and draped in the usual sterile fashion.

## 2021-12-16 DIAGNOSIS — L40.50 PSORIATIC ARTHRITIS (HCC): ICD-10-CM

## 2021-12-28 ENCOUNTER — HOSPITAL ENCOUNTER (OUTPATIENT)
Dept: RADIOLOGY | Facility: MEDICAL CENTER | Age: 61
End: 2021-12-28
Attending: FAMILY MEDICINE
Payer: COMMERCIAL

## 2021-12-28 DIAGNOSIS — L40.50 PSORIATIC ARTHRITIS (HCC): ICD-10-CM

## 2021-12-28 DIAGNOSIS — M54.16 LUMBAR RADICULOPATHY: ICD-10-CM

## 2021-12-28 PROCEDURE — 73721 MRI JNT OF LWR EXTRE W/O DYE: CPT | Mod: LT

## 2021-12-28 PROCEDURE — 72148 MRI LUMBAR SPINE W/O DYE: CPT

## 2021-12-28 RX ORDER — AMOXICILLIN AND CLAVULANATE POTASSIUM 875; 125 MG/1; MG/1
1 TABLET, FILM COATED ORAL 2 TIMES DAILY
Qty: 10 TABLET | Refills: 0 | Status: SHIPPED | OUTPATIENT
Start: 2021-12-28 | End: 2022-01-02

## 2022-01-05 ENCOUNTER — OFFICE VISIT (OUTPATIENT)
Dept: PHYSICAL MEDICINE AND REHAB | Facility: MEDICAL CENTER | Age: 62
End: 2022-01-05
Payer: COMMERCIAL

## 2022-01-05 VITALS
HEART RATE: 72 BPM | SYSTOLIC BLOOD PRESSURE: 128 MMHG | BODY MASS INDEX: 34.01 KG/M2 | TEMPERATURE: 96.9 F | OXYGEN SATURATION: 95 % | WEIGHT: 251.1 LBS | HEIGHT: 72 IN | DIASTOLIC BLOOD PRESSURE: 64 MMHG

## 2022-01-05 DIAGNOSIS — G89.29 CHRONIC MIDLINE THORACIC BACK PAIN: ICD-10-CM

## 2022-01-05 DIAGNOSIS — Z98.1 S/P LUMBAR FUSION: ICD-10-CM

## 2022-01-05 DIAGNOSIS — M53.3 SACROILIAC JOINT DYSFUNCTION OF LEFT SIDE: ICD-10-CM

## 2022-01-05 DIAGNOSIS — M54.2 CERVICALGIA: ICD-10-CM

## 2022-01-05 DIAGNOSIS — M48.061 FORAMINAL STENOSIS OF LUMBAR REGION: ICD-10-CM

## 2022-01-05 DIAGNOSIS — Z96.641 STATUS POST RIGHT HIP REPLACEMENT: ICD-10-CM

## 2022-01-05 DIAGNOSIS — M54.6 CHRONIC MIDLINE THORACIC BACK PAIN: ICD-10-CM

## 2022-01-05 DIAGNOSIS — M54.17 LUMBOSACRAL RADICULOPATHY: ICD-10-CM

## 2022-01-05 PROCEDURE — 99204 OFFICE O/P NEW MOD 45 MIN: CPT | Performed by: PHYSICAL MEDICINE & REHABILITATION

## 2022-01-05 RX ORDER — ETODOLAC 400 MG/1
1 TABLET, FILM COATED ORAL 2 TIMES DAILY
COMMUNITY
Start: 2022-01-04 | End: 2022-01-06 | Stop reason: SDUPTHER

## 2022-01-05 ASSESSMENT — PAIN SCALES - GENERAL: PAINLEVEL: 4=SLIGHT-MODERATE PAIN

## 2022-01-05 ASSESSMENT — FIBROSIS 4 INDEX: FIB4 SCORE: 0.75

## 2022-01-05 ASSESSMENT — PATIENT HEALTH QUESTIONNAIRE - PHQ9: CLINICAL INTERPRETATION OF PHQ2 SCORE: 0
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
## 2022-01-10 ENCOUNTER — APPOINTMENT (OUTPATIENT)
Dept: RADIOLOGY | Facility: MEDICAL CENTER | Age: 62
End: 2022-01-10
Attending: PHYSICAL MEDICINE & REHABILITATION
Payer: COMMERCIAL

## 2022-01-12 ENCOUNTER — HOSPITAL ENCOUNTER (OUTPATIENT)
Dept: RADIOLOGY | Facility: MEDICAL CENTER | Age: 62
End: 2022-01-12
Attending: PHYSICAL MEDICINE & REHABILITATION
Payer: COMMERCIAL

## 2022-01-12 DIAGNOSIS — M54.6 CHRONIC MIDLINE THORACIC BACK PAIN: ICD-10-CM

## 2022-01-12 DIAGNOSIS — M54.2 CERVICALGIA: ICD-10-CM

## 2022-01-12 DIAGNOSIS — G89.29 CHRONIC MIDLINE THORACIC BACK PAIN: ICD-10-CM

## 2022-01-12 PROCEDURE — 72040 X-RAY EXAM NECK SPINE 2-3 VW: CPT

## 2022-01-12 PROCEDURE — 72072 X-RAY EXAM THORAC SPINE 3VWS: CPT

## 2022-01-13 ENCOUNTER — APPOINTMENT (OUTPATIENT)
Dept: RADIOLOGY | Facility: MEDICAL CENTER | Age: 62
End: 2022-01-13
Attending: PHYSICAL MEDICINE & REHABILITATION
Payer: COMMERCIAL

## 2022-02-03 ENCOUNTER — HOSPITAL ENCOUNTER (OUTPATIENT)
Dept: RADIOLOGY | Facility: MEDICAL CENTER | Age: 62
End: 2022-02-03
Attending: FAMILY MEDICINE
Payer: COMMERCIAL

## 2022-02-03 DIAGNOSIS — Z12.39 ENCOUNTER FOR SCREENING FOR MALIGNANT NEOPLASM OF BREAST, UNSPECIFIED SCREENING MODALITY: ICD-10-CM

## 2022-02-03 DIAGNOSIS — D24.9: ICD-10-CM

## 2022-02-03 PROCEDURE — 77063 BREAST TOMOSYNTHESIS BI: CPT

## 2022-02-03 PROCEDURE — 76641 ULTRASOUND BREAST COMPLETE: CPT

## 2022-02-11 ENCOUNTER — HOSPITAL ENCOUNTER (OUTPATIENT)
Dept: RADIOLOGY | Facility: MEDICAL CENTER | Age: 62
End: 2022-02-11
Attending: FAMILY MEDICINE
Payer: COMMERCIAL

## 2022-02-11 DIAGNOSIS — R92.8 ABNORMAL MAMMOGRAM: ICD-10-CM

## 2022-02-11 PROCEDURE — 76642 ULTRASOUND BREAST LIMITED: CPT | Mod: RT

## 2022-02-11 PROCEDURE — G0279 TOMOSYNTHESIS, MAMMO: HCPCS | Mod: RT

## 2022-03-24 ENCOUNTER — TELEPHONE (OUTPATIENT)
Dept: MEDICAL GROUP | Facility: LAB | Age: 62
End: 2022-03-24
Payer: COMMERCIAL

## 2022-03-24 DIAGNOSIS — R29.898 RIGHT LEG WEAKNESS: ICD-10-CM

## 2022-03-24 DIAGNOSIS — J84.9 INTERSTITIAL LUNG DISEASE (HCC): ICD-10-CM

## 2022-03-29 DIAGNOSIS — Z87.01 HISTORY OF PNEUMOCYSTIS PNEUMONIA: ICD-10-CM

## 2022-03-29 RX ORDER — SULFAMETHOXAZOLE AND TRIMETHOPRIM 800; 160 MG/1; MG/1
TABLET ORAL
Qty: 90 TABLET | Refills: 3 | Status: SHIPPED | OUTPATIENT
Start: 2022-03-29 | End: 2023-02-17

## 2022-03-29 NOTE — TELEPHONE ENCOUNTER
Requesting to go to [facility]    Received request via: Patient    Was the patient seen in the last year in this department? Yes    Does the patient have an active prescription (recently filled or refills available) for medication(s) requested? No

## 2022-03-29 NOTE — TELEPHONE ENCOUNTER
----- Message from Arely Haynes sent at 3/28/2022  7:45 PM PDT -----  Regarding: Refills  Hi,    Would you send refill Rxs to [facility] for:  1 Cedrick - pravastatin 40 mg per his request  2 Arely - Yemi WILSON for my PCP prophylaxis    Thank you,  Kenzie

## 2022-04-06 ENCOUNTER — TELEPHONE (OUTPATIENT)
Dept: MEDICAL GROUP | Facility: LAB | Age: 62
End: 2022-04-06
Payer: COMMERCIAL

## 2022-04-06 ENCOUNTER — PATIENT MESSAGE (OUTPATIENT)
Dept: MEDICAL GROUP | Facility: LAB | Age: 62
End: 2022-04-06
Payer: COMMERCIAL

## 2022-04-06 NOTE — TELEPHONE ENCOUNTER
I offered her that too.  She wants to do any specialty doctors in [place].  I told her to let us know if she needs us to do anything.

## 2022-04-06 NOTE — TELEPHONE ENCOUNTER
1. Caller Name: Kaila                        Call Back Number: [phone] (home)        How would the patient prefer to be contacted with a response: Phone call OK to leave a detailed message    Pt is calling to let you know that she will not be going back to [facility].  She had gone for her appt to get her hemorrhoids banded and he told her to get a bidet.

## 2022-04-14 ENCOUNTER — OFFICE VISIT (OUTPATIENT)
Dept: MEDICAL GROUP | Facility: LAB | Age: 62
End: 2022-04-14
Payer: COMMERCIAL

## 2022-04-14 VITALS
TEMPERATURE: 96.8 F | OXYGEN SATURATION: 96 % | SYSTOLIC BLOOD PRESSURE: 122 MMHG | DIASTOLIC BLOOD PRESSURE: 66 MMHG | BODY MASS INDEX: 32.91 KG/M2 | HEIGHT: 72 IN | HEART RATE: 62 BPM | WEIGHT: 243 LBS | RESPIRATION RATE: 14 BRPM

## 2022-04-14 DIAGNOSIS — G62.9 NEUROPATHY: ICD-10-CM

## 2022-04-14 DIAGNOSIS — J45.40 MODERATE PERSISTENT ASTHMA WITHOUT COMPLICATION: ICD-10-CM

## 2022-04-14 PROCEDURE — 99214 OFFICE O/P EST MOD 30 MIN: CPT | Performed by: FAMILY MEDICINE

## 2022-04-14 RX ORDER — INHALER, ASSIST DEVICES
1 SPACER (EA) MISCELLANEOUS ONCE
Qty: 2 EACH | Refills: 1 | Status: SHIPPED | OUTPATIENT
Start: 2022-04-14 | End: 2022-04-14

## 2022-04-14 ASSESSMENT — FIBROSIS 4 INDEX: FIB4 SCORE: 0.75

## 2022-04-20 DIAGNOSIS — J84.9 INTERSTITIAL LUNG DISEASE (HCC): ICD-10-CM

## 2022-04-20 RX ORDER — INHALER, ASSIST DEVICES
1 SPACER (EA) MISCELLANEOUS PRN
Qty: 1 EACH | Refills: 2 | Status: ON HOLD | OUTPATIENT
Start: 2022-04-20 | End: 2023-07-03

## 2022-04-20 NOTE — TELEPHONE ENCOUNTER
Received a PA for Prochamber Aero Spacer, alternatives that dont require PA    Aerochamber MV    Received request via: Pharmacy    Was the patient seen in the last year in this department? Yes  4/14/2022  Does the patient have an active prescription (recently filled or refills available) for medication(s) requested? No

## 2022-04-28 ENCOUNTER — TELEPHONE (OUTPATIENT)
Dept: MEDICAL GROUP | Facility: LAB | Age: 62
End: 2022-04-28
Payer: COMMERCIAL

## 2022-04-28 DIAGNOSIS — K21.9 GASTROESOPHAGEAL REFLUX DISEASE, UNSPECIFIED WHETHER ESOPHAGITIS PRESENT: ICD-10-CM

## 2022-04-28 RX ORDER — FAMOTIDINE 20 MG/1
20 TABLET, FILM COATED ORAL PRN
Qty: 60 TABLET | Refills: 3 | Status: SHIPPED | OUTPATIENT
Start: 2022-04-28 | End: 2022-09-13

## 2022-04-28 NOTE — TELEPHONE ENCOUNTER
Pt is taking Famotidine 20 MG as needed from her GI provider. She uses this Prn  Between the omeprazole.

## 2022-04-28 NOTE — TELEPHONE ENCOUNTER
[facility] requesting Famotidine 20 MG TABS. I do not see this on pt's list. Currently taking Omeprazole.

## 2022-04-29 ENCOUNTER — PATIENT MESSAGE (OUTPATIENT)
Dept: MEDICAL GROUP | Facility: LAB | Age: 62
End: 2022-04-29
Payer: COMMERCIAL

## 2022-04-29 DIAGNOSIS — K52.9 CHRONIC DIARRHEA: ICD-10-CM

## 2022-04-29 DIAGNOSIS — K21.9 GASTROESOPHAGEAL REFLUX DISEASE, UNSPECIFIED WHETHER ESOPHAGITIS PRESENT: ICD-10-CM

## 2022-04-29 RX ORDER — ROSUVASTATIN CALCIUM 10 MG/1
10 TABLET, COATED ORAL
Qty: 90 TABLET | Refills: 3 | Status: SHIPPED | OUTPATIENT
Start: 2022-04-29 | End: 2023-04-24

## 2022-04-29 RX ORDER — ESCITALOPRAM OXALATE 20 MG/1
20 TABLET ORAL EVERY MORNING
Qty: 90 TABLET | Refills: 3 | Status: SHIPPED | OUTPATIENT
Start: 2022-04-29 | End: 2022-09-16 | Stop reason: SDUPTHER

## 2022-04-29 RX ORDER — MONTELUKAST SODIUM 10 MG/1
10 TABLET ORAL DAILY
Qty: 90 TABLET | Refills: 3 | Status: SHIPPED | OUTPATIENT
Start: 2022-04-29 | End: 2023-04-24

## 2022-04-29 RX ORDER — OMEPRAZOLE 40 MG/1
40 CAPSULE, DELAYED RELEASE ORAL DAILY
Qty: 90 CAPSULE | Refills: 3 | Status: SHIPPED | OUTPATIENT
Start: 2022-04-29 | End: 2023-04-24

## 2022-04-29 RX ORDER — ESCITALOPRAM OXALATE 10 MG/1
10 TABLET ORAL EVERY MORNING
Qty: 90 TABLET | Refills: 3 | Status: SHIPPED | OUTPATIENT
Start: 2022-04-29 | End: 2022-07-11

## 2022-04-29 NOTE — TELEPHONE ENCOUNTER
Also requesting   fluticasone-salmeterol (ADVAIR DISKUS) 250-50 MCG/DOSE AEROSOL will not let me pend. Can you add this as well?

## 2022-05-17 ENCOUNTER — PATIENT MESSAGE (OUTPATIENT)
Dept: MEDICAL GROUP | Facility: LAB | Age: 62
End: 2022-05-17
Payer: COMMERCIAL

## 2022-05-17 DIAGNOSIS — R06.83 SNORING: ICD-10-CM

## 2022-05-17 NOTE — PROGRESS NOTES
Concern for IGGY.  STOP-BANG score of 4 including witnessed snoring and apnea.  We will send referral for sleep study and follow-up.

## 2022-05-17 NOTE — PATIENT INSTRUCTIONS
SLEEP STUDY INSTRUCTIONS    1. Our main concern is to provide the best test and evaluation of your sleep and your cooperation in following the guidelines is very necessary.    2. We have no facilities for family members or guests at the sleep center. Special arrangements will be made for children requiring overnight sleep studies.    3. Unless otherwise instructed, AVOID alcoholic beverages on the day of your sleep study.    4. DO NOT drink coffee or caffeine-containing beverages after 12:00 noon on the day of your sleep study.    5. There is NO smoking at the sleep center.    6. Try to maintain a usual daytime schedule prior to the study (avoid unusual physical activity or meals).    7. DO NOT take a nap on the day of your study.    8. This is an outpatient procedure (test); therefore, nursing services, medications, and meals ARE NOT provided. If you take medications, bring them with you and take them on the schedule you do at home.    9. Please fill your sleep aid prescription (Ambien or Lunesta) and bring to your sleep study. Even patients who normally have no problem going to sleep often need a sleep aid in this different environment.    10. We ask that you wear conventional sleep attire (pajamas or sweats) for the sleep study. We discourage patients from wearing only their underwear to bed. We recommend two-piece pajamas as the techs will need to apply sensors to your stomach.    11. Please shampoo your hair the day of the sleep study. Please DO NOT use any other hair or skin products before your arrival (e.g., mousse, gel, hair or body spray, perfume, body lotion etc.) NOTE: Women should not wear heavy makeup prior to arrival as some wires are taped to the face area.    12. The technician will be applying several small electrodes to the scalp, eye area, chin, chest, and legs, plus respiratory effort belts around the chest. Also, there will be a device placed directly under the nose. (THIS WILL NOT OBSTRUCT  YOUR BREATHING.) This is a painless procedure and the skin is not broken.    13. The test is generally completed in six to eight hours; We are usually done between 6 - 7 a.m., unless you are scheduled for a nap study. You may need to come back another night for a second study to complete your treatment plan.    14. Patients who are scheduled for an MSLT (nap study) will stay at the sleep center for the day following their nighttime study. You will be notified if a nap study was ordered for you at the time the night study is scheduled. Generally, patients having a nap study will leave the sleep center by 4 p.m.    15. You will need to bring food for the following day if you are scheduled for a nap study. A refrigerator and microwave are available.    16. A bathroom is available for your use.    17. We are able to adjust the room temperature for your comfort. Please let the technologist know if you are uncomfortable during the study.    18. If you sleep better with a special pillow or stuffed animal, you may bring it along. Service animals are the only live animals permitted.    19. Cable T.V. is available.    20. You will be scheduled for a follow-up appointment three to five days after the sleep study to review your results.    21. A copy of your sleep study is sent to the referring physician approximately two weeks after your study.    22. Any questions can be directed to our staff at [phone].    23. If CPAP therapy is applied, a home unit will be ordered for you through the [employer] company. You will be contacted to schedule delivery after insurance authorization.

## 2022-05-23 ENCOUNTER — OFFICE VISIT (OUTPATIENT)
Dept: MEDICAL GROUP | Facility: LAB | Age: 62
End: 2022-05-23
Payer: COMMERCIAL

## 2022-05-23 ENCOUNTER — HOSPITAL ENCOUNTER (OUTPATIENT)
Facility: MEDICAL CENTER | Age: 62
End: 2022-05-23
Attending: NURSE PRACTITIONER
Payer: COMMERCIAL

## 2022-05-23 VITALS
BODY MASS INDEX: 33 KG/M2 | WEIGHT: 243.6 LBS | RESPIRATION RATE: 14 BRPM | SYSTOLIC BLOOD PRESSURE: 118 MMHG | OXYGEN SATURATION: 95 % | HEART RATE: 74 BPM | HEIGHT: 72 IN | TEMPERATURE: 97.5 F | DIASTOLIC BLOOD PRESSURE: 66 MMHG

## 2022-05-23 DIAGNOSIS — N39.0 ACUTE LOWER UTI: ICD-10-CM

## 2022-05-23 LAB
APPEARANCE UR: NORMAL
BILIRUB UR STRIP-MCNC: NORMAL MG/DL
COLOR UR AUTO: NORMAL
GLUCOSE UR STRIP.AUTO-MCNC: NEGATIVE MG/DL
KETONES UR STRIP.AUTO-MCNC: NEGATIVE MG/DL
LEUKOCYTE ESTERASE UR QL STRIP.AUTO: NORMAL
NITRITE UR QL STRIP.AUTO: POSITIVE
PH UR STRIP.AUTO: 5 [PH] (ref 5–8)
PROT UR QL STRIP: NORMAL MG/DL
RBC UR QL AUTO: NORMAL
SP GR UR STRIP.AUTO: 1.03
UROBILINOGEN UR STRIP-MCNC: 0.2 MG/DL

## 2022-05-23 PROCEDURE — 87077 CULTURE AEROBIC IDENTIFY: CPT

## 2022-05-23 PROCEDURE — 99213 OFFICE O/P EST LOW 20 MIN: CPT | Performed by: NURSE PRACTITIONER

## 2022-05-23 PROCEDURE — 87186 SC STD MICRODIL/AGAR DIL: CPT

## 2022-05-23 PROCEDURE — 87086 URINE CULTURE/COLONY COUNT: CPT

## 2022-05-23 PROCEDURE — 81002 URINALYSIS NONAUTO W/O SCOPE: CPT | Performed by: NURSE PRACTITIONER

## 2022-05-23 RX ORDER — PHENAZOPYRIDINE HYDROCHLORIDE 200 MG/1
200 TABLET, FILM COATED ORAL 3 TIMES DAILY PRN
Qty: 15 TABLET | Refills: 0 | Status: SHIPPED | OUTPATIENT
Start: 2022-05-23 | End: 2022-06-03 | Stop reason: SDUPTHER

## 2022-05-23 RX ORDER — AMOXICILLIN AND CLAVULANATE POTASSIUM 875; 125 MG/1; MG/1
1 TABLET, FILM COATED ORAL 2 TIMES DAILY
Qty: 14 TABLET | Refills: 0 | Status: SHIPPED | OUTPATIENT
Start: 2022-05-23 | End: 2022-07-01

## 2022-05-23 RX ORDER — SULFAMETHOXAZOLE AND TRIMETHOPRIM 800; 160 MG/1; MG/1
1 TABLET ORAL
COMMUNITY
Start: 2022-03-29 | End: 2023-02-17

## 2022-05-23 ASSESSMENT — FIBROSIS 4 INDEX: FIB4 SCORE: 0.75

## 2022-05-23 NOTE — PROGRESS NOTES
Subjective:     CC:   Chief Complaint   Patient presents with   • UTI     Frequency , dysuria       HPI:   Arely presents today with the following:    UTI  Onset yesterday. Patient reports dysuria, frequency, urgency, bladder spasms, chills.   Denies vaginal discharge, fever, N/V, flank pain.   Similar to UTIs she has had in the past.   She takes Humira for her psoriasis, does report frequent UTIs, about 4-5/year. Takes bactrim 3x/week preventively.     ROS:   As documented in history of present illness above    Objective:     Exam: /66 (BP Location: Right arm, Patient Position: Sitting, BP Cuff Size: Adult)   Pulse 74   Temp 36.4 °C (97.5 °F)   Resp 14   Ht 1.829 m (6')   Wt 110 kg (243 lb 9.6 oz)   SpO2 95%  Body mass index is 33.04 kg/m².    Constitutional: Alert, no distress, well-groomed.  Skin: Warm, dry, good turgor, no rashes in visible areas.  Eye: Equal, round and reactive, conjunctiva clear, lids normal.  ENMT: Lips without lesions, good dentition, moist mucous membranes.  Neck: Trachea midline, no masses, no thyromegaly.  Respiratory: Unlabored respiratory effort, no cough.  MSK: Normal gait, moves all extremities.  Neuro: Grossly non-focal.   Psych: Alert and oriented x3, normal affect and mood.    Assessment & Plan:     61 y.o. female with the following -     1. Acute lower UTI  Send urine for culture. Patient to take antibiotics as prescribed. Return to clinic if symptoms not improving within 3-4 days or in case of vomiting, fever, increasing pain. Discussed drinking plenty of fluids, wiping front to back every void and BM.  - amoxicillin-clavulanate (AUGMENTIN) 875-125 MG Tab; Take 1 Tablet by mouth 2 times a day.  Dispense: 14 Tablet; Refill: 0  - phenazopyridine (PYRIDIUM) 200 MG Tab; Take 1 Tablet by mouth 3 times a day as needed for Mild Pain.  Dispense: 15 Tablet; Refill: 0  - POCT Urinalysis  - URINE CULTURE(NEW); Future

## 2022-05-24 ENCOUNTER — PATIENT MESSAGE (OUTPATIENT)
Dept: MEDICAL GROUP | Facility: LAB | Age: 62
End: 2022-05-24
Payer: COMMERCIAL

## 2022-05-24 DIAGNOSIS — K52.9 CHRONIC DIARRHEA: ICD-10-CM

## 2022-05-27 DIAGNOSIS — N39.0 ACUTE LOWER UTI: ICD-10-CM

## 2022-05-27 LAB
BACTERIA UR CULT: ABNORMAL
BACTERIA UR CULT: ABNORMAL
SIGNIFICANT IND 70042: ABNORMAL
SITE SITE: ABNORMAL
SOURCE SOURCE: ABNORMAL

## 2022-06-03 DIAGNOSIS — N39.0 ACUTE LOWER UTI: ICD-10-CM

## 2022-06-03 RX ORDER — PHENAZOPYRIDINE HYDROCHLORIDE 200 MG/1
200 TABLET, FILM COATED ORAL 3 TIMES DAILY PRN
Qty: 15 TABLET | Refills: 0 | Status: SHIPPED | OUTPATIENT
Start: 2022-06-03 | End: 2023-04-14 | Stop reason: SDUPTHER

## 2022-06-03 NOTE — TELEPHONE ENCOUNTER
Received request via: Patient    Was the patient seen in the last year in this department? Yes  5/23/22  Does the patient have an active prescription (recently filled or refills available) for medication(s) requested? No

## 2022-06-07 ENCOUNTER — HOSPITAL ENCOUNTER (OUTPATIENT)
Dept: LAB | Facility: MEDICAL CENTER | Age: 62
End: 2022-06-07
Attending: INTERNAL MEDICINE
Payer: COMMERCIAL

## 2022-06-07 ENCOUNTER — HOSPITAL ENCOUNTER (OUTPATIENT)
Facility: MEDICAL CENTER | Age: 62
End: 2022-06-07
Attending: NURSE PRACTITIONER
Payer: COMMERCIAL

## 2022-06-07 ENCOUNTER — HOSPITAL ENCOUNTER (OUTPATIENT)
Dept: LAB | Facility: MEDICAL CENTER | Age: 62
End: 2022-06-07
Attending: FAMILY MEDICINE
Payer: COMMERCIAL

## 2022-06-07 DIAGNOSIS — G62.9 NEUROPATHY: ICD-10-CM

## 2022-06-07 DIAGNOSIS — N39.0 ACUTE LOWER UTI: ICD-10-CM

## 2022-06-07 LAB
25(OH)D3 SERPL-MCNC: 32 NG/ML (ref 30–100)
ALBUMIN SERPL BCP-MCNC: 4.5 G/DL (ref 3.2–4.9)
ALBUMIN/GLOB SERPL: 1.5 G/DL
ALP SERPL-CCNC: 96 U/L (ref 30–99)
ALT SERPL-CCNC: 22 U/L (ref 2–50)
ANION GAP SERPL CALC-SCNC: 16 MMOL/L (ref 7–16)
APPEARANCE UR: CLEAR
AST SERPL-CCNC: 31 U/L (ref 12–45)
BASOPHILS # BLD AUTO: 0.7 % (ref 0–1.8)
BASOPHILS # BLD: 0.04 K/UL (ref 0–0.12)
BILIRUB SERPL-MCNC: 0.6 MG/DL (ref 0.1–1.5)
BILIRUB UR QL STRIP.AUTO: ABNORMAL
BUN SERPL-MCNC: 23 MG/DL (ref 8–22)
CALCIUM SERPL-MCNC: 9.9 MG/DL (ref 8.5–10.5)
CHLORIDE SERPL-SCNC: 107 MMOL/L (ref 96–112)
CO2 SERPL-SCNC: 18 MMOL/L (ref 20–33)
COLOR UR: YELLOW
CREAT SERPL-MCNC: 0.79 MG/DL (ref 0.5–1.4)
CRP SERPL HS-MCNC: <0.3 MG/DL (ref 0–0.75)
EOSINOPHIL # BLD AUTO: 0.2 K/UL (ref 0–0.51)
EOSINOPHIL NFR BLD: 3.4 % (ref 0–6.9)
ERYTHROCYTE [DISTWIDTH] IN BLOOD BY AUTOMATED COUNT: 41.3 FL (ref 35.9–50)
EST. AVERAGE GLUCOSE BLD GHB EST-MCNC: 103 MG/DL
FOLATE SERPL-MCNC: 15.1 NG/ML
GFR SERPLBLD CREATININE-BSD FMLA CKD-EPI: 84 ML/MIN/1.73 M 2
GLOBULIN SER CALC-MCNC: 3 G/DL (ref 1.9–3.5)
GLUCOSE SERPL-MCNC: 100 MG/DL (ref 65–99)
GLUCOSE UR STRIP.AUTO-MCNC: NEGATIVE MG/DL
HBA1C MFR BLD: 5.2 % (ref 4–5.6)
HCT VFR BLD AUTO: 41.8 % (ref 37–47)
HGB BLD-MCNC: 14.4 G/DL (ref 12–16)
IMM GRANULOCYTES # BLD AUTO: 0.01 K/UL (ref 0–0.11)
IMM GRANULOCYTES NFR BLD AUTO: 0.2 % (ref 0–0.9)
KETONES UR STRIP.AUTO-MCNC: NEGATIVE MG/DL
LEUKOCYTE ESTERASE UR QL STRIP.AUTO: NEGATIVE
LYMPHOCYTES # BLD AUTO: 2.41 K/UL (ref 1–4.8)
LYMPHOCYTES NFR BLD: 41.1 % (ref 22–41)
MCH RBC QN AUTO: 31.2 PG (ref 27–33)
MCHC RBC AUTO-ENTMCNC: 34.4 G/DL (ref 33.6–35)
MCV RBC AUTO: 90.7 FL (ref 81.4–97.8)
MICRO URNS: ABNORMAL
MONOCYTES # BLD AUTO: 0.38 K/UL (ref 0–0.85)
MONOCYTES NFR BLD AUTO: 6.5 % (ref 0–13.4)
NEUTROPHILS # BLD AUTO: 2.82 K/UL (ref 2–7.15)
NEUTROPHILS NFR BLD: 48.1 % (ref 44–72)
NITRITE UR QL STRIP.AUTO: NEGATIVE
NRBC # BLD AUTO: 0 K/UL
NRBC BLD-RTO: 0 /100 WBC
PH UR STRIP.AUTO: 5.5 [PH] (ref 5–8)
PLATELET # BLD AUTO: 277 K/UL (ref 164–446)
PMV BLD AUTO: 10.4 FL (ref 9–12.9)
POTASSIUM SERPL-SCNC: 3.9 MMOL/L (ref 3.6–5.5)
PROT SERPL-MCNC: 7.5 G/DL (ref 6–8.2)
PROT UR QL STRIP: NEGATIVE MG/DL
RBC # BLD AUTO: 4.61 M/UL (ref 4.2–5.4)
RBC UR QL AUTO: NEGATIVE
SODIUM SERPL-SCNC: 141 MMOL/L (ref 135–145)
SP GR UR STRIP.AUTO: >=1.03
TSH SERPL DL<=0.005 MIU/L-ACNC: 3.6 UIU/ML (ref 0.38–5.33)
UROBILINOGEN UR STRIP.AUTO-MCNC: 0.2 MG/DL
VIT B12 SERPL-MCNC: 346 PG/ML (ref 211–911)
WBC # BLD AUTO: 5.9 K/UL (ref 4.8–10.8)

## 2022-06-07 PROCEDURE — 87086 URINE CULTURE/COLONY COUNT: CPT

## 2022-06-07 PROCEDURE — 85025 COMPLETE CBC W/AUTO DIFF WBC: CPT

## 2022-06-07 PROCEDURE — 82607 VITAMIN B-12: CPT

## 2022-06-07 PROCEDURE — 36415 COLL VENOUS BLD VENIPUNCTURE: CPT

## 2022-06-07 PROCEDURE — 84443 ASSAY THYROID STIM HORMONE: CPT

## 2022-06-07 PROCEDURE — 80053 COMPREHEN METABOLIC PANEL: CPT

## 2022-06-07 PROCEDURE — 86140 C-REACTIVE PROTEIN: CPT

## 2022-06-07 PROCEDURE — 87186 SC STD MICRODIL/AGAR DIL: CPT

## 2022-06-07 PROCEDURE — 83036 HEMOGLOBIN GLYCOSYLATED A1C: CPT

## 2022-06-07 PROCEDURE — 82306 VITAMIN D 25 HYDROXY: CPT

## 2022-06-07 PROCEDURE — 87077 CULTURE AEROBIC IDENTIFY: CPT

## 2022-06-07 PROCEDURE — 82746 ASSAY OF FOLIC ACID SERUM: CPT

## 2022-06-07 PROCEDURE — 81003 URINALYSIS AUTO W/O SCOPE: CPT

## 2022-06-09 DIAGNOSIS — F33.1 MAJOR DEPRESSIVE DISORDER, RECURRENT, MODERATE (HCC): ICD-10-CM

## 2022-06-09 RX ORDER — LAMOTRIGINE 100 MG/1
100 TABLET ORAL DAILY
Qty: 10 TABLET | Refills: 0 | Status: SHIPPED | OUTPATIENT
Start: 2022-06-09 | End: 2022-06-21

## 2022-06-09 RX ORDER — LAMOTRIGINE 100 MG/1
100 TABLET ORAL EVERY MORNING
Qty: 90 TABLET | Refills: 3 | Status: SHIPPED | OUTPATIENT
Start: 2022-06-09 | End: 2022-06-14 | Stop reason: SDUPTHER

## 2022-06-09 NOTE — TELEPHONE ENCOUNTER
Patient called and advised that her pharmacy won't fill this medication. Patient is wondering if you can send in a 10 day supply to her pharmacy and she'll just pay cash. They're leaving for a trip tomorrow.

## 2022-06-10 ENCOUNTER — PATIENT MESSAGE (OUTPATIENT)
Dept: MEDICAL GROUP | Facility: LAB | Age: 62
End: 2022-06-10
Payer: COMMERCIAL

## 2022-06-10 DIAGNOSIS — A49.9 ESBL (EXTENDED SPECTRUM BETA-LACTAMASE) PRODUCING BACTERIA INFECTION: ICD-10-CM

## 2022-06-10 DIAGNOSIS — Z16.12 ESBL (EXTENDED SPECTRUM BETA-LACTAMASE) PRODUCING BACTERIA INFECTION: ICD-10-CM

## 2022-06-10 DIAGNOSIS — N39.0 ACUTE LOWER UTI: ICD-10-CM

## 2022-06-10 RX ORDER — NITROFURANTOIN 25; 75 MG/1; MG/1
100 CAPSULE ORAL 2 TIMES DAILY
Qty: 14 CAPSULE | Refills: 0 | Status: SHIPPED | OUTPATIENT
Start: 2022-06-10 | End: 2022-07-11

## 2022-06-13 RX ORDER — FLUTICASONE PROPIONATE AND SALMETEROL 250; 50 UG/1; UG/1
1 POWDER RESPIRATORY (INHALATION) EVERY 12 HOURS
COMMUNITY
End: 2022-06-13

## 2022-06-13 NOTE — TELEPHONE ENCOUNTER
Received request via: Pharmacy    Was the patient seen in the last year in this department? Yes  5/26/2022  Does the patient have an active prescription (recently filled or refills available) for medication(s) requested? No

## 2022-06-14 RX ORDER — LAMOTRIGINE 100 MG/1
100 TABLET ORAL EVERY MORNING
Qty: 90 TABLET | Refills: 3 | Status: SHIPPED | OUTPATIENT
Start: 2022-06-14 | End: 2023-07-11

## 2022-06-14 RX ORDER — ACYCLOVIR 50 MG/G
1 OINTMENT TOPICAL 3 TIMES DAILY PRN
Qty: 15 G | Refills: 1 | Status: ON HOLD | OUTPATIENT
Start: 2022-06-14 | End: 2023-07-03

## 2022-06-14 RX ORDER — FLUTICASONE PROPIONATE AND SALMETEROL 250; 50 UG/1; UG/1
POWDER RESPIRATORY (INHALATION)
Qty: 60 EACH | Refills: 11 | Status: SHIPPED | OUTPATIENT
Start: 2022-06-14 | End: 2022-07-11

## 2022-06-14 NOTE — TELEPHONE ENCOUNTER
Received request via: Pharmacy    Was the patient seen in the last year in this department? Yes  5/23/22  Does the patient have an active prescription (recently filled or refills available) for medication(s) requested? No

## 2022-06-15 ENCOUNTER — OFFICE VISIT (OUTPATIENT)
Dept: SLEEP MEDICINE | Facility: MEDICAL CENTER | Age: 62
End: 2022-06-15
Payer: COMMERCIAL

## 2022-06-15 VITALS
HEART RATE: 64 BPM | BODY MASS INDEX: 32.91 KG/M2 | RESPIRATION RATE: 14 BRPM | DIASTOLIC BLOOD PRESSURE: 70 MMHG | OXYGEN SATURATION: 96 % | SYSTOLIC BLOOD PRESSURE: 128 MMHG | HEIGHT: 72 IN | WEIGHT: 243 LBS

## 2022-06-15 DIAGNOSIS — F51.04 CHRONIC INSOMNIA: ICD-10-CM

## 2022-06-15 DIAGNOSIS — G47.30 SLEEP DISORDER BREATHING: Primary | ICD-10-CM

## 2022-06-15 DIAGNOSIS — G47.19 EXCESSIVE DAYTIME SLEEPINESS: ICD-10-CM

## 2022-06-15 DIAGNOSIS — J45.40 MODERATE PERSISTENT ASTHMA WITHOUT COMPLICATION: ICD-10-CM

## 2022-06-15 DIAGNOSIS — G25.81 RLS (RESTLESS LEGS SYNDROME): ICD-10-CM

## 2022-06-15 PROCEDURE — 99214 OFFICE O/P EST MOD 30 MIN: CPT | Performed by: STUDENT IN AN ORGANIZED HEALTH CARE EDUCATION/TRAINING PROGRAM

## 2022-06-15 ASSESSMENT — FIBROSIS 4 INDEX: FIB4 SCORE: 1.48

## 2022-06-15 NOTE — PROGRESS NOTES
[facility] Consult Note     Date: 6/15/2022 / Time: 8:05 AM      Thank you for requesting a sleep medicine consultation on Arely Haynes at the sleep center. Presents today with the chief complaints of snoring, witnessed apneas, occasional excessive daytime sleepiness. She is referred by Joe Espinal M.D.  [address] [place] [address] for evaluation and treatment of sleep disorder breathing .     HISTORY OF PRESENT ILLNESS:     Arely Haynes is a 61 y.o. female with moderate persistent asthma, MDD, CHRISTY, psoriasis, pulmonary nodules, groundglass opacity, erosive esophagitis, GERD, right lower extremity neuropathy, history of PCP pneumonia, hyperlipidemia and restless leg syndrome.  Presents to Sleep Clinic for evaluation of potential sleep apnea.    She is being followed by pulmonology at the [facility].  In addition she is planning to see neurology regarding her neuropathy.    What leads her to today's appointment is she has been told by her  that she will snore in her sleep and occasionally have short apneic spells.  She is following with pulmonology in [place] due to concern for groundglass opacity in the right upper lobe and pulmonary nodules.    In terms of her sleep she has trouble getting to sleep.  It takes her 2 to 3 hours to fall asleep nightly.  This has been happening for years.  When she is asleep she will awaken 2-3 times a night and 2-3 times a week with the awakening able to take her longer than 30 minutes to get back to sleep.  She often wakes feeling unrefreshed.  She has fatigue during the day and sleepiness at times.  She states that she does not stay busy she will become tired and often take a nap.  She does not find her naps refreshing.    She does have trouble with neuropathic pain in the right lower extremity as well as a urge to move her legs at night.  She feels the restless legs causes her trouble in falling  "asleep.    As per supplemental questionnaire to be scanned or imported into chart:    [place] Sleepiness Score: 12    Sleep Schedule  Bedtime: Weekday 930-1030 pm Weekend 1030-11pm  Wake time: Weekday 8am Weekend 9-10  Sleep-onset latency: 2-3 hours   Awakenings from sleep: 2-3, 2-3 times a week hard to get back to   Difficulty falling back asleep: yes   Bedroom partner: yes   Naps: Yes, 1-2 a weeks, 20 min to 1 hour not refreshing    DAYTIME SYMPTOMS:   Excessive daytime sleepiness: Yes  Daytime fatigue: Yes  Difficulty concentrating: No   Memory problems: No   Irritability:Yes  Work/school performance issues: No , Retired   Sleepiness with driving: No   Caffeine/stimulant use: Yes  Alcohol use:No     SLEEP RELATED SYMPTOMS  Snoring: Yes  Witnessed apnea or gasping/choking: Yes  Dry mouth or mouth breathing: Yes  Sweating: Yes  Teeth grinding/biting: No   Morning headaches: No   Refreshed Upon Awakening: No      SLEEP RELATED BEHAVIORS:  Parasomnias (walking, talking, eating, violence): No   Leg kicking: yes   Restless legs - \"urge to move\": Yes  Nightmares: Yes Recurrent: No   Dream enactment: No      NARCOLEPSY:  Cataplexy: No   Sleep paralysis: No   Sleep attacks: No   Hypnagogic/hypnopompic hallucinations: No     MEDICAL HISTORY  Past Medical History:   Diagnosis Date   • Anesthesia     1st cousin has malignant hyperthermia/pt has never had an issue or her sisters   • Arthritis     hips knees hands spine   • Asthma     prn inhalers   • Bowel habit changes     diarrhea   • Depression     Daughter passed few years ago   • Erosive esophagitis    • GERD (gastroesophageal reflux disease)    • Heart burn    • High cholesterol    • Immunocompromised (HCC)    • Pain     hips knees   • Pneumonia 2018   • Psoriasis    • Psoriatic arthritis (HCC)         SURGICAL HISTORY  Past Surgical History:   Procedure Laterality Date   • IN BRONCHOSCOPY,DIAGNOSTIC  7/9/2021    Procedure: BRONCHOSCOPY;  Surgeon: Myles Vidal M.D.;  " Location: SURGERY [facility];  Service: Ent   • TENDON REPAIR Left 9/19/2019    Procedure: REPAIR, TENDON- QUADRICEPS RUTURE REPAIR AND MEYERS;  Surgeon: Jayden Velázquez M.D.;  Location: SURGERY [facility];  Service: Orthopedics   • HYSTERECTOMY ROBOTIC XI N/A 7/11/2019    Procedure: HYSTERECTOMY, ROBOT-ASSISTED, USING DA SABINO XI;  Surgeon: Curly Bernal M.D.;  Location: SURGERY [facility];  Service: Gynecology   • SALPINGO OOPHORECTOMY Bilateral 7/11/2019    Procedure: SALPINGO-OOPHORECTOMY;  Surgeon: Curly Bernal M.D.;  Location: SURGERY [facility];  Service: Gynecology   • GYN SURGERY  1999 c sec   • CYSTECTOMY     • FUSION, SPINE, LUMBAR, PLIF     • HIP REPLACEMENT, TOTAL      Right   • OTHER ABDOMINAL SURGERY      appendix   • OTHER ORTHOPEDIC SURGERY      left knee   • OTHER ORTHOPEDIC SURGERY      right rotator cuff        FAMILY HISTORY  Family History   Problem Relation Age of Onset   • Hyperlipidemia Mother    • Heart Attack Mother    • Psychiatric Illness Mother    • Heart Disease Mother    • Arthritis Mother    • Lung Disease Father    • Cancer Father    • Hyperlipidemia Father    • Hyperlipidemia Sister    • Cancer Sister         breast   • Heart Disease Paternal Grandfather    • Hypertension Neg Hx    • Diabetes Neg Hx        SOCIAL HISTORY  Social History     Socioeconomic History   • Marital status:    Occupational History   • Occupation: Flight Nurse/Educator   Tobacco Use   • Smoking status: Never Smoker   • Smokeless tobacco: Never Used   Vaping Use   • Vaping Use: Never used   Substance and Sexual Activity   • Alcohol use: Not Currently     Alcohol/week: 0.6 - 1.2 oz     Types: 1 - 2 Glasses of wine per week     Comment: one glss of wine at night    • Drug use: No   • Sexual activity: Yes     Comment: Tubal ligation   Other Topics Concern   •  Service No   • Blood Transfusions No   • Caffeine Concern No   • Occupational Exposure Yes   • Hobby Hazards Yes   •  Sleep Concern Yes   • Stress Concern No   • Weight Concern Yes   • Special Diet No   • Back Care No   • Exercise Yes   • Bike Helmet Yes   • Seat Belt Yes   • Self-Exams Yes        Occupation: Retired     CURRENT MEDICATIONS  Current Outpatient Medications   Medication Sig Dispense Refill   • fluticasone-salmeterol (ADVAIR) 250-50 MCG/ACT AEROSOL POWDER, BREATH ACTIVATED USE 1 INHALATION TWICE A DAY 60 Each 11   • acyclovir (ZOVIRAX) 5 % Ointment Apply 1 Application topically 3 times a day as needed (Cold Sores). 15 g 1   • lamoTRIgine (LAMICTAL) 100 MG Tab Take 1 Tablet by mouth every morning. 90 Tablet 3   • nitrofurantoin (MACROBID) 100 MG Cap Take 1 Capsule by mouth 2 times a day. 14 Capsule 0   • lamoTRIgine (LAMICTAL) 100 MG Tab Take 1 Tablet by mouth every day for 10 days. 10 Tablet 0   • phenazopyridine (PYRIDIUM) 200 MG Tab Take 1 Tablet by mouth 3 times a day as needed for Mild Pain. 15 Tablet 0   • sulfamethoxazole-trimethoprim (BACTRIM DS) 800-160 MG tablet Take 1 Tablet by mouth 3 times a week.     • amoxicillin-clavulanate (AUGMENTIN) 875-125 MG Tab Take 1 Tablet by mouth 2 times a day. 14 Tablet 0   • rosuvastatin (CRESTOR) 10 MG Tab Take 1 Tablet by mouth at bedtime. 90 Tablet 3   • escitalopram (LEXAPRO) 10 MG Tab Take 1 Tablet by mouth every morning. Take with escitalopram 20 mg for a total dose of 30 mg. 90 Tablet 3   • escitalopram (LEXAPRO) 20 MG tablet Take 1 Tablet by mouth every morning. Take with escitalopram 10 mg for a total dose of 30 mg. 90 Tablet 3   • omeprazole (PRILOSEC) 40 MG delayed-release capsule Take 1 Capsule by mouth every day. 90 Capsule 3   • montelukast (SINGULAIR) 10 MG Tab Take 1 Tablet by mouth every day. 90 Tablet 3   • famotidine (PEPCID) 20 MG Tab Take 1 Tablet by mouth as needed (reflux). 60 Tablet 3   • Spacer/Aero-Holding Chambers (AEROCHAMBER MV) Misc 1 Each as needed. 1 Each 2   • sulfamethoxazole-trimethoprim (BACTRIM DS) 800-160 MG tablet Take 1 tablet by mouth  3 times weekly. 90 Tablet 3   • fluticasone-salmeterol (ADVAIR DISKUS) 250-50 MCG/DOSE AEROSOL POWDER, BREATH ACTIVATED Inhale 1 Puff 2 times a day. 60 Each 3   • magnesium oxide (MAG-OX) 400 MG Tab tablet Take 1 Tablet by mouth every morning. 90 Tablet 3   • folic acid (FOLVITE) 1 MG Tab Take 2 Tablets by mouth every morning. 2 tablets = 2 mg. 90 Tablet 3   • etodolac (LODINE) 400 MG tablet Take 1 Tablet by mouth 2 times a day. 90 Tablet 3   • cyclobenzaprine (FLEXERIL) 5 mg tablet Take 1-2 Tablets by mouth 3 times a day as needed for Muscle Spasms. 90 Tablet 3   • albuterol 108 (90 Base) MCG/ACT Aero Soln inhalation aerosol Inhale 1-2 Puffs every four hours as needed for Shortness of Breath. 8 g 11   • HUMIRA PEN 40 MG/0.4ML Pen-injector Kit      • EPINEPHrine (EPIPEN 2-NATALIA) 0.3 MG/0.3ML Solution Auto-injector solution for injection Use as directed by MD as needed. 1 Each 1   • vitamin D (VITAMIND D3) 1000 UNIT Tab Take 1 tablet by mouth 2 times a day. 60 tablet 0   • acetaminophen (TYLENOL) 325 MG Tab Take 650 mg by mouth every 6 hours as needed for Mild Pain or Fever. 2 tablets = 650 mg.     • cetirizine (ZYRTEC) 10 MG Tab Take 10 mg by mouth at bedtime.       No current facility-administered medications for this visit.       REVIEW OF SYSTEMS  Constitutional: Denies fevers, Denies weight changes  Ears/Nose/Throat/Mouth: Denies nasal congestion or sore throat   Cardiovascular: Denies chest pain  Respiratory: Denies shortness of breath, Denies cough  Gastrointestinal/Hepatic: Denies nausea, vomiting  Sleep: see HPI    Physical Examination:  Vitals/ General Appearance:   Weight/BMI: Body mass index is 32.96 kg/m².  /70 (BP Location: Left arm, Patient Position: Sitting, BP Cuff Size: Adult)   Pulse 64   Resp 14   Ht 1.829 m (6')   Wt 110 kg (243 lb)   SpO2 96%   Vitals:    06/15/22 0801   BP: 128/70   BP Location: Left arm   Patient Position: Sitting   BP Cuff Size: Adult   Pulse: 64   Resp: 14   SpO2:  96%   Weight: 110 kg (243 lb)   Height: 1.829 m (6')       Pt. is alert and oriented to time, place and person. Cooperative and in no apparent distress.     Constitutional: Alert, no distress, well-groomed.  Skin: No rashes in visible areas.  Eye: Round. Conjunctiva clear, lids normal. No icterus.   ENT EXAM  Nasal alae/valves collapsible: No   Nasal septum deviation: Yes  Nasal turbinate hypertrophy: Left: Grade 1   Right: Grade 1  Hard palate narrow: No   Hard palate high: No   Soft palate/uvula (Mallampati score): 1  Tongue Scalloping: No   Retrognathia: No   Micrognathia: No   Cardiovascular:no murmus/gallops/rubs, normal S1 and S2 heart sounds, regular rate and rhythm  Pulmonary:Clear to auscultation, No wheezes, No crackles.  Neurologic:Awake, alert and oriented x 3, Normal age appropriate gait, No involuntary motions.  Extremities: No clubbing, cyanosis, or edema       ASSESSMENT AND PLAN   Arely Haynes is a 61 y.o. female with moderate persistent asthma, MDD, CHRISTY, psoriasis, pulmonary nodules, groundglass opacity, erosive esophagitis, GERD, right lower extremity neuropathy, history of PCP pneumonia, hyperlipidemia and restless leg syndrome.  Presents to Sleep Clinic for evaluation of potential sleep apnea.    1. Arely Haynes  has symptoms of Obstructive Sleep Apnea (IGGY). Arely Haynes has symptoms of snoring, choking/gasping during sleep, witnessed apnea, dry mouth, daytime sleepiness, unrefreshed upon awakening. These  interfere with activities of daily living.   ESS 12  Pt has risk factors for IGGY include obesity, age, and crowded oropharynx.     The pathophysiology of IGGY and the increased risk of cardiovascular morbidity from untreated IGGY is discussed in detail with the patient. She  also has depression, anxiety, GERD, moderate persistent asthma, neuropathic pain which can be worsened by IGGY.      We have discussed diagnostic options including in-laboratory, attended polysomnography and  home sleep testing. We have also discussed treatment options including airway pressurization, reconstructive otolaryngologic surgery, dental appliances and weight management.     Subsequently,treatment options will be discussed based on the diagnostic study. Meanwhile, She is urged to avoid supine sleep, weight gain and alcoholic beverages since all of these can worsen IGGY. She is cautioned against drowsy driving. If She feels sleepy while driving, advised must pull over for a break/nap, rather than persist on the road, in the interest of Pt's own safety and that of others on the road.    Plan  -  She  will be scheduled for an overnight PSG to assess sleep related breathing disorder.  - Follow up 1-2 weeks after sleep study to discuss results and treatment options moving forward   -Advised to reach out via MyChart or by phone with any questions or concerns.     2.  Restless leg syndrome   Patient is experiencing discomfort in legs with urge to move with him at night that improves with movement.  This happens nightly.  She feels it is impacting her sleep.    Discussed that low iron can worsen restless legs.  She has not had iron studies recently.  Reviewed that mild to moderate exercise can improve restless legs and strenuous exercise can worsen restless legs.  Briefly discussed that gabapentin therapy can be used to treat restless legs.    Plan  -Order placed for iron and ferritin levels    3. Chronic Insomnia   With prolonged sleep latency, frequent awakenings with difficulty falling back asleep and feeling this is impacting daily functioning for years meets criteria for chronic insomnia. Discussed potential causes of insomnia and importance of having a routine around bedtime. Dicussed cognitive behavioral therapy for insomnia (CBTi) which is first line treatment for insomnia.   Advised that her uncontrolled restless leg syndrome and potentially uncontrolled sleep apnea may be contributing to her insomnia both with  sleep initiation and with awakenings.  If treating these if they are present does not improve her insomnia advised would likely benefit from undergoing CBT-I with a sleep psychologist given her significant history of anxiety and depression.    RECOMMENDATIONS  -A few interventions noted below to help with the insomnia:  -Maintain a regular sleep schedule  -Avoid caffeinated beverages after lunch, and alcohol in late afternoon and evening  -Avoid prolonged use of light-emitting screens before bedtime  -Exercise regularly for at least 20 minutes, preferably more than four to five hours prior to bedtime  -Avoid daytime naps, especially if they are longer than 20 to 30 minutes or occur late in the day  -Advised to contact our office or myself with any questions via [employer]      Regarding treatment of other past medical problems and general health maintenance,  Pt is urged to follow up with PCP.      Please note portions of this record was created using voice recognition software. I have made every reasonable attempt to correct obvious errors, but I expect that there are errors of grammar and possibly content I did not discover before finalizing the note.

## 2022-06-17 ENCOUNTER — TELEPHONE (OUTPATIENT)
Dept: MEDICAL GROUP | Facility: LAB | Age: 62
End: 2022-06-17
Payer: COMMERCIAL

## 2022-06-17 NOTE — LETTER
June 17, 2022        Arely Haynes  [address] [place] [id number]        Dear Arely:    Here are your results you wanted faxed to Dr. Jeter. I made several attempts to fax to the number provided with out this going through.     Dr. Jeter fax: [phone]    If you have any questions or concerns, please don't hesitate to call.        Sincerely,        RENETTA Denton    Electronically Signed

## 2022-06-19 DIAGNOSIS — F33.1 MAJOR DEPRESSIVE DISORDER, RECURRENT, MODERATE (HCC): ICD-10-CM

## 2022-06-20 ENCOUNTER — HOSPITAL ENCOUNTER (OUTPATIENT)
Facility: MEDICAL CENTER | Age: 62
End: 2022-06-20
Attending: NURSE PRACTITIONER
Payer: COMMERCIAL

## 2022-06-20 ENCOUNTER — APPOINTMENT (OUTPATIENT)
Dept: LAB | Facility: MEDICAL CENTER | Age: 62
End: 2022-06-20
Attending: FAMILY MEDICINE
Payer: COMMERCIAL

## 2022-06-20 ENCOUNTER — PATIENT MESSAGE (OUTPATIENT)
Dept: MEDICAL GROUP | Facility: LAB | Age: 62
End: 2022-06-20
Payer: COMMERCIAL

## 2022-06-20 DIAGNOSIS — N39.0 ACUTE LOWER UTI: ICD-10-CM

## 2022-06-20 DIAGNOSIS — A49.9 ESBL (EXTENDED SPECTRUM BETA-LACTAMASE) PRODUCING BACTERIA INFECTION: ICD-10-CM

## 2022-06-20 DIAGNOSIS — Z16.12 ESBL (EXTENDED SPECTRUM BETA-LACTAMASE) PRODUCING BACTERIA INFECTION: ICD-10-CM

## 2022-06-20 LAB
AMORPH CRY #/AREA URNS HPF: PRESENT /HPF
APPEARANCE UR: ABNORMAL
BILIRUB UR QL STRIP.AUTO: ABNORMAL
CAOX CRY #/AREA URNS HPF: NORMAL /HPF
COLOR UR: YELLOW
GLUCOSE UR STRIP.AUTO-MCNC: NEGATIVE MG/DL
KETONES UR STRIP.AUTO-MCNC: ABNORMAL MG/DL
LEUKOCYTE ESTERASE UR QL STRIP.AUTO: NEGATIVE
MICRO URNS: ABNORMAL
NITRITE UR QL STRIP.AUTO: NEGATIVE
PH UR STRIP.AUTO: 5 [PH] (ref 5–8)
PROT UR QL STRIP: NEGATIVE MG/DL
RBC UR QL AUTO: NEGATIVE
SP GR UR STRIP.AUTO: >=1.03
UROBILINOGEN UR STRIP.AUTO-MCNC: 0.2 MG/DL

## 2022-06-20 PROCEDURE — 81001 URINALYSIS AUTO W/SCOPE: CPT

## 2022-06-20 PROCEDURE — 87086 URINE CULTURE/COLONY COUNT: CPT

## 2022-06-21 RX ORDER — LAMOTRIGINE 100 MG/1
100 TABLET ORAL DAILY
Qty: 10 TABLET | Refills: 0 | Status: SHIPPED | OUTPATIENT
Start: 2022-06-21 | End: 2022-07-01

## 2022-06-22 LAB
BACTERIA UR CULT: NORMAL
SIGNIFICANT IND 70042: NORMAL
SITE SITE: NORMAL
SOURCE SOURCE: NORMAL

## 2022-07-01 ENCOUNTER — HOSPITAL ENCOUNTER (OUTPATIENT)
Dept: LAB | Facility: MEDICAL CENTER | Age: 62
End: 2022-07-01
Attending: FAMILY MEDICINE
Payer: COMMERCIAL

## 2022-07-01 ENCOUNTER — HOSPITAL ENCOUNTER (OUTPATIENT)
Facility: MEDICAL CENTER | Age: 62
End: 2022-07-01
Attending: FAMILY MEDICINE
Payer: COMMERCIAL

## 2022-07-01 ENCOUNTER — HOSPITAL ENCOUNTER (OUTPATIENT)
Dept: RADIOLOGY | Facility: MEDICAL CENTER | Age: 62
End: 2022-07-01
Attending: FAMILY MEDICINE
Payer: COMMERCIAL

## 2022-07-01 ENCOUNTER — OFFICE VISIT (OUTPATIENT)
Dept: MEDICAL GROUP | Facility: LAB | Age: 62
End: 2022-07-01
Payer: COMMERCIAL

## 2022-07-01 VITALS
TEMPERATURE: 97 F | HEIGHT: 72 IN | DIASTOLIC BLOOD PRESSURE: 66 MMHG | SYSTOLIC BLOOD PRESSURE: 124 MMHG | WEIGHT: 240 LBS | OXYGEN SATURATION: 97 % | BODY MASS INDEX: 32.51 KG/M2 | HEART RATE: 62 BPM | RESPIRATION RATE: 14 BRPM

## 2022-07-01 DIAGNOSIS — Z11.59 SCREENING FOR VIRAL DISEASE: ICD-10-CM

## 2022-07-01 DIAGNOSIS — J84.9 INTERSTITIAL LUNG DISEASE (HCC): ICD-10-CM

## 2022-07-01 DIAGNOSIS — J84.9 INTERSTITIAL PULMONARY DISEASE (HCC): ICD-10-CM

## 2022-07-01 DIAGNOSIS — L40.9 PSORIASIS: ICD-10-CM

## 2022-07-01 DIAGNOSIS — Z91.030 BEE STING ALLERGY: ICD-10-CM

## 2022-07-01 DIAGNOSIS — F51.02 ADJUSTMENT INSOMNIA: ICD-10-CM

## 2022-07-01 LAB
ALBUMIN SERPL BCP-MCNC: 4.7 G/DL (ref 3.2–4.9)
ALBUMIN/GLOB SERPL: 1.6 G/DL
ALP SERPL-CCNC: 103 U/L (ref 30–99)
ALT SERPL-CCNC: 25 U/L (ref 2–50)
ANION GAP SERPL CALC-SCNC: 14 MMOL/L (ref 7–16)
AST SERPL-CCNC: 31 U/L (ref 12–45)
BASOPHILS # BLD AUTO: 1.2 % (ref 0–1.8)
BASOPHILS # BLD: 0.07 K/UL (ref 0–0.12)
BILIRUB SERPL-MCNC: 0.7 MG/DL (ref 0.1–1.5)
BUN SERPL-MCNC: 24 MG/DL (ref 8–22)
CALCIUM SERPL-MCNC: 9.6 MG/DL (ref 8.4–10.2)
CHLORIDE SERPL-SCNC: 107 MMOL/L (ref 96–112)
CO2 SERPL-SCNC: 18 MMOL/L (ref 20–33)
CREAT SERPL-MCNC: 0.7 MG/DL (ref 0.5–1.4)
EOSINOPHIL # BLD AUTO: 0.26 K/UL (ref 0–0.51)
EOSINOPHIL NFR BLD: 4.6 % (ref 0–6.9)
ERYTHROCYTE [DISTWIDTH] IN BLOOD BY AUTOMATED COUNT: 41.2 FL (ref 35.9–50)
FASTING STATUS PATIENT QL REPORTED: NORMAL
GFR SERPLBLD CREATININE-BSD FMLA CKD-EPI: 97 ML/MIN/1.73 M 2
GLOBULIN SER CALC-MCNC: 2.9 G/DL (ref 1.9–3.5)
GLUCOSE SERPL-MCNC: 109 MG/DL (ref 65–99)
HCT VFR BLD AUTO: 42.1 % (ref 37–47)
HGB BLD-MCNC: 14.7 G/DL (ref 12–16)
IMM GRANULOCYTES # BLD AUTO: 0.01 K/UL (ref 0–0.11)
IMM GRANULOCYTES NFR BLD AUTO: 0.2 % (ref 0–0.9)
LYMPHOCYTES # BLD AUTO: 2.25 K/UL (ref 1–4.8)
LYMPHOCYTES NFR BLD: 39.4 % (ref 22–41)
MCH RBC QN AUTO: 31.5 PG (ref 27–33)
MCHC RBC AUTO-ENTMCNC: 34.9 G/DL (ref 33.6–35)
MCV RBC AUTO: 90.1 FL (ref 81.4–97.8)
MONOCYTES # BLD AUTO: 0.43 K/UL (ref 0–0.85)
MONOCYTES NFR BLD AUTO: 7.5 % (ref 0–13.4)
NEUTROPHILS # BLD AUTO: 2.69 K/UL (ref 2–7.15)
NEUTROPHILS NFR BLD: 47.1 % (ref 44–72)
NRBC # BLD AUTO: 0 K/UL
NRBC BLD-RTO: 0 /100 WBC
NT-PROBNP SERPL IA-MCNC: 41 PG/ML (ref 0–125)
PLATELET # BLD AUTO: 261 K/UL (ref 164–446)
PMV BLD AUTO: 9.5 FL (ref 9–12.9)
POTASSIUM SERPL-SCNC: 4 MMOL/L (ref 3.6–5.5)
PROT SERPL-MCNC: 7.6 G/DL (ref 6–8.2)
RBC # BLD AUTO: 4.67 M/UL (ref 4.2–5.4)
SODIUM SERPL-SCNC: 139 MMOL/L (ref 135–145)
WBC # BLD AUTO: 5.7 K/UL (ref 4.8–10.8)

## 2022-07-01 PROCEDURE — 71046 X-RAY EXAM CHEST 2 VIEWS: CPT

## 2022-07-01 PROCEDURE — 83880 ASSAY OF NATRIURETIC PEPTIDE: CPT

## 2022-07-01 PROCEDURE — 80053 COMPREHEN METABOLIC PANEL: CPT

## 2022-07-01 PROCEDURE — 99215 OFFICE O/P EST HI 40 MIN: CPT | Mod: CS | Performed by: FAMILY MEDICINE

## 2022-07-01 PROCEDURE — 85025 COMPLETE CBC W/AUTO DIFF WBC: CPT

## 2022-07-01 PROCEDURE — 36415 COLL VENOUS BLD VENIPUNCTURE: CPT

## 2022-07-01 PROCEDURE — 0240U HCHG SARS-COV-2 COVID-19 NFCT DS RESP RNA 3 TRGT MIC: CPT

## 2022-07-01 RX ORDER — PREDNISONE 20 MG/1
40 TABLET ORAL DAILY
Qty: 10 TABLET | Refills: 0 | Status: SHIPPED | OUTPATIENT
Start: 2022-07-01 | End: 2022-07-01

## 2022-07-01 RX ORDER — EPINEPHRINE 0.3 MG/.3ML
INJECTION SUBCUTANEOUS
Qty: 1 EACH | Refills: 1 | Status: ON HOLD | OUTPATIENT
Start: 2022-07-01 | End: 2023-07-03

## 2022-07-01 RX ORDER — TRIAMCINOLONE ACETONIDE 1 MG/G
1 CREAM TOPICAL 2 TIMES DAILY
Qty: 80 G | Refills: 1 | Status: SHIPPED | OUTPATIENT
Start: 2022-07-01 | End: 2023-03-17 | Stop reason: SDUPTHER

## 2022-07-01 RX ORDER — LORAZEPAM 1 MG/1
1 TABLET ORAL EVERY 4 HOURS PRN
Qty: 15 TABLET | Refills: 0 | Status: SHIPPED | OUTPATIENT
Start: 2022-07-01 | End: 2022-07-31

## 2022-07-01 RX ORDER — PREDNISONE 20 MG/1
40 TABLET ORAL DAILY
Qty: 10 TABLET | Refills: 0 | Status: SHIPPED | OUTPATIENT
Start: 2022-07-01 | End: 2022-07-06

## 2022-07-01 ASSESSMENT — FIBROSIS 4 INDEX: FIB4 SCORE: 1.48

## 2022-07-01 NOTE — PROGRESS NOTES
Subjective:     CC: Shortness of breath    HPI:   Arely is a 61-year-old female with a complex medical history including psoriatic arthritis, ILD, history of PCP pneumonia, and asthma who presents today with 10 days of shortness of breath with activity.  Breathing has seemed worse than baseline over the last 10 days, especially with exertion.  She has also noted orthopnea.  O2 sats will drop into the upper 80s with activity.  Dry cough present, no fevers, does not feel acutely ill.  No increase in sputum production, no congestion.  She has increased her Advair to twice daily and is using albuterol 2-3 time daily with only minimal improvement.       Medications, past medical history, allergies, and social history have been reviewed and updated.      Objective:       Exam:  /66 (BP Location: Right arm, Patient Position: Sitting, BP Cuff Size: Adult)   Pulse 62   Temp 36.1 °C (97 °F)   Resp 14   Ht 1.829 m (6')   Wt 109 kg (240 lb)   LMP  (LMP Unknown)   SpO2 97%   BMI 32.55 kg/m²  Body mass index is 32.55 kg/m².    Constitutional: Alert. Well appearing. No distress.  Skin: Warm, dry, good turgor, no visible rashes.  Eye: Equal, round and reactive to light, conjunctiva clear, lids normal.  ENMT: Moist mucous membranes.   Respiratory: Normal effort. Lungs are clear to auscultation bilaterally. No wheezing.  Cardiovascular: Regular rate and rhythm. Normal S1/S2. No murmurs, rubs or gallops.   Ext: No edema  Neuro: Moves all four extremities. No facial droop.  Psych: Answers questions appropriately. Normal affect and mood.    Assessment & Plan:     61-year-old female with a complex medical history including psoriatic arthritis, ILD, history of PCP pneumonia, and asthma who presents today with 10 days of shortness of breath with activity.  She also has mild dry cough but no fevers, does not feel ill, no congestion or increased sputum.  She has noted orthopnea which does seem to be new for her but no  peripheral edema.  Given her complex history, I think the differential here is broad.  Worsening asthma or ILD vs viral respiratory infection vs PNA vs CHF or cardiac etiology.  CXR and labs ordered as below.  She did have repeat HRCT planned through her pulmonologist in [place] she may try to get this done sooner here.  Further work-up/treatment depending on labs and CXR.    UPDATE: CXR clear, labs unremarkable.  BNP is normal.  Think this is likely asthma or ILD flare +/- viral infection.  We will try prednisone burst.  She has close follow-up scheduled and discussed reasons to seek care.  She is also  in touch with her pulmonologist.    1. Interstitial lung disease (HCC)  2. Interstitial pulmonary disease (HCC)  - CT-CHEST, HIGH RESOLUTION LUNG; Future  - CBC WITH DIFFERENTIAL; Future  - Comp Metabolic Panel; Future  - proBrain Natriuretic Peptide, NT; Future  - DX-CHEST-2 VIEWS; Future  - CT-CHEST, HIGH RESOLUTION LUNG; Future  - predniSONE (DELTASONE) 20 MG Tab; Take 2 Tablets by mouth every day for 5 days.  Dispense: 10 Tablet; Refill: 0    3. Adjustment insomnia  Can use Ativan for short duration as needed.  - LORazepam (ATIVAN) 1 MG Tab; Take 1 Tablet by mouth every four hours as needed (sleep) for up to 30 days.  Dispense: 15 Tablet; Refill: 0    4. Bee sting allergy  - EPINEPHrine (EPIPEN 2-NATALIA) 0.3 MG/0.3ML Solution Auto-injector solution for injection; Use as directed by MD as needed.  Dispense: 1 Each; Refill: 1    5. Psoriasis  - triamcinolone acetonide (KENALOG) 0.1 % Cream; Apply 1 Application topically 2 times a day.  Dispense: 80 g; Refill: 1    6. Screening for viral disease  - CoV-2 and Flu A/B by PCR ([facility]); Future    My total time spent caring for the patient on the day of the encounter was 48 minutes.   This does not include time spent on separately billable procedures/tests.    Please note that this note was created using voice recognition software.

## 2022-07-02 DIAGNOSIS — Z11.59 SCREENING FOR VIRAL DISEASE: ICD-10-CM

## 2022-07-03 LAB
FLUAV RNA SPEC QL NAA+PROBE: NEGATIVE
FLUBV RNA SPEC QL NAA+PROBE: NEGATIVE
SARS-COV-2 RNA RESP QL NAA+PROBE: NOTDETECTED
SPECIMEN SOURCE: NORMAL

## 2022-07-06 ENCOUNTER — TELEPHONE (OUTPATIENT)
Dept: CARDIOLOGY | Facility: MEDICAL CENTER | Age: 62
End: 2022-07-06

## 2022-07-06 NOTE — TELEPHONE ENCOUNTER
Caller: Arely Haynes    Topic/issue: Patient was calling regarding taking a sleeping pill for her appointment that she has to aid in her sleep study and asked for a call back to discuss.  Callback Number: [phone] (home)       Thank you    -Sylvester GUTIERREZ

## 2022-07-06 NOTE — CARE PLAN
Problem: Communication  Goal: The ability to communicate needs accurately and effectively will improve  Outcome: PROGRESSING AS EXPECTED  Discussed use of pain scale, call light, medications and nonpharmacologic ways to control pain. Whiteboard updated, POC discussed.     Problem: Psychosocial Needs:  Goal: Level of anxiety will decrease  Outcome: PROGRESSING AS EXPECTED  Pt is anxious, decreased stimuli to help reduce anxiety level. Explained POC and what to expect during hospital stay.        No

## 2022-07-11 ENCOUNTER — OFFICE VISIT (OUTPATIENT)
Dept: MEDICAL GROUP | Facility: LAB | Age: 62
End: 2022-07-11
Payer: COMMERCIAL

## 2022-07-11 VITALS
TEMPERATURE: 97.2 F | DIASTOLIC BLOOD PRESSURE: 68 MMHG | OXYGEN SATURATION: 96 % | RESPIRATION RATE: 12 BRPM | BODY MASS INDEX: 32.37 KG/M2 | HEIGHT: 72 IN | HEART RATE: 63 BPM | WEIGHT: 239 LBS | SYSTOLIC BLOOD PRESSURE: 128 MMHG

## 2022-07-11 DIAGNOSIS — F41.9 ANXIETY: ICD-10-CM

## 2022-07-11 DIAGNOSIS — J45.40 MODERATE PERSISTENT ASTHMA WITHOUT COMPLICATION: ICD-10-CM

## 2022-07-11 PROCEDURE — 99214 OFFICE O/P EST MOD 30 MIN: CPT | Performed by: FAMILY MEDICINE

## 2022-07-11 RX ORDER — FLUTICASONE PROPIONATE AND SALMETEROL 500; 50 UG/1; UG/1
1 POWDER RESPIRATORY (INHALATION) EVERY 12 HOURS
Qty: 1 EACH | Refills: 3 | Status: SHIPPED | OUTPATIENT
Start: 2022-07-11 | End: 2022-07-11

## 2022-07-11 RX ORDER — LORAZEPAM 1 MG/1
1 TABLET ORAL NIGHTLY
Qty: 30 TABLET | Refills: 1 | Status: SHIPPED | OUTPATIENT
Start: 2022-07-11 | End: 2022-08-10

## 2022-07-11 RX ORDER — FLUTICASONE PROPIONATE AND SALMETEROL 500; 50 UG/1; UG/1
1 POWDER RESPIRATORY (INHALATION) EVERY 12 HOURS
Qty: 1 EACH | Refills: 3 | Status: SHIPPED | OUTPATIENT
Start: 2022-07-11 | End: 2023-06-08

## 2022-07-11 ASSESSMENT — FIBROSIS 4 INDEX: FIB4 SCORE: 1.47

## 2022-07-11 NOTE — PROGRESS NOTES
Subjective:     Chief Complaint   Patient presents with   • Follow-Up         HPI:   Arely presents today with follow up of ongoing SAHA. Know IPF, post COVID and PCP pneumonia. More recent flare and worsened SAHA.   Has pulm in [place]. CT scan ordered recently as well.   Has CT next week in [place].     This is also at rest at times, like drinking a glass of water.   Possibly also postnasal drip. Dry cough. Worse lately.   Did have some office spirometry and has full PFTs in august.       Current Outpatient Medications Ordered in Epic   Medication Sig Dispense Refill   • fluticasone-salmeterol (ADVAIR) 500-50 MCG/ACT AEROSOL POWDER, BREATH ACTIVATED Inhale 1 Puff every 12 hours. 1 Each 3   • LORazepam (ATIVAN) 1 MG Tab Take 1 Tablet by mouth every four hours as needed (sleep) for up to 30 days. 15 Tablet 0   • triamcinolone acetonide (KENALOG) 0.1 % Cream Apply 1 Application topically 2 times a day. 80 g 1   • EPINEPHrine (EPIPEN 2-NATALIA) 0.3 MG/0.3ML Solution Auto-injector solution for injection Use as directed by MD as needed. 1 Each 1   • acyclovir (ZOVIRAX) 5 % Ointment Apply 1 Application topically 3 times a day as needed (Cold Sores). 15 g 1   • lamoTRIgine (LAMICTAL) 100 MG Tab Take 1 Tablet by mouth every morning. 90 Tablet 3   • phenazopyridine (PYRIDIUM) 200 MG Tab Take 1 Tablet by mouth 3 times a day as needed for Mild Pain. 15 Tablet 0   • sulfamethoxazole-trimethoprim (BACTRIM DS) 800-160 MG tablet Take 1 Tablet by mouth 3 times a week.     • rosuvastatin (CRESTOR) 10 MG Tab Take 1 Tablet by mouth at bedtime. 90 Tablet 3   • escitalopram (LEXAPRO) 20 MG tablet Take 1 Tablet by mouth every morning. Take with escitalopram 10 mg for a total dose of 30 mg. 90 Tablet 3   • omeprazole (PRILOSEC) 40 MG delayed-release capsule Take 1 Capsule by mouth every day. 90 Capsule 3   • montelukast (SINGULAIR) 10 MG Tab Take 1 Tablet by mouth every day. 90 Tablet 3   • famotidine (PEPCID) 20 MG Tab Take 1 Tablet by mouth as  needed (reflux). 60 Tablet 3   • Spacer/Aero-Holding Chambers (AEROCHAMBER MV) Misc 1 Each as needed. 1 Each 2   • sulfamethoxazole-trimethoprim (BACTRIM DS) 800-160 MG tablet Take 1 tablet by mouth 3 times weekly. 90 Tablet 3   • magnesium oxide (MAG-OX) 400 MG Tab tablet Take 1 Tablet by mouth every morning. 90 Tablet 3   • folic acid (FOLVITE) 1 MG Tab Take 2 Tablets by mouth every morning. 2 tablets = 2 mg. 90 Tablet 3   • etodolac (LODINE) 400 MG tablet Take 1 Tablet by mouth 2 times a day. 90 Tablet 3   • cyclobenzaprine (FLEXERIL) 5 mg tablet Take 1-2 Tablets by mouth 3 times a day as needed for Muscle Spasms. 90 Tablet 3   • albuterol 108 (90 Base) MCG/ACT Aero Soln inhalation aerosol Inhale 1-2 Puffs every four hours as needed for Shortness of Breath. 8 g 11   • HUMIRA PEN 40 MG/0.4ML Pen-injector Kit      • vitamin D (VITAMIND D3) 1000 UNIT Tab Take 1 tablet by mouth 2 times a day. 60 tablet 0   • acetaminophen (TYLENOL) 325 MG Tab Take 650 mg by mouth every 6 hours as needed for Mild Pain or Fever. 2 tablets = 650 mg.     • cetirizine (ZYRTEC) 10 MG Tab Take 10 mg by mouth at bedtime.       No current Epic-ordered facility-administered medications on file.         ROS:  Gen: no fevers/chills, no changes in weight  Eyes: no changes in vision  ENT: no sore throat, no hearing loss, no bloody nose  Pulm: no sob, no cough  CV: no chest pain, no palpitations  GI: no nausea/vomiting, no diarrhea  : no dysuria  MSk: no myalgias  Skin: no rash  Neuro: no headaches, no numbness/tingling  Heme/Lymph: no easy bruising      Objective:     Exam:  /68 (BP Location: Right arm, Patient Position: Sitting, BP Cuff Size: Adult)   Pulse 63   Temp 36.2 °C (97.2 °F)   Resp 12   Ht 1.829 m (6')   Wt 108 kg (239 lb)   LMP  (LMP Unknown)   SpO2 96%   BMI 32.41 kg/m²  Body mass index is 32.41 kg/m².    Gen: Alert and oriented, No apparent distress.  Neck: Neck is supple without lymphadenopathy.  Lungs: Normal  effort, CTA bilaterally, no wheezes, rhonchi, or rales  CV: Regular rate and rhythm. No murmurs, rubs, or gallops.  Ext: No clubbing, cyanosis, edema.      Assessment & Plan:     61 y.o. female with the following -       1. Anxiety  Discussed use of lorazepam at nighttime for sleep.  Previous prescription did not go through.  - LORazepam (ATIVAN) 1 MG Tab; Take 1 Tablet by mouth every evening for 30 days.  Dispense: 30 Tablet; Refill: 1    2. Moderate persistent asthma without complication  Discussed possibility for asthma type symptoms though her PFTs in the past have not shown a typical asthma picture.  This does make it very difficult given the history of acute respiratory failure and possible interstitial lung disease.  For now we will go ahead and go up on her Advair to 500/50 mg to see how much this helps or not.  Interestingly since restarting Humira for psoriasis that she has noticed a bit of improvement with her shortness of breath.  If this is giving her some immune modulation and her lungs are doing better than this very well could be more immune related and she may be a candidate for Ofev which would be for interstitial lung disease.  We will see what her CT scan shows and also her PFTs in August.  She will let us know if things get worse or do not get better.  She has pulmonary follow-up as well.            No follow-ups on file.    Please note that this dictation was created using voice recognition software. I have made every reasonable attempt to correct obvious errors, but I expect that there are errors of grammar and possibly content that I did not discover before finalizing the note.

## 2022-07-12 ENCOUNTER — TELEPHONE (OUTPATIENT)
Dept: SLEEP MEDICINE | Facility: MEDICAL CENTER | Age: 62
End: 2022-07-12
Payer: COMMERCIAL

## 2022-07-12 NOTE — TELEPHONE ENCOUNTER
Patient did call is asking for upcoming appointment Sleep Study 8/7/22 If she can get some sleeping pills.

## 2022-08-07 ENCOUNTER — SLEEP STUDY (OUTPATIENT)
Dept: SLEEP MEDICINE | Facility: MEDICAL CENTER | Age: 62
End: 2022-08-07
Attending: STUDENT IN AN ORGANIZED HEALTH CARE EDUCATION/TRAINING PROGRAM
Payer: COMMERCIAL

## 2022-08-07 DIAGNOSIS — G47.19 EXCESSIVE DAYTIME SLEEPINESS: ICD-10-CM

## 2022-08-07 DIAGNOSIS — G47.30 SLEEP DISORDER BREATHING: ICD-10-CM

## 2022-08-07 DIAGNOSIS — J45.40 MODERATE PERSISTENT ASTHMA WITHOUT COMPLICATION: ICD-10-CM

## 2022-08-07 DIAGNOSIS — F51.04 CHRONIC INSOMNIA: ICD-10-CM

## 2022-08-07 PROCEDURE — 95810 POLYSOM 6/> YRS 4/> PARAM: CPT | Performed by: STUDENT IN AN ORGANIZED HEALTH CARE EDUCATION/TRAINING PROGRAM

## 2022-08-09 NOTE — PROCEDURES
MONTAGE: Standard  STUDY TYPE: Diagnostic    RECORDING TECHNIQUE:   After the scalp was prepared, gold plated electrodes were applied to the scalp according to the International 10-20 System. EEG (electroencephalogram) was continuously monitored from the O1-M2, O2-M1, C3-M2, C4-M1, F3-M2, and F4-M1. EOGs (electrooculograms) were monitored by electrodes placed at the left and right outer canthi. Chin EMG (electromyogram) was monitored by electrodes placed on the mentalis and sub-mentalis muscles. Nasal and oral airflow were monitored using a triple port thermocouple as well as oronasal pressure transducer. Respiratory effort was measured by inductive plethysmography technology employing abdominal and thoracic belts. Blood oxygen saturation and pulse were monitored by pulse oximetry. Heart rhythm was monitored by surface electrocardiogram. Leg EMG was studied using surface electrodes placed on left and right anterior tibialis. A microphone was used to monitor tracheal sounds and snoring. Body position was monitored and documented by technician observation.   SCORING CRITERIA:   A modification of the AASM manual for scoring of sleep and associated events was used. Obstructive apneas were scored by cessation of airflow for at least 10 seconds with continuing respiratory effort. Central apneas were scored by cessation of airflow for at least 10 seconds with no respiratory effort. Hypopneas were scored by a 30% or more reduction in airflow for at least 10 seconds accompanied by arterial oxygen desaturation of 3% or an arousal. For CMS (Medicare) patients, per AASM rule 1B, hypopneas are scored by 30% with mild reduction in airflow for at least 10 seconds accompanied by arterial saturation decreased at 4%.    Study start time was 11:22:59 PM. Diagnostic recording time was 7h 4.0m with a total sleep time of 5h 0.0m resulting in a sleep efficiency of 70.75%%. Sleep latency from the start of the study was 87 minutes and the  latency from sleep to REM was 288 minutes. In total,148 arousals were scored for an arousal index of 29.6.  Respiratory:  There were a total of 38 apneas consisting of 8 obstructive apneas, 0 mixed apneas, and 30 central apneas. A total of 180 hypopneas were scored. The apnea index was 7.60 per hour and the hypopnea index was 36.00 per hour resulting in an overall AHI of 43.60. AHI during REM was 73.3 and AHI while supine was 63.61.  Oximetry:  There was a mean oxygen saturation of 92.0%. The minimum oxygen saturation in NREM was 82.0 % and in REM was 78.0%. The patient spent 28.2 minutes of TST with SaO2 <88%.  Cardiac:  The highest heart rate seen while awake was 76 BPM while the highest heart rate during sleep was 64 BPM with an average sleeping heart rate of 53 BPM.  Limb Movements:  There were a total of 495 PLMs during sleep which resulted in a PLMS index of 99.0. Of these, 69 were associated with arousals which resulted in a PLMS arousal index of 13.8.    Impression:  1.  Severe obstructive sleep apnea with overall AHI 43.6  2.  Nocturnal hypoxia likely secondary to untreated sleep apnea minimum oxygen saturation 78%, time at or below 80% saturation of 28 minutes  3.  Respiratory events worse in REM sleep and in supine position  4.  PLM's noted  5.  Majority of respiratory events occurred in second part of the night    Recommendations:  I recommend that the patient should return for a CPAP/BiPAP titration.     In some cases alternative treatment options may be proven effective in resolving sleep apnea. These options include upper airway surgery, the use of a dental orthotic, weight loss or positional therapy. Clinical correlation is required. In general patients with sleep apnea are advised to avoid alcohol, sedatives and not to operate a motor vehicle while drowsy.  Untreated sleep apnea increases the risk for cardiovascular and neurovascular disease.

## 2022-08-23 ENCOUNTER — OFFICE VISIT (OUTPATIENT)
Dept: SLEEP MEDICINE | Facility: MEDICAL CENTER | Age: 62
End: 2022-08-23
Payer: COMMERCIAL

## 2022-08-23 VITALS
SYSTOLIC BLOOD PRESSURE: 142 MMHG | DIASTOLIC BLOOD PRESSURE: 72 MMHG | HEART RATE: 61 BPM | RESPIRATION RATE: 16 BRPM | HEIGHT: 72 IN | BODY MASS INDEX: 31.97 KG/M2 | OXYGEN SATURATION: 96 % | WEIGHT: 236 LBS

## 2022-08-23 DIAGNOSIS — G47.33 OSA (OBSTRUCTIVE SLEEP APNEA): Primary | ICD-10-CM

## 2022-08-23 DIAGNOSIS — J45.40 MODERATE PERSISTENT ASTHMA WITHOUT COMPLICATION: ICD-10-CM

## 2022-08-23 DIAGNOSIS — G47.19 EXCESSIVE DAYTIME SLEEPINESS: ICD-10-CM

## 2022-08-23 PROCEDURE — 99214 OFFICE O/P EST MOD 30 MIN: CPT | Performed by: STUDENT IN AN ORGANIZED HEALTH CARE EDUCATION/TRAINING PROGRAM

## 2022-08-23 ASSESSMENT — FIBROSIS 4 INDEX: FIB4 SCORE: 1.47

## 2022-08-23 NOTE — PROGRESS NOTES
[facility] Follow-up Visit    CC: Follow-up to discuss sleep study results      HPI:  Arely Haynes is a 61 y.o.female  with MDD, CHRISTY, moderate persistent asthma, psoriasis, pulmonary nodules, groundglass opacities, erosive esophagitis, GERD, lower extremity neuropathy, history of PCP pneumonia, hyperlipidemia, restless leg syndrome, possible small airway disease, insomnia, and severe obstructive sleep apnea.  Presents to sleep clinic to follow-up to discuss sleep study results.    She continues to follow with pulmonology at the [facility].  States since last visit there is concern for potential small airway disease due to chemical exposure in the past when at [place].    She felt the night the sleep study was not the best night sleep.  She did look over the sleep study results with her  prior to today's visit.  She continues to have trouble initiating and maintaining sleep.    Patient Active Problem List    Diagnosis Date Noted    Pulmonary nodules 08/16/2021    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[facility]) 07/15/2021    Bradycardia 07/15/2021    GERD (gastroesophageal reflux disease) 07/08/2021    Abnormal CT of the chest 07/08/2021    Rash and nonspecific skin eruption 06/07/2021    Major depressive disorder, recurrent, moderate (HCC) 01/07/2020    CHRISTY (generalized anxiety disorder) 01/07/2020    Seasonal allergies 12/30/2019    H/O: hysterectomy 10/02/2019    Quadriceps tendon rupture, left, initial encounter 09/19/2019    Ovarian cyst 06/25/2019    Psoriasis 03/27/2019    Immunosuppression ([facility]) 03/27/2019    Obesity (BMI 30.0-34.9) 03/27/2019    Menopausal hot flushes 03/27/2019    Psoriatic arthritis ([facility]) 11/30/2018    Familial hypercholesterolemia 11/30/2018    Moderate persistent asthma without complication 11/30/2018    History of total right hip arthroplasty 09/22/2017    Asthma 04/09/2015       Past Medical History:   Diagnosis Date    Anesthesia     1st cousin has malignant  hyperthermia/pt has never had an issue or her sisters    Arthritis     hips knees hands spine    Asthma     prn inhalers    Bowel habit changes     diarrhea    Depression     Daughter passed few years ago    Erosive esophagitis     GERD (gastroesophageal reflux disease)     Heart burn     High cholesterol     Immunocompromised (HCC)     Pain     hips knees    Pneumonia 2018    Psoriasis     Psoriatic arthritis (HCC)         Past Surgical History:   Procedure Laterality Date    ID BRONCHOSCOPY,DIAGNOSTIC  7/9/2021    Procedure: BRONCHOSCOPY;  Surgeon: Myles Vidal M.D.;  Location: SURGERY [facility];  Service: Ent    TENDON REPAIR Left 9/19/2019    Procedure: REPAIR, TENDON- QUADRICEPS RUTURE REPAIR AND MEYERS;  Surgeon: Jayden Velázquez M.D.;  Location: SURGERY [facility];  Service: Orthopedics    HYSTERECTOMY ROBOTIC XI N/A 7/11/2019    Procedure: HYSTERECTOMY, ROBOT-ASSISTED, USING DA SABINO XI;  Surgeon: Curly Bernal M.D.;  Location: SURGERY [facility];  Service: Gynecology    SALPINGO OOPHORECTOMY Bilateral 7/11/2019    Procedure: SALPINGO-OOPHORECTOMY;  Surgeon: Curly Bernal M.D.;  Location: SURGERY [facility];  Service: Gynecology    GYN SURGERY  1999    c sec    CYSTECTOMY      FUSION, SPINE, LUMBAR, PLIF      HIP REPLACEMENT, TOTAL      Right    OTHER ABDOMINAL SURGERY      appendix    OTHER ORTHOPEDIC SURGERY      left knee    OTHER ORTHOPEDIC SURGERY      right rotator cuff       Family History   Problem Relation Age of Onset    Hyperlipidemia Mother     Heart Attack Mother     Psychiatric Illness Mother     Heart Disease Mother     Arthritis Mother     Lung Disease Father     Cancer Father     Hyperlipidemia Father     Hyperlipidemia Sister     Cancer Sister         breast    Heart Disease Paternal Grandfather     Hypertension Neg Hx     Diabetes Neg Hx        Social History     Socioeconomic History    Marital status:      Spouse name: Not on file    Number of children: Not on  file    Years of education: Not on file    Highest education level: Not on file   Occupational History    Occupation: Flight Nurse/Educator   Tobacco Use    Smoking status: Never    Smokeless tobacco: Never   Vaping Use    Vaping Use: Never used   Substance and Sexual Activity    Alcohol use: Not Currently     Alcohol/week: 0.6 - 1.2 oz     Types: 1 - 2 Glasses of wine per week     Comment: one glss of wine at night     Drug use: No    Sexual activity: Yes     Comment: Tubal ligation   Other Topics Concern     Service No    Blood Transfusions No    Caffeine Concern No    Occupational Exposure Yes    Hobby Hazards Yes    Sleep Concern Yes    Stress Concern No    Weight Concern Yes    Special Diet No    Back Care No    Exercise Yes    Bike Helmet Yes    Seat Belt Yes    Self-Exams Yes   Social History Narrative    Not on file     Social Determinants of Health     Financial Resource Strain: Not on file   Food Insecurity: Not on file   Transportation Needs: Not on file   Physical Activity: Not on file   Stress: Not on file   Social Connections: Not on file   Intimate Partner Violence: Not on file   Housing Stability: Not on file       Current Outpatient Medications   Medication Sig Dispense Refill    fluticasone-salmeterol (ADVAIR) 500-50 MCG/ACT AEROSOL POWDER, BREATH ACTIVATED Inhale 1 Puff every 12 hours. 1 Each 3    triamcinolone acetonide (KENALOG) 0.1 % Cream Apply 1 Application topically 2 times a day. 80 g 1    EPINEPHrine (EPIPEN 2-NATALIA) 0.3 MG/0.3ML Solution Auto-injector solution for injection Use as directed by MD as needed. 1 Each 1    acyclovir (ZOVIRAX) 5 % Ointment Apply 1 Application topically 3 times a day as needed (Cold Sores). 15 g 1    lamoTRIgine (LAMICTAL) 100 MG Tab Take 1 Tablet by mouth every morning. 90 Tablet 3    phenazopyridine (PYRIDIUM) 200 MG Tab Take 1 Tablet by mouth 3 times a day as needed for Mild Pain. 15 Tablet 0    sulfamethoxazole-trimethoprim (BACTRIM DS) 800-160 MG  tablet Take 1 Tablet by mouth 3 times a week.      rosuvastatin (CRESTOR) 10 MG Tab Take 1 Tablet by mouth at bedtime. 90 Tablet 3    escitalopram (LEXAPRO) 20 MG tablet Take 1 Tablet by mouth every morning. Take with escitalopram 10 mg for a total dose of 30 mg. 90 Tablet 3    omeprazole (PRILOSEC) 40 MG delayed-release capsule Take 1 Capsule by mouth every day. 90 Capsule 3    montelukast (SINGULAIR) 10 MG Tab Take 1 Tablet by mouth every day. 90 Tablet 3    famotidine (PEPCID) 20 MG Tab Take 1 Tablet by mouth as needed (reflux). 60 Tablet 3    Spacer/Aero-Holding Chambers (AEROCHAMBER MV) Misc 1 Each as needed. 1 Each 2    sulfamethoxazole-trimethoprim (BACTRIM DS) 800-160 MG tablet Take 1 tablet by mouth 3 times weekly. 90 Tablet 3    magnesium oxide (MAG-OX) 400 MG Tab tablet Take 1 Tablet by mouth every morning. 90 Tablet 3    folic acid (FOLVITE) 1 MG Tab Take 2 Tablets by mouth every morning. 2 tablets = 2 mg. 90 Tablet 3    etodolac (LODINE) 400 MG tablet Take 1 Tablet by mouth 2 times a day. 90 Tablet 3    cyclobenzaprine (FLEXERIL) 5 mg tablet Take 1-2 Tablets by mouth 3 times a day as needed for Muscle Spasms. 90 Tablet 3    HUMIRA PEN 40 MG/0.4ML Pen-injector Kit       vitamin D (VITAMIND D3) 1000 UNIT Tab Take 1 tablet by mouth 2 times a day. 60 tablet 0    acetaminophen (TYLENOL) 325 MG Tab Take 650 mg by mouth every 6 hours as needed for Mild Pain or Fever. 2 tablets = 650 mg.      cetirizine (ZYRTEC) 10 MG Tab Take 10 mg by mouth at bedtime.      albuterol 108 (90 Base) MCG/ACT Aero Soln inhalation aerosol Inhale 1-2 Puffs every four hours as needed for Shortness of Breath. (Patient not taking: Reported on 8/23/2022) 8 g 11     No current facility-administered medications for this visit.        ALLERGIES: Iodine, Levofloxacin, Shellfish-derived products, and Unasyn [ampicillin-sulbactam sodium]    ROS  Constitutional: Denies fevers, Denies weight changes  Ears/Nose/Throat/Mouth: Denies nasal  congestion or sore throat   Cardiovascular: Denies chest pain  Respiratory: Denies shortness of breath, Denies cough  Gastrointestinal/Hepatic: Denies nausea, vomiting  Sleep: see HPI      PHYSICAL EXAM  BP (!) 142/72 (BP Location: Left arm, Patient Position: Sitting, BP Cuff Size: Large adult)   Pulse 61   Resp 16   Ht 1.829 m (6')   Wt 107 kg (236 lb)   LMP  (LMP Unknown)   SpO2 96%   BMI 32.01 kg/m²   Appearance: Well-nourished, well-developed, no acute distress  Eyes:  No scleral icterus , EOMI  ENMT: masked  Musculoskeletal:  Grossly normal; gait and station normal; digits and nails normal  Skin:  No rashes, petechiae, cyanosis  Neurologic: without focal signs; oriented to person, time, place, and purpose; judgement intact      Medical Decision Making   Assessment and Plan  Arely Haynes is a 61 y.o.female  with MDD, CHRISTY, moderate persistent asthma, psoriasis, pulmonary nodules, groundglass opacities, erosive esophagitis, GERD, lower extremity neuropathy, history of PCP pneumonia, hyperlipidemia, restless leg syndrome, possible small airway disease, insomnia, and severe obstructive sleep apnea.  Presents to sleep clinic to follow-up to discuss sleep study results.    Obstructive sleep apnea   Reviewed recent PSG with patient showing an AHI of 43.6 and Min Oxygen saturation of 78%.  Time at or below 88% saturation of 28.2 minutes  Based on sleep study and symptoms meets criteria for severe obstructive sleep apnea.   We discussed the pathophysiology of obstructive sleep apnea (IGGY) and risk factors for the disease. We also discussed possible consequences of untreated IGGY, including excessive daytime sleepiness and fatigue, cognitive dysfunction, cardiovascular complications such as elevated blood pressure, heart attacks, cardiac arrhythmias, and strokes. We discussed how IGGY typically gets worse with age. We discussed treatment options for IGGY, including the gold standard therapy (PAP), alternative  options such as a mandibular advancement device (custom-made oral appliances) and surgeries.     Given her comorbid conditions of moderate persistent asthma, potential small airway lung disease patient would benefit from undergoing a in lab titration.  There is potential she would benefit more from BiPAP therapy versus CPAP therapy however this would be better understood via PSG titration study.    She continues to have excessive daytime sleepiness.  This is likely secondary to untreated sleep apnea.    Her insomnia may also be worsened by untreated sleep apnea.  Advised to see if treating her sleep apnea will improve her sleep and overall energy along with sleep continuity.  Discussed in details CPAP and other therapies related to sleep apnea.  Given her comorbid conditions sleep apnea may be contributing to these.  These include acid reflux, chronic pain, restless leg syndrome, and chronic insomnia.    RECOMMENDATIONS  -She will be scheduled for PSG titration study  -Patient counseled to avoid driving when sleepy. Encouraged to anticipate sleepiness, consider taking a 10 min nap prior to driving, alternate with another , or pull over if sleepy while driving  -Advised to contact our office or myself with any questions [facility]    Positive airway pressure will favorably impact many of the adverse conditions and effects provoked by IGGY.    Have advised the patient to follow up with the appropriate healthcare practitioners for all other medical problems and issues.    Return for after sleep study.      Please note portions of this record was created using voice recognition software. I have made every reasonable attempt to correct obvious errors, but I expect that there are errors of grammar and possibly content I did not discover before finalizing the note.

## 2022-09-10 DIAGNOSIS — K21.9 GASTROESOPHAGEAL REFLUX DISEASE, UNSPECIFIED WHETHER ESOPHAGITIS PRESENT: ICD-10-CM

## 2022-09-12 NOTE — TELEPHONE ENCOUNTER
Received request via: Pharmacy    Was the patient seen in the last year in this department? Yes  7/11/22  Does the patient have an active prescription (recently filled or refills available) for medication(s) requested? No

## 2022-09-13 RX ORDER — FAMOTIDINE 20 MG/1
TABLET, FILM COATED ORAL
Qty: 60 TABLET | Refills: 11 | Status: ON HOLD | OUTPATIENT
Start: 2022-09-13 | End: 2023-07-03

## 2022-09-15 ENCOUNTER — APPOINTMENT (OUTPATIENT)
Dept: SLEEP MEDICINE | Facility: MEDICAL CENTER | Age: 62
End: 2022-09-15
Attending: STUDENT IN AN ORGANIZED HEALTH CARE EDUCATION/TRAINING PROGRAM
Payer: COMMERCIAL

## 2022-09-16 ENCOUNTER — PATIENT MESSAGE (OUTPATIENT)
Dept: MEDICAL GROUP | Facility: LAB | Age: 62
End: 2022-09-16
Payer: COMMERCIAL

## 2022-09-16 DIAGNOSIS — B37.0 THRUSH: ICD-10-CM

## 2022-09-16 RX ORDER — ESCITALOPRAM OXALATE 20 MG/1
20 TABLET ORAL EVERY MORNING
Qty: 90 TABLET | Refills: 3 | Status: SHIPPED | OUTPATIENT
Start: 2022-09-16 | End: 2023-07-12 | Stop reason: SDUPTHER

## 2022-09-16 RX ORDER — CLOTRIMAZOLE 10 MG/1
10 LOZENGE ORAL; TOPICAL
Qty: 70 TROCHE | Refills: 1 | Status: SHIPPED | OUTPATIENT
Start: 2022-09-16 | End: 2023-06-08

## 2022-10-06 ENCOUNTER — APPOINTMENT (RX ONLY)
Dept: URBAN - METROPOLITAN AREA CLINIC 6 | Facility: CLINIC | Age: 62
Setting detail: DERMATOLOGY
End: 2022-10-06

## 2022-10-06 DIAGNOSIS — Z71.89 OTHER SPECIFIED COUNSELING: ICD-10-CM

## 2022-10-06 DIAGNOSIS — L82.1 OTHER SEBORRHEIC KERATOSIS: ICD-10-CM

## 2022-10-06 DIAGNOSIS — D18.0 HEMANGIOMA: ICD-10-CM

## 2022-10-06 DIAGNOSIS — L81.4 OTHER MELANIN HYPERPIGMENTATION: ICD-10-CM

## 2022-10-06 DIAGNOSIS — D22 MELANOCYTIC NEVI: ICD-10-CM

## 2022-10-06 PROBLEM — D22.71 MELANOCYTIC NEVI OF RIGHT LOWER LIMB, INCLUDING HIP: Status: ACTIVE | Noted: 2022-10-06

## 2022-10-06 PROBLEM — D18.01 HEMANGIOMA OF SKIN AND SUBCUTANEOUS TISSUE: Status: ACTIVE | Noted: 2022-10-06

## 2022-10-06 PROBLEM — D22.72 MELANOCYTIC NEVI OF LEFT LOWER LIMB, INCLUDING HIP: Status: ACTIVE | Noted: 2022-10-06

## 2022-10-06 PROBLEM — D22.5 MELANOCYTIC NEVI OF TRUNK: Status: ACTIVE | Noted: 2022-10-06

## 2022-10-06 PROBLEM — D22.62 MELANOCYTIC NEVI OF LEFT UPPER LIMB, INCLUDING SHOULDER: Status: ACTIVE | Noted: 2022-10-06

## 2022-10-06 PROBLEM — D22.61 MELANOCYTIC NEVI OF RIGHT UPPER LIMB, INCLUDING SHOULDER: Status: ACTIVE | Noted: 2022-10-06

## 2022-10-06 PROCEDURE — 99203 OFFICE O/P NEW LOW 30 MIN: CPT

## 2022-10-06 PROCEDURE — ? COUNSELING

## 2022-10-06 ASSESSMENT — LOCATION DETAILED DESCRIPTION DERM
LOCATION DETAILED: RIGHT SUPERIOR LATERAL MIDBACK
LOCATION DETAILED: RIGHT PROXIMAL DORSAL FOREARM
LOCATION DETAILED: LEFT ANTERIOR PROXIMAL THIGH
LOCATION DETAILED: LEFT CENTRAL MALAR CHEEK
LOCATION DETAILED: RIGHT MID-UPPER BACK
LOCATION DETAILED: LEFT PROXIMAL DORSAL FOREARM
LOCATION DETAILED: RIGHT VENTRAL DISTAL FOREARM
LOCATION DETAILED: LEFT ANTERIOR DISTAL UPPER ARM
LOCATION DETAILED: LEFT INFERIOR UPPER BACK
LOCATION DETAILED: LEFT DISTAL POSTERIOR THIGH
LOCATION DETAILED: LEFT PROXIMAL CALF
LOCATION DETAILED: RIGHT PROXIMAL CALF
LOCATION DETAILED: RIGHT ANTERIOR PROXIMAL THIGH
LOCATION DETAILED: RIGHT MEDIAL INFERIOR CHEST
LOCATION DETAILED: LEFT VENTRAL DISTAL FOREARM
LOCATION DETAILED: RIGHT DISTAL POSTERIOR THIGH
LOCATION DETAILED: RIGHT ANTERIOR DISTAL UPPER ARM
LOCATION DETAILED: RIGHT VENTRAL PROXIMAL FOREARM

## 2022-10-06 ASSESSMENT — LOCATION ZONE DERM
LOCATION ZONE: LEG
LOCATION ZONE: ARM
LOCATION ZONE: FACE
LOCATION ZONE: TRUNK

## 2022-10-06 ASSESSMENT — LOCATION SIMPLE DESCRIPTION DERM
LOCATION SIMPLE: LEFT UPPER BACK
LOCATION SIMPLE: LEFT CHEEK
LOCATION SIMPLE: RIGHT FOREARM
LOCATION SIMPLE: LEFT UPPER ARM
LOCATION SIMPLE: RIGHT POSTERIOR THIGH
LOCATION SIMPLE: RIGHT THIGH
LOCATION SIMPLE: LEFT FOREARM
LOCATION SIMPLE: LEFT CALF
LOCATION SIMPLE: LEFT THIGH
LOCATION SIMPLE: LEFT POSTERIOR THIGH
LOCATION SIMPLE: RIGHT CALF
LOCATION SIMPLE: RIGHT UPPER ARM
LOCATION SIMPLE: RIGHT UPPER BACK
LOCATION SIMPLE: CHEST
LOCATION SIMPLE: RIGHT LOWER BACK

## 2022-10-14 RX ORDER — ESCITALOPRAM OXALATE 10 MG/1
10 TABLET ORAL EVERY MORNING
Qty: 90 TABLET | Refills: 3 | Status: SHIPPED | OUTPATIENT
Start: 2022-10-14 | End: 2023-07-12 | Stop reason: SDUPTHER

## 2022-10-14 NOTE — TELEPHONE ENCOUNTER
Received request via: Pharmacy  10 MG  Was the patient seen in the last year in this department? Yes  7/11/22  Does the patient have an active prescription (recently filled or refills available) for medication(s) requested? No

## 2022-10-18 ENCOUNTER — SLEEP STUDY (OUTPATIENT)
Dept: SLEEP MEDICINE | Facility: MEDICAL CENTER | Age: 62
End: 2022-10-18
Payer: COMMERCIAL

## 2022-10-18 DIAGNOSIS — G47.33 OSA (OBSTRUCTIVE SLEEP APNEA): ICD-10-CM

## 2022-10-18 PROCEDURE — 95811 POLYSOM 6/>YRS CPAP 4/> PARM: CPT | Performed by: STUDENT IN AN ORGANIZED HEALTH CARE EDUCATION/TRAINING PROGRAM

## 2022-10-26 NOTE — PROCEDURES
MONTAGE: Standard  STUDY TYPE: Treatment    RECORDING TECHNIQUE:   After the scalp was prepared, gold plated electrodes were applied to the scalp according to the International 10-20 System. EEG (electroencephalogram) was continuously monitored from the O1-M2, O2-M1, C3-M2, C4-M1, F3-M2, and F4-M1. EOGs (electrooculograms) were monitored by electrodes placed at the left and right outer canthi. Chin EMG (electromyogram) was monitored by electrodes placed on the mentalis and sub-mentalis muscles. Nasal and oral airflow were monitored using a triple port thermocouple as well as oronasal pressure transducer. Respiratory effort was measured by inductive plethysmography technology employing abdominal and thoracic belts. Blood oxygen saturation and pulse were monitored by pulse oximetry. Heart rhythm was monitored by surface electrocardiogram. Leg EMG was studied using surface electrodes placed on left and right anterior tibialis. A microphone was used to monitor tracheal sounds and snoring. Body position was monitored and documented by technician observation.     SCORING CRITERIA:   A modification of the AASM manual for scoring of sleep and associated events was used. Obstructive apneas were scored by cessation of airflow for at least 10 seconds with continuing respiratory effort. Central apneas were scored by cessation of airflow for at least 10 seconds with no respiratory effort. Hypopneas were scored by a 30% or more reduction in airflow for at least 10 seconds accompanied by arterial oxygen desaturation of 3% or an arousal. For CMS (Medicare) patients, per AASM rule 1B, hypopneas are scored by 30% with mild reduction in airflow for at least 10 seconds accompanied by arterial saturation decreased at 4%.    Study start time was 10:48:14 PM. Diagnostic recording time was 7h 32.5m with a total sleep time of 6h 33.0m resulting in a sleep efficiency of 86.85%%. Sleep latency from the start of the study was 33 minutes and  the latency from sleep to REM was 205 minutes. In total,84 arousals were scored for an arousal index of 12.8.  Respiratory:  There were a total of 30 apneas consisting of 2 obstructive apneas, 0 mixed apneas, and 28 central apneas. A total of 79 hypopneas were scored. The apnea index was 4.58 per hour and the hypopnea index was 12.06 per hour resulting in an overall AHI of 16.64. AHI during rem was 30.4 and AHI while supine was 17.65.  Central apneas 25.7% of respiratory events  Oximetry:  There was a mean oxygen saturation of 94.0%. The minimum oxygen saturation in NREM was 89.0 % and in REM was 88.0%. The patient spent 0.1 minutes of TST with SaO2 <88%.  Cardiac:  The highest heart rate seen while awake was 78 BPM while the highest heart rate during sleep was 72 BPM with an average sleeping heart rate of 49 BPM.  Limb Movements:  There were a total of 848 PLMs during sleep which resulted in a PLMS index of 129.5. Of these, 47 were associated with arousals which resulted in a PLMS arousal index of 7.2.  Titration:   CPAP was tried from 5 to 15cm H2O. BiPAP was tried from 17/13 to 19/14cm H2O.  This was a fully attended sleep study. This test was technically adequate. This patient was titrated on CPAP starting at 5 cm of water pressure. Patient was titrated up to BiPAP 19/14 cm of water pressure.       Impression:  1.  Severe obstructive sleep apnea seen on previous sleep study  2.  Respiratory events did improve with CPAP therapy  3.  Was tried on BiPAP did improve respiratory events during REM sleep however central apneas appeared to become present once back into N2 sleep  4.  Central apneas accounted for 25.7 percent of respiratory events  5.  PLM's noted  6.  No supine REM sleep was seen  7.  Respiratory events worse during REM sleep    Recommendations:  I recommend auto CPAP 6 to 15 cm.  May also consider auto BiPAP therapy EPAP 13 IPAP 19 pressure support 4.  Patient used a small AirFit N 30i     I also  recommend 30 day compliance download to assess the efficacy to the recommended pressure, measure leak, apnea hypopnea index and compliance for further outpatient monitoring and management of PAP therapy. In some cases alternative treatment options may be proven effective in resolving sleep apnea. These options include upper airway surgery, the use of a dental orthotic, weight loss orpositional therapy. Clinical correlation is required. In general patients with sleep apnea are advised to avoid alcohol, sedatives and not to operate a motor vehicle while drowsy.  Untreated sleep apnea increases the risk for cardiovascular and neurovascular disease.

## 2022-10-31 ENCOUNTER — OFFICE VISIT (OUTPATIENT)
Dept: SLEEP MEDICINE | Facility: MEDICAL CENTER | Age: 62
End: 2022-10-31
Payer: COMMERCIAL

## 2022-10-31 VITALS
OXYGEN SATURATION: 97 % | DIASTOLIC BLOOD PRESSURE: 72 MMHG | HEART RATE: 60 BPM | SYSTOLIC BLOOD PRESSURE: 128 MMHG | RESPIRATION RATE: 14 BRPM | WEIGHT: 234 LBS | HEIGHT: 72 IN | BODY MASS INDEX: 31.69 KG/M2

## 2022-10-31 DIAGNOSIS — G47.33 OSA (OBSTRUCTIVE SLEEP APNEA): Primary | ICD-10-CM

## 2022-10-31 PROCEDURE — 99213 OFFICE O/P EST LOW 20 MIN: CPT | Performed by: STUDENT IN AN ORGANIZED HEALTH CARE EDUCATION/TRAINING PROGRAM

## 2022-10-31 ASSESSMENT — FIBROSIS 4 INDEX: FIB4 SCORE: 1.47

## 2022-10-31 NOTE — PROGRESS NOTES
[facility] Follow-up Visit    CC: Follow-up to discuss history titration study results      HPI:  Arely Haynes is a 61 y.o.female  with CHRISTY, moderate persistent asthma, MDD, psoriasis, pulmonary nodules, groundglass opacities, erosive esophagitis, GERD, lower extremity neuropathy, history of PCP pneumonia, hyperlipidemia, restless leg syndrome, insomnia and severe obstructive sleep apnea.  Presents today to discuss sleep study results.    She reports night sleep study went well.  She did take a benzodiazepine the night of the study.  Despite medication she does not recall anything that was too uncomfortable the night of the study.  She did read over the sleep study results prior to today's visit.    She does report that since last visit she continues to follow with GI who is managing her esophagitis.     She reports she is interested in trying Pap therapy seeing if there is benefit to how this impacts her sleep as well as how she feels during the day.      Patient Active Problem List    Diagnosis Date Noted    Pulmonary nodules 08/16/2021    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[facility]) 07/15/2021    Bradycardia 07/15/2021    GERD (gastroesophageal reflux disease) 07/08/2021    Abnormal CT of the chest 07/08/2021    Rash and nonspecific skin eruption 06/07/2021    Major depressive disorder, recurrent, moderate ([facility]) 01/07/2020    CHRISTY (generalized anxiety disorder) 01/07/2020    Seasonal allergies 12/30/2019    H/O: hysterectomy 10/02/2019    Quadriceps tendon rupture, left, initial encounter 09/19/2019    Ovarian cyst 06/25/2019    Psoriasis 03/27/2019    Immunosuppression ([facility]) 03/27/2019    Obesity (BMI 30.0-34.9) 03/27/2019    Menopausal hot flushes 03/27/2019    Psoriatic arthritis ([facility]) 11/30/2018    Familial hypercholesterolemia 11/30/2018    Moderate persistent asthma without complication 11/30/2018    History of total right hip arthroplasty 09/22/2017    Asthma 04/09/2015       Past Medical  History:   Diagnosis Date    Anesthesia     1st cousin has malignant hyperthermia/pt has never had an issue or her sisters    Arthritis     hips knees hands spine    Asthma     prn inhalers    Bowel habit changes     diarrhea    Depression     Daughter passed few years ago    Erosive esophagitis     GERD (gastroesophageal reflux disease)     Heart burn     High cholesterol     Immunocompromised (HCC)     Pain     hips knees    Pneumonia 2018    Psoriasis     Psoriatic arthritis (HCC)         Past Surgical History:   Procedure Laterality Date    OK BRONCHOSCOPY,DIAGNOSTIC  7/9/2021    Procedure: BRONCHOSCOPY;  Surgeon: Myles Vidal M.D.;  Location: SURGERY [facility];  Service: Ent    TENDON REPAIR Left 9/19/2019    Procedure: REPAIR, TENDON- QUADRICEPS RUTURE REPAIR AND MEYERS;  Surgeon: Jayden Velázquez M.D.;  Location: SURGERY [facility];  Service: Orthopedics    HYSTERECTOMY ROBOTIC XI N/A 7/11/2019    Procedure: HYSTERECTOMY, ROBOT-ASSISTED, USING DA SABINO XI;  Surgeon: Curly Bernal M.D.;  Location: SURGERY [facility];  Service: Gynecology    SALPINGO OOPHORECTOMY Bilateral 7/11/2019    Procedure: SALPINGO-OOPHORECTOMY;  Surgeon: Curly Bernal M.D.;  Location: SURGERY [facility];  Service: Gynecology    GYN SURGERY  1999    c sec    CYSTECTOMY      FUSION, SPINE, LUMBAR, PLIF      HIP REPLACEMENT, TOTAL      Right    OTHER ABDOMINAL SURGERY      appendix    OTHER ORTHOPEDIC SURGERY      left knee    OTHER ORTHOPEDIC SURGERY      right rotator cuff       Family History   Problem Relation Age of Onset    Hyperlipidemia Mother     Heart Attack Mother     Psychiatric Illness Mother     Heart Disease Mother     Arthritis Mother     Lung Disease Father     Cancer Father     Hyperlipidemia Father     Hyperlipidemia Sister     Cancer Sister         breast    Heart Disease Paternal Grandfather     Hypertension Neg Hx     Diabetes Neg Hx        Social History     Socioeconomic History    Marital status:       Spouse name: Not on file    Number of children: Not on file    Years of education: Not on file    Highest education level: Not on file   Occupational History    Occupation: Flight Nurse/Educator   Tobacco Use    Smoking status: Never    Smokeless tobacco: Never   Vaping Use    Vaping Use: Never used   Substance and Sexual Activity    Alcohol use: Not Currently     Alcohol/week: 0.6 - 1.2 oz     Types: 1 - 2 Glasses of wine per week     Comment: one glss of wine at night     Drug use: No    Sexual activity: Yes     Comment: Tubal ligation   Other Topics Concern     Service No    Blood Transfusions No    Caffeine Concern No    Occupational Exposure Yes    Hobby Hazards Yes    Sleep Concern Yes    Stress Concern No    Weight Concern Yes    Special Diet No    Back Care No    Exercise Yes    Bike Helmet Yes    Seat Belt Yes    Self-Exams Yes   Social History Narrative    Not on file     Social Determinants of Health     Financial Resource Strain: Not on file   Food Insecurity: Not on file   Transportation Needs: Not on file   Physical Activity: Not on file   Stress: Not on file   Social Connections: Not on file   Intimate Partner Violence: Not on file   Housing Stability: Not on file       Current Outpatient Medications   Medication Sig Dispense Refill    escitalopram (LEXAPRO) 10 MG Tab Take 1 Tablet by mouth every morning. Take with escitalopram 20 mg for a total dose of 30 mg. 90 Tablet 3    clotrimazole (MYCELEX) 10 MG Bhumi bhumi Take 1 Bhumi by mouth 5 Times a Day. 70 Bhumi 1    escitalopram (LEXAPRO) 20 MG tablet Take 1 Tablet by mouth every morning. Take with escitalopram 10 mg for a total dose of 30 mg. 90 Tablet 3    famotidine (PEPCID) 20 MG Tab TAKE 1 TABLET AS NEEDED (REFLUX) 60 Tablet 11    fluticasone-salmeterol (ADVAIR) 500-50 MCG/ACT AEROSOL POWDER, BREATH ACTIVATED Inhale 1 Puff every 12 hours. 1 Each 3    triamcinolone acetonide (KENALOG) 0.1 % Cream Apply 1 Application  topically 2 times a day. 80 g 1    EPINEPHrine (EPIPEN 2-NATALIA) 0.3 MG/0.3ML Solution Auto-injector solution for injection Use as directed by MD as needed. 1 Each 1    acyclovir (ZOVIRAX) 5 % Ointment Apply 1 Application topically 3 times a day as needed (Cold Sores). 15 g 1    lamoTRIgine (LAMICTAL) 100 MG Tab Take 1 Tablet by mouth every morning. 90 Tablet 3    phenazopyridine (PYRIDIUM) 200 MG Tab Take 1 Tablet by mouth 3 times a day as needed for Mild Pain. 15 Tablet 0    sulfamethoxazole-trimethoprim (BACTRIM DS) 800-160 MG tablet Take 1 Tablet by mouth 3 times a week.      rosuvastatin (CRESTOR) 10 MG Tab Take 1 Tablet by mouth at bedtime. 90 Tablet 3    omeprazole (PRILOSEC) 40 MG delayed-release capsule Take 1 Capsule by mouth every day. 90 Capsule 3    montelukast (SINGULAIR) 10 MG Tab Take 1 Tablet by mouth every day. 90 Tablet 3    Spacer/Aero-Holding Chambers (AEROCHAMBER MV) Misc 1 Each as needed. 1 Each 2    sulfamethoxazole-trimethoprim (BACTRIM DS) 800-160 MG tablet Take 1 tablet by mouth 3 times weekly. 90 Tablet 3    magnesium oxide (MAG-OX) 400 MG Tab tablet Take 1 Tablet by mouth every morning. 90 Tablet 3    folic acid (FOLVITE) 1 MG Tab Take 2 Tablets by mouth every morning. 2 tablets = 2 mg. 90 Tablet 3    etodolac (LODINE) 400 MG tablet Take 1 Tablet by mouth 2 times a day. 90 Tablet 3    cyclobenzaprine (FLEXERIL) 5 mg tablet Take 1-2 Tablets by mouth 3 times a day as needed for Muscle Spasms. 90 Tablet 3    albuterol 108 (90 Base) MCG/ACT Aero Soln inhalation aerosol Inhale 1-2 Puffs every four hours as needed for Shortness of Breath. 8 g 11    HUMIRA PEN 40 MG/0.4ML Pen-injector Kit       vitamin D (VITAMIND D3) 1000 UNIT Tab Take 1 tablet by mouth 2 times a day. 60 tablet 0    acetaminophen (TYLENOL) 325 MG Tab Take 650 mg by mouth every 6 hours as needed for Mild Pain or Fever. 2 tablets = 650 mg.      cetirizine (ZYRTEC) 10 MG Tab Take 10 mg by mouth at bedtime.       No current  facility-administered medications for this visit.        ALLERGIES: Iodine, Levofloxacin, Shellfish-derived products, and Unasyn [ampicillin-sulbactam sodium]    ROS  Constitutional: Denies fevers, Denies weight changes  Ears/Nose/Throat/Mouth: Denies nasal congestion or sore throat   Cardiovascular: Denies chest pain  Respiratory: Denies shortness of breath, Denies cough  Gastrointestinal/Hepatic: Denies nausea, vomiting  Sleep: see HPI      PHYSICAL EXAM  /72 (BP Location: Left arm, Patient Position: Sitting, BP Cuff Size: Large adult)   Pulse 60   Resp 14   Ht 1.829 m (6')   Wt 106 kg (234 lb)   LMP  (LMP Unknown)   SpO2 97%   BMI 31.74 kg/m²   Appearance: Well-nourished, well-developed, no acute distress  Eyes:  No scleral icterus , EOMI  ENMT: masked  Musculoskeletal:  Grossly normal; gait and station normal; digits and nails normal  Skin:  No rashes, petechiae, cyanosis  Neurologic: without focal signs; oriented to person, time, place, and purpose; judgement intact      Medical Decision Making   Assessment and Plan  Arely Haynes is a 61 y.o.female  with CHRISTY, moderate persistent asthma, MDD, psoriasis, pulmonary nodules, groundglass opacities, erosive esophagitis, GERD, lower extremity neuropathy, history of PCP pneumonia, hyperlipidemia, restless leg syndrome, insomnia and severe obstructive sleep apnea.  Presents today to discuss sleep study results.    Obstructive sleep apnea   Reviewed recent PSG titration study with patient showing an improvement in her AHI.  She appeared to do best on CPAP compared to BiPAP however there is limited time of BiPAP therapy.  Based on previous sleep study and symptoms meets criteria for severe obstructive sleep apnea.     At last visit we discussed the pathophysiology of obstructive sleep apnea (IGGY) and risk factors for the disease. We also discussed possible consequences of untreated IGGY, including excessive daytime sleepiness and fatigue, cognitive  dysfunction, cardiovascular complications such as elevated blood pressure, heart attacks, cardiac arrhythmias, and strokes.     Reviewed in detail study with patient showing improvement in respiratory events with CPAP.  Patient did not notice a difference between CPAP and BiPAP.    RECOMMENDATIONS  -Start Auto CPAP at pressures 6-15 cm H2O  -Discussed importance of adherence/compliance   -Prescription generated for supplies   -Patient counseled to avoid driving when sleepy. Encouraged to anticipate sleepiness, consider taking a 10 min nap prior to driving, alternate with another , or pull over if sleepy while driving  -Advised to contact our office or myself with any questions via MyChart    Positive airway pressure will favorably impact many of the adverse conditions and effects provoked by IGGY.    Have advised the patient to follow up with the appropriate healthcare practitioners for all other medical problems and issues.    Return for 1-2 months after starting PAP therapy.      Please note portions of this record was created using voice recognition software. I have made every reasonable attempt to correct obvious errors, but I expect that there are errors of grammar and possibly content I did not discover before finalizing the note.

## 2022-11-10 ENCOUNTER — OFFICE VISIT (OUTPATIENT)
Dept: MEDICAL GROUP | Facility: LAB | Age: 62
End: 2022-11-10
Payer: COMMERCIAL

## 2022-11-10 VITALS
SYSTOLIC BLOOD PRESSURE: 126 MMHG | WEIGHT: 230.4 LBS | TEMPERATURE: 97.2 F | RESPIRATION RATE: 12 BRPM | HEART RATE: 60 BPM | OXYGEN SATURATION: 97 % | HEIGHT: 72 IN | DIASTOLIC BLOOD PRESSURE: 70 MMHG | BODY MASS INDEX: 31.21 KG/M2

## 2022-11-10 DIAGNOSIS — R20.2 NUMBNESS AND TINGLING: ICD-10-CM

## 2022-11-10 DIAGNOSIS — R53.1 RIGHT SIDED WEAKNESS: ICD-10-CM

## 2022-11-10 DIAGNOSIS — R20.0 NUMBNESS AND TINGLING: ICD-10-CM

## 2022-11-10 PROCEDURE — 99214 OFFICE O/P EST MOD 30 MIN: CPT | Performed by: FAMILY MEDICINE

## 2022-11-10 ASSESSMENT — FIBROSIS 4 INDEX: FIB4 SCORE: 1.47

## 2022-11-10 NOTE — PROGRESS NOTES
Subjective:     CC: Numbness, neurologic issues    HPI:   Arely is a 61-year-old female with a complex medical history including psoriatic arthritis, ILD, and history of PCP pneumonia who presents today with concern for progressing numbness and neurologic issues.    She is increasingly noting that she seems to be losing her balance to the right side.  She is getting worsening muscle wasting in her right lower leg.  She has had chronic numbness/tingling in the right lateral leg but is progressing and is now into the heel of the right foot.  She also has bilateral tingling in fingers 4-5 to her hands.  Severe cramping in both lower legs and feet at night.  Left-sided morning headaches are increasing as well as noted worsening vision to the left eye at her optometry visit.  She is noticing weakness in both the right arm and the right leg has been dropping stuff from her right hand.     She had a previous EMG which showed possible ulnar neuropathy to the left upper extremity but otherwise with nonspecific findings.  She sees neurology at the [facility] in March 2023 but looking to possibly start work-up prior to then.    Medications, past medical history, allergies, and social history have been reviewed and updated.      Objective:       Exam:  /70 (BP Location: Right arm, Patient Position: Sitting, BP Cuff Size: Large adult)   Pulse 60   Temp 36.2 °C (97.2 °F)   Resp 12   Ht 1.829 m (6')   Wt 105 kg (230 lb 6.4 oz)   LMP  (LMP Unknown)   SpO2 97%   BMI 31.25 kg/m²  Body mass index is 31.25 kg/m².    Constitutional: Alert. Well appearing. No distress.  Skin: Warm, dry, good turgor, no visible rashes.  Eye: Equal, round and reactive to light, conjunctiva clear, lids normal.  Neck: Spurling sign is negative.  ENMT: Moist mucous membranes.   Respiratory: Normal effort.  Neuro: 4/5 strength to right upper and right lower extremities -especially noted with flexion at the right elbow.  5/5 strength to  left upper and left lower extremities.  Sensation is grossly intact to light touch to both upper extremities, it is decreased to light touch to the right foot.  There is notable muscle wasting to the right calf.  Patellar DTR is 2+ on the right and 1+ on the left.  Psych: Answers questions appropriately. Normal affect and mood.      Assessment & Plan:     61 y.o. female with a history of psoriatic arthritis and interstitial lung disease who presents with worsening numbness and weakness and concern for possible neurologic disorder.  He has increasing bilateral numbness to both upper extremities as well as weakness to the right upper and right lower extremity with worsening numbness of the right lower extremity.  Balance is off.  She does have a history of lumbar disc disease and previous EMG showing left ulnar neuropathy.  However, I agree with concern that there is a possibility there is an underlying central disorder rather than multiple peripheral issues causing her neurologic symptoms.  She does have follow-up with neurology in [place] next March.  I think MRI brain and C-spine are warranted for further evaluation of an underlying autoimmune/demyelinating pathology.    1. Right sided weakness  - MR-BRAIN-WITH & W/O; Future  - MR-CERVICAL SPINE-WITH & W/O; Future    2. Numbness and tingling  - MR-BRAIN-WITH & W/O; Future  - MR-CERVICAL SPINE-WITH & W/O; Future    My total time spent caring for the patient on the day of the encounter was >30 minutes.   This does not include time spent on separately billable procedures/tests.     Please note that this note was created using voice recognition software.

## 2022-11-29 ENCOUNTER — HOSPITAL ENCOUNTER (OUTPATIENT)
Dept: LAB | Facility: MEDICAL CENTER | Age: 62
End: 2022-11-29
Attending: INTERNAL MEDICINE
Payer: COMMERCIAL

## 2022-11-29 ENCOUNTER — HOSPITAL ENCOUNTER (OUTPATIENT)
Dept: LAB | Facility: MEDICAL CENTER | Age: 62
End: 2022-11-29
Attending: FAMILY MEDICINE
Payer: COMMERCIAL

## 2022-11-29 ENCOUNTER — HOSPITAL ENCOUNTER (OUTPATIENT)
Dept: LAB | Facility: MEDICAL CENTER | Age: 62
End: 2022-11-29
Attending: STUDENT IN AN ORGANIZED HEALTH CARE EDUCATION/TRAINING PROGRAM
Payer: COMMERCIAL

## 2022-11-29 DIAGNOSIS — J84.9 INTERSTITIAL LUNG DISEASE (HCC): ICD-10-CM

## 2022-11-29 DIAGNOSIS — L40.50 PSORIATIC ARTHRITIS (HCC): ICD-10-CM

## 2022-11-29 DIAGNOSIS — G25.81 RLS (RESTLESS LEGS SYNDROME): ICD-10-CM

## 2022-11-29 LAB
ALBUMIN SERPL BCP-MCNC: 4.5 G/DL (ref 3.2–4.9)
ALBUMIN/GLOB SERPL: 1.7 G/DL
ALP SERPL-CCNC: 87 U/L (ref 30–99)
ALT SERPL-CCNC: 24 U/L (ref 2–50)
ANION GAP SERPL CALC-SCNC: 13 MMOL/L (ref 7–16)
AST SERPL-CCNC: 34 U/L (ref 12–45)
BASOPHILS # BLD AUTO: 1 % (ref 0–1.8)
BASOPHILS # BLD: 0.06 K/UL (ref 0–0.12)
BILIRUB SERPL-MCNC: 0.5 MG/DL (ref 0.1–1.5)
BUN SERPL-MCNC: 25 MG/DL (ref 8–22)
CALCIUM SERPL-MCNC: 10 MG/DL (ref 8.5–10.5)
CHLORIDE SERPL-SCNC: 109 MMOL/L (ref 96–112)
CO2 SERPL-SCNC: 21 MMOL/L (ref 20–33)
CREAT SERPL-MCNC: 0.63 MG/DL (ref 0.5–1.4)
EOSINOPHIL # BLD AUTO: 0.13 K/UL (ref 0–0.51)
EOSINOPHIL NFR BLD: 2.2 % (ref 0–6.9)
ERYTHROCYTE [DISTWIDTH] IN BLOOD BY AUTOMATED COUNT: 43.2 FL (ref 35.9–50)
FERRITIN SERPL-MCNC: 259 NG/ML (ref 10–291)
GFR SERPLBLD CREATININE-BSD FMLA CKD-EPI: 100 ML/MIN/1.73 M 2
GLOBULIN SER CALC-MCNC: 2.6 G/DL (ref 1.9–3.5)
GLUCOSE SERPL-MCNC: 88 MG/DL (ref 65–99)
HCT VFR BLD AUTO: 40.9 % (ref 37–47)
HGB BLD-MCNC: 14 G/DL (ref 12–16)
IMM GRANULOCYTES # BLD AUTO: 0.02 K/UL (ref 0–0.11)
IMM GRANULOCYTES NFR BLD AUTO: 0.3 % (ref 0–0.9)
IRON SATN MFR SERPL: 37 % (ref 15–55)
IRON SERPL-MCNC: 128 UG/DL (ref 40–170)
LYMPHOCYTES # BLD AUTO: 2.67 K/UL (ref 1–4.8)
LYMPHOCYTES NFR BLD: 45 % (ref 22–41)
MCH RBC QN AUTO: 32.1 PG (ref 27–33)
MCHC RBC AUTO-ENTMCNC: 34.2 G/DL (ref 33.6–35)
MCV RBC AUTO: 93.8 FL (ref 81.4–97.8)
MONOCYTES # BLD AUTO: 0.4 K/UL (ref 0–0.85)
MONOCYTES NFR BLD AUTO: 6.7 % (ref 0–13.4)
NEUTROPHILS # BLD AUTO: 2.65 K/UL (ref 2–7.15)
NEUTROPHILS NFR BLD: 44.8 % (ref 44–72)
NRBC # BLD AUTO: 0 K/UL
NRBC BLD-RTO: 0 /100 WBC
PLATELET # BLD AUTO: 279 K/UL (ref 164–446)
PMV BLD AUTO: 10.1 FL (ref 9–12.9)
POTASSIUM SERPL-SCNC: 4.1 MMOL/L (ref 3.6–5.5)
PROT SERPL-MCNC: 7.1 G/DL (ref 6–8.2)
RBC # BLD AUTO: 4.36 M/UL (ref 4.2–5.4)
SODIUM SERPL-SCNC: 143 MMOL/L (ref 135–145)
TIBC SERPL-MCNC: 348 UG/DL (ref 250–450)
UIBC SERPL-MCNC: 220 UG/DL (ref 110–370)
WBC # BLD AUTO: 5.9 K/UL (ref 4.8–10.8)

## 2022-11-29 PROCEDURE — 86364 TISS TRNSGLTMNASE EA IG CLAS: CPT

## 2022-11-29 PROCEDURE — 36415 COLL VENOUS BLD VENIPUNCTURE: CPT

## 2022-11-29 PROCEDURE — 83550 IRON BINDING TEST: CPT

## 2022-11-29 PROCEDURE — 85025 COMPLETE CBC W/AUTO DIFF WBC: CPT

## 2022-11-29 PROCEDURE — 80053 COMPREHEN METABOLIC PANEL: CPT

## 2022-11-29 PROCEDURE — 83540 ASSAY OF IRON: CPT

## 2022-11-29 PROCEDURE — 86645 CMV ANTIBODY IGM: CPT

## 2022-11-29 PROCEDURE — 82784 ASSAY IGA/IGD/IGG/IGM EACH: CPT

## 2022-11-29 PROCEDURE — 82728 ASSAY OF FERRITIN: CPT

## 2022-12-01 LAB
CMV IGM SERPL IA-ACNC: <8 AU/ML
IGA SERPL-MCNC: 309 MG/DL (ref 68–408)
TTG IGA SER IA-ACNC: <2 U/ML (ref 0–3)

## 2022-12-02 LAB
CMV DNA SERPL NAA+PROBE-ACNC: NOT DETECTED [IU]/ML
CMV DNA SERPL NAA+PROBE-LOG IU: NOT DETECTED {LOG_IU}/ML
CMV DNA SERPL QL NAA+PROBE: NOT DETECTED

## 2022-12-13 ENCOUNTER — HOSPITAL ENCOUNTER (OUTPATIENT)
Dept: RADIOLOGY | Facility: MEDICAL CENTER | Age: 62
End: 2022-12-13
Attending: FAMILY MEDICINE
Payer: COMMERCIAL

## 2022-12-13 DIAGNOSIS — R20.2 NUMBNESS AND TINGLING: ICD-10-CM

## 2022-12-13 DIAGNOSIS — R20.0 NUMBNESS AND TINGLING: ICD-10-CM

## 2022-12-13 DIAGNOSIS — R53.1 RIGHT SIDED WEAKNESS: ICD-10-CM

## 2022-12-13 PROCEDURE — 70553 MRI BRAIN STEM W/O & W/DYE: CPT

## 2022-12-13 PROCEDURE — A9576 INJ PROHANCE MULTIPACK: HCPCS | Performed by: FAMILY MEDICINE

## 2022-12-13 PROCEDURE — 72156 MRI NECK SPINE W/O & W/DYE: CPT

## 2022-12-13 PROCEDURE — 700117 HCHG RX CONTRAST REV CODE 255: Performed by: FAMILY MEDICINE

## 2022-12-13 RX ADMIN — GADOTERIDOL 20 ML: 279.3 INJECTION, SOLUTION INTRAVENOUS at 13:53

## 2022-12-22 ENCOUNTER — TELEMEDICINE (OUTPATIENT)
Dept: MEDICAL GROUP | Facility: LAB | Age: 62
End: 2022-12-22
Payer: COMMERCIAL

## 2022-12-22 DIAGNOSIS — R94.131 ABNORMAL EMG: ICD-10-CM

## 2022-12-22 DIAGNOSIS — M62.561 ATROPHY OF MUSCLE OF RIGHT LOWER LEG: ICD-10-CM

## 2022-12-22 PROCEDURE — 99214 OFFICE O/P EST MOD 30 MIN: CPT | Mod: 95 | Performed by: FAMILY MEDICINE

## 2022-12-22 NOTE — PROGRESS NOTES
Virtual Visit: Established Patient   This visit was conducted via Zoom using secure and encrypted videoconferencing technology.   The patient was in their home in the state of [place].    The patient's identity was confirmed and verbal consent was obtained for this virtual visit.     Subjective:   CC: MRI, muscle wasting, EMG      Arely Haynes is a 62 y.o. female with a complex medical history including psoriatic arthritis, ILD, history of PCP pneumonia, and continued unexplained numbness, weakness, and muscle wasting.    She is following with rheumatology at the [facility] and has appointment with neurology there next March.  Recent MRIs of brain and cervical spine are without signs of a central demyelinating issue.    [place] showed: Length dependent sensory peripheral polyneuropathy, Absence of sural nerve sensory resposnses    She had an MRI of her right tib-fib ordered at [place] to further evaluate worsening right calf muscle wasting.  She is unable to travel there would like to see if we can get this done at [facility].    ROS   See HPI    Current medicines (including changes today)  Current Outpatient Medications   Medication Sig Dispense Refill    escitalopram (LEXAPRO) 10 MG Tab Take 1 Tablet by mouth every morning. Take with escitalopram 20 mg for a total dose of 30 mg. 90 Tablet 3    clotrimazole (MYCELEX) 10 MG Bhumi bhumi Take 1 Bhumi by mouth 5 Times a Day. 70 Bhumi 1    escitalopram (LEXAPRO) 20 MG tablet Take 1 Tablet by mouth every morning. Take with escitalopram 10 mg for a total dose of 30 mg. 90 Tablet 3    famotidine (PEPCID) 20 MG Tab TAKE 1 TABLET AS NEEDED (REFLUX) 60 Tablet 11    fluticasone-salmeterol (ADVAIR) 500-50 MCG/ACT AEROSOL POWDER, BREATH ACTIVATED Inhale 1 Puff every 12 hours. 1 Each 3    triamcinolone acetonide (KENALOG) 0.1 % Cream Apply 1 Application topically 2 times a day. 80 g 1    EPINEPHrine (EPIPEN 2-NATALIA) 0.3 MG/0.3ML Solution Auto-injector solution for  injection Use as directed by MD as needed. 1 Each 1    acyclovir (ZOVIRAX) 5 % Ointment Apply 1 Application topically 3 times a day as needed (Cold Sores). 15 g 1    lamoTRIgine (LAMICTAL) 100 MG Tab Take 1 Tablet by mouth every morning. 90 Tablet 3    phenazopyridine (PYRIDIUM) 200 MG Tab Take 1 Tablet by mouth 3 times a day as needed for Mild Pain. 15 Tablet 0    sulfamethoxazole-trimethoprim (BACTRIM DS) 800-160 MG tablet Take 1 Tablet by mouth 3 times a week.      rosuvastatin (CRESTOR) 10 MG Tab Take 1 Tablet by mouth at bedtime. 90 Tablet 3    omeprazole (PRILOSEC) 40 MG delayed-release capsule Take 1 Capsule by mouth every day. 90 Capsule 3    montelukast (SINGULAIR) 10 MG Tab Take 1 Tablet by mouth every day. 90 Tablet 3    Spacer/Aero-Holding Chambers (AEROCHAMBER MV) Misc 1 Each as needed. 1 Each 2    sulfamethoxazole-trimethoprim (BACTRIM DS) 800-160 MG tablet Take 1 tablet by mouth 3 times weekly. 90 Tablet 3    magnesium oxide (MAG-OX) 400 MG Tab tablet Take 1 Tablet by mouth every morning. 90 Tablet 3    folic acid (FOLVITE) 1 MG Tab Take 2 Tablets by mouth every morning. 2 tablets = 2 mg. 90 Tablet 3    etodolac (LODINE) 400 MG tablet Take 1 Tablet by mouth 2 times a day. 90 Tablet 3    cyclobenzaprine (FLEXERIL) 5 mg tablet Take 1-2 Tablets by mouth 3 times a day as needed for Muscle Spasms. 90 Tablet 3    albuterol 108 (90 Base) MCG/ACT Aero Soln inhalation aerosol Inhale 1-2 Puffs every four hours as needed for Shortness of Breath. 8 g 11    HUMIRA PEN 40 MG/0.4ML Pen-injector Kit       vitamin D (VITAMIND D3) 1000 UNIT Tab Take 1 tablet by mouth 2 times a day. 60 tablet 0    acetaminophen (TYLENOL) 325 MG Tab Take 650 mg by mouth every 6 hours as needed for Mild Pain or Fever. 2 tablets = 650 mg.      cetirizine (ZYRTEC) 10 MG Tab Take 10 mg by mouth at bedtime.       No current facility-administered medications for this visit.       Patient Active Problem List    Diagnosis Date Noted    Pulmonary  nodules 08/16/2021    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[facility]) 07/15/2021    Bradycardia 07/15/2021    GERD (gastroesophageal reflux disease) 07/08/2021    Abnormal CT of the chest 07/08/2021    Rash and nonspecific skin eruption 06/07/2021    Major depressive disorder, recurrent, moderate ([facility]) 01/07/2020    CHRISTY (generalized anxiety disorder) 01/07/2020    Seasonal allergies 12/30/2019    H/O: hysterectomy 10/02/2019    Quadriceps tendon rupture, left, initial encounter 09/19/2019    Ovarian cyst 06/25/2019    Psoriasis 03/27/2019    Immunosuppression ([facility]) 03/27/2019    Obesity (BMI 30.0-34.9) 03/27/2019    Menopausal hot flushes 03/27/2019    Psoriatic arthritis ([facility]) 11/30/2018    Familial hypercholesterolemia 11/30/2018    Moderate persistent asthma without complication 11/30/2018    History of total right hip arthroplasty 09/22/2017    Asthma 04/09/2015        Objective:   LMP  (LMP Unknown)     Physical Exam:  Constitutional: Alert, no distress, well-groomed.  Skin: No rashes in visible areas.  Eye: Round. Conjunctiva clear, lids normal. No icterus.   ENMT: Lips pink without lesions, good dentition, moist mucous membranes. Phonation normal.  Neck: No masses, no thyromegaly. Moves freely without pain.  Respiratory: Unlabored respiratory effort, no cough or audible wheeze  Psych: Alert and oriented x3, normal affect and mood.     Assessment and Plan:   The following treatment plan was discussed:     Complex medical history including psoriatic arthritis, ILD, history of PCP pneumonia, and continued unexplained numbness, weakness, and muscle wasting. Recent MRIs of brain and cervical spine are without signs of a central demyelinating issue. EMG [place] showed: Length dependent sensory peripheral polyneuropathy, Absence of sural nerve sensory responses. She had an MRI of her right tib-fib ordered at [place] to further evaluate worsening right calf muscle wasting.  She is unable to travel there and would like to see  if we can get this done at [facility].    Unclear underlying etiology of her symptoms.  Concern for undiagnosed additional rheumatologic/autoimmune condition or other systemic cause of her underlying neurologic symptoms.  She does have appointment with neurology at [facility] in 3 months.  Pending MRI results could consider additional lab work-up prior to then (ie. SPEP/UPEP, urine/blood heavy metals? Lyme?)    1. Atrophy of muscle of right lower leg  2. Abnormal EMG  - [id number] RIGHT; Future    My total time spent caring for the patient on the day of the encounter was >30 minutes.   This does not include time spent on separately billable procedures/tests.      Follow-up: No follow-ups on file.

## 2022-12-29 RX ORDER — FLUTICASONE PROPIONATE AND SALMETEROL 250; 50 UG/1; UG/1
POWDER RESPIRATORY (INHALATION)
Qty: 60 EACH | Refills: 11 | Status: SHIPPED | OUTPATIENT
Start: 2022-12-29 | End: 2023-02-23 | Stop reason: SDUPTHER

## 2022-12-29 NOTE — TELEPHONE ENCOUNTER
I have reduced my dose from 500. [facility]     Thanks     Received request via: Pharmacy    Was the patient seen in the last year in this department? Yes  12/22/2022  Does the patient have an active prescription (recently filled or refills available) for medication(s) requested? No    Does the patient have long term Plus and need 100 day supply (blood pressure, diabetes and cholesterol meds only)? Patient does not have SCP

## 2023-01-06 ENCOUNTER — APPOINTMENT (OUTPATIENT)
Dept: RADIOLOGY | Facility: MEDICAL CENTER | Age: 63
End: 2023-01-06
Attending: FAMILY MEDICINE

## 2023-01-09 ENCOUNTER — PATIENT MESSAGE (OUTPATIENT)
Dept: MEDICAL GROUP | Facility: LAB | Age: 63
End: 2023-01-09
Payer: COMMERCIAL

## 2023-01-09 DIAGNOSIS — J84.9 INTERSTITIAL LUNG DISEASE (HCC): ICD-10-CM

## 2023-01-10 RX ORDER — IPRATROPIUM BROMIDE AND ALBUTEROL 20; 100 UG/1; UG/1
1 SPRAY, METERED RESPIRATORY (INHALATION) 4 TIMES DAILY
Qty: 4 G | Refills: 3 | Status: ON HOLD | OUTPATIENT
Start: 2023-01-10 | End: 2023-07-03

## 2023-01-13 ENCOUNTER — RESEARCH ENCOUNTER (OUTPATIENT)
Dept: MEDICAL GROUP | Facility: PHYSICIAN GROUP | Age: 63
End: 2023-01-13
Payer: COMMERCIAL

## 2023-01-13 DIAGNOSIS — Z00.6 RESEARCH STUDY PATIENT: ICD-10-CM

## 2023-01-17 ENCOUNTER — APPOINTMENT (OUTPATIENT)
Dept: RADIOLOGY | Facility: MEDICAL CENTER | Age: 63
End: 2023-01-17
Attending: FAMILY MEDICINE
Payer: COMMERCIAL

## 2023-01-17 ENCOUNTER — APPOINTMENT (OUTPATIENT)
Dept: PHYSICAL MEDICINE AND REHAB | Facility: MEDICAL CENTER | Age: 63
End: 2023-01-17
Payer: COMMERCIAL

## 2023-01-26 ENCOUNTER — PATIENT MESSAGE (OUTPATIENT)
Dept: MEDICAL GROUP | Facility: LAB | Age: 63
End: 2023-01-26
Payer: COMMERCIAL

## 2023-01-26 DIAGNOSIS — Z11.1 TUBERCULOSIS SCREENING: ICD-10-CM

## 2023-02-06 DIAGNOSIS — Z00.6 RESEARCH STUDY PATIENT: ICD-10-CM

## 2023-02-14 ENCOUNTER — HOSPITAL ENCOUNTER (OUTPATIENT)
Facility: MEDICAL CENTER | Age: 63
End: 2023-02-14
Attending: PATHOLOGY
Payer: COMMERCIAL

## 2023-02-14 ENCOUNTER — HOSPITAL ENCOUNTER (OUTPATIENT)
Dept: LAB | Facility: MEDICAL CENTER | Age: 63
End: 2023-02-14
Attending: FAMILY MEDICINE
Payer: COMMERCIAL

## 2023-02-14 DIAGNOSIS — Z11.1 TUBERCULOSIS SCREENING: ICD-10-CM

## 2023-02-14 PROCEDURE — 86480 TB TEST CELL IMMUN MEASURE: CPT

## 2023-02-14 PROCEDURE — 36415 COLL VENOUS BLD VENIPUNCTURE: CPT

## 2023-02-15 ENCOUNTER — HOSPITAL ENCOUNTER (OUTPATIENT)
Dept: RADIOLOGY | Facility: MEDICAL CENTER | Age: 63
End: 2023-02-15
Attending: FAMILY MEDICINE
Payer: COMMERCIAL

## 2023-02-15 DIAGNOSIS — M62.561 ATROPHY OF MUSCLE OF RIGHT LOWER LEG: ICD-10-CM

## 2023-02-15 DIAGNOSIS — R94.131 ABNORMAL EMG: ICD-10-CM

## 2023-02-15 DIAGNOSIS — Z00.6 RESEARCH STUDY PATIENT: ICD-10-CM

## 2023-02-15 PROCEDURE — 73718 MRI LOWER EXTREMITY W/O DYE: CPT | Mod: RT

## 2023-02-17 ENCOUNTER — HOSPITAL ENCOUNTER (EMERGENCY)
Facility: MEDICAL CENTER | Age: 63
End: 2023-02-17
Attending: EMERGENCY MEDICINE
Payer: COMMERCIAL

## 2023-02-17 ENCOUNTER — APPOINTMENT (OUTPATIENT)
Dept: RADIOLOGY | Facility: MEDICAL CENTER | Age: 63
End: 2023-02-17
Attending: EMERGENCY MEDICINE
Payer: COMMERCIAL

## 2023-02-17 VITALS
HEIGHT: 72 IN | BODY MASS INDEX: 32.25 KG/M2 | WEIGHT: 238.1 LBS | DIASTOLIC BLOOD PRESSURE: 61 MMHG | SYSTOLIC BLOOD PRESSURE: 114 MMHG | HEART RATE: 65 BPM | RESPIRATION RATE: 18 BRPM | TEMPERATURE: 97.2 F | OXYGEN SATURATION: 94 %

## 2023-02-17 DIAGNOSIS — K57.92 DIVERTICULITIS: ICD-10-CM

## 2023-02-17 LAB
ANION GAP SERPL CALC-SCNC: 13 MMOL/L (ref 7–16)
BASOPHILS # BLD AUTO: 0.6 % (ref 0–1.8)
BASOPHILS # BLD: 0.06 K/UL (ref 0–0.12)
BUN SERPL-MCNC: 28 MG/DL (ref 8–22)
CALCIUM SERPL-MCNC: 9.2 MG/DL (ref 8.4–10.2)
CHLORIDE SERPL-SCNC: 104 MMOL/L (ref 96–112)
CO2 SERPL-SCNC: 20 MMOL/L (ref 20–33)
CREAT SERPL-MCNC: 0.6 MG/DL (ref 0.5–1.4)
EOSINOPHIL # BLD AUTO: 0.14 K/UL (ref 0–0.51)
EOSINOPHIL NFR BLD: 1.3 % (ref 0–6.9)
ERYTHROCYTE [DISTWIDTH] IN BLOOD BY AUTOMATED COUNT: 39.1 FL (ref 35.9–50)
GAMMA INTERFERON BACKGROUND BLD IA-ACNC: 0.03 IU/ML
GFR SERPLBLD CREATININE-BSD FMLA CKD-EPI: 101 ML/MIN/1.73 M 2
GLUCOSE SERPL-MCNC: 97 MG/DL (ref 65–99)
HCT VFR BLD AUTO: 39.2 % (ref 37–47)
HGB BLD-MCNC: 14.1 G/DL (ref 12–16)
IMM GRANULOCYTES # BLD AUTO: 0.04 K/UL (ref 0–0.11)
IMM GRANULOCYTES NFR BLD AUTO: 0.4 % (ref 0–0.9)
LYMPHOCYTES # BLD AUTO: 2.11 K/UL (ref 1–4.8)
LYMPHOCYTES NFR BLD: 19.8 % (ref 22–41)
M TB IFN-G BLD-IMP: NEGATIVE
M TB IFN-G CD4+ BCKGRND COR BLD-ACNC: 0 IU/ML
MCH RBC QN AUTO: 32.3 PG (ref 27–33)
MCHC RBC AUTO-ENTMCNC: 36 G/DL (ref 33.6–35)
MCV RBC AUTO: 89.7 FL (ref 81.4–97.8)
MITOGEN IGNF BCKGRD COR BLD-ACNC: >10 IU/ML
MONOCYTES # BLD AUTO: 0.81 K/UL (ref 0–0.85)
MONOCYTES NFR BLD AUTO: 7.6 % (ref 0–13.4)
NEUTROPHILS # BLD AUTO: 7.52 K/UL (ref 2–7.15)
NEUTROPHILS NFR BLD: 70.3 % (ref 44–72)
NRBC # BLD AUTO: 0 K/UL
NRBC BLD-RTO: 0 /100 WBC
PLATELET # BLD AUTO: 250 K/UL (ref 164–446)
PMV BLD AUTO: 9.8 FL (ref 9–12.9)
POTASSIUM SERPL-SCNC: 3.6 MMOL/L (ref 3.6–5.5)
QFT TB2 - NIL TBQ2: 0 IU/ML
RBC # BLD AUTO: 4.37 M/UL (ref 4.2–5.4)
SODIUM SERPL-SCNC: 137 MMOL/L (ref 135–145)
WBC # BLD AUTO: 10.7 K/UL (ref 4.8–10.8)

## 2023-02-17 PROCEDURE — 80048 BASIC METABOLIC PNL TOTAL CA: CPT

## 2023-02-17 PROCEDURE — 96375 TX/PRO/DX INJ NEW DRUG ADDON: CPT

## 2023-02-17 PROCEDURE — 96374 THER/PROPH/DIAG INJ IV PUSH: CPT

## 2023-02-17 PROCEDURE — A9270 NON-COVERED ITEM OR SERVICE: HCPCS | Performed by: EMERGENCY MEDICINE

## 2023-02-17 PROCEDURE — 74177 CT ABD & PELVIS W/CONTRAST: CPT

## 2023-02-17 PROCEDURE — 700117 HCHG RX CONTRAST REV CODE 255: Performed by: EMERGENCY MEDICINE

## 2023-02-17 PROCEDURE — 700111 HCHG RX REV CODE 636 W/ 250 OVERRIDE (IP): Performed by: EMERGENCY MEDICINE

## 2023-02-17 PROCEDURE — 85025 COMPLETE CBC W/AUTO DIFF WBC: CPT

## 2023-02-17 PROCEDURE — 99284 EMERGENCY DEPT VISIT MOD MDM: CPT

## 2023-02-17 PROCEDURE — 700102 HCHG RX REV CODE 250 W/ 637 OVERRIDE(OP): Performed by: EMERGENCY MEDICINE

## 2023-02-17 PROCEDURE — 36415 COLL VENOUS BLD VENIPUNCTURE: CPT

## 2023-02-17 RX ORDER — ONDANSETRON 2 MG/ML
4 INJECTION INTRAMUSCULAR; INTRAVENOUS ONCE
Status: COMPLETED | OUTPATIENT
Start: 2023-02-17 | End: 2023-02-17

## 2023-02-17 RX ORDER — SULFAMETHOXAZOLE AND TRIMETHOPRIM 800; 160 MG/1; MG/1
1 TABLET ORAL ONCE
Status: COMPLETED | OUTPATIENT
Start: 2023-02-17 | End: 2023-02-17

## 2023-02-17 RX ORDER — SULFAMETHOXAZOLE AND TRIMETHOPRIM 800; 160 MG/1; MG/1
1 TABLET ORAL 2 TIMES DAILY
Qty: 20 TABLET | Refills: 0 | Status: SHIPPED | OUTPATIENT
Start: 2023-02-17 | End: 2023-02-27

## 2023-02-17 RX ADMIN — FENTANYL CITRATE 100 MCG: 50 INJECTION, SOLUTION INTRAMUSCULAR; INTRAVENOUS at 21:00

## 2023-02-17 RX ADMIN — ONDANSETRON 4 MG: 2 INJECTION INTRAMUSCULAR; INTRAVENOUS at 21:00

## 2023-02-17 RX ADMIN — SULFAMETHOXAZOLE AND TRIMETHOPRIM 1 TABLET: 800; 160 TABLET ORAL at 22:52

## 2023-02-17 RX ADMIN — IOHEXOL 100 ML: 350 INJECTION, SOLUTION INTRAVENOUS at 22:08

## 2023-02-17 ASSESSMENT — FIBROSIS 4 INDEX: FIB4 SCORE: 1.57

## 2023-02-18 NOTE — ED NOTES
Pt reports pain significantly decreased, denies needs at this time.   Dr. Grijalva at bedside to discuss results and d/c instructions.

## 2023-02-18 NOTE — ED TRIAGE NOTES
Pt comes in w/   c/o sever pain LLQ  Hx of diverticulosis she believes she is having a flare up  started this morning   continues t/o the day  worsening pain  c/o nausea as well  pt appears very uncomfortable   charge RN aware

## 2023-02-18 NOTE — DISCHARGE INSTRUCTIONS
Finish your antibiotics as prescribed even if you feel better first.  For pain, alternate every 3 hours back-and-forth between Tylenol and ibuprofen.

## 2023-02-18 NOTE — ED NOTES
Patient verbalized understanding of discharge instructions including not to drive for 8 hrs, no questions at this time. VS stable, patient will ambulate to exit with d/c instructions and rx in hand.

## 2023-02-18 NOTE — ED PROVIDER NOTES
ER Provider Note    Scribed for Js Grijalva M.d. by Faisal Jimenez. 2023  8:35 PM    Primary Care Provider: Joe Espinal M.D.    CHIEF COMPLAINT  Chief Complaint   Patient presents with    Abdominal Pain     LLQ pain that woke her up this morning   Hx of diverticulosis       Nausea     Started today      EXTERNAL RECORDS REVIEWED  Outpatient labs & studies no prior documentation of diverticulitis.    HPI/ROS  LIMITATION TO HISTORY   Select: : None    OUTSIDE HISTORIAN(S):  None    Arely Haynes is a 62 y.o. female with a history of diverticulosis who presents to the ED for evaluation of left lower quadrant abdominal pain onset this morning but worsening around 1600. The patient states she also has nausea, but denies any burning sensation with urination, bloody urine, back pain, or vomiting. She describes waking up this morning due to her abdominal pain. She is concerned that she is experiencing a diverticulosis flare-up. Her symptoms are exacerbated by moving her left leg or pushing on her abdomen. No alleviating factors were noted. She has a history of a , hysterectomy, bilateral oophorectomy, and appendectomy. She is allergic to Levofloxacin.    PAST MEDICAL HISTORY  Past Medical History:   Diagnosis Date    Anesthesia     1st cousin has malignant hyperthermia/pt has never had an issue or her sisters    Arthritis     hips knees hands spine    Asthma     prn inhalers    Bowel habit changes     diarrhea    Depression     Daughter passed few years ago    Erosive esophagitis     GERD (gastroesophageal reflux disease)     Heart burn     High cholesterol     Immunocompromised (HCC)     Pain     hips knees    Pneumonia 2018    Psoriasis     Psoriatic arthritis (HCC)      SURGICAL HISTORY  Past Surgical History:   Procedure Laterality Date    CA BRONCHOSCOPY,DIAGNOSTIC  2021    Procedure: BRONCHOSCOPY;  Surgeon: Myles Vidal M.D.;  Location: SURGERY [facility];  Service: Ent     TENDON REPAIR Left 9/19/2019    Procedure: REPAIR, TENDON- QUADRICEPS RUTURE REPAIR AND MEYERS;  Surgeon: Jayden Velázquez M.D.;  Location: SURGERY [facility];  Service: Orthopedics    HYSTERECTOMY ROBOTIC XI N/A 7/11/2019    Procedure: HYSTERECTOMY, ROBOT-ASSISTED, USING DA SABINO XI;  Surgeon: Curly Bernal M.D.;  Location: SURGERY [facility];  Service: Gynecology    SALPINGO OOPHORECTOMY Bilateral 7/11/2019    Procedure: SALPINGO-OOPHORECTOMY;  Surgeon: Curly Bernal M.D.;  Location: SURGERY [facility];  Service: Gynecology    GYN SURGERY  1999    c sec    CYSTECTOMY      FUSION, SPINE, LUMBAR, PLIF      HIP REPLACEMENT, TOTAL      Right    OTHER ABDOMINAL SURGERY      appendix    OTHER ORTHOPEDIC SURGERY      left knee    OTHER ORTHOPEDIC SURGERY      right rotator cuff     FAMILY HISTORY  Family History   Problem Relation Age of Onset    Hyperlipidemia Mother     Heart Attack Mother     Psychiatric Illness Mother     Heart Disease Mother     Arthritis Mother     Lung Disease Father     Cancer Father     Hyperlipidemia Father     Hyperlipidemia Sister     Cancer Sister         breast    Heart Disease Paternal Grandfather     Hypertension Neg Hx     Diabetes Neg Hx      SOCIAL HISTORY   reports that she has never smoked. She has never used smokeless tobacco. She reports that she does not currently use alcohol after a past usage of about 0.6 - 1.2 oz per week. She reports that she does not use drugs.    CURRENT MEDICATIONS  Discharge Medication List as of 2/17/2023 10:46 PM        CONTINUE these medications which have NOT CHANGED    Details   ipratropium-albuterol (COMBIVENT RESPIMAT)  MCG/ACT Aero Soln Inhale 1 Puff 4 times a day., Disp-4 g, R-3, Normal      fluticasone-salmeterol (ADVAIR) 250-50 MCG/ACT AEROSOL POWDER, BREATH ACTIVATED USE 1 INHALATION TWICE A DAY, Disp-60 Each, R-11, Normal      !! escitalopram (LEXAPRO) 10 MG Tab Take 1 Tablet by mouth every morning. Take with escitalopram  20 mg for a total dose of 30 mg., Disp-90 Tablet, R-3, Normal      clotrimazole (MYCELEX) 10 MG Bhumi bhumi Take 1 Bhumi by mouth 5 Times a Day., Disp-70 Bhumi, R-1, Normal      !! escitalopram (LEXAPRO) 20 MG tablet Take 1 Tablet by mouth every morning. Take with escitalopram 10 mg for a total dose of 30 mg., Disp-90 Tablet, R-3, Normal      famotidine (PEPCID) 20 MG Tab TAKE 1 TABLET AS NEEDED (REFLUX), Disp-60 Tablet, R-11, Normal      fluticasone-salmeterol (ADVAIR) 500-50 MCG/ACT AEROSOL POWDER, BREATH ACTIVATED Inhale 1 Puff every 12 hours., Disp-1 Each, R-3, Normal      triamcinolone acetonide (KENALOG) 0.1 % Cream Apply 1 Application topically 2 times a day., Disp-80 g, R-1, Normal      EPINEPHrine (EPIPEN 2-NATALIA) 0.3 MG/0.3ML Solution Auto-injector solution for injection Use as directed by MD as needed., Disp-1 Each, R-1, Normal      acyclovir (ZOVIRAX) 5 % Ointment Apply 1 Application topically 3 times a day as needed (Cold Sores)., Disp-15 g, R-1, Normal      lamoTRIgine (LAMICTAL) 100 MG Tab Take 1 Tablet by mouth every morning., Disp-90 Tablet, R-3, Normal      phenazopyridine (PYRIDIUM) 200 MG Tab Take 1 Tablet by mouth 3 times a day as needed for Mild Pain., Disp-15 Tablet, R-0, Normal      rosuvastatin (CRESTOR) 10 MG Tab Take 1 Tablet by mouth at bedtime., Disp-90 Tablet, R-3, Normal      omeprazole (PRILOSEC) 40 MG delayed-release capsule Take 1 Capsule by mouth every day., Disp-90 Capsule, R-3, Normal      montelukast (SINGULAIR) 10 MG Tab Take 1 Tablet by mouth every day., Disp-90 Tablet, R-3, Normal      Spacer/Aero-Holding Chambers (AEROCHAMBER MV) Misc 1 Each as needed.Aerochamber MV covered by insuranceDisp-1 Each, R-2, Normal      magnesium oxide (MAG-OX) 400 MG Tab tablet Take 1 Tablet by mouth every morning., Disp-90 Tablet, R-3, Normal      folic acid (FOLVITE) 1 MG Tab Take 2 Tablets by mouth every morning. 2 tablets = 2 mg., Disp-90 Tablet, R-3, Normal      etodolac (LODINE) 400 MG  tablet Take 1 Tablet by mouth 2 times a day., Disp-90 Tablet, R-3, Normal      cyclobenzaprine (FLEXERIL) 5 mg tablet Take 1-2 Tablets by mouth 3 times a day as needed for Muscle Spasms., Disp-90 Tablet, R-3, Normal      albuterol 108 (90 Base) MCG/ACT Aero Soln inhalation aerosol Inhale 1-2 Puffs every four hours as needed for Shortness of Breath., Disp-8 g, R-11, Normal      HUMIRA PEN 40 MG/0.4ML Pen-injector Kit BERTHA, Historical Med      vitamin D (VITAMIND D3) 1000 UNIT Tab Take 1 tablet by mouth 2 times a day., Disp-60 tablet, R-0, Normal      acetaminophen (TYLENOL) 325 MG Tab Take 650 mg by mouth every 6 hours as needed for Mild Pain or Fever. 2 tablets = 650 mg., Historical Med      cetirizine (ZYRTEC) 10 MG Tab Take 10 mg by mouth at bedtime., Historical Med       !! - Potential duplicate medications found. Please discuss with provider.        ALLERGIES  Iodine, Levofloxacin, Shellfish-derived products, and Unasyn [ampicillin-sulbactam sodium]    PHYSICAL EXAM  BP (!) 145/80   Pulse 73   Temp 36.8 °C (98.3 °F) (Temporal)   Resp 20   Ht 1.829 m (6')   Wt 108 kg (238 lb 1.6 oz)   LMP  (LMP Unknown)   SpO2 96%   BMI 32.29 kg/m²   Pulse ox interpretation: I interpret this pulse ox as normal.  Constitutional: Alert in Moderate distress.  HENT: No signs of trauma, Bilateral external ears normal, Nose normal.   Eyes: Conjunctiva normal, Non-icteric.   Neck: Normal range of motion, Supple, No stridor.   Lymphatic: No lymphadenopathy noted.   Cardiovascular: Regular rate and rhythm, no murmurs.   Thorax & Lungs: Normal breath sounds, No respiratory distress, No wheezing  Abdomen: Left lower quadrant tenderness worse with palpation or hip flexion. Bowel sounds normal, Soft, No masses, No pulsatile masses. No peritoneal signs.  Skin: Warm, Dry, No erythema, No rash.   Back: No midline bony tenderness. No CVA tenderness.  Extremities: Intact distal pulses, No edema, No cyanosis.  Musculoskeletal: Good range of  motion in all major joints. No or major deformities noted.   Neurologic: Alert , Normal motor function, Normal sensory function, No focal deficits noted.   Psychiatric: Affect normal, Judgment normal, Mood normal.     DIAGNOSTIC STUDIES    Labs:   Results for orders placed or performed during the hospital encounter of 02/17/23   CBC WITH DIFFERENTIAL   Result Value Ref Range    WBC 10.7 4.8 - 10.8 K/uL    RBC 4.37 4.20 - 5.40 M/uL    Hemoglobin 14.1 12.0 - 16.0 g/dL    Hematocrit 39.2 37.0 - 47.0 %    MCV 89.7 81.4 - 97.8 fL    MCH 32.3 27.0 - 33.0 pg    MCHC 36.0 (H) 33.6 - 35.0 g/dL    RDW 39.1 35.9 - 50.0 fL    Platelet Count 250 164 - 446 K/uL    MPV 9.8 9.0 - 12.9 fL    Neutrophils-Polys 70.30 44.00 - 72.00 %    Lymphocytes 19.80 (L) 22.00 - 41.00 %    Monocytes 7.60 0.00 - 13.40 %    Eosinophils 1.30 0.00 - 6.90 %    Basophils 0.60 0.00 - 1.80 %    Immature Granulocytes 0.40 0.00 - 0.90 %    Nucleated RBC 0.00 /100 WBC    Neutrophils (Absolute) 7.52 (H) 2.00 - 7.15 K/uL    Lymphs (Absolute) 2.11 1.00 - 4.80 K/uL    Monos (Absolute) 0.81 0.00 - 0.85 K/uL    Eos (Absolute) 0.14 0.00 - 0.51 K/uL    Baso (Absolute) 0.06 0.00 - 0.12 K/uL    Immature Granulocytes (abs) 0.04 0.00 - 0.11 K/uL    NRBC (Absolute) 0.00 K/uL   Basic Metabolic Panel   Result Value Ref Range    Sodium 137 135 - 145 mmol/L    Potassium 3.6 3.6 - 5.5 mmol/L    Chloride 104 96 - 112 mmol/L    Co2 20 20 - 33 mmol/L    Glucose 97 65 - 99 mg/dL    Bun 28 (H) 8 - 22 mg/dL    Creatinine 0.60 0.50 - 1.40 mg/dL    Calcium 9.2 8.4 - 10.2 mg/dL    Anion Gap 13.0 7.0 - 16.0   ESTIMATED GFR   Result Value Ref Range    GFR (CKD-EPI) 101 >60 mL/min/1.73 m 2      Radiology:   The attending emergency physician has independently interpreted the diagnostic imaging associated with this visit and am waiting the final reading from the radiologist.   Preliminary interpretation is a follows: No obvious perforation or abscess    Radiologist interpretation:    CT-ABDOMEN-PELVIS WITH   Final Result         1.  Diverticulosis with changes of sigmoid diverticulitis   2.  Small hiatal hernia   3.  Hepatomegaly   4.  Atherosclerosis        COURSE & MEDICAL DECISION MAKING     ED Observation Status? Yes; I am placing the patient in to an observation status due to a diagnostic uncertainty as well as therapeutic intensity. Patient placed in observation status at 8:38 PM, 2/17/2023.     Observation plan is as follows: This patient has severe pain in the left lower quadrant, concerning for diverticulitis, versus other possible diagnoses, with or without perforation or abscess, so she will need observation, pain management, reassessment, and disposition based on the results of laboratory and imaging studies.    Upon Reevaluation, the patient's condition has: Improved; and will be discharged.    Patient discharged from ED Observation status at 10:45 PM (Time) 2/17/23 (Date).     INITIAL ASSESSMENT, COURSE AND PLAN  Care Narrative:     8:37 PM - Patient was evaluated at bedside. Ordered for CT-Abdomen Pelvis w/, CBC w/diff, and BMP to evaluate. The patient will be medicated with Fentanyl 100 mcg injection and Zofran 4 mg injection for her symptoms. She notes that she has recently been able to tolerate IV contrast without issue. Patient verbalizes understanding and support with my plan of care. Differential diagnoses include but not limited to: Diverticulitis with or without abscess or perforation, Kidney stones/Ureteral stone, AAA seems less likely. Considered but do not suspect UTI.      9:43 PM - Patient was reevaluated at bedside. Patient states she is feeling improved at this time. Patient has not yet gone for CT imaging.      10:50 PM -this patient is been resting comfortably since she was given pain medication and has not required redosing.  We discussed her CT results, which shows diverticulitis without perforation or abscess or any other complication.  She feels comfortable  going home and I think this is an appropriate plan of care.  She is given a first dose of oral antibiotics here and prescribed appropriate antibiotics for home.  She was offered but declined narcotic pain medication, feeling she will be all right alternating between Tylenol and ibuprofen.  We discussed return precautions.        DISPOSITION AND DISCUSSIONS    Escalation of care considered, and ultimately not performed: acute inpatient care management, however at this time, the patient is most appropriate for outpatient management.    Decision tools and prescription drugs considered including, but not limited to: Antibiotics were prescribed,  and Pain Medications were offered but declined .      FINAL DIANGOSIS  1. Diverticulitis         Faisal JOHNSON (Scribe), am scribing for, and in the presence of, Js Grijalva M.D..    Electronically signed by: Faisal Jimenez ([place]), 2/17/2023    Js JOHNSON M.D. personally performed the services described in this documentation, as scribed by Faisal Jimenez in my presence, and it is both accurate and complete.     The note accurately reflects work and decisions made by me.  Js Grijalva M.D.  2/18/2023  3:14 PM

## 2023-02-22 ENCOUNTER — OFFICE VISIT (OUTPATIENT)
Dept: MEDICAL GROUP | Facility: LAB | Age: 63
End: 2023-02-22
Payer: COMMERCIAL

## 2023-02-22 VITALS
BODY MASS INDEX: 30.96 KG/M2 | TEMPERATURE: 97.1 F | DIASTOLIC BLOOD PRESSURE: 66 MMHG | HEART RATE: 66 BPM | HEIGHT: 72 IN | WEIGHT: 228.6 LBS | OXYGEN SATURATION: 97 % | RESPIRATION RATE: 14 BRPM | SYSTOLIC BLOOD PRESSURE: 124 MMHG

## 2023-02-22 DIAGNOSIS — K57.92 DIVERTICULITIS: ICD-10-CM

## 2023-02-22 PROCEDURE — 99214 OFFICE O/P EST MOD 30 MIN: CPT | Performed by: FAMILY MEDICINE

## 2023-02-22 RX ORDER — PREDNISONE 20 MG/1
TABLET ORAL
COMMUNITY
End: 2023-02-22

## 2023-02-22 RX ORDER — AMOXICILLIN 500 MG/1
CAPSULE ORAL
COMMUNITY
Start: 2023-02-03 | End: 2023-02-22

## 2023-02-22 RX ORDER — NITROFURANTOIN 25; 75 MG/1; MG/1
CAPSULE ORAL
COMMUNITY
End: 2023-02-22

## 2023-02-22 RX ORDER — FAMOTIDINE 40 MG/1
40 TABLET, FILM COATED ORAL
COMMUNITY
End: 2023-02-22

## 2023-02-22 RX ORDER — AMOXICILLIN AND CLAVULANATE POTASSIUM 875; 125 MG/1; MG/1
TABLET, FILM COATED ORAL
COMMUNITY
End: 2023-06-08

## 2023-02-22 RX ORDER — CELECOXIB 200 MG/1
CAPSULE ORAL
COMMUNITY
End: 2023-02-22

## 2023-02-22 RX ORDER — LORAZEPAM 1 MG/1
TABLET ORAL
COMMUNITY
End: 2023-02-22

## 2023-02-22 RX ORDER — AMOXICILLIN AND CLAVULANATE POTASSIUM 875; 125 MG/1; MG/1
1 TABLET, FILM COATED ORAL 2 TIMES DAILY
COMMUNITY
Start: 2023-02-18 | End: 2023-02-22

## 2023-02-22 ASSESSMENT — FIBROSIS 4 INDEX: FIB4 SCORE: 1.75

## 2023-02-22 NOTE — PROGRESS NOTES
Subjective:     CC: ER follow up    HPI:   Arely is a 62-year-old female with a complex medical history including immunosuppression, psoriatic arthritis, ILD and history of PCP pneumonia who had recent ER visit for diverticulitis.  Presented with abdominal pain and nausea, CT showed sigmoid diverticulitis.  She was discharged on Bactrim monotherapy (possibly due to listed Unasyn allergy).   is primary care doc and instead had her start Augmentin.  Has done well with this with no hives or rash, pain is much improved, no nausea.  Still having some diarrhea but no blood in stool.  Had colonoscopy last month.    Medications, past medical history, allergies, and social history have been reviewed and updated.      Objective:       Exam:  /66 (BP Location: Right arm, Patient Position: Sitting, BP Cuff Size: Adult)   Pulse 66   Temp 36.2 °C (97.1 °F)   Resp 14   Ht 1.829 m (6')   Wt 104 kg (228 lb 9.6 oz)   LMP  (LMP Unknown)   SpO2 97%   BMI 31.00 kg/m²  Body mass index is 31 kg/m².    Constitutional: Alert. Well appearing. No distress.  Skin: Warm, dry, good turgor, no visible rashes.  Eye: Equal, round and reactive to light, conjunctiva clear, lids normal.  ENMT: Moist mucous membranes. Normal dentition.  Respiratory: Normal effort.   Abdomen: Soft, nondistended.  Mild left lower quadrant tenderness without rebound or guarding.  Neuro: Moves all four extremities. No facial droop.  Psych: Answers questions appropriately. Normal affect and mood.    Assessment & Plan:     62 y.o. female with the following -     1. Diverticulitis  ER visit 2/17 with abdominal pain.  CT showed sigmoid diverticulitis.  Vitals reassuring, mild tenderness on exam but no rebound or guarding.  Much improved with Augmentin and will plan to complete this course.  Colonoscopy done last month.  Discussed return precautions.      Please note that this note was created using voice recognition software.

## 2023-02-23 ENCOUNTER — OFFICE VISIT (OUTPATIENT)
Dept: PHYSICAL MEDICINE AND REHAB | Facility: MEDICAL CENTER | Age: 63
End: 2023-02-23
Payer: COMMERCIAL

## 2023-02-23 VITALS
DIASTOLIC BLOOD PRESSURE: 64 MMHG | HEIGHT: 72 IN | HEART RATE: 65 BPM | BODY MASS INDEX: 30.88 KG/M2 | SYSTOLIC BLOOD PRESSURE: 128 MMHG | WEIGHT: 228 LBS | TEMPERATURE: 96.9 F | OXYGEN SATURATION: 92 %

## 2023-02-23 DIAGNOSIS — M48.02 CERVICAL SPINAL STENOSIS: ICD-10-CM

## 2023-02-23 DIAGNOSIS — R20.2 PARESTHESIA: ICD-10-CM

## 2023-02-23 DIAGNOSIS — Z91.81 AT RISK FOR FALLS: ICD-10-CM

## 2023-02-23 DIAGNOSIS — Z98.1 S/P LUMBAR FUSION: ICD-10-CM

## 2023-02-23 DIAGNOSIS — R53.1 WEAKNESS: ICD-10-CM

## 2023-02-23 DIAGNOSIS — G62.9 SENSORY NEUROPATHY: ICD-10-CM

## 2023-02-23 DIAGNOSIS — M54.17 LUMBOSACRAL RADICULOPATHY: ICD-10-CM

## 2023-02-23 DIAGNOSIS — E66.9 OBESITY (BMI 30.0-34.9): ICD-10-CM

## 2023-02-23 LAB
ELF SCORE: 10.7
RELATIVE RISK: ABNORMAL
RISK GROUP: ABNORMAL
RISK: 23.6 %

## 2023-02-23 PROCEDURE — 99417 PROLNG OP E/M EACH 15 MIN: CPT | Performed by: PHYSICAL MEDICINE & REHABILITATION

## 2023-02-23 PROCEDURE — 99215 OFFICE O/P EST HI 40 MIN: CPT | Performed by: PHYSICAL MEDICINE & REHABILITATION

## 2023-02-23 RX ORDER — FLUTICASONE PROPIONATE AND SALMETEROL 250; 50 UG/1; UG/1
POWDER RESPIRATORY (INHALATION)
Qty: 60 EACH | Refills: 11 | Status: ON HOLD | OUTPATIENT
Start: 2023-02-23 | End: 2023-07-03

## 2023-02-23 ASSESSMENT — FIBROSIS 4 INDEX: FIB4 SCORE: 1.75

## 2023-02-23 ASSESSMENT — PATIENT HEALTH QUESTIONNAIRE - PHQ9: CLINICAL INTERPRETATION OF PHQ2 SCORE: 0

## 2023-02-23 ASSESSMENT — PAIN SCALES - GENERAL: PAINLEVEL: 6=MODERATE PAIN
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
## 2023-02-27 ENCOUNTER — PATIENT MESSAGE (OUTPATIENT)
Dept: MEDICAL GROUP | Facility: LAB | Age: 63
End: 2023-02-27
Payer: COMMERCIAL

## 2023-02-27 DIAGNOSIS — K74.00 LIVER FIBROSIS: ICD-10-CM

## 2023-02-28 ENCOUNTER — APPOINTMENT (OUTPATIENT)
Dept: RADIOLOGY | Facility: MEDICAL CENTER | Age: 63
End: 2023-02-28
Attending: FAMILY MEDICINE

## 2023-03-03 ENCOUNTER — APPOINTMENT (OUTPATIENT)
Dept: RADIOLOGY | Facility: MEDICAL CENTER | Age: 63
End: 2023-03-03
Attending: FAMILY MEDICINE
Payer: COMMERCIAL

## 2023-03-03 DIAGNOSIS — Z12.31 ENCOUNTER FOR MAMMOGRAM TO ESTABLISH BASELINE MAMMOGRAM: ICD-10-CM

## 2023-03-03 PROCEDURE — 77067 SCR MAMMO BI INCL CAD: CPT

## 2023-03-09 LAB
APOB+LDLR+PCSK9 GENE MUT ANL BLD/T: NOT DETECTED
BRCA1+BRCA2 DEL+DUP + FULL MUT ANL BLD/T: NOT DETECTED
MLH1+MSH2+MSH6+PMS2 GN DEL+DUP+FUL M: NOT DETECTED

## 2023-03-17 ENCOUNTER — PATIENT MESSAGE (OUTPATIENT)
Dept: MEDICAL GROUP | Facility: LAB | Age: 63
End: 2023-03-17
Payer: COMMERCIAL

## 2023-03-17 DIAGNOSIS — L40.50 PSORIATIC ARTHRITIS (HCC): ICD-10-CM

## 2023-03-17 DIAGNOSIS — L40.9 PSORIASIS: ICD-10-CM

## 2023-03-17 RX ORDER — TRIAMCINOLONE ACETONIDE 1 MG/G
1 CREAM TOPICAL 2 TIMES DAILY
Qty: 80 G | Refills: 1 | Status: ON HOLD | OUTPATIENT
Start: 2023-03-17 | End: 2023-07-03

## 2023-03-17 RX ORDER — METHYLPREDNISOLONE 4 MG/1
TABLET ORAL
Qty: 21 TABLET | Refills: 0 | Status: SHIPPED | OUTPATIENT
Start: 2023-03-17 | End: 2023-04-12

## 2023-03-21 ENCOUNTER — APPOINTMENT (OUTPATIENT)
Dept: PHYSICAL MEDICINE AND REHAB | Facility: MEDICAL CENTER | Age: 63
End: 2023-03-21
Payer: COMMERCIAL
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
## 2023-04-09 ENCOUNTER — HOSPITAL ENCOUNTER (OUTPATIENT)
Dept: RADIOLOGY | Facility: MEDICAL CENTER | Age: 63
End: 2023-04-09
Attending: PHYSICAL MEDICINE & REHABILITATION
Payer: COMMERCIAL

## 2023-04-09 DIAGNOSIS — M54.17 LUMBOSACRAL RADICULOPATHY: ICD-10-CM

## 2023-04-09 DIAGNOSIS — Z98.1 S/P LUMBAR FUSION: ICD-10-CM

## 2023-04-09 PROCEDURE — A9579 GAD-BASE MR CONTRAST NOS,1ML: HCPCS

## 2023-04-09 PROCEDURE — 700117 HCHG RX CONTRAST REV CODE 255

## 2023-04-09 PROCEDURE — 72158 MRI LUMBAR SPINE W/O & W/DYE: CPT

## 2023-04-09 RX ADMIN — GADOTERIDOL 20 ML: 279.3 INJECTION, SOLUTION INTRAVENOUS at 08:02

## 2023-04-12 ENCOUNTER — OFFICE VISIT (OUTPATIENT)
Dept: PHYSICAL MEDICINE AND REHAB | Facility: MEDICAL CENTER | Age: 63
End: 2023-04-12
Payer: COMMERCIAL

## 2023-04-12 VITALS
OXYGEN SATURATION: 97 % | HEIGHT: 72 IN | HEART RATE: 66 BPM | DIASTOLIC BLOOD PRESSURE: 60 MMHG | TEMPERATURE: 96.4 F | BODY MASS INDEX: 32.26 KG/M2 | SYSTOLIC BLOOD PRESSURE: 112 MMHG | WEIGHT: 238.2 LBS

## 2023-04-12 DIAGNOSIS — Z98.1 S/P LUMBAR FUSION: ICD-10-CM

## 2023-04-12 DIAGNOSIS — M62.838 MUSCLE SPASM: ICD-10-CM

## 2023-04-12 DIAGNOSIS — G62.9 SENSORY NEUROPATHY: ICD-10-CM

## 2023-04-12 DIAGNOSIS — M48.061 FORAMINAL STENOSIS OF LUMBAR REGION: ICD-10-CM

## 2023-04-12 DIAGNOSIS — M54.17 LUMBOSACRAL RADICULOPATHY: ICD-10-CM

## 2023-04-12 DIAGNOSIS — M48.062 SPINAL STENOSIS OF LUMBAR REGION WITH NEUROGENIC CLAUDICATION: ICD-10-CM

## 2023-04-12 DIAGNOSIS — E66.9 OBESITY (BMI 30.0-34.9): ICD-10-CM

## 2023-04-12 PROCEDURE — 99215 OFFICE O/P EST HI 40 MIN: CPT | Performed by: PHYSICAL MEDICINE & REHABILITATION

## 2023-04-12 RX ORDER — TIZANIDINE 4 MG/1
4 TABLET ORAL EVERY 6 HOURS PRN
Qty: 40 TABLET | Refills: 0 | Status: SHIPPED | OUTPATIENT
Start: 2023-04-12 | End: 2023-04-27

## 2023-04-12 RX ORDER — GABAPENTIN 100 MG/1
CAPSULE ORAL
COMMUNITY
Start: 2023-03-15 | End: 2023-06-08

## 2023-04-12 ASSESSMENT — PAIN SCALES - GENERAL: PAINLEVEL: 6=MODERATE PAIN

## 2023-04-12 ASSESSMENT — FIBROSIS 4 INDEX: FIB4 SCORE: 1.75

## 2023-04-12 ASSESSMENT — PATIENT HEALTH QUESTIONNAIRE - PHQ9
5. POOR APPETITE OR OVEREATING: 2 - MORE THAN HALF THE DAYS
CLINICAL INTERPRETATION OF PHQ2 SCORE: 2
SUM OF ALL RESPONSES TO PHQ QUESTIONS 1-9: 9

## 2023-04-12 NOTE — PATIENT INSTRUCTIONS
Your procedure will be at the [facility] special procedure suite.    [facility] [place] [address]       PRE-PROCEDURE INSTRUCTIONS  You may take your regular medications except:   No Anti-inflammatories 5 days prior to your procedure. Anti-inflammatories include medicines such as  ibuprofen (Motrin, Advil), Excedrin, Naproxen (Aleve, Anaprox, Naprelan, Naprosyn), Celecoxib (Celebrex), Diclofenac (Voltaren-XR tab), and Meloxicam (Mobic).   No Glucophage or Metformin 24 hours before your procedure. You may resume next day after your procedure.  Call the physiatry office if you are taking or prescribed anti-biotics within five days of procedure.  Please ask provider if you are taking any new diabetes medication.  CONTINUE TAKING BLOOD PRESSURE MEDICATIONS AS PRESCRIBED.  Pain medications will not be prescribed on the procedure day. Procedural pain medication may be used by your provider   Call your doctor's office performing the procedure if you have a fever, chills, rash or new illness prior to your procedure    Anticoagulation/antiplatelet medications  No Blood thinning medications such as Coumadin or Plavix 5 days prior to procedure unless your doctor said to continue these medications. Call your doctor if a new medication is prescribed in this class.     Restrictions for eating before procedure:   If you are getting procedural sedation, then do not eat to for 8 hours prior to procedure appointment time. Do not drink fluids for four hours prior to your procedure time.   If you are not having procedural sedation, then Skip the meal prior to your procedure. If you have a morning procedure then skip breakfast. If you have an afternoon procedure then skip lunch.   You may drink clear liquids up to 2 hours prior to your procedure  You must have a  the day of procedure to accompany you home.      POST PROCEDURE INSTRUCTIONS   No heavy lifting, strenuous bending or strenuous exercise for 3 days  after your procedure.  No hot tubs, baths, swimming for 3 days after your procedure  You can remove the bandage the day after the procedure.  IF YOU RECEIVED A STEROID INJECTION. PLEASE NOTE THAT THERE MAY BE A DELAY FOR THE INJECTION TO START WORKING, THE DELAY MAY BE UP TO TWO WEEKS. IF YOU HAVE DIABETES, PLEASE NOTE THAT YOUR SUGAR LEVELS MAY BE ELEVATED FOR 1-2 DAYS AFTER A STEROID INJECTION.  THE STEROID MAY CAUSE TEMPORARY SYMPTOMS WHICH USUALLY RESOLVE ON THEIR OWN WITHIN 1 TO 2 DAYS INCLUDING FACIAL FLUSHING OR A FEELING OF WARMTH ON THE FACE, TEMPORARY INCREASES IN BLOOD SUGAR, INSOMNIA, INCREASED HUNGER  IF YOU RECEIVED A DIAGNOSTIC PROCEDURE (SUCH AS A MEDIAL BRANCH BLOCK), PLEASE NOTE THAT WE DO EXPECT THIS INJECTION TO WEAR OFF.  IT IS IMPORTANT TO COMPLETE THE PAIN DIARY AND LIST THE PAIN SCORE ONLY FOR THE REGION WHERE THE PROCEDURE WAS AND BRING THIS TO YOUR FOLLOW UP VISIT.  IF YOU RECEIVED A RADIOFREQUENCY PROCEDURE, THERE MAY BE SOME SORENESS AFTER THE PROCEDURE.  THIS IS NORMAL.  IF YOU EXPERIENCE PROLONGED WEAKNESS LONGER THAN ONE DAY, BOWEL OR BLADDER INCONTINENCE THEN PLEASE CALL THE PHYSIATRY OFFICE.  Your leg may feel heavy, weak and numb for up to 1-2 days. Be very careful walking.    You may resume normal activities 3 days after procedure.

## 2023-04-12 NOTE — PROGRESS NOTES
Follow up patient note  Pain Medicine, Interventional spine and sports physiatry, Physical medicine rehabilitation      Chief complaint:   Chief Complaint   Patient presents with    Follow-Up     MRI Review           HISTORY    Please see new patient note  by Dr Aleman,  for more details.     HPI  Patient identification: Arely Haynes 62 y.o. female with history of psoriatic arthritis, history of headaches, history of multiple head injuries, s/p right hip replacement, history of PLIF at L4-5 around 2009 presents for follow-up.  The    Interval history:   The patient returns for follow-up and for review of MRI lumbar spine.    Taking gabapentin 200mg in the morning, 100mg midday, 300mg at bedtime.  This does seem to be well-tolerated.  Sleep is still not very good due to pain.  She is up to 3 to 4 hours on average.  She does not think that Flexeril has been helpful at all.  She continues to have aching in the low back primarily on the lumbosacral region with pain radiating into the right thigh primarily.  She continues to have abnormal sensation in her feet with burning pinprick symptoms.    Physical therapy at [facility] orth[facility]ics which is within the better sensitive.  They have been working on her sacroiliac joints as well.  Some of the manual therapy has aggravated pain at times.    Pain is 5-6/10 on the NRS.  At times it is 7-8/10 on the NRS.    She has an active lifestyle and in the summer.  She has plans for backpacking trips this summer.        ROS  See HPI    PMHx:   Past Medical History:   Diagnosis Date    Anesthesia     1st cousin has malignant hyperthermia/pt has never had an issue or her sisters    Arthritis     hips knees hands spine    Asthma     prn inhalers    Bowel habit changes     diarrhea    Depression     Daughter passed few years ago    Erosive esophagitis     GERD (gastroesophageal reflux disease)     Heart burn     High cholesterol     Immunocompromised (HCC)     Pain     hips knees     Pneumonia 2018    Psoriasis     Psoriatic arthritis (HCC)        PSHx:   Past Surgical History:   Procedure Laterality Date    AR BRONCHOSCOPY,DIAGNOSTIC  7/9/2021    Procedure: BRONCHOSCOPY;  Surgeon: Myles Vidal M.D.;  Location: SURGERY [facility];  Service: Ent    TENDON REPAIR Left 9/19/2019    Procedure: REPAIR, TENDON- QUADRICEPS RUTURE REPAIR AND MEYERS;  Surgeon: Jayden Velázquez M.D.;  Location: SURGERY [facility];  Service: Orthopedics    HYSTERECTOMY ROBOTIC XI N/A 7/11/2019    Procedure: HYSTERECTOMY, ROBOT-ASSISTED, USING DA SABINO XI;  Surgeon: Curly Bernal M.D.;  Location: SURGERY [facility];  Service: Gynecology    SALPINGO OOPHORECTOMY Bilateral 7/11/2019    Procedure: SALPINGO-OOPHORECTOMY;  Surgeon: Curly Bernal M.D.;  Location: SURGERY [facility];  Service: Gynecology    GYN SURGERY  1999    c sec    CYSTECTOMY      FUSION, SPINE, LUMBAR, PLIF      HIP REPLACEMENT, TOTAL      Right    OTHER ABDOMINAL SURGERY      appendix    OTHER ORTHOPEDIC SURGERY      left knee    OTHER ORTHOPEDIC SURGERY      right rotator cuff       Family history   Family History   Problem Relation Age of Onset    Hyperlipidemia Mother     Heart Attack Mother     Psychiatric Illness Mother     Heart Disease Mother     Arthritis Mother     Lung Disease Father     Cancer Father     Hyperlipidemia Father     Hyperlipidemia Sister     Cancer Sister         breast    Heart Disease Paternal Grandfather     Hypertension Neg Hx     Diabetes Neg Hx        Medications:   Current Outpatient Medications   Medication    tizanidine (ZANAFLEX) 4 MG Tab    triamcinolone acetonide (KENALOG) 0.1 % Cream    fluticasone-salmeterol (ADVAIR) 250-50 MCG/ACT AEROSOL POWDER, BREATH ACTIVATED    amoxicillin-clavulanate (AUGMENTIN) 875-125 MG Tab    ipratropium-albuterol (COMBIVENT RESPIMAT)  MCG/ACT Aero Soln    escitalopram (LEXAPRO) 10 MG Tab    clotrimazole (MYCELEX) 10 MG Cristine cristine    escitalopram (LEXAPRO) 20 MG  tablet    famotidine (PEPCID) 20 MG Tab    fluticasone-salmeterol (ADVAIR) 500-50 MCG/ACT AEROSOL POWDER, BREATH ACTIVATED    EPINEPHrine (EPIPEN 2-NATALIA) 0.3 MG/0.3ML Solution Auto-injector solution for injection    acyclovir (ZOVIRAX) 5 % Ointment    lamoTRIgine (LAMICTAL) 100 MG Tab    phenazopyridine (PYRIDIUM) 200 MG Tab    rosuvastatin (CRESTOR) 10 MG Tab    omeprazole (PRILOSEC) 40 MG delayed-release capsule    montelukast (SINGULAIR) 10 MG Tab    Spacer/Aero-Holding Chambers (AEROCHAMBER MV) Misc    magnesium oxide (MAG-OX) 400 MG Tab tablet    folic acid (FOLVITE) 1 MG Tab    etodolac (LODINE) 400 MG tablet    albuterol 108 (90 Base) MCG/ACT Aero Soln inhalation aerosol    HUMIRA PEN 40 MG/0.4ML Pen-injector Kit    vitamin D (VITAMIND D3) 1000 UNIT Tab    acetaminophen (TYLENOL) 325 MG Tab    cetirizine (ZYRTEC) 10 MG Tab    gabapentin (NEURONTIN) 100 MG Cap     No current facility-administered medications for this visit.       Allergies:   Allergies   Allergen Reactions    Iodine Anaphylaxis, Rash and Hives     IV = anaphylaxis, topical = rash  IV = anaphylaxis, topical = rash    Levofloxacin Anaphylaxis    Shellfish-Derived Products Anaphylaxis     LOBSTER       Social Hx:   Social History     Socioeconomic History    Marital status:      Spouse name: Not on file    Number of children: Not on file    Years of education: Not on file    Highest education level: Not on file   Occupational History    Occupation: Flight Nurse/Educator   Tobacco Use    Smoking status: Never    Smokeless tobacco: Never   Vaping Use    Vaping Use: Never used   Substance and Sexual Activity    Alcohol use: Not Currently     Alcohol/week: 0.6 - 1.2 oz     Types: 1 - 2 Glasses of wine per week     Comment: one glss of wine at night     Drug use: No    Sexual activity: Yes     Comment: Tubal ligation   Other Topics Concern     Service No    Blood Transfusions No    Caffeine Concern No    Occupational Exposure Yes     Hobby Hazards Yes    Sleep Concern Yes    Stress Concern No    Weight Concern Yes    Special Diet No    Back Care No    Exercise Yes    Bike Helmet Yes    Seat Belt Yes    Self-Exams Yes   Social History Narrative    Not on file     Social Determinants of Health     Financial Resource Strain: Not on file   Food Insecurity: Not on file   Transportation Needs: Not on file   Physical Activity: Not on file   Stress: Not on file   Social Connections: Not on file   Intimate Partner Violence: Not on file   Housing Stability: Not on file         EXAMINATION     Physical Exam:   Vitals: /60 (BP Location: Right arm, Patient Position: Sitting, BP Cuff Size: Large adult)   Pulse 66   Temp (!) 35.8 °C (96.4 °F) (Temporal)   Ht 1.829 m (6')   Wt 108 kg (238 lb 3.2 oz)   SpO2 97%     Constitutional:   Body Habitus: Body mass index is 32.31 kg/m².  Cooperation: Fully cooperates with exam  Appearance: Well-groomed no disheveled, in no acute distress    Respiratory-  breathing comfortable on room air, no audible wheezing  Cardiovascular- capillary refills less than 2 seconds. No lower extremity edema is noted.   Psychiatric- alert and oriented ×3. Normal affect.   Spine:   Mild tenderness over the PSIS left greater than right.  Dulce Maria's test is negative bilaterally.  Compression test is negative bilaterally.  Straight leg raise is positive on the right and negative on the left    Reflexes 1+ right patella and 2+ left patella, 2+ achilles bilaterally.  No clonus bilaterally      Key points for the international standards for neurological classification of spinal cord injury (ISNCSCI) to light touch.     Dermatome R L   L2 2 2   L3 2 2   L4 2 2   L5 2 2   S1 2 2   S2 2 2       Motor Exam Lower Extremities    ? Myotome R L   Hip flexion L2 4 5-   Knee extension L3 4+ 5   Ankle dorsiflexion L4 4+ 5   Toe extension L5 4 5   Ankle plantarflexion S1 5- 5           MEDICAL DECISION MAKING    DATA    EMG 03/11/2022    Study  Summary:  Nerve conduction studies demonstrate absence of the bilateral sural sensory responses.   The left ulnar motor response demonstrates slowing of conduction velocity across the elbow.     Needle EMG exam of select muscles in the right leg shows increased amplitude motor units in the anterior tibialis and medial gastrocnemius.     Impression:  Abnormal study. 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   There is evidence of a chronic/old left L5 radiculopathy. There is no evidence of an active radiculopathy.     Gina Pires MD PhD  Fellow, Neuromuscular Medicine  Visiting Instructor, PM&R      Labs:   Lab Results   Component Value Date/Time    SODIUM 137 02/17/2023 09:10 PM    POTASSIUM 3.6 02/17/2023 09:10 PM    CHLORIDE 104 02/17/2023 09:10 PM    CO2 20 02/17/2023 09:10 PM    GLUCOSE 97 02/17/2023 09:10 PM    BUN 28 (H) 02/17/2023 09:10 PM    CREATININE 0.60 02/17/2023 09:10 PM        Lab Results   Component Value Date/Time    PROTHROMBTM 13.8 07/05/2019 02:00 PM    INR 1.04 07/05/2019 02:00 PM        Lab Results   Component Value Date/Time    WBC 10.7 02/17/2023 09:10 PM    RBC 4.37 02/17/2023 09:10 PM    HEMOGLOBIN 14.1 02/17/2023 09:10 PM    HEMATOCRIT 39.2 02/17/2023 09:10 PM    MCV 89.7 02/17/2023 09:10 PM    MCH 32.3 02/17/2023 09:10 PM    MCHC 36.0 (H) 02/17/2023 09:10 PM    MPV 9.8 02/17/2023 09:10 PM    NEUTSPOLYS 70.30 02/17/2023 09:10 PM    LYMPHOCYTES 19.80 (L) 02/17/2023 09:10 PM    MONOCYTES 7.60 02/17/2023 09:10 PM    EOSINOPHILS 1.30 02/17/2023 09:10 PM    BASOPHILS 0.60 02/17/2023 09:10 PM        Lab Results   Component Value Date/Time    HBA1C 5.7 (H) 03/15/2023 02:28 PM    HBA1C 5.2 06/07/2022 09:53 AM          Imaging: I personally reviewed following images  MRI lumbar spine with and without 04/09/2023  At L1-2, no central or foraminal stenosis  At L2-3, diffuse  disc bulge without central canal stenosis, moderate facet arthropathy, mild left foraminal stenosis  At L3-4, there is moderate central canal stenosis with severe facet arthropathy, difffuse disc bulge, mod-severe right and mode left foraminal stenosis  At L4-5, posterior decompression with posterior fusion at L4-5.  Diffuse disc bulge, facet arthropathy mild foraminal stenosis bilaterally  At L5-S1, diffuse disc bulge, severe facet arthropathy, mild foraminal stenosis bilaterally      MRI brain with and without 12/13/2022  Nonspecific T2 hyperintensities noted in subcortical region and periventribular white matter.     MRI cervical spine with and without 12/13/2022  There is moderate central canal stenosis at C5-6 without other levels of central canal stenosis.  No significant cord compression, no T2 signal changes.  Mild multilevel degenerative changes      I reviewed the following radiology reports  MRI lumbar spine with and without 04/09/2023  IMPRESSION:     1.  Postsurgical changes at L4-L5 redemonstrated  2.  Multilevel multifactorial degenerative changes  3.  No areas of high-grade central canal narrowing  4.  Areas of central canal and neural foraminal narrowing as described above      Results for orders placed during the hospital encounter of 12/13/22    MR-BRAIN-WITH & W/O    Impression  1.  No acute abnormality.  2.  A few nonspecific T2 hyperintensities in the supratentorial white matter likely representing nonspecific foci of gliosis.  3.  Mild cerebral volume loss.                 Results for orders placed during the hospital encounter of 12/13/22    MR-CERVICAL SPINE-WITH & W/O    Impression  Multifocal degenerative disease in the cervical spine as described above.    Results for orders placed during the hospital encounter of 12/28/21    MR-LUMBAR SPINE-W/O    Impression  Postoperative changes in the lumbar spine at L4-5 with a well decompressed spinal canal.    Endplate degenerative changes at L3-4  result in mild canal narrowing and mild bilateral foraminal narrowing.           DIAGNOSIS   Visit Diagnoses     ICD-10-CM   1. Lumbosacral radiculopathy  M54.17   2. Spinal stenosis of lumbar region with neurogenic claudication  M48.062   3. Foraminal stenosis of lumbar region  M48.061   4. S/P lumbar fusion  Z98.1   5. Sensory neuropathy  G62.9   6. Obesity (BMI 30.0-34.9)  E66.9   7. Muscle spasm  M62.838           ASSESSMENT and PLAN:     Arely Haynes 62 y.o. female seen for above    Arely was seen today for follow-up.    Diagnoses and all orders for this visit:    Lumbosacral radiculopathy  -     Referral to Pain Clinic    Spinal stenosis of lumbar region with neurogenic claudication  -     Referral to Pain Clinic    Foraminal stenosis of lumbar region  -     Referral to Pain Clinic    S/P lumbar fusion    Sensory neuropathy    Obesity (BMI 30.0-34.9)    Muscle spasm  -     tizanidine (ZANAFLEX) 4 MG Tab; Take 1 Tablet by mouth every 6 hours as needed (muscle spasm) for up to 10 days.        Continue PT as tolerated.  Encouraged her to be cautious about being too active with her activities and days that she feels a little better.  Consider gentle home exercise program as tolerated.  Discussed findings on MRI of the lumbar spine.  Trial of right L3-4 interlaminar epidural steroid injection discussed. The risks benefits and alternatives to this procedure were discussed and the patient wishes to proceed with the procedure. Risks include but are not limited to damage to surrounding structures, infection, bleeding, worsening of pain which can be permanent, weakness which can be permanent. Benefits include pain relief, improved function. Alternatives includes not doing the procedure.    Discussed titrating gabapentin to 300 mg in the morning, 300 mg/day and 600 mg at bedtime.  Discussed gradual titration.  Discussed trial of tizanidine to 4 mg at bedtime and up to every 6 hours if well-tolerated.  Discussed  that she will avoid taking these medications with alcohol.  Discontinue cyclobenzaprine.  Discussed her activity goals which include hiking and biking.  Encouraged her to temporarily decrease the intensity of activities  Continue with making healthy dietary choices and goals of weight management.      Follow up: Hospital injection    Thank you for allowing me to participate in the care of this patient. If you have any questions please not hesitate to contact me.    My total time spent caring for the patient on the day of the encounter was 45 minutes.   This does not include time spent on separately billable procedures/tests.      Please note that this dictation was created using voice recognition software. I have made every reasonable attempt to correct obvious errors but there may be errors of grammar and content that I may have overlooked prior to finalization of this note.      Elvis Aleman MD  Interventional Spine and Sports Physiatry  Physical Medicine and Rehabilitation  [facility]  4/12/2023

## 2023-04-13 ENCOUNTER — HOSPITAL ENCOUNTER (OUTPATIENT)
Facility: MEDICAL CENTER | Age: 63
End: 2023-04-13
Attending: FAMILY MEDICINE
Payer: COMMERCIAL

## 2023-04-13 DIAGNOSIS — R30.0 DYSURIA: ICD-10-CM

## 2023-04-13 LAB
APPEARANCE UR: ABNORMAL
BACTERIA #/AREA URNS HPF: ABNORMAL /HPF
BILIRUB UR QL STRIP.AUTO: ABNORMAL
COLOR UR: YELLOW
EPI CELLS #/AREA URNS HPF: ABNORMAL /HPF
GLUCOSE UR STRIP.AUTO-MCNC: NEGATIVE MG/DL
HYALINE CASTS #/AREA URNS LPF: ABNORMAL /LPF
KETONES UR STRIP.AUTO-MCNC: 15 MG/DL
LEUKOCYTE ESTERASE UR QL STRIP.AUTO: ABNORMAL
MICRO URNS: ABNORMAL
NITRITE UR QL STRIP.AUTO: NEGATIVE
PH UR STRIP.AUTO: 5 [PH] (ref 5–8)
PROT UR QL STRIP: 100 MG/DL
RBC # URNS HPF: ABNORMAL /HPF
RBC UR QL AUTO: NEGATIVE
SP GR UR STRIP.AUTO: >=1.03
UROBILINOGEN UR STRIP.AUTO-MCNC: 0.2 MG/DL
WBC #/AREA URNS HPF: ABNORMAL /HPF

## 2023-04-13 PROCEDURE — 87077 CULTURE AEROBIC IDENTIFY: CPT

## 2023-04-13 PROCEDURE — 87186 SC STD MICRODIL/AGAR DIL: CPT

## 2023-04-13 PROCEDURE — 81001 URINALYSIS AUTO W/SCOPE: CPT

## 2023-04-13 PROCEDURE — 87086 URINE CULTURE/COLONY COUNT: CPT

## 2023-04-14 ENCOUNTER — OFFICE VISIT (OUTPATIENT)
Dept: MEDICAL GROUP | Facility: LAB | Age: 63
End: 2023-04-14
Payer: COMMERCIAL

## 2023-04-14 VITALS
BODY MASS INDEX: 32.23 KG/M2 | OXYGEN SATURATION: 95 % | SYSTOLIC BLOOD PRESSURE: 120 MMHG | RESPIRATION RATE: 16 BRPM | WEIGHT: 238 LBS | DIASTOLIC BLOOD PRESSURE: 64 MMHG | HEART RATE: 70 BPM | TEMPERATURE: 97.5 F | HEIGHT: 72 IN

## 2023-04-14 DIAGNOSIS — N39.0 ACUTE LOWER UTI: ICD-10-CM

## 2023-04-14 PROCEDURE — 99213 OFFICE O/P EST LOW 20 MIN: CPT | Performed by: PHYSICIAN ASSISTANT

## 2023-04-14 RX ORDER — PHENAZOPYRIDINE HYDROCHLORIDE 200 MG/1
200 TABLET, FILM COATED ORAL 3 TIMES DAILY PRN
Qty: 15 TABLET | Refills: 0 | Status: SHIPPED | OUTPATIENT
Start: 2023-04-14 | End: 2023-06-08

## 2023-04-14 RX ORDER — NITROFURANTOIN 25; 75 MG/1; MG/1
100 CAPSULE ORAL 2 TIMES DAILY
Qty: 14 CAPSULE | Refills: 0 | Status: SHIPPED | OUTPATIENT
Start: 2023-04-14 | End: 2023-04-21

## 2023-04-14 ASSESSMENT — FIBROSIS 4 INDEX: FIB4 SCORE: 1.75

## 2023-04-14 NOTE — PROGRESS NOTES
Chief Complaint   Patient presents with    UTI       HPI:  Symptom onset: 3 days ago   Current symptoms: Painful, urgent, frequent voids. No blood noted in urine.  Since onset symptoms are: Unchanged  Treatments tried: OTC antispasm med.  Associated symptoms: Negative for fever, flank pain, nausea and vomiting, vaginal discharge, pelvic pain.  History is positive for frequent UTI.     Taking off prophylactic Bactrim 1 month ago for recurrent UTIs    ROS:  Denies fever, chills, vomiting or abdominal pain.     OBJECTIVE:  /64   Pulse 70   Temp 36.4 °C (97.5 °F)   Resp 16   Ht 1.829 m (6')   Wt 108 kg (238 lb)   SpO2 95%   Gen: Alert, NAD.  Chest: Lungs clear to auscultation, CV RRR.  Abdomen: Soft, tender in suprapubic region. No CVAT. Normal bowel sounds.          ASSESSMENT/PLAN:     1. Acute lower UTI          1. Abnormal urinalysis, culture pending. Start antibiotics.  2. Provided education to drink plenty of fluids, wipe front to back every void and bowel movement.   3. Return to clinic if symptoms not improving within 3-4 days or in case of vomiting, fever, increasing pain.

## 2023-04-18 DIAGNOSIS — N39.0 ACUTE LOWER UTI: ICD-10-CM

## 2023-04-21 DIAGNOSIS — M62.838 MUSCLE SPASM: ICD-10-CM

## 2023-04-27 RX ORDER — TIZANIDINE 4 MG/1
TABLET ORAL
Qty: 40 TABLET | Refills: 0 | Status: SHIPPED | OUTPATIENT
Start: 2023-04-27 | End: 2023-05-12

## 2023-05-08 DIAGNOSIS — M62.838 MUSCLE SPASM: ICD-10-CM

## 2023-05-08 PROCEDURE — 1125F AMNT PAIN NOTED PAIN PRSNT: CPT | Performed by: PHYSICAL MEDICINE & REHABILITATION

## 2023-05-12 RX ORDER — TIZANIDINE 4 MG/1
TABLET ORAL
Qty: 40 TABLET | Refills: 0 | Status: ON HOLD | OUTPATIENT
Start: 2023-05-12 | End: 2023-07-03

## 2023-05-12 NOTE — PROGRESS NOTES
Subjective:     CC: Rash, cold sores    HPI:   Arely presents today with:    Rash  Diffuse, red, dry, itchy rash that started 4-5 days ago. To trunk, arms, thighs.   No new exposurres.  Only new medication is Humira - started 10 weeks ago.  Did have a rash earlier this month as well,    Have a different rash earlier this month, more discrete areas of itching that resembled bug bites.    Is on methotrexate and Humira for psoriatic arthritis.  With rheumatologist in [place].    She also currently has a cold sore on her lower nose.  She has acyclovir but prefers to avoid using it if she can, upsets her stomach.    Medications, past medical history, allergies, and social history have been reviewed and updated.    ROS:  Review of Systems   Constitutional: Negative for chills and fever.   Respiratory: Negative for cough and shortness of breath.    Cardiovascular: Negative for chest pain.   Gastrointestinal: Negative for diarrhea, nausea and vomiting.        Objective:       Exam:  /66 (BP Location: Left arm, Patient Position: Sitting, BP Cuff Size: Adult)   Pulse 62   Temp 36.8 °C (98.2 °F)   Resp 12   Ht 1.829 m (6')   Wt 111 kg (244 lb)   LMP  (LMP Unknown)   SpO2 95%   BMI 33.09 kg/m²  Body mass index is 33.09 kg/m².    Constitutional: Alert. Well appearing. No distress.  Skin: Warm, dry.  To the trunk and arms there is a diffuse maculopapular rash with mild scale.  ENMT: Moist mucous membranes. Normal dentition.  Respiratory: Normal effort.   Neuro: Moves all four extremities. No facial droop.  Psych: Answers questions appropriately. Normal affect and mood.    Assessment & Plan:     60 y.o. female with the following -     1. Rash and nonspecific skin eruption  Diffuse, pruritic, maculopapular rash to trunk and arms.  Began 4 to 5 days ago although she did have a rash earlier this month.  Concern for possible drug reaction secondary to Humira.  Will treat with prednisone burst, topical  triamcinolone to worst areas.  She does have follow-up with her rheumatologist this next week and will discuss with them.  - triamcinolone acetonide (KENALOG) 0.1 % Cream; Apply to affected area BID x14 days  Dispense: 80 g; Refill: 0  - predniSONE (DELTASONE) 20 MG Tab; Take 2 Tablets by mouth every day for 5 days.  Dispense: 10 tablet; Refill: 0    2. Recurrent cold sores  She has acyclovir available but would like to try Abreva first.  - docosanol (ABREVA) 10 % Cream; Apply 1 Application topically 5 Times a Day.  Dispense: 2 g; Refill: 1      Please note that this note was created using voice recognition software.       Son

## 2023-06-02 ENCOUNTER — APPOINTMENT (OUTPATIENT)
Dept: RADIOLOGY | Facility: REHABILITATION | Age: 63
End: 2023-06-02
Attending: PHYSICAL MEDICINE & REHABILITATION
Payer: COMMERCIAL

## 2023-06-02 ENCOUNTER — HOSPITAL ENCOUNTER (OUTPATIENT)
Facility: REHABILITATION | Age: 63
End: 2023-06-02
Attending: PHYSICAL MEDICINE & REHABILITATION | Admitting: PHYSICAL MEDICINE & REHABILITATION
Payer: COMMERCIAL

## 2023-06-02 VITALS
TEMPERATURE: 98.4 F | OXYGEN SATURATION: 94 % | HEIGHT: 72 IN | HEART RATE: 54 BPM | RESPIRATION RATE: 16 BRPM | SYSTOLIC BLOOD PRESSURE: 141 MMHG | DIASTOLIC BLOOD PRESSURE: 78 MMHG | BODY MASS INDEX: 32.25 KG/M2 | WEIGHT: 238.1 LBS

## 2023-06-02 PROCEDURE — 700101 HCHG RX REV CODE 250

## 2023-06-02 PROCEDURE — 700117 HCHG RX CONTRAST REV CODE 255

## 2023-06-02 PROCEDURE — 62323 NJX INTERLAMINAR LMBR/SAC: CPT

## 2023-06-02 PROCEDURE — A9579 GAD-BASE MR CONTRAST NOS,1ML: HCPCS

## 2023-06-02 PROCEDURE — 700111 HCHG RX REV CODE 636 W/ 250 OVERRIDE (IP)

## 2023-06-02 RX ORDER — DEXAMETHASONE SODIUM PHOSPHATE 10 MG/ML
INJECTION, SOLUTION INTRAMUSCULAR; INTRAVENOUS
Status: COMPLETED
Start: 2023-06-02 | End: 2023-06-02

## 2023-06-02 RX ORDER — LIDOCAINE HYDROCHLORIDE 20 MG/ML
INJECTION, SOLUTION EPIDURAL; INFILTRATION; INTRACAUDAL; PERINEURAL
Status: COMPLETED
Start: 2023-06-02 | End: 2023-06-02

## 2023-06-02 RX ADMIN — DEXAMETHASONE SODIUM PHOSPHATE 10 MG: 10 INJECTION, SOLUTION INTRAMUSCULAR; INTRAVENOUS at 10:06

## 2023-06-02 RX ADMIN — LIDOCAINE HYDROCHLORIDE 5 ML: 20 INJECTION, SOLUTION EPIDURAL; INFILTRATION; INTRACAUDAL at 10:06

## 2023-06-02 RX ADMIN — GADOTERIDOL 10 ML: 279.3 INJECTION, SOLUTION INTRAVENOUS at 10:06

## 2023-06-02 ASSESSMENT — FIBROSIS 4 INDEX: FIB4 SCORE: 1.75

## 2023-06-02 ASSESSMENT — PAIN DESCRIPTION - PAIN TYPE: TYPE: CHRONIC PAIN

## 2023-06-02 NOTE — PROGRESS NOTES
0940 Pt admitted to Pre Procedure area.  Consent signed and post op instructions reviewed with pt, all questions answered.   0945 Dr. Aleman in to see pt.    1031 Pt received to Post Procedure area with updates from procedure RN.  Snack and drink tolerated without C/O N/V.  Dressing clear, dry, no swelling noted.    1045 Pt seen by Dr. Aleman for post procedure evaluation.     1050 Pt plan was to drive self home.  Dr Aleman advised pt obtain ride.  Pt waiting in recliner for ride to arrive.    1202 Pt ambulated without difficulty & meets criteria for DC, accompanied to car and placed in passenger seat.  Discharged to designated .

## 2023-06-02 NOTE — H&P
Physical Medicine & Rehab History & Physical Note    Date  6/2/2023    Primary Care Physician  Joe Espinal M.D.    CC  Pre-Op Diagnosis Codes:     * Lumbosacral radiculopathy [M54.17]     * Spinal stenosis of lumbar region with neurogenic claudication [M48.062]     * Foraminal stenosis of lumbar region [M48.061]     * S/P lumbar spinal fusion [Z98.1]    HPI  This is a 62 y.o. female who presented with low back and right thigh pain.  Here for interlaminar epidural steroid injection.     Past Medical History:   Diagnosis Date    Anesthesia     1st cousin has malignant hyperthermia/pt has never had an issue or her sisters    Arthritis     hips knees hands spine    Asthma     prn inhalers    Bowel habit changes     diarrhea    Depression     Daughter passed few years ago    Erosive esophagitis     GERD (gastroesophageal reflux disease)     Heart burn     High cholesterol     Immunocompromised (HCC)     Pain     hips knees    Pneumonia 2018    Psoriasis     Psoriatic arthritis (HCC)        Past Surgical History:   Procedure Laterality Date    [place] BRONCHOSCOPY,DIAGNOSTIC  7/9/2021    Procedure: BRONCHOSCOPY;  Surgeon: Myles Vidal M.D.;  Location: SURGERY [facility];  Service: Ent    TENDON REPAIR Left 9/19/2019    Procedure: REPAIR, TENDON- QUADRICEPS RUTURE REPAIR AND MEYERS;  Surgeon: Jayden Velázquez M.D.;  Location: [facility];  Service: Orthopedics    HYSTERECTOMY ROBOTIC XI N/A 7/11/2019    Procedure: HYSTERECTOMY, ROBOT-ASSISTED, USING DA SABINO XI;  Surgeon: Curly Bernal M.D.;  Location: [facility];  Service: Gynecology    SALPINGO OOPHORECTOMY Bilateral 7/11/2019    Procedure: SALPINGO-OOPHORECTOMY;  Surgeon: Curly Bernal M.D.;  Location: [facility];  Service: Gynecology    GYN SURGERY  1999    c sec    CYSTECTOMY      FUSION, SPINE, LUMBAR, PLIF      HIP REPLACEMENT, TOTAL      Right    OTHER ABDOMINAL SURGERY      appendix    OTHER ORTHOPEDIC SURGERY       left knee    OTHER ORTHOPEDIC SURGERY      right rotator cuff       Current Facility-Administered Medications   Medication Dose Route Frequency Provider Last Rate Last Admin    LIDOCAINE HCL (PF) 2 % INJ SOLN             DEXAMETHASONE SOD PHOSPHATE PF 10 MG/ML INJ SOLN             IOHEXOL 240 MG/ML INJ SOLN                Social History     Socioeconomic History    Marital status:      Spouse name: Not on file    Number of children: Not on file    Years of education: Not on file    Highest education level: Not on file   Occupational History    Occupation: Flight Nurse/Educator   Tobacco Use    Smoking status: Never    Smokeless tobacco: Never   Vaping Use    Vaping Use: Never used   Substance and Sexual Activity    Alcohol use: Not Currently     Alcohol/week: 0.6 - 1.2 oz     Types: 1 - 2 Glasses of wine per week     Comment: one glss of wine at night     Drug use: No    Sexual activity: Yes     Comment: Tubal ligation   Other Topics Concern     Service No    Blood Transfusions No    Caffeine Concern No    Occupational Exposure Yes    Hobby Hazards Yes    Sleep Concern Yes    Stress Concern No    Weight Concern Yes    Special Diet No    Back Care No    Exercise Yes    Bike Helmet Yes    Seat Belt Yes    Self-Exams Yes   Social History Narrative    Not on file     Social Determinants of Health     Financial Resource Strain: Not on file   Food Insecurity: Not on file   Transportation Needs: Not on file   Physical Activity: Not on file   Stress: Not on file   Social Connections: Not on file   Intimate Partner Violence: Not on file   Housing Stability: Not on file       Family History   Problem Relation Age of Onset    Hyperlipidemia Mother     Heart Attack Mother     Psychiatric Illness Mother     Heart Disease Mother     Arthritis Mother     Lung Disease Father     Cancer Father     Hyperlipidemia Father     Hyperlipidemia Sister     Cancer Sister         breast    Heart Disease Paternal Grandfather      Hypertension Neg Hx     Diabetes Neg Hx        Allergies  Iodine, Levofloxacin, and Shellfish-derived products    Review of Systems  Negative except for note of mild incontinence, history of hysterectomy    Physical Exam  Constitutional:       Appearance: Normal appearance.   HENT:      Head: Normocephalic and atraumatic.   Cardiovascular:      Rate and Rhythm: Normal rate.      Heart sounds: Normal heart sounds.   Pulmonary:      Effort: Pulmonary effort is normal. No respiratory distress.      Breath sounds: Normal breath sounds.   Neurological:      Mental Status: She is alert and oriented to person, place, and time.   Psychiatric:         Mood and Affect: Mood normal.         Vital Signs  Vitals were reviewed in the nursing chart    Labs:  Contains abnormal data COMP METABOLIC PANEL  Order: [id number]   Ref Range & Units 2 mo ago   Sodium 136 - 144 mmol/L 142    Potassium 3.3 - 5.0 mmol/L 3.6    Chloride 102 - 110 mmol/L 112 High     Co2 20 - 26 mmol/L 19 Low     Bun 8 - 24 mg/dL 21    Creatinine 0.57 - 1.11 mg/dL 0.55 Low     Glucose 64 - 128 mg/dL 93    Anion Gap 8 - 14 mmol/L 11    Calcium 8.4 - 10.5 mg/dL 9.9    Total Protein 6.5 - 8.4 g/dL 7.8    Albumin 3.5 - 5.0 g/dL 4.4    Total Bilirubin 0.2 - 1.4 mg/dL 0.5    Alkaline Phosphatase 38 - 126 U/L 103    AST(SGOT) 16 - 40 U/L 26    ALT(SGPT) 5 - 60 U/L 19    eGFR, CKD-EPI CRT 2021 mL/min/1.73m2 102                        Radiology:  DX-PORTABLE FLUOROSCOPY < 1 HOUR Is the patient pregnant? Unknown    (Results Pending)         Assessment/Plan:  Pre-Op Diagnosis Codes:     * Lumbosacral radiculopathy [M54.17]     * Spinal stenosis of lumbar region with neurogenic claudication [M48.062]     * Foraminal stenosis of lumbar region [M48.061]     * S/P lumbar spinal fusion [Z98.1]  Procedure(s):  RIGHT L3-4 interlaminar epidural steroid injection. Patient has tolerated gadolinium    Plan to proceed with injection as planned.    Elvis Aleman MD

## 2023-06-02 NOTE — OP REPORT
"Type of procedure: Right L5-S1 interlaminar epidural steroid injection     Pre-Operative Diagnosis:   M54.17 (ICD-10-CM) - Lumbosacral radiculopathy   M48.062 (ICD-10-CM) - Spinal stenosis of lumbar region with neurogenic claudication   M48.061 (ICD-10-CM) - Foraminal stenosis of lumbar region        Post-Operative Diagnoses: Same    Type of Anesthesia: Local anesthesia     Physician: Elvis Aleman MD     Blood Loss: None     Method of Procedure:     The patient signed an informed consent in the pre-op area after all risks and complications were explained and all questions were answered.  Blood pressure, heart rate, pulse oximetry, and EKG monitoring were performed before and after the procedure.      The patient was prepped and draped in a sterile fashion in the prone position.   The patient's spine was surveyed under fluoroscopic visualization and anatomical landmarks were identified.     Right L5-S1 Lumbar Interlaminar Epidural Steroid Injection:     The region overlying the right sacrum lamina was localized and the soft tissues overlying this structure were infiltrated with 1% Lidocaine without Epinephrine.  A 20G 3.5 inch needle which was changed to a 6 inch, Tuohy needle was inserted through the anesthetized tract of tissue to the right sacrum base.  The needle was \"walked off\" the lamina and then loss of resistance was obtained.  After negative aspiration for blood, Gadavist was used to confirm needle location and AP and lateral films were obtained confirming needle placement in the epidural space.  The postsurgical changes with PLIF at L4-5 and use of Gadavist obscured visualization.  A small test dose of 0.5ml of 2% lidocaine was injected.  After 90 second test dose and no lower extremity symptoms and negative aspiraction, injection was performed using 1cc dexamethasone (10mg/cc), 1.0cc of 1% lidocaine without epinephrine and 2.0cc of preservative-free normal saline.  The patient tolerated the procedure " well.    This procedure required more time and mental effort to account for the post-surgical changes in the spine with scar tissue and use of Gadavist.     Complications: None     Disposition:     1. The patient was discharged to the recovery area in good condition.  2. Discharge instructions were provided.  3. Call with any questions or problems  4. Apply ice to injection site and keep clean and dry for 24 hours.     Elvis Aleman MD  Physical Medicine and Rehabilitation  Interventional Spine and Sports Physiatry  [facility]

## 2023-06-08 ENCOUNTER — OFFICE VISIT (OUTPATIENT)
Dept: MEDICAL GROUP | Facility: LAB | Age: 63
End: 2023-06-08
Payer: COMMERCIAL

## 2023-06-08 VITALS
DIASTOLIC BLOOD PRESSURE: 64 MMHG | HEART RATE: 62 BPM | SYSTOLIC BLOOD PRESSURE: 116 MMHG | BODY MASS INDEX: 32.05 KG/M2 | OXYGEN SATURATION: 96 % | WEIGHT: 236.6 LBS | HEIGHT: 72 IN | TEMPERATURE: 97.2 F | RESPIRATION RATE: 14 BRPM

## 2023-06-08 DIAGNOSIS — R53.1 RIGHT SIDED WEAKNESS: ICD-10-CM

## 2023-06-08 DIAGNOSIS — R73.03 PREDIABETES: ICD-10-CM

## 2023-06-08 DIAGNOSIS — E66.9 OBESITY (BMI 30.0-34.9): ICD-10-CM

## 2023-06-08 DIAGNOSIS — M54.17 LUMBOSACRAL RADICULOPATHY: ICD-10-CM

## 2023-06-08 PROCEDURE — 99214 OFFICE O/P EST MOD 30 MIN: CPT | Performed by: FAMILY MEDICINE

## 2023-06-08 PROCEDURE — 3078F DIAST BP <80 MM HG: CPT | Performed by: FAMILY MEDICINE

## 2023-06-08 PROCEDURE — 3074F SYST BP LT 130 MM HG: CPT | Performed by: FAMILY MEDICINE

## 2023-06-08 RX ORDER — ETODOLAC 400 MG/1
1 TABLET, FILM COATED ORAL 2 TIMES DAILY
COMMUNITY
Start: 2023-03-23 | End: 2023-06-08

## 2023-06-08 RX ORDER — FLUTICASONE PROPIONATE AND SALMETEROL 100; 50 UG/1; UG/1
POWDER RESPIRATORY (INHALATION)
COMMUNITY
End: 2023-06-08

## 2023-06-08 RX ORDER — METFORMIN HYDROCHLORIDE 500 MG/1
500 TABLET, EXTENDED RELEASE ORAL 2 TIMES DAILY
Qty: 180 TABLET | Refills: 3 | Status: SHIPPED | OUTPATIENT
Start: 2023-06-08 | End: 2023-07-12 | Stop reason: SDUPTHER

## 2023-06-08 RX ORDER — METHOTREXATE SODIUM 25 MG/ML
INJECTION, SOLUTION INTRA-ARTERIAL; INTRAMUSCULAR; INTRAVENOUS
COMMUNITY
End: 2023-06-08

## 2023-06-08 RX ORDER — AMOXICILLIN 500 MG/1
CAPSULE ORAL
Status: ON HOLD | COMMUNITY
End: 2023-07-03

## 2023-06-08 RX ORDER — BUDESONIDE AND FORMOTEROL FUMARATE DIHYDRATE 160; 4.5 UG/1; UG/1
AEROSOL RESPIRATORY (INHALATION)
COMMUNITY
End: 2023-06-08

## 2023-06-08 RX ORDER — SULFAMETHOXAZOLE AND TRIMETHOPRIM 800; 160 MG/1; MG/1
TABLET ORAL
COMMUNITY
End: 2023-06-08

## 2023-06-08 RX ORDER — METHYLPREDNISOLONE 4 MG/1
TABLET ORAL
COMMUNITY
Start: 2023-03-17 | End: 2023-06-08

## 2023-06-08 RX ORDER — AMOXICILLIN 500 MG/1
500 CAPSULE ORAL 3 TIMES DAILY
COMMUNITY
Start: 2023-05-22 | End: 2023-06-08

## 2023-06-08 RX ORDER — FAMOTIDINE 20 MG/1
20 TABLET, FILM COATED ORAL
COMMUNITY
Start: 2023-02-24 | End: 2023-06-08

## 2023-06-08 RX ORDER — GABAPENTIN 100 MG/1
CAPSULE ORAL
COMMUNITY
Start: 2023-03-15 | End: 2023-06-22

## 2023-06-08 RX ORDER — ETODOLAC 500 MG/1
TABLET, FILM COATED ORAL
COMMUNITY
End: 2023-06-08

## 2023-06-08 ASSESSMENT — FIBROSIS 4 INDEX: FIB4 SCORE: 1.75

## 2023-06-08 NOTE — PROGRESS NOTES
Subjective:     CC: Weight loss meds, PT referral    HPI:   Arely presents today to discuss weight loss.  Previously very active but has not had increasing issues with right leg weakness secondary to lumbosacral radiculopathy which is significantly limiting her exercise.  Interested in option of medications.    She did recently have epidural steroid injection with physiatry and hoping this will help with some symptoms.  She is also considering neurosurgery evaluation.  Needs new referral to continue PT.      Medications, past medical history, allergies, and social history have been reviewed and updated.      Objective:       Exam:  /64 (BP Location: Left arm, Patient Position: Sitting, BP Cuff Size: Large adult)   Pulse 62   Temp 36.2 °C (97.2 °F)   Resp 14   Ht 1.829 m (6')   Wt 107 kg (236 lb 9.6 oz)   LMP  (LMP Unknown)   SpO2 96%   BMI 32.09 kg/m²  Body mass index is 32.09 kg/m².    Constitutional: Alert. Well appearing. No distress.  Skin: Warm, dry, good turgor, no visible rashes.  Respiratory: Normal effort. Lungs are clear to auscultation bilaterally.  Cardiovascular: Regular rate and rhythm. Normal S1/S2. No murmurs, rubs or gallops.   Ext: Muscle wasting present to the right calf as compared to the left.  Moves both lower extremities.  Psych: Answers questions appropriately. Normal affect and mood.      Assessment & Plan:     62 y.o. female with the following -     1. Obesity (BMI 30.0-34.9)  2. Prediabetes  Exercise severely limited secondary to her right leg weakness and lumbosacral radiculopathy.  She would like to consider medications, discussed options and she would prefer to try metformin.  We will start this 500 mg twice daily and can increase dose if needed.  Also indicated for prediabetes with recent A1c 5.7%.  - metFORMIN ER (GLUCOPHAGE XR) 500 MG TABLET SR 24 HR; Take 1 Tablet by mouth 2 times a day.  Dispense: 180 Tablet; Refill: 3    3. Right sided weakness  4. Lumbosacral  radiculopathy  Chronic, complex issues, history of PLIF at L4-5 around 2009.  Currently following with physiatry, taking gabapentin.  Had recent epidural steroid injection and seeing some improvement.  Needs new referral to PT and is considering neurosurgical opinion.  - Referral to Physical Therapy      Please note that this note was created using voice recognition software.

## 2023-06-22 ENCOUNTER — OFFICE VISIT (OUTPATIENT)
Dept: PHYSICAL MEDICINE AND REHAB | Facility: MEDICAL CENTER | Age: 63
End: 2023-06-22
Payer: COMMERCIAL

## 2023-06-22 VITALS
BODY MASS INDEX: 31.83 KG/M2 | WEIGHT: 235 LBS | HEART RATE: 61 BPM | DIASTOLIC BLOOD PRESSURE: 60 MMHG | TEMPERATURE: 97 F | SYSTOLIC BLOOD PRESSURE: 116 MMHG | HEIGHT: 72 IN | OXYGEN SATURATION: 93 %

## 2023-06-22 DIAGNOSIS — Z98.1 S/P LUMBAR FUSION: ICD-10-CM

## 2023-06-22 DIAGNOSIS — M54.50 LUMBOSACRAL PAIN: ICD-10-CM

## 2023-06-22 DIAGNOSIS — M54.17 LUMBOSACRAL RADICULOPATHY: ICD-10-CM

## 2023-06-22 DIAGNOSIS — M53.3 SACROILIAC JOINT PAIN: ICD-10-CM

## 2023-06-22 PROCEDURE — 3074F SYST BP LT 130 MM HG: CPT | Performed by: PHYSICAL MEDICINE & REHABILITATION

## 2023-06-22 PROCEDURE — 3078F DIAST BP <80 MM HG: CPT | Performed by: PHYSICAL MEDICINE & REHABILITATION

## 2023-06-22 PROCEDURE — 1125F AMNT PAIN NOTED PAIN PRSNT: CPT | Performed by: PHYSICAL MEDICINE & REHABILITATION

## 2023-06-22 PROCEDURE — 99214 OFFICE O/P EST MOD 30 MIN: CPT | Performed by: PHYSICAL MEDICINE & REHABILITATION

## 2023-06-22 RX ORDER — PREGABALIN 50 MG/1
50 CAPSULE ORAL 3 TIMES DAILY
Qty: 90 CAPSULE | Refills: 1 | Status: SHIPPED | OUTPATIENT
Start: 2023-06-22 | End: 2023-07-12

## 2023-06-22 ASSESSMENT — PATIENT HEALTH QUESTIONNAIRE - PHQ9: CLINICAL INTERPRETATION OF PHQ2 SCORE: 0

## 2023-06-22 ASSESSMENT — FIBROSIS 4 INDEX: FIB4 SCORE: 1.75

## 2023-06-22 ASSESSMENT — PAIN SCALES - GENERAL: PAINLEVEL: 3=SLIGHT PAIN

## 2023-06-22 NOTE — PROGRESS NOTES
Follow up patient note  Pain Medicine, Interventional spine and sports physiatry, Physical medicine rehabilitation      Chief complaint:   Chief Complaint   Patient presents with    Follow-Up     Lumbosacral radiculopathy, 2 week post op          HISTORY    Please see new patient note  by Dr Aleman,  for more details.     HPI  Patient identification: Arely Haynes 62 y.o. female with history of psoriatic arthritis, history of headaches, history of multiple head injuries, s/p right hip replacement, history of PLIF at L4-5 around 2009 presents for follow-up.      Interval history:   The patient returns after right L5-S1 interlaminar epidural steroid injection on June 2, 2023.  This has helped with pain.  She now has pain that is 2-3/10 on the NRS.  This is significantly better that it was.  She does continued to have some tension and pain left sacroiliac joint.    She continues to have some pain in the right foot that is worse at night.  Feels cold    Currently, she is taking gabapentin 300mg in morning, 100mg middday and 300mg at bedtime.    Physical therapy at [facility] will be starting soon and she anticipates doing this twice a week.    Pain is 2-3/10 on the NRS.       ROS  See HPI    PMHx:   Past Medical History:   Diagnosis Date    Anesthesia     1st cousin has malignant hyperthermia/pt has never had an issue or her sisters    Arthritis     hips knees hands spine    Asthma     prn inhalers    Bowel habit changes     diarrhea    Depression     Daughter passed few years ago    Erosive esophagitis     GERD (gastroesophageal reflux disease)     Heart burn     High cholesterol     Immunocompromised (HCC)     Pain     hips knees    Pneumonia 2018    Psoriasis     Psoriatic arthritis (HCC)        PSHx:   Past Surgical History:   Procedure Laterality Date    [place] INJ LUMBAR/SACRAL,W/ IMAGING Right 6/2/2023    Procedure: RIGHT L5-S1 interlaminar epidural steroid injection. Patient has tolerated gadolinium;   Surgeon: Elvis Aleman M.D.;  [facility];  Service: Pain Management    FL BRONCHOSCOPY,DIAGNOSTIC  7/9/2021    Procedure: BRONCHOSCOPY;  Surgeon: Myles Vidal M.D.;  Location: SURGERY [facility];  Service: Ent    TENDON REPAIR Left 9/19/2019    Procedure: REPAIR, TENDON- QUADRICEPS RUTURE REPAIR AND MEYERS;  Surgeon: Jayden Velázquez M.D.;  Location: SURGERY [facility];  Service: Orthopedics    HYSTERECTOMY ROBOTIC XI N/A 7/11/2019    Procedure: HYSTERECTOMY, ROBOT-ASSISTED, USING DA SABINO XI;  Surgeon: Curly Bernal M.D.;  Location: SURGERY [facility];  Service: Gynecology    SALPINGO OOPHORECTOMY Bilateral 7/11/2019    Procedure: SALPINGO-OOPHORECTOMY;  Surgeon: Curly Bernal M.D.;  Location: SURGERY [facility];  Service: Gynecology    GYN SURGERY  1999    c sec    CYSTECTOMY      FUSION, SPINE, LUMBAR, PLIF      HIP REPLACEMENT, TOTAL      Right    OTHER ABDOMINAL SURGERY      appendix    OTHER ORTHOPEDIC SURGERY      left knee    OTHER ORTHOPEDIC SURGERY      right rotator cuff       Family history   Family History   Problem Relation Age of Onset    Hyperlipidemia Mother     Heart Attack Mother     Psychiatric Illness Mother     Heart Disease Mother     Arthritis Mother     Lung Disease Father     Cancer Father     Hyperlipidemia Father     Hyperlipidemia Sister     Cancer Sister         breast    Heart Disease Paternal Grandfather     Hypertension Neg Hx     Diabetes Neg Hx        Medications:   Current Outpatient Medications   Medication    pregabalin (LYRICA) 50 MG capsule    amoxicillin (AMOXIL) 500 MG Cap    metFORMIN ER (GLUCOPHAGE XR) 500 MG TABLET SR 24 HR    tizanidine (ZANAFLEX) 4 MG Tab    montelukast (SINGULAIR) 10 MG Tab    omeprazole (PRILOSEC) 40 MG delayed-release capsule    rosuvastatin (CRESTOR) 10 MG Tab    triamcinolone acetonide (KENALOG) 0.1 % Cream    fluticasone-salmeterol (ADVAIR) 250-50 MCG/ACT AEROSOL POWDER, BREATH ACTIVATED     ipratropium-albuterol (COMBIVENT RESPIMAT)  MCG/ACT Aero Soln    escitalopram (LEXAPRO) 10 MG Tab    escitalopram (LEXAPRO) 20 MG tablet    famotidine (PEPCID) 20 MG Tab    EPINEPHrine (EPIPEN 2-NATALIA) 0.3 MG/0.3ML Solution Auto-injector solution for injection    acyclovir (ZOVIRAX) 5 % Ointment    lamoTRIgine (LAMICTAL) 100 MG Tab    Spacer/Aero-Holding Chambers (AEROCHAMBER MV) Misc    magnesium oxide (MAG-OX) 400 MG Tab tablet    folic acid (FOLVITE) 1 MG Tab    etodolac (LODINE) 400 MG tablet    vitamin D (VITAMIND D3) 1000 UNIT Tab    cetirizine (ZYRTEC) 10 MG Tab     No current facility-administered medications for this visit.       Allergies:   Allergies   Allergen Reactions    Beef Allergy Anaphylaxis    Iodine Anaphylaxis, Rash and Hives     IV = anaphylaxis, topical = rash  IV = anaphylaxis, topical = rash    Levofloxacin Anaphylaxis    Shellfish-Derived Products Anaphylaxis     LOBSTER       Social Hx:   Social History     Socioeconomic History    Marital status:      Spouse name: Not on file    Number of children: Not on file    Years of education: Not on file    Highest education level: Not on file   Occupational History    Occupation: Flight Nurse/Educator   Tobacco Use    Smoking status: Never    Smokeless tobacco: Never   Vaping Use    Vaping Use: Never used   Substance and Sexual Activity    Alcohol use: Not Currently     Alcohol/week: 0.6 - 1.2 oz     Types: 1 - 2 Glasses of wine per week     Comment: one glss of wine at night     Drug use: No    Sexual activity: Yes     Comment: Tubal ligation   Other Topics Concern     Service No    Blood Transfusions No    Caffeine Concern No    Occupational Exposure Yes    Hobby Hazards Yes    Sleep Concern Yes    Stress Concern No    Weight Concern Yes    Special Diet No    Back Care No    Exercise Yes    Bike Helmet Yes    Seat Belt Yes    Self-Exams Yes   Social History Narrative    Not on file     Social Determinants of Health      Financial Resource Strain: Not on file   Food Insecurity: Not on file   Transportation Needs: Not on file   Physical Activity: Not on file   Stress: Not on file   Social Connections: Not on file   Intimate Partner Violence: Not on file   Housing Stability: Not on file         EXAMINATION     Physical Exam:   Vitals: /60 (BP Location: Right arm, Patient Position: Sitting, BP Cuff Size: Adult)   Pulse 61   Temp 36.1 °C (97 °F) (Temporal)   Ht 1.829 m (6')   Wt 107 kg (235 lb)   SpO2 93%     Constitutional:   Body Habitus: Body mass index is 31.87 kg/m².  Cooperation: Fully cooperates with exam  Appearance: Well-groomed no disheveled, in no acute distress  Skin no warmth or erythema was noted at the injection site  Respiratory-  breathing comfortable on room air, no audible wheezing  Cardiovascular- No lower extremity edema is noted.   Psychiatric- alert and oriented ×3. Normal affect.   Spine:   Tenderness over the PSIS left   Dulce Maria's test is positive on the left.  Compression test is positive on the left.  Gaenslen's test is positive on the left.  SLR is negative bilaterally.    Reflexes 1+ right patella and 2+ left patella, 2+ achilles bilaterally.  No clonus bilaterally      Key points for the international standards for neurological classification of spinal cord injury (ISNCSCI) to light touch.     Dermatome R L   L2 2 2   L3 2 2   L4 2 2   L5 2 2   S1 2 2   S2 2 2       Motor Exam Lower Extremities    ? Myotome R L   Hip flexion L2 4+ 5-   Knee extension L3 5- 5   Ankle dorsiflexion L4 5- 5   Toe extension L5 4 5   Ankle plantarflexion S1 5- 5           MEDICAL DECISION MAKING    DATA    EMG 03/11/2022    Study Summary:  Nerve conduction studies demonstrate absence of the bilateral sural sensory responses.   The left ulnar motor response demonstrates slowing of conduction velocity across the elbow.     Needle EMG exam of select muscles in the right leg shows increased amplitude motor units in the  anterior tibialis and medial gastrocnemius.     Impression:  Abnormal study. 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   There is evidence of a chronic/old left L5 radiculopathy. There is no evidence of an active radiculopathy.     Gina Pires MD PhD  Fellow, Neuromuscular Medicine  Visiting Instructor, PM&R      Labs:   Lab Results   Component Value Date/Time    SODIUM 137 02/17/2023 09:10 PM    POTASSIUM 3.6 02/17/2023 09:10 PM    CHLORIDE 104 02/17/2023 09:10 PM    CO2 20 02/17/2023 09:10 PM    GLUCOSE 97 02/17/2023 09:10 PM    BUN 28 (H) 02/17/2023 09:10 PM    CREATININE 0.60 02/17/2023 09:10 PM        Lab Results   Component Value Date/Time    PROTHROMBTM 13.8 07/05/2019 02:00 PM    INR 1.04 07/05/2019 02:00 PM        Lab Results   Component Value Date/Time    WBC 10.7 02/17/2023 09:10 PM    RBC 4.37 02/17/2023 09:10 PM    HEMOGLOBIN 14.1 02/17/2023 09:10 PM    HEMATOCRIT 39.2 02/17/2023 09:10 PM    MCV 89.7 02/17/2023 09:10 PM    MCH 32.3 02/17/2023 09:10 PM    MCHC 36.0 (H) 02/17/2023 09:10 PM    MPV 9.8 02/17/2023 09:10 PM    NEUTSPOLYS 70.30 02/17/2023 09:10 PM    LYMPHOCYTES 19.80 (L) 02/17/2023 09:10 PM    MONOCYTES 7.60 02/17/2023 09:10 PM    EOSINOPHILS 1.30 02/17/2023 09:10 PM    BASOPHILS 0.60 02/17/2023 09:10 PM        Lab Results   Component Value Date/Time    HBA1C 5.7 (H) 03/15/2023 02:28 PM    HBA1C 5.2 06/07/2022 09:53 AM          Imaging: I personally reviewed following images  MRI lumbar spine with and without 04/09/2023  At L1-2, no central or foraminal stenosis  At L2-3, diffuse disc bulge without central canal stenosis, moderate facet arthropathy, mild left foraminal stenosis  At L3-4, there is moderate central canal stenosis with severe facet arthropathy, difffuse disc bulge, mod-severe right and mode left foraminal stenosis  At L4-5, posterior decompression with  posterior fusion at L4-5.  Diffuse disc bulge, facet arthropathy mild foraminal stenosis bilaterally  At L5-S1, diffuse disc bulge, severe facet arthropathy, mild foraminal stenosis bilaterally      MRI brain with and without 12/13/2022  Nonspecific T2 hyperintensities noted in subcortical region and periventribular white matter.     MRI cervical spine with and without 12/13/2022  There is moderate central canal stenosis at C5-6 without other levels of central canal stenosis.  No significant cord compression, no T2 signal changes.  Mild multilevel degenerative changes      I reviewed the following radiology reports  MRI lumbar spine with and without 04/09/2023  IMPRESSION:     1.  Postsurgical changes at L4-L5 redemonstrated  2.  Multilevel multifactorial degenerative changes  3.  No areas of high-grade central canal narrowing  4.  Areas of central canal and neural foraminal narrowing as described above      Results for orders placed during the hospital encounter of 12/13/22    MR-BRAIN-WITH & W/O    Impression  1.  No acute abnormality.  2.  A few nonspecific T2 hyperintensities in the supratentorial white matter likely representing nonspecific foci of gliosis.  3.  Mild cerebral volume loss.                 Results for orders placed during the hospital encounter of 12/13/22    MR-CERVICAL SPINE-WITH & W/O    Impression  Multifocal degenerative disease in the cervical spine as described above.    Results for orders placed during the hospital encounter of 12/28/21    MR-LUMBAR SPINE-W/O    Impression  Postoperative changes in the lumbar spine at L4-5 with a well decompressed spinal canal.    Endplate degenerative changes at L3-4 result in mild canal narrowing and mild bilateral foraminal narrowing.           DIAGNOSIS   Visit Diagnoses     ICD-10-CM   1. Sacroiliac joint pain  M53.3   2. Lumbosacral radiculopathy  M54.17   3. S/P lumbar fusion  Z98.1   4. Lumbosacral pain  M54.50             ASSESSMENT and PLAN:      Arely HERNANDEZ Kotrady 62 y.o. female seen for above    Arely was seen today for follow-up.    Diagnoses and all orders for this visit:    Sacroiliac joint pain  -     Referral to Pain Clinic    Lumbosacral radiculopathy  -     pregabalin (LYRICA) 50 MG capsule; Take 1 Capsule by mouth 3 times a day for 60 days.    S/P lumbar fusion    Lumbosacral pain  -     Referral to Pain Clinic        Discussed symptoms are improved after L5-S1 interlaminar epidural, but she continues to have pain in the right foot.  We discussed discontinuing gabapentin.  Trial lyrica.  Discussed possible side effects.  Titration instructions given.  Advised gradual titration to lowest effective dose.  She will contact the office with questions or concerns.  Home exercise program has not resolved symptoms.  Continue with plans for return to PT.  Plan for left sacroiliac joint injection. The risks benefits and alternatives to this procedure were discussed and the patient wishes to proceed with the procedure. Risks include but are not limited to damage to surrounding structures, infection, bleeding, worsening of pain which can be permanent, weakness which can be permanent. Benefits include pain relief, improved function. Alternatives includes not doing the procedure.    Continue activity as tolerated.    Follow up: Hospital injection    Thank you for allowing me to participate in the care of this patient. If you have any questions please not hesitate to contact me.      Please note that this dictation was created using voice recognition software. I have made every reasonable attempt to correct obvious errors but there may be errors of grammar and content that I may have overlooked prior to finalization of this note.      Elvis Aleman MD  Interventional Spine and Sports Physiatry  Physical Medicine and Rehabilitation  [facility]  6/22/2023

## 2023-06-22 NOTE — PATIENT INSTRUCTIONS
Lyrica   50mg in the morning and 50mg at bedtime.  Then, after 3 days, start 50mg in the morning and 100mg at bedtime.  Contact the office with any questions or concerns.

## 2023-06-23 ENCOUNTER — HOSPITAL ENCOUNTER (EMERGENCY)
Facility: MEDICAL CENTER | Age: 63
End: 2023-06-23
Attending: EMERGENCY MEDICINE
Payer: COMMERCIAL

## 2023-06-23 VITALS
WEIGHT: 236 LBS | SYSTOLIC BLOOD PRESSURE: 146 MMHG | HEART RATE: 68 BPM | DIASTOLIC BLOOD PRESSURE: 70 MMHG | BODY MASS INDEX: 31.97 KG/M2 | RESPIRATION RATE: 18 BRPM | OXYGEN SATURATION: 98 % | HEIGHT: 72 IN

## 2023-06-23 DIAGNOSIS — L50.9 HIVES: ICD-10-CM

## 2023-06-23 DIAGNOSIS — T78.40XA ALLERGIC REACTION, INITIAL ENCOUNTER: ICD-10-CM

## 2023-06-23 PROCEDURE — 700111 HCHG RX REV CODE 636 W/ 250 OVERRIDE (IP): Performed by: EMERGENCY MEDICINE

## 2023-06-23 PROCEDURE — 96375 TX/PRO/DX INJ NEW DRUG ADDON: CPT

## 2023-06-23 PROCEDURE — 96374 THER/PROPH/DIAG INJ IV PUSH: CPT

## 2023-06-23 PROCEDURE — 99284 EMERGENCY DEPT VISIT MOD MDM: CPT

## 2023-06-23 RX ORDER — METHYLPREDNISOLONE SODIUM SUCCINATE 125 MG/2ML
125 INJECTION, POWDER, LYOPHILIZED, FOR SOLUTION INTRAMUSCULAR; INTRAVENOUS ONCE
Status: COMPLETED | OUTPATIENT
Start: 2023-06-23 | End: 2023-06-23

## 2023-06-23 RX ORDER — METHYLPREDNISOLONE 4 MG/1
TABLET ORAL
Qty: 1 EACH | Refills: 0 | Status: ON HOLD | OUTPATIENT
Start: 2023-06-23 | End: 2023-07-03

## 2023-06-23 RX ORDER — FAMOTIDINE 40 MG/1
40 TABLET, FILM COATED ORAL DAILY
Qty: 7 TABLET | Refills: 0 | Status: ON HOLD | OUTPATIENT
Start: 2023-06-23 | End: 2023-07-03

## 2023-06-23 RX ADMIN — METHYLPREDNISOLONE SODIUM SUCCINATE 125 MG: 125 INJECTION, POWDER, FOR SOLUTION INTRAMUSCULAR; INTRAVENOUS at 18:30

## 2023-06-23 RX ADMIN — FAMOTIDINE 40 MG: 10 INJECTION, SOLUTION INTRAVENOUS at 18:15

## 2023-06-23 ASSESSMENT — FIBROSIS 4 INDEX: FIB4 SCORE: 1.75

## 2023-06-24 NOTE — ED PROVIDER NOTES
ER Provider Note    Scribed for Dr. Damaris Pham D.O. by Shelbie Garcia. 6/23/2023  5:57 PM    Primary Care Provider: Joe Espinal M.D.    CHIEF COMPLAINT  Chief Complaint   Patient presents with    Allergic Reaction     States she went to [employer] and had french fries and d coke, started feeling itchy, throat swelling and wheezing.  Pt used her own epi pen x 2.         EXTERNAL RECORDS REVIEWED  Other no pertinent medical records    HPI/ROS  LIMITATION TO HISTORY   Select: : None    OUTSIDE HISTORIAN(S):  None    Arely Haynes is a 62 y.o. female who presents to the ED via EMS for a possible allergic reaction onset prior to arrival. The patient describes that she went out to eat, had some french fries from [employer] and a coke. While driving home she felt her throat become itchy, tight, started wheezing, skin turning red, and felt uncomfortable. She notes her face got very flushed. When she got home she gave herself two doses of EPI pen. She adds taking Benadryl to help. She notes that earlier in the day she had peanut butter/ pretzels about 4 PM. She adds that she has never had problems with peanut butter before. There are no known alleviating or exacerbating factors.  She adds now that she feels her symptoms alleviated with the medications.  She is currently feeling much better.      PAST MEDICAL HISTORY  Past Medical History:   Diagnosis Date    Anesthesia     1st cousin has malignant hyperthermia/pt has never had an issue or her sisters    Arthritis     hips knees hands spine    Asthma     prn inhalers    Bowel habit changes     diarrhea    Depression     Daughter passed few years ago    Erosive esophagitis     GERD (gastroesophageal reflux disease)     Heart burn     High cholesterol     Immunocompromised (HCC)     Pain     hips knees    Pneumonia 2018    Psoriasis     Psoriatic arthritis (HCC)        SURGICAL HISTORY  Past Surgical History:   Procedure Laterality Date    TN INJ  LUMBAR/SACRAL,W/ IMAGING Right 6/2/2023    Procedure: RIGHT L5-S1 interlaminar epidural steroid injection. Patient has tolerated gadolinium;  Surgeon: Elvis Aleman M.D.;  [facility];  Service: Pain Management    ME BRONCHOSCOPY,DIAGNOSTIC  7/9/2021    Procedure: BRONCHOSCOPY;  Surgeon: Myles Vidal M.D.;  Location: SURGERY [facility];  Service: Ent    TENDON REPAIR Left 9/19/2019    Procedure: REPAIR, TENDON- QUADRICEPS RUTURE REPAIR AND MEYERS;  Surgeon: Jayden Velázquez M.D.;  Location: SURGERY [facility];  Service: Orthopedics    HYSTERECTOMY ROBOTIC XI N/A 7/11/2019    Procedure: HYSTERECTOMY, ROBOT-ASSISTED, USING DA SABINO XI;  Surgeon: Curly Bernal M.D.;  Location: SURGERY [facility] [place];  Service: Gynecology    SALPINGO OOPHORECTOMY Bilateral 7/11/2019    Procedure: SALPINGO-OOPHORECTOMY;  Surgeon: Curly Bernal M.D.;  Location: SURGERY [facility] [place];  Service: Gynecology    GYN SURGERY  1999    c sec    CYSTECTOMY      FUSION, SPINE, LUMBAR, PLIF      HIP REPLACEMENT, TOTAL      Right    OTHER ABDOMINAL SURGERY      appendix    OTHER ORTHOPEDIC SURGERY      left knee    OTHER ORTHOPEDIC SURGERY      right rotator cuff       FAMILY HISTORY  Family History   Problem Relation Age of Onset    Hyperlipidemia Mother     Heart Attack Mother     Psychiatric Illness Mother     Heart Disease Mother     Arthritis Mother     Lung Disease Father     Cancer Father     Hyperlipidemia Father     Hyperlipidemia Sister     Cancer Sister         breast    Heart Disease Paternal Grandfather     Hypertension Neg Hx     Diabetes Neg Hx        SOCIAL HISTORY   reports that she has never smoked. She has never used smokeless tobacco. She reports that she does not currently use alcohol after a past usage of about 0.6 - 1.2 oz of alcohol per week. She reports that she does not use drugs.    CURRENT MEDICATIONS  Previous Medications    ACYCLOVIR (ZOVIRAX) 5 % OINTMENT    Apply 1 Application  topically 3 times a day as needed (Cold Sores).    AMOXICILLIN (AMOXIL) 500 MG CAP    amoxicillin 500 mg capsule   TK FOUR CS PO 1 HOUR B DAPP    CETIRIZINE (ZYRTEC) 10 MG TAB    Take 10 mg by mouth at bedtime.    EPINEPHRINE (EPIPEN 2-NATALIA) 0.3 MG/0.3ML SOLUTION AUTO-INJECTOR SOLUTION FOR INJECTION    Use as directed by MD as needed.    ESCITALOPRAM (LEXAPRO) 10 MG TAB    Take 1 Tablet by mouth every morning. Take with escitalopram 20 mg for a total dose of 30 mg.    ESCITALOPRAM (LEXAPRO) 20 MG TABLET    Take 1 Tablet by mouth every morning. Take with escitalopram 10 mg for a total dose of 30 mg.    ETODOLAC (LODINE) 400 MG TABLET    Take 1 Tablet by mouth 2 times a day.    FAMOTIDINE (PEPCID) 20 MG TAB    TAKE 1 TABLET AS NEEDED (REFLUX)    FLUTICASONE-SALMETEROL (ADVAIR) 250-50 MCG/ACT AEROSOL POWDER, BREATH ACTIVATED    USE 1 INHALATION TWICE A DAY    FOLIC ACID (FOLVITE) 1 MG TAB    Take 2 Tablets by mouth every morning. 2 tablets = 2 mg.    IPRATROPIUM-ALBUTEROL (COMBIVENT RESPIMAT)  MCG/ACT AERO SOLN    Inhale 1 Puff 4 times a day.    LAMOTRIGINE (LAMICTAL) 100 MG TAB    Take 1 Tablet by mouth every morning.    MAGNESIUM OXIDE (MAG-OX) 400 MG TAB TABLET    Take 1 Tablet by mouth every morning.    METFORMIN ER (GLUCOPHAGE XR) 500 MG TABLET SR 24 HR    Take 1 Tablet by mouth 2 times a day.    MONTELUKAST (SINGULAIR) 10 MG TAB    TAKE 1 TABLET DAILY    OMEPRAZOLE (PRILOSEC) 40 MG DELAYED-RELEASE CAPSULE    TAKE 1 CAPSULE DAILY    PREGABALIN (LYRICA) 50 MG CAPSULE    Take 1 Capsule by mouth 3 times a day for 60 days.    ROSUVASTATIN (CRESTOR) 10 MG TAB    TAKE 1 TABLET AT BEDTIME    SPACER/AERO-HOLDING CHAMBERS (AEROCHAMBER MV) MISC    1 Each as needed.    TIZANIDINE (ZANAFLEX) 4 MG TAB    TAKE 1 TABLET BY MOUTH EVERY 6 HOURS FOR UP TO 10 DAYS AS NEEDED FOR MUSCLE SPASM    TRIAMCINOLONE ACETONIDE (KENALOG) 0.1 % CREAM    Apply 1 Application. topically 2 times a day.    VITAMIN D (VITAMIND D3) 1000 UNIT TAB     Take 1 tablet by mouth 2 times a day.       ALLERGIES  Beef allergy, Iodine, Levofloxacin, and Shellfish-derived products    PHYSICAL EXAM  BP (!) 146/70   Pulse 68   Resp 18   Ht 1.829 m (6')   Wt 107 kg (236 lb)   LMP  (LMP Unknown)   SpO2 98%   BMI 32.01 kg/m²   Constitutional: Patient is well developed, well nourished.  Erythematous rash no acute distress, speaking full sentences.  HENT: No tongue or uvular edema. Normocephalic, atraumatic.    Cardiovascular: Normal heart rate and Regular rhythm. No murmur,   Thorax & Lungs: Clear and equal breath sounds with good excursion. No respiratory distress, no wheezing    Abdomen: Bowel sounds normal in all four quadrants. Soft,nontender   Skin: Skin erythematous throughout with no hives. Warm, Dry   Extremities: Peripheral pulses 4/4 No edema    Neurologic: Alert & oriented x 3, Normal motor function, Normal sensory function  Psychiatric: Affect normal, Judgment normal, Mood normal.     COURSE & MEDICAL DECISION MAKING    ED Observation Status? Yes; I am placing the patient in to an observation status due to a diagnostic uncertainty as well as therapeutic intensity. Patient placed in observation status at 6:03 PM, 6/23/2023.     Observation plan is as follows: Medication and reassessment.     Upon Reevaluation, the patient's condition has: Improved; and will be discharged.    Patient discharged from ED Observation status at 6:51 PM 6/23/2023     INITIAL ASSESSMENT AND PLAN  Care Narrative:       5:57 PM - Patient seen and evaluated at bedside. Discussed plan of care, including medication and reassessment. Patient agrees to plan of care. Patient will be treated with PEPCID 40 mg injection and Solu Medrol 125 mg injection for her symptoms.     6:39 PM - Patient was reevaluated at bedside. Patient notes that she is feeling improved, her skin has no erythema, and her throat feels fine after the medication. Advised on plan to continue to observe the patient and  then discharge. The plan for discharge was discussed. Patient and/or family was given the opportunity to ask any questions. Patient and/or family verbalizes understanding and agreement to this plan of care.       6:51 PM - Patient is doing well, has much improved, and is ready for discharge.  She is to take the medications as directed, pay attention to possible nut allergy in the future and return if any problems with her breathing or swallowing.  She is discharged in stable and improved condition.    ADDITIONAL PROBLEM LIST AND DISPOSITION  Chronic allergies               DISPOSITION AND DISCUSSIONS  I have discussed management of the patient with the following physicians and GEMMA's: None    Discussion of management with other QHP or appropriate source(s): None     Escalation of care considered, and ultimately not performed: the patient was evaluated by myself, after discussion I have recommended the patient to be discharged.    Barriers to care at this time, including but not limited to:  None .     Decision tools and prescription drugs considered including, but not limited to:  Medrol Dosepak, EpiPen, Pepcid .    The patient will return for new or worsening symptoms and is stable at the time of discharge.    The patient is referred to a primary physician for blood pressure management, diabetic screening, and for all other preventative health concerns.    DISPOSITION:  Patient will be discharged home in stable condition.    FOLLOW UP:  Joe Espinal M.D.  [id number] [id number] [phone]    Schedule an appointment as soon as possible for a visit in 2 days        OUTPATIENT MEDICATIONS:  New Prescriptions    EPINEPHRINE 0.3 MG/0.3ML SOLUTION PREFILLED SYRINGE    Inject the contents of the epipen into the thigh, hold for 3 seconds and release from thigh as needed for anaphylaxis.    FAMOTIDINE (PEPCID) 40 MG TAB    Take 1 Tablet by mouth every day.    METHYLPREDNISOLONE (MEDROL  DOSEPAK) 4 MG TABLET THERAPY PACK    As directed with food        FINAL IMPRESSION   1. Allergic reaction, initial encounter    2. Hives         I, Shelbie Garcia (Scribe), am scribing for, and in the presence of, No att. providers found.    Electronically signed by: Shelbie Garcia (Scribe), 6/23/2023    I, No att. providers found personally performed the services described in this documentation, as scribed by Shelbie Garcia in my presence, and it is both accurate and complete.    The note accurately reflects work and decisions made by me.  Damaris Pham D.O.  6/24/2023  12:59 AM

## 2023-06-24 NOTE — DISCHARGE INSTRUCTIONS
Take the medications I am prescribing as directed with food and follow-up with your primary care provider or allergy specialist within the next 2 to 3 days.  Increase fluids  Return immediately should you have any recurrent allergy symptoms.  Benadryl 50 mg every 6 hours until your rash is completely subsided.

## 2023-06-24 NOTE — ED TRIAGE NOTES
Chief Complaint   Patient presents with    Allergic Reaction     States she went to [employer] and had french fries and d coke, started feeling itchy, throat swelling and wheezing.  Pt used her own epi pen x 2.       BP (!) 146/70   Pulse 68   Resp 18   Ht 1.829 m (6')   Wt 107 kg (236 lb)   LMP  (LMP Unknown)   SpO2 98%   BMI 32.01 kg/m²

## 2023-06-24 NOTE — ED NOTES
ERP at bedside. Pt agrees with plan of care discussed by ERP. AIDET acknowledged with patient. Macrina in low position, side rail up for pt safety. Call light within reach. Will continue to monitor.

## 2023-06-26 ENCOUNTER — PATIENT MESSAGE (OUTPATIENT)
Dept: MEDICAL GROUP | Facility: LAB | Age: 63
End: 2023-06-26
Payer: COMMERCIAL

## 2023-06-26 DIAGNOSIS — R06.00 DYSPNEA, UNSPECIFIED TYPE: ICD-10-CM

## 2023-06-26 DIAGNOSIS — J45.40 MODERATE PERSISTENT ASTHMA WITHOUT COMPLICATION: ICD-10-CM

## 2023-06-26 DIAGNOSIS — R06.2 WHEEZING: ICD-10-CM

## 2023-06-27 RX ORDER — IPRATROPIUM BROMIDE AND ALBUTEROL SULFATE 2.5; .5 MG/3ML; MG/3ML
3 SOLUTION RESPIRATORY (INHALATION) EVERY 4 HOURS PRN
Qty: 360 ML | Refills: 3 | Status: SHIPPED | OUTPATIENT
Start: 2023-06-27 | End: 2023-10-19

## 2023-07-02 ENCOUNTER — APPOINTMENT (OUTPATIENT)
Dept: RADIOLOGY | Facility: MEDICAL CENTER | Age: 63
DRG: 392 | End: 2023-07-02
Attending: STUDENT IN AN ORGANIZED HEALTH CARE EDUCATION/TRAINING PROGRAM
Payer: COMMERCIAL

## 2023-07-02 ENCOUNTER — HOSPITAL ENCOUNTER (INPATIENT)
Facility: MEDICAL CENTER | Age: 63
LOS: 3 days | DRG: 392 | End: 2023-07-06
Attending: STUDENT IN AN ORGANIZED HEALTH CARE EDUCATION/TRAINING PROGRAM | Admitting: INTERNAL MEDICINE
Payer: COMMERCIAL

## 2023-07-02 DIAGNOSIS — A41.9 SEPSIS, DUE TO UNSPECIFIED ORGANISM, UNSPECIFIED WHETHER ACUTE ORGAN DYSFUNCTION PRESENT (HCC): ICD-10-CM

## 2023-07-02 DIAGNOSIS — Z91.89 AT RISK FOR CLOSTRIDIOIDES DIFFICILE INFECTION: ICD-10-CM

## 2023-07-02 DIAGNOSIS — K52.9 COLITIS: ICD-10-CM

## 2023-07-02 LAB — EKG IMPRESSION: NORMAL

## 2023-07-02 PROCEDURE — 36415 COLL VENOUS BLD VENIPUNCTURE: CPT

## 2023-07-02 PROCEDURE — 99285 EMERGENCY DEPT VISIT HI MDM: CPT

## 2023-07-02 PROCEDURE — 96374 THER/PROPH/DIAG INJ IV PUSH: CPT

## 2023-07-02 PROCEDURE — 84484 ASSAY OF TROPONIN QUANT: CPT

## 2023-07-02 PROCEDURE — 71045 X-RAY EXAM CHEST 1 VIEW: CPT

## 2023-07-02 PROCEDURE — 85610 PROTHROMBIN TIME: CPT

## 2023-07-02 PROCEDURE — 83605 ASSAY OF LACTIC ACID: CPT

## 2023-07-02 PROCEDURE — 85379 FIBRIN DEGRADATION QUANT: CPT

## 2023-07-02 PROCEDURE — 94640 AIRWAY INHALATION TREATMENT: CPT

## 2023-07-02 PROCEDURE — 80053 COMPREHEN METABOLIC PANEL: CPT

## 2023-07-02 PROCEDURE — 85025 COMPLETE CBC W/AUTO DIFF WBC: CPT

## 2023-07-02 PROCEDURE — 94760 N-INVAS EAR/PLS OXIMETRY 1: CPT

## 2023-07-02 PROCEDURE — 96375 TX/PRO/DX INJ NEW DRUG ADDON: CPT

## 2023-07-02 PROCEDURE — 83615 LACTATE (LD) (LDH) ENZYME: CPT

## 2023-07-02 PROCEDURE — 700101 HCHG RX REV CODE 250: Performed by: STUDENT IN AN ORGANIZED HEALTH CARE EDUCATION/TRAINING PROGRAM

## 2023-07-02 PROCEDURE — 93005 ELECTROCARDIOGRAM TRACING: CPT

## 2023-07-02 RX ORDER — IPRATROPIUM BROMIDE AND ALBUTEROL SULFATE 2.5; .5 MG/3ML; MG/3ML
3 SOLUTION RESPIRATORY (INHALATION) ONCE
Status: COMPLETED | OUTPATIENT
Start: 2023-07-02 | End: 2023-07-02

## 2023-07-02 RX ADMIN — IPRATROPIUM BROMIDE AND ALBUTEROL SULFATE 3 ML: .5; 3 SOLUTION RESPIRATORY (INHALATION) at 23:46

## 2023-07-02 ASSESSMENT — LIFESTYLE VARIABLES
TOTAL SCORE: 0
EVER FELT BAD OR GUILTY ABOUT YOUR DRINKING: NO
TOTAL SCORE: 0
EVER HAD A DRINK FIRST THING IN THE MORNING TO STEADY YOUR NERVES TO GET RID OF A HANGOVER: NO
DO YOU DRINK ALCOHOL: NO
HAVE PEOPLE ANNOYED YOU BY CRITICIZING YOUR DRINKING: NO
TOTAL SCORE: 0
CONSUMPTION TOTAL: INCOMPLETE
HAVE YOU EVER FELT YOU SHOULD CUT DOWN ON YOUR DRINKING: NO

## 2023-07-02 ASSESSMENT — FIBROSIS 4 INDEX: FIB4 SCORE: 1.75

## 2023-07-03 ENCOUNTER — APPOINTMENT (OUTPATIENT)
Dept: RADIOLOGY | Facility: MEDICAL CENTER | Age: 63
DRG: 392 | End: 2023-07-03
Attending: STUDENT IN AN ORGANIZED HEALTH CARE EDUCATION/TRAINING PROGRAM
Payer: COMMERCIAL

## 2023-07-03 PROBLEM — K52.9 COLITIS: Status: ACTIVE | Noted: 2023-07-03

## 2023-07-03 PROBLEM — R65.10 SIRS (SYSTEMIC INFLAMMATORY RESPONSE SYNDROME) (HCC): Status: ACTIVE | Noted: 2023-07-03

## 2023-07-03 PROBLEM — E87.29 HIGH ANION GAP METABOLIC ACIDOSIS: Status: ACTIVE | Noted: 2023-07-03

## 2023-07-03 LAB
ALBUMIN SERPL BCP-MCNC: 3.9 G/DL (ref 3.2–4.9)
ALBUMIN/GLOB SERPL: 1.1 G/DL
ALP SERPL-CCNC: 96 U/L (ref 30–99)
ALT SERPL-CCNC: 22 U/L (ref 2–50)
ANION GAP SERPL CALC-SCNC: 17 MMOL/L (ref 7–16)
APPEARANCE UR: CLEAR
AST SERPL-CCNC: 25 U/L (ref 12–45)
BACTERIA #/AREA URNS HPF: ABNORMAL /HPF
BASOPHILS # BLD AUTO: 0.3 % (ref 0–1.8)
BASOPHILS # BLD: 0.04 K/UL (ref 0–0.12)
BILIRUB SERPL-MCNC: 0.9 MG/DL (ref 0.1–1.5)
BILIRUB UR QL STRIP.AUTO: ABNORMAL
BUN SERPL-MCNC: 21 MG/DL (ref 8–22)
C DIFF DNA SPEC QL NAA+PROBE: NEGATIVE
C DIFF TOX GENS STL QL NAA+PROBE: NEGATIVE
CALCIUM ALBUM COR SERPL-MCNC: 9.1 MG/DL (ref 8.5–10.5)
CALCIUM SERPL-MCNC: 9 MG/DL (ref 8.4–10.2)
CHLORIDE SERPL-SCNC: 103 MMOL/L (ref 96–112)
CO2 SERPL-SCNC: 16 MMOL/L (ref 20–33)
COLOR UR: YELLOW
CREAT SERPL-MCNC: 0.84 MG/DL (ref 0.5–1.4)
D DIMER PPP IA.FEU-MCNC: 2.88 UG/ML (FEU) (ref 0–0.5)
EOSINOPHIL # BLD AUTO: 0.04 K/UL (ref 0–0.51)
EOSINOPHIL NFR BLD: 0.3 % (ref 0–6.9)
EPI CELLS #/AREA URNS HPF: ABNORMAL /HPF
ERYTHROCYTE [DISTWIDTH] IN BLOOD BY AUTOMATED COUNT: 39.8 FL (ref 35.9–50)
FLUAV RNA SPEC QL NAA+PROBE: NEGATIVE
FLUBV RNA SPEC QL NAA+PROBE: NEGATIVE
GFR SERPLBLD CREATININE-BSD FMLA CKD-EPI: 78 ML/MIN/1.73 M 2
GLOBULIN SER CALC-MCNC: 3.5 G/DL (ref 1.9–3.5)
GLUCOSE SERPL-MCNC: 119 MG/DL (ref 65–99)
GLUCOSE UR STRIP.AUTO-MCNC: NEGATIVE MG/DL
HCT VFR BLD AUTO: 43.3 % (ref 37–47)
HGB BLD-MCNC: 15.4 G/DL (ref 12–16)
IMM GRANULOCYTES # BLD AUTO: 0.24 K/UL (ref 0–0.11)
IMM GRANULOCYTES NFR BLD AUTO: 1.8 % (ref 0–0.9)
INR PPP: 1.05 (ref 0.87–1.13)
KETONES UR STRIP.AUTO-MCNC: NEGATIVE MG/DL
LACTATE SERPL-SCNC: 1 MMOL/L (ref 0.5–2)
LACTATE SERPL-SCNC: 1.3 MMOL/L (ref 0.5–2)
LDH SERPL L TO P-CCNC: 268 U/L (ref 107–266)
LEUKOCYTE ESTERASE UR QL STRIP.AUTO: NEGATIVE
LYMPHOCYTES # BLD AUTO: 1.92 K/UL (ref 1–4.8)
LYMPHOCYTES NFR BLD: 14.7 % (ref 22–41)
MCH RBC QN AUTO: 31.3 PG (ref 27–33)
MCHC RBC AUTO-ENTMCNC: 35.6 G/DL (ref 32.2–35.5)
MCV RBC AUTO: 88 FL (ref 81.4–97.8)
MICRO URNS: ABNORMAL
MONOCYTES # BLD AUTO: 0.84 K/UL (ref 0–0.85)
MONOCYTES NFR BLD AUTO: 6.4 % (ref 0–13.4)
MUCOUS THREADS #/AREA URNS HPF: ABNORMAL /HPF
NEUTROPHILS # BLD AUTO: 10.01 K/UL (ref 1.82–7.42)
NEUTROPHILS NFR BLD: 76.5 % (ref 44–72)
NITRITE UR QL STRIP.AUTO: POSITIVE
NRBC # BLD AUTO: 0 K/UL
NRBC BLD-RTO: 0 /100 WBC (ref 0–0.2)
PH UR STRIP.AUTO: 5 [PH] (ref 5–8)
PLATELET # BLD AUTO: 270 K/UL (ref 164–446)
PMV BLD AUTO: 9.9 FL (ref 9–12.9)
POTASSIUM SERPL-SCNC: 3.8 MMOL/L (ref 3.6–5.5)
PROT SERPL-MCNC: 7.4 G/DL (ref 6–8.2)
PROT UR QL STRIP: 30 MG/DL
PROTHROMBIN TIME: 14.1 SEC (ref 12–14.6)
RBC # BLD AUTO: 4.92 M/UL (ref 4.2–5.4)
RBC # URNS HPF: ABNORMAL /HPF
RBC UR QL AUTO: NEGATIVE
RSV RNA SPEC QL NAA+PROBE: NEGATIVE
SARS-COV-2 RNA RESP QL NAA+PROBE: NOTDETECTED
SODIUM SERPL-SCNC: 136 MMOL/L (ref 135–145)
SP GR UR STRIP.AUTO: 1.02
SPECIMEN SOURCE: NORMAL
TROPONIN T SERPL-MCNC: 13 NG/L (ref 6–19)
WBC # BLD AUTO: 13.1 K/UL (ref 4.8–10.8)
WBC #/AREA URNS HPF: ABNORMAL /HPF

## 2023-07-03 PROCEDURE — 700101 HCHG RX REV CODE 250: Performed by: INTERNAL MEDICINE

## 2023-07-03 PROCEDURE — 700111 HCHG RX REV CODE 636 W/ 250 OVERRIDE (IP): Performed by: STUDENT IN AN ORGANIZED HEALTH CARE EDUCATION/TRAINING PROGRAM

## 2023-07-03 PROCEDURE — A9270 NON-COVERED ITEM OR SERVICE: HCPCS | Performed by: INTERNAL MEDICINE

## 2023-07-03 PROCEDURE — 99223 1ST HOSP IP/OBS HIGH 75: CPT | Performed by: INTERNAL MEDICINE

## 2023-07-03 PROCEDURE — 770001 HCHG ROOM/CARE - MED/SURG/GYN PRIV*

## 2023-07-03 PROCEDURE — 74177 CT ABD & PELVIS W/CONTRAST: CPT

## 2023-07-03 PROCEDURE — C9803 HOPD COVID-19 SPEC COLLECT: HCPCS | Performed by: STUDENT IN AN ORGANIZED HEALTH CARE EDUCATION/TRAINING PROGRAM

## 2023-07-03 PROCEDURE — 700117 HCHG RX CONTRAST REV CODE 255: Performed by: STUDENT IN AN ORGANIZED HEALTH CARE EDUCATION/TRAINING PROGRAM

## 2023-07-03 PROCEDURE — 700102 HCHG RX REV CODE 250 W/ 637 OVERRIDE(OP): Performed by: INTERNAL MEDICINE

## 2023-07-03 PROCEDURE — 700105 HCHG RX REV CODE 258: Mod: JZ | Performed by: INTERNAL MEDICINE

## 2023-07-03 PROCEDURE — 36415 COLL VENOUS BLD VENIPUNCTURE: CPT

## 2023-07-03 PROCEDURE — 83605 ASSAY OF LACTIC ACID: CPT

## 2023-07-03 PROCEDURE — 87040 BLOOD CULTURE FOR BACTERIA: CPT | Mod: 91

## 2023-07-03 PROCEDURE — 87493 C DIFF AMPLIFIED PROBE: CPT

## 2023-07-03 PROCEDURE — 94760 N-INVAS EAR/PLS OXIMETRY 1: CPT

## 2023-07-03 PROCEDURE — 700101 HCHG RX REV CODE 250: Performed by: STUDENT IN AN ORGANIZED HEALTH CARE EDUCATION/TRAINING PROGRAM

## 2023-07-03 PROCEDURE — 81001 URINALYSIS AUTO W/SCOPE: CPT

## 2023-07-03 PROCEDURE — 87077 CULTURE AEROBIC IDENTIFY: CPT

## 2023-07-03 PROCEDURE — 0241U HCHG SARS-COV-2 COVID-19 NFCT DS RESP RNA 4 TRGT MIC: CPT

## 2023-07-03 PROCEDURE — 71275 CT ANGIOGRAPHY CHEST: CPT

## 2023-07-03 PROCEDURE — 700105 HCHG RX REV CODE 258: Performed by: STUDENT IN AN ORGANIZED HEALTH CARE EDUCATION/TRAINING PROGRAM

## 2023-07-03 PROCEDURE — 87046 STOOL CULTR AEROBIC BACT EA: CPT

## 2023-07-03 PROCEDURE — 700111 HCHG RX REV CODE 636 W/ 250 OVERRIDE (IP): Performed by: INTERNAL MEDICINE

## 2023-07-03 PROCEDURE — 94640 AIRWAY INHALATION TREATMENT: CPT

## 2023-07-03 PROCEDURE — 700102 HCHG RX REV CODE 250 W/ 637 OVERRIDE(OP)

## 2023-07-03 PROCEDURE — A9270 NON-COVERED ITEM OR SERVICE: HCPCS

## 2023-07-03 RX ORDER — OXYCODONE HYDROCHLORIDE 5 MG/1
2.5 TABLET ORAL
Status: DISCONTINUED | OUTPATIENT
Start: 2023-07-03 | End: 2023-07-06 | Stop reason: HOSPADM

## 2023-07-03 RX ORDER — SODIUM CHLORIDE, SODIUM LACTATE, POTASSIUM CHLORIDE, CALCIUM CHLORIDE 600; 310; 30; 20 MG/100ML; MG/100ML; MG/100ML; MG/100ML
INJECTION, SOLUTION INTRAVENOUS CONTINUOUS
Status: DISCONTINUED | OUTPATIENT
Start: 2023-07-03 | End: 2023-07-06 | Stop reason: HOSPADM

## 2023-07-03 RX ORDER — CEFTRIAXONE 2 G/1
2000 INJECTION, POWDER, FOR SOLUTION INTRAMUSCULAR; INTRAVENOUS ONCE
Status: COMPLETED | OUTPATIENT
Start: 2023-07-03 | End: 2023-07-03

## 2023-07-03 RX ORDER — TIZANIDINE 4 MG/1
2 TABLET ORAL EVERY 6 HOURS PRN
Status: DISCONTINUED | OUTPATIENT
Start: 2023-07-03 | End: 2023-07-03

## 2023-07-03 RX ORDER — LAMOTRIGINE 100 MG/1
100 TABLET ORAL EVERY MORNING
Status: DISCONTINUED | OUTPATIENT
Start: 2023-07-03 | End: 2023-07-06 | Stop reason: HOSPADM

## 2023-07-03 RX ORDER — ONDANSETRON 2 MG/ML
4 INJECTION INTRAMUSCULAR; INTRAVENOUS EVERY 4 HOURS PRN
Status: DISCONTINUED | OUTPATIENT
Start: 2023-07-03 | End: 2023-07-06 | Stop reason: HOSPADM

## 2023-07-03 RX ORDER — ACETAMINOPHEN 500 MG
1000 TABLET ORAL EVERY 6 HOURS PRN
COMMUNITY
End: 2023-07-12

## 2023-07-03 RX ORDER — SODIUM CHLORIDE, SODIUM LACTATE, POTASSIUM CHLORIDE, AND CALCIUM CHLORIDE .6; .31; .03; .02 G/100ML; G/100ML; G/100ML; G/100ML
1000 INJECTION, SOLUTION INTRAVENOUS
Status: DISCONTINUED | OUTPATIENT
Start: 2023-07-03 | End: 2023-07-06 | Stop reason: HOSPADM

## 2023-07-03 RX ORDER — SODIUM CHLORIDE, SODIUM LACTATE, POTASSIUM CHLORIDE, AND CALCIUM CHLORIDE .6; .31; .03; .02 G/100ML; G/100ML; G/100ML; G/100ML
30 INJECTION, SOLUTION INTRAVENOUS
Status: DISCONTINUED | OUTPATIENT
Start: 2023-07-03 | End: 2023-07-06 | Stop reason: HOSPADM

## 2023-07-03 RX ORDER — FLUTICASONE PROPIONATE AND SALMETEROL 500; 50 UG/1; UG/1
1 POWDER RESPIRATORY (INHALATION) EVERY 12 HOURS
Status: DISCONTINUED | OUTPATIENT
Start: 2023-07-03 | End: 2023-07-03

## 2023-07-03 RX ORDER — PROCHLORPERAZINE EDISYLATE 5 MG/ML
5-10 INJECTION INTRAMUSCULAR; INTRAVENOUS EVERY 4 HOURS PRN
Status: DISCONTINUED | OUTPATIENT
Start: 2023-07-03 | End: 2023-07-06 | Stop reason: HOSPADM

## 2023-07-03 RX ORDER — LANOLIN ALCOHOL/MO/W.PET/CERES
400 CREAM (GRAM) TOPICAL EVERY MORNING
Status: DISCONTINUED | OUTPATIENT
Start: 2023-07-03 | End: 2023-07-06 | Stop reason: HOSPADM

## 2023-07-03 RX ORDER — SODIUM CHLORIDE 9 MG/ML
1000 INJECTION, SOLUTION INTRAVENOUS ONCE
Status: DISCONTINUED | OUTPATIENT
Start: 2023-07-03 | End: 2023-07-03

## 2023-07-03 RX ORDER — ONDANSETRON 4 MG/1
4 TABLET, ORALLY DISINTEGRATING ORAL EVERY 4 HOURS PRN
Status: DISCONTINUED | OUTPATIENT
Start: 2023-07-03 | End: 2023-07-06 | Stop reason: HOSPADM

## 2023-07-03 RX ORDER — ROSUVASTATIN CALCIUM 10 MG/1
10 TABLET, COATED ORAL
Status: DISCONTINUED | OUTPATIENT
Start: 2023-07-03 | End: 2023-07-06 | Stop reason: HOSPADM

## 2023-07-03 RX ORDER — IPRATROPIUM BROMIDE AND ALBUTEROL SULFATE 2.5; .5 MG/3ML; MG/3ML
3 SOLUTION RESPIRATORY (INHALATION)
Status: DISCONTINUED | OUTPATIENT
Start: 2023-07-03 | End: 2023-07-06 | Stop reason: HOSPADM

## 2023-07-03 RX ORDER — PREGABALIN 25 MG/1
50 CAPSULE ORAL 3 TIMES DAILY
Status: DISCONTINUED | OUTPATIENT
Start: 2023-07-03 | End: 2023-07-06 | Stop reason: HOSPADM

## 2023-07-03 RX ORDER — LABETALOL HYDROCHLORIDE 5 MG/ML
10 INJECTION, SOLUTION INTRAVENOUS EVERY 4 HOURS PRN
Status: DISCONTINUED | OUTPATIENT
Start: 2023-07-03 | End: 2023-07-06 | Stop reason: HOSPADM

## 2023-07-03 RX ORDER — FAMOTIDINE 20 MG/1
20 TABLET, FILM COATED ORAL 2 TIMES DAILY
COMMUNITY
End: 2023-07-12 | Stop reason: SDUPTHER

## 2023-07-03 RX ORDER — HYDROMORPHONE HYDROCHLORIDE 1 MG/ML
0.25 INJECTION, SOLUTION INTRAMUSCULAR; INTRAVENOUS; SUBCUTANEOUS
Status: DISCONTINUED | OUTPATIENT
Start: 2023-07-03 | End: 2023-07-06 | Stop reason: HOSPADM

## 2023-07-03 RX ORDER — ROSUVASTATIN CALCIUM 10 MG/1
10 TABLET, COATED ORAL EVERY EVENING
COMMUNITY
End: 2023-07-12

## 2023-07-03 RX ORDER — METRONIDAZOLE 500 MG/100ML
500 INJECTION, SOLUTION INTRAVENOUS ONCE
Status: COMPLETED | OUTPATIENT
Start: 2023-07-03 | End: 2023-07-03

## 2023-07-03 RX ORDER — ESCITALOPRAM OXALATE 20 MG/1
20 TABLET ORAL EVERY MORNING
Status: DISCONTINUED | OUTPATIENT
Start: 2023-07-03 | End: 2023-07-03

## 2023-07-03 RX ORDER — FLUTICASONE PROPIONATE AND SALMETEROL 500; 50 UG/1; UG/1
1 POWDER RESPIRATORY (INHALATION) EVERY 12 HOURS
COMMUNITY
End: 2023-07-12

## 2023-07-03 RX ORDER — OXYCODONE HYDROCHLORIDE 5 MG/1
5 TABLET ORAL
Status: DISCONTINUED | OUTPATIENT
Start: 2023-07-03 | End: 2023-07-06 | Stop reason: HOSPADM

## 2023-07-03 RX ORDER — ESCITALOPRAM OXALATE 10 MG/1
10 TABLET ORAL EVERY MORNING
Status: DISCONTINUED | OUTPATIENT
Start: 2023-07-03 | End: 2023-07-03

## 2023-07-03 RX ORDER — PROMETHAZINE HYDROCHLORIDE 25 MG/1
12.5-25 SUPPOSITORY RECTAL EVERY 4 HOURS PRN
Status: DISCONTINUED | OUTPATIENT
Start: 2023-07-03 | End: 2023-07-06 | Stop reason: HOSPADM

## 2023-07-03 RX ORDER — ESCITALOPRAM OXALATE 10 MG/1
30 TABLET ORAL DAILY
Status: DISCONTINUED | OUTPATIENT
Start: 2023-07-03 | End: 2023-07-06 | Stop reason: HOSPADM

## 2023-07-03 RX ORDER — ACETAMINOPHEN 325 MG/1
650 TABLET ORAL EVERY 6 HOURS PRN
Status: DISCONTINUED | OUTPATIENT
Start: 2023-07-03 | End: 2023-07-06 | Stop reason: HOSPADM

## 2023-07-03 RX ORDER — PROMETHAZINE HYDROCHLORIDE 25 MG/1
12.5-25 TABLET ORAL EVERY 4 HOURS PRN
Status: DISCONTINUED | OUTPATIENT
Start: 2023-07-03 | End: 2023-07-06 | Stop reason: HOSPADM

## 2023-07-03 RX ORDER — FAMOTIDINE 20 MG/1
20 TABLET, FILM COATED ORAL 2 TIMES DAILY
Status: DISCONTINUED | OUTPATIENT
Start: 2023-07-03 | End: 2023-07-04

## 2023-07-03 RX ORDER — SODIUM CHLORIDE 9 MG/ML
30 INJECTION, SOLUTION INTRAVENOUS ONCE
Status: COMPLETED | OUTPATIENT
Start: 2023-07-03 | End: 2023-07-03

## 2023-07-03 RX ORDER — ATORVASTATIN CALCIUM 20 MG/1
20 TABLET, FILM COATED ORAL EVERY EVENING
Status: ON HOLD | COMMUNITY
End: 2023-07-03

## 2023-07-03 RX ADMIN — LAMOTRIGINE 100 MG: 100 TABLET ORAL at 09:47

## 2023-07-03 RX ADMIN — SODIUM CHLORIDE, POTASSIUM CHLORIDE, SODIUM LACTATE AND CALCIUM CHLORIDE: 600; 310; 30; 20 INJECTION, SOLUTION INTRAVENOUS at 04:22

## 2023-07-03 RX ADMIN — VANCOMYCIN HYDROCHLORIDE 125 MG: 5 INJECTION, POWDER, LYOPHILIZED, FOR SOLUTION INTRAVENOUS at 18:06

## 2023-07-03 RX ADMIN — SODIUM CHLORIDE, POTASSIUM CHLORIDE, SODIUM LACTATE AND CALCIUM CHLORIDE: 600; 310; 30; 20 INJECTION, SOLUTION INTRAVENOUS at 12:34

## 2023-07-03 RX ADMIN — HYDROMORPHONE HYDROCHLORIDE 0.25 MG: 1 INJECTION, SOLUTION INTRAMUSCULAR; INTRAVENOUS; SUBCUTANEOUS at 05:51

## 2023-07-03 RX ADMIN — VANCOMYCIN HYDROCHLORIDE 125 MG: KIT ORAL at 05:55

## 2023-07-03 RX ADMIN — METRONIDAZOLE 500 MG: 5 INJECTION, SOLUTION INTRAVENOUS at 01:30

## 2023-07-03 RX ADMIN — VANCOMYCIN HYDROCHLORIDE 125 MG: 5 INJECTION, POWDER, LYOPHILIZED, FOR SOLUTION INTRAVENOUS at 11:38

## 2023-07-03 RX ADMIN — OXYCODONE 5 MG: 5 TABLET ORAL at 20:00

## 2023-07-03 RX ADMIN — IOHEXOL 100 ML: 350 INJECTION, SOLUTION INTRAVENOUS at 01:43

## 2023-07-03 RX ADMIN — FAMOTIDINE 20 MG: 20 TABLET, FILM COATED ORAL at 17:40

## 2023-07-03 RX ADMIN — ONDANSETRON 4 MG: 2 INJECTION INTRAMUSCULAR; INTRAVENOUS at 04:18

## 2023-07-03 RX ADMIN — MOMETASONE FUROATE AND FORMOTEROL FUMARATE DIHYDRATE 2 PUFF: 200; 5 AEROSOL RESPIRATORY (INHALATION) at 19:21

## 2023-07-03 RX ADMIN — RIVAROXABAN 10 MG: 10 TABLET, FILM COATED ORAL at 11:37

## 2023-07-03 RX ADMIN — ACETAMINOPHEN 650 MG: 325 TABLET ORAL at 02:56

## 2023-07-03 RX ADMIN — ROSUVASTATIN CALCIUM 10 MG: 10 TABLET, FILM COATED ORAL at 20:19

## 2023-07-03 RX ADMIN — CEFTRIAXONE SODIUM 2000 MG: 2 INJECTION, POWDER, FOR SOLUTION INTRAMUSCULAR; INTRAVENOUS at 01:30

## 2023-07-03 RX ADMIN — OXYCODONE 5 MG: 5 TABLET ORAL at 11:37

## 2023-07-03 RX ADMIN — VANCOMYCIN HYDROCHLORIDE 125 MG: 5 INJECTION, POWDER, LYOPHILIZED, FOR SOLUTION INTRAVENOUS at 23:58

## 2023-07-03 RX ADMIN — ACETAMINOPHEN 650 MG: 325 TABLET ORAL at 16:34

## 2023-07-03 RX ADMIN — ONDANSETRON 4 MG: 2 INJECTION INTRAMUSCULAR; INTRAVENOUS at 17:41

## 2023-07-03 RX ADMIN — SODIUM CHLORIDE 2193 ML: 9 INJECTION, SOLUTION INTRAVENOUS at 01:09

## 2023-07-03 RX ADMIN — PREGABALIN 50 MG: 25 CAPSULE ORAL at 20:19

## 2023-07-03 RX ADMIN — Medication 400 MG: at 09:46

## 2023-07-03 RX ADMIN — OXYCODONE 2.5 MG: 5 TABLET ORAL at 04:43

## 2023-07-03 RX ADMIN — PREGABALIN 50 MG: 25 CAPSULE ORAL at 09:46

## 2023-07-03 RX ADMIN — ACETAMINOPHEN 650 MG: 325 TABLET ORAL at 07:58

## 2023-07-03 RX ADMIN — VANCOMYCIN HYDROCHLORIDE 125 MG: 5 INJECTION, POWDER, LYOPHILIZED, FOR SOLUTION INTRAVENOUS at 05:55

## 2023-07-03 RX ADMIN — SODIUM CHLORIDE, POTASSIUM CHLORIDE, SODIUM LACTATE AND CALCIUM CHLORIDE: 600; 310; 30; 20 INJECTION, SOLUTION INTRAVENOUS at 20:02

## 2023-07-03 RX ADMIN — ESCITALOPRAM OXALATE 30 MG: 10 TABLET ORAL at 09:47

## 2023-07-03 ASSESSMENT — ENCOUNTER SYMPTOMS
CHILLS: 1
HEARTBURN: 0
LOSS OF CONSCIOUSNESS: 0
COUGH: 0
SHORTNESS OF BREATH: 0
BLURRED VISION: 0
SPEECH CHANGE: 0
CONSTIPATION: 0
FEVER: 1
SENSORY CHANGE: 0
MYALGIAS: 1
SPUTUM PRODUCTION: 0
FALLS: 0
DIZZINESS: 0
DIARRHEA: 1
SHORTNESS OF BREATH: 1
WEAKNESS: 0
NAUSEA: 1
VOMITING: 0
SORE THROAT: 0
TINGLING: 0
HEADACHES: 0
PHOTOPHOBIA: 0
STRIDOR: 0
ABDOMINAL PAIN: 1
NAUSEA: 0
DEPRESSION: 0
NERVOUS/ANXIOUS: 0
BLOOD IN STOOL: 0
PALPITATIONS: 0
CLAUDICATION: 0
MYALGIAS: 0
INSOMNIA: 0

## 2023-07-03 ASSESSMENT — PAIN DESCRIPTION - PAIN TYPE
TYPE: ACUTE PAIN

## 2023-07-03 ASSESSMENT — LIFESTYLE VARIABLES
ON A TYPICAL DAY WHEN YOU DRINK ALCOHOL HOW MANY DRINKS DO YOU HAVE: 0
HAVE YOU EVER FELT YOU SHOULD CUT DOWN ON YOUR DRINKING: NO
EVER FELT BAD OR GUILTY ABOUT YOUR DRINKING: NO
REASON UNABLE TO ASSESS: SCREEN COMPLETED
EVER HAD A DRINK FIRST THING IN THE MORNING TO STEADY YOUR NERVES TO GET RID OF A HANGOVER: NO
HOW MANY TIMES IN THE PAST YEAR HAVE YOU HAD 5 OR MORE DRINKS IN A DAY: 0
TOTAL SCORE: 0
TOTAL SCORE: 0
CONSUMPTION TOTAL: NEGATIVE
HAVE PEOPLE ANNOYED YOU BY CRITICIZING YOUR DRINKING: NO
TOTAL SCORE: 0
AVERAGE NUMBER OF DAYS PER WEEK YOU HAVE A DRINK CONTAINING ALCOHOL: 0
ALCOHOL_USE: NO

## 2023-07-03 ASSESSMENT — COGNITIVE AND FUNCTIONAL STATUS - GENERAL
SUGGESTED CMS G CODE MODIFIER MOBILITY: CH
MOBILITY SCORE: 24
DAILY ACTIVITIY SCORE: 24
SUGGESTED CMS G CODE MODIFIER DAILY ACTIVITY: CH

## 2023-07-03 ASSESSMENT — FIBROSIS 4 INDEX: FIB4 SCORE: 1.243670845407727533

## 2023-07-03 NOTE — PROGRESS NOTES
Chart review complete.  Patient remains on special contact isolation to rule out c-diff. Stool has been collected.

## 2023-07-03 NOTE — PROGRESS NOTES
Patient arrived to the floor via wheelchair at 0414.  Patient alert and oriented able to walk into the room with steady gait.  Patient voided but contaminated sample with small amount of stool - urine sample needed for UA.  Patient politely refusing bed alarm.   remains on and patient verbalized understanding of this.  Patient oriented to room and call light.  Call light and belongings remain in reach of patient.

## 2023-07-03 NOTE — ASSESSMENT & PLAN NOTE
Noted on CT scan  Started after finishing a few courses of antibiotics within the last 3 months  C. difficile negative stool PCR panel and cultures sent-pending results  No need for antibiotics for now  Causing sepsis  Imodium and simethicone for symptoms  DC vancomycin

## 2023-07-03 NOTE — DIETARY
Nutrition Services:    Per Nutrition Rep (NR), pt is not allergic to beef. RD met w/ pt in her room. She said that she thinks there was a misunderstanding when she said she was allergic to bees. Allergy to beef removed from Epic.     Pt requested 4 or 5 apple juices at time of RD's visit. She was going to put this in her pitcher w/ ice. Pt is on a consistent CHO diet. Pt said that she is prediabetic. RD agreed to provide 1 apple juice instead of 4 or 5. Nurse aware.     RD will follow weekly or PRN.

## 2023-07-03 NOTE — PROGRESS NOTES
Received bedside report from night shift RN.  Assumed pt care.   Assessment and chart check complete.  Pt is AA0X4, no signs of distress, denies nausea/vomiting.  Updated for the  POC of the day.  Pt pain currently 7/10, medicated per MAR.   0758-Pt is resting in bed, medicated per MAR for fever.  0947-Given scheduled medications.  1137-Pt is resting bed comfortably. Medicated per MAR for pain. Up to restroom.  1200-Gown changed. IVF fluid changed.  1500-Pt is resting bed, no signs of distress.  1634-Checked temperature, medicated per MAR for fever.  1740-Medicated per Mar for nausea.   1800-Pt ambulate to bathroom  accompanied by   IVF infusing.  Fall precautions in place, treaded socks on pt, bed in low position.   Call light within reach.   Educated pt to call if needing anything.   Hourly rounding.

## 2023-07-03 NOTE — ASSESSMENT & PLAN NOTE
This is Sepsis Present on admission  SIRS criteria identified on my evaluation include: Fever, with temperature greater than 100.9 deg F, Tachycardia, with heart rate greater than 90 BPM and Leukocytosis, with WBC greater than 12,000  Source is colitis  Sepsis protocol initiated  Fluid resuscitation ordered per protocol  Crystalloid Fluid Administration: Fluid resuscitation ordered per standard protocol - 30 mL/kg per current or ideal body weight  IV antibiotics as appropriate for source of sepsis  Reassessment: I have reassessed the patient's hemodynamic status  Start oral vancomycin  Await culture results  Causing metabolic acidosis  Patient is at risk of worsening, closely monitor her hemodynamics  Causing significant fever which is leading to her dyspnea, negative CTA chest

## 2023-07-03 NOTE — ASSESSMENT & PLAN NOTE
Hold home medications due to association with worsening C. Difficile  Okay to resume Pepcid however hold on Prilosec

## 2023-07-03 NOTE — HOSPITAL COURSE
Patient presented with fevers chills abdominal pain diarrhea.  She started having these issues on Thursday and is started with fatigue.  She went on Friday however was too fatigued and had to go home.  After that she noticed development of fevers and chills and then abdominal pain with diarrhea.  She has been experiencing significant shortness of breath with minimal activity.  Patient came into the emergency room and had CTA of the chest which was negative for any evidence of PE but CT of the abdomen was concerning for colitis.  She had recently been on 2 courses of antibiotics 1 for recent PCP pneumonia and then she had a spinal procedure done about 3 weeks ago where she was on a course of 5-day antibiotics following that.  She is having very foul-smelling frequent diarrhea that is dark brown.  There is high suspicion of C. difficile therefore she has been started on oral vancomycin.

## 2023-07-03 NOTE — DOCUMENTATION QUERY
"                                                                         [facility]                                                                       Query Response Note      PATIENT:               VIOLA CORONADO  ACCT #:                  [id number]  MRN:                     [id number]  :                      1960  ADMIT DATE:       2023 10:57 PM  DISCH DATE:          RESPONDING  PROVIDER #:        [id number]           QUERY TEXT:    The diagnosis of sepsis and colitis has been documented in the H&P and progress notes. Per H&P, \"SIRS criteria identified on my evaluation include: Fever, with temperature greater than 100.9 deg F, Tachycardia, with heart rate greater than 90 BPM and Leukocytosis, with WBC greater than 12,000\".    Please clarify the status of sepsis after study:    The patient's Clinical Indicators include:  H&P: \"Sepsis ... SIRS criteria identified on my evaluation include: Fever, with temperature greater than 100.9 deg F, Tachycardia, with heart rate greater than 90 BPM and Leukocytosis, with WBC greater than 12,000. Source is colitis\"    Labs: WBC 13.1, , CR 0.84, Total Bilirubin 0.9, Lactic Acid 1.3 > 1.0  Vitals: Temp 103.8, HR 68-90, RR 16-20, MAP , 96-98% on room air    Risk Factors: Colitis  Treatment: IV fluids/bolus, Vancomycin PO    Thank You,  Babs Enriquez RN  Clinical    Connect via [employer] or [email or web address]  Options provided:   -- Sepsis ruled in, (please specify sepsis-related organ dysfunction)   -- Sepsis ruled out, SIRS criteria only   -- Other explanation, (please specify other explanation)   -- Unable to determine      Query created by: Babs Enriquez on 7/3/2023 1:53 PM    RESPONSE TEXT:    Sepsis ruled out, SIRS criteria only          Electronically signed by:  MARCE TRUJILLO DO 7/3/2023 2:05 PM              "

## 2023-07-03 NOTE — H&P
Hospital Medicine History & Physical Note    Date of Service  7/3/2023    Primary Care Physician  Joe Espinal M.D.    Consultants  None    Specialist Names: None    Code Status  Full Code    Chief Complaint  Chief Complaint   Patient presents with    Shortness of Breath     Reports worsening sob since Thursday       History of Presenting Illness  Arely Haynes is a 62 y.o. female who presented 7/2/2023 with fever, chills, abdominal pain and diarrhea.  Patient states her symptoms started on Thursday, initially just feeling very fatigued.  Patient did try to go to work on Friday however she was too fatigued.  She then began noticing fever and chills.  Later it worsened and she began having abdominal pain and diarrhea.  When the fever became more significant, she also began experiencing shortness of breath with any activity.  Patient states this went on with her fevers constantly worsening until this evening when she decided it got bad enough so she presented to the emergency department.  She did just finish a course of amoxicillin prior to symptom onset.  I did discuss the case including labs and imaging with the ER physician.    I discussed the plan of care with patient and family.    Review of Systems  Review of Systems   Constitutional:  Positive for chills, fever and malaise/fatigue.   HENT:  Negative for congestion.    Respiratory:  Positive for shortness of breath. Negative for cough, sputum production and stridor.    Cardiovascular:  Negative for chest pain, palpitations and leg swelling.   Gastrointestinal:  Positive for abdominal pain and diarrhea. Negative for constipation, nausea and vomiting.   Genitourinary:  Negative for dysuria and urgency.   Musculoskeletal:  Positive for myalgias. Negative for falls.   Neurological:  Negative for dizziness, tingling, loss of consciousness, weakness and headaches.   Psychiatric/Behavioral:  Negative for depression and suicidal ideas.    All other  systems reviewed and are negative.      Past Medical History   has a past medical history of Anesthesia, Arthritis, Asthma, Bowel habit changes, Depression, Erosive esophagitis, GERD (gastroesophageal reflux disease), Heart burn, High cholesterol, Immunocompromised (HCC), Pain, Pneumonia (2018), Psoriasis, and Psoriatic arthritis (HCC).    Surgical History   has a past surgical history that includes fusion, spine, lumbar, plif; hip replacement, total; cystectomy; other abdominal surgery; gyn surgery (1999); other orthopedic surgery; other orthopedic surgery; hysterectomy robotic xi (N/A, 7/11/2019); salpingo oophorectomy (Bilateral, 7/11/2019); tendon repair (Left, 9/19/2019); pr bronchoscopy,diagnostic (7/9/2021); and pr inj lumbar/sacral,w/ imaging (Right, 6/2/2023).     Family History  family history includes Arthritis in her mother; Cancer in her father and sister; Heart Attack in her mother; Heart Disease in her mother and paternal grandfather; Hyperlipidemia in her father, mother, and sister; Lung Disease in her father; Psychiatric Illness in her mother.   Family history reviewed with patient. There is no family history that is pertinent to the chief complaint.     Social History   reports that she has never smoked. She has never used smokeless tobacco. She reports that she does not currently use alcohol after a past usage of about 0.6 - 1.2 oz of alcohol per week. She reports that she does not use drugs.    Allergies  Allergies   Allergen Reactions    Beef Allergy Anaphylaxis    Iodine Anaphylaxis, Rash and Hives     IV = anaphylaxis, topical = rash  IV = anaphylaxis, topical = rash    Levofloxacin Anaphylaxis    Shellfish-Derived Products Anaphylaxis     LOBSTER       Medications  Prior to Admission Medications   Prescriptions Last Dose Informant Patient Reported? Taking?   EPINEPHrine (EPIPEN 2-NATALIA) 0.3 MG/0.3ML Solution Auto-injector solution for injection   No No   Sig: Use as directed by MD as needed.    EPINEPHrine 0.3 MG/0.3ML Solution Prefilled Syringe   No No   Sig: Inject the contents of the epipen into the thigh, hold for 3 seconds and release from thigh as needed for anaphylaxis.   Spacer/Aero-Holding Chambers (AEROCHAMBER MV) [facility]   No No   Si Each as needed.   acyclovir (ZOVIRAX) 5 % Ointment   No No   Sig: Apply 1 Application topically 3 times a day as needed (Cold Sores).   amoxicillin (AMOXIL) 500 MG Cap   Yes No   Sig: amoxicillin 500 mg capsule   TK FOUR CS PO 1 HOUR B DAPP   cetirizine (ZYRTEC) 10 MG Tab  Patient Yes No   Sig: Take 10 mg by mouth at bedtime.   escitalopram (LEXAPRO) 10 MG Tab   No No   Sig: Take 1 Tablet by mouth every morning. Take with escitalopram 20 mg for a total dose of 30 mg.   escitalopram (LEXAPRO) 20 MG tablet   No No   Sig: Take 1 Tablet by mouth every morning. Take with escitalopram 10 mg for a total dose of 30 mg.   etodolac (LODINE) 400 MG tablet   No No   Sig: Take 1 Tablet by mouth 2 times a day.   famotidine (PEPCID) 20 MG Tab   No No   Sig: TAKE 1 TABLET AS NEEDED (REFLUX)   famotidine (PEPCID) 40 MG Tab   No No   Sig: Take 1 Tablet by mouth every day.   fluticasone-salmeterol (ADVAIR) 250-50 MCG/ACT AEROSOL POWDER, BREATH ACTIVATED   No No   Sig: USE 1 INHALATION TWICE A DAY   folic acid (FOLVITE) 1 MG Tab   No No   Sig: Take 2 Tablets by mouth every morning. 2 tablets = 2 mg.   ipratropium-albuterol (COMBIVENT RESPIMAT)  MCG/ACT Aero Soln   No No   Sig: Inhale 1 Puff 4 times a day.   ipratropium-albuterol (DUONEB) 0.5-2.5 (3) MG/3ML nebulizer solution   No No   Sig: Take 3 mL by nebulization every four hours as needed for Shortness of Breath.   lamoTRIgine (LAMICTAL) 100 MG Tab   No No   Sig: Take 1 Tablet by mouth every morning.   magnesium oxide (MAG-OX) 400 MG Tab tablet   No No   Sig: Take 1 Tablet by mouth every morning.   metFORMIN ER (GLUCOPHAGE XR) 500 MG TABLET SR 24 HR   No No   Sig: Take 1 Tablet by mouth 2 times a day.   methylPREDNISolone  (MEDROL DOSEPAK) 4 MG Tablet Therapy Pack   No No   Sig: As directed with food   montelukast (SINGULAIR) 10 MG Tab   No No   Sig: TAKE 1 TABLET DAILY   omeprazole (PRILOSEC) 40 MG delayed-release capsule   No No   Sig: TAKE 1 CAPSULE DAILY   pregabalin (LYRICA) 50 MG capsule   No No   Sig: Take 1 Capsule by mouth 3 times a day for 60 days.   rosuvastatin (CRESTOR) 10 MG Tab   No No   Sig: TAKE 1 TABLET AT BEDTIME   tizanidine (ZANAFLEX) 4 MG Tab   No No   Sig: TAKE 1 TABLET BY MOUTH EVERY 6 HOURS FOR UP TO 10 DAYS AS NEEDED FOR MUSCLE SPASM   triamcinolone acetonide (KENALOG) 0.1 % Cream   No No   Sig: Apply 1 Application. topically 2 times a day.   vitamin D (VITAMIND D3) 1000 UNIT Tab  Patient No No   Sig: Take 1 tablet by mouth 2 times a day.      Facility-Administered Medications: None       Physical Exam  Temp:  [36.7 °C (98 °F)-39.9 °C (103.8 °F)] 39.9 °C (103.8 °F)  Pulse:  [79-90] 79  Resp:  [16-18] 16  BP: (115-150)/(56-77) 115/76  SpO2:  [96 %-97 %] 97 %  Blood Pressure: 115/76   Temperature: (!) 39.9 °C (103.8 °F)   Pulse: 79   Respiration: 16   Pulse Oximetry: 97 %       Physical Exam  Vitals and nursing note reviewed.   Constitutional:       General: She is not in acute distress.     Appearance: She is well-developed. She is obese. She is not diaphoretic.   HENT:      Head: Normocephalic and atraumatic.      Right Ear: External ear normal.      Left Ear: External ear normal.      Nose: Nose normal. No congestion or rhinorrhea.      Mouth/Throat:      Mouth: Mucous membranes are dry.      Pharynx: No oropharyngeal exudate.   Eyes:      General:         Right eye: No discharge.         Left eye: No discharge.   Neck:      Trachea: No tracheal deviation.   Cardiovascular:      Rate and Rhythm: Normal rate and regular rhythm.      Heart sounds: Murmur heard.      No friction rub. No gallop.   Pulmonary:      Effort: Pulmonary effort is normal. No respiratory distress.      Breath sounds: Normal breath  sounds. No stridor. No wheezing or rales.   Chest:      Chest wall: No tenderness.   Abdominal:      General: Bowel sounds are normal. There is no distension.      Palpations: Abdomen is soft.      Tenderness: There is generalized abdominal tenderness.   Musculoskeletal:         General: No tenderness. Normal range of motion.      Cervical back: Neck supple.      Right lower leg: No edema.      Left lower leg: No edema.   Lymphadenopathy:      Cervical: No cervical adenopathy.   Skin:     General: Skin is warm and dry.      Findings: No erythema or rash.   Neurological:      Mental Status: She is alert and oriented to person, place, and time.      Cranial Nerves: No cranial nerve deficit.   Psychiatric:         Mood and Affect: Mood normal.         Behavior: Behavior normal.         Thought Content: Thought content normal.         Judgment: Judgment normal.         Laboratory:  Recent Labs     07/02/23  2310   WBC 13.1*   RBC 4.92   HEMOGLOBIN 15.4   HEMATOCRIT 43.3   MCV 88.0   MCH 31.3   MCHC 35.6*   RDW 39.8   PLATELETCT 270   MPV 9.9     Recent Labs     07/02/23  2310   SODIUM 136   POTASSIUM 3.8   CHLORIDE 103   CO2 16*   GLUCOSE 119*   BUN 21   CREATININE 0.84   CALCIUM 9.0     Recent Labs     07/02/23  2310   ALTSGPT 22   ASTSGOT 25   ALKPHOSPHAT 96   TBILIRUBIN 0.9   GLUCOSE 119*         No results for input(s): NTPROBNP in the last 72 hours.      Recent Labs     07/02/23  2310   TROPONINT 13       Imaging:  CT-ABDOMEN-PELVIS WITH   Final Result         1.  Mild cecal wall thickening with slight adjacent hazy fat stranding, appearance suggests mild segmental colitis.   2.  Mild air-filled distention of small bowel loops with reactive mucosal pattern suggesting component of ileus and/or enteritis.   3.  Hepatomegaly   4.  Atherosclerosis      CT-CTA CHEST PULMONARY ARTERY W/ RECONS   Final Result         1.  No pulmonary embolus appreciated.      DX-CHEST-PORTABLE (1 VIEW)   Final Result         1.  No acute  cardiopulmonary disease.          X-Ray:  I have personally reviewed the images and compared with prior images.    Assessment/Plan:  Justification for Admission Status  I anticipate this patient will require at least two midnights for appropriate medical management, necessitating inpatient admission because colitis, concerning for C. difficile causing SIRS    Patient will need a Med/Surg bed on MEDICAL service .  The need is secondary to colitis, concerning for C. difficile.    * Colitis- (present on admission)  Assessment & Plan  Noted on CT scan  Started after finishing course of antibiotics  Telemetry concerning for C. difficile colitis I am going to start oral vancomycin  Await C. difficile by PCR  Causing sepsis    Sepsis (HCC)- (present on admission)  Assessment & Plan  This is Sepsis Present on admission  SIRS criteria identified on my evaluation include: Fever, with temperature greater than 100.9 deg F, Tachycardia, with heart rate greater than 90 BPM and Leukocytosis, with WBC greater than 12,000  Source is colitis  Sepsis protocol initiated  Fluid resuscitation ordered per protocol  Crystalloid Fluid Administration: Fluid resuscitation ordered per standard protocol - 30 mL/kg per current or ideal body weight  IV antibiotics as appropriate for source of sepsis  Reassessment: I have reassessed the patient's hemodynamic status  Start oral vancomycin  Await culture results  Causing metabolic acidosis  Patient is at risk of worsening, closely monitor her hemodynamics  Causing significant fever which is leading to her dyspnea, negative CTA chest    High anion gap metabolic acidosis- (present on admission)  Assessment & Plan  Due to sepsis and dehydration  Start IV fluids  Repeat BMP in the morning    GERD (gastroesophageal reflux disease)- (present on admission)  Assessment & Plan  Hold home medications due to association with worsening C. difficile    CHRISTY (generalized anxiety disorder)- (present on  admission)  Assessment & Plan  Continue home Lexapro    Asthma- (present on admission)  Assessment & Plan  No acute exacerbation    Familial hypercholesterolemia- (present on admission)  Assessment & Plan  Continue home statin        VTE prophylaxis: SCDs/TEDs and Xarelto 10 mg daily as prophylaxis

## 2023-07-03 NOTE — ASSESSMENT & PLAN NOTE
Due to sepsis and dehydration  Continue IV fluids  Repeat BMP in the morning  Lactic acid has normalized

## 2023-07-03 NOTE — PROGRESS NOTES
Hourly rounding compleet  800  900  1000  1100  1200  1300  1400  1500  1600  1700  1800  1900

## 2023-07-03 NOTE — PROGRESS NOTES
Med rec updated and complete, per pt   Allergies reviewed, per pt  Pt reports that she has not used or taken her ACYCLOVIR 5% ointment, KENALOG 0.1% cream, VITAMIN D, or FOLIC ACID 1MG in the last 30 days or longer, Also has not take her AMOXICILLIN 500MG for her dental appt.

## 2023-07-03 NOTE — PROGRESS NOTES
4 Eyes Skin Assessment Completed by Allegra, RN and Taye RN.    Head WDL  Ears WDL  Nose WDL  Mouth WDL  Neck WDL  Breast/Chest WDL  Shoulder Blades WDL  Spine WDL  (R) Arm/Elbow/Hand WDL  (L) Arm/Elbow/Hand WDL  Abdomen WDL  Groin WDL  Scrotum/Coccyx/Buttocks WDL  (R) Leg WDL  (L) Leg WDL  (R) Heel/Foot/Toe WDL  (L) Heel/Foot/Toe WDL          Devices In Places Blood Pressure Cuff,       Interventions In Place Pillows      Possible Skin Injury No    Pictures Uploaded Into Epic N/A  Wound Consult Placed N/A  RN Wound Prevention Protocol Ordered No

## 2023-07-03 NOTE — PROGRESS NOTES
[facility] Medicine Daily Progress Note    Date of Service  7/3/2023    Chief Complaint  Arely Haynes is a 62 y.o. female admitted 7/2/2023 with fevers, chills, abdominal pain and diarrhea    Hospital Course  Patient presented with fevers chills abdominal pain diarrhea.  She started having these issues on Thursday and is started with fatigue.  She went on Friday however was too fatigued and had to go home.  After that she noticed development of fevers and chills and then abdominal pain with diarrhea.  She has been experiencing significant shortness of breath with minimal activity.  Patient came into the emergency room and had CTA of the chest which was negative for any evidence of PE but CT of the abdomen was concerning for colitis.  She had recently been on 2 courses of antibiotics 1 for recent PCP pneumonia and then she had a spinal procedure done about 3 weeks ago where she was on a course of 5-day antibiotics following that.  She is having very foul-smelling frequent diarrhea that is dark brown.  There is high suspicion of C. difficile therefore she has been started on oral vancomycin.    Interval Problem Update  7/3 patient states that the diarrhea has actually increased in frequency since he has been started on IV fluids however she was markedly dehydrated when she got here.  Patient has multiple risk factors for C. difficile and stool culture has been collected but is currently pending.  We will continue with oral vancomycin.    I have discussed this patient's plan of care and discharge plan at IDT rounds today with Case Management, Nursing, Nursing leadership, and other members of the IDT team.    Consultants/Specialty  none    Code Status  Full Code    Disposition  The patient is not medically cleared for discharge to home or a post-acute facility.  Anticipate discharge to: home with close outpatient follow-up    I have placed the appropriate orders for post-discharge needs.    Review of Systems  Review  of Systems   Constitutional:  Positive for chills and fever.   HENT:  Negative for congestion and sore throat.    Eyes:  Negative for blurred vision and photophobia.   Respiratory:  Negative for cough and shortness of breath.    Cardiovascular:  Negative for chest pain, claudication and leg swelling.   Gastrointestinal:  Positive for abdominal pain, diarrhea and nausea. Negative for blood in stool, constipation, heartburn, melena and vomiting.   Genitourinary:  Negative for dysuria and hematuria.   Musculoskeletal:  Negative for joint pain and myalgias.   Skin:  Negative for itching and rash.   Neurological:  Negative for dizziness, sensory change, speech change, weakness and headaches.   Psychiatric/Behavioral:  Negative for depression. The patient is not nervous/anxious and does not have insomnia.         Physical Exam  Temp:  [36.7 °C (98 °F)-39.9 °C (103.8 °F)] 36.8 °C (98.2 °F)  Pulse:  [64-90] 64  Resp:  [16-20] 17  BP: (105-150)/(51-77) 105/51  SpO2:  [93 %-98 %] 93 %    Physical Exam  Vitals and nursing note reviewed.   Constitutional:       General: She is not in acute distress.     Appearance: Normal appearance. She is not ill-appearing.   HENT:      Head: Normocephalic and atraumatic.      Nose: Nose normal.   Cardiovascular:      Rate and Rhythm: Normal rate and regular rhythm.      Heart sounds: Normal heart sounds. No murmur heard.  Pulmonary:      Effort: Pulmonary effort is normal.      Breath sounds: Normal breath sounds.   Abdominal:      General: Bowel sounds are normal. There is no distension.      Palpations: Abdomen is soft.      Tenderness: There is abdominal tenderness (Generalized lower abdominal).   Musculoskeletal:         General: No swelling or tenderness.      Cervical back: Neck supple.   Skin:     General: Skin is warm and dry.   Neurological:      General: No focal deficit present.      Mental Status: She is alert and oriented to person, place, and time.   Psychiatric:         Mood  and Affect: Mood normal.         Fluids    Intake/Output Summary (Last 24 hours) at 7/3/2023 1233  Last data filed at 7/3/2023 0508  Gross per 24 hour   Intake --   Output 450 ml   Net -450 ml       Laboratory  Recent Labs     07/02/23  2310   WBC 13.1*   RBC 4.92   HEMOGLOBIN 15.4   HEMATOCRIT 43.3   MCV 88.0   MCH 31.3   MCHC 35.6*   RDW 39.8   PLATELETCT 270   MPV 9.9     Recent Labs     07/02/23  2310   SODIUM 136   POTASSIUM 3.8   CHLORIDE 103   CO2 16*   GLUCOSE 119*   BUN 21   CREATININE 0.84   CALCIUM 9.0     Recent Labs     07/02/23  2310   INR 1.05               Imaging  CT-ABDOMEN-PELVIS WITH   Final Result         1.  Mild cecal wall thickening with slight adjacent hazy fat stranding, appearance suggests mild segmental colitis.   2.  Mild air-filled distention of small bowel loops with reactive mucosal pattern suggesting component of ileus and/or enteritis.   3.  Hepatomegaly   4.  Atherosclerosis      CT-CTA CHEST PULMONARY ARTERY W/ RECONS   Final Result         1.  No pulmonary embolus appreciated.      DX-CHEST-PORTABLE (1 VIEW)   Final Result         1.  No acute cardiopulmonary disease.           Assessment/Plan  * Colitis- (present on admission)  Assessment & Plan  Noted on CT scan  Started after finishing a few courses of antibiotics within the last 3 months  CT and history concerning for C. difficile colitis  Await C. difficile by PCR  Causing sepsis  No Imodium  Continue p.o. vancomycin    High anion gap metabolic acidosis- (present on admission)  Assessment & Plan  Due to sepsis and dehydration  Continue IV fluids  Repeat BMP in the morning  Lactic acid has normalized    GERD (gastroesophageal reflux disease)- (present on admission)  Assessment & Plan  Hold home medications due to association with worsening C. Difficile  Okay to resume Pepcid however hold on Prilosec    CHRISTY (generalized anxiety disorder)- (present on admission)  Assessment & Plan  Continue home Lexapro    Asthma- (present on  admission)  Assessment & Plan  No acute exacerbation  Resume home meds    Familial hypercholesterolemia- (present on admission)  Assessment & Plan  Continue home statin    Sepsis (HCC)- (present on admission)  Assessment & Plan  This is Sepsis Present on admission  SIRS criteria identified on my evaluation include: Fever, with temperature greater than 100.9 deg F, Tachycardia, with heart rate greater than 90 BPM and Leukocytosis, with WBC greater than 12,000  Source is colitis  Sepsis protocol initiated  Fluid resuscitation ordered per protocol  Crystalloid Fluid Administration: Fluid resuscitation ordered per standard protocol - 30 mL/kg per current or ideal body weight  IV antibiotics as appropriate for source of sepsis  Reassessment: I have reassessed the patient's hemodynamic status  Start oral vancomycin  Await culture results  Causing metabolic acidosis  Patient is at risk of worsening, closely monitor her hemodynamics  Causing significant fever which is leading to her dyspnea, negative CTA chest         VTE prophylaxis: SCDs/TEDs    I have performed a physical exam and reviewed and updated ROS and Plan today (7/3/2023). In review of yesterday's note (7/2/2023), there are no changes except as documented above.

## 2023-07-03 NOTE — ED PROVIDER NOTES
ED Provider Note    CHIEF COMPLAINT  Chief Complaint   Patient presents with    Shortness of Breath     Reports worsening sob since Thursday       EXTERNAL RECORDS REVIEWED  Outpatient Notes visit for SI joint pain on 6/22/2023    HPI/ROS  LIMITATION TO HISTORY   Select: : None  OUTSIDE HISTORIAN(S):  Significant other  who is a physician reports that the patient has been taken off methotrexate and Humira but is still on etodolac    Arely Haynes is a 62 y.o. female who presents with shortness of breath, dyspnea on exertion, fevers, diarrhea.  Patient has a complex history of psoriatic arthritis, recent PCP pneumonia has been off antibiotics.  Patient was recently on cephalosporins 2 weeks ago following a spinal procedure.  Patient reports that she had fevers and fatigue starting on Friday into Saturday improved slightly and then started to worsen again last night.  Patient endorses chills and nausea with dry heaving.  Patient also endorses lower abdominal pain.  Patient denies dysuria or hematuria.  Patient is status post appendectomy.    PAST MEDICAL HISTORY   has a past medical history of Anesthesia, Arthritis, Asthma, Bowel habit changes, Depression, Erosive esophagitis, GERD (gastroesophageal reflux disease), Heart burn, High cholesterol, Immunocompromised (HCC), Pain, Pneumonia (2018), Psoriasis, and Psoriatic arthritis ([facility]).    SURGICAL HISTORY   has a past surgical history that includes fusion, spine, lumbar, plif; hip replacement, total; cystectomy; other abdominal surgery; gyn surgery (1999); other orthopedic surgery; other orthopedic surgery; hysterectomy robotic xi (N/A, 7/11/2019); salpingo oophorectomy (Bilateral, 7/11/2019); tendon repair (Left, 9/19/2019); bronchoscopy,diagnostic (7/9/2021); and inj lumbar/sacral,w/ imaging (Right, 6/2/2023).    FAMILY HISTORY  Family History   Problem Relation Age of Onset    Hyperlipidemia Mother     Heart Attack Mother     Psychiatric Illness Mother      Heart Disease Mother     Arthritis Mother     Lung Disease Father     Cancer Father     Hyperlipidemia Father     Hyperlipidemia Sister     Cancer Sister         breast    Heart Disease Paternal Grandfather     Hypertension Neg Hx     Diabetes Neg Hx        SOCIAL HISTORY  Social History     Tobacco Use    Smoking status: Never    Smokeless tobacco: Never   Vaping Use    Vaping Use: Never used   Substance and Sexual Activity    Alcohol use: Not Currently     Alcohol/week: 0.6 - 1.2 oz     Types: 1 - 2 Glasses of wine per week     Comment: one glss of wine at night     Drug use: No    Sexual activity: Yes     Comment: Tubal ligation       CURRENT MEDICATIONS  Home Medications    **Home medications have not yet been reviewed for this encounter**         ALLERGIES  Allergies   Allergen Reactions    Beef Allergy Anaphylaxis    Iodine Anaphylaxis, Rash and Hives     IV = anaphylaxis, topical = rash  IV = anaphylaxis, topical = rash    Levofloxacin Anaphylaxis    Shellfish-Derived Products Anaphylaxis     LOBSTER       PHYSICAL EXAM  VITAL SIGNS: /76   Pulse 79   Temp (!) 39.9 °C (103.8 °F) (Oral)   Resp 16   Ht 1.829 m (6')   Wt 105 kg (231 lb 7.7 oz)   LMP  (LMP Unknown)   SpO2 97%   BMI 31.39 kg/m²    Vitals and nursing note reviewed.   Constitutional:       Comments: Patient is lying in bed supine, pleasant, conversant, speaking in complete sentences   HENT:      Head: Normocephalic and atraumatic.   Eyes:      Extraocular Movements: Extraocular movements intact.      Conjunctiva/sclera: Conjunctivae normal.      Pupils: Pupils are equal, round, and reactive to light.   Cardiovascular:      Pulses: Normal pulses.      Comments: HR 84  Pulmonary:      Effort: Pulmonary effort is normal. No respiratory distress.  Scant diffuse expiratory wheezes.  Abdominal:      Comments: Abdomen is soft, lower abdominal tenderness to palpation, non-distended, non-rigid, no rebound, guarding, masses, no McBurney's  point tenderness, no peritoneal signs, negative Rovsing sign, negative Bella sign.  No CVA tenderness to palpation.  Musculoskeletal:         General: No swelling. Normal range of motion.      Cervical back: Normal range of motion. No rigidity.   Skin:     General: Skin is warm and dry.      Capillary Refill: Capillary refill takes less than 2 seconds.   Neurological:      Mental Status: Alert.       DIAGNOSTIC STUDIES / PROCEDURES  EKG  I have independently interpreted this EKG  LVH without ischemia    LABS  Leukocytosis    RADIOLOGY  I have independently interpreted the diagnostic imaging associated with this visit and am waiting the final reading from the radiologist.   My preliminary interpretation is as follows: No PE  Radiologist interpretation: Colitis    COURSE & MEDICAL DECISION MAKING      INITIAL ASSESSMENT, COURSE AND PLAN  Care Narrative: Chest x-ray to evaluate for pneumonia versus pneumothorax.  Patient given DuoNeb for shortness of breath, scant wheezes identified.  Septic work-up will be initiated given recent fever.  CT abdomen pelvis pending as well.  D-dimer to rule out PE.  EKG nonischemic, troponin to rule out ACS.  CBC to evaluate for acute anemia and leukocytosis.  CMP to evaluate for acute electrolyte abnormality, acute kidney injury, acute liver failure or dysfunction.     Electronically signed by: Stevo Brantley M.D., 7/2/2023 11:41 PM    Critical Care    I spent 35 minutes of critical care time with this patient. This does not include time spent on separately reported billable procedures.   This includes pulse ox interpretation, medical record review when available, interpretation of labs and imaging, specialist consultation, and hemodynamic monitoring.      HYDRATION: Based on the patient's presentation of Sepsis the patient was given IV fluids. IV Hydration was used because oral hydration was not adequate alone. Upon recheck following hydration, the patient was improved.          DISPOSITION AND DISCUSSIONS  I have discussed management of the patient with the following physicians and GEMMA's:  Dr Lund    FINAL DIAGNOSIS  1. Sepsis, due to unspecified organism, unspecified whether acute organ dysfunction present (HCC)    2. Colitis    3. At risk for Clostridioides difficile infection           Electronically signed by: Stevo Brantley M.D., 7/2/2023 11:31 PM

## 2023-07-04 LAB
ANION GAP SERPL CALC-SCNC: 12 MMOL/L (ref 7–16)
BASOPHILS # BLD AUTO: 0.3 % (ref 0–1.8)
BASOPHILS # BLD: 0.03 K/UL (ref 0–0.12)
BUN SERPL-MCNC: 10 MG/DL (ref 8–22)
CALCIUM SERPL-MCNC: 8.5 MG/DL (ref 8.4–10.2)
CHLORIDE SERPL-SCNC: 108 MMOL/L (ref 96–112)
CO2 SERPL-SCNC: 18 MMOL/L (ref 20–33)
CREAT SERPL-MCNC: 0.67 MG/DL (ref 0.5–1.4)
EOSINOPHIL # BLD AUTO: 0.05 K/UL (ref 0–0.51)
EOSINOPHIL NFR BLD: 0.6 % (ref 0–6.9)
ERYTHROCYTE [DISTWIDTH] IN BLOOD BY AUTOMATED COUNT: 43.2 FL (ref 35.9–50)
GFR SERPLBLD CREATININE-BSD FMLA CKD-EPI: 98 ML/MIN/1.73 M 2
GLUCOSE SERPL-MCNC: 92 MG/DL (ref 65–99)
HCT VFR BLD AUTO: 38.9 % (ref 37–47)
HGB BLD-MCNC: 13.2 G/DL (ref 12–16)
IMM GRANULOCYTES # BLD AUTO: 0.04 K/UL (ref 0–0.11)
IMM GRANULOCYTES NFR BLD AUTO: 0.5 % (ref 0–0.9)
LYMPHOCYTES # BLD AUTO: 1.85 K/UL (ref 1–4.8)
LYMPHOCYTES NFR BLD: 21.4 % (ref 22–41)
MCH RBC QN AUTO: 31.3 PG (ref 27–33)
MCHC RBC AUTO-ENTMCNC: 33.9 G/DL (ref 32.2–35.5)
MCV RBC AUTO: 92.2 FL (ref 81.4–97.8)
MONOCYTES # BLD AUTO: 0.6 K/UL (ref 0–0.85)
MONOCYTES NFR BLD AUTO: 6.9 % (ref 0–13.4)
NEUTROPHILS # BLD AUTO: 6.07 K/UL (ref 1.82–7.42)
NEUTROPHILS NFR BLD: 70.3 % (ref 44–72)
NRBC # BLD AUTO: 0 K/UL
NRBC BLD-RTO: 0 /100 WBC (ref 0–0.2)
PLATELET # BLD AUTO: 223 K/UL (ref 164–446)
PMV BLD AUTO: 9.7 FL (ref 9–12.9)
POTASSIUM SERPL-SCNC: 3.7 MMOL/L (ref 3.6–5.5)
RBC # BLD AUTO: 4.22 M/UL (ref 4.2–5.4)
SODIUM SERPL-SCNC: 138 MMOL/L (ref 135–145)
WBC # BLD AUTO: 8.6 K/UL (ref 4.8–10.8)

## 2023-07-04 PROCEDURE — 700102 HCHG RX REV CODE 250 W/ 637 OVERRIDE(OP): Performed by: INTERNAL MEDICINE

## 2023-07-04 PROCEDURE — 87077 CULTURE AEROBIC IDENTIFY: CPT | Mod: 91

## 2023-07-04 PROCEDURE — 700101 HCHG RX REV CODE 250: Performed by: INTERNAL MEDICINE

## 2023-07-04 PROCEDURE — 94760 N-INVAS EAR/PLS OXIMETRY 1: CPT

## 2023-07-04 PROCEDURE — 87899 AGENT NOS ASSAY W/OPTIC: CPT

## 2023-07-04 PROCEDURE — A9270 NON-COVERED ITEM OR SERVICE: HCPCS | Performed by: INTERNAL MEDICINE

## 2023-07-04 PROCEDURE — 700111 HCHG RX REV CODE 636 W/ 250 OVERRIDE (IP): Performed by: INTERNAL MEDICINE

## 2023-07-04 PROCEDURE — 85025 COMPLETE CBC W/AUTO DIFF WBC: CPT

## 2023-07-04 PROCEDURE — 770001 HCHG ROOM/CARE - MED/SURG/GYN PRIV*

## 2023-07-04 PROCEDURE — 80048 BASIC METABOLIC PNL TOTAL CA: CPT

## 2023-07-04 PROCEDURE — 87045 FECES CULTURE AEROBIC BACT: CPT

## 2023-07-04 PROCEDURE — 87507 IADNA-DNA/RNA PROBE TQ 12-25: CPT

## 2023-07-04 PROCEDURE — 94640 AIRWAY INHALATION TREATMENT: CPT

## 2023-07-04 PROCEDURE — 36415 COLL VENOUS BLD VENIPUNCTURE: CPT

## 2023-07-04 PROCEDURE — 99233 SBSQ HOSP IP/OBS HIGH 50: CPT | Performed by: INTERNAL MEDICINE

## 2023-07-04 RX ORDER — LOPERAMIDE HYDROCHLORIDE 2 MG/1
2 CAPSULE ORAL 4 TIMES DAILY PRN
Status: DISCONTINUED | OUTPATIENT
Start: 2023-07-04 | End: 2023-07-06 | Stop reason: HOSPADM

## 2023-07-04 RX ORDER — SIMETHICONE 125 MG
125 TABLET,CHEWABLE ORAL 3 TIMES DAILY PRN
Status: DISCONTINUED | OUTPATIENT
Start: 2023-07-04 | End: 2023-07-06 | Stop reason: HOSPADM

## 2023-07-04 RX ORDER — MONTELUKAST SODIUM 10 MG/1
10 TABLET ORAL NIGHTLY
Status: DISCONTINUED | OUTPATIENT
Start: 2023-07-04 | End: 2023-07-06 | Stop reason: HOSPADM

## 2023-07-04 RX ORDER — OMEPRAZOLE 20 MG/1
40 CAPSULE, DELAYED RELEASE ORAL DAILY
Status: DISCONTINUED | OUTPATIENT
Start: 2023-07-04 | End: 2023-07-06 | Stop reason: HOSPADM

## 2023-07-04 RX ADMIN — ACETAMINOPHEN 650 MG: 325 TABLET ORAL at 05:23

## 2023-07-04 RX ADMIN — LOPERAMIDE HYDROCHLORIDE 2 MG: 2 CAPSULE ORAL at 23:17

## 2023-07-04 RX ADMIN — LAMOTRIGINE 100 MG: 100 TABLET ORAL at 05:24

## 2023-07-04 RX ADMIN — ROSUVASTATIN CALCIUM 10 MG: 10 TABLET, FILM COATED ORAL at 20:45

## 2023-07-04 RX ADMIN — SIMETHICONE 125 MG: 125 TABLET, CHEWABLE ORAL at 14:54

## 2023-07-04 RX ADMIN — ESCITALOPRAM OXALATE 30 MG: 10 TABLET ORAL at 05:23

## 2023-07-04 RX ADMIN — MONTELUKAST 10 MG: 10 TABLET, FILM COATED ORAL at 20:45

## 2023-07-04 RX ADMIN — LOPERAMIDE HYDROCHLORIDE 2 MG: 2 CAPSULE ORAL at 11:21

## 2023-07-04 RX ADMIN — PREGABALIN 50 MG: 25 CAPSULE ORAL at 20:45

## 2023-07-04 RX ADMIN — MOMETASONE FUROATE AND FORMOTEROL FUMARATE DIHYDRATE 2 PUFF: 200; 5 AEROSOL RESPIRATORY (INHALATION) at 06:43

## 2023-07-04 RX ADMIN — VANCOMYCIN HYDROCHLORIDE 125 MG: 5 INJECTION, POWDER, LYOPHILIZED, FOR SOLUTION INTRAVENOUS at 05:23

## 2023-07-04 RX ADMIN — RIVAROXABAN 10 MG: 10 TABLET, FILM COATED ORAL at 17:09

## 2023-07-04 RX ADMIN — ONDANSETRON 4 MG: 2 INJECTION INTRAMUSCULAR; INTRAVENOUS at 06:37

## 2023-07-04 RX ADMIN — FAMOTIDINE 20 MG: 20 TABLET, FILM COATED ORAL at 05:24

## 2023-07-04 RX ADMIN — OMEPRAZOLE 40 MG: 20 CAPSULE, DELAYED RELEASE ORAL at 11:21

## 2023-07-04 RX ADMIN — Medication 400 MG: at 05:23

## 2023-07-04 RX ADMIN — ACETAMINOPHEN 650 MG: 325 TABLET ORAL at 11:22

## 2023-07-04 RX ADMIN — LOPERAMIDE HYDROCHLORIDE 2 MG: 2 CAPSULE ORAL at 17:09

## 2023-07-04 RX ADMIN — MOMETASONE FUROATE AND FORMOTEROL FUMARATE DIHYDRATE 2 PUFF: 200; 5 AEROSOL RESPIRATORY (INHALATION) at 21:01

## 2023-07-04 ASSESSMENT — PAIN DESCRIPTION - PAIN TYPE
TYPE: ACUTE PAIN

## 2023-07-04 ASSESSMENT — ENCOUNTER SYMPTOMS
BLURRED VISION: 0
HEADACHES: 0
INSOMNIA: 0
NAUSEA: 1
WEAKNESS: 0
SHORTNESS OF BREATH: 0
NERVOUS/ANXIOUS: 0
ABDOMINAL PAIN: 1
HEARTBURN: 0
SORE THROAT: 0
BLOOD IN STOOL: 0
MYALGIAS: 0
PHOTOPHOBIA: 0
CONSTIPATION: 0
SPEECH CHANGE: 0
CHILLS: 1
FEVER: 1
SENSORY CHANGE: 0
DIZZINESS: 0
CLAUDICATION: 0
COUGH: 0
VOMITING: 0
DIARRHEA: 1
DEPRESSION: 0

## 2023-07-04 NOTE — PROGRESS NOTES
Educated that Special Contact Precautions for C.diff involves staff and visitors wearing gowns and gloves when in the patient room, and using soap and water for hand hygiene when exiting patient’s room.     Educated that patient may leave the room if they or continent of stool, but prior to exiting the patient room each time, the patient needs to have on a fresh patient gown, ensure the potentially infectious area is covered, and perform hand hygiene with soap and water immediately prior to exiting the room.    In addition, educated that alcohol based hand rub is NOT sufficient for hand hygiene for Special Contact Precautions. A bonnet is placed over the hand  dispenser inside the patients’ room as a reminder to wash hands with soap and water.

## 2023-07-04 NOTE — PROGRESS NOTES
Assumed care of pt. Pt c/o 7/10 abdominal pain. Medicated per MAR. RN educated pt on POC for the evening. All questions answered. Fall precautions in place.    2100: pt reports improvement in pain to 4/10.    0000: administered midnight vancomycin.    0215: pt resting in bed.     0318: Assisted pt to bathroom.

## 2023-07-04 NOTE — PROGRESS NOTES
[facility] Medicine Daily Progress Note    Date of Service  7/4/2023    Chief Complaint  Arely Haynes is a 62 y.o. female admitted 7/2/2023 with fevers, chills, abdominal pain and diarrhea    Hospital Course  Patient presented with fevers chills abdominal pain diarrhea.  She started having these issues on Thursday and is started with fatigue.  She went on Friday however was too fatigued and had to go home.  After that she noticed development of fevers and chills and then abdominal pain with diarrhea.  She has been experiencing significant shortness of breath with minimal activity.  Patient came into the emergency room and had CTA of the chest which was negative for any evidence of PE but CT of the abdomen was concerning for colitis.  She had recently been on 2 courses of antibiotics 1 for recent PCP pneumonia and then she had a spinal procedure done about 3 weeks ago where she was on a course of 5-day antibiotics following that.  She is having very foul-smelling frequent diarrhea that is dark brown.  There is high suspicion of C. difficile therefore she has been started on oral vancomycin.    Interval Problem Update  7/3 patient states that the diarrhea has actually increased in frequency since he has been started on IV fluids however she was markedly dehydrated when she got here.  Patient has multiple risk factors for C. difficile and stool culture has been collected but is currently pending.  We will continue with oral vancomycin.    7/4: C. difficile negative, vancomycin discontinued.  Still having diarrhea and abdominal distention/pain.  Added loperamide and simethicone for symptom.  Also she will be started on biotics at this point unnecessary.  Stool PCR sent.  Continue with IV fluids until fully tolerating diet    I have discussed this patient's plan of care and discharge plan at IDT rounds today with Case Management, Nursing, Nursing leadership, and other members of the IDT  team.    Consultants/Specialty  none    Code Status  Full Code    Disposition  The patient is not medically cleared for discharge to home or a post-acute facility.  Anticipate discharge to: home with close outpatient follow-up    I have placed the appropriate orders for post-discharge needs.    Review of Systems  Review of Systems   Constitutional:  Positive for chills and fever.   HENT:  Negative for congestion and sore throat.    Eyes:  Negative for blurred vision and photophobia.   Respiratory:  Negative for cough and shortness of breath.    Cardiovascular:  Negative for chest pain, claudication and leg swelling.   Gastrointestinal:  Positive for abdominal pain, diarrhea and nausea. Negative for blood in stool, constipation, heartburn, melena and vomiting.   Genitourinary:  Negative for dysuria and hematuria.   Musculoskeletal:  Negative for joint pain and myalgias.   Skin:  Negative for itching and rash.   Neurological:  Negative for dizziness, sensory change, speech change, weakness and headaches.   Psychiatric/Behavioral:  Negative for depression. The patient is not nervous/anxious and does not have insomnia.    All other systems reviewed and are negative.       Physical Exam  Temp:  [37.2 °C (99 °F)-39.4 °C (102.9 °F)] 37.5 °C (99.5 °F)  Pulse:  [53-72] 53  Resp:  [16-18] 16  BP: (122-128)/(49-85) 125/85  SpO2:  [93 %-97 %] 93 %    Physical Exam  Vitals and nursing note reviewed.   Constitutional:       General: She is not in acute distress.     Appearance: Normal appearance. She is not ill-appearing.   HENT:      Head: Normocephalic and atraumatic.      Nose: Nose normal.   Cardiovascular:      Rate and Rhythm: Normal rate and regular rhythm.      Heart sounds: Normal heart sounds. No murmur heard.  Pulmonary:      Effort: Pulmonary effort is normal.      Breath sounds: Normal breath sounds.   Abdominal:      General: Bowel sounds are normal. There is distension.      Palpations: Abdomen is soft.       Tenderness: There is abdominal tenderness (Generalized lower abdominal).   Musculoskeletal:         General: No swelling or tenderness.      Cervical back: Neck supple.   Skin:     General: Skin is warm and dry.   Neurological:      General: No focal deficit present.      Mental Status: She is alert and oriented to person, place, and time.   Psychiatric:         Mood and Affect: Mood normal.         Fluids    Intake/Output Summary (Last 24 hours) at 7/4/2023 1344  Last data filed at 7/4/2023 0800  Gross per 24 hour   Intake 240 ml   Output 400 ml   Net -160 ml       Laboratory  Recent Labs     07/02/23  2310 07/04/23  0120   WBC 13.1* 8.6   RBC 4.92 4.22   HEMOGLOBIN 15.4 13.2   HEMATOCRIT 43.3 38.9   MCV 88.0 92.2   MCH 31.3 31.3   MCHC 35.6* 33.9   RDW 39.8 43.2   PLATELETCT 270 223   MPV 9.9 9.7     Recent Labs     07/02/23  2310 07/04/23  0120   SODIUM 136 138   POTASSIUM 3.8 3.7   CHLORIDE 103 108   CO2 16* 18*   GLUCOSE 119* 92   BUN 21 10   CREATININE 0.84 0.67   CALCIUM 9.0 8.5     Recent Labs     07/02/23  2310   INR 1.05               Imaging  CT-ABDOMEN-PELVIS WITH   Final Result         1.  Mild cecal wall thickening with slight adjacent hazy fat stranding, appearance suggests mild segmental colitis.   2.  Mild air-filled distention of small bowel loops with reactive mucosal pattern suggesting component of ileus and/or enteritis.   3.  Hepatomegaly   4.  Atherosclerosis      CT-CTA CHEST PULMONARY ARTERY W/ RECONS   Final Result         1.  No pulmonary embolus appreciated.      DX-CHEST-PORTABLE (1 VIEW)   Final Result         1.  No acute cardiopulmonary disease.           Assessment/Plan  * Colitis- (present on admission)  Assessment & Plan  Noted on CT scan  Started after finishing a few courses of antibiotics within the last 3 months  C. difficile negative stool PCR panel and cultures sent  Causing sepsis  Imodium and simethicone for symptoms  DC vancomycin    High anion gap metabolic acidosis-  (present on admission)  Assessment & Plan  Due to sepsis and dehydration  Continue IV fluids  Repeat BMP in the morning  Lactic acid has normalized    GERD (gastroesophageal reflux disease)- (present on admission)  Assessment & Plan  Hold home medications due to association with worsening C. Difficile  Okay to resume Pepcid however hold on Prilosec    CHRISTY (generalized anxiety disorder)- (present on admission)  Assessment & Plan  Continue home Lexapro    Asthma- (present on admission)  Assessment & Plan  No acute exacerbation  Resume home meds    Familial hypercholesterolemia- (present on admission)  Assessment & Plan  Continue home statin    Sepsis (HCC)- (present on admission)  Assessment & Plan  This is Sepsis Present on admission  SIRS criteria identified on my evaluation include: Fever, with temperature greater than 100.9 deg F, Tachycardia, with heart rate greater than 90 BPM and Leukocytosis, with WBC greater than 12,000  Source is colitis  Sepsis protocol initiated  Fluid resuscitation ordered per protocol  Crystalloid Fluid Administration: Fluid resuscitation ordered per standard protocol - 30 mL/kg per current or ideal body weight  IV antibiotics as appropriate for source of sepsis  Reassessment: I have reassessed the patient's hemodynamic status  Start oral vancomycin  Await culture results  Causing metabolic acidosis  Patient is at risk of worsening, closely monitor her hemodynamics  Causing significant fever which is leading to her dyspnea, negative CTA chest         VTE prophylaxis: SCDs/TEDs    I have performed a physical exam and reviewed and updated ROS and Plan today (7/4/2023). In review of yesterday's note (7/3/2023), there are no changes except as documented above.      Total time spent with patient over 52 minutes.  This included my review with nursing and ancillary staff, review of records, face to face interview, physical examination; my review of lab results (CBC, chemistry panel), imaging  review (CXR) and ECG.   In addition, counseling patient and speaking with consultants.

## 2023-07-04 NOTE — CARE PLAN
The patient is Stable - Low risk of patient condition declining or worsening    Shift Goals  Clinical Goals: pt will remain at stated pain goal of 4/10 pain.  Patient Goals: sleep comfortably  Family Goals: N/A    Progress made toward(s) clinical / shift goals:      Current PRN medications allow pt to remain at stated pain goal.    Problem: Knowledge Deficit - Standard  Goal: Patient and family/care givers will demonstrate understanding of plan of care, disease process/condition, diagnostic tests and medications  Outcome: Progressing     Problem: Hemodynamics  Goal: Patient's hemodynamics, fluid balance and neurologic status will be stable or improve  Outcome: Progressing     Problem: Fluid Volume  Goal: Fluid volume balance will be maintained  Outcome: Progressing     Problem: Respiratory  Goal: Patient will achieve/maintain optimum respiratory ventilation and gas exchange  Outcome: Progressing     Problem: Physical Regulation  Goal: Diagnostic test results will improve  Outcome: Progressing     Problem: Pain - Standard  Goal: Alleviation of pain or a reduction in pain to the patient’s comfort goal  Outcome: Progressing     Problem: Fall Risk  Goal: Patient will remain free from falls  Outcome: Progressing       Patient is not progressing towards the following goals:

## 2023-07-04 NOTE — CARE PLAN
The patient is Stable - Low risk of patient condition declining or worsening    Shift Goals  Clinical Goals: reduce cramping bloating, ambulate  Patient Goals: reduce abdominal discomfort  Family Goals: N/A    Progress made toward(s) clinical / shift goals: administer medications as prescribed    Patient is not progressing towards the following goals:

## 2023-07-04 NOTE — PROGRESS NOTES
Hourly rounds     2043 Patient laying in bed, nothing needed   2239 BP check   0024 patient sleeping   0126 Patient sleeping   0442 BP check   0610 Patient sleeping

## 2023-07-04 NOTE — CARE PLAN
The patient is Stable - Low risk of patient condition declining or worsening    Shift Goals  Clinical Goals: Monitor for fever and manage pain throughout the shift  Patient Goals: pain control, rest comfortably  Family Goals: N/A    Progress made toward(s) clinical / shift goals:  PRN medication given for pain controlled.Medicated per MAR for fever.Educated to increase oral fluid intake. Per pt BM 12X watery.    Patient is not progressing towards the following goals:      Problem: Hemodynamics  Goal: Patient's hemodynamics, fluid balance and neurologic status will be stable or improve  Outcome: Progressing     Problem: Fluid Volume  Goal: Fluid volume balance will be maintained  Outcome: Progressing     Problem: Pain - Standard  Goal: Alleviation of pain or a reduction in pain to the patient’s comfort goal  Outcome: Progressing     Problem: Fall Risk  Goal: Patient will remain free from falls  Outcome: Progressing

## 2023-07-04 NOTE — PROGRESS NOTES
Received report from night shift RN. Assumed pt care 0700  Pt is A&Ox4, resting  in bed, increased toileting related to increased GI activity (documented in flow sheets). Plan of care reviewed for activities and goals this shift. Medication eduction provided.  Pt verbalizes understanding of plan of care for this shift. Pt on room air; no signs of SOB/respiratory distress.  Patients needs attended well. Provider updated on pt status.   Fall precautions in place. Bed at lowest position. Call light and personal belongings within reach.   Hourly rounding in progress.    Family at bedside.   1200-Level of comfort increased. No new updates on status of care, pt sleeping eyes closed

## 2023-07-05 LAB
ANION GAP SERPL CALC-SCNC: 13 MMOL/L (ref 7–16)
BUN SERPL-MCNC: 5 MG/DL (ref 8–22)
CALCIUM SERPL-MCNC: 8.6 MG/DL (ref 8.4–10.2)
CHLORIDE SERPL-SCNC: 112 MMOL/L (ref 96–112)
CO2 SERPL-SCNC: 20 MMOL/L (ref 20–33)
CREAT SERPL-MCNC: 0.6 MG/DL (ref 0.5–1.4)
ERYTHROCYTE [DISTWIDTH] IN BLOOD BY AUTOMATED COUNT: 41.3 FL (ref 35.9–50)
GFR SERPLBLD CREATININE-BSD FMLA CKD-EPI: 100 ML/MIN/1.73 M 2
GLUCOSE SERPL-MCNC: 85 MG/DL (ref 65–99)
HCT VFR BLD AUTO: 36 % (ref 37–47)
HGB BLD-MCNC: 12.4 G/DL (ref 12–16)
MAGNESIUM SERPL-MCNC: 1.8 MG/DL (ref 1.5–2.5)
MCH RBC QN AUTO: 31.2 PG (ref 27–33)
MCHC RBC AUTO-ENTMCNC: 34.4 G/DL (ref 32.2–35.5)
MCV RBC AUTO: 90.5 FL (ref 81.4–97.8)
PLATELET # BLD AUTO: 214 K/UL (ref 164–446)
PMV BLD AUTO: 9.4 FL (ref 9–12.9)
POTASSIUM SERPL-SCNC: 3.4 MMOL/L (ref 3.6–5.5)
RBC # BLD AUTO: 3.98 M/UL (ref 4.2–5.4)
SODIUM SERPL-SCNC: 145 MMOL/L (ref 135–145)
WBC # BLD AUTO: 5.7 K/UL (ref 4.8–10.8)

## 2023-07-05 PROCEDURE — 99232 SBSQ HOSP IP/OBS MODERATE 35: CPT | Performed by: INTERNAL MEDICINE

## 2023-07-05 PROCEDURE — 700102 HCHG RX REV CODE 250 W/ 637 OVERRIDE(OP): Performed by: INTERNAL MEDICINE

## 2023-07-05 PROCEDURE — 94760 N-INVAS EAR/PLS OXIMETRY 1: CPT

## 2023-07-05 PROCEDURE — A9270 NON-COVERED ITEM OR SERVICE: HCPCS | Performed by: INTERNAL MEDICINE

## 2023-07-05 PROCEDURE — 770001 HCHG ROOM/CARE - MED/SURG/GYN PRIV*

## 2023-07-05 PROCEDURE — 94640 AIRWAY INHALATION TREATMENT: CPT

## 2023-07-05 PROCEDURE — 83735 ASSAY OF MAGNESIUM: CPT

## 2023-07-05 PROCEDURE — 700111 HCHG RX REV CODE 636 W/ 250 OVERRIDE (IP): Mod: JZ | Performed by: INTERNAL MEDICINE

## 2023-07-05 PROCEDURE — 80048 BASIC METABOLIC PNL TOTAL CA: CPT

## 2023-07-05 PROCEDURE — 700105 HCHG RX REV CODE 258: Mod: JZ | Performed by: INTERNAL MEDICINE

## 2023-07-05 PROCEDURE — 85027 COMPLETE CBC AUTOMATED: CPT

## 2023-07-05 PROCEDURE — 36415 COLL VENOUS BLD VENIPUNCTURE: CPT

## 2023-07-05 RX ORDER — POTASSIUM CHLORIDE 20 MEQ/1
40 TABLET, EXTENDED RELEASE ORAL ONCE
Status: COMPLETED | OUTPATIENT
Start: 2023-07-05 | End: 2023-07-05

## 2023-07-05 RX ORDER — MAGNESIUM SULFATE HEPTAHYDRATE 40 MG/ML
2 INJECTION, SOLUTION INTRAVENOUS ONCE
Status: COMPLETED | OUTPATIENT
Start: 2023-07-05 | End: 2023-07-05

## 2023-07-05 RX ADMIN — MONTELUKAST 10 MG: 10 TABLET, FILM COATED ORAL at 21:12

## 2023-07-05 RX ADMIN — LAMOTRIGINE 100 MG: 100 TABLET ORAL at 06:01

## 2023-07-05 RX ADMIN — LOPERAMIDE HYDROCHLORIDE 2 MG: 2 CAPSULE ORAL at 12:00

## 2023-07-05 RX ADMIN — ROSUVASTATIN CALCIUM 10 MG: 10 TABLET, FILM COATED ORAL at 21:12

## 2023-07-05 RX ADMIN — RIVAROXABAN 10 MG: 10 TABLET, FILM COATED ORAL at 21:12

## 2023-07-05 RX ADMIN — POTASSIUM CHLORIDE 40 MEQ: 1500 TABLET, EXTENDED RELEASE ORAL at 14:32

## 2023-07-05 RX ADMIN — OMEPRAZOLE 40 MG: 20 CAPSULE, DELAYED RELEASE ORAL at 06:01

## 2023-07-05 RX ADMIN — ESCITALOPRAM OXALATE 30 MG: 10 TABLET ORAL at 06:01

## 2023-07-05 RX ADMIN — SODIUM CHLORIDE, POTASSIUM CHLORIDE, SODIUM LACTATE AND CALCIUM CHLORIDE 999 ML: 600; 310; 30; 20 INJECTION, SOLUTION INTRAVENOUS at 17:06

## 2023-07-05 RX ADMIN — LOPERAMIDE HYDROCHLORIDE 2 MG: 2 CAPSULE ORAL at 06:01

## 2023-07-05 RX ADMIN — MAGNESIUM SULFATE HEPTAHYDRATE 2 G: 2 INJECTION, SOLUTION INTRAVENOUS at 14:35

## 2023-07-05 RX ADMIN — OXYCODONE 5 MG: 5 TABLET ORAL at 23:17

## 2023-07-05 RX ADMIN — Medication 400 MG: at 06:01

## 2023-07-05 RX ADMIN — ACETAMINOPHEN 650 MG: 325 TABLET ORAL at 14:31

## 2023-07-05 RX ADMIN — PREGABALIN 50 MG: 25 CAPSULE ORAL at 21:12

## 2023-07-05 RX ADMIN — SIMETHICONE 125 MG: 125 TABLET, CHEWABLE ORAL at 14:32

## 2023-07-05 RX ADMIN — MOMETASONE FUROATE AND FORMOTEROL FUMARATE DIHYDRATE 2 PUFF: 200; 5 AEROSOL RESPIRATORY (INHALATION) at 19:43

## 2023-07-05 RX ADMIN — MOMETASONE FUROATE AND FORMOTEROL FUMARATE DIHYDRATE 2 PUFF: 200; 5 AEROSOL RESPIRATORY (INHALATION) at 07:45

## 2023-07-05 ASSESSMENT — ENCOUNTER SYMPTOMS
ABDOMINAL PAIN: 1
CONSTIPATION: 0
NAUSEA: 1
BLURRED VISION: 0
WEAKNESS: 0
HEADACHES: 0
NERVOUS/ANXIOUS: 0
CLAUDICATION: 0
MYALGIAS: 0
SORE THROAT: 0
HEARTBURN: 0
COUGH: 0
CHILLS: 1
DIZZINESS: 0
DEPRESSION: 0
SPEECH CHANGE: 0
FEVER: 1
SHORTNESS OF BREATH: 0
BLOOD IN STOOL: 0
DIARRHEA: 1
SENSORY CHANGE: 0
PHOTOPHOBIA: 0
VOMITING: 0
INSOMNIA: 0

## 2023-07-05 ASSESSMENT — PAIN DESCRIPTION - PAIN TYPE
TYPE: ACUTE PAIN

## 2023-07-05 ASSESSMENT — PATIENT HEALTH QUESTIONNAIRE - PHQ9
1. LITTLE INTEREST OR PLEASURE IN DOING THINGS: NOT AT ALL
2. FEELING DOWN, DEPRESSED, IRRITABLE, OR HOPELESS: NOT AT ALL
SUM OF ALL RESPONSES TO PHQ9 QUESTIONS 1 AND 2: 0
1. LITTLE INTEREST OR PLEASURE IN DOING THINGS: NOT AT ALL
2. FEELING DOWN, DEPRESSED, IRRITABLE, OR HOPELESS: NOT AT ALL
SUM OF ALL RESPONSES TO PHQ9 QUESTIONS 1 AND 2: 0

## 2023-07-05 NOTE — PROGRESS NOTES
[facility] Medicine Daily Progress Note    Date of Service  7/5/2023    Chief Complaint  Arely Haynes is a 62 y.o. female admitted 7/2/2023 with fevers, chills, abdominal pain and diarrhea    Hospital Course  Patient presented with fevers chills abdominal pain diarrhea.  She started having these issues on Thursday and is started with fatigue.  She went on Friday however was too fatigued and had to go home.  After that she noticed development of fevers and chills and then abdominal pain with diarrhea.  She has been experiencing significant shortness of breath with minimal activity.  Patient came into the emergency room and had CTA of the chest which was negative for any evidence of PE but CT of the abdomen was concerning for colitis.  She had recently been on 2 courses of antibiotics 1 for recent PCP pneumonia and then she had a spinal procedure done about 3 weeks ago where she was on a course of 5-day antibiotics following that.  She is having very foul-smelling frequent diarrhea that is dark brown.  There is high suspicion of C. difficile therefore she has been started on oral vancomycin.    Interval Problem Update  7/3 patient states that the diarrhea has actually increased in frequency since he has been started on IV fluids however she was markedly dehydrated when she got here.  Patient has multiple risk factors for C. difficile and stool culture has been collected but is currently pending.  We will continue with oral vancomycin.    7/4: C. difficile negative, vancomycin discontinued.  Still having diarrhea and abdominal distention/pain.  Added loperamide and simethicone for symptom.  Also she will be started on biotics at this point unnecessary.  Stool PCR sent.  Continue with IV fluids until fully tolerating diet    7/5: Still not fully tolerating diet.  Continue with IV fluids.  Pending PCR. hold antibiotics    I have discussed this patient's plan of care and discharge plan at IDT rounds today with Case  Management, Nursing, Nursing leadership, and other members of the IDT team.    Consultants/Specialty  none    Code Status  Full Code    Disposition  The patient is not medically cleared for discharge to home or a post-acute facility.  Anticipate discharge to: home with close outpatient follow-up    I have placed the appropriate orders for post-discharge needs.    Review of Systems  Review of Systems   Constitutional:  Positive for chills and fever.   HENT:  Negative for congestion and sore throat.    Eyes:  Negative for blurred vision and photophobia.   Respiratory:  Negative for cough and shortness of breath.    Cardiovascular:  Negative for chest pain, claudication and leg swelling.   Gastrointestinal:  Positive for abdominal pain, diarrhea and nausea. Negative for blood in stool, constipation, heartburn, melena and vomiting.   Genitourinary:  Negative for dysuria and hematuria.   Musculoskeletal:  Negative for joint pain and myalgias.   Skin:  Negative for itching and rash.   Neurological:  Negative for dizziness, sensory change, speech change, weakness and headaches.   Psychiatric/Behavioral:  Negative for depression. The patient is not nervous/anxious and does not have insomnia.    All other systems reviewed and are negative.       Physical Exam  Temp:  [36.6 °C (97.9 °F)-37.3 °C (99.2 °F)] 37.3 °C (99.1 °F)  Pulse:  [53-64] 53  Resp:  [16-17] 17  BP: (122-139)/(65-80) 139/70  SpO2:  [94 %-96 %] 96 %    Physical Exam  Vitals and nursing note reviewed.   Constitutional:       General: She is not in acute distress.     Appearance: Normal appearance. She is not ill-appearing.   HENT:      Head: Normocephalic and atraumatic.      Nose: Nose normal.   Cardiovascular:      Rate and Rhythm: Normal rate and regular rhythm.      Heart sounds: Normal heart sounds. No murmur heard.  Pulmonary:      Effort: Pulmonary effort is normal.      Breath sounds: Normal breath sounds.   Abdominal:      General: Bowel sounds are  normal. There is distension.      Palpations: Abdomen is soft.      Tenderness: There is abdominal tenderness (Generalized lower abdominal).   Musculoskeletal:         General: No swelling or tenderness.      Cervical back: Neck supple.   Skin:     General: Skin is warm and dry.   Neurological:      General: No focal deficit present.      Mental Status: She is alert and oriented to person, place, and time.   Psychiatric:         Mood and Affect: Mood normal.         Fluids    Intake/Output Summary (Last 24 hours) at 7/5/2023 1351  Last data filed at 7/5/2023 0800  Gross per 24 hour   Intake 580 ml   Output --   Net 580 ml       Laboratory  Recent Labs     07/02/23  2310 07/04/23  0120 07/05/23  0110   WBC 13.1* 8.6 5.7   RBC 4.92 4.22 3.98*   HEMOGLOBIN 15.4 13.2 12.4   HEMATOCRIT 43.3 38.9 36.0*   MCV 88.0 92.2 90.5   MCH 31.3 31.3 31.2   MCHC 35.6* 33.9 34.4   RDW 39.8 43.2 41.3   PLATELETCT 270 223 214   MPV 9.9 9.7 9.4     Recent Labs     07/02/23  2310 07/04/23  0120 07/05/23  0110   SODIUM 136 138 145   POTASSIUM 3.8 3.7 3.4*   CHLORIDE 103 108 112   CO2 16* 18* 20   GLUCOSE 119* 92 85   BUN 21 10 5*   CREATININE 0.84 0.67 0.60   CALCIUM 9.0 8.5 8.6     Recent Labs     07/02/23  2310   INR 1.05               Imaging  CT-ABDOMEN-PELVIS WITH   Final Result         1.  Mild cecal wall thickening with slight adjacent hazy fat stranding, appearance suggests mild segmental colitis.   2.  Mild air-filled distention of small bowel loops with reactive mucosal pattern suggesting component of ileus and/or enteritis.   3.  Hepatomegaly   4.  Atherosclerosis      CT-CTA CHEST PULMONARY ARTERY W/ RECONS   Final Result         1.  No pulmonary embolus appreciated.      DX-CHEST-PORTABLE (1 VIEW)   Final Result         1.  No acute cardiopulmonary disease.           Assessment/Plan  * Colitis- (present on admission)  Assessment & Plan  Noted on CT scan  Started after finishing a few courses of antibiotics within the last 3  months  C. difficile negative stool PCR panel and cultures sent-pending results  No need for antibiotics for now  Causing sepsis  Imodium and simethicone for symptoms  DC vancomycin    High anion gap metabolic acidosis- (present on admission)  Assessment & Plan  Due to sepsis and dehydration  Continue IV fluids  Repeat BMP in the morning  Lactic acid has normalized    Hypokalemia  Assessment & Plan  Potassium 3.4  Magnesium 1.8  Replace and monitor as needed    GERD (gastroesophageal reflux disease)- (present on admission)  Assessment & Plan  Hold home medications due to association with worsening C. Difficile  Okay to resume Pepcid however hold on Prilosec    CHRISTY (generalized anxiety disorder)- (present on admission)  Assessment & Plan  Continue home Lexapro    Asthma- (present on admission)  Assessment & Plan  No acute exacerbation  Resume home meds    Familial hypercholesterolemia- (present on admission)  Assessment & Plan  Continue home statin    Sepsis (HCC)- (present on admission)  Assessment & Plan  This is Sepsis Present on admission  SIRS criteria identified on my evaluation include: Fever, with temperature greater than 100.9 deg F, Tachycardia, with heart rate greater than 90 BPM and Leukocytosis, with WBC greater than 12,000  Source is colitis  Sepsis protocol initiated  Fluid resuscitation ordered per protocol  Crystalloid Fluid Administration: Fluid resuscitation ordered per standard protocol - 30 mL/kg per current or ideal body weight  IV antibiotics as appropriate for source of sepsis  Reassessment: I have reassessed the patient's hemodynamic status  Start oral vancomycin  Await culture results  Causing metabolic acidosis  Patient is at risk of worsening, closely monitor her hemodynamics  Causing significant fever which is leading to her dyspnea, negative CTA chest         VTE prophylaxis: SCDs/TEDs    I have performed a physical exam and reviewed and updated ROS and Plan today (7/5/2023). In review  of yesterday's note (7/4/2023), there are no changes except as documented above.

## 2023-07-05 NOTE — CARE PLAN
The patient is Stable - Low risk of patient condition declining or worsening    Shift Goals  Clinical Goals: pt will not sustain a fall this shift.  Patient Goals: sleep comfortably  Family Goals: N/A    Progress made toward(s) clinical / shift goals:      Pt placed on moderate fall precautions. No falls this shift.    Problem: Knowledge Deficit - Standard  Goal: Patient and family/care givers will demonstrate understanding of plan of care, disease process/condition, diagnostic tests and medications  Outcome: Progressing     Problem: Respiratory  Goal: Patient will achieve/maintain optimum respiratory ventilation and gas exchange  Outcome: Progressing     Problem: Mechanical Ventilation  Goal: Safe management of artificial airway and ventilation  Outcome: Progressing     Problem: Pain - Standard  Goal: Alleviation of pain or a reduction in pain to the patient’s comfort goal  Outcome: Progressing     Problem: Fall Risk  Goal: Patient will remain free from falls  Outcome: Progressing       Patient is not progressing towards the following goals:

## 2023-07-05 NOTE — PROGRESS NOTES
Pt is awake in bed. VSS. Pt has no c/o pain or n/v at this time. Pt reports improvement in diarrhea today. RN discussed POC with pt. All questions answered. Fall precautions in place.    2205:  pt resting in bed.    2308: administered imodium per pt request.    0156: pt resting in bed.    0345: Assisted pt to bathroom.

## 2023-07-05 NOTE — PROGRESS NOTES
Received report from night shift RN. Assumed pt care 0700  Pt is A&Ox4, resting comfortably in bed upon shift change.   Inquiring regarding discharge plan.   Plan of care reviewed for activities and goals this shift. Pt verbalizes understanding of plan of care for this shift. Pt on room air; no signs of SOB/respiratory distress. Labs reviewed. Pt denies pain and nausea at this moment.   Notes to have had 3 loose stools over night shift.   None noted on am shift with rounding.   Patients needs attended well. Fall precautions in place. Bed at lowest position. Call light and personal belongings within reach.   Hourly rounding in progress.   0945-pt verbalizes having a few times of bright green watery stools. Updated changed were notified to provider.Pt up self and ambulating up in room independent.   1130-medications administered; visitor at bedside. Snacks provided. Encourage increased oral fluids. Pt tolerating fluids well.   1310-Pt ambulating up in room and hallways. Pt continues to verbalize understanding of plan of care.   1445- plan of care update. New orders written, medications administered. Quiet environment provided for pt rest.   1500- pt resting in bed. No distress noted.  1700- IVF infusing, plan for remainder of shift reviewed. Level of comfort increased.   Call light with in reach, bed in low position, pt up ad bib

## 2023-07-05 NOTE — PROGRESS NOTES
Hourly rounding     1952 Patient resting, blankets provided   2045 Patient sleeping   2242 vitals check   0012 Patient sleeping   0214 Patient sleeping   0440 Vitals check   0613 patient sleeping

## 2023-07-05 NOTE — CARE PLAN
The patient is Stable - Low risk of patient condition declining or worsening    Shift Goals  Clinical Goals: Tolerate diet, decreased loose stools  Patient Goals: dc home  Family Goals: N/A    Progress made toward(s) clinical / shift goals:  Noted loose stools continue     Patient is not progressing towards the following goals:

## 2023-07-06 VITALS
BODY MASS INDEX: 32.28 KG/M2 | DIASTOLIC BLOOD PRESSURE: 65 MMHG | HEART RATE: 59 BPM | WEIGHT: 238.32 LBS | OXYGEN SATURATION: 96 % | SYSTOLIC BLOOD PRESSURE: 130 MMHG | RESPIRATION RATE: 17 BRPM | HEIGHT: 72 IN | TEMPERATURE: 99.2 F

## 2023-07-06 LAB
ADV 40+41 DNA STL QL NAA+NON-PROBE: NOT DETECTED
ASTRO TYP 1-8 RNA STL QL NAA+NON-PROBE: NOT DETECTED
BACTERIA STL CULT: NORMAL
C CAYETANENSIS DNA STL QL NAA+NON-PROBE: NOT DETECTED
C COLI+JEJ+UPSA DNA STL QL NAA+NON-PROBE: DETECTED
C JEJUNI+C COLI AG STL QL: NORMAL
CRYPTOSP DNA STL QL NAA+NON-PROBE: NOT DETECTED
E COLI O157 DNA STL QL NAA+NON-PROBE: ABNORMAL
E HISTOLYT DNA STL QL NAA+NON-PROBE: NOT DETECTED
EAEC PAA PLAS AGGR+AATA ST NAA+NON-PRB: NOT DETECTED
EC STX1+STX2 GENES STL QL NAA+NON-PROBE: NOT DETECTED
EPEC EAE GENE STL QL NAA+NON-PROBE: DETECTED
ETEC LTA+ST1A+ST1B TOX ST NAA+NON-PROBE: NOT DETECTED
G LAMBLIA DNA STL QL NAA+NON-PROBE: NOT DETECTED
NOROVIRUS GI+II RNA STL QL NAA+NON-PROBE: ABNORMAL
P SHIGELLOIDES DNA STL QL NAA+NON-PROBE: NOT DETECTED
RVA RNA STL QL NAA+NON-PROBE: ABNORMAL
S ENT+BONG DNA STL QL NAA+NON-PROBE: NOT DETECTED
SAPO I+II+IV+V RNA STL QL NAA+NON-PROBE: ABNORMAL
SHIGELLA SP+EIEC IPAH ST NAA+NON-PROBE: NOT DETECTED
SIGNIFICANT IND 70042: NORMAL
SITE SITE: NORMAL
SOURCE SOURCE: NORMAL
V CHOL+PARA+VUL DNA STL QL NAA+NON-PROBE: NOT DETECTED
V CHOLERAE DNA STL QL NAA+NON-PROBE: NOT DETECTED
Y ENTEROCOL DNA STL QL NAA+NON-PROBE: NOT DETECTED

## 2023-07-06 PROCEDURE — A9270 NON-COVERED ITEM OR SERVICE: HCPCS | Performed by: INTERNAL MEDICINE

## 2023-07-06 PROCEDURE — 700102 HCHG RX REV CODE 250 W/ 637 OVERRIDE(OP): Performed by: INTERNAL MEDICINE

## 2023-07-06 PROCEDURE — 700105 HCHG RX REV CODE 258: Mod: JZ | Performed by: INTERNAL MEDICINE

## 2023-07-06 PROCEDURE — 99239 HOSP IP/OBS DSCHRG MGMT >30: CPT | Performed by: INTERNAL MEDICINE

## 2023-07-06 RX ORDER — ONDANSETRON 4 MG/1
4 TABLET, ORALLY DISINTEGRATING ORAL EVERY 4 HOURS PRN
Qty: 10 TABLET | Refills: 0 | Status: SHIPPED | OUTPATIENT
Start: 2023-07-06 | End: 2023-07-12

## 2023-07-06 RX ORDER — LOPERAMIDE HYDROCHLORIDE 2 MG/1
2 CAPSULE ORAL 4 TIMES DAILY PRN
Qty: 30 CAPSULE | COMMUNITY
Start: 2023-07-06 | End: 2023-07-12

## 2023-07-06 RX ORDER — AZITHROMYCIN 500 MG/1
500 TABLET, FILM COATED ORAL DAILY
Qty: 3 TABLET | Refills: 0 | Status: ACTIVE | OUTPATIENT
Start: 2023-07-06 | End: 2023-07-09

## 2023-07-06 RX ADMIN — LAMOTRIGINE 100 MG: 100 TABLET ORAL at 05:39

## 2023-07-06 RX ADMIN — OMEPRAZOLE 40 MG: 20 CAPSULE, DELAYED RELEASE ORAL at 05:39

## 2023-07-06 RX ADMIN — SODIUM CHLORIDE, POTASSIUM CHLORIDE, SODIUM LACTATE AND CALCIUM CHLORIDE: 600; 310; 30; 20 INJECTION, SOLUTION INTRAVENOUS at 01:14

## 2023-07-06 RX ADMIN — ESCITALOPRAM OXALATE 30 MG: 10 TABLET ORAL at 05:39

## 2023-07-06 RX ADMIN — Medication 400 MG: at 05:39

## 2023-07-06 ASSESSMENT — PAIN DESCRIPTION - PAIN TYPE
TYPE: ACUTE PAIN
TYPE: ACUTE PAIN

## 2023-07-06 NOTE — CARE PLAN
Problem: Fluid Volume  Goal: Fluid volume balance will be maintained  Outcome: Progressing     Problem: Pain - Standard  Goal: Alleviation of pain or a reduction in pain to the patient’s comfort goal  Outcome: Progressing     Problem: Fall Risk  Goal: Patient will remain free from falls  Outcome: Progressing     The patient is Stable - Low risk of patient condition declining or worsening    Shift Goals  Clinical Goals: Maintain pain and nausea control throughout shift  Patient Goals: Discharge home if medically cleared  Family Goals: n/a    Progress made toward(s) clinical / shift goals:  Pt reports pain and nausea adequately controlled throughout shift. Pt medically cleared for discharge.    Patient is not progressing towards the following goals: n/a

## 2023-07-06 NOTE — PROGRESS NOTES
Bedside report received from night RN. Assumed care of patient. Daily plan of care discussed. Pt awake and alert, family member at bedside. Pt denies complaints or concerns this AM. 12 hour chart check complete. Hourly rounding in place.

## 2023-07-06 NOTE — PROGRESS NOTES
2210H Pt awake, watching tv    0048H Pt resting in bed,eyes closed, respirations even and unlabored    0114H Pt resting in bed,eyes closed, respirations even and unlabored, arouses easily    0356H Pt resting in bed,eyes closed, respirations even and unlabored, arouses easily    0602H  Pt awake

## 2023-07-06 NOTE — PROGRESS NOTES
0800 Hourly round complete. No needs identified.    0900 Hourly round complete. Pt refused AM Lyrica dose.    1000 Hourly round complete. Pt cleared for discharge. Paperwork reviewed and signed, PIV removed, pt awaiting ride home.

## 2023-07-06 NOTE — PROGRESS NOTES
Received report from day shift nurse. Assumed pt care at 1915. Pt is A&Ox4, resting comfortably in bed. Pt on r.a.. No signs of SOB/respiratory distress. Labs noted, VSS. Pt c/o no pain at this moment. Fall precautions in place. Bed at lowest position. Call light and personal belongings within reach. Continue to monitor

## 2023-07-06 NOTE — CARE PLAN
The patient is Stable - Low risk of patient condition declining or worsening    Shift Goals  Clinical Goals: free from injury, monitor temp  Patient Goals: sleep at least 8 hours  Family Goals: N/A    Progress made toward(s) clinical / shift goals: Temperature kept monitored. Call light within reach. Fall precautions in placed. Pt received Oxycodone 5mg po as prn dose during this shift. Hourly rounding in progress    Patient is not progressing towards the following goals:

## 2023-07-06 NOTE — DISCHARGE SUMMARY
"Discharge Summary    CHIEF COMPLAINT ON ADMISSION  Chief Complaint   Patient presents with    Shortness of Breath     Reports worsening sob since Thursday       Reason for Admission  Shortness of breath     Admission Date  7/2/2023    CODE STATUS  Full Code    HPI & HOSPITAL COURSE  Per notes, \"Patient presented with fevers chills abdominal pain diarrhea.  She started having these issues on Thursday and is started with fatigue.  She went on Friday however was too fatigued and had to go home.  After that she noticed development of fevers and chills and then abdominal pain with diarrhea.  She has been experiencing significant shortness of breath with minimal activity.  Patient came into the emergency room and had CTA of the chest which was negative for any evidence of PE but CT of the abdomen was concerning for colitis.  She had recently been on 2 courses of antibiotics 1 for recent PCP pneumonia and then she had a spinal procedure done about 3 weeks ago where she was on a course of 5-day antibiotics following that.  She is having very foul-smelling frequent diarrhea that is dark brown.  There is high suspicion of C. difficile therefore she has been started on oral vancomycin.\"    Patient was initially admitted for diarrhea after antibiotics, suspected C. difficile infection.  C. difficile was negative.  Patient did have persistent diarrhea, fever, chills and nausea.  A PCR of stool revealed both Campylobacter and E. coli.  Given patient's persistent symptoms she was started on a course of azithromycin.  She is allergic to fluoroquinolones.  Overall at the time of discharge patient symptoms have improved.  I recommend she follow-up with her PCP within 1 to 2 weeks for general checkup.  She should also return to the ED immediately should her symptoms return or worsen.  Of note, laboratory contacted me regarding 1 out of 2 blood cultures resulting positive after patient had discharged.  I did call patient and alerted " her of these results.  At this time no further antibiotic therapy is necessary.  We will follow cultures, if patient has 2 out of 2 positive cultures, make sure to return to ED as she is allergic to fluoroquinolones.    Addendum: blood cultures were positive 1/2 set for campylobacter. Patient is following with PCP, per notes, completed course of antibiotics.     Therefore, she is discharged in good and stable condition to home with close outpatient follow-up.    The patient recovered much more quickly than anticipated on admission.    Discharge Date  7/6/2023    FOLLOW UP ITEMS POST DISCHARGE  FU with PCP within 1-2 weeks    DISCHARGE DIAGNOSES  Principal Problem:    Colitis (POA: Yes)  Active Problems:    Sepsis (HCC) (POA: Yes)    Familial hypercholesterolemia (POA: Yes)    Asthma (POA: Yes)    CHRISTY (generalized anxiety disorder) (POA: Yes)      Overview: IMO load March 2020    GERD (gastroesophageal reflux disease) (POA: Yes)    Hypokalemia (POA: Unknown)    High anion gap metabolic acidosis (POA: Yes)  Resolved Problems:    * No resolved hospital problems. *      FOLLOW UP  Future Appointments   Date Time Provider Department Center   7/28/2023  3:00 PM Elvis Aleman M.D. [facility] None     No follow-up provider specified.    MEDICATIONS ON DISCHARGE     Medication List        START taking these medications        Instructions   azithromycin 500 MG tablet  Commonly known as: Zithromax   Take 1 Tablet by mouth every day for 3 days.  Dose: 500 mg     loperamide 2 MG Caps  Commonly known as: Imodium   Take 1 Capsule by mouth 4 times a day as needed for Diarrhea.  Dose: 2 mg     ondansetron 4 MG Tbdp  Commonly known as: Zofran ODT   Take 1 Tablet by mouth every four hours as needed for Nausea/Vomiting for up to 10 days.  Dose: 4 mg            CHANGE how you take these medications        Instructions   EPINEPHrine 0.3 MG/0.3ML Sosy  What changed:   how much to take  how to take this  when to take this  reasons to take  this   Inject the contents of the epipen into the thigh, hold for 3 seconds and release from thigh as needed for anaphylaxis.     montelukast 10 MG Tabs  What changed: when to take this  Commonly known as: Singulair   TAKE 1 TABLET DAILY            CONTINUE taking these medications        Instructions   acetaminophen 500 MG Tabs  Commonly known as: Tylenol   Take 1,000 mg by mouth every 6 hours as needed for Moderate Pain.  Dose: 1,000 mg     Advair Diskus 500-50 MCG/ACT Aepb  Generic drug: fluticasone-salmeterol   Inhale 1 Puff every 12 hours.  Dose: 1 Puff     * escitalopram 20 MG tablet  Commonly known as: Lexapro   Take 1 Tablet by mouth every morning. Take with escitalopram 10 mg for a total dose of 30 mg.  Dose: 20 mg     * escitalopram 10 MG Tabs  Commonly known as: Lexapro   Take 1 Tablet by mouth every morning. Take with escitalopram 20 mg for a total dose of 30 mg.  Dose: 10 mg     etodolac 400 MG tablet  Commonly known as: Lodine   Take 1 Tablet by mouth 2 times a day.  Dose: 400 mg     famotidine 20 MG Tabs  Commonly known as: Pepcid   Take 20 mg by mouth 2 times a day. Indications: Gastroesophageal Reflux Disease  Dose: 20 mg     * ipratropium-albuterol  MCG/ACT Aers  Commonly known as: Combivent Respimat   Inhale 1 Puff as needed (For SOB).  Dose: 1 Puff     * ipratropium-albuterol 0.5-2.5 (3) MG/3ML nebulizer solution  Commonly known as: Duoneb   Doctor's comments: 90 day supply  Take 3 mL by nebulization every four hours as needed for Shortness of Breath.  Dose: 3 mL     lamoTRIgine 100 MG Tabs  Commonly known as: LaMICtal   Take 1 Tablet by mouth every morning.  Dose: 100 mg     magnesium oxide 400 MG Tabs tablet  Commonly known as: Mag-Ox   Take 1 Tablet by mouth every morning.  Dose: 400 mg     metFORMIN  MG Tb24  Commonly known as: Glucophage XR   Take 1 Tablet by mouth 2 times a day.  Dose: 500 mg     omeprazole 40 MG delayed-release capsule  Commonly known as: PriLOSEC   TAKE 1  CAPSULE DAILY     pregabalin 50 MG capsule  Commonly known as: Lyrica   Doctor's comments: 30 days with 1 refill  Take 1 Capsule by mouth 3 times a day for 60 days.  Dose: 50 mg     rosuvastatin 10 MG Tabs  Commonly known as: Crestor   Take 10 mg by mouth every evening.  Dose: 10 mg           * This list has 4 medication(s) that are the same as other medications prescribed for you. Read the directions carefully, and ask your doctor or other care provider to review them with you.                  Allergies  Allergies   Allergen Reactions    Bee Venom Anaphylaxis     Pt is allergic to bees.     Iodine Anaphylaxis, Rash and Hives     IV = anaphylaxis, topical = rash  IV = anaphylaxis, topical = rash    Levofloxacin Anaphylaxis    Shellfish-Derived Products Anaphylaxis     LOBSTER       DIET  Orders Placed This Encounter   Procedures    Diet Order Diet: Regular     Standing Status:   Standing     Number of Occurrences:   1     Order Specific Question:   Diet:     Answer:   Regular [1]       ACTIVITY  As tolerated.  Weight bearing as tolerated    CONSULTATIONS  None    PROCEDURES  none    LABORATORY  Lab Results   Component Value Date    SODIUM 145 07/05/2023    POTASSIUM 3.4 (L) 07/05/2023    CHLORIDE 112 07/05/2023    CO2 20 07/05/2023    GLUCOSE 85 07/05/2023    BUN 5 (L) 07/05/2023    CREATININE 0.60 07/05/2023        Lab Results   Component Value Date    WBC 5.7 07/05/2023    HEMOGLOBIN 12.4 07/05/2023    HEMATOCRIT 36.0 (L) 07/05/2023    PLATELETCT 214 07/05/2023        Total time of the discharge process exceeds 45 minutes.

## 2023-07-06 NOTE — PROGRESS NOTES
Pt discharged to home via personal vehicle with friend. Discharge paperwork reviewed and signed. Prescribed home medications reviewed with pt per discharge summary. VSS. Pt escorted off unit via wheelchair with hospital staff.

## 2023-07-08 LAB
BACTERIA BLD CULT: ABNORMAL
BACTERIA BLD CULT: ABNORMAL
BACTERIA BLD CULT: NORMAL
SIGNIFICANT IND 70042: ABNORMAL
SIGNIFICANT IND 70042: NORMAL
SITE SITE: ABNORMAL
SITE SITE: NORMAL
SOURCE SOURCE: ABNORMAL
SOURCE SOURCE: NORMAL

## 2023-07-11 RX ORDER — LAMOTRIGINE 100 MG/1
TABLET ORAL
Qty: 90 TABLET | Refills: 3 | Status: SHIPPED | OUTPATIENT
Start: 2023-07-11 | End: 2023-07-12 | Stop reason: SDUPTHER

## 2023-07-11 NOTE — TELEPHONE ENCOUNTER
Received request via: Pharmacy    Was the patient seen in the last year in this department? Yes  LOV 06/08/2023  Does the patient have an active prescription (recently filled or refills available) for medication(s) requested? No    Does the patient have [facility] and need 100 day supply (blood pressure, diabetes and cholesterol meds only)? Patient does not have SCP

## 2023-07-12 ENCOUNTER — HOSPITAL ENCOUNTER (OUTPATIENT)
Dept: LAB | Facility: MEDICAL CENTER | Age: 63
End: 2023-07-12
Attending: FAMILY MEDICINE
Payer: COMMERCIAL

## 2023-07-12 ENCOUNTER — OFFICE VISIT (OUTPATIENT)
Dept: MEDICAL GROUP | Facility: LAB | Age: 63
End: 2023-07-12
Payer: COMMERCIAL

## 2023-07-12 VITALS
DIASTOLIC BLOOD PRESSURE: 62 MMHG | WEIGHT: 228.2 LBS | BODY MASS INDEX: 30.91 KG/M2 | OXYGEN SATURATION: 95 % | HEART RATE: 60 BPM | HEIGHT: 72 IN | RESPIRATION RATE: 12 BRPM | TEMPERATURE: 97.5 F | SYSTOLIC BLOOD PRESSURE: 114 MMHG

## 2023-07-12 DIAGNOSIS — L40.50 PSORIATIC ARTHRITIS (HCC): ICD-10-CM

## 2023-07-12 DIAGNOSIS — J45.40 MODERATE PERSISTENT ASTHMA WITHOUT COMPLICATION: ICD-10-CM

## 2023-07-12 DIAGNOSIS — E66.9 OBESITY (BMI 30.0-34.9): ICD-10-CM

## 2023-07-12 DIAGNOSIS — A04.5 CAMPYLOBACTER DIARRHEA: ICD-10-CM

## 2023-07-12 DIAGNOSIS — E78.01 FAMILIAL HYPERCHOLESTEROLEMIA: ICD-10-CM

## 2023-07-12 DIAGNOSIS — R73.03 PREDIABETES: ICD-10-CM

## 2023-07-12 DIAGNOSIS — F41.9 ANXIETY: ICD-10-CM

## 2023-07-12 DIAGNOSIS — K21.9 GASTROESOPHAGEAL REFLUX DISEASE, UNSPECIFIED WHETHER ESOPHAGITIS PRESENT: ICD-10-CM

## 2023-07-12 PROBLEM — A41.9 SEPSIS (HCC): Status: RESOLVED | Noted: 2018-11-30 | Resolved: 2023-07-12

## 2023-07-12 PROBLEM — J84.9 ILD (INTERSTITIAL LUNG DISEASE) (HCC): Status: ACTIVE | Noted: 2023-07-12

## 2023-07-12 PROBLEM — K52.9 COLITIS: Status: RESOLVED | Noted: 2023-07-03 | Resolved: 2023-07-12

## 2023-07-12 PROBLEM — E87.6 HYPOKALEMIA: Status: RESOLVED | Noted: 2021-07-10 | Resolved: 2023-07-12

## 2023-07-12 PROBLEM — R93.89 ABNORMAL CT OF THE CHEST: Status: RESOLVED | Noted: 2021-07-08 | Resolved: 2023-07-12

## 2023-07-12 PROBLEM — E87.29 HIGH ANION GAP METABOLIC ACIDOSIS: Status: RESOLVED | Noted: 2023-07-03 | Resolved: 2023-07-12

## 2023-07-12 PROBLEM — R21 RASH AND NONSPECIFIC SKIN ERUPTION: Status: RESOLVED | Noted: 2021-06-07 | Resolved: 2023-07-12

## 2023-07-12 LAB
ANION GAP SERPL CALC-SCNC: 12 MMOL/L (ref 7–16)
BUN SERPL-MCNC: 13 MG/DL (ref 8–22)
CALCIUM SERPL-MCNC: 9.6 MG/DL (ref 8.5–10.5)
CHLORIDE SERPL-SCNC: 105 MMOL/L (ref 96–112)
CO2 SERPL-SCNC: 23 MMOL/L (ref 20–33)
CREAT SERPL-MCNC: 0.76 MG/DL (ref 0.5–1.4)
GFR SERPLBLD CREATININE-BSD FMLA CKD-EPI: 88 ML/MIN/1.73 M 2
GLUCOSE SERPL-MCNC: 100 MG/DL (ref 65–99)
POTASSIUM SERPL-SCNC: 4.1 MMOL/L (ref 3.6–5.5)
SODIUM SERPL-SCNC: 140 MMOL/L (ref 135–145)

## 2023-07-12 PROCEDURE — 36415 COLL VENOUS BLD VENIPUNCTURE: CPT

## 2023-07-12 PROCEDURE — 3078F DIAST BP <80 MM HG: CPT | Performed by: FAMILY MEDICINE

## 2023-07-12 PROCEDURE — 99214 OFFICE O/P EST MOD 30 MIN: CPT | Performed by: FAMILY MEDICINE

## 2023-07-12 PROCEDURE — 80048 BASIC METABOLIC PNL TOTAL CA: CPT

## 2023-07-12 PROCEDURE — 3074F SYST BP LT 130 MM HG: CPT | Performed by: FAMILY MEDICINE

## 2023-07-12 RX ORDER — METFORMIN HYDROCHLORIDE 500 MG/1
1 TABLET, EXTENDED RELEASE ORAL 2 TIMES DAILY
COMMUNITY
End: 2023-07-12

## 2023-07-12 RX ORDER — CYCLOBENZAPRINE HCL 5 MG
TABLET ORAL
COMMUNITY
End: 2023-07-12

## 2023-07-12 RX ORDER — LAMOTRIGINE 100 MG/1
100 TABLET ORAL EVERY MORNING
Qty: 90 TABLET | Refills: 3 | Status: SHIPPED | OUTPATIENT
Start: 2023-07-12 | End: 2023-10-19 | Stop reason: SDUPTHER

## 2023-07-12 RX ORDER — LORAZEPAM 1 MG/1
1 TABLET ORAL EVERY EVENING
COMMUNITY
End: 2023-07-12

## 2023-07-12 RX ORDER — VALACYCLOVIR HYDROCHLORIDE 1 G/1
TABLET, FILM COATED ORAL
COMMUNITY
End: 2023-07-12

## 2023-07-12 RX ORDER — FAMOTIDINE 40 MG/1
1 TABLET, FILM COATED ORAL
COMMUNITY
End: 2023-07-12

## 2023-07-12 RX ORDER — BENZONATATE 200 MG/1
CAPSULE ORAL
COMMUNITY
End: 2023-07-12

## 2023-07-12 RX ORDER — MONTELUKAST SODIUM 10 MG/1
1 TABLET ORAL
COMMUNITY
End: 2023-07-12

## 2023-07-12 RX ORDER — ALBUTEROL SULFATE 90 UG/1
AEROSOL, METERED RESPIRATORY (INHALATION)
COMMUNITY
End: 2023-10-19

## 2023-07-12 RX ORDER — AZITHROMYCIN 500 MG/1
TABLET, FILM COATED ORAL
COMMUNITY
End: 2023-07-12

## 2023-07-12 RX ORDER — NITROFURANTOIN MACROCRYSTALS 100 MG/1
1 CAPSULE ORAL EVERY 12 HOURS
COMMUNITY
End: 2023-07-12

## 2023-07-12 RX ORDER — HYDROCODONE BITARTRATE AND ACETAMINOPHEN 10; 325 MG/1; MG/1
TABLET ORAL
COMMUNITY
End: 2023-07-12

## 2023-07-12 RX ORDER — METFORMIN HYDROCHLORIDE 500 MG/1
500 TABLET, EXTENDED RELEASE ORAL 2 TIMES DAILY
Qty: 180 TABLET | Refills: 3 | Status: SHIPPED | OUTPATIENT
Start: 2023-07-12 | End: 2023-07-12 | Stop reason: SDUPTHER

## 2023-07-12 RX ORDER — METFORMIN HYDROCHLORIDE 500 MG/1
500 TABLET, EXTENDED RELEASE ORAL 2 TIMES DAILY
Qty: 180 TABLET | Refills: 3 | Status: SHIPPED | OUTPATIENT
Start: 2023-07-12 | End: 2023-10-19 | Stop reason: SDUPTHER

## 2023-07-12 RX ORDER — AMOXICILLIN AND CLAVULANATE POTASSIUM 875; 125 MG/1; MG/1
TABLET, FILM COATED ORAL
COMMUNITY
End: 2023-07-12

## 2023-07-12 RX ORDER — FLUTICASONE PROPIONATE AND SALMETEROL 250; 50 UG/1; UG/1
1 POWDER RESPIRATORY (INHALATION) 2 TIMES DAILY
COMMUNITY
End: 2023-07-12 | Stop reason: SDUPTHER

## 2023-07-12 RX ORDER — FAMOTIDINE 20 MG/1
1 TABLET, FILM COATED ORAL 2 TIMES DAILY
COMMUNITY
End: 2023-07-12

## 2023-07-12 RX ORDER — FLUTICASONE PROPIONATE AND SALMETEROL 250; 50 UG/1; UG/1
1 POWDER RESPIRATORY (INHALATION) 2 TIMES DAILY
Qty: 60 EACH | Refills: 3 | Status: SHIPPED | OUTPATIENT
Start: 2023-07-12 | End: 2023-10-19 | Stop reason: SDUPTHER

## 2023-07-12 RX ORDER — ESCITALOPRAM OXALATE 10 MG/1
10 TABLET ORAL EVERY MORNING
Qty: 90 TABLET | Refills: 3 | Status: SHIPPED | OUTPATIENT
Start: 2023-07-12 | End: 2023-10-19 | Stop reason: SDUPTHER

## 2023-07-12 RX ORDER — SULFAMETHOXAZOLE AND TRIMETHOPRIM 800; 160 MG/1; MG/1
1 TABLET ORAL 2 TIMES DAILY
COMMUNITY
End: 2023-07-12

## 2023-07-12 RX ORDER — NITROFURANTOIN 25; 75 MG/1; MG/1
1 CAPSULE ORAL 2 TIMES DAILY
COMMUNITY
End: 2023-07-12

## 2023-07-12 RX ORDER — LAMOTRIGINE 100 MG/1
1 TABLET ORAL
COMMUNITY
End: 2023-07-12

## 2023-07-12 RX ORDER — PHENAZOPYRIDINE HYDROCHLORIDE 200 MG/1
TABLET, FILM COATED ORAL
COMMUNITY
End: 2023-07-12

## 2023-07-12 RX ORDER — ONDANSETRON 4 MG/1
TABLET, ORALLY DISINTEGRATING ORAL
COMMUNITY
End: 2023-07-12

## 2023-07-12 RX ORDER — OMEPRAZOLE 40 MG/1
1 CAPSULE, DELAYED RELEASE ORAL
COMMUNITY
End: 2023-07-12

## 2023-07-12 RX ORDER — ETODOLAC 500 MG/1
TABLET, FILM COATED ORAL
COMMUNITY
End: 2023-07-12

## 2023-07-12 RX ORDER — OMEPRAZOLE 40 MG/1
40 CAPSULE, DELAYED RELEASE ORAL DAILY
Qty: 90 CAPSULE | Refills: 3 | Status: SHIPPED | OUTPATIENT
Start: 2023-07-12 | End: 2023-10-19 | Stop reason: SDUPTHER

## 2023-07-12 RX ORDER — CLOTRIMAZOLE 10 MG/1
LOZENGE ORAL; TOPICAL
COMMUNITY
End: 2023-07-12

## 2023-07-12 RX ORDER — FAMOTIDINE 20 MG/1
20 TABLET, FILM COATED ORAL 2 TIMES DAILY
Qty: 180 TABLET | Refills: 3 | Status: SHIPPED | OUTPATIENT
Start: 2023-07-12 | End: 2023-10-19 | Stop reason: SDUPTHER

## 2023-07-12 RX ORDER — TRIAMCINOLONE ACETONIDE 1 MG/G
1 CREAM TOPICAL 2 TIMES DAILY
COMMUNITY
End: 2023-07-12

## 2023-07-12 RX ORDER — APREMILAST 30 MG/1
TABLET, FILM COATED ORAL
COMMUNITY
End: 2023-07-12

## 2023-07-12 RX ORDER — ACETAMINOPHEN 500 MG
TABLET ORAL
COMMUNITY
End: 2023-07-12

## 2023-07-12 RX ORDER — TIZANIDINE 4 MG/1
TABLET ORAL
COMMUNITY
End: 2023-07-12

## 2023-07-12 RX ORDER — AZITHROMYCIN 250 MG/1
TABLET, FILM COATED ORAL
COMMUNITY
End: 2023-07-12

## 2023-07-12 RX ORDER — FLUCONAZOLE 150 MG/1
TABLET ORAL
COMMUNITY
End: 2023-07-12

## 2023-07-12 RX ORDER — OLOPATADINE HYDROCHLORIDE 2 MG/ML
SOLUTION/ DROPS OPHTHALMIC
COMMUNITY
End: 2023-07-12

## 2023-07-12 RX ORDER — ESCITALOPRAM OXALATE 20 MG/1
20 TABLET ORAL EVERY MORNING
Qty: 90 TABLET | Refills: 3 | Status: SHIPPED | OUTPATIENT
Start: 2023-07-12 | End: 2023-10-19 | Stop reason: SDUPTHER

## 2023-07-12 RX ORDER — MONTELUKAST SODIUM 10 MG/1
10 TABLET ORAL DAILY
Qty: 90 TABLET | Refills: 3 | Status: SHIPPED | OUTPATIENT
Start: 2023-07-12 | End: 2023-10-19 | Stop reason: SDUPTHER

## 2023-07-12 RX ORDER — ETODOLAC 400 MG/1
400 TABLET, FILM COATED ORAL 2 TIMES DAILY
Qty: 90 TABLET | Refills: 3 | Status: SHIPPED | OUTPATIENT
Start: 2023-07-12 | End: 2023-10-19 | Stop reason: SDUPTHER

## 2023-07-12 RX ORDER — CHLORHEXIDINE GLUCONATE ORAL RINSE 1.2 MG/ML
SOLUTION DENTAL
COMMUNITY
End: 2023-07-12

## 2023-07-12 RX ORDER — PREGABALIN 50 MG/1
1 CAPSULE ORAL 3 TIMES DAILY
COMMUNITY
End: 2023-07-12

## 2023-07-12 RX ORDER — PREDNISONE 20 MG/1
TABLET ORAL
COMMUNITY
End: 2023-07-12

## 2023-07-12 RX ORDER — GABAPENTIN 100 MG/1
CAPSULE ORAL
COMMUNITY
End: 2023-07-12

## 2023-07-12 RX ORDER — IPRATROPIUM BROMIDE AND ALBUTEROL SULFATE 2.5; .5 MG/3ML; MG/3ML
SOLUTION RESPIRATORY (INHALATION)
COMMUNITY
End: 2023-07-12

## 2023-07-12 RX ORDER — ROSUVASTATIN CALCIUM 10 MG/1
1 TABLET, COATED ORAL
COMMUNITY
End: 2023-07-12 | Stop reason: SDUPTHER

## 2023-07-12 RX ORDER — ROSUVASTATIN CALCIUM 10 MG/1
10 TABLET, COATED ORAL
Qty: 90 TABLET | Refills: 3 | Status: SHIPPED | OUTPATIENT
Start: 2023-07-12 | End: 2023-10-19 | Stop reason: SDUPTHER

## 2023-07-12 ASSESSMENT — FIBROSIS 4 INDEX: FIB4 SCORE: 1.57

## 2023-07-12 NOTE — TELEPHONE ENCOUNTER
Received request via: Pharmacy    Was the patient seen in the last year in this department? Yes  LOV 06/08/2023  Does the patient have an active prescription (recently filled or refills available) for medication(s) requested? No    Does the patient have [facility] and need 100 day supply (blood pressure, diabetes and cholesterol meds only)? Patient does not have SCP

## 2023-07-12 NOTE — PROGRESS NOTES
"Subjective:     CC: Follow-up    HPI:   Arely presents today for follow-up after recent hospitalization from 7/2 - 7/6 after presenting with shortness of breath, abdominal pain, fevers, and diarrhea.  Work-up included stool PCR positive for Campylobacter and E. coli.  After initially being presumptively treated for C. difficile with oral Vanco she was switched to azithromycin due to fluoroquinolone allergy.  Improved and discharged with continued course of azithromycin.  Has done well at home, had some temperature elevations for the first few days but those resolved.  No further diarrhea.  Slowly reintroducing foods although appetite is still low.  1 blood culture positive for Campylobacter but the other was negative.    Medications, past medical history, allergies, and social history have been reviewed and updated.      Objective:       Exam:  /62 (BP Location: Right arm, Patient Position: Sitting, BP Cuff Size: Adult)   Pulse 60   Temp 36.4 °C (97.5 °F)   Resp 12   Ht 1.829 m (6' 0.01\")   Wt 104 kg (228 lb 3.2 oz)   LMP  (LMP Unknown)   SpO2 95%   BMI 30.94 kg/m²  Body mass index is 30.94 kg/m².    Constitutional: Alert. Well appearing. No distress.  Skin: Warm, dry, good turgor, no visible rashes.  Respiratory: Normal effort. Lungs are clear to auscultation bilaterally.  Cardiovascular: Regular rate and rhythm. Normal S1/S2. No murmurs, rubs or gallops.   Neuro: Moves all four extremities. No facial droop.  Psych: Answers questions appropriately. Normal affect and mood.      Assessment & Plan:     62 y.o. female with the following -     1. Campylobacter diarrhea  Positive stool PCR while hospitalized for Campylobacter and E. coli.  She completed a course of azithromycin and has been doing well with generally resolved symptoms.  No further diarrhea or fevers.  - Basic Metabolic Panel; Future    Needs all medications refilled for chronic conditions as below as she is switching pharmacies.    2. " Prediabetes  3. Obesity (BMI 30.0-34.9)  Continue metformin  - metFORMIN ER (GLUCOPHAGE XR) 500 MG TABLET SR 24 HR; Take 1 Tablet by mouth 2 times a day.  Dispense: 180 Tablet; Refill: 3    4. Moderate persistent asthma without complication  Overall stable, continue Advair.  - fluticasone-salmeterol (ADVAIR) 250-50 MCG/ACT AEROSOL POWDER, BREATH ACTIVATED; Inhale 1 Puff 2 times a day.  Dispense: 60 Each; Refill: 3  - montelukast (SINGULAIR) 10 MG Tab; Take 1 Tablet by mouth every day.  Dispense: 90 Tablet; Refill: 3    5. Anxiety  Continue current medications, she has done well on a total Lexapro dose of 30 mg daily for years of this has not been modified.  - escitalopram (LEXAPRO) 20 MG tablet; Take 1 Tablet by mouth every morning. Take with escitalopram 10 mg for a total dose of 30 mg.  Dispense: 90 Tablet; Refill: 3  - escitalopram (LEXAPRO) 10 MG Tab; Take 1 Tablet by mouth every morning. Take with escitalopram 20 mg for a total dose of 30 mg.  Dispense: 90 Tablet; Refill: 3  - lamoTRIgine (LAMICTAL) 100 MG Tab; Take 1 Tablet by mouth every morning.  Dispense: 90 Tablet; Refill: 3    6. Gastroesophageal reflux disease, unspecified whether esophagitis present  Follows with specialist and this may be contributing to her chronic pulmonary issues.  Continues on Pepcid and Prilosec.  - famotidine (PEPCID) 20 MG Tab; Take 1 Tablet by mouth 2 times a day. Indications: Gastroesophageal Reflux Disease  Dispense: 180 Tablet; Refill: 3  - omeprazole (PRILOSEC) 40 MG delayed-release capsule; Take 1 Capsule by mouth every day.  Dispense: 90 Capsule; Refill: 3    7. Familial hypercholesterolemia  Continue Crestor.  - rosuvastatin (CRESTOR) 10 MG Tab; Take 1 Tablet by mouth at bedtime.  Dispense: 90 Tablet; Refill: 3    8. Psoriatic arthritis (HCC)  Follows with rheumatology, continue etodolac.  - etodolac (LODINE) 400 MG tablet; Take 1 Tablet by mouth 2 times a day.  Dispense: 90 Tablet; Refill: 3      Please note that this  note was created using voice recognition software.       93

## 2023-07-14 ENCOUNTER — PATIENT MESSAGE (OUTPATIENT)
Dept: MEDICAL GROUP | Facility: LAB | Age: 63
End: 2023-07-14
Payer: COMMERCIAL

## 2023-07-14 DIAGNOSIS — A04.5 CAMPYLOBACTER DIARRHEA: ICD-10-CM

## 2023-07-18 ENCOUNTER — HOSPITAL ENCOUNTER (OUTPATIENT)
Facility: MEDICAL CENTER | Age: 63
End: 2023-07-18
Attending: FAMILY MEDICINE
Payer: COMMERCIAL

## 2023-07-18 PROCEDURE — 87899 AGENT NOS ASSAY W/OPTIC: CPT

## 2023-07-18 PROCEDURE — 87045 FECES CULTURE AEROBIC BACT: CPT

## 2023-07-19 DIAGNOSIS — A04.5 CAMPYLOBACTER DIARRHEA: ICD-10-CM

## 2023-07-21 LAB
BACTERIA STL CULT: NORMAL
C JEJUNI+C COLI AG STL QL: NORMAL
SIGNIFICANT IND 70042: NORMAL
SITE SITE: NORMAL
SOURCE SOURCE: NORMAL

## 2023-07-28 ENCOUNTER — APPOINTMENT (OUTPATIENT)
Dept: RADIOLOGY | Facility: REHABILITATION | Age: 63
End: 2023-07-28
Attending: PHYSICAL MEDICINE & REHABILITATION
Payer: COMMERCIAL

## 2023-07-28 ENCOUNTER — HOSPITAL ENCOUNTER (OUTPATIENT)
Facility: REHABILITATION | Age: 63
End: 2023-07-28
Attending: PHYSICAL MEDICINE & REHABILITATION | Admitting: PHYSICAL MEDICINE & REHABILITATION
Payer: COMMERCIAL

## 2023-07-28 VITALS
HEIGHT: 72 IN | RESPIRATION RATE: 18 BRPM | OXYGEN SATURATION: 97 % | WEIGHT: 229.28 LBS | HEART RATE: 52 BPM | SYSTOLIC BLOOD PRESSURE: 139 MMHG | DIASTOLIC BLOOD PRESSURE: 71 MMHG | TEMPERATURE: 97.9 F | BODY MASS INDEX: 31.05 KG/M2

## 2023-07-28 PROCEDURE — 700101 HCHG RX REV CODE 250

## 2023-07-28 PROCEDURE — 700117 HCHG RX CONTRAST REV CODE 255

## 2023-07-28 PROCEDURE — G0260 INJ FOR SACROILIAC JT ANESTH: HCPCS

## 2023-07-28 PROCEDURE — 700111 HCHG RX REV CODE 636 W/ 250 OVERRIDE (IP)

## 2023-07-28 PROCEDURE — A9579 GAD-BASE MR CONTRAST NOS,1ML: HCPCS

## 2023-07-28 RX ORDER — LIDOCAINE HYDROCHLORIDE 20 MG/ML
INJECTION, SOLUTION EPIDURAL; INFILTRATION; INTRACAUDAL; PERINEURAL
Status: COMPLETED
Start: 2023-07-28 | End: 2023-07-28

## 2023-07-28 RX ORDER — METHYLPREDNISOLONE ACETATE 80 MG/ML
INJECTION, SUSPENSION INTRA-ARTICULAR; INTRALESIONAL; INTRAMUSCULAR; SOFT TISSUE
Status: COMPLETED
Start: 2023-07-28 | End: 2023-07-28

## 2023-07-28 RX ADMIN — METHYLPREDNISOLONE ACETATE 80 MG: 80 INJECTION, SUSPENSION INTRA-ARTICULAR; INTRALESIONAL; INTRAMUSCULAR; SOFT TISSUE at 08:37

## 2023-07-28 RX ADMIN — GADOTERIDOL 7 ML: 279.3 INJECTION, SOLUTION INTRAVENOUS at 08:36

## 2023-07-28 RX ADMIN — LIDOCAINE HYDROCHLORIDE 5 ML: 20 INJECTION, SOLUTION EPIDURAL; INFILTRATION; INTRACAUDAL at 08:37

## 2023-07-28 ASSESSMENT — PAIN DESCRIPTION - PAIN TYPE
TYPE: CHRONIC PAIN
TYPE: CHRONIC PAIN

## 2023-07-28 ASSESSMENT — FIBROSIS 4 INDEX: FIB4 SCORE: 1.57

## 2023-07-28 NOTE — PROGRESS NOTES
Dr. Aleman in to see patient, questions answered.  [phone] Dr. Aleman in to see patient, discharge orders received.

## 2023-07-28 NOTE — OP REPORT
Date of Service: 7/28/2023     Physician/s: Elvis Aleman MD     Pre-operative Diagnosis:    M53.3 (ICD-10-CM) - Sacroiliac joint pain   M54.50 (ICD-10-CM) - Lumbosacral pain        Post-operative Diagnosis:    M53.3 (ICD-10-CM) - Sacroiliac joint pain   M54.50 (ICD-10-CM) - Lumbosacral pain        Type of anesthesia: Local anesthesia     Procedure: left sacroiliac joint injection     Description of procedure:     The risks, benefits, and alternatives of the procedure were reviewed and discussed with the patient.  Written informed consent was freely obtained. A pre-procedural time-out was conducted by the physician verifying patient’s identity, procedure to be performed, procedure site and side, and allergy verification. Appropriate equipment was determined to be in place for the procedure.        In the fluoroscopy suite the patient was placed in a prone position and the skin was prepped and draped in the usual sterile fashion. The fluoroscope was placed over the left sacroiliac joint at the appropriate angle for joint entrance, and the target for injection was marked. A 27g needle was used to inject approx 1cc of 1% Lidocaine was injected subcutaneously into the epidermal and dermal layers. The needle was removed. A 25 gauge 3.5 inch needle was then placed down to the level of bone at the inferior/distal aspect of the joint. The needle was then retracted and carefully advanced into the joint capsule. The needle tip was then verified by a lateral view. In the AP view, contrast dye, prohance, was used to highlight the entire joint line while the fluoroscope was running live. Following negative aspiration, approx 1.0mL of 2% lidocaine without epinephrine with 1.0mL of 80mg/ml depomedrol was then injected. The needle was removed intact after restyleted. The patient's low back was covered with a 4x4 gauze, the area was cleansed with sterile normal saline, and a dressing was applied. There were no complications noted.       The patient was discharged to the recovery area in good condition.    Elvis Aleman MD  Physical Medicine and Rehabilitation  Interventional Spine and Sports Physiatry  [facility]    CPT: [id number]

## 2023-07-28 NOTE — H&P
Physical Medicine & Rehab History & Physical Note    Date  7/28/2023    Primary Care Physician  Joe Espinal M.D.      Pre-Op Diagnosis Codes:     * Sacroiliac joint pain [M53.3]    HPI  This is a 62 y.o. female who presented with left-sided low back pain.  She is here for left sacroiliac joint injection.  Pain is 5/10 on the NRS.    Past Medical History:   Diagnosis Date    Anesthesia     1st cousin has malignant hyperthermia/pt has never had an issue or her sisters    Arthritis     hips knees hands spine    Asthma     prn inhalers    Bowel habit changes     diarrhea    Depression     Daughter passed few years ago    Erosive esophagitis     GERD (gastroesophageal reflux disease)     Heart burn     High cholesterol     Immunocompromised (HCC)     Pain     hips knees    Pneumonia 2018    Psoriasis     Psoriatic arthritis (HCC)        Past Surgical History:   Procedure Laterality Date    AZ INJ LUMBAR/SACRAL,W/ IMAGING Right 6/2/2023    Procedure: RIGHT L5-S1 interlaminar epidural steroid injection. Patient has tolerated gadolinium;  Surgeon: Elvis Aleman M.D.;  [facility];  Service: Pain Management    AZ BRONCHOSCOPY,DIAGNOSTIC  7/9/2021    Procedure: BRONCHOSCOPY;  Surgeon: Myles Vidal M.D.;  Location: [facility];  Service: Ent    TENDON REPAIR Left 9/19/2019    Procedure: REPAIR, TENDON- QUADRICEPS RUTURE REPAIR AND MEYERS;  Surgeon: Jayden Velázquez M.D.;  Location: [facility];  Service: Orthopedics    HYSTERECTOMY ROBOTIC XI N/A 7/11/2019    Procedure: HYSTERECTOMY, ROBOT-ASSISTED, USING DA SABINO XI;  Surgeon: Curly Bernal M.D.;  Location: SURGERY [facility];  Service: Gynecology    SALPINGO OOPHORECTOMY Bilateral 7/11/2019    Procedure: SALPINGO-OOPHORECTOMY;  Surgeon: Curly Bernal M.D.;  Location: SURGERY [facility];  Service: Gynecology    GYN SURGERY  1999    c sec    CYSTECTOMY      FUSION, SPINE, LUMBAR, PLIF      HIP  REPLACEMENT, TOTAL      Right    OTHER ABDOMINAL SURGERY      appendix    OTHER ORTHOPEDIC SURGERY      left knee    OTHER ORTHOPEDIC SURGERY      right rotator cuff       No current facility-administered medications for this encounter.       Social History     Socioeconomic History    Marital status:      Spouse name: Not on file    Number of children: Not on file    Years of education: Not on file    Highest education level: Not on file   Occupational History    Occupation: Flight Nurse/Educator   Tobacco Use    Smoking status: Never    Smokeless tobacco: Never   Vaping Use    Vaping Use: Never used   Substance and Sexual Activity    Alcohol use: Not Currently     Alcohol/week: 0.6 - 1.2 oz     Types: 1 - 2 Glasses of wine per week     Comment: one glss of wine at night     Drug use: No    Sexual activity: Yes     Comment: Tubal ligation   Other Topics Concern     Service No    Blood Transfusions No    Caffeine Concern No    Occupational Exposure Yes    Hobby Hazards Yes    Sleep Concern Yes    Stress Concern No    Weight Concern Yes    Special Diet No    Back Care No    Exercise Yes    Bike Helmet Yes    Seat Belt Yes    Self-Exams Yes   Social History Narrative    Not on file     Social Determinants of Health     Financial Resource Strain: Not on file   Food Insecurity: Not on file   Transportation Needs: Not on file   Physical Activity: Not on file   Stress: Not on file   Social Connections: Not on file   Intimate Partner Violence: Not on file   Housing Stability: Not on file       Family History   Problem Relation Age of Onset    Hyperlipidemia Mother     Heart Attack Mother     Psychiatric Illness Mother     Heart Disease Mother     Arthritis Mother     Lung Disease Father     Cancer Father     Hyperlipidemia Father     Hyperlipidemia Sister     Cancer Sister         breast    Heart Disease Paternal Grandfather     Hypertension Neg Hx     Diabetes Neg Hx        Allergies  Bee venom, Iodine,  Levofloxacin, and Shellfish-derived products    Review of Systems  Patient was hospitalized for campylobacter septicemia 07/02/2023-07/06/2023, reviewed.  She reports she is off antibiotics and is back to her baseline state of health.    Physical Exam  Constitutional:       Appearance: Normal appearance.   Cardiovascular:      Rate and Rhythm: Normal rate.      Heart sounds: Normal heart sounds.   Pulmonary:      Effort: Pulmonary effort is normal. No respiratory distress.      Breath sounds: Normal breath sounds.   Neurological:      Mental Status: She is alert and oriented to person, place, and time.   Psychiatric:         Mood and Affect: Mood normal.         Vital Signs  Blood Pressure: 117/67   Temperature: 36.6 °C (97.9 °F)   Pulse: (!) 53   Respiration: 18   Pulse Oximetry: 96 %       Labs:  GFR 88 on 07/12/2023                    Radiology:  DX-PORTABLE FLUOROSCOPY < 1 HOUR Is the patient pregnant? Unknown    (Results Pending)         Assessment/Plan:  Pre-Op Diagnosis Codes:     * Sacroiliac joint pain [M53.3]  Procedure(s):  LEFT sacroiliac joint injection    Plan to proceed with injection as planned.    Elvis Aleman MD  Physical Medicine and Rehabilitation  Interventional Spine and Sports Physiatry  [facility]

## 2023-08-01 ENCOUNTER — PATIENT MESSAGE (OUTPATIENT)
Dept: MEDICAL GROUP | Facility: LAB | Age: 63
End: 2023-08-01
Payer: COMMERCIAL

## 2023-08-01 DIAGNOSIS — J45.40 MODERATE PERSISTENT ASTHMA WITHOUT COMPLICATION: ICD-10-CM

## 2023-08-01 DIAGNOSIS — U07.1 COVID-19: ICD-10-CM

## 2023-08-01 RX ORDER — IPRATROPIUM BROMIDE AND ALBUTEROL SULFATE 2.5; .5 MG/3ML; MG/3ML
3 SOLUTION RESPIRATORY (INHALATION) 4 TIMES DAILY
Qty: 30 ML | Refills: 3 | Status: SHIPPED | OUTPATIENT
Start: 2023-08-01 | End: 2023-10-19

## 2023-08-01 RX ORDER — BENZONATATE 100 MG/1
100 CAPSULE ORAL 3 TIMES DAILY PRN
Qty: 60 CAPSULE | Refills: 0 | Status: SHIPPED | OUTPATIENT
Start: 2023-08-01 | End: 2023-08-05

## 2023-08-03 ENCOUNTER — TELEMEDICINE (OUTPATIENT)
Dept: PHYSICAL MEDICINE AND REHAB | Facility: MEDICAL CENTER | Age: 63
End: 2023-08-03
Payer: COMMERCIAL

## 2023-08-03 DIAGNOSIS — Z98.1 S/P LUMBAR FUSION: ICD-10-CM

## 2023-08-03 DIAGNOSIS — M53.3 SACROILIAC JOINT PAIN: ICD-10-CM

## 2023-08-03 DIAGNOSIS — M79.2 NEUROPATHIC PAIN: ICD-10-CM

## 2023-08-03 DIAGNOSIS — M62.838 MUSCLE SPASM: ICD-10-CM

## 2023-08-03 PROCEDURE — 99214 OFFICE O/P EST MOD 30 MIN: CPT | Mod: 95 | Performed by: PHYSICAL MEDICINE & REHABILITATION

## 2023-08-03 RX ORDER — TIZANIDINE 4 MG/1
4 TABLET ORAL EVERY 6 HOURS PRN
Qty: 40 TABLET | Refills: 0 | Status: SHIPPED | OUTPATIENT
Start: 2023-08-03 | End: 2023-10-19 | Stop reason: SDUPTHER

## 2023-08-03 RX ORDER — PREGABALIN 150 MG/1
150 CAPSULE ORAL 2 TIMES DAILY
Qty: 60 CAPSULE | Refills: 1 | Status: SHIPPED | OUTPATIENT
Start: 2023-08-03 | End: 2023-10-02

## 2023-08-03 NOTE — PROGRESS NOTES
Telemedicine: Established Patient   This evaluation was conducted via Zoom using secure and encrypted videoconferencing technology. The patient was in their home in the [facility].    The patient's identity was confirmed and verbal consent was obtained for this virtual visit.    Subjective:   CC:   Chief Complaint   Patient presents with    Follow-Up     Fv from injection, pt also covid positive       Arely Haynes is a 62 y.o. female presenting for evaluation and management of:    Kaila reports that she developed symptoms on Sunday or Monday and was diagnosed with COVID-19.  Reports that she is starting to improve.    Left sacroiliac joint injection has significantly improved her symptoms.  This was on 07/28/2023.  Reports that she has been able to walk more.  No concerns at injection site.  Right side is painful.  Additionally, she has had increased burning pain in her feet    She is not taking Lyrica 100 mg 3 times a day.  This did significantly help relieve the symptoms.  She has had 2 EMGs at [facility].  The first suggested sensory polyneuropathy in the second did not demonstrate this but suggested multilevel, chronic lumbar radiculopathy.  She has not had a small fiber nerve biopsy.    She does occasionally use tizanidine to assist with back spasms.      ROS  Recently cough, loss of sense of taste and smell.  COVID.  Improving.    Allergies   Allergen Reactions    Bee Venom Anaphylaxis     Pt is allergic to bees.     Iodine Anaphylaxis, Rash and Hives     IV = anaphylaxis, topical = rash  IV = anaphylaxis, topical = rash    Levofloxacin Anaphylaxis    Shellfish-Derived Products Anaphylaxis     LOBSTER       Current medicines (including changes today)  Current Outpatient Medications   Medication Sig Dispense Refill    pregabalin (LYRICA) 150 MG Cap Take 1 Capsule by mouth 2 times a day for 60 days. 60 Capsule 1    tizanidine (ZANAFLEX) 4 MG Tab Take 1 Tablet by mouth every 6 hours as needed  (muscle spasms) for up to 10 days. 40 Tablet 0    Nirmatrelvir&Ritonavir 300/100 20 x 150 MG & 10 x 100MG Tablet Therapy Pack Take 300 mg nirmatrelvir (two 150 mg tablets) with 100 mg ritonavir (one 100 mg tablet) by mouth, with all three tablets taken together twice daily for 5 days. 30 Each 0    ipratropium-albuterol (DUONEB) 0.5-2.5 (3) MG/3ML nebulizer solution Take 3 mL by nebulization 4 times a day. 30 mL 3    benzonatate (TESSALON) 100 MG Cap Take 1 Capsule by mouth 3 times a day as needed for Cough. 60 Capsule 0    albuterol 108 (90 Base) MCG/ACT Aero Soln inhalation aerosol INHALE 1-2 PUFFS BY MOUTH EVERY 4 HOURS AS NEEDED FOR SHORTNESS OF BREATH      fluticasone-salmeterol (ADVAIR) 250-50 MCG/ACT AEROSOL POWDER, BREATH ACTIVATED Inhale 1 Puff 2 times a day. 60 Each 3    metFORMIN ER (GLUCOPHAGE XR) 500 MG TABLET SR 24 HR Take 1 Tablet by mouth 2 times a day. 180 Tablet 3    etodolac (LODINE) 400 MG tablet Take 1 Tablet by mouth 2 times a day. 90 Tablet 3    escitalopram (LEXAPRO) 20 MG tablet Take 1 Tablet by mouth every morning. Take with escitalopram 10 mg for a total dose of 30 mg. 90 Tablet 3    famotidine (PEPCID) 20 MG Tab Take 1 Tablet by mouth 2 times a day. Indications: Gastroesophageal Reflux Disease 180 Tablet 3    escitalopram (LEXAPRO) 10 MG Tab Take 1 Tablet by mouth every morning. Take with escitalopram 20 mg for a total dose of 30 mg. 90 Tablet 3    rosuvastatin (CRESTOR) 10 MG Tab Take 1 Tablet by mouth at bedtime. 90 Tablet 3    lamoTRIgine (LAMICTAL) 100 MG Tab Take 1 Tablet by mouth every morning. 90 Tablet 3    montelukast (SINGULAIR) 10 MG Tab Take 1 Tablet by mouth every day. 90 Tablet 3    omeprazole (PRILOSEC) 40 MG delayed-release capsule Take 1 Capsule by mouth every day. 90 Capsule 3    ipratropium-albuterol (DUONEB) 0.5-2.5 (3) MG/3ML nebulizer solution Take 3 mL by nebulization every four hours as needed for Shortness of Breath. 360 mL 3    EPINEPHrine 0.3 MG/0.3ML Solution  Prefilled Syringe Inject the contents of the epipen into the thigh, hold for 3 seconds and release from thigh as needed for anaphylaxis. (Patient taking differently: Inject 0.3 mL under the skin as needed. Inject the contents of the epipen into the thigh, hold for 3 seconds and release from thigh as needed for anaphylaxis.  Indications: Life-Threatening Hypersensitivity Reaction) 1 Each 6    magnesium oxide (MAG-OX) 400 MG Tab tablet Take 1 Tablet by mouth every morning. 90 Tablet 3     No current facility-administered medications for this visit.       Patient Active Problem List    Diagnosis Date Noted    ILD (interstitial lung disease) ([facility]) 07/12/2023    Pulmonary nodules 08/16/2021    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[facility]) 07/15/2021    Bradycardia 07/15/2021    GERD (gastroesophageal reflux disease) 07/08/2021    Major depressive disorder, recurrent, moderate ([facility]) 01/07/2020    CHRISTY (generalized anxiety disorder) 01/07/2020    Seasonal allergies 12/30/2019    H/O: hysterectomy 10/02/2019    Quadriceps tendon rupture, left, initial encounter 09/19/2019    Ovarian cyst 06/25/2019    Psoriasis 03/27/2019    Immunosuppression ([facility]) 03/27/2019    Obesity (BMI 30.0-34.9) 03/27/2019    Menopausal hot flushes 03/27/2019    Psoriatic arthritis ([facility]) 11/30/2018    Familial hypercholesterolemia 11/30/2018    Moderate persistent asthma without complication 11/30/2018    History of total right hip arthroplasty 09/22/2017    Asthma 04/09/2015       Family History   Problem Relation Age of Onset    Hyperlipidemia Mother     Heart Attack Mother     Psychiatric Illness Mother     Heart Disease Mother     Arthritis Mother     Lung Disease Father     Cancer Father     Hyperlipidemia Father     Hyperlipidemia Sister     Cancer Sister         breast    Heart Disease Paternal Grandfather     Hypertension Neg Hx     Diabetes Neg Hx        She  has a past medical history of Anesthesia, Arthritis, Asthma, Bowel habit changes,  Depression, Erosive esophagitis, GERD (gastroesophageal reflux disease), Heart burn, High cholesterol, Immunocompromised (HCC), Pain, Pneumonia (2018), Psoriasis, and Psoriatic arthritis (formerly [facility]).  She  has a past surgical history that includes fusion, spine, lumbar, plif; hip replacement, total; cystectomy; other abdominal surgery; gyn surgery (1999); other orthopedic surgery; other orthopedic surgery; hysterectomy robotic xi (N/A, 7/11/2019); salpingo oophorectomy (Bilateral, 7/11/2019); tendon repair (Left, 9/19/2019); pr bronchoscopy,diagnostic (7/9/2021); pr inj lumbar/sacral,w/ imaging (Right, 6/2/2023); and pr injection,sacroiliac joint (Left, 7/28/2023).       Objective:   LMP  (LMP Unknown)     Physical Exam    Gen: Patient is alert and cooperative and in no acute distress.  HEENT: No facial asymmetry.  Patient coughs intermittently.    Patient is sitting    Exam is limited by telehealth nature of the visit.    Assessment and Plan:   The following treatment plan was discussed:     1. Sacroiliac joint pain    2. S/P lumbar fusion  - pregabalin (LYRICA) 150 MG Cap; Take 1 Capsule by mouth 2 times a day for 60 days.  Dispense: 60 Capsule; Refill: 1    3. Neuropathic pain  - pregabalin (LYRICA) 150 MG Cap; Take 1 Capsule by mouth 2 times a day for 60 days.  Dispense: 60 Capsule; Refill: 1    4. Muscle spasm  - tizanidine (ZANAFLEX) 4 MG Tab; Take 1 Tablet by mouth every 6 hours as needed (muscle spasms) for up to 10 days.  Dispense: 40 Tablet; Refill: 0      Discussed that she does not have improvement in left-sided sacroiliac joint pain after injection last week.  She is noticing symptoms on the right.  Discussed that this could be reassessed by Dr. Fitzgerald in the future after resolution of her COVID symptoms.  Refilled tizanidine 4 mg to be taken for muscle spasms.  She has tolerated this medication well.  Discussed trial of Lyrica 150 mg twice a day.  Prescription sent to pharmacy on file.  This can be managed  by her PCP in the future.  Reviewed the burning, neuropathic symptoms that she has in her lower extremities.  We discussed the 2 previous EMGs that were performed at the [facility] with disparate results.  Recommend that she discuss possible referral to neurology or follow-up at the [facility] with Dr. Espinal.  She may benefit from small fiber nerve biopsy.  This could also be done locally with neurology or at the [facility].  Continue activity as tolerated.    Follow-up: Return in about 4 weeks (around 8/31/2023), or if symptoms worsen or fail to improve.

## 2023-08-05 ENCOUNTER — HOSPITAL ENCOUNTER (OUTPATIENT)
Facility: MEDICAL CENTER | Age: 63
End: 2023-08-05
Attending: PHYSICIAN ASSISTANT
Payer: COMMERCIAL

## 2023-08-05 ENCOUNTER — OFFICE VISIT (OUTPATIENT)
Dept: URGENT CARE | Facility: CLINIC | Age: 63
End: 2023-08-05
Payer: COMMERCIAL

## 2023-08-05 VITALS
SYSTOLIC BLOOD PRESSURE: 122 MMHG | HEART RATE: 56 BPM | TEMPERATURE: 98.6 F | DIASTOLIC BLOOD PRESSURE: 72 MMHG | RESPIRATION RATE: 16 BRPM | BODY MASS INDEX: 30.07 KG/M2 | HEIGHT: 72 IN | OXYGEN SATURATION: 96 % | WEIGHT: 222 LBS

## 2023-08-05 DIAGNOSIS — N30.01 ACUTE CYSTITIS WITH HEMATURIA: ICD-10-CM

## 2023-08-05 PROBLEM — E78.00 HYPERCHOLESTEROLEMIA: Status: ACTIVE | Noted: 2023-05-12

## 2023-08-05 PROBLEM — R05.9 COUGH: Status: ACTIVE | Noted: 2023-08-05

## 2023-08-05 PROBLEM — K57.30 DVRTCLOS OF LG INT W/O PERFORATION OR ABSCESS W/O BLEEDING: Status: ACTIVE | Noted: 2020-10-26

## 2023-08-05 PROBLEM — K52.9 NONINFECTIVE GASTROENTERITIS AND COLITIS, UNSPECIFIED: Status: ACTIVE | Noted: 2023-05-12

## 2023-08-05 PROBLEM — Z86.010 PERSONAL HISTORY OF COLONIC POLYPS: Status: ACTIVE | Noted: 2023-05-12

## 2023-08-05 PROBLEM — K64.9 HEMORRHOIDS WITHOUT COMPLICATION: Status: ACTIVE | Noted: 2023-05-12

## 2023-08-05 PROBLEM — R12 HEARTBURN: Status: ACTIVE | Noted: 2023-05-12

## 2023-08-05 PROBLEM — K63.5 POLYP OF COLON: Status: ACTIVE | Noted: 2020-10-26

## 2023-08-05 PROBLEM — K52.9 CHRONIC DIARRHEA: Status: ACTIVE | Noted: 2023-08-05

## 2023-08-05 PROBLEM — K44.9 DIAPHRAGMATIC HERNIA WITHOUT OBSTRUCTION OR GANGRENE: Status: ACTIVE | Noted: 2023-01-03

## 2023-08-05 PROBLEM — Z86.0100 PERSONAL HISTORY OF COLONIC POLYPS: Status: ACTIVE | Noted: 2023-05-12

## 2023-08-05 PROBLEM — K92.1 HEMATOCHEZIA: Status: ACTIVE | Noted: 2023-05-12

## 2023-08-05 PROBLEM — R15.2 FECAL URGENCY: Status: ACTIVE | Noted: 2023-05-12

## 2023-08-05 PROBLEM — K31.7 POLYP OF STOMACH AND DUODENUM: Status: ACTIVE | Noted: 2023-01-03

## 2023-08-05 PROBLEM — R49.0 HOARSENESS: Status: ACTIVE | Noted: 2023-05-12

## 2023-08-05 PROBLEM — K64.1 SECOND DEGREE HEMORRHOIDS: Status: ACTIVE | Noted: 2020-10-26

## 2023-08-05 LAB
APPEARANCE UR: NORMAL
BILIRUB UR STRIP-MCNC: NORMAL MG/DL
COLOR UR AUTO: NORMAL
GLUCOSE UR STRIP.AUTO-MCNC: NEGATIVE MG/DL
KETONES UR STRIP.AUTO-MCNC: NORMAL MG/DL
LEUKOCYTE ESTERASE UR QL STRIP.AUTO: NORMAL
NITRITE UR QL STRIP.AUTO: NEGATIVE
PH UR STRIP.AUTO: 5 [PH] (ref 5–8)
PROT UR QL STRIP: NORMAL MG/DL
RBC UR QL AUTO: NORMAL
SP GR UR STRIP.AUTO: 1.03
UROBILINOGEN UR STRIP-MCNC: 0.2 MG/DL

## 2023-08-05 PROCEDURE — 87077 CULTURE AEROBIC IDENTIFY: CPT

## 2023-08-05 PROCEDURE — 3074F SYST BP LT 130 MM HG: CPT | Performed by: PHYSICIAN ASSISTANT

## 2023-08-05 PROCEDURE — 87186 SC STD MICRODIL/AGAR DIL: CPT

## 2023-08-05 PROCEDURE — 99213 OFFICE O/P EST LOW 20 MIN: CPT | Performed by: PHYSICIAN ASSISTANT

## 2023-08-05 PROCEDURE — 81002 URINALYSIS NONAUTO W/O SCOPE: CPT | Performed by: PHYSICIAN ASSISTANT

## 2023-08-05 PROCEDURE — 87086 URINE CULTURE/COLONY COUNT: CPT

## 2023-08-05 PROCEDURE — 3078F DIAST BP <80 MM HG: CPT | Performed by: PHYSICIAN ASSISTANT

## 2023-08-05 RX ORDER — PREGABALIN 50 MG/1
50 CAPSULE ORAL 3 TIMES DAILY
COMMUNITY
Start: 2023-07-24 | End: 2023-10-19

## 2023-08-05 RX ORDER — NITROFURANTOIN 25; 75 MG/1; MG/1
100 CAPSULE ORAL 2 TIMES DAILY
Qty: 10 CAPSULE | Refills: 0 | Status: SHIPPED | OUTPATIENT
Start: 2023-08-05 | End: 2023-08-10

## 2023-08-05 RX ORDER — METFORMIN HYDROCHLORIDE 500 MG/1
1 TABLET, EXTENDED RELEASE ORAL
COMMUNITY
Start: 2023-07-12 | End: 2023-10-19

## 2023-08-05 ASSESSMENT — ENCOUNTER SYMPTOMS
NAUSEA: 0
VOMITING: 0
COUGH: 0
SINUS PAIN: 0
CONSTIPATION: 0
DIAPHORESIS: 0
FEVER: 0
FLANK PAIN: 0
ABDOMINAL PAIN: 0
CHILLS: 0
DIARRHEA: 1
SHORTNESS OF BREATH: 0
WHEEZING: 0

## 2023-08-05 ASSESSMENT — FIBROSIS 4 INDEX: FIB4 SCORE: 1.57

## 2023-08-05 NOTE — PROGRESS NOTES
Subjective:     Arely Haynes  is a 62 y.o. female who presents for UTI (Pelvic pain, bladder spasms, incontinent x 1 days )       She presents today for suprapubic pain, bladder spasm and urinary incontinence has been ongoing over the last 24 hours.  Does have previous history of urinary tract infections, current symptoms do feel similar to those previously experienced.  Previously infected with ESBL urinary tract infection.  She denies abdominal pain, no nausea or vomiting.  No fevers, no flank pain. Please recall she was hospitalized on 7/2/2023 - 7/6/2023 for colitis secondary to Campylobacter.  She states that she has recovered well from this infection but did become infected with COVID-19 and has been taking Paxlovid for this COVID-19 infection, she is on day 4 of the Paxlovid medication and does admit to diarrhea occurring while taking this medication..       Review of Systems   Constitutional:  Negative for chills, diaphoresis, fever and malaise/fatigue.   HENT:  Negative for sinus pain.    Respiratory:  Negative for cough, shortness of breath and wheezing.    Cardiovascular:  Negative for chest pain.   Gastrointestinal:  Positive for diarrhea. Negative for abdominal pain, constipation, nausea and vomiting.   Genitourinary:  Positive for dysuria, frequency and urgency. Negative for flank pain.      Allergies   Allergen Reactions    Bee Venom Anaphylaxis     Pt is allergic to bees.     Iodine Anaphylaxis, Rash and Hives     IV = anaphylaxis, topical = rash  IV = anaphylaxis, topical = rash    Levofloxacin Anaphylaxis    Shellfish-Derived Products Anaphylaxis     LOBSTER     Past Medical History:   Diagnosis Date    Anesthesia     1st cousin has malignant hyperthermia/pt has never had an issue or her sisters    Arthritis     hips knees hands spine    Asthma     prn inhalers    Bowel habit changes     diarrhea    Depression     Daughter passed few years ago    Erosive esophagitis     GERD  (gastroesophageal reflux disease)     Heart burn     High cholesterol     Immunocompromised (HCC)     Pain     hips knees    Pneumonia 2018    Psoriasis     Psoriatic arthritis (HCC)         Objective:   /72 (BP Location: Left arm, Patient Position: Sitting, BP Cuff Size: Adult)   Pulse (!) 56   Temp 37 °C (98.6 °F) (Oral)   Resp 16   Ht 1.829 m (6')   Wt 101 kg (222 lb)   LMP  (LMP Unknown)   SpO2 96%   BMI 30.11 kg/m²   Physical Exam  Vitals and nursing note reviewed.   Constitutional:       General: She is not in acute distress.     Appearance: She is not ill-appearing or toxic-appearing.   HENT:      Head: Normocephalic.   Eyes:      General: No scleral icterus.     Conjunctiva/sclera: Conjunctivae normal.   Cardiovascular:      Rate and Rhythm: Normal rate and regular rhythm.   Pulmonary:      Effort: Pulmonary effort is normal. No respiratory distress.      Breath sounds: Normal breath sounds. No stridor.   Abdominal:      Tenderness: There is no right CVA tenderness or left CVA tenderness.   Musculoskeletal:      Cervical back: Neck supple.   Neurological:      Mental Status: She is alert and oriented to person, place, and time.   Psychiatric:         Mood and Affect: Mood normal.         Behavior: Behavior normal.         Thought Content: Thought content normal.         Judgment: Judgment normal.             Diagnostic testing:    POC urinalysis-large bilirubin, trace ketones, moderate blood, positive protein, moderate leukocytes, all others within normal limits    Urine culture-pending    Assessment/Plan:     Encounter Diagnoses   Name Primary?    Acute cystitis with hematuria           Plan for care for today's complaint includes starting patient on Macrobid.  Please note she does have allergy sensitivity to fluoroquinolones.  Urine culture obtained, will contact the patient via [employer] to discuss the results of the testing obtained today, will adjust treatment plan accordingly.   Vital signs are stable and patient is well-appearing today, no evidence of pyelonephritis on examination today.  Continue to monitor symptoms and return to urgent care or follow-up with primary care provider if symptoms remain ongoing.  Follow-up in the emergency department if symptoms become severe, ER precautions discussed in office today..  Prescription for Macrobid provided.    See AVS Instructions below for written guidance provided to patient on after-visit management and care in addition to our verbal discussion during the visit.    Please note that this dictation was created using voice recognition software. I have attempted to correct all errors, but there may be sound-alike, spelling, grammar and possibly content errors that I did not discover before finalizing the note.    Oli Montemayor PA-C

## 2023-08-06 DIAGNOSIS — N30.01 ACUTE CYSTITIS WITH HEMATURIA: ICD-10-CM

## 2023-08-08 LAB
BACTERIA UR CULT: ABNORMAL
SIGNIFICANT IND 70042: ABNORMAL
SITE SITE: ABNORMAL
SOURCE SOURCE: ABNORMAL

## 2023-08-20 ENCOUNTER — HOSPITAL ENCOUNTER (OUTPATIENT)
Facility: MEDICAL CENTER | Age: 63
End: 2023-08-20
Attending: PHYSICIAN ASSISTANT
Payer: COMMERCIAL

## 2023-08-20 ENCOUNTER — OFFICE VISIT (OUTPATIENT)
Dept: URGENT CARE | Facility: CLINIC | Age: 63
End: 2023-08-20
Payer: COMMERCIAL

## 2023-08-20 VITALS
TEMPERATURE: 97.4 F | DIASTOLIC BLOOD PRESSURE: 72 MMHG | HEART RATE: 70 BPM | HEIGHT: 72 IN | OXYGEN SATURATION: 97 % | BODY MASS INDEX: 31.18 KG/M2 | RESPIRATION RATE: 16 BRPM | WEIGHT: 230.2 LBS | SYSTOLIC BLOOD PRESSURE: 112 MMHG

## 2023-08-20 DIAGNOSIS — N30.01 ACUTE CYSTITIS WITH HEMATURIA: ICD-10-CM

## 2023-08-20 DIAGNOSIS — R10.9 RIGHT FLANK PAIN: ICD-10-CM

## 2023-08-20 DIAGNOSIS — B37.0 ORAL THRUSH: ICD-10-CM

## 2023-08-20 DIAGNOSIS — N39.0 ACUTE LOWER UTI: ICD-10-CM

## 2023-08-20 DIAGNOSIS — R35.0 URINARY FREQUENCY: ICD-10-CM

## 2023-08-20 LAB
APPEARANCE UR: NORMAL
BILIRUB UR STRIP-MCNC: NORMAL MG/DL
COLOR UR AUTO: NORMAL
GLUCOSE UR STRIP.AUTO-MCNC: NEGATIVE MG/DL
KETONES UR STRIP.AUTO-MCNC: NORMAL MG/DL
LEUKOCYTE ESTERASE UR QL STRIP.AUTO: NORMAL
NITRITE UR QL STRIP.AUTO: NEGATIVE
PH UR STRIP.AUTO: 6 [PH] (ref 5–8)
PROT UR QL STRIP: NORMAL MG/DL
RBC UR QL AUTO: NORMAL
SP GR UR STRIP.AUTO: 1.02
UROBILINOGEN UR STRIP-MCNC: 0.2 MG/DL

## 2023-08-20 PROCEDURE — 87186 SC STD MICRODIL/AGAR DIL: CPT

## 2023-08-20 PROCEDURE — 81002 URINALYSIS NONAUTO W/O SCOPE: CPT | Performed by: PHYSICIAN ASSISTANT

## 2023-08-20 PROCEDURE — 99214 OFFICE O/P EST MOD 30 MIN: CPT | Performed by: PHYSICIAN ASSISTANT

## 2023-08-20 PROCEDURE — 87077 CULTURE AEROBIC IDENTIFY: CPT

## 2023-08-20 PROCEDURE — 3078F DIAST BP <80 MM HG: CPT | Performed by: PHYSICIAN ASSISTANT

## 2023-08-20 PROCEDURE — 3074F SYST BP LT 130 MM HG: CPT | Performed by: PHYSICIAN ASSISTANT

## 2023-08-20 PROCEDURE — 87086 URINE CULTURE/COLONY COUNT: CPT

## 2023-08-20 RX ORDER — CEFDINIR 300 MG/1
300 CAPSULE ORAL 2 TIMES DAILY
Qty: 20 CAPSULE | Refills: 0 | Status: SHIPPED | OUTPATIENT
Start: 2023-08-20 | End: 2023-08-30

## 2023-08-20 RX ORDER — CEFTRIAXONE 1 G/1
1 INJECTION, POWDER, FOR SOLUTION INTRAMUSCULAR; INTRAVENOUS ONCE
Qty: 1 G | Refills: 0 | Status: SHIPPED | OUTPATIENT
Start: 2023-08-20 | End: 2023-08-20 | Stop reason: CLARIF

## 2023-08-20 RX ORDER — CLOTRIMAZOLE 10 MG/1
10 LOZENGE ORAL; TOPICAL
Qty: 70 TROCHE | Refills: 0 | Status: SHIPPED | OUTPATIENT
Start: 2023-08-20 | End: 2023-09-03

## 2023-08-20 ASSESSMENT — ENCOUNTER SYMPTOMS
SORE THROAT: 0
VOMITING: 0
FEVER: 0
FLANK PAIN: 1
FATIGUE: 1
MYALGIAS: 1
CHILLS: 1

## 2023-08-20 ASSESSMENT — FIBROSIS 4 INDEX: FIB4 SCORE: 1.57

## 2023-08-20 NOTE — PROGRESS NOTES
Subjective     Kaila Haynes is a 62 y.o. female who presents with UTI (Incontinence, R side back pain, bladder feels full x 2 days) and Thrush    PMH:  has a past medical history of Anesthesia, Arthritis, Asthma, Bowel habit changes, Depression, Erosive esophagitis, GERD (gastroesophageal reflux disease), Heart burn, High cholesterol, Immunocompromised ([facility]), Pain, Pneumonia (2018), Psoriasis, and Psoriatic arthritis ([facility]).  MEDS:   Current Outpatient Medications:     cefdinir (OMNICEF) 300 MG Cap, Take 1 Capsule by mouth 2 times a day for 10 days., Disp: 20 Capsule, Rfl: 0    clotrimazole (MYCELEX) 10 MG Bhumi bhumi, Take 1 Bhumi by mouth 5 Times a Day for 14 days., Disp: 70 Bhumi, Rfl: 0    pregabalin (LYRICA) 50 MG capsule, Take 50 mg by mouth 3 times a day., Disp: , Rfl:     metFORMIN ER (GLUCOPHAGE XR) 500 MG TABLET SR 24 HR, Take 1 Tablet by mouth., Disp: , Rfl:     pregabalin (LYRICA) 150 MG Cap, Take 1 Capsule by mouth 2 times a day for 60 days., Disp: 60 Capsule, Rfl: 1    ipratropium-albuterol (DUONEB) 0.5-2.5 (3) MG/3ML nebulizer solution, Take 3 mL by nebulization 4 times a day., Disp: 30 mL, Rfl: 3    albuterol 108 (90 Base) MCG/ACT Aero Soln inhalation aerosol, INHALE 1-2 PUFFS BY MOUTH EVERY 4 HOURS AS NEEDED FOR SHORTNESS OF BREATH, Disp: , Rfl:     fluticasone-salmeterol (ADVAIR) 250-50 MCG/ACT AEROSOL POWDER, BREATH ACTIVATED, Inhale 1 Puff 2 times a day., Disp: 60 Each, Rfl: 3    metFORMIN ER (GLUCOPHAGE XR) 500 MG TABLET SR 24 HR, Take 1 Tablet by mouth 2 times a day., Disp: 180 Tablet, Rfl: 3    etodolac (LODINE) 400 MG tablet, Take 1 Tablet by mouth 2 times a day., Disp: 90 Tablet, Rfl: 3    escitalopram (LEXAPRO) 20 MG tablet, Take 1 Tablet by mouth every morning. Take with escitalopram 10 mg for a total dose of 30 mg., Disp: 90 Tablet, Rfl: 3    famotidine (PEPCID) 20 MG Tab, Take 1 Tablet by mouth 2 times a day. Indications: Gastroesophageal Reflux Disease, Disp: 180 Tablet, Rfl: 3     escitalopram (LEXAPRO) 10 MG Tab, Take 1 Tablet by mouth every morning. Take with escitalopram 20 mg for a total dose of 30 mg., Disp: 90 Tablet, Rfl: 3    rosuvastatin (CRESTOR) 10 MG Tab, Take 1 Tablet by mouth at bedtime., Disp: 90 Tablet, Rfl: 3    lamoTRIgine (LAMICTAL) 100 MG Tab, Take 1 Tablet by mouth every morning., Disp: 90 Tablet, Rfl: 3    montelukast (SINGULAIR) 10 MG Tab, Take 1 Tablet by mouth every day., Disp: 90 Tablet, Rfl: 3    omeprazole (PRILOSEC) 40 MG delayed-release capsule, Take 1 Capsule by mouth every day., Disp: 90 Capsule, Rfl: 3    ipratropium-albuterol (DUONEB) 0.5-2.5 (3) MG/3ML nebulizer solution, Take 3 mL by nebulization every four hours as needed for Shortness of Breath., Disp: 360 mL, Rfl: 3    EPINEPHrine 0.3 MG/0.3ML Solution Prefilled Syringe, Inject the contents of the epipen into the thigh, hold for 3 seconds and release from thigh as needed for anaphylaxis. (Patient taking differently: Inject 0.3 mL under the skin as needed. Inject the contents of the epipen into the thigh, hold for 3 seconds and release from thigh as needed for anaphylaxis.  Indications: Life-Threatening Hypersensitivity Reaction), Disp: 1 Each, Rfl: 6    magnesium oxide (MAG-OX) 400 MG Tab tablet, Take 1 Tablet by mouth every morning., Disp: 90 Tablet, Rfl: 3  ALLERGIES:   Allergies   Allergen Reactions    Bee Venom Anaphylaxis     Pt is allergic to bees.     Iodine Anaphylaxis, Rash and Hives     IV = anaphylaxis, topical = rash  IV = anaphylaxis, topical = rash    Levofloxacin Anaphylaxis    Shellfish-Derived Products Anaphylaxis     LOBSTER     SURGHX:   Past Surgical History:   Procedure Laterality Date    VA INJECTION,SACROILIAC JOINT Left 7/28/2023    Procedure: LEFT sacroiliac joint injection;  Surgeon: Elvis Aleman M.D.;  [facility];  Service: Pain Management    VA INJ LUMBAR/SACRAL,W/ IMAGING Right 6/2/2023    Procedure: RIGHT L5-S1 interlaminar epidural steroid  injection. Patient has tolerated gadolinium;  Surgeon: Elvis Aleman M.D.;  [facility];  Service: Pain Management    GA BRONCHOSCOPY,DIAGNOSTIC  7/9/2021    Procedure: BRONCHOSCOPY;  Surgeon: Myles Vidal M.D.;  Location: SURGERY [facility];  Service: Ent    TENDON REPAIR Left 9/19/2019    Procedure: REPAIR, TENDON- QUADRICEPS RUTURE REPAIR AND MEYERS;  Surgeon: Jayden Velázquez M.D.;  Location: SURGERY [facility];  Service: Orthopedics    HYSTERECTOMY ROBOTIC XI N/A 7/11/2019    Procedure: HYSTERECTOMY, ROBOT-ASSISTED, USING DA SABINO XI;  Surgeon: Curly Bernal M.D.;  Location: SURGERY [facility];  Service: Gynecology    SALPINGO OOPHORECTOMY Bilateral 7/11/2019    Procedure: SALPINGO-OOPHORECTOMY;  Surgeon: Curly Bernal M.D.;  Location: SURGERY [facility];  Service: Gynecology    GYN SURGERY  1999    c sec    CYSTECTOMY      FUSION, SPINE, LUMBAR, PLIF      HIP REPLACEMENT, TOTAL      Right    OTHER ABDOMINAL SURGERY      appendix    OTHER ORTHOPEDIC SURGERY      left knee    OTHER ORTHOPEDIC SURGERY      right rotator cuff     SOCHX:  reports that she has never smoked. She has never used smokeless tobacco. She reports that she does not currently use alcohol after a past usage of about 0.6 - 1.2 oz of alcohol per week. She reports that she does not use drugs.  FH: Reviewed with patient, not pertinent to this visit.           Patient presents with urinary urgency, frequency and right flank pain with bladder irritation for the last 2 days.  Patient denies fever but has had chills and muscle aches.  Patient has had multiple bacterial infections in the last few months, including sepsis due to colitis for which she was hospitalized.  PT most recent antibiotic use was for UTI, given macrobid which was helpful.  PT is concerned about pyelonephritis due to flank pain/chills and urinary symptoms.  PT denies hx of renal stones, recent CT did not reveal stones either.     PT  also co thrush from recent abx over the last 2 months, is almost out of her fluconazole troches, would like new Rx as she still has thrush on tongue and cheeks.      No other complaints.         UTI  This is a new problem. The current episode started in the past 7 days. The problem occurs constantly. The problem has been gradually worsening. Associated symptoms include chills, fatigue, myalgias and urinary symptoms. Pertinent negatives include no fever, rash, sore throat or vomiting. The symptoms are aggravated by exertion. She has tried drinking and NSAIDs for the symptoms. The treatment provided no relief.       Review of Systems   Constitutional:  Positive for chills and fatigue. Negative for fever.   HENT:  Negative for sore throat.    Gastrointestinal:  Negative for vomiting.   Genitourinary:  Positive for flank pain, frequency and urgency. Negative for hematuria.   Musculoskeletal:  Positive for myalgias.   Skin:  Negative for rash.   All other systems reviewed and are negative.             Objective     /72 (BP Location: Left arm, Patient Position: Sitting, BP Cuff Size: Adult)   Pulse 70   Temp 36.3 °C (97.4 °F) (Temporal)   Resp 16   Ht 1.829 m (6')   Wt 104 kg (230 lb 3.2 oz)   LMP  (LMP Unknown)   SpO2 97%   BMI 31.22 kg/m²      Physical Exam  Vitals and nursing note reviewed.   Constitutional:       General: She is not in acute distress.     Appearance: Normal appearance. She is well-developed and normal weight. She is not toxic-appearing.   HENT:      Head: Normocephalic and atraumatic.      Nose: Nose normal.      Mouth/Throat:      Mouth: Mucous membranes are moist.   Eyes:      Extraocular Movements: Extraocular movements intact.      Conjunctiva/sclera: Conjunctivae normal.      Pupils: Pupils are equal, round, and reactive to light.   Cardiovascular:      Rate and Rhythm: Normal rate and regular rhythm.      Pulses: Normal pulses.      Heart sounds: Normal heart sounds.   Pulmonary:       Effort: Pulmonary effort is normal.      Breath sounds: Normal breath sounds.   Abdominal:      General: Bowel sounds are normal.      Palpations: Abdomen is soft.      Tenderness: There is no guarding or rebound.   Musculoskeletal:         General: Normal range of motion.      Cervical back: Normal range of motion and neck supple.   Skin:     General: Skin is warm and dry.      Capillary Refill: Capillary refill takes less than 2 seconds.   Neurological:      General: No focal deficit present.      Mental Status: She is alert and oriented to person, place, and time.      Gait: Gait normal.   Psychiatric:         Mood and Affect: Mood normal.         Behavior: Behavior is cooperative.                             Assessment & Plan                1. Acute cystitis with hematuria  cefdinir (OMNICEF) 300 MG Cap    POCT Urinalysis    URINE CULTURE(NEW)    cefTRIAXone (Rocephin) 1 g in lidocaine (Xylocaine) 1 % 4 mL for IM use    clotrimazole (MYCELEX) 10 MG Bhumi bhumi    DISCONTINUED: cefTRIAXone (ROCEPHIN) 1 GM Recon Soln      2. Urinary frequency  cefdinir (OMNICEF) 300 MG Cap    POCT Urinalysis    URINE CULTURE(NEW)    cefTRIAXone (Rocephin) 1 g in lidocaine (Xylocaine) 1 % 4 mL for IM use    clotrimazole (MYCELEX) 10 MG Bhumi bhumi      3. Right flank pain  cefdinir (OMNICEF) 300 MG Cap    POCT Urinalysis    URINE CULTURE(NEW)    cefTRIAXone (Rocephin) 1 g in lidocaine (Xylocaine) 1 % 4 mL for IM use    clotrimazole (MYCELEX) 10 MG Bhumi bhumi      4. Oral thrush  cefdinir (OMNICEF) 300 MG Cap    POCT Urinalysis    URINE CULTURE(NEW)    cefTRIAXone (Rocephin) 1 g in lidocaine (Xylocaine) 1 % 4 mL for IM use    clotrimazole (MYCELEX) 10 MG Bhumi bhumi        UA: +LE, blood, protein    I have reviewed patient's previous urine culture results, I feel patient would benefit from bacteriocidal rather than a bacteriostatic medication at this time.  Therefore I will treat with cefdinir twice daily x10 days.  I  will also treat with a gram of Rocephin today considering patient has flank pain, chills I would like to avoid pyelonephritis, and by starting antibiotics with an injection I feel this will be helpful to the patient.  Patient verbalized agreement with this plan.  Patient's  who is a physician is also in agreement with this plan.    Culture sent to lab, will call with any necessary treatment or treatment changes.     I have sent a new prescription for clotrimazole lozenges.    Differential diagnosis, supportive care, and indications for immediate follow-up discussed with patient.  Instructed to return to clinic or nearest emergency department for any change in condition, further concerns, or worsening of symptoms.    I personally reviewed prior external notes and test results pertinent to today's visit.  I have independently reviewed and interpreted all diagnostics ordered during this urgent care visit.    PT should follow up with PCP in 1-2 days for re-evaluation if symptoms have not improved.      Discussed red flags and reasons to return to UC or ED.      Pt and/or family verbalized understanding of diagnosis and follow up instructions and was offered informational handout on diagnosis.  PT discharged.     Please note that this dictation was created using voice recognition software. I have made every reasonable attempt to correct obvious errors, but I expect that there may be errors of grammar and possibly content that I did not discover before finalizing the note.     My total time spent caring for the patient on the day of the encounter was 39 minutes.   This does not include time spent on separately billable procedures/tests.

## 2023-09-03 ENCOUNTER — HOSPITAL ENCOUNTER (OUTPATIENT)
Facility: MEDICAL CENTER | Age: 63
End: 2023-09-03
Attending: PHYSICIAN ASSISTANT
Payer: COMMERCIAL

## 2023-09-03 ENCOUNTER — OFFICE VISIT (OUTPATIENT)
Dept: URGENT CARE | Facility: CLINIC | Age: 63
End: 2023-09-03
Payer: COMMERCIAL

## 2023-09-03 VITALS
DIASTOLIC BLOOD PRESSURE: 78 MMHG | RESPIRATION RATE: 18 BRPM | BODY MASS INDEX: 32.23 KG/M2 | HEIGHT: 72 IN | SYSTOLIC BLOOD PRESSURE: 134 MMHG | HEART RATE: 67 BPM | TEMPERATURE: 97.5 F | OXYGEN SATURATION: 94 % | WEIGHT: 238 LBS

## 2023-09-03 DIAGNOSIS — R30.0 DYSURIA: ICD-10-CM

## 2023-09-03 DIAGNOSIS — N30.90 CYSTITIS: ICD-10-CM

## 2023-09-03 LAB
APPEARANCE UR: NORMAL
BILIRUB UR STRIP-MCNC: NORMAL MG/DL
COLOR UR AUTO: YELLOW
GLUCOSE UR STRIP.AUTO-MCNC: NEGATIVE MG/DL
KETONES UR STRIP.AUTO-MCNC: NEGATIVE MG/DL
LEUKOCYTE ESTERASE UR QL STRIP.AUTO: NORMAL
NITRITE UR QL STRIP.AUTO: NEGATIVE
PH UR STRIP.AUTO: 5 [PH] (ref 5–8)
PROT UR QL STRIP: NEGATIVE MG/DL
RBC UR QL AUTO: NEGATIVE
SP GR UR STRIP.AUTO: 1.02
UROBILINOGEN UR STRIP-MCNC: 0.2 MG/DL

## 2023-09-03 PROCEDURE — 3078F DIAST BP <80 MM HG: CPT | Performed by: PHYSICIAN ASSISTANT

## 2023-09-03 PROCEDURE — 99213 OFFICE O/P EST LOW 20 MIN: CPT | Performed by: PHYSICIAN ASSISTANT

## 2023-09-03 PROCEDURE — 87077 CULTURE AEROBIC IDENTIFY: CPT

## 2023-09-03 PROCEDURE — 87086 URINE CULTURE/COLONY COUNT: CPT

## 2023-09-03 PROCEDURE — 81002 URINALYSIS NONAUTO W/O SCOPE: CPT | Performed by: PHYSICIAN ASSISTANT

## 2023-09-03 PROCEDURE — 87186 SC STD MICRODIL/AGAR DIL: CPT

## 2023-09-03 PROCEDURE — 3075F SYST BP GE 130 - 139MM HG: CPT | Performed by: PHYSICIAN ASSISTANT

## 2023-09-03 RX ORDER — CEFDINIR 300 MG/1
300 CAPSULE ORAL EVERY 12 HOURS
Qty: 28 CAPSULE | Refills: 0 | Status: SHIPPED | OUTPATIENT
Start: 2023-09-03 | End: 2023-09-17

## 2023-09-03 ASSESSMENT — FIBROSIS 4 INDEX: FIB4 SCORE: 1.57

## 2023-09-03 NOTE — PROGRESS NOTES
Subjective:   Arely Haynes is a 62 y.o. female who presents for UTI (X1day, Urination frequency, cloudy urine, states this would be 3rd uti )    This is a pleasant 62-year-old female who presents for recurrent UTI symptoms.  Current symptoms began with dysuria and cloudy urine.  She has had chronic diarrhea related to food poisoning recently.  She currently denies flank pain.  She does admit to some mild suprapubic pressure.  No fevers or chills.  Does endorse an episode of incontinence related to diarrhea recently.      Medications:  albuterol Aers  clotrimazole Troc  EPINEPHrine Sosy  escitalopram  escitalopram Tabs  etodolac  famotidine Tabs  fluticasone-salmeterol Aepb  ipratropium-albuterol  lamoTRIgine Tabs  magnesium oxide Tabs  metFORMIN ER Tb24  montelukast Tabs  omeprazole  pregabalin  pregabalin Caps  rosuvastatin Tabs    Allergies:             Bee venom, Iodine, Levofloxacin, and Shellfish-derived products    Surgical History:         Past Surgical History:   Procedure Laterality Date    ID INJECTION,SACROILIAC JOINT Left 7/28/2023    Procedure: LEFT sacroiliac joint injection;  Surgeon: Elvis Aleman M.D.;  [facility];  Service: Pain Management    ID INJ LUMBAR/SACRAL,W/ IMAGING Right 6/2/2023    Procedure: RIGHT L5-S1 interlaminar epidural steroid injection. Patient has tolerated gadolinium;  Surgeon: Elvis Aleman M.D.;  Location: [facility];  Service: Pain Management    ID BRONCHOSCOPY,DIAGNOSTIC  7/9/2021    Procedure: BRONCHOSCOPY;  Surgeon: Myles Vidal M.D.;  Location: [facility];  Service: Ent    TENDON REPAIR Left 9/19/2019    Procedure: REPAIR, TENDON- QUADRICEPS RUTURE REPAIR AND MEYERS;  Surgeon: Jayden Velázquez M.D.;  Location: [facility];  Service: Orthopedics    HYSTERECTOMY ROBOTIC XI N/A 7/11/2019    Procedure: HYSTERECTOMY, ROBOT-ASSISTED, USING DA SABINO XI;  Surgeon: Curly Bernal M.D.;  Location: SURGERY  [facility];  Service: Gynecology    SALPINGO OOPHORECTOMY Bilateral 7/11/2019    Procedure: SALPINGO-OOPHORECTOMY;  Surgeon: Curly Bernal M.D.;  Location: SURGERY [facility];  Service: Gynecology    GYN SURGERY  1999    c sec    CYSTECTOMY      FUSION, SPINE, LUMBAR, PLIF      HIP REPLACEMENT, TOTAL      Right    OTHER ABDOMINAL SURGERY      appendix    OTHER ORTHOPEDIC SURGERY      left knee    OTHER ORTHOPEDIC SURGERY      right rotator cuff       Past Social Hx:  Arely Haynes  reports that she has never smoked. She has never used smokeless tobacco. She reports current alcohol use of about 0.6 - 1.2 oz of alcohol per week. She reports that she does not use drugs.     Past Family Hx:   Arely Haynes family history includes Arthritis in her mother; Cancer in her father and sister; Heart Attack in her mother; Heart Disease in her mother and paternal grandfather; Hyperlipidemia in her father, mother, and sister; Lung Disease in her father; Psychiatric Illness in her mother.       Problem list, medications, and allergies reviewed by myself today in Epic.     Objective:     /78   Pulse 67   Temp 36.4 °C (97.5 °F) (Temporal)   Resp 18   Ht 1.829 m (6')   Wt 108 kg (238 lb)   LMP  (LMP Unknown)   SpO2 94%   BMI 32.28 kg/m²     Physical Exam  Vitals and nursing note reviewed.   Constitutional:       General: She is not in acute distress.     Appearance: Normal appearance. She is not ill-appearing, toxic-appearing or diaphoretic.      Comments: This is a nontoxic-appearing adult in no apparent distress   HENT:      Nose: Nose normal.   Eyes:      Extraocular Movements: Extraocular movements intact.   Cardiovascular:      Rate and Rhythm: Normal rate.      Pulses: Normal pulses.      Heart sounds: Normal heart sounds.   Pulmonary:      Effort: Pulmonary effort is normal. No respiratory distress.      Breath sounds: Normal breath sounds.   Abdominal:      General: There is no distension.       Palpations: Abdomen is soft.      Tenderness: There is no abdominal tenderness. There is no right CVA tenderness, left CVA tenderness, guarding or rebound.      Hernia: No hernia is present.      Comments: No CVA tenderness  No suprapubic tenderness   Musculoskeletal:      Cervical back: No rigidity.   Neurological:      Mental Status: She is alert and oriented to person, place, and time.   Psychiatric:         Mood and Affect: Mood normal.         Behavior: Behavior is cooperative.       Lab Results   Component Value Date/Time    POCCOLOR Yellow 09/03/2023 04:19 PM    POCAPPEAR Cloudy 09/03/2023 04:19 PM    POCLEUKEST Moderate 09/03/2023 04:19 PM    POCNITRITE Negative 09/03/2023 04:19 PM    POCUROBILIGE 0.2 09/03/2023 04:19 PM    POCPROTEIN Negative 09/03/2023 04:19 PM    POCURPH 5.0 09/03/2023 04:19 PM    POCBLOOD Negative 09/03/2023 04:19 PM    POCSPGRV 1.025 09/03/2023 04:19 PM    POCKETONES Negative 09/03/2023 04:19 PM    POCBILIRUBIN Large 09/03/2023 04:19 PM    POCGLUCUA Negative 09/03/2023 04:19 PM        Assessment/Plan:     Diagnosis and Associated Orders:     1. Dysuria  - POCT Urinalysis  - cefdinir (OMNICEF) 300 MG Cap; Take 1 Capsule by mouth every 12 hours for 14 days.  Dispense: 28 Capsule; Refill: 0  - URINE CULTURE(NEW); Future    2. Cystitis  - cefdinir (OMNICEF) 300 MG Cap; Take 1 Capsule by mouth every 12 hours for 14 days.  Dispense: 28 Capsule; Refill: 0  - URINE CULTURE(NEW); Future        Comments/MDM:  Discussed with patient signs and symptoms are consistent with a simple urinary tract infection. They are overall very well-appearing with normal vital signs and benign examination findings.  Increase fluid intake, AZO per manufacturers instructions for burning urination.    Urine culture: will call back only if positive and if necessary change in therapy.     Advised to return to the Urgent Care or follow up with their PCP if symptoms are not improving in 2-3 days or sooner if any worsening  symptoms such as fever, chills, abdominal pain, back/flank pain, nausea, vomiting, or any other concerns.     I personally reviewed prior external notes and test results pertinent to today's visit. Supportive care, natural history, differential diagnoses, and indications for immediate follow-up discussed. Return to clinic or go to ED if symptoms worsen or persist.  Red flag symptoms discussed.  Patient/Parent/Guardian voices understanding. Follow-up with your primary care provider in 3-5 days.  All side effects of medication discussed including allergic response, GI upset, tendon injury, rash, sedation etc    Please note that this dictation was created using voice recognition software. I have made a reasonable attempt to correct obvious errors, but I expect that there are errors of grammar and possibly content that I did not discover before finalizing the note.    This note was electronically signed by Cassidy Mariscal PA-C

## 2023-09-07 NOTE — RESULT ENCOUNTER NOTE
Hi,     Your urine culture confirms infection which should respond to antibiotics prescribed at your appointment.  Please contact this office if symptoms continue after completion of the prescription.    Cassidy Mariscal PA-C

## 2023-09-19 ENCOUNTER — PATIENT MESSAGE (OUTPATIENT)
Dept: MEDICAL GROUP | Facility: LAB | Age: 63
End: 2023-09-19
Payer: COMMERCIAL

## 2023-09-19 DIAGNOSIS — N39.0 RECURRENT UTI: ICD-10-CM

## 2023-10-19 ENCOUNTER — OFFICE VISIT (OUTPATIENT)
Dept: MEDICAL GROUP | Facility: LAB | Age: 63
End: 2023-10-19
Payer: COMMERCIAL

## 2023-10-19 VITALS
DIASTOLIC BLOOD PRESSURE: 66 MMHG | TEMPERATURE: 97.9 F | OXYGEN SATURATION: 96 % | BODY MASS INDEX: 32.15 KG/M2 | SYSTOLIC BLOOD PRESSURE: 134 MMHG | WEIGHT: 237.4 LBS | RESPIRATION RATE: 16 BRPM | HEART RATE: 58 BPM | HEIGHT: 72 IN

## 2023-10-19 DIAGNOSIS — Z23 NEED FOR VACCINATION: ICD-10-CM

## 2023-10-19 DIAGNOSIS — K52.9 CHRONIC DIARRHEA: ICD-10-CM

## 2023-10-19 DIAGNOSIS — M54.17 LUMBOSACRAL RADICULOPATHY: ICD-10-CM

## 2023-10-19 DIAGNOSIS — K86.89 ATROPHIC PANCREAS: ICD-10-CM

## 2023-10-19 DIAGNOSIS — J45.40 MODERATE PERSISTENT ASTHMA WITHOUT COMPLICATION: ICD-10-CM

## 2023-10-19 DIAGNOSIS — K21.9 GASTROESOPHAGEAL REFLUX DISEASE, UNSPECIFIED WHETHER ESOPHAGITIS PRESENT: ICD-10-CM

## 2023-10-19 DIAGNOSIS — E78.01 FAMILIAL HYPERCHOLESTEROLEMIA: ICD-10-CM

## 2023-10-19 DIAGNOSIS — L40.50 PSORIATIC ARTHRITIS (HCC): ICD-10-CM

## 2023-10-19 DIAGNOSIS — N39.0 RECURRENT UTI: ICD-10-CM

## 2023-10-19 DIAGNOSIS — S09.90XA TRAUMATIC INJURY OF HEAD, INITIAL ENCOUNTER: ICD-10-CM

## 2023-10-19 DIAGNOSIS — R73.03 PREDIABETES: ICD-10-CM

## 2023-10-19 DIAGNOSIS — E66.9 OBESITY (BMI 30.0-34.9): ICD-10-CM

## 2023-10-19 DIAGNOSIS — F41.9 ANXIETY: ICD-10-CM

## 2023-10-19 DIAGNOSIS — M62.838 MUSCLE SPASM: ICD-10-CM

## 2023-10-19 PROCEDURE — 90471 IMMUNIZATION ADMIN: CPT | Performed by: FAMILY MEDICINE

## 2023-10-19 PROCEDURE — 99215 OFFICE O/P EST HI 40 MIN: CPT | Mod: 25 | Performed by: FAMILY MEDICINE

## 2023-10-19 PROCEDURE — 3075F SYST BP GE 130 - 139MM HG: CPT | Performed by: FAMILY MEDICINE

## 2023-10-19 PROCEDURE — 3078F DIAST BP <80 MM HG: CPT | Performed by: FAMILY MEDICINE

## 2023-10-19 PROCEDURE — 90686 IIV4 VACC NO PRSV 0.5 ML IM: CPT | Performed by: FAMILY MEDICINE

## 2023-10-19 RX ORDER — ESCITALOPRAM OXALATE 20 MG/1
20 TABLET ORAL EVERY MORNING
Qty: 90 TABLET | Refills: 3 | Status: SHIPPED | OUTPATIENT
Start: 2023-10-19

## 2023-10-19 RX ORDER — LAMOTRIGINE 100 MG/1
100 TABLET ORAL EVERY MORNING
Qty: 90 TABLET | Refills: 3 | Status: SHIPPED | OUTPATIENT
Start: 2023-10-19

## 2023-10-19 RX ORDER — PANCRELIPASE 15000; 3000; 9500 [USP'U]/1; [USP'U]/1; [USP'U]/1
1 CAPSULE, DELAYED RELEASE ORAL
Qty: 90 CAPSULE | Refills: 3 | Status: SHIPPED | OUTPATIENT
Start: 2023-10-19 | End: 2024-03-19

## 2023-10-19 RX ORDER — FAMOTIDINE 20 MG/1
20 TABLET, FILM COATED ORAL 2 TIMES DAILY
Qty: 180 TABLET | Refills: 3 | Status: SHIPPED | OUTPATIENT
Start: 2023-10-19

## 2023-10-19 RX ORDER — MONTELUKAST SODIUM 10 MG/1
10 TABLET ORAL DAILY
Qty: 90 TABLET | Refills: 3 | Status: SHIPPED | OUTPATIENT
Start: 2023-10-19

## 2023-10-19 RX ORDER — ROSUVASTATIN CALCIUM 10 MG/1
10 TABLET, COATED ORAL
Qty: 90 TABLET | Refills: 3 | Status: SHIPPED | OUTPATIENT
Start: 2023-10-19

## 2023-10-19 RX ORDER — TIZANIDINE 4 MG/1
4 TABLET ORAL EVERY 6 HOURS PRN
Qty: 30 TABLET | Refills: 3 | Status: SHIPPED | OUTPATIENT
Start: 2023-10-19 | End: 2023-11-18

## 2023-10-19 RX ORDER — METFORMIN HYDROCHLORIDE 500 MG/1
500 TABLET, EXTENDED RELEASE ORAL 2 TIMES DAILY
Qty: 180 TABLET | Refills: 3 | Status: ON HOLD | OUTPATIENT
Start: 2023-10-19 | End: 2024-03-24

## 2023-10-19 RX ORDER — ESCITALOPRAM OXALATE 10 MG/1
10 TABLET ORAL EVERY MORNING
Qty: 90 TABLET | Refills: 3 | Status: ON HOLD | OUTPATIENT
Start: 2023-10-19 | End: 2024-03-24

## 2023-10-19 RX ORDER — ESTRADIOL 0.1 MG/G
CREAM VAGINAL
Qty: 42.5 G | Refills: 3 | Status: SHIPPED | OUTPATIENT
Start: 2023-10-19 | End: 2023-12-27 | Stop reason: SDUPTHER

## 2023-10-19 RX ORDER — MAGNESIUM OXIDE 400 MG/1
400 TABLET ORAL EVERY MORNING
Qty: 90 TABLET | Refills: 3 | Status: SHIPPED | OUTPATIENT
Start: 2023-10-19

## 2023-10-19 RX ORDER — PREGABALIN 150 MG/1
150 CAPSULE ORAL 2 TIMES DAILY
Qty: 180 CAPSULE | Refills: 0 | Status: SHIPPED | OUTPATIENT
Start: 2023-10-19 | End: 2023-10-30 | Stop reason: SDUPTHER

## 2023-10-19 RX ORDER — OMEPRAZOLE 40 MG/1
40 CAPSULE, DELAYED RELEASE ORAL DAILY
Qty: 90 CAPSULE | Refills: 3 | Status: SHIPPED | OUTPATIENT
Start: 2023-10-19

## 2023-10-19 RX ORDER — FLUTICASONE PROPIONATE AND SALMETEROL 250; 50 UG/1; UG/1
1 POWDER RESPIRATORY (INHALATION) 2 TIMES DAILY
Qty: 60 EACH | Refills: 3 | Status: SHIPPED | OUTPATIENT
Start: 2023-10-19 | End: 2024-03-22

## 2023-10-19 RX ORDER — SULFAMETHOXAZOLE AND TRIMETHOPRIM 800; 160 MG/1; MG/1
TABLET ORAL
COMMUNITY
Start: 2023-09-17 | End: 2023-10-19

## 2023-10-19 RX ORDER — ETODOLAC 400 MG/1
400 TABLET, FILM COATED ORAL 2 TIMES DAILY
Qty: 180 TABLET | Refills: 3 | Status: SHIPPED | OUTPATIENT
Start: 2023-10-19

## 2023-10-19 ASSESSMENT — FIBROSIS 4 INDEX: FIB4 SCORE: 1.57

## 2023-10-19 NOTE — PROGRESS NOTES
Subjective:     CC: Multiple concerns    HPI:   Arely a 62-year-old female with a complex medical history including psoriatic arthritis, history of PCP pneumonia, chronic immunosuppression, ILD, and recent recurrent UTIs who presents with multiple concerns.    Has had 3+ culture positive UTIs over the last 2 to 3 months.  See below for available culture data.  Most recently treated in [place] while on vacation.  Prior to this had not had recent recurrent UTIs but does report some type of anatomic abnormality when a child, possible ureteral issue.    She also had a recent head trauma hiking on vacation.  Tripped hiking and hit her right frontal head.  Did have a loss of consciousness for 5 or 6 seconds and has had some persistent symptoms since then including daily right frontal headaches, mild dizziness, feeling unsteady and balance.  Cognition and thought processes have been slightly altered as well.  She did not seek care afterwards and no follow-up or imaging.  No numbness or weakness, no slurred speech.    He also has chronic diarrhea of unknown cause.  Had unremarkable work-up with gastroenterology including colonoscopy.  Of note she has had abdominal imaging which showed atrophic pancreas.    Medications, past medical history, allergies, and social history have been reviewed and updated.      Objective:       Exam:  /66 (BP Location: Right arm, Patient Position: Sitting, BP Cuff Size: Large adult)   Pulse (!) 58   Temp 36.6 °C (97.9 °F)   Resp 16   Ht 1.829 m (6')   Wt 108 kg (237 lb 6.4 oz)   LMP  (LMP Unknown)   SpO2 96%   BMI 32.20 kg/m²  Body mass index is 32.2 kg/m².    Constitutional: Alert. Well appearing. No distress.  Skin: Warm, dry, good turgor, no visible rashes.  ENMT: Moist mucous membranes. Normal dentition.  Respiratory: Normal effort. Lungs are clear to auscultation bilaterally.  Cardiovascular: Regular rate and rhythm. Normal S1/S2. No murmurs, rubs or gallops.   Abdomen: Soft  and nontender.  No CVA tenderness.  Neuro: Annual nerves II through XII are intact to exam.  Moves all 4 extremities.  Sensation is grossly intact to all 4 extremities.  Psych: Answers questions appropriately. Normal affect and mood.    Assessment & Plan:     62 y.o. female with the following -     1. Traumatic injury of head, initial encounter  Tripped while hiking with direct head trauma and brief LOC.  Continue daily headaches, mild dizziness and altered cognition.  No focal neurologic deficits.  Likely postconcussive syndrome but CT ordered to evaluate for subdural bleed.  Discussed slow return to activities for postconcussive syndrome to avoid prolonged symptoms.  - CT-HEAD W/O; Future    2. Recurrent UTI  3+ culture positive UTIs over the last 2 to 3 months.  See below for available culture data.  Most recently treated in [place] while on vacation.  Prior to this had not had recent recurrent UTIs but does report some type of anatomic abnormality when a child, possible ureteral issue.  Try estradiol for prevention.  We will also send referral for urology consultation evaluate for any structural issues.  - estradiol (ESTRACE) 0.1 MG/GM vaginal cream; Insert 2 g daily intravaginally for 2 weeks, followed by 1 g two-three times per week  Dispense: 42.5 g; Refill: 3    3. Atrophic pancreas  4. Chronic diarrhea  Chronic diarrhea with negative work-up including colonoscopy, atrophic pancreas noted on imaging.  Possible pancreatic insufficiency.  She will trial drug holiday from metformin and if not been improving start Creon.  - pancrelipase, Lip-Prot-Amyl, (CREON) 7772-4115 units Cap DR Particles; Take 1 Capsule by mouth 3 times a day with meals. Can increase to up to 3 capsules 3 times daily.  Dispense: 90 Capsule; Refill: 3    5. Muscle spasm  Continue tizanidine as needed.  - tizanidine (ZANAFLEX) 4 MG Tab; Take 1 Tablet by mouth every 6 hours as needed (muscle spasms) for up to 30 days.  Dispense: 30 Tablet;  Refill: 3    6. Psoriatic arthritis (HCC)  Stable, follows with rheumatology.  Continue to etodolac.  - etodolac (LODINE) 400 MG tablet; Take 1 Tablet by mouth 2 times a day.  Dispense: 180 Tablet; Refill: 3    7. Gastroesophageal reflux disease, unspecified whether esophagitis present  Stable, continue current medications.  - omeprazole (PRILOSEC) 40 MG delayed-release capsule; Take 1 Capsule by mouth every day.  Dispense: 90 Capsule; Refill: 3  - famotidine (PEPCID) 20 MG Tab; Take 1 Tablet by mouth 2 times a day. Indications: Gastroesophageal Reflux Disease  Dispense: 180 Tablet; Refill: 3    8. Anxiety  Stable, continue Lexapro.  - escitalopram (LEXAPRO) 20 MG tablet; Take 1 Tablet by mouth every morning. Take with escitalopram 10 mg for a total dose of 30 mg.  Dispense: 90 Tablet; Refill: 3  - lamoTRIgine (LAMICTAL) 100 MG Tab; Take 1 Tablet by mouth every morning.  Dispense: 90 Tablet; Refill: 3  - escitalopram (LEXAPRO) 10 MG Tab; Take 1 Tablet by mouth every morning. Take with escitalopram 20 mg for a total dose of 30 mg.  Dispense: 90 Tablet; Refill: 3    9. Familial hypercholesterolemia  Continue Crestor.  - rosuvastatin (CRESTOR) 10 MG Tab; Take 1 Tablet by mouth at bedtime.  Dispense: 90 Tablet; Refill: 3    10. Moderate persistent asthma without complication  Continue Advair and Singulair.  - montelukast (SINGULAIR) 10 MG Tab; Take 1 Tablet by mouth every day.  Dispense: 90 Tablet; Refill: 3  - fluticasone-salmeterol (ADVAIR) 250-50 MCG/ACT AEROSOL POWDER, BREATH ACTIVATED; Inhale 1 Puff 2 times a day.  Dispense: 60 Each; Refill: 3    11. Lumbosacral radiculopathy  Improved, continue Lyrica.  - pregabalin (LYRICA) 150 MG Cap; Take 1 Capsule by mouth 2 times a day for 90 days.  Dispense: 180 Capsule; Refill: 0    12. Need for vaccination  - INFLUENZA VACCINE QUAD INJ (PF)    13. Prediabetes  14. Obesity (BMI 30.0-34.9)  Stop metformin and monitor for improvement in diarrhea as above.  If no improvement  will restart.  - metFORMIN ER (GLUCOPHAGE XR) 500 MG TABLET SR 24 HR; Take 1 Tablet by mouth 2 times a day.  Dispense: 180 Tablet; Refill: 3  - metFORMIN ER (GLUCOPHAGE XR) 500 MG TABLET SR 24 HR; Take 1 Tablet by mouth 2 times a day.  Dispense: 180 Tablet; Refill: 3    My total time spent caring for the patient on the day of the encounter was >40 minutes.   This does not include time spent on separately billable procedures/tests.       Please note that this note was created using voice recognition software.

## 2023-10-22 ENCOUNTER — HOSPITAL ENCOUNTER (OUTPATIENT)
Dept: RADIOLOGY | Facility: MEDICAL CENTER | Age: 63
End: 2023-10-22
Attending: FAMILY MEDICINE
Payer: COMMERCIAL

## 2023-10-22 DIAGNOSIS — S09.90XA TRAUMATIC INJURY OF HEAD, INITIAL ENCOUNTER: ICD-10-CM

## 2023-10-22 PROCEDURE — 70450 CT HEAD/BRAIN W/O DYE: CPT

## 2023-10-30 DIAGNOSIS — M54.17 LUMBOSACRAL RADICULOPATHY: ICD-10-CM

## 2023-10-30 RX ORDER — PREGABALIN 150 MG/1
150 CAPSULE ORAL 2 TIMES DAILY
Qty: 180 CAPSULE | Refills: 0 | Status: SHIPPED | OUTPATIENT
Start: 2023-10-30 | End: 2023-12-27

## 2023-11-03 ENCOUNTER — HOSPITAL ENCOUNTER (OUTPATIENT)
Dept: RADIOLOGY | Facility: MEDICAL CENTER | Age: 63
End: 2023-11-03
Attending: NURSE PRACTITIONER
Payer: COMMERCIAL

## 2023-11-03 ENCOUNTER — HOSPITAL ENCOUNTER (OUTPATIENT)
Facility: MEDICAL CENTER | Age: 63
End: 2023-11-03
Attending: NURSE PRACTITIONER
Payer: COMMERCIAL

## 2023-11-03 ENCOUNTER — OFFICE VISIT (OUTPATIENT)
Dept: MEDICAL GROUP | Facility: LAB | Age: 63
End: 2023-11-03
Payer: COMMERCIAL

## 2023-11-03 ENCOUNTER — HOSPITAL ENCOUNTER (OUTPATIENT)
Dept: LAB | Facility: MEDICAL CENTER | Age: 63
End: 2023-11-03
Attending: STUDENT IN AN ORGANIZED HEALTH CARE EDUCATION/TRAINING PROGRAM
Payer: COMMERCIAL

## 2023-11-03 ENCOUNTER — OFFICE VISIT (OUTPATIENT)
Dept: URGENT CARE | Facility: CLINIC | Age: 63
End: 2023-11-03
Payer: COMMERCIAL

## 2023-11-03 VITALS
HEIGHT: 72 IN | BODY MASS INDEX: 31.97 KG/M2 | DIASTOLIC BLOOD PRESSURE: 68 MMHG | OXYGEN SATURATION: 98 % | SYSTOLIC BLOOD PRESSURE: 132 MMHG | WEIGHT: 236 LBS | RESPIRATION RATE: 16 BRPM | HEART RATE: 54 BPM | TEMPERATURE: 97.9 F

## 2023-11-03 VITALS
TEMPERATURE: 96.8 F | OXYGEN SATURATION: 97 % | DIASTOLIC BLOOD PRESSURE: 80 MMHG | WEIGHT: 237 LBS | BODY MASS INDEX: 32.1 KG/M2 | SYSTOLIC BLOOD PRESSURE: 130 MMHG | HEART RATE: 52 BPM | HEIGHT: 72 IN | RESPIRATION RATE: 16 BRPM

## 2023-11-03 DIAGNOSIS — R10.9 ABDOMINAL PAIN, UNSPECIFIED ABDOMINAL LOCATION: ICD-10-CM

## 2023-11-03 DIAGNOSIS — Z87.19 HISTORY OF DIVERTICULITIS: ICD-10-CM

## 2023-11-03 DIAGNOSIS — R10.30 LOWER ABDOMINAL PAIN: ICD-10-CM

## 2023-11-03 LAB
APPEARANCE UR: CLEAR
BILIRUB UR STRIP-MCNC: NORMAL MG/DL
COLOR UR AUTO: YELLOW
CREAT SERPL-MCNC: 0.61 MG/DL (ref 0.5–1.4)
GFR SERPLBLD CREATININE-BSD FMLA CKD-EPI: 100 ML/MIN/1.73 M 2
GLUCOSE UR STRIP.AUTO-MCNC: NEGATIVE MG/DL
KETONES UR STRIP.AUTO-MCNC: NEGATIVE MG/DL
LEUKOCYTE ESTERASE UR QL STRIP.AUTO: NORMAL
NITRITE UR QL STRIP.AUTO: NEGATIVE
PH UR STRIP.AUTO: 6 [PH] (ref 5–8)
PROT UR QL STRIP: NEGATIVE MG/DL
RBC UR QL AUTO: NEGATIVE
SP GR UR STRIP.AUTO: 1.01
UROBILINOGEN UR STRIP-MCNC: 0.2 MG/DL

## 2023-11-03 PROCEDURE — 3075F SYST BP GE 130 - 139MM HG: CPT | Performed by: FAMILY MEDICINE

## 2023-11-03 PROCEDURE — 87086 URINE CULTURE/COLONY COUNT: CPT

## 2023-11-03 PROCEDURE — 99214 OFFICE O/P EST MOD 30 MIN: CPT | Performed by: NURSE PRACTITIONER

## 2023-11-03 PROCEDURE — 3075F SYST BP GE 130 - 139MM HG: CPT | Performed by: NURSE PRACTITIONER

## 2023-11-03 PROCEDURE — 81002 URINALYSIS NONAUTO W/O SCOPE: CPT | Performed by: NURSE PRACTITIONER

## 2023-11-03 PROCEDURE — 3078F DIAST BP <80 MM HG: CPT | Performed by: FAMILY MEDICINE

## 2023-11-03 PROCEDURE — 99214 OFFICE O/P EST MOD 30 MIN: CPT | Performed by: FAMILY MEDICINE

## 2023-11-03 PROCEDURE — 3079F DIAST BP 80-89 MM HG: CPT | Performed by: NURSE PRACTITIONER

## 2023-11-03 PROCEDURE — 82565 ASSAY OF CREATININE: CPT

## 2023-11-03 PROCEDURE — 700117 HCHG RX CONTRAST REV CODE 255: Performed by: FAMILY MEDICINE

## 2023-11-03 PROCEDURE — 36415 COLL VENOUS BLD VENIPUNCTURE: CPT

## 2023-11-03 PROCEDURE — 74177 CT ABD & PELVIS W/CONTRAST: CPT

## 2023-11-03 RX ORDER — AMOXICILLIN AND CLAVULANATE POTASSIUM 875; 125 MG/1; MG/1
1 TABLET, FILM COATED ORAL 2 TIMES DAILY
Qty: 20 TABLET | Refills: 0 | Status: SHIPPED | OUTPATIENT
Start: 2023-11-03 | End: 2023-11-04

## 2023-11-03 RX ADMIN — IOHEXOL 100 ML: 350 INJECTION, SOLUTION INTRAVENOUS at 14:37

## 2023-11-03 ASSESSMENT — FIBROSIS 4 INDEX
FIB4 SCORE: 1.57
FIB4 SCORE: 1.57

## 2023-11-03 NOTE — PROGRESS NOTES
Subjective:     CC: Abdominal pain, concern for diverticulitis    HPI:   Arely presents today with concern for abdominal pain and possible diverticulitis.  She has a history of diverticulitis and had intermittent sharp lower abdominal pain began about 6 days ago.  She switched to clear liquids for a few days and did see improvement but then 3 days ago had some solids again with resulting worsening pain and watery diarrhea.  She did recently started on Creon to treat possible atrophic pancreatitis with chronic diarrhea.  No fevers, no vomiting.  She was seen for these complaints in urgent care this morning -work-up included normal UA.  She does have a abdominal pelvis CT with oral and IV contrast scheduled for this afternoon but she is worried she will be able to complete it as lab was not able to draw her renal function until later this morning and she preferred to do this without oral contrast if possible.    Reports mild dysuria but clear UA this morning as above.  No change in vaginal bleeding or discharge.  He does have a history of a hysterectomy as well as a bilateral oophorectomy.  Has also had appendectomy.    Medications, past medical history, allergies, and social history have been reviewed and updated.      Objective:       Exam:  /68 (BP Location: Right arm, Patient Position: Sitting, BP Cuff Size: Large adult)   Pulse (!) 54   Temp 36.6 °C (97.9 °F)   Resp 16   Ht 1.829 m (6')   Wt 107 kg (236 lb)   LMP  (LMP Unknown)   SpO2 98%   BMI 32.01 kg/m²  Body mass index is 32.01 kg/m².    Constitutional: Alert. Well appearing. No distress.  Skin: Warm, dry, good turgor, no visible rashes.  ENMT: Moist mucous membranes. Normal dentition.  Cardiovascular: Regular rate and rhythm. Normal S1/S2. No murmurs, rubs or gallops.   Abdomen: Positive bowel sounds.  Mild left lower quadrant tenderness without rebound or guarding.  Nondistended, no organomegaly.  Neuro: Moves all four extremities. No facial  droop.  Psych: Answers questions appropriately. Normal affect and mood.        Assessment & Plan:     62 y.o. female with the following -     1. History of diverticulitis  2. Lower abdominal pain  History of diverticulitis, chronic diarrhea and atrophic pancreatitis.  Sharp lower abdominal pain began 6 days ago and did improve with clear liquid diet but then worsened again after reintroducing foods.  She was seen in urgent care this morning with clear UA, has labs and abdominal/pelvic CT pending this afternoon.  She is wondering whether it would be worth to just start antibiotics or continue with CT scan, also would prefer to avoid oral contrast with CT scan.    Her abdominal exam is relatively benign today, there is mild left lower quadrant tenderness but no peritoneal signs, rebound or guarding.  Vitals normal and no reported fevers.  I do think diverticulitis is possible, and may have partially improved with clear liquid diet on its own.  I discussed that I did think it was reasonable to skip the CT scan and monitor for improvement with plan to start Augmentin if worsening.  However, we did discuss that if things were to worsen over the weekend that it may be difficult to get a CT scan done without an ER visit.  She would prefer to go ahead and get CT scan done this afternoon.  Have switched the order to IV contrast only.  We will plan to start previously prescribed Augmentin if CT scan is consistent with colitis or diverticulitis.    - CT-ABDOMEN-PELVIS WITH; Future    Please note that this note was created using voice recognition software.

## 2023-11-04 ENCOUNTER — HOSPITAL ENCOUNTER (OUTPATIENT)
Facility: MEDICAL CENTER | Age: 63
End: 2023-11-04
Attending: STUDENT IN AN ORGANIZED HEALTH CARE EDUCATION/TRAINING PROGRAM
Payer: COMMERCIAL

## 2023-11-04 ENCOUNTER — OFFICE VISIT (OUTPATIENT)
Dept: URGENT CARE | Facility: CLINIC | Age: 63
End: 2023-11-04
Payer: COMMERCIAL

## 2023-11-04 VITALS
SYSTOLIC BLOOD PRESSURE: 112 MMHG | WEIGHT: 236 LBS | RESPIRATION RATE: 14 BRPM | BODY MASS INDEX: 31.97 KG/M2 | OXYGEN SATURATION: 97 % | HEIGHT: 72 IN | HEART RATE: 58 BPM | DIASTOLIC BLOOD PRESSURE: 72 MMHG | TEMPERATURE: 97.6 F

## 2023-11-04 DIAGNOSIS — R39.9 LOWER URINARY TRACT SYMPTOMS (LUTS): ICD-10-CM

## 2023-11-04 LAB
APPEARANCE UR: NORMAL
BILIRUB UR STRIP-MCNC: NORMAL MG/DL
COLOR UR AUTO: NORMAL
GLUCOSE UR STRIP.AUTO-MCNC: NEGATIVE MG/DL
KETONES UR STRIP.AUTO-MCNC: NORMAL MG/DL
LEUKOCYTE ESTERASE UR QL STRIP.AUTO: NORMAL
NITRITE UR QL STRIP.AUTO: NEGATIVE
PH UR STRIP.AUTO: 5.5 [PH] (ref 5–8)
PROT UR QL STRIP: NORMAL MG/DL
RBC UR QL AUTO: NORMAL
SP GR UR STRIP.AUTO: 1.03
UROBILINOGEN UR STRIP-MCNC: 0.2 MG/DL

## 2023-11-04 PROCEDURE — 3074F SYST BP LT 130 MM HG: CPT | Performed by: STUDENT IN AN ORGANIZED HEALTH CARE EDUCATION/TRAINING PROGRAM

## 2023-11-04 PROCEDURE — 87186 SC STD MICRODIL/AGAR DIL: CPT

## 2023-11-04 PROCEDURE — 81002 URINALYSIS NONAUTO W/O SCOPE: CPT | Performed by: STUDENT IN AN ORGANIZED HEALTH CARE EDUCATION/TRAINING PROGRAM

## 2023-11-04 PROCEDURE — 99214 OFFICE O/P EST MOD 30 MIN: CPT | Performed by: STUDENT IN AN ORGANIZED HEALTH CARE EDUCATION/TRAINING PROGRAM

## 2023-11-04 PROCEDURE — 87077 CULTURE AEROBIC IDENTIFY: CPT

## 2023-11-04 PROCEDURE — 87086 URINE CULTURE/COLONY COUNT: CPT

## 2023-11-04 PROCEDURE — 3078F DIAST BP <80 MM HG: CPT | Performed by: STUDENT IN AN ORGANIZED HEALTH CARE EDUCATION/TRAINING PROGRAM

## 2023-11-04 RX ORDER — CEFDINIR 300 MG/1
300 CAPSULE ORAL 2 TIMES DAILY
Qty: 20 CAPSULE | Refills: 0 | Status: SHIPPED | OUTPATIENT
Start: 2023-11-04 | End: 2023-11-14

## 2023-11-04 ASSESSMENT — FIBROSIS 4 INDEX: FIB4 SCORE: 1.57

## 2023-11-04 NOTE — PROGRESS NOTES
Subjective:   Arely Haynes is a 62 y.o. female who presents for UTI (Frequency, burning, pressure x yesterday )      HPI:  62-year-old female presents to the urgent care for reevaluation of suspected urinary tract infection.  Reports worsening increased urinary frequency, urinary urgency, and burning with urination for the past 24 hours.  Seen in urgent care yesterday for lower abdominal discomfort.  Had extensive work-up including CT abdomen pelvis which showed no acute findings.  Normal creatinine and GFR.  Urinalysis at that time showed trace leukocyte esterase but no significant signs of infection.  Urine culture was sent to the lab yesterday.  Patient was also seen by her PCP yesterday.  She returns because her symptoms appear to be localizing to the urinary tract system.  She does still feel some pressure sensation to the suprapubic region but no sharp or worsening abdominal pain.  No vaginal discharge, hematuria, vaginal bleeding, vomiting, chest pain, shortness of breath, reflux.  She has been dealing with intermittent diarrhea.  She does have a history of diverticulitis although CT yesterday did not demonstrate any acute diverticulitis.  No blood in her stool or melena.      Medications:    cefdinir Caps  Creon Cpep  EPINEPHrine Sosy  escitalopram  escitalopram Tabs  estradiol  etodolac  famotidine Tabs  fluticasone-salmeterol Aepb  lamoTRIgine Tabs  magnesium oxide Tabs  metFORMIN ER Tb24  montelukast Tabs  omeprazole  pregabalin Caps  rosuvastatin Tabs  tizanidine Tabs    Allergies: Bee venom, Iodine, Levofloxacin, and Shellfish-derived products    Problem List: Arely Haynes does not have any pertinent problems on file.    Surgical History:  Past Surgical History:   Procedure Laterality Date    VA INJECTION,SACROILIAC JOINT Left 7/28/2023    Procedure: LEFT sacroiliac joint injection;  Surgeon: Elvis Aleman M.D.;  [facility];  Service: Pain Management    VA INJ  LUMBAR/SACRAL,W/ IMAGING Right 6/2/2023    Procedure: RIGHT L5-S1 interlaminar epidural steroid injection. Patient has tolerated gadolinium;  Surgeon: Elvis Aleman M.D.;  [facility];  Service: Pain Management    WA BRONCHOSCOPY,DIAGNOSTIC  7/9/2021    Procedure: BRONCHOSCOPY;  Surgeon: Myles Vidal M.D.;  Location: SUR[facility];  Service: Ent    TENDON REPAIR Left 9/19/2019    Procedure: REPAIR, TENDON- QUADRICEPS RUTURE REPAIR AND MEYERS;  Surgeon: Jayden Velázquez M.D.;  Location: SURGERY [facility];  Service: Orthopedics    HYSTERECTOMY ROBOTIC XI N/A 7/11/2019    Procedure: HYSTERECTOMY, ROBOT-ASSISTED, USING DA SABINO XI;  Surgeon: Curly Bernal M.D.;  Location: SURGERY [facility];  Service: Gynecology    SALPINGO OOPHORECTOMY Bilateral 7/11/2019    Procedure: SALPINGO-OOPHORECTOMY;  Surgeon: Curly Bernal M.D.;  Location: SURGERY [facility];  Service: Gynecology    GYN SURGERY  1999    c sec    CYSTECTOMY      FUSION, SPINE, LUMBAR, PLIF      HIP REPLACEMENT, TOTAL      Right    OTHER ABDOMINAL SURGERY      appendix    OTHER ORTHOPEDIC SURGERY      left knee    OTHER ORTHOPEDIC SURGERY      right rotator cuff       Past Social Hx: Arely Haynes  reports that she has never smoked. She has never used smokeless tobacco. She reports current alcohol use of about 0.6 - 1.2 oz of alcohol per week. She reports that she does not use drugs.     Past Family Hx:  Arely Haynes family history includes Arthritis in her mother; Cancer in her father and sister; Heart Attack in her mother; Heart Disease in her mother and paternal grandfather; Hyperlipidemia in her father, mother, and sister; Lung Disease in her father; Psychiatric Illness in her mother.     Problem list, medications, and allergies reviewed by myself today in Epic.     Objective:     /72 (BP Location: Left arm, Patient Position: Sitting, BP Cuff Size: Adult)   Pulse (!) 58   Temp 36.4 °C (97.6  °F) (Temporal)   Resp 14   Ht 1.829 m (6')   Wt 107 kg (236 lb)   LMP  (LMP Unknown)   SpO2 97%   BMI 32.01 kg/m²     Physical Exam  Vitals reviewed.   Constitutional:       Appearance: Normal appearance.   Eyes:      Conjunctiva/sclera: Conjunctivae normal.   Cardiovascular:      Rate and Rhythm: Normal rate.      Pulses: Normal pulses.      Heart sounds: No murmur heard.  Pulmonary:      Effort: Pulmonary effort is normal. No respiratory distress.      Breath sounds: Normal breath sounds. No stridor. No wheezing, rhonchi or rales.   Abdominal:      General: Bowel sounds are normal. There is no distension.      Palpations: Abdomen is soft.      Tenderness: There is no abdominal tenderness. There is no right CVA tenderness, left CVA tenderness, guarding or rebound. Negative signs include Bella's sign, Rovsing's sign and McBurney's sign.   Neurological:      Mental Status: She is alert and oriented to person, place, and time.         Lab Results/POC Test Results   Results for orders placed or performed in visit on 11/04/23   POCT Urinalysis   Result Value Ref Range    POC Color DARK YELLOW Negative    POC Appearance CLOUDY Negative    POC Glucose NEGATIVE Negative mg/dL    POC Bilirubin LARGE Negative mg/dL    POC Ketones 15 MG/DL Negative mg/dL    POC Specific Gravity 1.030 <1.005 - >1.030    POC Blood TRACE-INTACT Negative    POC Urine PH 5.5 5.0 - 8.0    POC Protein 100 MG/DL Negative mg/dL    POC Urobiligen 0.2 Negative (0.2) mg/dL    POC Nitrites NEGATIVE Negative    POC Leukocyte Esterase SMALL Negative           Assessment/Plan:     Diagnosis and associated orders:     1. Lower urinary tract symptoms (LUTS)  POCT Urinalysis    URINE CULTURE(NEW)    cefdinir (OMNICEF) 300 MG Cap         Comments/MDM:     POCT urinalysis shows small leukocyte esterase and trace intact blood.  Symptomology consistent with acute cystitis.  Discussed empiric treatment versus waiting for culture results.  Patient did have a  culture conducted yesterday.  Patient would like to pursue antibiotics at this time and is agreeable to the risks of this medication without urine culture to fully diagnose.  Patient was started on cefdinir.  Patient and patient's  are requesting new urine culture from today's urine sample.  Additional urine culture sent.  She was contacted with any positive result that requires a change in treatment.  Abdominal exam today is benign and shows no signs of acute abdomen.  No CVA tenderness bilaterally.  Do not suspect pyelonephritis at this time.  No tenderness, guarding, rebound, or rigidity.  Vitals are all stable.  Strict ED/return precautions given.         Differential diagnosis, natural history, supportive care, and indications for immediate follow-up discussed.    Advised the patient to follow-up with the primary care physician for recheck, reevaluation, and consideration of further management.    Please note that this dictation was created using voice recognition software. I have made a reasonable attempt to correct obvious errors, but I expect that there are errors of grammar and possibly content that I did not discover before finalizing the note.    Electronically signed by Reese Rushing PA-C.

## 2023-11-06 ENCOUNTER — OFFICE VISIT (OUTPATIENT)
Dept: URGENT CARE | Facility: CLINIC | Age: 63
End: 2023-11-06
Payer: COMMERCIAL

## 2023-11-06 VITALS
TEMPERATURE: 98.5 F | BODY MASS INDEX: 31.97 KG/M2 | HEART RATE: 64 BPM | SYSTOLIC BLOOD PRESSURE: 118 MMHG | HEIGHT: 72 IN | OXYGEN SATURATION: 96 % | WEIGHT: 236 LBS | DIASTOLIC BLOOD PRESSURE: 66 MMHG | RESPIRATION RATE: 16 BRPM

## 2023-11-06 DIAGNOSIS — R39.9 UTI SYMPTOMS: ICD-10-CM

## 2023-11-06 DIAGNOSIS — N30.01 ACUTE CYSTITIS WITH HEMATURIA: ICD-10-CM

## 2023-11-06 LAB
APPEARANCE UR: NORMAL
BACTERIA UR CULT: ABNORMAL
BACTERIA UR CULT: ABNORMAL
BACTERIA UR CULT: NORMAL
BILIRUB UR STRIP-MCNC: NORMAL MG/DL
COLOR UR AUTO: NORMAL
GLUCOSE UR STRIP.AUTO-MCNC: 100 MG/DL
KETONES UR STRIP.AUTO-MCNC: NORMAL MG/DL
LEUKOCYTE ESTERASE UR QL STRIP.AUTO: NORMAL
NITRITE UR QL STRIP.AUTO: POSITIVE
PH UR STRIP.AUTO: 5 [PH] (ref 5–8)
PROT UR QL STRIP: 100 MG/DL
RBC UR QL AUTO: NORMAL
SIGNIFICANT IND 70042: ABNORMAL
SIGNIFICANT IND 70042: NORMAL
SITE SITE: ABNORMAL
SITE SITE: NORMAL
SOURCE SOURCE: ABNORMAL
SOURCE SOURCE: NORMAL
SP GR UR STRIP.AUTO: 1
UROBILINOGEN UR STRIP-MCNC: 2 MG/DL

## 2023-11-06 PROCEDURE — 81002 URINALYSIS NONAUTO W/O SCOPE: CPT | Performed by: FAMILY MEDICINE

## 2023-11-06 PROCEDURE — 3074F SYST BP LT 130 MM HG: CPT | Performed by: FAMILY MEDICINE

## 2023-11-06 PROCEDURE — 99213 OFFICE O/P EST LOW 20 MIN: CPT | Performed by: FAMILY MEDICINE

## 2023-11-06 PROCEDURE — 3078F DIAST BP <80 MM HG: CPT | Performed by: FAMILY MEDICINE

## 2023-11-06 RX ORDER — NITROFURANTOIN 25; 75 MG/1; MG/1
100 CAPSULE ORAL EVERY 12 HOURS
Qty: 10 CAPSULE | Refills: 0 | Status: SHIPPED | OUTPATIENT
Start: 2023-11-06 | End: 2023-11-11

## 2023-11-06 ASSESSMENT — FIBROSIS 4 INDEX: FIB4 SCORE: 1.57

## 2023-11-06 NOTE — PROGRESS NOTES
Subjective:      62 y.o. female presents to urgent care for concerns of a bladder infection.  Since last week she has been experiencing increased urinary urgency, frequency, and dysuria.  No associated hematuria.  She was seen in urgent care 11/4/2023 for the symptoms and was given a prescription for cefdinir.  Urine culture from that same visit grows ESBL.  Bowel movements are regular and soft.  She drinks an average of 40 oz of water and 2 caffeinated beverages.  She is currently sexually active with one, male partner and she has had a total hysterectomy for contraception.    She denies any other questions or concerns at this time.    Current problem list, medication, and past medical/surgical history were reviewed in Epic.    ROS  See HPI     Objective:      /66   Pulse 64   Temp 36.9 °C (98.5 °F) (Temporal)   Resp 16   Ht 1.829 m (6')   Wt 107 kg (236 lb)   LMP  (LMP Unknown)   SpO2 96%   BMI 32.01 kg/m²     Physical Exam  Constitutional:       General: She is not in acute distress.     Appearance: She is not diaphoretic.   Cardiovascular:      Rate and Rhythm: Normal rate and regular rhythm.      Heart sounds: Normal heart sounds.   Pulmonary:      Effort: Pulmonary effort is normal. No respiratory distress.      Breath sounds: Normal breath sounds.   Abdominal:      General: Bowel sounds are normal.      Palpations: Abdomen is soft.      Tenderness: There is no abdominal tenderness. There is no right CVA tenderness or left CVA tenderness.   Neurological:      Mental Status: She is alert.   Psychiatric:         Mood and Affect: Affect normal.         Judgment: Judgment normal.       Assessment/Plan:     1. Acute cystitis with hematuria  2. UTI symptoms  Urinalysis continues to indicate infection.  Urine culture from 11/4/2023 was reviewed, it is sensitive to nitrofurantoin, therefore prescription has been sent.  She was also encouraged to increase her water intake to 64 ounces daily.  May use  d-mannose as needed for recurrent bladder infections.  - POCT Urinalysis  - nitrofurantoin (MACROBID) 100 MG Cap; Take 1 Capsule by mouth every 12 hours for 5 days.  Dispense: 10 Capsule; Refill: 0      Instructed to return to Urgent Care or nearest Emergency Department if symptoms fail to improve, for any change in condition, further concerns, or new concerning symptoms. Patient states understanding of the plan of care and discharge instructions.    Sabina Ott M.D.

## 2023-11-14 NOTE — ED TRIAGE NOTES
Chief Complaint   Patient presents with    Shortness of Breath     Reports worsening sob since Thursday     BP (!) 150/77   Pulse 84   Temp 36.7 °C (98 °F) (Temporal)   Resp 18   Ht 1.829 m (6')   Wt 105 kg (231 lb 7.7 oz)   LMP  (LMP Unknown)   SpO2 96%   BMI 31.39 kg/m²      warm and dry/color normal/normal/no rashes/no ulcers warm and dry/color normal/no rashes/no ulcers

## 2023-12-27 ENCOUNTER — OFFICE VISIT (OUTPATIENT)
Dept: MEDICAL GROUP | Facility: LAB | Age: 63
End: 2023-12-27
Payer: COMMERCIAL

## 2023-12-27 VITALS
TEMPERATURE: 97.1 F | WEIGHT: 239 LBS | SYSTOLIC BLOOD PRESSURE: 120 MMHG | DIASTOLIC BLOOD PRESSURE: 62 MMHG | OXYGEN SATURATION: 95 % | HEART RATE: 53 BPM | BODY MASS INDEX: 32.37 KG/M2 | RESPIRATION RATE: 16 BRPM | HEIGHT: 72 IN

## 2023-12-27 DIAGNOSIS — G60.9 IDIOPATHIC NEUROPATHY: ICD-10-CM

## 2023-12-27 DIAGNOSIS — M54.17 LUMBOSACRAL RADICULOPATHY: ICD-10-CM

## 2023-12-27 DIAGNOSIS — N39.0 RECURRENT UTI: ICD-10-CM

## 2023-12-27 PROCEDURE — 3078F DIAST BP <80 MM HG: CPT | Performed by: FAMILY MEDICINE

## 2023-12-27 PROCEDURE — 3074F SYST BP LT 130 MM HG: CPT | Performed by: FAMILY MEDICINE

## 2023-12-27 PROCEDURE — 99214 OFFICE O/P EST MOD 30 MIN: CPT | Performed by: FAMILY MEDICINE

## 2023-12-27 RX ORDER — ESTRADIOL 0.1 MG/G
CREAM VAGINAL
Qty: 42.5 G | Refills: 3 | Status: SHIPPED | OUTPATIENT
Start: 2023-12-27 | End: 2024-01-25 | Stop reason: SDUPTHER

## 2023-12-27 RX ORDER — PREGABALIN 200 MG/1
200 CAPSULE ORAL 2 TIMES DAILY
Qty: 180 CAPSULE | Refills: 0 | Status: SHIPPED | OUTPATIENT
Start: 2023-12-27 | End: 2024-01-28 | Stop reason: SDUPTHER

## 2023-12-27 ASSESSMENT — FIBROSIS 4 INDEX: FIB4 SCORE: 1.59

## 2023-12-27 NOTE — PROGRESS NOTES
Subjective:     CC: Worsening neuropathy    HPI:   Kaila is a 62-year-old female with a complex medical history including psoriatic arthritis, history of PCP pneumonia, chronic immunosuppression, ILD, and chronic neuropathy who presents to follow-up on worsening neuropathy symptoms.  She is getting worsening paresthesias in both feet (feet feel like they are in sand) As well as persistent weakness in the right arm and right hand.  She has previously had extensive workup or treatment for this with multiple specialist including physiatry, neurology.  She received some of her specialist care at [facility] and had seen neurology there with plan to see neurosurgery but they had recommended did last year that she hold off on neurosurgical opinion at that time.  Does continue on Lyrica 150 mg twice daily.  Recent episode where she did step off of a short ladder due to weakness and also dropped a glass due to weakness in the right hand.    EMG April 2023  Interpretation: This is an abnormal study.  There is electrodiagnostic evidence of chronic reinnervation in multiple muscles on the right. This could suggest chronic radiculopathies from L3-S1 on the right.      Medications, past medical history, allergies, and social history have been reviewed and updated.      Objective:       Exam:  /62   Pulse (!) 53   Temp 36.2 °C (97.1 °F) (Temporal)   Resp 16   Ht 1.829 m (6')   Wt 108 kg (239 lb)   LMP  (LMP Unknown)   SpO2 95%   BMI 32.41 kg/m²  Body mass index is 32.41 kg/m².    Constitutional: Alert. Well appearing. No distress.  Skin: Warm, dry, good turgor, no visible rashes.  ENMT: Moist mucous membranes. Normal dentition.  Neuro:  strength is decreased in the right hand as compared to the left.  There is decreased sensation to monofilament testing to both lower extremities up to the mid shin, right worse than left.  4/5 strength in the right leg as compared to 5/5 on the left with flexion at the hip  as well as flexing/extension at the knee and dorsiflexion of the feet.  Psych: Answers questions appropriately. Normal affect and mood.      Assessment & Plan:     63 y.o. female with the following -     1. Lumbosacral radiculopathy  Persistent but worsening, continued weakness of the right leg.  She has had multiple treatments for this including injections with physiatry and previous lumbar fusion.  Workup has included MRI and EMG in April of last year which did suggest chronic radiculopathies from L3-S1 on the right.  Needs updated referral for neurosurgical evaluation at [facility] and this is sent.  Try higher dose of Lyrica 200 mg twice daily.  - pregabalin (LYRICA) 200 MG capsule; Take 1 Capsule by mouth 2 times a day for 90 days.  Dispense: 180 Capsule; Refill: 0  - Referral to Neurosurgery    2. Idiopathic neuropathy  Worsening symptoms to all extremities.  Had previously had extensive workup with neurology including labs and EMG.  She does plan another visit with neurology at the [facility].  Increasing Lyrica as above.    3. Recurrent UTI  Continue estradiol for prevention.  - estradiol (ESTRACE) 0.1 MG/GM vaginal cream; Insert 2 g daily intravaginally for 2 weeks, followed by 1 g two-three times per week  Dispense: 42.5 g; Refill: 3      Please note that this note was created using voice recognition software.

## 2024-01-25 ENCOUNTER — OFFICE VISIT (OUTPATIENT)
Dept: URGENT CARE | Facility: CLINIC | Age: 64
End: 2024-01-25
Payer: COMMERCIAL

## 2024-01-25 ENCOUNTER — HOSPITAL ENCOUNTER (OUTPATIENT)
Facility: MEDICAL CENTER | Age: 64
End: 2024-01-25
Payer: COMMERCIAL

## 2024-01-25 VITALS
BODY MASS INDEX: 33.46 KG/M2 | WEIGHT: 247 LBS | RESPIRATION RATE: 18 BRPM | TEMPERATURE: 97.9 F | HEART RATE: 76 BPM | SYSTOLIC BLOOD PRESSURE: 122 MMHG | OXYGEN SATURATION: 98 % | DIASTOLIC BLOOD PRESSURE: 60 MMHG | HEIGHT: 72 IN

## 2024-01-25 DIAGNOSIS — R39.9 UTI SYMPTOMS: ICD-10-CM

## 2024-01-25 DIAGNOSIS — N39.0 RECURRENT UTI: ICD-10-CM

## 2024-01-25 DIAGNOSIS — Z76.0 MEDICATION REFILL: ICD-10-CM

## 2024-01-25 LAB
APPEARANCE UR: CLEAR
BILIRUB UR STRIP-MCNC: NEGATIVE MG/DL
COLOR UR AUTO: YELLOW
GLUCOSE UR STRIP.AUTO-MCNC: NEGATIVE MG/DL
KETONES UR STRIP.AUTO-MCNC: NEGATIVE MG/DL
LEUKOCYTE ESTERASE UR QL STRIP.AUTO: NORMAL
NITRITE UR QL STRIP.AUTO: NEGATIVE
PH UR STRIP.AUTO: 5.5 [PH] (ref 5–8)
PROT UR QL STRIP: NEGATIVE MG/DL
RBC UR QL AUTO: NEGATIVE
SP GR UR STRIP.AUTO: 1
UROBILINOGEN UR STRIP-MCNC: 0.2 MG/DL

## 2024-01-25 PROCEDURE — 87660 TRICHOMONAS VAGIN DIR PROBE: CPT

## 2024-01-25 PROCEDURE — 87086 URINE CULTURE/COLONY COUNT: CPT

## 2024-01-25 PROCEDURE — 81002 URINALYSIS NONAUTO W/O SCOPE: CPT

## 2024-01-25 PROCEDURE — 99214 OFFICE O/P EST MOD 30 MIN: CPT

## 2024-01-25 PROCEDURE — 3074F SYST BP LT 130 MM HG: CPT

## 2024-01-25 PROCEDURE — 87480 CANDIDA DNA DIR PROBE: CPT

## 2024-01-25 PROCEDURE — 87510 GARDNER VAG DNA DIR PROBE: CPT

## 2024-01-25 PROCEDURE — 3078F DIAST BP <80 MM HG: CPT

## 2024-01-25 RX ORDER — NITROFURANTOIN 25; 75 MG/1; MG/1
100 CAPSULE ORAL 2 TIMES DAILY
Qty: 10 CAPSULE | Refills: 0 | Status: SHIPPED | OUTPATIENT
Start: 2024-01-25 | End: 2024-01-30

## 2024-01-25 RX ORDER — ESTRADIOL 0.1 MG/G
CREAM VAGINAL
Qty: 42.5 G | Refills: 3 | Status: SHIPPED | OUTPATIENT
Start: 2024-01-25

## 2024-01-25 ASSESSMENT — ENCOUNTER SYMPTOMS
CHILLS: 1
FLANK PAIN: 0
FEVER: 0

## 2024-01-25 ASSESSMENT — FIBROSIS 4 INDEX: FIB4 SCORE: 1.59

## 2024-01-25 NOTE — PROGRESS NOTES
"Subjective:   Arely Haynes is a 63 y.o. female who presents for UTI (Going often, chills x 6 days)      HPI: This is a 63-year-old female who presents today for UTI symptoms.  Patient reports developing dysuria, frequency, foul-smelling urine, and blood in urine over the last 4 days.  She reports doing a home dipstick and noting her urine was positive for leukocytes.  She has not taken anything for her symptoms.  She does report history of UTIs in the past.  Patient endorses \"fishy smell \".  She denies vaginal discharge or any concern for STIs.      Review of Systems   Constitutional:  Positive for chills. Negative for fever.   Genitourinary:  Positive for dysuria, frequency and hematuria. Negative for flank pain.       Medications:    Current Outpatient Medications on File Prior to Visit   Medication Sig Dispense Refill    estradiol (ESTRACE) 0.1 MG/GM vaginal cream Insert 2 g daily intravaginally for 2 weeks, followed by 1 g two-three times per week 42.5 g 3    pregabalin (LYRICA) 200 MG capsule Take 1 Capsule by mouth 2 times a day for 90 days. 180 Capsule 0    metFORMIN ER (GLUCOPHAGE XR) 500 MG TABLET SR 24 HR Take 1 Tablet by mouth 2 times a day. 180 Tablet 3    magnesium oxide (MAG-OX) 400 MG Tab tablet Take 1 Tablet by mouth every morning. 90 Tablet 3    etodolac (LODINE) 400 MG tablet Take 1 Tablet by mouth 2 times a day. 180 Tablet 3    omeprazole (PRILOSEC) 40 MG delayed-release capsule Take 1 Capsule by mouth every day. 90 Capsule 3    escitalopram (LEXAPRO) 20 MG tablet Take 1 Tablet by mouth every morning. Take with escitalopram 10 mg for a total dose of 30 mg. 90 Tablet 3    famotidine (PEPCID) 20 MG Tab Take 1 Tablet by mouth 2 times a day. Indications: Gastroesophageal Reflux Disease 180 Tablet 3    lamoTRIgine (LAMICTAL) 100 MG Tab Take 1 Tablet by mouth every morning. 90 Tablet 3    escitalopram (LEXAPRO) 10 MG Tab Take 1 Tablet by mouth every morning. Take with escitalopram 20 mg for a total " dose of 30 mg. 90 Tablet 3    rosuvastatin (CRESTOR) 10 MG Tab Take 1 Tablet by mouth at bedtime. 90 Tablet 3    montelukast (SINGULAIR) 10 MG Tab Take 1 Tablet by mouth every day. 90 Tablet 3    fluticasone-salmeterol (ADVAIR) 250-50 MCG/ACT AEROSOL POWDER, BREATH ACTIVATED Inhale 1 Puff 2 times a day. 60 Each 3    EPINEPHrine 0.3 MG/0.3ML Solution Prefilled Syringe Inject the contents of the epipen into the thigh, hold for 3 seconds and release from thigh as needed for anaphylaxis. (Patient taking differently: Inject 0.3 mL under the skin as needed. Inject the contents of the epipen into the thigh, hold for 3 seconds and release from thigh as needed for anaphylaxis.  Indications: Life-Threatening Hypersensitivity Reaction) 1 Each 6    pancrelipase, Lip-Prot-Amyl, (CREON) 1272-8571 units Cap DR Particles Take 1 Capsule by mouth 3 times a day with meals. Can increase to up to 3 capsules 3 times daily. 90 Capsule 3     No current facility-administered medications on file prior to visit.        Allergies:   Bee venom, Iodine, Levofloxacin, and Shellfish-derived products    Problem List:   Patient Active Problem List   Diagnosis    Psoriatic arthritis (HCC)    Familial hypercholesterolemia    Moderate persistent asthma without complication    Asthma    History of total right hip arthroplasty    Psoriasis    Immunosuppression ([facility])    Obesity (BMI 30.0-34.9)    Menopausal hot flushes    Ovarian cyst    Quadriceps tendon rupture, left, initial encounter    H/O: hysterectomy    Seasonal allergies    Major depressive disorder, recurrent, moderate (HCC)    CHRISTY (generalized anxiety disorder)    GERD (gastroesophageal reflux disease)    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[facility])    Bradycardia    Pulmonary nodules    ILD (interstitial lung disease) ([facility])    Second degree hemorrhoids    Polyp of stomach and duodenum    Polyp of colon    Personal history of colonic polyps    Noninfective gastroenteritis and colitis, unspecified     Hypercholesterolemia    Hoarseness    Hemorrhoids without complication    Hematochezia    Heartburn    Fecal urgency    Dvrtclos of lg int w/o perforation or abscess w/o bleeding    Diaphragmatic hernia without obstruction or gangrene    Cough    Chronic diarrhea        Surgical History:  Past Surgical History:   Procedure Laterality Date    [place] INJECTION,SACROILIAC JOINT Left 7/28/2023    Procedure: LEFT sacroiliac joint injection;  Surgeon: Elvis Aleman M.D.;  [facility];  Service: Pain Management    [place] INJ LUMBAR/SACRAL,W/ IMAGING Right 6/2/2023    Procedure: RIGHT L5-S1 interlaminar epidural steroid injection. Patient has tolerated gadolinium;  Surgeon: Elvis Aleman M.D.;  [facility];  Service: Pain Management    [place] BRONCHOSCOPY,DIAGNOSTIC  7/9/2021    Procedure: BRONCHOSCOPY;  Surgeon: Myles Vidal M.D.;  Location: [facility];  Service: Ent    TENDON REPAIR Left 9/19/2019    Procedure: REPAIR, TENDON- QUADRICEPS RUTURE REPAIR AND MEYERS;  Surgeon: Jayden Velázquez M.D.;  Location: [facility];  Service: Orthopedics    HYSTERECTOMY ROBOTIC XI N/A 7/11/2019    Procedure: HYSTERECTOMY, ROBOT-ASSISTED, USING DA SABINO XI;  Surgeon: Curly Bernal M.D.;  Location: SURGERY [facility];  Service: Gynecology    SALPINGO OOPHORECTOMY Bilateral 7/11/2019    Procedure: SALPINGO-OOPHORECTOMY;  Surgeon: Curly Bernal M.D.;  Location: SURGERY [facility];  Service: Gynecology    GYN SURGERY  1999    c sec    CYSTECTOMY      FUSION, SPINE, LUMBAR, PLIF      HIP REPLACEMENT, TOTAL      Right    OTHER ABDOMINAL SURGERY      appendix    OTHER ORTHOPEDIC SURGERY      left knee    OTHER ORTHOPEDIC SURGERY      right rotator cuff       Past Social Hx:   Social History     Tobacco Use    Smoking status: Never    Smokeless tobacco: Never   Vaping Use    Vaping Use: Never used   Substance Use Topics    Alcohol use: Yes     Alcohol/week: 0.6 -  1.2 oz     Types: 1 - 2 Glasses of wine per week     Comment: one glss of wine at night     Drug use: No          Problem list, medications, and allergies reviewed by myself today in Epic.     Objective:     /60   Pulse 76   Temp 36.6 °C (97.9 °F) (Temporal)   Resp 18   Ht 1.829 m (6')   Wt 112 kg (247 lb)   LMP  (LMP Unknown)   SpO2 98%   BMI 33.50 kg/m²     Physical Exam  Vitals and nursing note reviewed.   Constitutional:       General: She is not in acute distress.     Appearance: Normal appearance. She is normal weight. She is not ill-appearing, toxic-appearing or diaphoretic.   HENT:      Head: Normocephalic and atraumatic.   Cardiovascular:      Rate and Rhythm: Normal rate and regular rhythm.      Pulses: Normal pulses.      Heart sounds: Normal heart sounds. No murmur heard.     No friction rub. No gallop.   Pulmonary:      Effort: Pulmonary effort is normal. No respiratory distress.      Breath sounds: Normal breath sounds. No stridor. No wheezing, rhonchi or rales.   Chest:      Chest wall: No tenderness.   Abdominal:      Tenderness: There is no right CVA tenderness or left CVA tenderness.      Comments: +suprapubic tenderness   Skin:     General: Skin is warm and dry.      Capillary Refill: Capillary refill takes less than 2 seconds.   Neurological:      General: No focal deficit present.      Mental Status: She is alert and oriented to person, place, and time. Mental status is at baseline.      Gait: Gait normal.   Psychiatric:         Mood and Affect: Mood normal.         Behavior: Behavior normal.         Thought Content: Thought content normal.         Judgment: Judgment normal.         Assessment/Plan:     Diagnosis and associated orders:   1. UTI symptoms  POCT Urinalysis    VAGINAL PATHOGENS DNA PANEL    URINE CULTURE(NEW)    nitrofurantoin (MACROBID) 100 MG Cap      2. Recurrent UTI  URINE CULTURE(NEW)    estradiol (ESTRACE) 0.1 MG/GM vaginal cream    nitrofurantoin (MACROBID) 100  "MG Cap      3. Medication refill                Comments/MDM:   Pt is clinically stable at today's acute urgent care visit.  No acute distress noted. Appropriate for outpatient management at this time.     Acute problem.  Patient is not ill or toxic appearing in clinic today.  Vital signs are stable, she is afebrile.  She presents to the urgent care today for UTI symptoms.  She does have history of recurrent UTIs.  She was last seen in urgent care on 11\4\23 for same.  Urine culture was positive for E. coli ESBL with resistance to several antibiotics, and shows sensitivity to nitrofurantoin.  Given her symptoms, I will treat her empirically for suspected UTI with course of nitrofurantoin as the previous culture from 11\4\23 shows sensitivity to this.  Advised patient to begin taking antibiotic as prescribed and stay well-hydrated.  Patient reports that her primary care did prescribe her estradiol vaginal cream for her symptoms.  She reports that she was unable to pick this up and is requesting a pharmacy change.  Medication ordered for patient.  Given her symptoms of \"fishy smell \", vaginal pathogens panel will be collected today to rule out vaginal etiologies.  Patient is to return to UC or go to ER for any new or worsening signs or symptoms, and follow with with PCP for recheck. Patient is agreeable with plan of care and verbalizes good understanding.          Discussed DDx, management options (risks,benefits, and alternatives to planned treatment), natural progression and supportive care.  Expressed understanding and the treatment plan was agreed upon. Questions were encouraged and answered   Return to urgent care prn if new or worsening sx or if there is no improvement in condition prn.    Educated in Red flags and indications to immediately call 911 or present to the Emergency Department.   Advised the patient to follow-up with the primary care physician for recheck, reevaluation, and consideration of further " management.    I personally reviewed prior external notes and test results pertinent to today's visit.  I have independently reviewed and interpreted all diagnostics ordered during this urgent care acute visit.         Please note that this dictation was created using voice recognition software. I have made a reasonable attempt to correct obvious errors, but I expect that there are errors of grammar and possibly content that I did not discover before finalizing the note.    This note was electronically signed by MIRTA Webb

## 2024-01-26 LAB
CANDIDA DNA VAG QL PROBE+SIG AMP: NEGATIVE
G VAGINALIS DNA VAG QL PROBE+SIG AMP: NEGATIVE
T VAGINALIS DNA VAG QL PROBE+SIG AMP: NEGATIVE

## 2024-01-27 LAB
BACTERIA UR CULT: NORMAL
SIGNIFICANT IND 70042: NORMAL
SITE SITE: NORMAL
SOURCE SOURCE: NORMAL

## 2024-01-28 ENCOUNTER — PATIENT MESSAGE (OUTPATIENT)
Dept: MEDICAL GROUP | Facility: LAB | Age: 64
End: 2024-01-28
Payer: COMMERCIAL

## 2024-01-28 DIAGNOSIS — E66.9 OBESITY (BMI 30.0-34.9): ICD-10-CM

## 2024-02-17 ENCOUNTER — OFFICE VISIT (OUTPATIENT)
Dept: URGENT CARE | Facility: CLINIC | Age: 64
End: 2024-02-17
Payer: COMMERCIAL

## 2024-02-17 ENCOUNTER — HOSPITAL ENCOUNTER (OUTPATIENT)
Facility: MEDICAL CENTER | Age: 64
End: 2024-02-17
Attending: PHYSICIAN ASSISTANT
Payer: COMMERCIAL

## 2024-02-17 VITALS
SYSTOLIC BLOOD PRESSURE: 132 MMHG | DIASTOLIC BLOOD PRESSURE: 84 MMHG | BODY MASS INDEX: 33.5 KG/M2 | HEART RATE: 54 BPM | OXYGEN SATURATION: 99 % | WEIGHT: 247 LBS | RESPIRATION RATE: 14 BRPM | TEMPERATURE: 96.9 F

## 2024-02-17 DIAGNOSIS — R35.0 URINARY FREQUENCY: ICD-10-CM

## 2024-02-17 DIAGNOSIS — N30.00 ACUTE CYSTITIS WITHOUT HEMATURIA: ICD-10-CM

## 2024-02-17 LAB
APPEARANCE UR: NORMAL
BILIRUB UR STRIP-MCNC: NORMAL MG/DL
COLOR UR AUTO: NORMAL
GLUCOSE UR STRIP.AUTO-MCNC: NEGATIVE MG/DL
KETONES UR STRIP.AUTO-MCNC: NEGATIVE MG/DL
LEUKOCYTE ESTERASE UR QL STRIP.AUTO: NORMAL
NITRITE UR QL STRIP.AUTO: NEGATIVE
PH UR STRIP.AUTO: 7 [PH] (ref 5–8)
PROT UR QL STRIP: NEGATIVE MG/DL
RBC UR QL AUTO: NEGATIVE
SP GR UR STRIP.AUTO: 1.01
UROBILINOGEN UR STRIP-MCNC: 0.2 MG/DL

## 2024-02-17 PROCEDURE — 87077 CULTURE AEROBIC IDENTIFY: CPT

## 2024-02-17 PROCEDURE — 3079F DIAST BP 80-89 MM HG: CPT | Performed by: PHYSICIAN ASSISTANT

## 2024-02-17 PROCEDURE — 87086 URINE CULTURE/COLONY COUNT: CPT

## 2024-02-17 PROCEDURE — 99214 OFFICE O/P EST MOD 30 MIN: CPT | Performed by: PHYSICIAN ASSISTANT

## 2024-02-17 PROCEDURE — 87186 SC STD MICRODIL/AGAR DIL: CPT

## 2024-02-17 PROCEDURE — 3075F SYST BP GE 130 - 139MM HG: CPT | Performed by: PHYSICIAN ASSISTANT

## 2024-02-17 PROCEDURE — 81002 URINALYSIS NONAUTO W/O SCOPE: CPT | Performed by: PHYSICIAN ASSISTANT

## 2024-02-17 RX ORDER — CEFDINIR 300 MG/1
300 CAPSULE ORAL 2 TIMES DAILY
Qty: 14 CAPSULE | Refills: 0 | Status: SHIPPED | OUTPATIENT
Start: 2024-02-17 | End: 2024-02-24

## 2024-02-17 ASSESSMENT — ENCOUNTER SYMPTOMS
FLANK PAIN: 0
FEVER: 0
CHILLS: 0

## 2024-02-17 ASSESSMENT — FIBROSIS 4 INDEX: FIB4 SCORE: 1.59

## 2024-02-17 NOTE — PROGRESS NOTES
Subjective:   Arely Haynes is a 63 y.o. female who presents today with   Chief Complaint   Patient presents with    UTI      uncomfortable bladder x 3 days, currently on abx for URI       UTI  This is a new problem. Episode onset: 3 days. The problem occurs constantly. The problem has been unchanged. Associated symptoms include urinary symptoms. Pertinent negatives include no chills or fever. She has tried nothing for the symptoms. The treatment provided no relief.     Patient was seen in urgent care for urinary tract infection 3 weeks ago and urine culture came back negative.  Vaginal pathogen test also negative.    PMH:  has a past medical history of Anesthesia, Arthritis, Asthma, Bowel habit changes, Depression, Erosive esophagitis, GERD (gastroesophageal reflux disease), Heart burn, High cholesterol, Immunocompromised ([facility]), Pain, Pneumonia (2018), Psoriasis, and Psoriatic arthritis ([facility]).  MEDS:   Current Outpatient Medications:     cefdinir (OMNICEF) 300 MG Cap, Take 1 Capsule by mouth 2 times a day for 7 days., Disp: 14 Capsule, Rfl: 0    pregabalin (LYRICA) 200 MG capsule, Take 1 Capsule by mouth 2 times a day for 90 days., Disp: 180 Capsule, Rfl: 0    estradiol (ESTRACE) 0.1 MG/GM vaginal cream, Insert 2 g daily intravaginally for 2 weeks, followed by 1 g two-three times per week, Disp: 42.5 g, Rfl: 3    metFORMIN ER (GLUCOPHAGE XR) 500 MG TABLET SR 24 HR, Take 1 Tablet by mouth 2 times a day., Disp: 180 Tablet, Rfl: 3    magnesium oxide (MAG-OX) 400 MG Tab tablet, Take 1 Tablet by mouth every morning., Disp: 90 Tablet, Rfl: 3    etodolac (LODINE) 400 MG tablet, Take 1 Tablet by mouth 2 times a day., Disp: 180 Tablet, Rfl: 3    omeprazole (PRILOSEC) 40 MG delayed-release capsule, Take 1 Capsule by mouth every day., Disp: 90 Capsule, Rfl: 3    escitalopram (LEXAPRO) 20 MG tablet, Take 1 Tablet by mouth every morning. Take with escitalopram 10 mg for a total dose of 30 mg., Disp: 90 Tablet, Rfl: 3     famotidine (PEPCID) 20 MG Tab, Take 1 Tablet by mouth 2 times a day. Indications: Gastroesophageal Reflux Disease, Disp: 180 Tablet, Rfl: 3    lamoTRIgine (LAMICTAL) 100 MG Tab, Take 1 Tablet by mouth every morning., Disp: 90 Tablet, Rfl: 3    escitalopram (LEXAPRO) 10 MG Tab, Take 1 Tablet by mouth every morning. Take with escitalopram 20 mg for a total dose of 30 mg., Disp: 90 Tablet, Rfl: 3    rosuvastatin (CRESTOR) 10 MG Tab, Take 1 Tablet by mouth at bedtime., Disp: 90 Tablet, Rfl: 3    montelukast (SINGULAIR) 10 MG Tab, Take 1 Tablet by mouth every day., Disp: 90 Tablet, Rfl: 3    fluticasone-salmeterol (ADVAIR) 250-50 MCG/ACT AEROSOL POWDER, BREATH ACTIVATED, Inhale 1 Puff 2 times a day., Disp: 60 Each, Rfl: 3    EPINEPHrine 0.3 MG/0.3ML Solution Prefilled Syringe, Inject the contents of the epipen into the thigh, hold for 3 seconds and release from thigh as needed for anaphylaxis. (Patient taking differently: Inject 0.3 mL under the skin as needed. Inject the contents of the epipen into the thigh, hold for 3 seconds and release from thigh as needed for anaphylaxis.  Indications: Life-Threatening Hypersensitivity Reaction), Disp: 1 Each, Rfl: 6    pancrelipase, Lip-Prot-Amyl, (CREON) 9332-4851 units Cap DR Particles, Take 1 Capsule by mouth 3 times a day with meals. Can increase to up to 3 capsules 3 times daily., Disp: 90 Capsule, Rfl: 3  ALLERGIES:   Allergies   Allergen Reactions    Bee Venom Anaphylaxis     Pt is allergic to bees.     Iodine Anaphylaxis, Rash and Hives     IV = anaphylaxis, topical = rash  IV = anaphylaxis, topical = rash    Levofloxacin Anaphylaxis    Shellfish-Derived Products Anaphylaxis     LOBSTER     SURGHX:   Past Surgical History:   Procedure Laterality Date    [place] INJECTION,SACROILIAC JOINT Left 7/28/2023    Procedure: LEFT sacroiliac joint injection;  Surgeon: Elvis Aleman M.D.;  [facility];  Service: Pain Management    [place] INJ LUMBAR/SACRAL,W/  IMAGING Right 6/2/2023    Procedure: RIGHT L5-S1 interlaminar epidural steroid injection. Patient has tolerated gadolinium;  Surgeon: Elvis Aleman M.D.;  Location: [facility];  Service: Pain Management    [place] BRONCHOSCOPY,DIAGNOSTIC  7/9/2021    Procedure: BRONCHOSCOPY;  Surgeon: Myles Vidal M.D.;  Location: SURGERY [facility];  Service: Ent    TENDON REPAIR Left 9/19/2019    Procedure: REPAIR, TENDON- QUADRICEPS RUTURE REPAIR AND MEYERS;  Surgeon: Jayden Velázquez M.D.;  Location: SURGERY [facility];  Service: Orthopedics    HYSTERECTOMY ROBOTIC XI N/A 7/11/2019    Procedure: HYSTERECTOMY, ROBOT-ASSISTED, USING DA SABINO XI;  Surgeon: Curly Bernal M.D.;  Location: SURGERY [facility];  Service: Gynecology    SALPINGO OOPHORECTOMY Bilateral 7/11/2019    Procedure: SALPINGO-OOPHORECTOMY;  Surgeon: Curly Bernal M.D.;  Location: SURGERY [facility];  Service: Gynecology    GYN SURGERY  1999    c sec    CYSTECTOMY      FUSION, SPINE, LUMBAR, PLIF      HIP REPLACEMENT, TOTAL      Right    OTHER ABDOMINAL SURGERY      appendix    OTHER ORTHOPEDIC SURGERY      left knee    OTHER ORTHOPEDIC SURGERY      right rotator cuff     SOCHX:  reports that she has never smoked. She has never used smokeless tobacco. She reports current alcohol use of about 0.6 - 1.2 oz of alcohol per week. She reports that she does not use drugs.  FH: Reviewed with patient, not pertinent to this visit.       Review of Systems   Constitutional:  Negative for chills and fever.   Genitourinary:  Positive for dysuria, frequency and urgency. Negative for flank pain and hematuria.        Objective:   /84 (BP Location: Left arm, Patient Position: Sitting, BP Cuff Size: Large adult)   Pulse (!) 54   Temp 36.1 °C (96.9 °F) (Temporal)   Resp 14   Wt 112 kg (247 lb)   LMP  (LMP Unknown)   SpO2 99%   BMI 33.50 kg/m²   Physical Exam  Vitals and nursing note reviewed.   Constitutional:       General: She is not in  acute distress.     Appearance: Normal appearance. She is well-developed. She is not ill-appearing or toxic-appearing.   HENT:      Head: Normocephalic and atraumatic.      Right Ear: Hearing normal.      Left Ear: Hearing normal.   Cardiovascular:      Rate and Rhythm: Normal rate and regular rhythm.      Heart sounds: Normal heart sounds.   Pulmonary:      Effort: Pulmonary effort is normal.      Breath sounds: Normal breath sounds.   Abdominal:      Tenderness: There is abdominal tenderness. There is no right CVA tenderness or left CVA tenderness.      Comments: No acute point tenderness.   Genitourinary:     Comments: Patient deferred  exam  Musculoskeletal:      Comments: Normal movement in all 4 extremities   Skin:     General: Skin is warm and dry.   Neurological:      Mental Status: She is alert.      Coordination: Coordination normal.   Psychiatric:         Mood and Affect: Mood normal.         UA consistent with infection    Assessment/Plan:   Assessment    1. Urinary frequency  - POCT Urinalysis    2. Acute cystitis without hematuria  - URINE CULTURE(NEW); Future  - cefTRIAXone (Rocephin) 1,000 mg in lidocaine (Xylocaine) 1 % 4 mL for IM use  - cefdinir (OMNICEF) 300 MG Cap; Take 1 Capsule by mouth 2 times a day for 7 days.  Dispense: 14 Capsule; Refill: 0      Symptoms and presentation appear consistent with urinary tract infection at this time and we will treat accordingly with antibiotics.  Patient has history of recurrent urinary infections and has appointment with urologist coming up.  Patient requesting treatment with Rocephin shot and cefdinir as that has seemed to work the best for her in the past.    Differential diagnosis, natural history, supportive care, and indications for immediate follow-up discussed.   Patient given instructions and understanding of medications and treatment.    If not improving in 3-5 days, F/U with PCP or return to UC if symptoms worsen.    Patient agreeable to  plan.      Please note that this dictation was created using voice recognition software. I have made every reasonable attempt to correct obvious errors, but I expect that there are errors of grammar and possibly content that I did not discover before finalizing the note.    Lobito Rodriguez PA-C

## 2024-02-18 DIAGNOSIS — N30.00 ACUTE CYSTITIS WITHOUT HEMATURIA: ICD-10-CM

## 2024-02-28 ENCOUNTER — TELEPHONE (OUTPATIENT)
Dept: MEDICAL GROUP | Facility: LAB | Age: 64
End: 2024-02-28
Payer: COMMERCIAL

## 2024-03-10 ENCOUNTER — OFFICE VISIT (OUTPATIENT)
Dept: URGENT CARE | Facility: CLINIC | Age: 64
End: 2024-03-10
Payer: COMMERCIAL

## 2024-03-10 ENCOUNTER — HOSPITAL ENCOUNTER (OUTPATIENT)
Facility: MEDICAL CENTER | Age: 64
End: 2024-03-10
Attending: PHYSICIAN ASSISTANT
Payer: COMMERCIAL

## 2024-03-10 VITALS
HEIGHT: 72 IN | SYSTOLIC BLOOD PRESSURE: 110 MMHG | HEART RATE: 71 BPM | TEMPERATURE: 98.6 F | DIASTOLIC BLOOD PRESSURE: 62 MMHG | RESPIRATION RATE: 16 BRPM | BODY MASS INDEX: 32.51 KG/M2 | WEIGHT: 240 LBS | OXYGEN SATURATION: 96 %

## 2024-03-10 DIAGNOSIS — N30.01 ACUTE CYSTITIS WITH HEMATURIA: ICD-10-CM

## 2024-03-10 DIAGNOSIS — R30.0 DYSURIA: ICD-10-CM

## 2024-03-10 LAB
APPEARANCE UR: NORMAL
BILIRUB UR STRIP-MCNC: NORMAL MG/DL
COLOR UR AUTO: NORMAL
GLUCOSE UR STRIP.AUTO-MCNC: NEGATIVE MG/DL
KETONES UR STRIP.AUTO-MCNC: NEGATIVE MG/DL
LEUKOCYTE ESTERASE UR QL STRIP.AUTO: NORMAL
NITRITE UR QL STRIP.AUTO: NEGATIVE
PH UR STRIP.AUTO: 5 [PH] (ref 5–8)
PROT UR QL STRIP: 100 MG/DL
RBC UR QL AUTO: NORMAL
SP GR UR STRIP.AUTO: 1.03
UROBILINOGEN UR STRIP-MCNC: 0.2 MG/DL

## 2024-03-10 PROCEDURE — 81002 URINALYSIS NONAUTO W/O SCOPE: CPT | Performed by: PHYSICIAN ASSISTANT

## 2024-03-10 PROCEDURE — 3074F SYST BP LT 130 MM HG: CPT | Performed by: PHYSICIAN ASSISTANT

## 2024-03-10 PROCEDURE — 87086 URINE CULTURE/COLONY COUNT: CPT

## 2024-03-10 PROCEDURE — 99214 OFFICE O/P EST MOD 30 MIN: CPT | Performed by: PHYSICIAN ASSISTANT

## 2024-03-10 PROCEDURE — 3078F DIAST BP <80 MM HG: CPT | Performed by: PHYSICIAN ASSISTANT

## 2024-03-10 RX ORDER — PHENAZOPYRIDINE HYDROCHLORIDE 200 MG/1
200 TABLET, FILM COATED ORAL 3 TIMES DAILY PRN
Qty: 6 TABLET | Refills: 0 | Status: SHIPPED | OUTPATIENT
Start: 2024-03-10

## 2024-03-10 RX ORDER — CEFDINIR 300 MG/1
300 CAPSULE ORAL 2 TIMES DAILY
Qty: 28 CAPSULE | Refills: 0 | Status: ON HOLD | OUTPATIENT
Start: 2024-03-10 | End: 2024-03-24

## 2024-03-10 ASSESSMENT — ENCOUNTER SYMPTOMS
VOMITING: 0
DIARRHEA: 1
BLOOD IN STOOL: 0
FEVER: 0
NAUSEA: 0
CONSTIPATION: 0
FLANK PAIN: 0
CHILLS: 0
ABDOMINAL PAIN: 1

## 2024-03-10 ASSESSMENT — FIBROSIS 4 INDEX: FIB4 SCORE: 1.59

## 2024-03-10 NOTE — PROGRESS NOTES
Subjective:   Arely Haynes is a 63 y.o. female who presents today with   Chief Complaint   Patient presents with    UTI     Patient coming for urinary infection, painful sitting down      UTI  This is a new problem. Episode onset: 3 days. The problem occurs constantly. The problem has been unchanged. Associated symptoms include abdominal pain and urinary symptoms. Pertinent negatives include no chills, fever, nausea or vomiting. She has tried nothing for the symptoms. The treatment provided no relief.     Patient's  is present today.  She does have  appointment with urology in 2 weeks.  Does have history of ESBL but last urine culture did not show resistance and was treated appropriately.  Does not feel like she has been emptying her bladder completely.  Has had history of ureteral dilation as a child.    PMH:  has a past medical history of Anesthesia, Arthritis, Asthma, Bowel habit changes, Depression, Erosive esophagitis, GERD (gastroesophageal reflux disease), Heart burn, High cholesterol, Immunocompromised ([facility]), Pain, Pneumonia (2018), Psoriasis, and Psoriatic arthritis ([facility]).  MEDS:   Current Outpatient Medications:     cefdinir (OMNICEF) 300 MG Cap, Take 1 Capsule by mouth 2 times a day for 14 days., Disp: 28 Capsule, Rfl: 0    phenazopyridine (PYRIDIUM) 200 MG Tab, Take 1 Tablet by mouth 3 times a day as needed for Moderate Pain., Disp: 6 Tablet, Rfl: 0    Tirzepatide-Weight Management 2.5 MG/0.5ML Solution Auto-injector, Inject 0.5 mL under the skin every 7 days., Disp: 2 mL, Rfl: 3    pregabalin (LYRICA) 200 MG capsule, Take 1 Capsule by mouth 2 times a day for 90 days., Disp: 180 Capsule, Rfl: 0    estradiol (ESTRACE) 0.1 MG/GM vaginal cream, Insert 2 g daily intravaginally for 2 weeks, followed by 1 g two-three times per week, Disp: 42.5 g, Rfl: 3    metFORMIN ER (GLUCOPHAGE XR) 500 MG TABLET SR 24 HR, Take 1 Tablet by mouth 2 times a day., Disp: 180 Tablet, Rfl: 3    magnesium oxide  (MAG-OX) 400 MG Tab tablet, Take 1 Tablet by mouth every morning., Disp: 90 Tablet, Rfl: 3    etodolac (LODINE) 400 MG tablet, Take 1 Tablet by mouth 2 times a day., Disp: 180 Tablet, Rfl: 3    omeprazole (PRILOSEC) 40 MG delayed-release capsule, Take 1 Capsule by mouth every day., Disp: 90 Capsule, Rfl: 3    escitalopram (LEXAPRO) 20 MG tablet, Take 1 Tablet by mouth every morning. Take with escitalopram 10 mg for a total dose of 30 mg., Disp: 90 Tablet, Rfl: 3    famotidine (PEPCID) 20 MG Tab, Take 1 Tablet by mouth 2 times a day. Indications: Gastroesophageal Reflux Disease, Disp: 180 Tablet, Rfl: 3    lamoTRIgine (LAMICTAL) 100 MG Tab, Take 1 Tablet by mouth every morning., Disp: 90 Tablet, Rfl: 3    escitalopram (LEXAPRO) 10 MG Tab, Take 1 Tablet by mouth every morning. Take with escitalopram 20 mg for a total dose of 30 mg., Disp: 90 Tablet, Rfl: 3    rosuvastatin (CRESTOR) 10 MG Tab, Take 1 Tablet by mouth at bedtime., Disp: 90 Tablet, Rfl: 3    montelukast (SINGULAIR) 10 MG Tab, Take 1 Tablet by mouth every day., Disp: 90 Tablet, Rfl: 3    fluticasone-salmeterol (ADVAIR) 250-50 MCG/ACT AEROSOL POWDER, BREATH ACTIVATED, Inhale 1 Puff 2 times a day., Disp: 60 Each, Rfl: 3    EPINEPHrine 0.3 MG/0.3ML Solution Prefilled Syringe, Inject the contents of the epipen into the thigh, hold for 3 seconds and release from thigh as needed for anaphylaxis. (Patient taking differently: Inject 0.3 mL under the skin as needed. Inject the contents of the epipen into the thigh, hold for 3 seconds and release from thigh as needed for anaphylaxis.  Indications: Life-Threatening Hypersensitivity Reaction), Disp: 1 Each, Rfl: 6    pancrelipase, Lip-Prot-Amyl, (CREON) 4880-1700 units Cap DR Particles, Take 1 Capsule by mouth 3 times a day with meals. Can increase to up to 3 capsules 3 times daily., Disp: 90 Capsule, Rfl: 3  ALLERGIES:   Allergies   Allergen Reactions    Bee Venom Anaphylaxis     Pt is allergic to bees.     Iodine  Anaphylaxis, Rash and Hives     IV = anaphylaxis, topical = rash  IV = anaphylaxis, topical = rash    Levofloxacin Anaphylaxis    Shellfish-Derived Products Anaphylaxis     LOBSTER     SURGHX:   Past Surgical History:   Procedure Laterality Date    NE INJECTION,SACROILIAC JOINT Left 7/28/2023    Procedure: LEFT sacroiliac joint injection;  Surgeon: Elvis Aleman M.D.;  [facility];  Service: Pain Management    NE INJ LUMBAR/SACRAL,W/ IMAGING Right 6/2/2023    Procedure: RIGHT L5-S1 interlaminar epidural steroid injection. Patient has tolerated gadolinium;  Surgeon: Elvis Aleman M.D.;  [facility];  Service: Pain Management    NE BRONCHOSCOPY,DIAGNOSTIC  7/9/2021    Procedure: BRONCHOSCOPY;  Surgeon: Myles Vidal M.D.;  Location: [facility];  Service: Ent    TENDON REPAIR Left 9/19/2019    Procedure: REPAIR, TENDON- QUADRICEPS RUTURE REPAIR AND MEYERS;  Surgeon: Jayden Velázquez M.D.;  Location: [facility];  Service: Orthopedics    HYSTERECTOMY ROBOTIC XI N/A 7/11/2019    Procedure: HYSTERECTOMY, ROBOT-ASSISTED, USING DA SABINO XI;  Surgeon: Curly Bernal M.D.;  Location: [facility];  Service: Gynecology    SALPINGO OOPHORECTOMY Bilateral 7/11/2019    Procedure: SALPINGO-OOPHORECTOMY;  Surgeon: Curly Bernal M.D.;  Location: [facility];  Service: Gynecology    GYN SURGERY  1999    c sec    CYSTECTOMY      FUSION, SPINE, LUMBAR, PLIF      HIP REPLACEMENT, TOTAL      Right    OTHER ABDOMINAL SURGERY      appendix    OTHER ORTHOPEDIC SURGERY      left knee    OTHER ORTHOPEDIC SURGERY      right rotator cuff     SOCHX:  reports that she has never smoked. She has never used smokeless tobacco. She reports current alcohol use of about 0.6 - 1.2 oz of alcohol per week. She reports that she does not use drugs.  FH: Reviewed with patient, not pertinent to this visit.     Review of Systems   Constitutional:  Negative for  chills and fever.   Gastrointestinal:  Positive for abdominal pain and diarrhea (occasional at baseline). Negative for blood in stool, constipation, nausea and vomiting.   Genitourinary:  Positive for dysuria, frequency and urgency. Negative for flank pain and hematuria.      Objective:   /62   Pulse 71   Temp 37 °C (98.6 °F) (Temporal)   Resp 16   Ht 1.829 m (6')   Wt 109 kg (240 lb)   LMP  (LMP Unknown)   SpO2 96%   BMI 32.55 kg/m²   Physical Exam  Vitals and nursing note reviewed.   Constitutional:       General: She is not in acute distress.     Appearance: Normal appearance. She is well-developed. She is not ill-appearing or toxic-appearing.   HENT:      Head: Normocephalic and atraumatic.      Right Ear: Hearing normal.      Left Ear: Hearing normal.   Cardiovascular:      Rate and Rhythm: Normal rate.   Pulmonary:      Effort: Pulmonary effort is normal.   Abdominal:      Palpations: Abdomen is soft.      Tenderness: There is abdominal tenderness in the suprapubic area. There is right CVA tenderness (minimal). There is no left CVA tenderness.   Genitourinary:     Comments: Patient deferred  exam  Musculoskeletal:      Comments: Normal movement in all 4 extremities   Skin:     General: Skin is warm and dry.   Neurological:      Mental Status: She is alert.      Coordination: Coordination normal.   Psychiatric:         Mood and Affect: Mood normal.       UA consistent with infection    Assessment/Plan:   Assessment    1. Acute cystitis with hematuria  - cefdinir (OMNICEF) 300 MG Cap; Take 1 Capsule by mouth 2 times a day for 14 days.  Dispense: 28 Capsule; Refill: 0  - cefTRIAXone (Rocephin) 1,000 mg in lidocaine (Xylocaine) 1 % 4 mL for IM use  - URINE CULTURE(NEW); Future    2. Dysuria  - POCT Urinalysis  - phenazopyridine (PYRIDIUM) 200 MG Tab; Take 1 Tablet by mouth 3 times a day as needed for Moderate Pain.  Dispense: 6 Tablet; Refill: 0    No significant flank pain or findings on exam  today that would warrant any need of CT at this time but did discuss with patient possibility of needing a CT scan if symptoms persist or worsen to rule out potential kidney stone.  If urine output continues to be decreased I would recommend going to the ER for this as well.  She is understanding.  Will send an extended dose of cefdinir at this time as symptoms improved with cefdinir at her last visit but did seem to recur quickly.  Most likely urinary retention causing recurrent symptoms of UTI.  She will follow-up with urology in the next couple of weeks.    Differential diagnosis, natural history, supportive care, and indications for immediate follow-up discussed.   Patient given instructions and understanding of medications and treatment.    If not improving in 3-5 days, F/U with PCP or return to UC if symptoms worsen.  Strict ER precautions provided with any new or worsening symptoms.  Patient and her  are agreeable to plan.    Please note that this dictation was created using voice recognition software. I have made every reasonable attempt to correct obvious errors, but I expect that there are errors of grammar and possibly content that I did not discover before finalizing the note.    Lobito Rodriguez PA-C

## 2024-03-11 LAB
BACTERIA UR CULT: NORMAL
SIGNIFICANT IND 70042: NORMAL
SITE SITE: NORMAL
SOURCE SOURCE: NORMAL

## 2024-03-12 ENCOUNTER — TELEPHONE (OUTPATIENT)
Dept: URGENT CARE | Facility: PHYSICIAN GROUP | Age: 64
End: 2024-03-12
Payer: COMMERCIAL

## 2024-03-12 NOTE — TELEPHONE ENCOUNTER
Attempted to call patient but no answer. Wanted to check in with symptoms and see if improving. Will attempt to call again at later time.   
Declines

## 2024-03-17 ENCOUNTER — PATIENT MESSAGE (OUTPATIENT)
Dept: MEDICAL GROUP | Facility: LAB | Age: 64
End: 2024-03-17
Payer: COMMERCIAL

## 2024-03-17 DIAGNOSIS — J45.41 MODERATE PERSISTENT ASTHMA WITH ACUTE EXACERBATION: ICD-10-CM

## 2024-03-19 ENCOUNTER — TELEMEDICINE (OUTPATIENT)
Dept: MEDICAL GROUP | Facility: LAB | Age: 64
End: 2024-03-19
Payer: COMMERCIAL

## 2024-03-19 DIAGNOSIS — B37.0 THRUSH: ICD-10-CM

## 2024-03-19 DIAGNOSIS — J98.9 RESPIRATORY ILLNESS: ICD-10-CM

## 2024-03-19 DIAGNOSIS — N39.0 RECURRENT UTI: ICD-10-CM

## 2024-03-19 DIAGNOSIS — J45.41 MODERATE PERSISTENT ASTHMA WITH ACUTE EXACERBATION: ICD-10-CM

## 2024-03-19 PROCEDURE — 99214 OFFICE O/P EST MOD 30 MIN: CPT | Mod: 95 | Performed by: FAMILY MEDICINE

## 2024-03-19 RX ORDER — CLOTRIMAZOLE 10 MG/1
10 LOZENGE ORAL; TOPICAL
Qty: 30 TROCHE | Refills: 3 | Status: SHIPPED | OUTPATIENT
Start: 2024-03-19 | End: 2024-03-19

## 2024-03-19 RX ORDER — CLOTRIMAZOLE 10 MG/1
10 LOZENGE ORAL; TOPICAL
Qty: 40 TROCHE | Refills: 3 | Status: SHIPPED | OUTPATIENT
Start: 2024-03-19 | End: 2024-03-26

## 2024-03-19 NOTE — PROGRESS NOTES
Virtual Visit: Established Patient   This visit was conducted via Zoom using secure and encrypted videoconferencing technology.   The patient was in a private location outside of their home in the state of [place].    The patient's identity was confirmed and verbal consent was obtained for this virtual visit.    Subjective:   CC:   Chief Complaint   Patient presents with    Asthma     Arely Haynes is a 63 y.o. female with a complex medical history that includes psoriatic arthritis, history of PCP pneumonia, chronic immunosuppression, ILD, and asthma who presents with multiple concerns.    Seen in urgent care 9 days ago with symptoms concerning for UTI, was given Rocephin along with Rx for cefdinir.  Culture ended up being negative and she did stop cefdinir as was concerned about a possible reaction to this with rash on her back.  She is seeing urology for this next week.    She has also had a week of respiratory symptoms including shortness of breath, fatigue, wheezing, dyspnea on exertion.  He has not had fevers.  Has had some lateral rib pain with coughing.   is a physician and reported scattered wheezing on examining her lungs, he did start her on a course of prednisone 50 mg daily for 2 days and had seen improvement in symptoms.  Has been monitoring peak flow and this is improving.    Also reports oral thrush and requesting treatment for this.    ROS   See HPI    Current medicines (including changes today)  Current Outpatient Medications   Medication Sig Dispense Refill    cefdinir (OMNICEF) 300 MG Cap Take 1 Capsule by mouth 2 times a day for 14 days. 28 Capsule 0    phenazopyridine (PYRIDIUM) 200 MG Tab Take 1 Tablet by mouth 3 times a day as needed for Moderate Pain. 6 Tablet 0    Tirzepatide-Weight Management 2.5 MG/0.5ML Solution Auto-injector Inject 0.5 mL under the skin every 7 days. 2 mL 3    pregabalin (LYRICA) 200 MG capsule Take 1 Capsule by mouth 2 times a day for 90 days. 180 Capsule 0     estradiol (ESTRACE) 0.1 MG/GM vaginal cream Insert 2 g daily intravaginally for 2 weeks, followed by 1 g two-three times per week 42.5 g 3    metFORMIN ER (GLUCOPHAGE XR) 500 MG TABLET SR 24 HR Take 1 Tablet by mouth 2 times a day. 180 Tablet 3    magnesium oxide (MAG-OX) 400 MG Tab tablet Take 1 Tablet by mouth every morning. 90 Tablet 3    etodolac (LODINE) 400 MG tablet Take 1 Tablet by mouth 2 times a day. 180 Tablet 3    omeprazole (PRILOSEC) 40 MG delayed-release capsule Take 1 Capsule by mouth every day. 90 Capsule 3    escitalopram (LEXAPRO) 20 MG tablet Take 1 Tablet by mouth every morning. Take with escitalopram 10 mg for a total dose of 30 mg. 90 Tablet 3    famotidine (PEPCID) 20 MG Tab Take 1 Tablet by mouth 2 times a day. Indications: Gastroesophageal Reflux Disease 180 Tablet 3    lamoTRIgine (LAMICTAL) 100 MG Tab Take 1 Tablet by mouth every morning. 90 Tablet 3    escitalopram (LEXAPRO) 10 MG Tab Take 1 Tablet by mouth every morning. Take with escitalopram 20 mg for a total dose of 30 mg. 90 Tablet 3    rosuvastatin (CRESTOR) 10 MG Tab Take 1 Tablet by mouth at bedtime. 90 Tablet 3    montelukast (SINGULAIR) 10 MG Tab Take 1 Tablet by mouth every day. 90 Tablet 3    fluticasone-salmeterol (ADVAIR) 250-50 MCG/ACT AEROSOL POWDER, BREATH ACTIVATED Inhale 1 Puff 2 times a day. 60 Each 3    EPINEPHrine 0.3 MG/0.3ML Solution Prefilled Syringe Inject the contents of the epipen into the thigh, hold for 3 seconds and release from thigh as needed for anaphylaxis. (Patient taking differently: Inject 0.3 mL under the skin as needed. Inject the contents of the epipen into the thigh, hold for 3 seconds and release from thigh as needed for anaphylaxis.  Indications: Life-Threatening Hypersensitivity Reaction) 1 Each 6     No current facility-administered medications for this visit.       Patient Active Problem List    Diagnosis Date Noted    Cough 08/05/2023    Chronic diarrhea 08/05/2023    ILD (interstitial  lung disease) ([facility]) 07/12/2023    Personal history of colonic polyps 05/12/2023    Noninfective gastroenteritis and colitis, unspecified 05/12/2023    Hypercholesterolemia 05/12/2023    Hoarseness 05/12/2023    Hemorrhoids without complication 05/12/2023    Hematochezia 05/12/2023    Heartburn 05/12/2023    Fecal urgency 05/12/2023    Polyp of stomach and duodenum 01/03/2023    Diaphragmatic hernia without obstruction or gangrene 01/03/2023    Pulmonary nodules 08/16/2021    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[facility]) 07/15/2021    Bradycardia 07/15/2021    GERD (gastroesophageal reflux disease) 07/08/2021    Second degree hemorrhoids 10/26/2020    Polyp of colon 10/26/2020    Dvrtclos of lg int w/o perforation or abscess w/o bleeding 10/26/2020    Major depressive disorder, recurrent, moderate ([facility]) 01/07/2020    CHRISTY (generalized anxiety disorder) 01/07/2020    Seasonal allergies 12/30/2019    H/O: hysterectomy 10/02/2019    Quadriceps tendon rupture, left, initial encounter 09/19/2019    Ovarian cyst 06/25/2019    Psoriasis 03/27/2019    Immunosuppression ([facility]) 03/27/2019    Obesity (BMI 30.0-34.9) 03/27/2019    Menopausal hot flushes 03/27/2019    Psoriatic arthritis ([facility]) 11/30/2018    Familial hypercholesterolemia 11/30/2018    Moderate persistent asthma without complication 11/30/2018    History of total right hip arthroplasty 09/22/2017    Asthma 04/09/2015        Objective:   LMP  (LMP Unknown)     Physical Exam:  Constitutional: Alert, no distress, well-groomed.  Skin: No rashes in visible areas.  Eye: Round. Conjunctiva clear, lids normal. No icterus.   ENMT: Lips pink without lesions  Neck: No masses, no thyromegaly. Moves freely without pain.  Respiratory: Unlabored respiratory effort, no cough or audible wheeze  Psych: Alert and oriented x3, normal affect and mood.     Assessment and Plan:   The following treatment plan was discussed:     1. Respiratory illness  2. Moderate persistent asthma with acute  exacerbation  Dyspnea on exertion, shortness of breath, and wheezing. Likely viral respiratory illness triggering asthma flare as she has responded to treatment with 50 mg prednisone x 2 days.  However, she is high risk with immunosuppression, ILD, history of PCP pneumonia.  Chest x-ray is ordered for further evaluation with plan to continue prednisone course if this does not reveal anything concerning for acute pneumonia.  Discussed importance of prompt follow-up with ER precautions for worsening symptoms, also discussed limitations of virtual visit today.  She is scheduled close follow-up in 3 days for recheck with ER precautions in the meantime.    3. Thrush  Rx sent for clotrimazole atrocious, follow-up if not improving.  - clotrimazole (MYCELEX) 10 MG Bhumi bhumi; Take 1 Bhumi by mouth 5 Times a Day for 7 days.  Dispense: 40 Bhumi; Refill: 3    4. Recurrent UTI  Evaluated at urgent care 9 days ago and was given dose of Rocephin and course of cefdinir.  Culture ended up being negative and she has since stopped cefdinir as there was some concern that her rash could be secondary to this.  No return of urinary symptoms and no current urinary symptoms.  Has follow-up with urology next week.    Follow-up: Return in about 3 days (around 3/22/2024).

## 2024-03-20 ENCOUNTER — HOSPITAL ENCOUNTER (OUTPATIENT)
Dept: RADIOLOGY | Facility: MEDICAL CENTER | Age: 64
DRG: 189 | End: 2024-03-20
Attending: FAMILY MEDICINE
Payer: COMMERCIAL

## 2024-03-20 DIAGNOSIS — J45.41 MODERATE PERSISTENT ASTHMA WITH ACUTE EXACERBATION: ICD-10-CM

## 2024-03-20 DIAGNOSIS — J98.9 RESPIRATORY ILLNESS: ICD-10-CM

## 2024-03-20 PROCEDURE — 71046 X-RAY EXAM CHEST 2 VIEWS: CPT

## 2024-03-20 RX ORDER — PREDNISONE 10 MG/1
TABLET ORAL
Qty: 30 TABLET | Refills: 0 | Status: SHIPPED | OUTPATIENT
Start: 2024-03-20 | End: 2024-03-20

## 2024-03-20 RX ORDER — PREDNISONE 10 MG/1
TABLET ORAL
Qty: 24 TABLET | Refills: 0 | Status: ON HOLD | OUTPATIENT
Start: 2024-03-20 | End: 2024-03-24

## 2024-03-22 ENCOUNTER — HOSPITAL ENCOUNTER (INPATIENT)
Facility: MEDICAL CENTER | Age: 64
LOS: 2 days | DRG: 189 | End: 2024-03-24
Attending: EMERGENCY MEDICINE | Admitting: INTERNAL MEDICINE
Payer: COMMERCIAL

## 2024-03-22 ENCOUNTER — APPOINTMENT (OUTPATIENT)
Dept: RADIOLOGY | Facility: MEDICAL CENTER | Age: 64
DRG: 189 | End: 2024-03-22
Attending: EMERGENCY MEDICINE
Payer: COMMERCIAL

## 2024-03-22 ENCOUNTER — APPOINTMENT (OUTPATIENT)
Dept: MEDICAL GROUP | Facility: LAB | Age: 64
End: 2024-03-22
Payer: COMMERCIAL

## 2024-03-22 DIAGNOSIS — E66.9 OBESITY (BMI 30.0-34.9): ICD-10-CM

## 2024-03-22 DIAGNOSIS — R06.02 SOB (SHORTNESS OF BREATH): ICD-10-CM

## 2024-03-22 DIAGNOSIS — J45.909 UNCOMPLICATED ASTHMA, UNSPECIFIED ASTHMA SEVERITY, UNSPECIFIED WHETHER PERSISTENT: ICD-10-CM

## 2024-03-22 DIAGNOSIS — J45.40 MODERATE PERSISTENT ASTHMA WITHOUT COMPLICATION: ICD-10-CM

## 2024-03-22 DIAGNOSIS — J96.01 ACUTE HYPOXEMIC RESPIRATORY FAILURE (HCC): ICD-10-CM

## 2024-03-22 DIAGNOSIS — R73.03 PREDIABETES: ICD-10-CM

## 2024-03-22 PROBLEM — J84.9 ILD (INTERSTITIAL LUNG DISEASE) (HCC): Status: RESOLVED | Noted: 2023-07-12 | Resolved: 2024-03-22

## 2024-03-22 LAB
ALBUMIN SERPL BCP-MCNC: 4.1 G/DL (ref 3.2–4.9)
ALBUMIN/GLOB SERPL: 1.4 G/DL
ALP SERPL-CCNC: 95 U/L (ref 30–99)
ALT SERPL-CCNC: 16 U/L (ref 2–50)
ANION GAP SERPL CALC-SCNC: 12 MMOL/L (ref 7–16)
AST SERPL-CCNC: 25 U/L (ref 12–45)
BASOPHILS # BLD AUTO: 0.5 % (ref 0–1.8)
BASOPHILS # BLD: 0.05 K/UL (ref 0–0.12)
BILIRUB SERPL-MCNC: 0.4 MG/DL (ref 0.1–1.5)
BUN SERPL-MCNC: 34 MG/DL (ref 8–22)
CALCIUM ALBUM COR SERPL-MCNC: 9.3 MG/DL (ref 8.5–10.5)
CALCIUM SERPL-MCNC: 9.4 MG/DL (ref 8.4–10.2)
CHLORIDE SERPL-SCNC: 108 MMOL/L (ref 96–112)
CO2 SERPL-SCNC: 22 MMOL/L (ref 20–33)
CREAT SERPL-MCNC: 0.75 MG/DL (ref 0.5–1.4)
EKG IMPRESSION: NORMAL
EOSINOPHIL # BLD AUTO: 0.04 K/UL (ref 0–0.51)
EOSINOPHIL NFR BLD: 0.4 % (ref 0–6.9)
ERYTHROCYTE [DISTWIDTH] IN BLOOD BY AUTOMATED COUNT: 42.5 FL (ref 35.9–50)
FLUAV RNA SPEC QL NAA+PROBE: NEGATIVE
FLUBV RNA SPEC QL NAA+PROBE: NEGATIVE
GFR SERPLBLD CREATININE-BSD FMLA CKD-EPI: 89 ML/MIN/1.73 M 2
GLOBULIN SER CALC-MCNC: 3 G/DL (ref 1.9–3.5)
GLUCOSE BLD STRIP.AUTO-MCNC: 141 MG/DL (ref 65–99)
GLUCOSE SERPL-MCNC: 104 MG/DL (ref 65–99)
HCT VFR BLD AUTO: 42.1 % (ref 37–47)
HGB BLD-MCNC: 14.2 G/DL (ref 12–16)
IMM GRANULOCYTES # BLD AUTO: 0.15 K/UL (ref 0–0.11)
IMM GRANULOCYTES NFR BLD AUTO: 1.4 % (ref 0–0.9)
LYMPHOCYTES # BLD AUTO: 4.56 K/UL (ref 1–4.8)
LYMPHOCYTES NFR BLD: 42.3 % (ref 22–41)
MCH RBC QN AUTO: 30.9 PG (ref 27–33)
MCHC RBC AUTO-ENTMCNC: 33.7 G/DL (ref 32.2–35.5)
MCV RBC AUTO: 91.5 FL (ref 81.4–97.8)
MONOCYTES # BLD AUTO: 0.66 K/UL (ref 0–0.85)
MONOCYTES NFR BLD AUTO: 6.1 % (ref 0–13.4)
NEUTROPHILS # BLD AUTO: 5.32 K/UL (ref 1.82–7.42)
NEUTROPHILS NFR BLD: 49.3 % (ref 44–72)
NRBC # BLD AUTO: 0 K/UL
NRBC BLD-RTO: 0 /100 WBC (ref 0–0.2)
PLATELET # BLD AUTO: 270 K/UL (ref 164–446)
PMV BLD AUTO: 9.6 FL (ref 9–12.9)
POTASSIUM SERPL-SCNC: 3.4 MMOL/L (ref 3.6–5.5)
PROT SERPL-MCNC: 7.1 G/DL (ref 6–8.2)
RBC # BLD AUTO: 4.6 M/UL (ref 4.2–5.4)
RSV RNA SPEC QL NAA+PROBE: NEGATIVE
SARS-COV-2 RNA RESP QL NAA+PROBE: NOTDETECTED
SODIUM SERPL-SCNC: 142 MMOL/L (ref 135–145)
SPECIMEN SOURCE: NORMAL
TROPONIN T SERPL-MCNC: 8 NG/L (ref 6–19)
WBC # BLD AUTO: 10.8 K/UL (ref 4.8–10.8)

## 2024-03-22 PROCEDURE — 94640 AIRWAY INHALATION TREATMENT: CPT

## 2024-03-22 PROCEDURE — 770022 HCHG ROOM/CARE - ICU (200)

## 2024-03-22 PROCEDURE — 0241U HCHG SARS-COV-2 COVID-19 NFCT DS RESP RNA 4 TRGT MIC: CPT

## 2024-03-22 PROCEDURE — 96366 THER/PROPH/DIAG IV INF ADDON: CPT

## 2024-03-22 PROCEDURE — 94645 CONT INHLJ TX EACH ADDL HOUR: CPT

## 2024-03-22 PROCEDURE — 94760 N-INVAS EAR/PLS OXIMETRY 1: CPT

## 2024-03-22 PROCEDURE — 85025 COMPLETE CBC W/AUTO DIFF WBC: CPT

## 2024-03-22 PROCEDURE — 99291 CRITICAL CARE FIRST HOUR: CPT

## 2024-03-22 PROCEDURE — 700111 HCHG RX REV CODE 636 W/ 250 OVERRIDE (IP): Mod: JZ | Performed by: EMERGENCY MEDICINE

## 2024-03-22 PROCEDURE — 84484 ASSAY OF TROPONIN QUANT: CPT

## 2024-03-22 PROCEDURE — 700105 HCHG RX REV CODE 258: Performed by: INTERNAL MEDICINE

## 2024-03-22 PROCEDURE — 700101 HCHG RX REV CODE 250: Performed by: EMERGENCY MEDICINE

## 2024-03-22 PROCEDURE — 96365 THER/PROPH/DIAG IV INF INIT: CPT

## 2024-03-22 PROCEDURE — 82962 GLUCOSE BLOOD TEST: CPT

## 2024-03-22 PROCEDURE — 99291 CRITICAL CARE FIRST HOUR: CPT | Performed by: INTERNAL MEDICINE

## 2024-03-22 PROCEDURE — 93005 ELECTROCARDIOGRAM TRACING: CPT

## 2024-03-22 PROCEDURE — 700111 HCHG RX REV CODE 636 W/ 250 OVERRIDE (IP): Mod: JZ | Performed by: INTERNAL MEDICINE

## 2024-03-22 PROCEDURE — 700102 HCHG RX REV CODE 250 W/ 637 OVERRIDE(OP): Performed by: INTERNAL MEDICINE

## 2024-03-22 PROCEDURE — 71045 X-RAY EXAM CHEST 1 VIEW: CPT

## 2024-03-22 PROCEDURE — 94644 CONT INHLJ TX 1ST HOUR: CPT

## 2024-03-22 PROCEDURE — 36415 COLL VENOUS BLD VENIPUNCTURE: CPT

## 2024-03-22 PROCEDURE — 96375 TX/PRO/DX INJ NEW DRUG ADDON: CPT

## 2024-03-22 PROCEDURE — A9270 NON-COVERED ITEM OR SERVICE: HCPCS | Performed by: INTERNAL MEDICINE

## 2024-03-22 PROCEDURE — 700105 HCHG RX REV CODE 258: Performed by: EMERGENCY MEDICINE

## 2024-03-22 PROCEDURE — 700101 HCHG RX REV CODE 250: Performed by: INTERNAL MEDICINE

## 2024-03-22 PROCEDURE — 80053 COMPREHEN METABOLIC PANEL: CPT

## 2024-03-22 RX ORDER — METHYLPREDNISOLONE SODIUM SUCCINATE 125 MG/2ML
125 INJECTION, POWDER, LYOPHILIZED, FOR SOLUTION INTRAMUSCULAR; INTRAVENOUS ONCE
Status: COMPLETED | OUTPATIENT
Start: 2024-03-22 | End: 2024-03-22

## 2024-03-22 RX ORDER — POTASSIUM CHLORIDE 20 MEQ/1
40 TABLET, EXTENDED RELEASE ORAL ONCE
Status: COMPLETED | OUTPATIENT
Start: 2024-03-22 | End: 2024-03-22

## 2024-03-22 RX ORDER — METHYLPREDNISOLONE SODIUM SUCCINATE 125 MG/2ML
62.5 INJECTION, POWDER, LYOPHILIZED, FOR SOLUTION INTRAMUSCULAR; INTRAVENOUS DAILY
Status: DISCONTINUED | OUTPATIENT
Start: 2024-03-23 | End: 2024-03-24 | Stop reason: HOSPADM

## 2024-03-22 RX ORDER — PREGABALIN 100 MG/1
200 CAPSULE ORAL 2 TIMES DAILY
Status: DISCONTINUED | OUTPATIENT
Start: 2024-03-22 | End: 2024-03-24 | Stop reason: HOSPADM

## 2024-03-22 RX ORDER — FAMOTIDINE 20 MG/1
20 TABLET, FILM COATED ORAL 2 TIMES DAILY
Status: DISCONTINUED | OUTPATIENT
Start: 2024-03-22 | End: 2024-03-24 | Stop reason: HOSPADM

## 2024-03-22 RX ORDER — POLYETHYLENE GLYCOL 3350 17 G/17G
1 POWDER, FOR SOLUTION ORAL
Status: DISCONTINUED | OUTPATIENT
Start: 2024-03-22 | End: 2024-03-24 | Stop reason: HOSPADM

## 2024-03-22 RX ORDER — MONTELUKAST SODIUM 10 MG/1
10 TABLET ORAL DAILY
Status: DISCONTINUED | OUTPATIENT
Start: 2024-03-23 | End: 2024-03-24 | Stop reason: HOSPADM

## 2024-03-22 RX ORDER — PROMETHAZINE HYDROCHLORIDE 25 MG/1
12.5-25 TABLET ORAL EVERY 4 HOURS PRN
Status: DISCONTINUED | OUTPATIENT
Start: 2024-03-22 | End: 2024-03-24 | Stop reason: HOSPADM

## 2024-03-22 RX ORDER — FLUTICASONE PROPIONATE AND SALMETEROL 250; 50 UG/1; UG/1
1 POWDER RESPIRATORY (INHALATION) 2 TIMES DAILY PRN
Status: ON HOLD | COMMUNITY
End: 2024-03-24

## 2024-03-22 RX ORDER — FLUTICASONE PROPIONATE AND SALMETEROL 250; 50 UG/1; UG/1
POWDER RESPIRATORY (INHALATION)
Qty: 60 EACH | Refills: 11 | Status: SHIPPED | OUTPATIENT
Start: 2024-03-22 | End: 2024-03-22

## 2024-03-22 RX ORDER — LANOLIN ALCOHOL/MO/W.PET/CERES
400 CREAM (GRAM) TOPICAL DAILY
Status: DISCONTINUED | OUTPATIENT
Start: 2024-03-23 | End: 2024-03-24 | Stop reason: HOSPADM

## 2024-03-22 RX ORDER — MAGNESIUM SULFATE HEPTAHYDRATE 40 MG/ML
2 INJECTION, SOLUTION INTRAVENOUS ONCE
Status: COMPLETED | OUTPATIENT
Start: 2024-03-22 | End: 2024-03-22

## 2024-03-22 RX ORDER — ESCITALOPRAM OXALATE 10 MG/1
20 TABLET ORAL EVERY MORNING
Status: DISCONTINUED | OUTPATIENT
Start: 2024-03-23 | End: 2024-03-24 | Stop reason: HOSPADM

## 2024-03-22 RX ORDER — ROSUVASTATIN CALCIUM 10 MG/1
10 TABLET, COATED ORAL
Status: DISCONTINUED | OUTPATIENT
Start: 2024-03-22 | End: 2024-03-24 | Stop reason: HOSPADM

## 2024-03-22 RX ORDER — PROMETHAZINE HYDROCHLORIDE 25 MG/1
12.5-25 SUPPOSITORY RECTAL EVERY 4 HOURS PRN
Status: DISCONTINUED | OUTPATIENT
Start: 2024-03-22 | End: 2024-03-24 | Stop reason: HOSPADM

## 2024-03-22 RX ORDER — ENOXAPARIN SODIUM 100 MG/ML
40 INJECTION SUBCUTANEOUS DAILY
Status: DISCONTINUED | OUTPATIENT
Start: 2024-03-22 | End: 2024-03-24 | Stop reason: HOSPADM

## 2024-03-22 RX ORDER — SODIUM CHLORIDE 9 MG/ML
1000 INJECTION, SOLUTION INTRAVENOUS ONCE
Status: COMPLETED | OUTPATIENT
Start: 2024-03-22 | End: 2024-03-22

## 2024-03-22 RX ORDER — ACETAMINOPHEN 325 MG/1
650 TABLET ORAL EVERY 6 HOURS PRN
Status: DISCONTINUED | OUTPATIENT
Start: 2024-03-22 | End: 2024-03-24 | Stop reason: HOSPADM

## 2024-03-22 RX ORDER — AMOXICILLIN 250 MG
2 CAPSULE ORAL EVERY EVENING
Status: DISCONTINUED | OUTPATIENT
Start: 2024-03-22 | End: 2024-03-24 | Stop reason: HOSPADM

## 2024-03-22 RX ORDER — IPRATROPIUM BROMIDE AND ALBUTEROL SULFATE 2.5; .5 MG/3ML; MG/3ML
3 SOLUTION RESPIRATORY (INHALATION)
Status: DISCONTINUED | OUTPATIENT
Start: 2024-03-22 | End: 2024-03-23

## 2024-03-22 RX ORDER — ONDANSETRON 4 MG/1
4 TABLET, ORALLY DISINTEGRATING ORAL EVERY 4 HOURS PRN
Status: DISCONTINUED | OUTPATIENT
Start: 2024-03-22 | End: 2024-03-24 | Stop reason: HOSPADM

## 2024-03-22 RX ORDER — PROCHLORPERAZINE EDISYLATE 5 MG/ML
5-10 INJECTION INTRAMUSCULAR; INTRAVENOUS EVERY 4 HOURS PRN
Status: DISCONTINUED | OUTPATIENT
Start: 2024-03-22 | End: 2024-03-24 | Stop reason: HOSPADM

## 2024-03-22 RX ORDER — ONDANSETRON 2 MG/ML
4 INJECTION INTRAMUSCULAR; INTRAVENOUS EVERY 4 HOURS PRN
Status: DISCONTINUED | OUTPATIENT
Start: 2024-03-22 | End: 2024-03-24 | Stop reason: HOSPADM

## 2024-03-22 RX ORDER — IPRATROPIUM BROMIDE AND ALBUTEROL SULFATE 2.5; .5 MG/3ML; MG/3ML
3 SOLUTION RESPIRATORY (INHALATION)
Status: DISCONTINUED | OUTPATIENT
Start: 2024-03-22 | End: 2024-03-22

## 2024-03-22 RX ORDER — MAGNESIUM OXIDE 400 MG/1
400 TABLET ORAL EVERY MORNING
Status: DISCONTINUED | OUTPATIENT
Start: 2024-03-22 | End: 2024-03-22

## 2024-03-22 RX ORDER — METFORMIN HYDROCHLORIDE 500 MG/1
500 TABLET, EXTENDED RELEASE ORAL 2 TIMES DAILY WITH MEALS
Status: DISCONTINUED | OUTPATIENT
Start: 2024-03-22 | End: 2024-03-24 | Stop reason: HOSPADM

## 2024-03-22 RX ORDER — CLOTRIMAZOLE 10 MG/1
10 LOZENGE ORAL; TOPICAL
Status: DISCONTINUED | OUTPATIENT
Start: 2024-03-22 | End: 2024-03-24 | Stop reason: HOSPADM

## 2024-03-22 RX ADMIN — PREGABALIN 200 MG: 100 CAPSULE ORAL at 17:08

## 2024-03-22 RX ADMIN — FAMOTIDINE 20 MG: 20 TABLET ORAL at 17:08

## 2024-03-22 RX ADMIN — IPRATROPIUM BROMIDE AND ALBUTEROL SULFATE 3 ML: .5; 3 SOLUTION RESPIRATORY (INHALATION) at 11:28

## 2024-03-22 RX ADMIN — MAGNESIUM SULFATE HEPTAHYDRATE 2 G: 2 INJECTION, SOLUTION INTRAVENOUS at 09:22

## 2024-03-22 RX ADMIN — IPRATROPIUM BROMIDE AND ALBUTEROL SULFATE 3 ML: .5; 3 SOLUTION RESPIRATORY (INHALATION) at 22:40

## 2024-03-22 RX ADMIN — CLOTRIMAZOLE 10 MG: 10 LOZENGE ORAL; TOPICAL at 22:21

## 2024-03-22 RX ADMIN — MOMETASONE FUROATE AND FORMOTEROL FUMARATE DIHYDRATE 2 PUFF: 200; 5 AEROSOL RESPIRATORY (INHALATION) at 18:44

## 2024-03-22 RX ADMIN — IPRATROPIUM BROMIDE AND ALBUTEROL SULFATE 3 ML: .5; 3 SOLUTION RESPIRATORY (INHALATION) at 18:44

## 2024-03-22 RX ADMIN — ENOXAPARIN SODIUM 40 MG: 100 INJECTION SUBCUTANEOUS at 17:08

## 2024-03-22 RX ADMIN — CLOTRIMAZOLE 10 MG: 10 LOZENGE ORAL; TOPICAL at 15:59

## 2024-03-22 RX ADMIN — METHYLPREDNISOLONE SODIUM SUCCINATE 125 MG: 125 INJECTION, POWDER, FOR SOLUTION INTRAMUSCULAR; INTRAVENOUS at 09:21

## 2024-03-22 RX ADMIN — CLOTRIMAZOLE 10 MG: 10 LOZENGE ORAL; TOPICAL at 19:25

## 2024-03-22 RX ADMIN — Medication 15 MG/HR: at 12:19

## 2024-03-22 RX ADMIN — ALBUTEROL SULFATE 5 MG: 2.5 SOLUTION RESPIRATORY (INHALATION) at 09:50

## 2024-03-22 RX ADMIN — DOCUSATE SODIUM 50MG AND SENNOSIDES 8.6MG 2 TABLET: 8.6; 5 TABLET, FILM COATED ORAL at 17:08

## 2024-03-22 RX ADMIN — ALBUTEROL SULFATE 5 MG: 2.5 SOLUTION RESPIRATORY (INHALATION) at 09:33

## 2024-03-22 RX ADMIN — SODIUM CHLORIDE 1000 ML: 9 INJECTION, SOLUTION INTRAVENOUS at 10:16

## 2024-03-22 RX ADMIN — POTASSIUM CHLORIDE 40 MEQ: 1500 TABLET, EXTENDED RELEASE ORAL at 17:08

## 2024-03-22 RX ADMIN — CLOTRIMAZOLE 10 MG: 10 LOZENGE ORAL; TOPICAL at 13:19

## 2024-03-22 RX ADMIN — IPRATROPIUM BROMIDE AND ALBUTEROL SULFATE 3 ML: .5; 3 SOLUTION RESPIRATORY (INHALATION) at 09:20

## 2024-03-22 RX ADMIN — ROSUVASTATIN 10 MG: 10 TABLET, FILM COATED ORAL at 22:21

## 2024-03-22 ASSESSMENT — LIFESTYLE VARIABLES
AVERAGE NUMBER OF DAYS PER WEEK YOU HAVE A DRINK CONTAINING ALCOHOL: 7
EVER FELT BAD OR GUILTY ABOUT YOUR DRINKING: NO
TOTAL SCORE: 0
TOTAL SCORE: 0
ALCOHOL_USE: YES
HOW MANY TIMES IN THE PAST YEAR HAVE YOU HAD 5 OR MORE DRINKS IN A DAY: 0
HAVE PEOPLE ANNOYED YOU BY CRITICIZING YOUR DRINKING: NO
HAVE PEOPLE ANNOYED YOU BY CRITICIZING YOUR DRINKING: NO
ON A TYPICAL DAY WHEN YOU DRINK ALCOHOL HOW MANY DRINKS DO YOU HAVE: 1
TOTAL SCORE: 0
EVER FELT BAD OR GUILTY ABOUT YOUR DRINKING: NO
DOES PATIENT WANT TO STOP DRINKING: NO
EVER HAD A DRINK FIRST THING IN THE MORNING TO STEADY YOUR NERVES TO GET RID OF A HANGOVER: NO
HAVE YOU EVER FELT YOU SHOULD CUT DOWN ON YOUR DRINKING: NO
TOTAL SCORE: 0
ALCOHOL_USE: YES
TOTAL SCORE: 0
EVER_SMOKED: NEVER
HAVE YOU EVER FELT YOU SHOULD CUT DOWN ON YOUR DRINKING: NO
CONSUMPTION TOTAL: NEGATIVE
EVER HAD A DRINK FIRST THING IN THE MORNING TO STEADY YOUR NERVES TO GET RID OF A HANGOVER: NO
CONSUMPTION TOTAL: NEGATIVE
AVERAGE NUMBER OF DAYS PER WEEK YOU HAVE A DRINK CONTAINING ALCOHOL: 7
DOES PATIENT WANT TO STOP DRINKING: CANNOT ASSESS
TOTAL SCORE: 0
HOW MANY TIMES IN THE PAST YEAR HAVE YOU HAD 5 OR MORE DRINKS IN A DAY: 0
ON A TYPICAL DAY WHEN YOU DRINK ALCOHOL HOW MANY DRINKS DO YOU HAVE: 1

## 2024-03-22 ASSESSMENT — COGNITIVE AND FUNCTIONAL STATUS - GENERAL
DRESSING REGULAR LOWER BODY CLOTHING: A LITTLE
WALKING IN HOSPITAL ROOM: A LITTLE
MOBILITY SCORE: 20
CLIMB 3 TO 5 STEPS WITH RAILING: A LITTLE
PERSONAL GROOMING: A LITTLE
TOILETING: A LITTLE
EATING MEALS: A LITTLE
MOVING TO AND FROM BED TO CHAIR: A LITTLE
DAILY ACTIVITIY SCORE: 18
DRESSING REGULAR UPPER BODY CLOTHING: A LITTLE
SUGGESTED CMS G CODE MODIFIER DAILY ACTIVITY: CK
SUGGESTED CMS G CODE MODIFIER MOBILITY: CJ
HELP NEEDED FOR BATHING: A LITTLE
STANDING UP FROM CHAIR USING ARMS: A LITTLE

## 2024-03-22 ASSESSMENT — COPD QUESTIONNAIRES
COPD SCREENING SCORE: 2
HAVE YOU SMOKED AT LEAST 100 CIGARETTES IN YOUR ENTIRE LIFE: NO/DON'T KNOW
DO YOU EVER COUGH UP ANY MUCUS OR PHLEGM?: NO/ONLY WITH OCCASIONAL COLDS OR INFECTIONS
DURING THE PAST 4 WEEKS HOW MUCH DID YOU FEEL SHORT OF BREATH: NONE/LITTLE OF THE TIME

## 2024-03-22 ASSESSMENT — ENCOUNTER SYMPTOMS
WHEEZING: 1
DIZZINESS: 0
CHILLS: 0
VOMITING: 0
DEPRESSION: 0
WEAKNESS: 1
SPUTUM PRODUCTION: 1
COUGH: 1
FEVER: 0
SHORTNESS OF BREATH: 1
NAUSEA: 0

## 2024-03-22 ASSESSMENT — FIBROSIS 4 INDEX
FIB4 SCORE: 1.481481481481481481
FIB4 SCORE: 1.59

## 2024-03-22 ASSESSMENT — PAIN DESCRIPTION - PAIN TYPE
TYPE: ACUTE PAIN
TYPE: ACUTE PAIN

## 2024-03-22 NOTE — ED PROVIDER NOTES
ED Provider Note    CHIEF COMPLAINT  Chief Complaint   Patient presents with    Shortness of Breath       EXTERNAL RECORDS REVIEWED  Outpatient Notes seen by her primary care physician via Zoom on 3/19/2024.  She was seen in urgent care 9 days prior with symptoms concerning for UTI and was given Rocephin along with a prescription for cefdinir.  Culture ended up being negative and she stopped cefdinir as she was concerned about a rash on her back.  She also reported a week of respiratory symptoms.  Her  had started her on a course of prednisone 50 mg daily for 2 days and saw improvement.  She has a history of interstitial lung disease as well as a history of PCP pneumonia when she was taking methotrexate for RA.  A prescription for clotrimazole was sent in for thrush.    HPI/ROS  LIMITATION TO HISTORY   Select: : None  OUTSIDE HISTORIAN(S):  Significant other  reports patient has been on high dose steroids for the past 10 days.    Arely Haynes is a 63 y.o. female who presents with a chief complaint of chest tightness and shortness of breath that has been ongoing for the past 2 weeks.  Patient saw her primary care physician and was started on cefdinir and steroids.  Her last dose of steroids is scheduled for tomorrow and she stopped her course of antibiotics 3 days ago.  She has a cough that is productive of white, chunky sputum but denies any hemoptysis.  She has a history of asthma and interstitial lung disease for which she follows with a provider in [place].  She denies any fevers or chills, chest pain, nausea or vomiting.  She has no lung swelling and denies any history of DVT or PE.  She has a history of hyperlipidemia but denies any history of hypertension, diabetes, or tobacco abuse.  She has never had an MI.  Her mother had her first MI at the age of 50 and her father had an MI in his 70s.    PAST MEDICAL HISTORY   has a past medical history of Anesthesia, Arthritis, Asthma, Bowel  habit changes, Depression, Erosive esophagitis, GERD (gastroesophageal reflux disease), Heart burn, High cholesterol, Immunocompromised (HCC), Pain, Pneumonia (2018), Psoriasis, and Psoriatic arthritis (HCC).    SURGICAL HISTORY   has a past surgical history that includes fusion, spine, lumbar, plif; hip replacement, total; cystectomy; other abdominal surgery; gyn surgery (1999); other orthopedic surgery; other orthopedic surgery; hysterectomy robotic xi (N/A, 7/11/2019); salpingo oophorectomy (Bilateral, 7/11/2019); tendon repair (Left, 9/19/2019); bronchoscopy,diagnostic (7/9/2021); inj lumbar/sacral,w/ imaging (Right, 6/2/2023); and injection,sacroiliac joint (Left, 7/28/2023).    FAMILY HISTORY  Family History   Problem Relation Age of Onset    Hyperlipidemia Mother     Heart Attack Mother     Psychiatric Illness Mother     Heart Disease Mother     Arthritis Mother     Lung Disease Father     Cancer Father     Hyperlipidemia Father     Hyperlipidemia Sister     Cancer Sister         breast    Heart Disease Paternal Grandfather     Hypertension Neg Hx     Diabetes Neg Hx        SOCIAL HISTORY  Social History     Tobacco Use    Smoking status: Never    Smokeless tobacco: Never   Vaping Use    Vaping Use: Never used   Substance and Sexual Activity    Alcohol use: Yes     Alcohol/week: 0.6 - 1.2 oz     Types: 1 - 2 Glasses of wine per week     Comment: one glss of wine at night     Drug use: No    Sexual activity: Yes     Comment: Tubal ligation       CURRENT MEDICATIONS  Home Medications       Reviewed by Rei Bush (Pharmacy Tech) on 03/22/24 at 0934  Med List Status: Complete     Medication Last Dose Status   cefdinir (OMNICEF) 300 MG Cap FEW DAYS AGO Active   clotrimazole (MYCELEX) 10 MG Bhumi bhumi 3/22/2024 Active   EPINEPHrine 0.3 MG/0.3ML Solution Prefilled Syringe PRN Active   escitalopram (LEXAPRO) 10 MG Tab 3/22/2024 Active   escitalopram (LEXAPRO) 20 MG tablet 3/22/2024 Active   estradiol  (ESTRACE) 0.1 MG/GM vaginal cream FEW DAYS AGO Active   etodolac (LODINE) 400 MG tablet 3/22/2024 Active   famotidine (PEPCID) 20 MG Tab 3/22/2024 Active   fluticasone-salmeterol (ADVAIR DISKUS) 250-50 MCG/ACT AEROSOL POWDER, BREATH ACTIVATED 3/21/2024 Active   lamoTRIgine (LAMICTAL) 100 MG Tab 3/22/2024 Active   magnesium oxide (MAG-OX) 400 MG Tab tablet 3/22/2024 Active   metFORMIN ER (GLUCOPHAGE XR) 500 MG TABLET SR 24 HR 3/22/2024 Active   montelukast (SINGULAIR) 10 MG Tab 3/22/2024 Active   omeprazole (PRILOSEC) 40 MG delayed-release capsule 3/22/2024 Active   phenazopyridine (PYRIDIUM) 200 MG Tab ABOUT 1 WEEK Active   predniSONE (DELTASONE) 10 MG Tab 3/22/2024 Active   pregabalin (LYRICA) 200 MG capsule 3/22/2024 Active   rosuvastatin (CRESTOR) 10 MG Tab 3/21/2024 Active                    ALLERGIES  Allergies   Allergen Reactions    Bee Venom Anaphylaxis     Pt is allergic to bees.     Iodine Anaphylaxis, Rash and Hives     IV = anaphylaxis, topical = rash  IV = anaphylaxis, topical = rash    Levofloxacin Anaphylaxis    Shellfish-Derived Products Anaphylaxis     LOBSTER     PHYSICAL EXAM  VITAL SIGNS: BP (!) 146/75   Pulse (!) 59   Temp 36 °C (96.8 °F) (Temporal)   Resp (!) 40   Ht 1.829 m (6')   Wt 114 kg (252 lb 3.3 oz)   LMP  (LMP Unknown)   SpO2 93%   BMI 34.21 kg/m²    Physical Exam  Vitals and nursing note reviewed.   Constitutional:       General: She is in acute distress.      Appearance: She is well-developed.   HENT:      Head: Normocephalic and atraumatic.      Mouth/Throat:      Comments: Dry mucous membranes  Eyes:      Extraocular Movements: Extraocular movements intact.      Pupils: Pupils are equal, round, and reactive to light.   Cardiovascular:      Rate and Rhythm: Normal rate and regular rhythm.   Pulmonary:      Effort: Tachypnea, accessory muscle usage and respiratory distress present.      Breath sounds: Decreased breath sounds, wheezing and rhonchi present. No rales.    Abdominal:      Palpations: Abdomen is soft.      Tenderness: There is no abdominal tenderness.   Musculoskeletal:         General: Normal range of motion.      Cervical back: Normal range of motion and neck supple.      Left lower leg: No edema.   Skin:     General: Skin is warm and dry.   Neurological:      General: No focal deficit present.      Mental Status: She is alert and oriented to person, place, and time.   Psychiatric:         Mood and Affect: Mood is anxious.         Behavior: Behavior normal.       DIAGNOSTIC STUDIES / PROCEDURES  LABS  Results for orders placed or performed during the hospital encounter of 03/22/24   CBC WITH DIFFERENTIAL   Result Value Ref Range    WBC 10.8 4.8 - 10.8 K/uL    RBC 4.60 4.20 - 5.40 M/uL    Hemoglobin 14.2 12.0 - 16.0 g/dL    Hematocrit 42.1 37.0 - 47.0 %    MCV 91.5 81.4 - 97.8 fL    MCH 30.9 27.0 - 33.0 pg    MCHC 33.7 32.2 - 35.5 g/dL    RDW 42.5 35.9 - 50.0 fL    Platelet Count 270 164 - 446 K/uL    MPV 9.6 9.0 - 12.9 fL    Neutrophils-Polys 49.30 44.00 - 72.00 %    Lymphocytes 42.30 (H) 22.00 - 41.00 %    Monocytes 6.10 0.00 - 13.40 %    Eosinophils 0.40 0.00 - 6.90 %    Basophils 0.50 0.00 - 1.80 %    Immature Granulocytes 1.40 (H) 0.00 - 0.90 %    Nucleated RBC 0.00 0.00 - 0.20 /100 WBC    Neutrophils (Absolute) 5.32 1.82 - 7.42 K/uL    Lymphs (Absolute) 4.56 1.00 - 4.80 K/uL    Monos (Absolute) 0.66 0.00 - 0.85 K/uL    Eos (Absolute) 0.04 0.00 - 0.51 K/uL    Baso (Absolute) 0.05 0.00 - 0.12 K/uL    Immature Granulocytes (abs) 0.15 (H) 0.00 - 0.11 K/uL    NRBC (Absolute) 0.00 K/uL   Comp Metabolic Panel   Result Value Ref Range    Sodium 142 135 - 145 mmol/L    Potassium 3.4 (L) 3.6 - 5.5 mmol/L    Chloride 108 96 - 112 mmol/L    Co2 22 20 - 33 mmol/L    Anion Gap 12.0 7.0 - 16.0    Glucose 104 (H) 65 - 99 mg/dL    Bun 34 (H) 8 - 22 mg/dL    Creatinine 0.75 0.50 - 1.40 mg/dL    Calcium 9.4 8.4 - 10.2 mg/dL    Correct Calcium 9.3 8.5 - 10.5 mg/dL    AST(SGOT) 25  12 - 45 U/L    ALT(SGPT) 16 2 - 50 U/L    Alkaline Phosphatase 95 30 - 99 U/L    Total Bilirubin 0.4 0.1 - 1.5 mg/dL    Albumin 4.1 3.2 - 4.9 g/dL    Total Protein 7.1 6.0 - 8.2 g/dL    Globulin 3.0 1.9 - 3.5 g/dL    A-G Ratio 1.4 g/dL   TROPONIN   Result Value Ref Range    Troponin T 8 6 - 19 ng/L   ESTIMATED GFR   Result Value Ref Range    GFR (CKD-EPI) 89 >60 mL/min/1.73 m 2   EKG   Result Value Ref Range    Report       [facility] Emergency Dept.    Test Date:  2024  Pt Name:    VIOLA CORONADO              Department: [facility]  MRN:        [id number]                      Room:       -ROOM 6  Gender:     Female                       Technician: ENRRIQUE  :        [id number]                   Requested By:ER TRIAGE PROTOCOL  Order #:    [id number]                    Reading MD: David Alas MD    Measurements  Intervals                                Axis  Rate:       56                           P:          3  KS:         161                          QRS:        -23  QRSD:       110                          T:          18  QT:         436  QTc:        421    Interpretive Statements  Sinus rhythm  Probable left ventricular hypertrophy  Anterior Q waves, possibly due to LVH  Compared to ECG 2023 22:51:40  Q waves now present  Myocardial infarct finding no longer present  ST (T wave) deviation no longer present  Electronically Signed On 2024 10:08:22 PDT by David Alas MD       RADIOLOGY  I have independently interpreted the diagnostic imaging associated with this visit and am waiting the final reading from the radiologist.   My preliminary interpretation is as follows: No lobar pneumonia    Radiologist interpretation:   DX-CHEST-PORTABLE (1 VIEW)   Final Result      1.  Linear bibasilar atelectasis.      2.  Cardiomegaly with interstitial prominence.        COURSE & MEDICAL DECISION MAKING    ED Observation Status? No; Patient does not meet criteria for ED Observation.      INITIAL ASSESSMENT, COURSE AND PLAN  Care Narrative: This is a 63-year-old female with a history of asthma and interstitial lung disease who is here with worsening shortness of breath and chest tightness despite what sounds like appropriate outpatient treatment with antibiotics and steroids for the past 10 to 14 days.  She arrives here in respiratory distress, tachypneic, tripoding, with increased work of breathing.  She is diffusely wheezy with rhonchorous breath sounds.  RT was called immediately and started the patient on high flow.  Patient was started on IV fluids and given magnesium, continuous DuoNeb, and Solu-Medrol.  Chest x-ray is without acute abnormality.  She has no lower extremity swelling.  I did consider etiologies such as PE but she has no risk factors for the same and has no symptoms consistent with this, no tachycardia, leg swelling, hemoptysis, history of DVT or PE.  EKG does not suggest acute ischemia although she does have some risk factors with hyperlipidemia, age, and family history.  Initial troponin was negative.  Heart score: 3.  Remainder of her labs are reassuring.  Patient was reevaluated at bedside after about 15 minutes of therapy and reported that she had some improvement but objectively continues to have increased work of breathing and maintains that she is very fatigued.  She will benefit from ICU level hospitalization.  I spoke with Dr. Whiteside, intensivist, who concurs and will admit the patient to her service.  Patient was admitted in guarded condition.    CRITICAL CARE  The very real possibilty of a deterioration of this patient's condition required the highest level of my preparedness for sudden, emergent intervention.  I provided critical care services, which included medication orders, frequent reevaluations of the patient's condition and response to treatment, ordering and reviewing test results, and discussing the case with various consultants.  The critical care  time associated with the care of the patient was 38 minutes. Review chart for interventions. This time is exclusive of any other billable procedures.     HYDRATION: Based on the patient's presentation of Dehydration and Inability to take oral fluids the patient was given IV fluids. IV Hydration was used because oral hydration was not adequate alone. Upon recheck following hydration, the patient was unchanged.      ADDITIONAL PROBLEM LIST  None  DISPOSITION AND DISCUSSIONS  I have discussed management of the patient with the following physicians and GEMMA's:  Dr. Whiteside, intensivist.    Discussion of management with other [facility] or appropriate source(s): RT assisted with high flow nasal cannula      Escalation of care considered, and ultimately not performed: N/A.    Barriers to care at this time, including but not limited to:  None .     Decision tools and prescription drugs considered including, but not limited to: HEART Score 2 .    FINAL DIAGNOSIS  1.  Hypoxic respiratory failure  2.  Asthma exacerbation    Electronically signed by: David Alas M.D., 3/22/2024 9:39 AM

## 2024-03-22 NOTE — ED NOTES
2nd PIV established.  IVF started.  Pt appears more comfortable and is able to speak now.   has arrived @ BS.

## 2024-03-22 NOTE — TELEPHONE ENCOUNTER
Received request via: Pharmacy    Was the patient seen in the last year in this department? Yes    Does the patient have an active prescription (recently filled or refills available) for medication(s) requested? No    Pharmacy Name: [facility]    Does the patient have [facility] and need 100 day supply (blood pressure, diabetes and cholesterol meds only)? Patient does not have SCP

## 2024-03-22 NOTE — DISCHARGE PLANNING
Met with pt and her . Pt said that she is independent with ADLs and IADLs. Pt does not use any DME including no home O2. Explained that if she should need home O2 then CM/SW can assist with setting up home O2. Explain process of testing home O2.     Pt said her  can assist her at home.     PCP is Dr. Joe Espinal.   Pharmacy is [facility] in [place] and [facility] for longer term medications.    Care Transition Team Assessment    Information Source  Orientation Level: Oriented X4  Information Given By: Patient  Who is responsible for making decisions for patient? : Patient    Readmission Evaluation  Is this a readmission?: No    Elopement Risk  Legal Hold: No  Ambulatory or Self Mobile in Wheelchair: Yes  Disoriented: No  Psychiatric Symptoms: None  History of Wandering: No  Elopement this Admit: No  Vocalizing Wanting to Leave: No  Displays Behaviors, Body Language Wanting to Leave: No-Not at Risk for Elopement    Interdisciplinary Discharge Planning  Does Admitting Nurse Feel This Could be a Complex Discharge?: No  Primary Care Physician: Dr Joe Espinal  Lives with - Patient's Self Care Capacity: Spouse  Patient or legal guardian wants to designate a caregiver: No  Support Systems: Spouse / Significant Other  Do You Take your Prescribed Medications Regularly: Yes  Able to Return to Previous ADL's: Yes  Mobility Issues: No  Prior Services: Home-Independent  Patient Prefers to be Discharged to:: Home  Assistance Needed: No  Durable Medical Equipment: Not Applicable    Discharge Preparedness  What is your plan after discharge?: Home with help  What are your discharge supports?: Spouse  Prior Functional Level: Ambulatory, Drives Self, Independent with Activities of Daily Living, Independent with Medication Management  Difficulity with ADLs: None  Difficulity with IADLs: None    Functional Assesment  Prior Functional Level: Ambulatory, Drives Self, Independent with Activities of Daily  Living, Independent with Medication Management    Finances  Financial Barriers to Discharge: No  Prescription Coverage: Yes    Vision / Hearing Impairment  Vision Impairment : Yes  Right Eye Vision: Impaired, Wears Glasses  Left Eye Vision: Impaired, Wears Glasses  Hearing Impairment : No    Values / Beliefs / Concerns  Values / Beliefs Concerns : No    Advance Directive  Advance Directive?: None    Domestic Abuse  Have you ever been the victim of abuse or violence?: No  Physical Abuse or Sexual Abuse: No  Verbal Abuse or Emotional Abuse: No  Possible Abuse/Neglect Reported to:: Not Applicable         Discharge Risks or Barriers  Discharge risks or barriers?: Post-acute placement / services  Patient risk factors: Cognitive / sensory / physical deficit    Anticipated Discharge Information  Discharge Disposition: Discharged to home/self care (01)

## 2024-03-22 NOTE — PROGRESS NOTES
4 Eyes Skin Assessment Completed by Corazon, ERIC and ERIC Ralph.    Head WDL  Ears WDL  Nose WDL  Mouth WDL  Neck WDL  Breast/Chest WDL  Shoulder Blades WDL  Spine WDL  (R) Arm/Elbow/Hand WDL  (L) Arm/Elbow/Hand WDL  Abdomen WDL  Groin WDL  Scrotum/Coccyx/Buttocks WDL  (R) Leg WDL  (L) Leg WDL  (R) Heel/Foot/Toe WDL  (L) Heel/Foot/Toe WDL          Devices In Places ECG, Blood Pressure Cuff, Pulse Ox, and SCD's      Interventions In Place Pillows and Low Air Loss Mattress    Possible Skin Injury No    Pictures Uploaded Into Epic N/A  Wound Consult Placed N/A  RN Wound Prevention Protocol Ordered No

## 2024-03-22 NOTE — ED NOTES
Pt sitting upright on gurney.  In obvious respiratory distress.  High Flow @ 40L and 100%.  RT @ BS.  X-ray @ BS. Call light within reach.

## 2024-03-22 NOTE — ED NOTES
Med rec completed per pt   Allergies reviewed     Pt was on a 14 day course of Cefdinir that she stopped taking a few days ago   Pt states that there were about 3 doses of this course left     Pt is on a tapering course of Prednisone   Pt has 1 day left of taking 40 mg

## 2024-03-22 NOTE — ED TRIAGE NOTES
Chief Complaint   Patient presents with    Shortness of Breath     SOB for the past 10 days, using duoneb at home, with no improvement. Pt worse this AM. Pt in obvious distress. Able to speak only a few words per breath. No wheezing noted.

## 2024-03-23 LAB
ALBUMIN SERPL BCP-MCNC: 3.8 G/DL (ref 3.2–4.9)
ALBUMIN/GLOB SERPL: 1.4 G/DL
ALP SERPL-CCNC: 77 U/L (ref 30–99)
ALT SERPL-CCNC: 19 U/L (ref 2–50)
ANION GAP SERPL CALC-SCNC: 12 MMOL/L (ref 7–16)
AST SERPL-CCNC: 18 U/L (ref 12–45)
BASOPHILS # BLD AUTO: 0.1 % (ref 0–1.8)
BASOPHILS # BLD: 0.01 K/UL (ref 0–0.12)
BILIRUB SERPL-MCNC: 0.3 MG/DL (ref 0.1–1.5)
BUN SERPL-MCNC: 24 MG/DL (ref 8–22)
CALCIUM ALBUM COR SERPL-MCNC: 9.1 MG/DL (ref 8.5–10.5)
CALCIUM SERPL-MCNC: 8.9 MG/DL (ref 8.4–10.2)
CHLORIDE SERPL-SCNC: 108 MMOL/L (ref 96–112)
CO2 SERPL-SCNC: 20 MMOL/L (ref 20–33)
CREAT SERPL-MCNC: 0.57 MG/DL (ref 0.5–1.4)
EOSINOPHIL # BLD AUTO: 0 K/UL (ref 0–0.51)
EOSINOPHIL NFR BLD: 0 % (ref 0–6.9)
ERYTHROCYTE [DISTWIDTH] IN BLOOD BY AUTOMATED COUNT: 45.3 FL (ref 35.9–50)
GFR SERPLBLD CREATININE-BSD FMLA CKD-EPI: 101 ML/MIN/1.73 M 2
GLOBULIN SER CALC-MCNC: 2.7 G/DL (ref 1.9–3.5)
GLUCOSE BLD STRIP.AUTO-MCNC: 149 MG/DL (ref 65–99)
GLUCOSE BLD STRIP.AUTO-MCNC: 186 MG/DL (ref 65–99)
GLUCOSE BLD STRIP.AUTO-MCNC: 189 MG/DL (ref 65–99)
GLUCOSE SERPL-MCNC: 145 MG/DL (ref 65–99)
HCT VFR BLD AUTO: 36.3 % (ref 37–47)
HGB BLD-MCNC: 12.4 G/DL (ref 12–16)
IMM GRANULOCYTES # BLD AUTO: 0.15 K/UL (ref 0–0.11)
IMM GRANULOCYTES NFR BLD AUTO: 1.2 % (ref 0–0.9)
LYMPHOCYTES # BLD AUTO: 1.02 K/UL (ref 1–4.8)
LYMPHOCYTES NFR BLD: 8.1 % (ref 22–41)
MCH RBC QN AUTO: 32.1 PG (ref 27–33)
MCHC RBC AUTO-ENTMCNC: 34.2 G/DL (ref 32.2–35.5)
MCV RBC AUTO: 94 FL (ref 81.4–97.8)
MONOCYTES # BLD AUTO: 0.66 K/UL (ref 0–0.85)
MONOCYTES NFR BLD AUTO: 5.2 % (ref 0–13.4)
NEUTROPHILS # BLD AUTO: 10.78 K/UL (ref 1.82–7.42)
NEUTROPHILS NFR BLD: 85.4 % (ref 44–72)
NRBC # BLD AUTO: 0 K/UL
NRBC BLD-RTO: 0 /100 WBC (ref 0–0.2)
PLATELET # BLD AUTO: 235 K/UL (ref 164–446)
PMV BLD AUTO: 10 FL (ref 9–12.9)
POTASSIUM SERPL-SCNC: 4 MMOL/L (ref 3.6–5.5)
PROT SERPL-MCNC: 6.5 G/DL (ref 6–8.2)
RBC # BLD AUTO: 3.86 M/UL (ref 4.2–5.4)
SODIUM SERPL-SCNC: 140 MMOL/L (ref 135–145)
WBC # BLD AUTO: 12.6 K/UL (ref 4.8–10.8)

## 2024-03-23 PROCEDURE — 94640 AIRWAY INHALATION TREATMENT: CPT

## 2024-03-23 PROCEDURE — A9270 NON-COVERED ITEM OR SERVICE: HCPCS | Performed by: INTERNAL MEDICINE

## 2024-03-23 PROCEDURE — 82962 GLUCOSE BLOOD TEST: CPT

## 2024-03-23 PROCEDURE — 85025 COMPLETE CBC W/AUTO DIFF WBC: CPT

## 2024-03-23 PROCEDURE — 700101 HCHG RX REV CODE 250: Performed by: INTERNAL MEDICINE

## 2024-03-23 PROCEDURE — 700111 HCHG RX REV CODE 636 W/ 250 OVERRIDE (IP): Mod: JZ | Performed by: INTERNAL MEDICINE

## 2024-03-23 PROCEDURE — 94760 N-INVAS EAR/PLS OXIMETRY 1: CPT

## 2024-03-23 PROCEDURE — 99233 SBSQ HOSP IP/OBS HIGH 50: CPT | Performed by: INTERNAL MEDICINE

## 2024-03-23 PROCEDURE — 80053 COMPREHEN METABOLIC PANEL: CPT

## 2024-03-23 PROCEDURE — 94669 MECHANICAL CHEST WALL OSCILL: CPT

## 2024-03-23 PROCEDURE — 700102 HCHG RX REV CODE 250 W/ 637 OVERRIDE(OP): Performed by: INTERNAL MEDICINE

## 2024-03-23 PROCEDURE — 770020 HCHG ROOM/CARE - TELE (206)

## 2024-03-23 RX ORDER — IPRATROPIUM BROMIDE AND ALBUTEROL SULFATE 2.5; .5 MG/3ML; MG/3ML
3 SOLUTION RESPIRATORY (INHALATION)
Status: DISCONTINUED | OUTPATIENT
Start: 2024-03-23 | End: 2024-03-24 | Stop reason: HOSPADM

## 2024-03-23 RX ORDER — IPRATROPIUM BROMIDE AND ALBUTEROL SULFATE 2.5; .5 MG/3ML; MG/3ML
3 SOLUTION RESPIRATORY (INHALATION)
Status: DISCONTINUED | OUTPATIENT
Start: 2024-03-23 | End: 2024-03-23

## 2024-03-23 RX ORDER — ALBUTEROL SULFATE 90 UG/1
2 AEROSOL, METERED RESPIRATORY (INHALATION)
Status: DISCONTINUED | OUTPATIENT
Start: 2024-03-23 | End: 2024-03-23

## 2024-03-23 RX ORDER — ALBUTEROL SULFATE 90 UG/1
2 AEROSOL, METERED RESPIRATORY (INHALATION) EVERY 4 HOURS PRN
Status: DISCONTINUED | OUTPATIENT
Start: 2024-03-23 | End: 2024-03-24 | Stop reason: HOSPADM

## 2024-03-23 RX ADMIN — ESCITALOPRAM OXALATE 20 MG: 10 TABLET ORAL at 06:34

## 2024-03-23 RX ADMIN — INSULIN HUMAN 1 UNITS: 100 INJECTION, SOLUTION PARENTERAL at 20:24

## 2024-03-23 RX ADMIN — Medication 400 MG: at 06:33

## 2024-03-23 RX ADMIN — IPRATROPIUM BROMIDE AND ALBUTEROL SULFATE 3 ML: .5; 3 SOLUTION RESPIRATORY (INHALATION) at 09:18

## 2024-03-23 RX ADMIN — ENOXAPARIN SODIUM 40 MG: 100 INJECTION SUBCUTANEOUS at 16:55

## 2024-03-23 RX ADMIN — CLOTRIMAZOLE 10 MG: 10 LOZENGE ORAL; TOPICAL at 06:35

## 2024-03-23 RX ADMIN — IPRATROPIUM BROMIDE AND ALBUTEROL SULFATE 3 ML: .5; 3 SOLUTION RESPIRATORY (INHALATION) at 14:38

## 2024-03-23 RX ADMIN — PREGABALIN 200 MG: 100 CAPSULE ORAL at 06:33

## 2024-03-23 RX ADMIN — CLOTRIMAZOLE 10 MG: 10 LOZENGE ORAL; TOPICAL at 18:36

## 2024-03-23 RX ADMIN — ALBUTEROL SULFATE 2 PUFF: 90 AEROSOL, METERED RESPIRATORY (INHALATION) at 21:00

## 2024-03-23 RX ADMIN — MOMETASONE FUROATE AND FORMOTEROL FUMARATE DIHYDRATE 2 PUFF: 200; 5 AEROSOL RESPIRATORY (INHALATION) at 07:00

## 2024-03-23 RX ADMIN — IPRATROPIUM BROMIDE AND ALBUTEROL SULFATE 3 ML: .5; 3 SOLUTION RESPIRATORY (INHALATION) at 22:06

## 2024-03-23 RX ADMIN — MONTELUKAST 10 MG: 10 TABLET, FILM COATED ORAL at 06:34

## 2024-03-23 RX ADMIN — IPRATROPIUM BROMIDE AND ALBUTEROL SULFATE 3 ML: .5; 3 SOLUTION RESPIRATORY (INHALATION) at 07:00

## 2024-03-23 RX ADMIN — IPRATROPIUM BROMIDE AND ALBUTEROL SULFATE 3 ML: .5; 3 SOLUTION RESPIRATORY (INHALATION) at 03:31

## 2024-03-23 RX ADMIN — ROSUVASTATIN 10 MG: 10 TABLET, FILM COATED ORAL at 20:16

## 2024-03-23 RX ADMIN — IPRATROPIUM BROMIDE AND ALBUTEROL SULFATE 3 ML: .5; 3 SOLUTION RESPIRATORY (INHALATION) at 02:32

## 2024-03-23 RX ADMIN — IPRATROPIUM BROMIDE AND ALBUTEROL SULFATE 3 ML: .5; 3 SOLUTION RESPIRATORY (INHALATION) at 18:45

## 2024-03-23 RX ADMIN — METHYLPREDNISOLONE SODIUM SUCCINATE 62.5 MG: 125 INJECTION, POWDER, FOR SOLUTION INTRAMUSCULAR; INTRAVENOUS at 06:33

## 2024-03-23 RX ADMIN — CLOTRIMAZOLE 10 MG: 10 LOZENGE ORAL; TOPICAL at 11:20

## 2024-03-23 RX ADMIN — CLOTRIMAZOLE 10 MG: 10 LOZENGE ORAL; TOPICAL at 22:20

## 2024-03-23 RX ADMIN — FAMOTIDINE 20 MG: 20 TABLET ORAL at 06:34

## 2024-03-23 RX ADMIN — PREGABALIN 200 MG: 100 CAPSULE ORAL at 17:01

## 2024-03-23 RX ADMIN — FAMOTIDINE 20 MG: 20 TABLET ORAL at 16:59

## 2024-03-23 RX ADMIN — INSULIN HUMAN 1 UNITS: 100 INJECTION, SOLUTION PARENTERAL at 16:55

## 2024-03-23 RX ADMIN — CLOTRIMAZOLE 10 MG: 10 LOZENGE ORAL; TOPICAL at 15:55

## 2024-03-23 ASSESSMENT — PAIN DESCRIPTION - PAIN TYPE
TYPE: ACUTE PAIN

## 2024-03-23 NOTE — CONSULTS
Pulmonary and Critical Care Consultation    Date of consult: 3/22/2024    Referring Physician  Dr Alas    Reason for Consultation  Asthma exacerbation    History of Presenting Illness  63 y.o. female who presented 3/22/2024 with worsening shortness of breath.  She has a history of asthma for which she takes Advair 250 daily.  A couple of weeks ago patient had an upper respiratory infection and had an increase in her respiratory symptoms.  At that time, she increased her Advair to 500 daily.  She then traveled to [facility] to see her son and he shortness of breath and wheezing increased.  She saw urgent care on Sunday, 3/17/2024 and was given a prednisone taper.  She has been taking the prednisone, however, this morning she had increasing work of breathing and presented to the ER.  In the ER, she was noted to be hypoxemic and required high flow nasal cannula.  She was given IV fluids, IV magnesium, continuous DuoNeb, and Solu-Medrol.  She was subsequently admitted to the ICU.  On my examination, patient continued to be dyspneic and very wheezy.  She was started on continuous albuterol which has improved her symptoms.  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  After that time, she was taking Humira up until about 6 months ago.  At this time, she is currently managed with NSAIDs only.  She did see an ILD specialist after her PJP pneumonia who reviewed her imaging and stated that she did not have any lasting damage from the PJP pneumonia.  I have also reviewed her imaging and agree with this.    Code Status  Full Code    Review of Systems   Constitutional:  Positive for malaise/fatigue. Negative for chills and fever.   Respiratory:  Positive for cough, sputum production, shortness of breath and wheezing.    Cardiovascular:  Negative for chest pain.   Gastrointestinal:  Negative for nausea and  vomiting.   Neurological:  Positive for weakness. Negative for dizziness.   Psychiatric/Behavioral:  Negative for depression.        Past Medical History   has a past medical history of Anesthesia, Arthritis, Asthma, Bowel habit changes, Depression, Erosive esophagitis, GERD (gastroesophageal reflux disease), Heart burn, High cholesterol, Immunocompromised ([facility]), Pain, Pneumonia (2018), Psoriasis, and Psoriatic arthritis (HCC).    Surgical History   has a past surgical history that includes fusion, spine, lumbar, plif; hip replacement, total; cystectomy; other abdominal surgery; gyn surgery (1999); other orthopedic surgery; other orthopedic surgery; hysterectomy robotic xi (N/A, 7/11/2019); salpingo oophorectomy (Bilateral, 7/11/2019); tendon repair (Left, 9/19/2019); pr bronchoscopy,diagnostic (7/9/2021); pr inj lumbar/sacral,w/ imaging (Right, 6/2/2023); and pr injection,sacroiliac joint (Left, 7/28/2023).    Family History  family history includes Arthritis in her mother; Cancer in her father and sister; Heart Attack in her mother; Heart Disease in her mother and paternal grandfather; Hyperlipidemia in her father, mother, and sister; Lung Disease in her father; Psychiatric Illness in her mother.    Social History   reports that she has never smoked. She has never used smokeless tobacco. She reports current alcohol use of about 0.6 - 1.2 oz of alcohol per week. She reports that she does not use drugs.    Medications  Home Medications       Reviewed by Rei Bush (Pharmacy Tech) on 03/22/24 at 0934  Med List Status: Complete     Medication Last Dose Status   cefdinir (OMNICEF) 300 MG Cap FEW DAYS AGO Active   clotrimazole (MYCELEX) 10 MG Bhumi bhumi 3/22/2024 Active   EPINEPHrine 0.3 MG/0.3ML Solution Prefilled Syringe PRN Active   escitalopram (LEXAPRO) 10 MG Tab 3/22/2024 Active   escitalopram (LEXAPRO) 20 MG tablet 3/22/2024 Active   estradiol (ESTRACE) 0.1 MG/GM vaginal cream FEW DAYS AGO Active    etodolac (LODINE) 400 MG tablet 3/22/2024 Active   famotidine (PEPCID) 20 MG Tab 3/22/2024 Active   fluticasone-salmeterol (ADVAIR DISKUS) 250-50 MCG/ACT AEROSOL POWDER, BREATH ACTIVATED 3/21/2024 Active   lamoTRIgine (LAMICTAL) 100 MG Tab 3/22/2024 Active   magnesium oxide (MAG-OX) 400 MG Tab tablet 3/22/2024 Active   metFORMIN ER (GLUCOPHAGE XR) 500 MG TABLET SR 24 HR 3/22/2024 Active   montelukast (SINGULAIR) 10 MG Tab 3/22/2024 Active   omeprazole (PRILOSEC) 40 MG delayed-release capsule 3/22/2024 Active   phenazopyridine (PYRIDIUM) 200 MG Tab ABOUT 1 WEEK Active   predniSONE (DELTASONE) 10 MG Tab 3/22/2024 Active   pregabalin (LYRICA) 200 MG capsule 3/22/2024 Active   rosuvastatin (CRESTOR) 10 MG Tab 3/21/2024 Active                  Current Facility-Administered Medications   Medication Dose Route Frequency Provider Last Rate Last Admin    Respiratory Therapy Consult   Nebulization Continuous RT Arin Whiteside D.O.        enoxaparin (Lovenox) inj 40 mg  40 mg Subcutaneous DAILY AT 1800 NUHA VillarOCami   40 mg at 03/22/24 1708    acetaminophen (Tylenol) tablet 650 mg  650 mg Oral Q6HRS PRN Arin Whiteside D.O.        senna-docusate (Pericolace Or Senokot S) 8.6-50 MG per tablet 2 Tablet  2 Tablet Oral Q EVENING BHAVANI Villar.O.   2 Tablet at 03/22/24 1708    And    polyethylene glycol/lytes (Miralax) Packet 1 Packet  1 Packet Oral QDAY PRN Arin Whiteside D.O.        ondansetron (Zofran) syringe/vial injection 4 mg  4 mg Intravenous Q4HRS PRN Arin Whiteside D.O.        ondansetron (Zofran ODT) dispertab 4 mg  4 mg Oral Q4HRS PRN Arin Whiteside D.O.        promethazine (Phenergan) tablet 12.5-25 mg  12.5-25 mg Oral Q4HRS PRN Arin Whiteside D.O.        promethazine (Phenergan) suppository 12.5-25 mg  12.5-25 mg Rectal Q4HRS PRN NUHA VillarO.        prochlorperazine (Compazine) injection 5-10 mg  5-10 mg Intravenous Q4HRS PRN Arin Whiteside,  BHAVANI.OCami        ipratropium-albuterol (DUONEB) nebulizer solution  3 mL Nebulization Q4H PRN (RT) Arin Whiteside D.O.        [START ON 3/23/2024] methylPREDNISolone sod succ (SOLU-MEDROL) 125 MG injection 62.5 mg  62.5 mg Intravenous DAILY Arin Whiteside D.O.        clotrimazole (Mycelex) cristine 10 mg  10 mg Oral 5X/DAY NUHA VillarOCami   10 mg at 03/22/24 1559    [START ON 3/23/2024] escitalopram (Lexapro) tablet 20 mg  20 mg Oral QAM Arin Whiteside D.O.        famotidine (Pepcid) tablet 20 mg  20 mg Oral BID NUHA VillarOCami   20 mg at 03/22/24 1708    mometasone-formoterol (Dulera) 200-5 MCG/ACT inhaler 2 Puff  2 Puff Inhalation BID (RT) Arin Whiteside D.O.        [Held by provider] metFORMIN ER (Glucophage XR) tablet 500 mg  500 mg Oral BID WITH MEALS Airn Whiteside D.O.        [START ON 3/23/2024] montelukast (Singulair) tablet 10 mg  10 mg Oral DAILY Arin Whiteside D.O.        pregabalin (Lyrica) capsule 200 mg  200 mg Oral BID NUHA VillarOCami   200 mg at 03/22/24 1708    rosuvastatin (Crestor) tablet 10 mg  10 mg Oral QHS Arin Whiteside D.O.        [START ON 3/23/2024] magnesium oxide tablet 400 mg  400 mg Oral DAILY Arin Whiteside D.O.        albuterol (Proventil) 100 mg in NS 60 mL for continuous nebulization  15 mg/hr Nebulization RT-Continuous Arin Whiteside D.O. 9 mL/hr at 03/22/24 1219 15 mg/hr at 03/22/24 1219    ipratropium-albuterol (DUONEB) nebulizer solution  3 mL Nebulization Q4HRS (RT) Arin K Christopher, D.O.        insulin regular (HumuLIN R,NovoLIN R) injection  1-6 Units Subcutaneous 4X/DAY ACHS Arin Whiteside D.O.        And    dextrose 10 % BOLUS 25 g  25 g Intravenous Q15 MIN PRN Arin Whiteside D.O.           Allergies  Allergies   Allergen Reactions    Bee Venom Anaphylaxis     Pt is allergic to bees.     Iodine Anaphylaxis, Rash and Hives     IV = anaphylaxis, topical = rash  IV = anaphylaxis,  topical = rash    Levofloxacin Anaphylaxis    Shellfish-Derived Products Anaphylaxis     LOBSTER       Vital Signs last 24 hours  Temp:  [36 °C (96.8 °F)-36.7 °C (98 °F)] 36.7 °C (98 °F)  Pulse:  [56-93] 90  Resp:  [13-56] 14  BP: (115-190)/() 120/50  SpO2:  [92 %-99 %] 97 %    Physical Exam  Vitals and nursing note reviewed.   Constitutional:       General: She is in acute distress.      Appearance: Normal appearance. She is ill-appearing.   HENT:      Head: Normocephalic.   Eyes:      Conjunctiva/sclera: Conjunctivae normal.   Cardiovascular:      Rate and Rhythm: Normal rate and regular rhythm.   Pulmonary:      Effort: Respiratory distress present.      Breath sounds: Wheezing present.   Abdominal:      Palpations: Abdomen is soft.   Skin:     General: Skin is warm and dry.   Neurological:      Mental Status: She is alert and oriented to person, place, and time. Mental status is at baseline.   Psychiatric:         Mood and Affect: Mood normal.         Behavior: Behavior normal.       Fluids    Intake/Output Summary (Last 24 hours) at 3/22/2024 1714  Last data filed at 3/22/2024 1200  Gross per 24 hour   Intake 0 ml   Output --   Net 0 ml       Laboratory  Recent Results (from the past 48 hour(s))   CBC WITH DIFFERENTIAL    Collection Time: 03/22/24  9:13 AM   Result Value Ref Range    WBC 10.8 4.8 - 10.8 K/uL    RBC 4.60 4.20 - 5.40 M/uL    Hemoglobin 14.2 12.0 - 16.0 g/dL    Hematocrit 42.1 37.0 - 47.0 %    MCV 91.5 81.4 - 97.8 fL    MCH 30.9 27.0 - 33.0 pg    MCHC 33.7 32.2 - 35.5 g/dL    RDW 42.5 35.9 - 50.0 fL    Platelet Count 270 164 - 446 K/uL    MPV 9.6 9.0 - 12.9 fL    Neutrophils-Polys 49.30 44.00 - 72.00 %    Lymphocytes 42.30 (H) 22.00 - 41.00 %    Monocytes 6.10 0.00 - 13.40 %    Eosinophils 0.40 0.00 - 6.90 %    Basophils 0.50 0.00 - 1.80 %    Immature Granulocytes 1.40 (H) 0.00 - 0.90 %    Nucleated RBC 0.00 0.00 - 0.20 /100 WBC    Neutrophils (Absolute) 5.32 1.82 - 7.42 K/uL    Lymphs  (Absolute) 4.56 1.00 - 4.80 K/uL    Monos (Absolute) 0.66 0.00 - 0.85 K/uL    Eos (Absolute) 0.04 0.00 - 0.51 K/uL    Baso (Absolute) 0.05 0.00 - 0.12 K/uL    Immature Granulocytes (abs) 0.15 (H) 0.00 - 0.11 K/uL    NRBC (Absolute) 0.00 K/uL   Comp Metabolic Panel    Collection Time: 24  9:13 AM   Result Value Ref Range    Sodium 142 135 - 145 mmol/L    Potassium 3.4 (L) 3.6 - 5.5 mmol/L    Chloride 108 96 - 112 mmol/L    Co2 22 20 - 33 mmol/L    Anion Gap 12.0 7.0 - 16.0    Glucose 104 (H) 65 - 99 mg/dL    Bun 34 (H) 8 - 22 mg/dL    Creatinine 0.75 0.50 - 1.40 mg/dL    Calcium 9.4 8.4 - 10.2 mg/dL    Correct Calcium 9.3 8.5 - 10.5 mg/dL    AST(SGOT) 25 12 - 45 U/L    ALT(SGPT) 16 2 - 50 U/L    Alkaline Phosphatase 95 30 - 99 U/L    Total Bilirubin 0.4 0.1 - 1.5 mg/dL    Albumin 4.1 3.2 - 4.9 g/dL    Total Protein 7.1 6.0 - 8.2 g/dL    Globulin 3.0 1.9 - 3.5 g/dL    A-G Ratio 1.4 g/dL   TROPONIN    Collection Time: 24  9:13 AM   Result Value Ref Range    Troponin T 8 6 - 19 ng/L   CoV-2, FLU A/B, and RSV by PCR (2-4 Hours [employer]) : Collect NP swab in VTM    Collection Time: 24  9:13 AM    Specimen: Respirate   Result Value Ref Range    Influenza virus A RNA Negative Negative    Influenza virus B, PCR Negative Negative    RSV, PCR Negative Negative    SARS-CoV-2 by PCR NotDetected     SARS-CoV-2 Source NP Swab    ESTIMATED GFR    Collection Time: 24  9:13 AM   Result Value Ref Range    GFR (CKD-EPI) 89 >60 mL/min/1.73 m 2   EKG    Collection Time: 24  9:14 AM   Result Value Ref Range    Report       [facility] Emergency Dept.    Test Date:  2024  Pt Name:    VIOLA CORONADO              Department: [facility]  MRN:        [id number]                      Room:       -ROOM 6  Gender:     Female                       Technician: ENRRIQUE  :        [id number]                   Requested By:ER TRIAGE PROTOCOL  Order #:    [id number]                    Reading MD: David  MD Evette    Measurements  Intervals                                Axis  Rate:       56                           P:          3  WI:         161                          QRS:        -23  QRSD:       110                          T:          18  QT:         436  QTc:        421    Interpretive Statements  Sinus rhythm  Probable left ventricular hypertrophy  Anterior Q waves, possibly due to LVH  Compared to ECG 07/02/2023 22:51:40  Q waves now present  Myocardial infarct finding no longer present  ST (T wave) deviation no longer present  Electronically Signed On 03- 10:08:22 PDT by David Alas MD         Imaging  DX-CHEST-PORTABLE (1 VIEW)   Final Result      1.  Linear bibasilar atelectasis.      2.  Cardiomegaly with interstitial prominence.          Assessment/Plan  # Status asthmaticus, patient currently on high flow nasal cannula and continuous albuterol  #Acute hypoxemic respiratory failure due to above   Titrate O2 to maintain sats 88-92%  Start Solu-Medrol 62 mg daily, status post 125 mg today  Continuous albuterol, will attempt to wean today to every 4 nebs  As needed nebulizers  Continue with Singulair  Start Dulera, substituted for Advair    #Psoriatic arthritis   #Neuropathy   Continue with Lyrica  Continue with Lexapro  Will hold home etofolac steroid use    #Recurrent UTIs  Continue to monitor for symptoms  Currently off antibiotics    #PreDM  Hold home metformin  Insulin correction scale as she is on steroids    Discussed patient condition and risk of morbidity and/or mortality with Family, RN, RT, Pharmacy, and Patient.      The patient remains critically ill.  Critical care time = 72 minutes in directly providing and coordinating critical care and extensive data review.  No time overlap and excludes procedures.    __________  This note was generated using voice recognition software which has a chance of producing errors of grammar and content.  I have made every reasonable attempt to find  and correct any errors, but it should be expected that some may not be found prior to finalization of this note.    Arin Whiteside, DO   Pulmonary and Critical Care           L pectoral region, back of L knee

## 2024-03-23 NOTE — CARE PLAN
The patient is Stable - Low risk of patient condition declining or worsening    Shift Goals  Clinical Goals: Wean 02. administer breathing treatments.  Patient Goals: Feel better breath easier.    Progress made toward(s) clinical / shift goals:    Problem: Knowledge Deficit - Standard  Goal: Patient and family/care givers will demonstrate understanding of plan of care, disease process/condition, diagnostic tests and medications  Outcome: Progressing     Problem: Skin Integrity  Goal: Skin integrity is maintained or improved  Outcome: Progressing     Problem: Fall Risk  Goal: Patient will remain free from falls  Outcome: Progressing       Patient is not progressing towards the following goals:

## 2024-03-23 NOTE — PROGRESS NOTES
Pulmonary and Critical Care Progress Note    Date of admission  3/22/2024    Reason for Consultation  Asthma exacerbation     History of Presenting Illness  63 y.o. female who presented 3/22/2024 with worsening shortness of breath.  She has a history of asthma for which she takes Advair 250 daily.  A couple of weeks ago patient had an upper respiratory infection and had an increase in her respiratory symptoms.  At that time, she increased her Advair to 500 daily.  She then traveled to [facility] to see her son and he shortness of breath and wheezing increased.  She saw urgent care on Sunday, 3/17/2024 and was given a prednisone taper.  She has been taking the prednisone, however, this morning she had increasing work of breathing and presented to the ER.  In the ER, she was noted to be hypoxemic and required high flow nasal cannula.  She was given IV fluids, IV magnesium, continuous DuoNeb, and Solu-Medrol.  She was subsequently admitted to the ICU.  On my examination, patient continued to be dyspneic and very wheezy.  She was started on continuous albuterol which has improved her symptoms.  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  After that time, she was taking Humira up until about 6 months ago.  At this time, she is currently managed with NSAIDs only.  She did see an ILD specialist after her PJP pneumonia who reviewed her imaging and stated that she did not have any lasting damage from the PJP pneumonia.  I have also reviewed her imaging and agree with this.    Hospital Course  3/23 - feeling improved overall, getting nebs q2 hours     Interval Problem Update  Chart review from the past 24 hours includes imaging, laboratory studies, vital signs and notes available.  Pertinent data for today's visit includes    Neuro: intact  Cardiac: stable, mildly hypertensive   Pulm: down to 6 lpm,  wheezing, off of continuous albuterol   Heme: stable  /renal: stable  GI: no acute issues   Endo: glucose WNL with no insulin given in last 24 hours   ID:  afebrile   I/O: not accurate    Vital Signs for last 24 hours   Temp:  [36.4 °C (97.5 °F)-37.2 °C (98.9 °F)] 36.6 °C (97.8 °F)  Pulse:  [67-99] 72  Resp:  [9-56] 18  BP: ()/(41-70) 142/44  SpO2:  [90 %-99 %] 90 %    Hemodynamic parameters for last 24 hours       Respiratory Information for the last 24 hours      Physical Exam  Vitals and nursing note reviewed.   Constitutional:       General: She is not in acute distress.     Appearance: Normal appearance.   HENT:      Head: Normocephalic.   Eyes:      Conjunctiva/sclera: Conjunctivae normal.   Cardiovascular:      Rate and Rhythm: Normal rate and regular rhythm.   Pulmonary:      Effort: Pulmonary effort is normal. No respiratory distress.      Breath sounds: Wheezing present.   Abdominal:      Palpations: Abdomen is soft.   Skin:     General: Skin is warm and dry.   Neurological:      Mental Status: She is alert and oriented to person, place, and time. Mental status is at baseline.   Psychiatric:         Mood and Affect: Mood normal.         Behavior: Behavior normal.         Medications  Current Facility-Administered Medications   Medication Dose Route Frequency Provider Last Rate Last Admin    ipratropium-albuterol (DUONEB) nebulizer solution  3 mL Nebulization Q2HRS Arin Whiteside D.O.        Respiratory Therapy Consult   Nebulization Continuous RT Arin Whiteside D.O.        enoxaparin (Lovenox) inj 40 mg  40 mg Subcutaneous DAILY AT 1800 NUHA VillarO.   40 mg at 03/22/24 1708    acetaminophen (Tylenol) tablet 650 mg  650 mg Oral Q6HRS PRN Arin Whiteside D.O.        senna-docusate (Pericolace Or Senokot S) 8.6-50 MG per tablet 2 Tablet  2 Tablet Oral Q EVENING NUHA VillarOCami   2 Tablet at 03/22/24 1708    And    polyethylene glycol/lytes (Miralax) Packet 1  Packet  1 Packet Oral QDAY PRN Arin Whiteside D.O.        ondansetron (Zofran) syringe/vial injection 4 mg  4 mg Intravenous Q4HRS PRN Arin Whiteside D.O.        ondansetron (Zofran ODT) dispertab 4 mg  4 mg Oral Q4HRS PRN Arin Whiteside D.O.        promethazine (Phenergan) tablet 12.5-25 mg  12.5-25 mg Oral Q4HRS PRN NUHA VillarOCami        promethazine (Phenergan) suppository 12.5-25 mg  12.5-25 mg Rectal Q4HRS PRN Arin Whiteside D.O.        prochlorperazine (Compazine) injection 5-10 mg  5-10 mg Intravenous Q4HRS PRN Arin Whiteside D.O.        methylPREDNISolone sod succ (SOLU-MEDROL) 125 MG injection 62.5 mg  62.5 mg Intravenous DAILY NUHA VillarOCami   62.5 mg at 03/23/24 0633    clotrimazole (Mycelex) cristine 10 mg  10 mg Oral 5X/DAY NUHA VillarOCami   10 mg at 03/23/24 0635    escitalopram (Lexapro) tablet 20 mg  20 mg Oral QAM BHAVANI Villar.O.   20 mg at 03/23/24 0634    famotidine (Pepcid) tablet 20 mg  20 mg Oral BID BHAVANI Villar.O.   20 mg at 03/23/24 0634    mometasone-formoterol (Dulera) 200-5 MCG/ACT inhaler 2 Puff  2 Puff Inhalation BID (RT) NUHA VillarOCami   2 Puff at 03/23/24 0700    [Held by provider] metFORMIN ER (Glucophage XR) tablet 500 mg  500 mg Oral BID WITH MEALS Arin Whiteside D.O.        montelukast (Singulair) tablet 10 mg  10 mg Oral DAILY BHAVANI Villar.O.   10 mg at 03/23/24 0634    pregabalin (Lyrica) capsule 200 mg  200 mg Oral BID Arin Whiteside D.O.   200 mg at 03/23/24 0633    rosuvastatin (Crestor) tablet 10 mg  10 mg Oral QHS Arin Whiteside D.O.   10 mg at 03/22/24 2221    magnesium oxide tablet 400 mg  400 mg Oral DAILY Arin Whiteside D.O.   400 mg at 03/23/24 0633    insulin regular (HumuLIN R,NovoLIN R) injection  1-6 Units Subcutaneous 4X/DAY ACHS Arin Whiteside D.O.        And    dextrose 10 % BOLUS 25 g  25 g Intravenous Q15 MIN PRN Arin GILMORE  SIMRAN Whiteside.           Fluids    Intake/Output Summary (Last 24 hours) at 3/23/2024 1001  Last data filed at 3/23/2024 0300  Gross per 24 hour   Intake 0 ml   Output 350 ml   Net -350 ml       Laboratory          Recent Labs     03/22/24  0913 03/23/24  0411   SODIUM 142 140   POTASSIUM 3.4* 4.0   CHLORIDE 108 108   CO2 22 20   BUN 34* 24*   CREATININE 0.75 0.57   CALCIUM 9.4 8.9     Recent Labs     03/22/24  0913 03/23/24  0411   ALTSGPT 16 19   ASTSGOT 25 18   ALKPHOSPHAT 95 77   TBILIRUBIN 0.4 0.3   GLUCOSE 104* 145*     Recent Labs     03/22/24 0913 03/23/24  0411   WBC 10.8 12.6*   NEUTSPOLYS 49.30 85.40*   LYMPHOCYTES 42.30* 8.10*   MONOCYTES 6.10 5.20   EOSINOPHILS 0.40 0.00   BASOPHILS 0.50 0.10   ASTSGOT 25 18   ALTSGPT 16 19   ALKPHOSPHAT 95 77   TBILIRUBIN 0.4 0.3     Recent Labs     03/22/24  0913 03/23/24  0411   RBC 4.60 3.86*   HEMOGLOBIN 14.2 12.4   HEMATOCRIT 42.1 36.3*   PLATELETCT 270 235       Imaging  No new imaging    Assessment/Plan  # Status asthmaticus, patient currently down to 6 lpm O2  #Acute hypoxemic respiratory failure due to above   Titrate O2 to maintain sats 88-92%  Continue to monitor in the ICU for respiratory distress  Continue Solu-Medrol 62 mg daily, she failed outpatient prednisone  Nebs q2 hours, continuous albuterol on standby  As needed nebulizers  Continue with Singulair  Continue Dulera with spacer, would recommend an HFA maintenance inhaler with spacer for DC     #Psoriatic arthritis   #Neuropathy   Continue with Lyrica  Continue with Lexapro  Will hold home etofolac due to current steroid use     #Recurrent UTIs  Continue to monitor for symptoms  Currently off antibiotics     #PreDM  Hold home metformin  Insulin correction scale as she is on steroids    I have performed a physical exam and reviewed and updated ROS and Plan today (3/23/2024). In review of yesterday's note (3/22/2024), there are no changes except as documented above.     Discussed patient condition  and risk of morbidity and/or mortality with Family, RN, RT, Pharmacy, and Patient    The patient remains critically ill.  Critical care time = 54 minutes in directly providing and coordinating critical care and extensive data review.  No time overlap and excludes procedures.    __________  This note was generated using voice recognition software which has a chance of producing errors of grammar and content.  I have made every reasonable attempt to find and correct any errors, but it should be expected that some may not be found prior to finalization of this note.    Arin Whiteside, DO   Pulmonary and Critical Care

## 2024-03-23 NOTE — CARE PLAN
The patient is   Problem: Knowledge Deficit - Standard  Goal: Patient and family/care givers will demonstrate understanding of plan of care, disease process/condition, diagnostic tests and medications  Outcome: Progressing     Problem: Skin Integrity  Goal: Skin integrity is maintained or improved  Outcome: Progressing     Problem: Fall Risk  Goal: Patient will remain free from falls  Outcome: Progressing       Shift Goals  Clinical Goals: wean off HHF  Patient Goals: ease of breathing    Progress made toward(s) clinical / shift goals:  now on 6 NC     Patient is not progressing towards the following goals: still requiring ICU for further monitoring

## 2024-03-23 NOTE — CARE PLAN
Problem: Bronchoconstriction  Goal: Improve in air movement and diminished wheezing  Description: Target End Date:  2 to 3 days    1.  Implement inhaled treatments  2.  Evaluate and manage medication effects  Flowsheets (Taken 3/22/2024 1718)  Bronchodilator Goals/Outcome: Improved Patient Appearance with Decreased use of Accessory Muscles  Bronchodilator Indications: History / Diagnosis  Bronchodilator Indications: Bronchospasm by physical exam  Note: 60ml syringe x1  Duo Q4     Problem: Humidified High Flow Nasal Cannula  Goal: Maintain adequate oxygenation dependent on patient condition  Description: Target End Date:  resolve prior to discharge or when underlying condition is resolved/stabilized    1.  Implement humidified high flow oxygen therapy  2.  Titrate high flow oxygen to maintain appropriate SpO2  Flowsheets (Taken 3/22/2024 1500 by Ramo Manuel, R.N.)  O2 (LPM): 25  FiO2%: 60 %

## 2024-03-23 NOTE — PROGRESS NOTES
12-hour chart check complete.    Monitor Summary  Rhythm: NSR  Rate: 68-98  Ectopy: NONE  Measurements: 0.16/0.08/0.38

## 2024-03-24 VITALS
DIASTOLIC BLOOD PRESSURE: 63 MMHG | HEIGHT: 72 IN | RESPIRATION RATE: 14 BRPM | SYSTOLIC BLOOD PRESSURE: 133 MMHG | TEMPERATURE: 97.8 F | BODY MASS INDEX: 34.34 KG/M2 | HEART RATE: 60 BPM | OXYGEN SATURATION: 92 % | WEIGHT: 253.53 LBS

## 2024-03-24 LAB
GLUCOSE BLD STRIP.AUTO-MCNC: 104 MG/DL (ref 65–99)
GLUCOSE BLD STRIP.AUTO-MCNC: 122 MG/DL (ref 65–99)

## 2024-03-24 PROCEDURE — 700111 HCHG RX REV CODE 636 W/ 250 OVERRIDE (IP): Mod: JZ | Performed by: INTERNAL MEDICINE

## 2024-03-24 PROCEDURE — 700102 HCHG RX REV CODE 250 W/ 637 OVERRIDE(OP): Performed by: INTERNAL MEDICINE

## 2024-03-24 PROCEDURE — 94640 AIRWAY INHALATION TREATMENT: CPT

## 2024-03-24 PROCEDURE — A9270 NON-COVERED ITEM OR SERVICE: HCPCS | Performed by: INTERNAL MEDICINE

## 2024-03-24 PROCEDURE — 82962 GLUCOSE BLOOD TEST: CPT | Mod: 91

## 2024-03-24 PROCEDURE — 99239 HOSP IP/OBS DSCHRG MGMT >30: CPT | Performed by: INTERNAL MEDICINE

## 2024-03-24 PROCEDURE — 94669 MECHANICAL CHEST WALL OSCILL: CPT

## 2024-03-24 PROCEDURE — 700101 HCHG RX REV CODE 250: Performed by: INTERNAL MEDICINE

## 2024-03-24 RX ORDER — METFORMIN HYDROCHLORIDE 500 MG/1
500 TABLET, EXTENDED RELEASE ORAL 2 TIMES DAILY
Qty: 1 TABLET | Refills: 0
Start: 2024-03-24

## 2024-03-24 RX ORDER — KETOROLAC TROMETHAMINE 10 MG/1
1200 TABLET, FILM COATED ORAL 2 TIMES DAILY
COMMUNITY
Start: 2024-03-24

## 2024-03-24 RX ORDER — METHYLPREDNISOLONE 4 MG/1
TABLET ORAL
Qty: 14 TABLET | Refills: 0 | Status: SHIPPED | OUTPATIENT
Start: 2024-03-24 | End: 2024-04-01

## 2024-03-24 RX ORDER — BUDESONIDE 0.5 MG/2ML
500 INHALANT ORAL 2 TIMES DAILY
Qty: 60 ML | Refills: 0 | Status: SHIPPED | OUTPATIENT
Start: 2024-03-24 | End: 2024-04-11

## 2024-03-24 RX ORDER — IPRATROPIUM BROMIDE AND ALBUTEROL SULFATE 2.5; .5 MG/3ML; MG/3ML
3 SOLUTION RESPIRATORY (INHALATION) EVERY 4 HOURS
Qty: 1 EACH | Refills: 0
Start: 2024-03-24

## 2024-03-24 RX ORDER — LANOLIN ALCOHOL/MO/W.PET/CERES
400 CREAM (GRAM) TOPICAL DAILY
Qty: 30 TABLET | Refills: 0 | Status: SHIPPED | OUTPATIENT
Start: 2024-03-25 | End: 2024-04-11

## 2024-03-24 RX ADMIN — CLOTRIMAZOLE 10 MG: 10 LOZENGE ORAL; TOPICAL at 15:54

## 2024-03-24 RX ADMIN — ACETAMINOPHEN 650 MG: 325 TABLET ORAL at 03:27

## 2024-03-24 RX ADMIN — METHYLPREDNISOLONE SODIUM SUCCINATE 62.5 MG: 125 INJECTION, POWDER, FOR SOLUTION INTRAMUSCULAR; INTRAVENOUS at 12:01

## 2024-03-24 RX ADMIN — ESCITALOPRAM OXALATE 20 MG: 10 TABLET ORAL at 13:14

## 2024-03-24 RX ADMIN — MOMETASONE FUROATE AND FORMOTEROL FUMARATE DIHYDRATE 2 PUFF: 200; 5 AEROSOL RESPIRATORY (INHALATION) at 06:41

## 2024-03-24 RX ADMIN — IPRATROPIUM BROMIDE AND ALBUTEROL SULFATE 3 ML: .5; 3 SOLUTION RESPIRATORY (INHALATION) at 06:32

## 2024-03-24 RX ADMIN — MONTELUKAST 10 MG: 10 TABLET, FILM COATED ORAL at 13:13

## 2024-03-24 RX ADMIN — IPRATROPIUM BROMIDE AND ALBUTEROL SULFATE 3 ML: .5; 3 SOLUTION RESPIRATORY (INHALATION) at 10:56

## 2024-03-24 RX ADMIN — CLOTRIMAZOLE 10 MG: 10 LOZENGE ORAL; TOPICAL at 11:49

## 2024-03-24 RX ADMIN — PREGABALIN 200 MG: 100 CAPSULE ORAL at 13:13

## 2024-03-24 RX ADMIN — IPRATROPIUM BROMIDE AND ALBUTEROL SULFATE 3 ML: .5; 3 SOLUTION RESPIRATORY (INHALATION) at 02:14

## 2024-03-24 ASSESSMENT — FIBROSIS 4 INDEX: FIB4 SCORE: 1.12

## 2024-03-24 ASSESSMENT — PAIN DESCRIPTION - PAIN TYPE
TYPE: ACUTE PAIN
TYPE: ACUTE PAIN

## 2024-03-24 NOTE — PROGRESS NOTES
Received report from Jenny RN, per NOC RN, patient refusing AM medications due to having coughing fit earlier and concern for having another coughing fit.    0815 Assessment completed. Patient A&O x4. Patient is on 1LNC, Spo2 >90%.  Patient reporting 0/10 pain. Bed is locked and in lowest position. Reviewed POC, home oxygen evaluation completed. Call light within reach. No other needs at this time.

## 2024-03-24 NOTE — DISCHARGE SUMMARY
Discharge Summary    CHIEF COMPLAINT ON ADMISSION  Chief Complaint   Patient presents with    Shortness of Breath       Reason for Admission  Shortness of Breath     Admission Date  3/22/2024    CODE STATUS  Full Code    HPI & HOSPITAL COURSE  This is a 63 y.o. female here with intractable asthma attack found to have significant hypoxia in the emergency room.  She has a prior history of pneumocystis pneumonia as well as COVID but was told that she has no residual lung damage from any of that.  She does not typically get hospitalized for her asthma.  This started with an upper respiratory infection she then went to [place] where there was a lot of pollen and she thinks that made her worse.  She did not have her nebulizer with her on this trip and her breathing was such that she could not get a good enough breath to use her Advair inhaler adequately.  She was started on oral steroids and antibiotics without improvement.  Her respiratory status was such that she was admitted to ICU on 40 L high flow.  She has now been weaned down to nasal cannula.  She would like to discharge home.  Her  is at bedside when she was discharged and agrees with the plan.  Home O2 has been ordered and she may leave when it is delivered.  No notes on file    Therefore, she is discharged in good and stable condition to home with close outpatient follow-up.    The patient met 2-midnight criteria for an inpatient stay at the time of discharge.    Discharge Date  3/24/2024    FOLLOW UP ITEMS POST DISCHARGE  PCP    DISCHARGE DIAGNOSES  Principal Problem:    Acute hypoxemic respiratory failure (HCC) (POA: Yes)  Resolved Problems:    * No resolved hospital problems. *      FOLLOW UP  No future appointments.  No follow-up provider specified.    MEDICATIONS ON DISCHARGE     Medication List        START taking these medications        Instructions   budesonide 0.5 MG/2ML Susp  Commonly known as: Pulmicort   Take 2 mL by  nebulization 2 times a day. *If unable to get good breath of steroid inhaler (Dulera)  Dose: 0.5 mg     guaiFENesin-DM CR 1200-60 MG Tb12   Take 1,200 mg by mouth 2 times a day.  Dose: 1,200 mg     ipratropium-albuterol 0.5-2.5 (3) MG/3ML nebulizer solution  Commonly known as: Abran   Doctor's comments: Patient has medication already at home  Take 3 mL by nebulization every 4 hours. Taper off as tolerated  Dose: 3 mL     magnesium oxide 400 (240 Mg) MG Tabs  Start taking on: March 25, 2024   Take 1 Tablet by mouth every day.  Dose: 400 mg     methylPREDNISolone 4 MG Tabs  Start taking on: March 24, 2024  Commonly known as: Medrol   Take 1 Tablet by mouth 3 times a day for 2 days, THEN 1 Tablet 2 times a day for 2 days, THEN 1 Tablet every day for 4 days.     mometasone-formoterol 200-5 MCG/ACT Aero  Commonly known as: Dulera  Replaces: Advair Diskus 250-50 MCG/ACT Aepb   Inhale 2 Puffs 2 times a day.  Dose: 2 Puff            CHANGE how you take these medications        Instructions   escitalopram 20 MG tablet  What changed: Another medication with the same name was removed. Continue taking this medication, and follow the directions you see here.  Commonly known as: Lexapro   Take 1 Tablet by mouth every morning. Take with escitalopram 10 mg for a total dose of 30 mg.  Dose: 20 mg     metFORMIN  MG Tb24  What changed: additional instructions  Commonly known as: Glucophage XR   Take 1 Tablet by mouth 2 times a day. HOLD THIS MEDICATION UNTIL TUESDAY  Dose: 500 mg            CONTINUE taking these medications        Instructions   clotrimazole 10 MG Troc bhumi  Commonly known as: Mycelex   Take 1 Bhumi by mouth 5 Times a Day for 7 days.  Dose: 10 mg     EPINEPHrine 0.3 MG/0.3ML Sosy   Inject the contents of the epipen into the thigh, hold for 3 seconds and release from thigh as needed for anaphylaxis.     estradiol 0.1 MG/GM vaginal cream  Commonly known as: Estrace   Insert 2 g daily intravaginally for 2  weeks, followed by 1 g two-three times per week     etodolac 400 MG tablet  Commonly known as: Lodine   Take 1 Tablet by mouth 2 times a day.  Dose: 400 mg     famotidine 20 MG Tabs  Commonly known as: Pepcid   Take 1 Tablet by mouth 2 times a day. Indications: Gastroesophageal Reflux Disease  Dose: 20 mg     lamoTRIgine 100 MG Tabs  Commonly known as: LaMICtal   Take 1 Tablet by mouth every morning.  Dose: 100 mg     magnesium oxide 400 MG Tabs tablet  Commonly known as: Mag-Ox   Take 1 Tablet by mouth every morning.  Dose: 400 mg     montelukast 10 MG Tabs  Commonly known as: Singulair   Take 1 Tablet by mouth every day.  Dose: 10 mg     omeprazole 40 MG delayed-release capsule  Commonly known as: PriLOSEC   Take 1 Capsule by mouth every day.  Dose: 40 mg     phenazopyridine 200 MG Tabs  Commonly known as: Pyridium   Take 1 Tablet by mouth 3 times a day as needed for Moderate Pain.  Dose: 200 mg     pregabalin 200 MG capsule  Commonly known as: Lyrica   Take 1 Capsule by mouth 2 times a day for 90 days.  Dose: 200 mg     rosuvastatin 10 MG Tabs  Commonly known as: Crestor   Take 1 Tablet by mouth at bedtime.  Dose: 10 mg            STOP taking these medications      Advair Diskus 250-50 MCG/ACT Aepb  Generic drug: fluticasone-salmeterol  Replaced by: mometasone-formoterol 200-5 MCG/ACT Aero     cefdinir 300 MG Caps  Commonly known as: Omnicef     predniSONE 10 MG Tabs  Commonly known as: Deltasone              Allergies  Allergies   Allergen Reactions    Bee Venom Anaphylaxis     Pt is allergic to bees.     Iodine Anaphylaxis, Rash and Hives     IV = anaphylaxis, topical = rash  IV = anaphylaxis, topical = rash    Levofloxacin Anaphylaxis    Shellfish-Derived Products Anaphylaxis     LOBSTER       DIET  Orders Placed This Encounter   Procedures    Diet Order Diet: Consistent CHO (Diabetic)     Standing Status:   Standing     Number of Occurrences:   1     Order Specific Question:   Diet:     Answer:   Consistent  CHO (Diabetic) [4]       ACTIVITY  As tolerated.  Weight bearing as tolerated  Hopefully she will be able to wean off oxygen so that she may return to work in 1 week    CONSULTATIONS  Critical care    PROCEDURES  None    LABORATORY  Lab Results   Component Value Date    SODIUM 140 03/23/2024    POTASSIUM 4.0 03/23/2024    CHLORIDE 108 03/23/2024    CO2 20 03/23/2024    GLUCOSE 145 (H) 03/23/2024    BUN 24 (H) 03/23/2024    CREATININE 0.57 03/23/2024        Lab Results   Component Value Date    WBC 12.6 (H) 03/23/2024    HEMOGLOBIN 12.4 03/23/2024    HEMATOCRIT 36.3 (L) 03/23/2024    PLATELETCT 235 03/23/2024        Total time of the discharge process exceeds 45 minutes.

## 2024-03-24 NOTE — DISCHARGE PLANNING
Received choice form @: 123  Agency/Facility name: [facility]  Sent referral per choice form @: 123    Spoke with Moustapha at [facility], he as requested I call Luis Armendariz due to patients home address. Spoke with Zach at [facility] [phone], he will call his manager to see if they will accept patient. Awaiting for return call on acceptance.  Referral has been faxed to [phone] as per request.  [employer] advised,    [id number]  Spoke with  Moustapha at [facility], he will deliver portable as soon as possible.

## 2024-03-24 NOTE — PROGRESS NOTES
4 Eyes Skin Assessment Completed by ERIC Barkley and ERIC Granger.    Head WDL  Ears WDL  Nose WDL  Mouth WDL  Neck WDL  Breast/Chest WDL  Shoulder Blades WDL  Spine WDL  (R) Arm/Elbow/Hand WDL  (L) Arm/Elbow/Hand WDL  Abdomen WDL  Groin WDL  Scrotum/Coccyx/Buttocks Bruise Left hip/buttocks  (R) Leg WDL  (L) Leg WDL  (R) Heel/Foot/Toe Cracked/dry  (L) Heel/Foot/Toe Cracked/dry          Devices In Places Tele Box and Pulse Ox      Interventions In Place Pillows    Possible Skin Injury No    Pictures Uploaded Into Epic N/A  Wound Consult Placed N/A  RN Wound Prevention Protocol Ordered No

## 2024-03-24 NOTE — DISCHARGE PLANNING
Case Management Discharge Planning    Admission Date: 3/22/2024  GMLOS: 3.6  ALOS: 2    6-Clicks ADL Score: 18  6-Clicks Mobility Score: 20      Anticipated Discharge Dispo: Discharge Disposition: Discharged to home/self care (01)    DME Needed: Yes    DME Ordered: Yes    Action(s) Taken: Choice obtained    Escalations Completed: None    Medically Clear: Yes    Course of Action  **1233  Spoke to patient about home oxygen. Per patient, this is a new set up and she did not use oxygen at home prior to hospitalization. LSW obtained choice. Choice form faxed to [facility]. DME orders are in.

## 2024-03-24 NOTE — FACE TO FACE
"Face to Face Note  -  Durable Medical Equipment    Amy Olson M.D. - NPI: [id number]  I certify that this patient is under my care and that they had a durable medical equipment(DME)face to face encounter by myself that meets the physician DME face-to-face encounter requirements with this patient on:    Date of encounter:   Patient:                    MRN:                       YOB: 2025  Arely Haynes  [id number]  1960     The encounter with the patient was in whole, or in part, for the following medical condition, which is the primary reason for durable medical equipment:  Asthma and Other - acute hypoxic respiratory failure    I certify that, based on my findings, the following durable medical equipment is medically necessary:    Oxygen   HOME O2 Saturation Measurements:(Values must be present for Home Oxygen orders)  Room air sat at rest: 88  Room air sat with amb: 88  With liters of O2: 1, O2 sat at rest with O2: 94  With Liters of O2: 4, O2 sat with amb with O2 : 91  Is the patient mobile?: Yes  If patient feels more short of breath, they can go up to 6 liters per minute and contact healthcare provider.    Supporting Symptoms: The patient requires supplemental oxygen, as the following interventions have been tried with limited or no improvement: \"Bronchodilators and/or steroid inhalers, \"Positive expiratory pressure therapies, \"Oral and/or IV steroids, \"Ambulation with oximetry, and \"Incentive spirometry   and Nebulizer.    My Clinical findings support the need for the above equipment due to:  Hypoxia  " general

## 2024-03-25 ENCOUNTER — TELEPHONE (OUTPATIENT)
Dept: HEALTH INFORMATION MANAGEMENT | Facility: OTHER | Age: 64
End: 2024-03-25
Payer: COMMERCIAL

## 2024-03-28 ENCOUNTER — OFFICE VISIT (OUTPATIENT)
Dept: MEDICAL GROUP | Facility: LAB | Age: 64
End: 2024-03-28
Payer: COMMERCIAL

## 2024-03-28 ENCOUNTER — PATIENT MESSAGE (OUTPATIENT)
Dept: MEDICAL GROUP | Facility: LAB | Age: 64
End: 2024-03-28

## 2024-03-28 ENCOUNTER — HOSPITAL ENCOUNTER (OUTPATIENT)
Facility: MEDICAL CENTER | Age: 64
End: 2024-03-28
Attending: PHYSICIAN ASSISTANT
Payer: COMMERCIAL

## 2024-03-28 VITALS
SYSTOLIC BLOOD PRESSURE: 122 MMHG | TEMPERATURE: 97.8 F | HEIGHT: 72 IN | WEIGHT: 245 LBS | BODY MASS INDEX: 33.18 KG/M2 | HEART RATE: 68 BPM | RESPIRATION RATE: 20 BRPM | OXYGEN SATURATION: 95 % | DIASTOLIC BLOOD PRESSURE: 68 MMHG

## 2024-03-28 DIAGNOSIS — J45.51 SEVERE PERSISTENT ASTHMA WITH ACUTE EXACERBATION: ICD-10-CM

## 2024-03-28 DIAGNOSIS — N39.0 RECURRENT UTI: ICD-10-CM

## 2024-03-28 DIAGNOSIS — M54.17 LUMBOSACRAL RADICULOPATHY: ICD-10-CM

## 2024-03-28 DIAGNOSIS — Z09 HOSPITAL DISCHARGE FOLLOW-UP: Primary | ICD-10-CM

## 2024-03-28 LAB
APPEARANCE UR: ABNORMAL
BACTERIA #/AREA URNS HPF: ABNORMAL /HPF
BILIRUB UR QL STRIP.AUTO: NEGATIVE
COLOR UR: YELLOW
EPI CELLS #/AREA URNS HPF: NEGATIVE /HPF
GLUCOSE UR STRIP.AUTO-MCNC: NEGATIVE MG/DL
HYALINE CASTS #/AREA URNS LPF: ABNORMAL /LPF
KETONES UR STRIP.AUTO-MCNC: NEGATIVE MG/DL
LEUKOCYTE ESTERASE UR QL STRIP.AUTO: ABNORMAL
MICRO URNS: ABNORMAL
NITRITE UR QL STRIP.AUTO: POSITIVE
PH UR STRIP.AUTO: 5.5 [PH] (ref 5–8)
PROT UR QL STRIP: 30 MG/DL
RBC # URNS HPF: ABNORMAL /HPF
RBC UR QL AUTO: ABNORMAL
SP GR UR STRIP.AUTO: 1.02
UROBILINOGEN UR STRIP.AUTO-MCNC: 0.2 MG/DL
WBC #/AREA URNS HPF: ABNORMAL /HPF

## 2024-03-28 PROCEDURE — 87077 CULTURE AEROBIC IDENTIFY: CPT

## 2024-03-28 PROCEDURE — 87086 URINE CULTURE/COLONY COUNT: CPT

## 2024-03-28 PROCEDURE — 87186 SC STD MICRODIL/AGAR DIL: CPT

## 2024-03-28 PROCEDURE — 81001 URINALYSIS AUTO W/SCOPE: CPT

## 2024-03-28 RX ORDER — PREGABALIN 200 MG/1
200 CAPSULE ORAL 2 TIMES DAILY
Qty: 180 CAPSULE | Refills: 0 | Status: SHIPPED | OUTPATIENT
Start: 2024-03-28 | End: 2024-06-26

## 2024-03-28 RX ORDER — IPRATROPIUM BROMIDE AND ALBUTEROL 20; 100 UG/1; UG/1
1 SPRAY, METERED RESPIRATORY (INHALATION) PRN
Qty: 4 G | Refills: 3 | Status: SHIPPED | OUTPATIENT
Start: 2024-03-28

## 2024-03-28 RX ORDER — ALBUTEROL SULFATE 90 UG/1
2 AEROSOL, METERED RESPIRATORY (INHALATION) EVERY 4 HOURS PRN
Qty: 8 G | Refills: 3 | Status: SHIPPED | OUTPATIENT
Start: 2024-03-28

## 2024-03-28 RX ORDER — PREDNISONE 20 MG/1
TABLET ORAL
Qty: 10 TABLET | Refills: 0 | Status: SHIPPED | OUTPATIENT
Start: 2024-03-28

## 2024-03-28 ASSESSMENT — FIBROSIS 4 INDEX: FIB4 SCORE: 1.12

## 2024-03-28 NOTE — PROGRESS NOTES
Subjective:     CC: Hospital follow up    HPI:   Arely is a 63 y.o. female with a complex medical history that includes psoriatic arthritis, history of PCP pneumonia, chronic immunosuppression, ILD, and asthma who presents for hospital follow-up.    Admitted 3/22-3/24 for shortness of breath and hypoxia secondary to asthma exacerbation.  Initially required ICU admission with high flow oxygen and was weaned to nasal cannula on discharge.  She was treated with course of steroids and antibiotics with improvement and was discharged with continued medrol taper.  Advair was also switched to Dulera and she was continued continued on budesonide nebs and DuoNebs.  Has continued to improve at home and is now off of oxygen, reports O2 sats at the lowest in the low 90s with activity and she is not feeling persistently short of breath anymore.  Does have follow-up planned with pulmonology in 1 month.    Also has recently recurrent UTIs with follow-up planned with urology.  Had some urinary frequency yesterday and has a urinalysis from lab pending.    Medications, past medical history, allergies, and social history have been reviewed and updated.      Objective:       Exam:  /68   Pulse 68   Temp 36.6 °C (97.8 °F) (Temporal)   Resp 20   Ht 1.829 m (6')   Wt 111 kg (245 lb)   LMP  (LMP Unknown)   SpO2 95%   BMI 33.23 kg/m²  Body mass index is 33.23 kg/m².    Constitutional: Alert. Well appearing. No distress.  Skin: Warm, dry, good turgor, no visible rashes.  ENMT: Moist mucous membranes. Normal dentition.  Respiratory: Normal effort.  Bilateral, diffuse expiratory wheezing.  Mildly reduced air movement bilaterally.  Cardiovascular: Regular rate and rhythm. Normal S1/S2. No murmurs, rubs or gallops.   Neuro: Moves all four extremities. No facial droop.  Psych: Answers questions appropriately. Normal affect and mood.      Assessment & Plan:     63 y.o. female with the following -     1. Hospital discharge  follow-up  2. Severe persistent asthma with acute exacerbation    Recently hospitalized with shortness of breath and hypoxia secondary to asthma exacerbation.  Has improved after discharge and continues on Medrol taper, is off of oxygen.  Has follow-up planned with pulmonology.  Still with wheezing today but no increased work of breathing, mildly reduced air movement.  Continue Dulera, nebulizers as needed.  Will provide Rx for prednisone burst to have on hand to treat asthma flare.  Follow-up scheduled in 2 weeks to ensure continued symptom improvement.    - predniSONE (DELTASONE) 20 MG Tab; Take 40 mg by mouth daily for 5 days as needed for asthma attack  Dispense: 10 Tablet; Refill: 0    3. Recurrent UTI  History of ESBL, culture last month with E. coli resistant to Bactrim.  Had some recent frequency and urinalysis currently pending.  Does have follow-up planned with urology.  Can provide Rx for antibiotics if pending urinalysis is consistent with UTI.         Please note that this note was created using voice recognition software.  '

## 2024-03-28 NOTE — LETTER
March 28, 2024    To Whom It May Concern:         This is confirmation that Arely Norwoodwilfredoyovany attended her scheduled appointment with Joe Espinal M.D. on 3/28/24. She is cleared to return to work 4/1/24.         If you have any questions please do not hesitate to call me at the phone number listed below.    Sincerely,          Joe Espinal M.D.  [phone]                  
    March 28, 2024    To Whom It May Concern:         This is confirmation that Arely Norwoodwilfredoyovany attended her scheduled appointment with Joe Espinal M.D. on 3/28/24. She is cleared to return to work.         If you have any questions please do not hesitate to call me at the phone number listed below.    Sincerely,          Joe Espinal M.D.  [phone]                  
dental pain/injury

## 2024-03-29 RX ORDER — NITROFURANTOIN 25; 75 MG/1; MG/1
100 CAPSULE ORAL 2 TIMES DAILY
Qty: 10 CAPSULE | Refills: 0 | Status: SHIPPED | OUTPATIENT
Start: 2024-03-29 | End: 2024-04-03

## 2024-04-05 ENCOUNTER — TELEPHONE (OUTPATIENT)
Dept: MEDICAL GROUP | Facility: LAB | Age: 64
End: 2024-04-05
Payer: COMMERCIAL

## 2024-04-05 NOTE — TELEPHONE ENCOUNTER
1. Caller Name: Kenzie                        Call Back Number: [phone] (home)        How would the patient prefer to be contacted with a response: Phone call OK to leave a detailed message    Pt is having trouble with asthma.  Her lungs are tight.  She is asking if she needs to do a steroid pack.

## 2024-04-11 ENCOUNTER — OFFICE VISIT (OUTPATIENT)
Dept: MEDICAL GROUP | Facility: LAB | Age: 64
End: 2024-04-11
Payer: COMMERCIAL

## 2024-04-11 VITALS
OXYGEN SATURATION: 95 % | WEIGHT: 247 LBS | RESPIRATION RATE: 20 BRPM | BODY MASS INDEX: 33.46 KG/M2 | DIASTOLIC BLOOD PRESSURE: 66 MMHG | TEMPERATURE: 97.8 F | HEIGHT: 72 IN | HEART RATE: 61 BPM | SYSTOLIC BLOOD PRESSURE: 130 MMHG

## 2024-04-11 DIAGNOSIS — J45.51 SEVERE PERSISTENT ASTHMA WITH ACUTE EXACERBATION: ICD-10-CM

## 2024-04-11 DIAGNOSIS — Z91.09 ENVIRONMENTAL ALLERGIES: ICD-10-CM

## 2024-04-11 PROBLEM — R12 HEARTBURN: Status: RESOLVED | Noted: 2023-05-12 | Resolved: 2024-04-11

## 2024-04-11 PROCEDURE — 3078F DIAST BP <80 MM HG: CPT | Performed by: FAMILY MEDICINE

## 2024-04-11 PROCEDURE — 3075F SYST BP GE 130 - 139MM HG: CPT | Performed by: FAMILY MEDICINE

## 2024-04-11 PROCEDURE — 99214 OFFICE O/P EST MOD 30 MIN: CPT | Performed by: FAMILY MEDICINE

## 2024-04-11 RX ORDER — LORATADINE 10 MG/1
10 TABLET ORAL DAILY
COMMUNITY

## 2024-04-11 ASSESSMENT — FIBROSIS 4 INDEX: FIB4 SCORE: 1.12

## 2024-04-11 NOTE — PROGRESS NOTES
Subjective:     CC: Asthma    HPI:   Kaila 63-year-old female with a complex medical history  that includes psoriatic arthritis, history of PCP pneumonia, chronic immunosuppression, ILD, and severe asthma who presents with concern for asthma.  She was admitted 3 weeks ago with asthma exacerbation, did initially require ICU admission and high flow oxygen.  She did complete a prolonged oral steroid taper.  Over the last 6 days she has had return of episodic coughing fits that can be triggered by being outside or cold air.  Does seem to improve when she stays inside throughout the day.  She has some mild wheezing and shortness of breath with these fits but no persistent increased work of breathing or shortness of breath.  He is using Dulera twice daily, using albuterol near daily but did not have to use it yesterday.  Did use DuoNebs earlier this week.  Has prednisone 40 mg x 5 days burst on hand to treat flares but has not used this yet.    No fevers.  She has not felt ill otherwise, no worsening of mild chronic rhinorrhea.    She is seeing pulmonology here later this month but is also established with a pulmonologist at the [facility] and has an appointment next week.    Reports very severe allergies as a child, even had to be admitted with IV steroids multiple times.    Medications, past medical history, allergies, and social history have been reviewed and updated.      Objective:       Exam:  /66   Pulse 61   Temp 36.6 °C (97.8 °F) (Temporal)   Resp 20   Ht 1.829 m (6')   Wt 112 kg (247 lb)   LMP  (LMP Unknown)   SpO2 95%   BMI 33.50 kg/m²  Body mass index is 33.5 kg/m².    Constitutional: Alert. No distress.  Skin: Warm, dry, good turgor, no visible rashes.  ENMT: Moist mucous membranes.   Respiratory: No significant increase in effort.  Air movement is reduced bilaterally, R>L.  Expiratory wheezing present bilaterally.  Cardiovascular: Regular rate and rhythm. Normal S1/S2. No murmurs, rubs  or gallops.   Neuro: Moves all four extremities. No facial droop.  Psych: Answers questions appropriately. Normal affect and mood.    Assessment & Plan:     63 y.o. female with the following -     1. Severe persistent asthma with acute exacerbation    Hospitalized last month with severe asthma exacerbation initially requiring ICU admission and high flow oxygen.  Did well with prolonged oral steroid course but for the last 6 days she has had a return of coughing fits with mild shortness of breath.  Vitals are reassuring, there is mildly reduced air movement and expiratory wheezing but no significant increase in effort.  Continue Dulera, albuterol and DuoNebs as needed.  We discussed starting with another oral prednisone burst but she would like to hold off at this point as she does feel like she is overall improving and much improved between the coughing fits triggered by being outside.  She is seeing pulmonology next week.  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  Suspect prior flare never completely resolved after recent hospitalization and likely worsened by allergen trigger.  Given her complex history and clinical course will do CXR for further evaluation.  ER precautions for severe worsening.    - DX-CHEST-2 VIEWS; Future  - Referral to Allergy    2. Environmental allergies    Suspect likely contributing to recent asthma worsening.  She does report very severe allergies as a child.  Continue Singulair.  Did recommend she increase her antihistamine (she reports she is taking either Claritin or Zyrtec) to twice daily and also add a Flonase nasal spray.  Will send referral to establish with allergy.    - Referral to Allergy      Please note that this note was created using voice recognition software.

## 2024-04-29 ENCOUNTER — OFFICE VISIT (OUTPATIENT)
Dept: SLEEP MEDICINE | Facility: MEDICAL CENTER | Age: 64
End: 2024-04-29
Attending: INTERNAL MEDICINE
Payer: COMMERCIAL

## 2024-04-29 VITALS
WEIGHT: 246 LBS | SYSTOLIC BLOOD PRESSURE: 124 MMHG | OXYGEN SATURATION: 96 % | DIASTOLIC BLOOD PRESSURE: 62 MMHG | BODY MASS INDEX: 33.32 KG/M2 | RESPIRATION RATE: 18 BRPM | HEART RATE: 85 BPM | HEIGHT: 72 IN

## 2024-04-29 DIAGNOSIS — J45.50 SEVERE PERSISTENT ASTHMA WITHOUT COMPLICATION: ICD-10-CM

## 2024-04-29 DIAGNOSIS — J96.01 ACUTE HYPOXEMIC RESPIRATORY FAILURE (HCC): ICD-10-CM

## 2024-04-29 DIAGNOSIS — J45.50 SEVERE PERSISTENT ASTHMA, UNSPECIFIED WHETHER COMPLICATED: ICD-10-CM

## 2024-04-29 PROCEDURE — 99213 OFFICE O/P EST LOW 20 MIN: CPT | Performed by: INTERNAL MEDICINE

## 2024-04-29 RX ORDER — TEZEPELUMAB-EKKO 210 MG/1.9ML
210 INJECTION, SOLUTION SUBCUTANEOUS
Qty: 1.91 ML | Refills: 11 | Status: SHIPPED | OUTPATIENT
Start: 2024-04-29

## 2024-04-29 ASSESSMENT — FIBROSIS 4 INDEX: FIB4 SCORE: 1.12

## 2024-04-29 NOTE — PROGRESS NOTES
Pulmonary Clinic- Initial Consult    Date of Service: 4/29/2024    Referring Physician: No ref. provider found    Reason for Consult: Severe asthma    Chief Complaint:   Chief Complaint   Patient presents with    Hospital Follow-up     Hospital f/u 03/22/2024 03/22/2024-3-     HPI:   Arely Haynes is a 63 y.o. female who is referred to the pulmonary clinic for severe asthma.     From my H&P 3/22/2024 during ICU stay   63 y.o. female who presented 3/22/2024 with worsening shortness of breath.  She has a history of asthma for which she takes Advair 250 daily.  A couple of weeks ago patient had an upper respiratory infection and had an increase in her respiratory symptoms.  At that time, she increased her Advair to 500 daily.  She then traveled to [facility] to see her son and he shortness of breath and wheezing increased.  She saw urgent care on Sunday, 3/17/2024 and was given a prednisone taper.  She has been taking the prednisone, however, this morning she had increasing work of breathing and presented to the ER.  In the ER, she was noted to be hypoxemic and required high flow nasal cannula.  She was given IV fluids, IV magnesium, continuous DuoNeb, and Solu-Medrol.  She was subsequently admitted to the ICU.  On my examination, patient continued to be dyspneic and very wheezy.  She was started on continuous albuterol which has improved her symptoms.  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  After that time, she was taking Humira up until about 6 months ago.  At this time, she is currently managed with NSAIDs only.  She did see an ILD specialist after her PJP pneumonia who reviewed her imaging and stated that she did not have any lasting damage from the PJP pneumonia.  I have also reviewed her imaging and agree with this    Patient was discharged home 2 days after admission with 2  to 3 L of oxygen, which she has since weaned off.  She has had a kathy few weeks since her discharge and has nearly needed another round of steroids.  She says that the last day or so, her symptoms have started to clear and she is starting to feel much better.  We discussed that she needs to be on a biologic for her asthma as she is very severe and had a significant recent hospitalization.    Past Medical History:   Diagnosis Date    Anesthesia     1st cousin has malignant hyperthermia/pt has never had an issue or her sisters    Arthritis     hips knees hands spine    Asthma     prn inhalers    Bowel habit changes     diarrhea    Depression     Daughter passed few years ago    Erosive esophagitis     GERD (gastroesophageal reflux disease)     Heart burn     High cholesterol     Immunocompromised (HCC)     Pain     hips knees    Pneumonia 2018    Psoriasis     Psoriatic arthritis (HCC)        Past Surgical History:   Procedure Laterality Date    ID INJECTION,SACROILIAC JOINT Left 7/28/2023    Procedure: LEFT sacroiliac joint injection;  Surgeon: Elvis Aleman M.D.;  [facility];  Service: Pain Management    ID INJ LUMBAR/SACRAL,W/ IMAGING Right 6/2/2023    Procedure: RIGHT L5-S1 interlaminar epidural steroid injection. Patient has tolerated gadolinium;  Surgeon: Elvis Aleman M.D.;  [facility];  Service: Pain Management    ID BRONCHOSCOPY,DIAGNOSTIC  7/9/2021    Procedure: BRONCHOSCOPY;  Surgeon: Myles Vidal M.D.;  Location: [facility];  Service: Ent    TENDON REPAIR Left 9/19/2019    Procedure: REPAIR, TENDON- QUADRICEPS RUTURE REPAIR AND MEYERS;  Surgeon: Jayden Velázquez M.D.;  Location: [facility];  Service: Orthopedics    HYSTERECTOMY ROBOTIC XI N/A 7/11/2019    Procedure: HYSTERECTOMY, ROBOT-ASSISTED, USING DA SABINO XI;  Surgeon: Curly Bernal M.D.;  Location: [facility];  Service: Gynecology    SALPINGO  OOPHORECTOMY Bilateral 7/11/2019    Procedure: SALPINGO-OOPHORECTOMY;  Surgeon: Curly Bernal M.D.;  Location: SURGERY [facility];  Service: Gynecology    GYN SURGERY  1999    c sec    CYSTECTOMY      FUSION, SPINE, LUMBAR, PLIF      HIP REPLACEMENT, TOTAL      Right    OTHER ABDOMINAL SURGERY      appendix    OTHER ORTHOPEDIC SURGERY      left knee    OTHER ORTHOPEDIC SURGERY      right rotator cuff       Social History     Socioeconomic History    Marital status:      Spouse name: Not on file    Number of children: Not on file    Years of education: Not on file    Highest education level: Not on file   Occupational History    Occupation: Flight Nurse/Educator   Tobacco Use    Smoking status: Never    Smokeless tobacco: Never   Vaping Use    Vaping Use: Never used   Substance and Sexual Activity    Alcohol use: Yes     Alcohol/week: 0.6 - 1.2 oz     Types: 1 - 2 Glasses of wine per week     Comment: one glss of wine at night     Drug use: No    Sexual activity: Yes     Comment: Tubal ligation   Other Topics Concern     Service No    Blood Transfusions No    Caffeine Concern No    Occupational Exposure Yes    Hobby Hazards Yes    Sleep Concern Yes    Stress Concern No    Weight Concern Yes    Special Diet No    Back Care No    Exercise Yes    Bike Helmet Yes    Seat Belt Yes    Self-Exams Yes   Social History Narrative    Not on file     Social Determinants of Health     Financial Resource Strain: Not on file   Food Insecurity: Not on file   Transportation Needs: Not on file   Physical Activity: Not on file   Stress: Not on file   Social Connections: Not on file   Intimate Partner Violence: Not on file   Housing Stability: Not on file          Family History   Problem Relation Age of Onset    Hyperlipidemia Mother     Heart Attack Mother     Psychiatric Illness Mother     Heart Disease Mother     Arthritis Mother     Lung Disease Father     Cancer Father     Hyperlipidemia Father      Hyperlipidemia Sister     Cancer Sister         breast    Heart Disease Paternal Grandfather     Hypertension Neg Hx     Diabetes Neg Hx        Current Outpatient Medications on File Prior to Visit   Medication Sig Dispense Refill    loratadine (CLARITIN) 10 MG Tab Take 10 mg by mouth every day.      predniSONE (DELTASONE) 20 MG Tab Take 40 mg by mouth daily for 5 days as needed for asthma attack 10 Tablet 0    pregabalin (LYRICA) 200 MG capsule Take 1 Capsule by mouth 2 times a day for 90 days. 180 Capsule 0    albuterol 108 (90 Base) MCG/ACT Aero Soln inhalation aerosol Inhale 2 Puffs every four hours as needed for Shortness of Breath. 8 g 3    metFORMIN ER (GLUCOPHAGE XR) 500 MG TABLET SR 24 HR Take 1 Tablet by mouth 2 times a day. HOLD THIS MEDICATION UNTIL TUESDAY 1 Tablet 0    ipratropium-albuterol (DUONEB) 0.5-2.5 (3) MG/3ML nebulizer solution Take 3 mL by nebulization every 4 hours. Taper off as tolerated 1 Each 0    Dextromethorphan-guaiFENesin (GUAIFENESIN-DM CR) 1200-60 MG TABLET SR 12 HR Take 1,200 mg by mouth 2 times a day.      mometasone-formoterol (DULERA) 200-5 MCG/ACT Aerosol Inhale 2 Puffs 2 times a day. 1 Each 5    phenazopyridine (PYRIDIUM) 200 MG Tab Take 1 Tablet by mouth 3 times a day as needed for Moderate Pain. 6 Tablet 0    estradiol (ESTRACE) 0.1 MG/GM vaginal cream Insert 2 g daily intravaginally for 2 weeks, followed by 1 g two-three times per week 42.5 g 3    magnesium oxide (MAG-OX) 400 MG Tab tablet Take 1 Tablet by mouth every morning. 90 Tablet 3    etodolac (LODINE) 400 MG tablet Take 1 Tablet by mouth 2 times a day. 180 Tablet 3    omeprazole (PRILOSEC) 40 MG delayed-release capsule Take 1 Capsule by mouth every day. 90 Capsule 3    escitalopram (LEXAPRO) 20 MG tablet Take 1 Tablet by mouth every morning. Take with escitalopram 10 mg for a total dose of 30 mg. 90 Tablet 3    famotidine (PEPCID) 20 MG Tab Take 1 Tablet by mouth 2 times a day. Indications: Gastroesophageal Reflux  Disease 180 Tablet 3    lamoTRIgine (LAMICTAL) 100 MG Tab Take 1 Tablet by mouth every morning. 90 Tablet 3    rosuvastatin (CRESTOR) 10 MG Tab Take 1 Tablet by mouth at bedtime. 90 Tablet 3    montelukast (SINGULAIR) 10 MG Tab Take 1 Tablet by mouth every day. 90 Tablet 3    EPINEPHrine 0.3 MG/0.3ML Solution Prefilled Syringe Inject the contents of the epipen into the thigh, hold for 3 seconds and release from thigh as needed for anaphylaxis. 1 Each 6     No current facility-administered medications on file prior to visit.       Allergies: Bee venom, Iodine, Levofloxacin, and Shellfish-derived products    Review of Systems   Constitutional:  Positive for malaise/fatigue. Negative for chills, fever and weight loss.   Respiratory:  Positive for cough, sputum production, shortness of breath and wheezing.    Cardiovascular:  Negative for chest pain, palpitations and orthopnea.   Gastrointestinal:  Negative for nausea and vomiting.   Musculoskeletal:  Negative for neck pain.   Neurological:  Negative for dizziness and headaches.   Psychiatric/Behavioral:  Positive for depression. Negative for suicidal ideas.        Vitals:  /62 (BP Location: Right arm, Patient Position: Sitting, BP Cuff Size: Adult)   Pulse 85   Resp 18   Ht 1.829 m (6')   Wt 112 kg (246 lb)   SpO2 96%     Physical Exam  Vitals reviewed.   Constitutional:       Appearance: Normal appearance.   HENT:      Head: Normocephalic and atraumatic.   Eyes:      Conjunctiva/sclera: Conjunctivae normal.   Cardiovascular:      Rate and Rhythm: Normal rate and regular rhythm.   Pulmonary:      Effort: Pulmonary effort is normal. No respiratory distress.      Breath sounds: No wheezing.   Abdominal:      Palpations: Abdomen is soft.   Musculoskeletal:      Right lower leg: No edema.      Left lower leg: No edema.   Skin:     General: Skin is warm and dry.   Neurological:      General: No focal deficit present.      Mental Status: She is alert and oriented  to person, place, and time. Mental status is at baseline.   Psychiatric:         Mood and Affect: Mood normal.         Behavior: Behavior normal.         Laboratory Data:      Pertinent Studies:    PFTs as reviewed by me personally show:  2018- normal     Imaging as reviewed by me personally show:      Echo:  7/15/2021  CONCLUSIONS  Normal left ventricular chamber size.  Left ventricular ejection fraction is visually estimated to be 65%.  Mild aortic insufficiency.  Normal right ventricular size and systolic function.  Right heart pressures are normal.     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     This note was generated using voice recognition software which has a chance of producing errors of grammar and possibly content.  I have made every reasonable attempt to find and correct any obvious errors, but it should be expected that some may not be found prior to finalization of this note.    Time spent in record review prior to patient arrival, reviewing results, and in face-to-face encounter totaled 62 min, excluding any procedures if performed.    Arin Whiteside, DO   Pulmonary and Critical  [facility]

## 2024-04-30 ENCOUNTER — TELEPHONE (OUTPATIENT)
Dept: SLEEP MEDICINE | Facility: MEDICAL CENTER | Age: 64
End: 2024-04-30
Payer: COMMERCIAL

## 2024-04-30 PROBLEM — J45.50 SEVERE PERSISTENT ASTHMA WITHOUT COMPLICATION: Status: ACTIVE | Noted: 2018-11-30

## 2024-04-30 ASSESSMENT — ENCOUNTER SYMPTOMS
PALPITATIONS: 0
DEPRESSION: 1
FEVER: 0
WEIGHT LOSS: 0
NAUSEA: 0
VOMITING: 0
COUGH: 1
SHORTNESS OF BREATH: 1
HEADACHES: 0
NECK PAIN: 0
WHEEZING: 1
DIZZINESS: 0
ORTHOPNEA: 0
CHILLS: 0
SPUTUM PRODUCTION: 1

## 2024-04-30 NOTE — ASSESSMENT & PLAN NOTE
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

## 2024-04-30 NOTE — TELEPHONE ENCOUNTER
Prior Authorization for TEZSPIRE SOLUTION PREFILLED SYRINGE 210 MG/1.91ML (Quantity: 1.91, Days: 30) has been submitted via Cover My Meds: Key ([id number])    Insurance: LAURA    Will follow up in 24-48 business hours.

## 2024-05-02 DIAGNOSIS — M54.17 LUMBOSACRAL RADICULOPATHY: ICD-10-CM

## 2024-05-02 DIAGNOSIS — J45.909 UNCOMPLICATED ASTHMA, UNSPECIFIED ASTHMA SEVERITY, UNSPECIFIED WHETHER PERSISTENT: ICD-10-CM

## 2024-05-02 RX ORDER — PREGABALIN 200 MG/1
200 CAPSULE ORAL 2 TIMES DAILY
Qty: 180 CAPSULE | Refills: 0 | Status: SHIPPED | OUTPATIENT
Start: 2024-05-02 | End: 2024-05-14 | Stop reason: SDUPTHER

## 2024-05-02 NOTE — TELEPHONE ENCOUNTER
Received request via: Pharmacy    Was the patient seen in the last year in this department? Yes    Does the patient have an active prescription (recently filled or refills available) for medication(s) requested? No    Pharmacy Name: [facility]     Does the patient have nursing home Plus and need 100 day supply (blood pressure, diabetes and cholesterol meds only)? Patient does not have SCP

## 2024-05-02 NOTE — TELEPHONE ENCOUNTER
Received request via: Pharmacy    Was the patient seen in the last year in this department? Yes4/11/24    Does the patient have an active prescription (recently filled or refills available) for medication(s) requested? No    Pharmacy Name: [facility]    Does the patient have [facility] Plus and need 100 day supply (blood pressure, diabetes and cholesterol meds only)? Medication is not for cholesterol, blood pressure or diabetes

## 2024-05-09 ENCOUNTER — PATIENT MESSAGE (OUTPATIENT)
Dept: MEDICAL GROUP | Facility: LAB | Age: 64
End: 2024-05-09
Payer: COMMERCIAL

## 2024-05-09 DIAGNOSIS — J45.51 SEVERE PERSISTENT ASTHMA WITH ACUTE EXACERBATION: ICD-10-CM

## 2024-05-09 RX ORDER — PREDNISONE 20 MG/1
40 TABLET ORAL DAILY
Qty: 10 TABLET | Refills: 0 | Status: SHIPPED | OUTPATIENT
Start: 2024-05-09 | End: 2024-05-14

## 2024-05-09 RX ORDER — IPRATROPIUM BROMIDE AND ALBUTEROL 20; 100 UG/1; UG/1
1 SPRAY, METERED RESPIRATORY (INHALATION) 4 TIMES DAILY
Qty: 4 G | Refills: 2 | Status: SHIPPED | OUTPATIENT
Start: 2024-05-09

## 2024-05-14 ENCOUNTER — PATIENT MESSAGE (OUTPATIENT)
Dept: MEDICAL GROUP | Facility: LAB | Age: 64
End: 2024-05-14
Payer: COMMERCIAL

## 2024-05-14 DIAGNOSIS — M54.17 LUMBOSACRAL RADICULOPATHY: ICD-10-CM

## 2024-05-14 RX ORDER — PREGABALIN 200 MG/1
200 CAPSULE ORAL 2 TIMES DAILY
Qty: 180 CAPSULE | Refills: 0 | Status: SHIPPED | OUTPATIENT
Start: 2024-05-14 | End: 2024-08-12

## 2024-05-15 ASSESSMENT — ENCOUNTER SYMPTOMS
DEPRESSION: 1
WEIGHT LOSS: 0
FEVER: 0
NAUSEA: 0
NECK PAIN: 0
SPUTUM PRODUCTION: 1
HEADACHES: 0
PALPITATIONS: 0
VOMITING: 0
CHILLS: 0
COUGH: 1
SHORTNESS OF BREATH: 1
DIZZINESS: 0
ORTHOPNEA: 0

## 2024-05-15 NOTE — PROGRESS NOTES
"Pulmonary Clinic- Follow up    Date of Service: 5/17/24    Referring Physician: No ref. provider found    Reason for Consult: Severe asthma    Chief Complaint:   Chief Complaint   Patient presents with    Follow-Up     Last seen 04/29/2024     HPI:   \"Arely Haynes is a 63 y.o. female who is referred to the pulmonary clinic for severe asthma.     From my H&P 3/22/2024 during ICU stay   63 y.o. female who presented 3/22/2024 with worsening shortness of breath.  She has a history of asthma for which she takes Advair 250 daily.  A couple of weeks ago patient had an upper respiratory infection and had an increase in her respiratory symptoms.  At that time, she increased her Advair to 500 daily.  She then traveled to [facility] to see her son and he shortness of breath and wheezing increased.  She saw urgent care on Sunday, 3/17/2024 and was given a prednisone taper.  She has been taking the prednisone, however, this morning she had increasing work of breathing and presented to the ER.  In the ER, she was noted to be hypoxemic and required high flow nasal cannula.  She was given IV fluids, IV magnesium, continuous DuoNeb, and Solu-Medrol.  She was subsequently admitted to the ICU.  On my examination, patient continued to be dyspneic and very wheezy.  She was started on continuous albuterol which has improved her symptoms.  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  After that time, she was taking Humira up until about 6 months ago.  At this time, she is currently managed with NSAIDs only.  She did see an ILD specialist after her PJP pneumonia who reviewed her imaging and stated that she did not have any lasting damage from the PJP pneumonia.  I have also reviewed her imaging and agree with this    Patient was discharged home 2 days after admission with 2 to 3 L of oxygen, which she has since " "weaned off.  She has had a kathy few weeks since her discharge and has nearly needed another round of steroids.  She says that the last day or so, her symptoms have started to clear and she is starting to feel much better.  We discussed that she needs to be on a biologic for her asthma as she is very severe and had a significant recent hospitalization.\" - Dr. Whiteside    Today 5/17/24 - Per patient Tezpire was declined by insurance as non-formulary.  She is still feeling awful and just finished a short steroid burst. Eos were 920 in 2021 and have been up to 260 in the last 2 years.  She has been on steroids so often that her eos are low or normal now however that is falsely decreased due to the steroid administration.  She is using high dose Dulera but still needing her nebulizer multiple times a day.      Past Medical History:   Diagnosis Date    Anesthesia     1st cousin has malignant hyperthermia/pt has never had an issue or her sisters    Arthritis     hips knees hands spine    Asthma     prn inhalers    Bowel habit changes     diarrhea    Depression     Daughter passed few years ago    Erosive esophagitis     GERD (gastroesophageal reflux disease)     Heart burn     High cholesterol     Immunocompromised (HCC)     Pain     hips knees    Pneumonia 2018    Psoriasis     Psoriatic arthritis (HCC)        Past Surgical History:   Procedure Laterality Date    KY INJECTION,SACROILIAC JOINT Left 7/28/2023    Procedure: LEFT sacroiliac joint injection;  Surgeon: Elvis Aleman M.D.;  [facility];  Service: Pain Management    KY INJ LUMBAR/SACRAL,W/ IMAGING Right 6/2/2023    Procedure: RIGHT L5-S1 interlaminar epidural steroid injection. Patient has tolerated gadolinium;  Surgeon: Elvis Aleman M.D.;  Location: [facility];  Service: Pain Management    KY BRONCHOSCOPY,DIAGNOSTIC  7/9/2021    Procedure: BRONCHOSCOPY;  Surgeon: Myles Vidal M.D.;  Location: SURGERY [facility];  Service: Ent    TENDON REPAIR Left 9/19/2019    Procedure: REPAIR, TENDON- QUADRICEPS RUTURE REPAIR AND MEYERS;  Surgeon: Jayden Velázquez M.D.;  Location: SURGERY [facility];  Service: Orthopedics    HYSTERECTOMY ROBOTIC XI N/A 7/11/2019    Procedure: HYSTERECTOMY, ROBOT-ASSISTED, USING DA SABINO XI;  Surgeon: Curly Bernal M.D.;  Location: SURGERY [facility];  Service: Gynecology    SALPINGO OOPHORECTOMY Bilateral 7/11/2019    Procedure: SALPINGO-OOPHORECTOMY;  Surgeon: Curly Bernal M.D.;  Location: SURGERY [facility];  Service: Gynecology    GYN SURGERY  1999    c sec    CYSTECTOMY      FUSION, SPINE, LUMBAR, PLIF      HIP REPLACEMENT, TOTAL      Right    OTHER ABDOMINAL SURGERY      appendix    OTHER ORTHOPEDIC SURGERY      left knee    OTHER ORTHOPEDIC SURGERY      right rotator cuff       Social History     Socioeconomic History    Marital status:      Spouse name: Not on file    Number of children: Not on file    Years of education: Not on file    Highest education level: Not on file   Occupational History    Occupation: Flight Nurse/Educator   Tobacco Use    Smoking status: Never    Smokeless tobacco: Never   Vaping Use    Vaping status: Never Used   Substance and Sexual Activity    Alcohol use: Yes     Alcohol/week: 0.6 - 1.2 oz     Types: 1 - 2 Glasses of wine per week     Comment: one glss of wine at night     Drug use: No    Sexual activity: Yes     Comment: Tubal ligation   Other Topics Concern     Service No    Blood Transfusions No    Caffeine Concern No    Occupational Exposure Yes    Hobby Hazards Yes    Sleep Concern Yes    Stress Concern No    Weight Concern Yes    Special Diet No    Back Care No    Exercise Yes    Bike Helmet Yes    Seat Belt Yes    Self-Exams Yes   Social History Narrative    Not on file     Social Determinants of Health     Financial Resource Strain: Not on file   Food Insecurity: Not on file   Transportation Needs: Not on file   Physical  Activity: Not on file   Stress: Not on file   Social Connections: Not on file   Intimate Partner Violence: Not on file   Housing Stability: Not on file          Family History   Problem Relation Age of Onset    Hyperlipidemia Mother     Heart Attack Mother     Psychiatric Illness Mother     Heart Disease Mother     Arthritis Mother     Lung Disease Father     Cancer Father     Hyperlipidemia Father     Hyperlipidemia Sister     Cancer Sister         breast    Heart Disease Paternal Grandfather     Hypertension Neg Hx     Diabetes Neg Hx        Current Outpatient Medications on File Prior to Visit   Medication Sig Dispense Refill    pregabalin (LYRICA) 200 MG capsule Take 1 Capsule by mouth 2 times a day for 90 days. 180 Capsule 0    ipratropium-albuterol (COMBIVENT RESPIMAT)  MCG/ACT Aero Soln Inhale 1 Puff 4 times a day. 4 g 2    mometasone-formoterol (DULERA) 200-5 MCG/ACT Aerosol Inhale 2 Puffs 2 times a day. 1 Each 5    prefilled syringe tezepelumab-ekko (TEZSPIRE) 210 mg/1.91 mL Inject 1.91 mL under the skin every 4 weeks. 1.91 mL 11    loratadine (CLARITIN) 10 MG Tab Take 10 mg by mouth every day.      metFORMIN ER (GLUCOPHAGE XR) 500 MG TABLET SR 24 HR Take 1 Tablet by mouth 2 times a day. HOLD THIS MEDICATION UNTIL TUESDAY 1 Tablet 0    ipratropium-albuterol (DUONEB) 0.5-2.5 (3) MG/3ML nebulizer solution Take 3 mL by nebulization every 4 hours. Taper off as tolerated 1 Each 0    estradiol (ESTRACE) 0.1 MG/GM vaginal cream Insert 2 g daily intravaginally for 2 weeks, followed by 1 g two-three times per week 42.5 g 3    magnesium oxide (MAG-OX) 400 MG Tab tablet Take 1 Tablet by mouth every morning. 90 Tablet 3    etodolac (LODINE) 400 MG tablet Take 1 Tablet by mouth 2 times a day. 180 Tablet 3    omeprazole (PRILOSEC) 40 MG delayed-release capsule Take 1 Capsule by mouth every day. 90 Capsule 3    escitalopram (LEXAPRO) 20 MG tablet Take 1 Tablet by mouth every morning. Take with escitalopram 10 mg for  a total dose of 30 mg. 90 Tablet 3    famotidine (PEPCID) 20 MG Tab Take 1 Tablet by mouth 2 times a day. Indications: Gastroesophageal Reflux Disease 180 Tablet 3    lamoTRIgine (LAMICTAL) 100 MG Tab Take 1 Tablet by mouth every morning. 90 Tablet 3    rosuvastatin (CRESTOR) 10 MG Tab Take 1 Tablet by mouth at bedtime. 90 Tablet 3    montelukast (SINGULAIR) 10 MG Tab Take 1 Tablet by mouth every day. 90 Tablet 3    EPINEPHrine 0.3 MG/0.3ML Solution Prefilled Syringe Inject the contents of the epipen into the thigh, hold for 3 seconds and release from thigh as needed for anaphylaxis. 1 Each 6    predniSONE (DELTASONE) 20 MG Tab Take 40 mg by mouth daily for 5 days as needed for asthma attack (Patient not taking: Reported on 5/17/2024) 10 Tablet 0    albuterol 108 (90 Base) MCG/ACT Aero Soln inhalation aerosol Inhale 2 Puffs every four hours as needed for Shortness of Breath. 8 g 3    Dextromethorphan-guaiFENesin (GUAIFENESIN-DM CR) 1200-60 MG TABLET SR 12 HR Take 1,200 mg by mouth 2 times a day. (Patient not taking: Reported on 4/29/2024)      phenazopyridine (PYRIDIUM) 200 MG Tab Take 1 Tablet by mouth 3 times a day as needed for Moderate Pain. 6 Tablet 0     No current facility-administered medications on file prior to visit.       Allergies: Bee venom, Iodine, Levofloxacin, and Shellfish-derived products    Review of Systems   Constitutional:  Positive for malaise/fatigue. Negative for chills, fever and weight loss.   Respiratory:  Positive for cough, sputum production and shortness of breath. Negative for wheezing.    Cardiovascular:  Negative for chest pain, palpitations and orthopnea.   Gastrointestinal:  Negative for nausea and vomiting.   Musculoskeletal:  Negative for neck pain.   Neurological:  Negative for dizziness and headaches.   Psychiatric/Behavioral:  Positive for depression. Negative for suicidal ideas.        Vitals:  /60 (BP Location: Left arm, Patient Position: Sitting, BP Cuff Size:  Adult)   Pulse 78   Resp 20   Ht 1.829 m (6')   Wt 113 kg (250 lb)   SpO2 94%     Physical Exam  Vitals reviewed.   Constitutional:       Appearance: Normal appearance.   HENT:      Head: Normocephalic and atraumatic.   Eyes:      Conjunctiva/sclera: Conjunctivae normal.   Cardiovascular:      Rate and Rhythm: Normal rate and regular rhythm.   Pulmonary:      Effort: Pulmonary effort is normal. No respiratory distress.      Breath sounds: No wheezing.   Abdominal:      Palpations: Abdomen is soft.   Musculoskeletal:      Right lower leg: No edema.      Left lower leg: No edema.   Skin:     General: Skin is warm and dry.   Neurological:      General: No focal deficit present.      Mental Status: She is alert and oriented to person, place, and time. Mental status is at baseline.   Psychiatric:         Mood and Affect: Mood normal.         Behavior: Behavior normal.         Laboratory Data:      Pertinent Studies:    PFTs as reviewed by me personally show:  2018- normal     Imaging as reviewed by me personally show:      Echo:  7/15/2021  CONCLUSIONS  Normal left ventricular chamber size.  Left ventricular ejection fraction is visually estimated to be 65%.  Mild aortic insufficiency.  Normal right ventricular size and systolic function.  Right heart pressures are normal.     Assessment/Plan:    Problem List Items Addressed This Visit       Severe persistent asthma without complication     Uncontrolled on high dose Dulera with Duonebs multiple times a day.  She has been on steroids recently (just finished 2 days ago).  Tezpire was declined by insurance.  Patient has a history of severe eosinophilia but not recently due to steroid use.  Her exercise tolerance is severely affected by her shortness of breath.  Plan:  - Start Nucala - Rx sent  - Continue Dulera  - Continue Duonebs  - Steroid taper provided if needed  - Follow up in 4 weeks         Relevant Medications    Mepolizumab (NUCALA) 100 MG/ML Solution  Auto-injector     Other Visit Diagnoses       Shortness of breath        Relevant Medications    Mepolizumab (NUCALA) 100 MG/ML Solution Auto-injector    predniSONE (DELTASONE) 10 MG Tab    Other Relevant Orders    EC-ECHOCARDIOGRAM COMPLETE W/O CONT               Return in about 4 weeks (around 6/14/2024) for Dr. Whiteside/Erika or available MD.     This note was generated using voice recognition software which has a chance of producing errors of grammar and possibly content.  I have made every reasonable attempt to find and correct any obvious errors, but it should be expected that some may not be found prior to finalization of this note.    Time spent in record review prior to patient arrival, reviewing results, and in face-to-face encounter totaled 30 min, excluding any procedures if performed.    Betsy James MD RD  Pulmonary and Critical Care    Available on Voalte

## 2024-05-17 ENCOUNTER — OFFICE VISIT (OUTPATIENT)
Dept: SLEEP MEDICINE | Facility: MEDICAL CENTER | Age: 64
End: 2024-05-17
Attending: INTERNAL MEDICINE
Payer: COMMERCIAL

## 2024-05-17 VITALS
BODY MASS INDEX: 33.86 KG/M2 | OXYGEN SATURATION: 94 % | HEIGHT: 72 IN | SYSTOLIC BLOOD PRESSURE: 120 MMHG | HEART RATE: 78 BPM | WEIGHT: 250 LBS | RESPIRATION RATE: 20 BRPM | DIASTOLIC BLOOD PRESSURE: 60 MMHG

## 2024-05-17 DIAGNOSIS — J45.50 SEVERE PERSISTENT ASTHMA WITHOUT COMPLICATION: ICD-10-CM

## 2024-05-17 DIAGNOSIS — R06.02 SHORTNESS OF BREATH: ICD-10-CM

## 2024-05-17 PROCEDURE — 99214 OFFICE O/P EST MOD 30 MIN: CPT | Performed by: INTERNAL MEDICINE

## 2024-05-17 PROCEDURE — 3074F SYST BP LT 130 MM HG: CPT | Performed by: INTERNAL MEDICINE

## 2024-05-17 PROCEDURE — 3078F DIAST BP <80 MM HG: CPT | Performed by: INTERNAL MEDICINE

## 2024-05-17 RX ORDER — PREDNISONE 10 MG/1
TABLET ORAL
Qty: 18 TABLET | Refills: 2 | Status: SHIPPED | OUTPATIENT
Start: 2024-05-17

## 2024-05-17 RX ORDER — MEPOLIZUMAB 100 MG/ML
100 INJECTION, SOLUTION SUBCUTANEOUS ONCE
Qty: 0.84 ML | Refills: 0 | Status: SHIPPED | OUTPATIENT
Start: 2024-05-17 | End: 2024-05-17

## 2024-05-17 ASSESSMENT — ENCOUNTER SYMPTOMS: WHEEZING: 0

## 2024-05-17 ASSESSMENT — FIBROSIS 4 INDEX: FIB4 SCORE: 1.12

## 2024-05-17 NOTE — ASSESSMENT & PLAN NOTE
Uncontrolled on high dose Dulera with Duonebs multiple times a day.  She has been on steroids recently (just finished 2 days ago).  Tezpire was declined by insurance.  Patient has a history of severe eosinophilia but not recently due to steroid use.  Her exercise tolerance is severely affected by her shortness of breath.  Plan:  - Start Nucala - Rx sent  - Continue Dulera  - Continue Duonebs  - Steroid taper provided if needed  - Follow up in 4 weeks

## 2024-05-22 ENCOUNTER — TELEPHONE (OUTPATIENT)
Dept: SLEEP MEDICINE | Facility: MEDICAL CENTER | Age: 64
End: 2024-05-22
Payer: COMMERCIAL

## 2024-05-22 NOTE — TELEPHONE ENCOUNTER
Prior Authorization for Nucala 100mg/ml Auto Injector (Quantity: 1ml, Days: 28) has been submitted via Fax: #[phone]    Messaged MA and MD to send over new prescription for Auto Injectors.    Will follow up in 24-48 business hours.

## 2024-05-27 DIAGNOSIS — J45.51 SEVERE PERSISTENT ASTHMA WITH ACUTE EXACERBATION: ICD-10-CM

## 2024-05-29 ENCOUNTER — TELEPHONE (OUTPATIENT)
Dept: SLEEP MEDICINE | Facility: MEDICAL CENTER | Age: 64
End: 2024-05-29
Payer: COMMERCIAL

## 2024-05-29 NOTE — TELEPHONE ENCOUNTER
VOICEMAIL:05/23/24  1. Caller Name: Kaila                      Call Back Number: [phone] (home)      2. Message: Pt called and lm, re: her steroids and asking how she should taper.     3. Patient approves office to leave a detailed voicemail/MyChart message: N\A    05/29/24:  Called and spoke with pt. Pt said she is almost done with her Steriods.  She better but still a little tight.  Pt was asking about her Nucala. She thought she was getting this at the infusion center.

## 2024-06-11 DIAGNOSIS — T78.40XA ALLERGIC REACTION, INITIAL ENCOUNTER: ICD-10-CM

## 2024-06-12 ENCOUNTER — HOSPITAL ENCOUNTER (EMERGENCY)
Facility: MEDICAL CENTER | Age: 64
End: 2024-06-12
Attending: EMERGENCY MEDICINE
Payer: COMMERCIAL

## 2024-06-12 ENCOUNTER — APPOINTMENT (OUTPATIENT)
Dept: RADIOLOGY | Facility: MEDICAL CENTER | Age: 64
End: 2024-06-12
Attending: EMERGENCY MEDICINE
Payer: COMMERCIAL

## 2024-06-12 ENCOUNTER — TELEPHONE (OUTPATIENT)
Dept: SLEEP MEDICINE | Facility: MEDICAL CENTER | Age: 64
End: 2024-06-12
Payer: COMMERCIAL

## 2024-06-12 VITALS
WEIGHT: 253.31 LBS | HEIGHT: 72 IN | SYSTOLIC BLOOD PRESSURE: 136 MMHG | BODY MASS INDEX: 34.31 KG/M2 | DIASTOLIC BLOOD PRESSURE: 65 MMHG | HEART RATE: 66 BPM | OXYGEN SATURATION: 91 % | RESPIRATION RATE: 26 BRPM | TEMPERATURE: 98.3 F

## 2024-06-12 DIAGNOSIS — R06.00 DYSPNEA, UNSPECIFIED TYPE: ICD-10-CM

## 2024-06-12 DIAGNOSIS — J45.901 ASTHMA WITH ACUTE EXACERBATION, UNSPECIFIED ASTHMA SEVERITY, UNSPECIFIED WHETHER PERSISTENT: ICD-10-CM

## 2024-06-12 DIAGNOSIS — J45.51 SEVERE PERSISTENT ASTHMA WITH ACUTE EXACERBATION: ICD-10-CM

## 2024-06-12 LAB
ALBUMIN SERPL BCP-MCNC: 4.6 G/DL (ref 3.2–4.9)
ALBUMIN/GLOB SERPL: 1.5 G/DL
ALP SERPL-CCNC: 102 U/L (ref 30–99)
ALT SERPL-CCNC: 30 U/L (ref 2–50)
ANION GAP SERPL CALC-SCNC: 14 MMOL/L (ref 7–16)
AST SERPL-CCNC: 38 U/L (ref 12–45)
BASOPHILS # BLD AUTO: 0.7 % (ref 0–1.8)
BASOPHILS # BLD: 0.05 K/UL (ref 0–0.12)
BILIRUB SERPL-MCNC: 0.9 MG/DL (ref 0.1–1.5)
BUN SERPL-MCNC: 27 MG/DL (ref 8–22)
CALCIUM ALBUM COR SERPL-MCNC: 9.4 MG/DL (ref 8.5–10.5)
CALCIUM SERPL-MCNC: 9.9 MG/DL (ref 8.4–10.2)
CHLORIDE SERPL-SCNC: 106 MMOL/L (ref 96–112)
CO2 SERPL-SCNC: 20 MMOL/L (ref 20–33)
CREAT SERPL-MCNC: 0.62 MG/DL (ref 0.5–1.4)
EKG IMPRESSION: NORMAL
EOSINOPHIL # BLD AUTO: 0.35 K/UL (ref 0–0.51)
EOSINOPHIL NFR BLD: 4.9 % (ref 0–6.9)
ERYTHROCYTE [DISTWIDTH] IN BLOOD BY AUTOMATED COUNT: 44.4 FL (ref 35.9–50)
GFR SERPLBLD CREATININE-BSD FMLA CKD-EPI: 99 ML/MIN/1.73 M 2
GLOBULIN SER CALC-MCNC: 3.1 G/DL (ref 1.9–3.5)
GLUCOSE SERPL-MCNC: 101 MG/DL (ref 65–99)
HCT VFR BLD AUTO: 42.8 % (ref 37–47)
HGB BLD-MCNC: 14.5 G/DL (ref 12–16)
IMM GRANULOCYTES # BLD AUTO: 0.02 K/UL (ref 0–0.11)
IMM GRANULOCYTES NFR BLD AUTO: 0.3 % (ref 0–0.9)
LYMPHOCYTES # BLD AUTO: 2.4 K/UL (ref 1–4.8)
LYMPHOCYTES NFR BLD: 33.4 % (ref 22–41)
MCH RBC QN AUTO: 31.2 PG (ref 27–33)
MCHC RBC AUTO-ENTMCNC: 33.9 G/DL (ref 32.2–35.5)
MCV RBC AUTO: 92 FL (ref 81.4–97.8)
MONOCYTES # BLD AUTO: 0.66 K/UL (ref 0–0.85)
MONOCYTES NFR BLD AUTO: 9.2 % (ref 0–13.4)
NEUTROPHILS # BLD AUTO: 3.71 K/UL (ref 1.82–7.42)
NEUTROPHILS NFR BLD: 51.5 % (ref 44–72)
NRBC # BLD AUTO: 0 K/UL
NRBC BLD-RTO: 0 /100 WBC (ref 0–0.2)
PLATELET # BLD AUTO: 233 K/UL (ref 164–446)
PMV BLD AUTO: 9.7 FL (ref 9–12.9)
POTASSIUM SERPL-SCNC: 3.6 MMOL/L (ref 3.6–5.5)
PROT SERPL-MCNC: 7.7 G/DL (ref 6–8.2)
RBC # BLD AUTO: 4.65 M/UL (ref 4.2–5.4)
SODIUM SERPL-SCNC: 140 MMOL/L (ref 135–145)
WBC # BLD AUTO: 7.2 K/UL (ref 4.8–10.8)

## 2024-06-12 PROCEDURE — 85025 COMPLETE CBC W/AUTO DIFF WBC: CPT

## 2024-06-12 PROCEDURE — 700101 HCHG RX REV CODE 250: Performed by: EMERGENCY MEDICINE

## 2024-06-12 PROCEDURE — 700111 HCHG RX REV CODE 636 W/ 250 OVERRIDE (IP): Mod: JZ | Performed by: EMERGENCY MEDICINE

## 2024-06-12 PROCEDURE — 96374 THER/PROPH/DIAG INJ IV PUSH: CPT

## 2024-06-12 PROCEDURE — 94644 CONT INHLJ TX 1ST HOUR: CPT

## 2024-06-12 PROCEDURE — 93005 ELECTROCARDIOGRAM TRACING: CPT | Performed by: EMERGENCY MEDICINE

## 2024-06-12 PROCEDURE — 80053 COMPREHEN METABOLIC PANEL: CPT

## 2024-06-12 PROCEDURE — 71045 X-RAY EXAM CHEST 1 VIEW: CPT

## 2024-06-12 PROCEDURE — 36415 COLL VENOUS BLD VENIPUNCTURE: CPT

## 2024-06-12 PROCEDURE — 99284 EMERGENCY DEPT VISIT MOD MDM: CPT

## 2024-06-12 PROCEDURE — 94640 AIRWAY INHALATION TREATMENT: CPT

## 2024-06-12 RX ORDER — IPRATROPIUM BROMIDE AND ALBUTEROL SULFATE 2.5; .5 MG/3ML; MG/3ML
3 SOLUTION RESPIRATORY (INHALATION)
Status: COMPLETED | OUTPATIENT
Start: 2024-06-12 | End: 2024-06-12

## 2024-06-12 RX ORDER — PREDNISONE 20 MG/1
40 TABLET ORAL DAILY
Qty: 20 TABLET | Refills: 1 | Status: SHIPPED | OUTPATIENT
Start: 2024-06-12 | End: 2024-07-02

## 2024-06-12 RX ORDER — PREDNISONE 20 MG/1
TABLET ORAL
Qty: 10 TABLET | Refills: 0 | Status: SHIPPED | OUTPATIENT
Start: 2024-06-12

## 2024-06-12 RX ORDER — METHYLPREDNISOLONE SODIUM SUCCINATE 40 MG/ML
40 INJECTION, POWDER, LYOPHILIZED, FOR SOLUTION INTRAMUSCULAR; INTRAVENOUS ONCE
Status: COMPLETED | OUTPATIENT
Start: 2024-06-12 | End: 2024-06-12

## 2024-06-12 RX ADMIN — IPRATROPIUM BROMIDE AND ALBUTEROL SULFATE 3 ML: .5; 3 SOLUTION RESPIRATORY (INHALATION) at 19:00

## 2024-06-12 RX ADMIN — METHYLPREDNISOLONE SODIUM SUCCINATE 40 MG: 40 INJECTION, POWDER, FOR SOLUTION INTRAMUSCULAR; INTRAVENOUS at 18:56

## 2024-06-12 RX ADMIN — Medication 10 MG/HR: at 19:11

## 2024-06-12 ASSESSMENT — FIBROSIS 4 INDEX: FIB4 SCORE: 1.12

## 2024-06-13 NOTE — ED NOTES
Received report from Penny, no questions about plan of care at this time. Pt on continues neb at this time.

## 2024-06-13 NOTE — ED TRIAGE NOTES
Chief Complaint   Patient presents with    Shortness of Breath     Since this AM, HX of asthma and respiratory failure. States she feels like she's not moving air well.      BP (!) 164/95   Pulse 70   Temp 36.8 °C (98.3 °F) (Temporal)   Resp (!) 24   Ht 1.829 m (6')   Wt 115 kg (253 lb 4.9 oz)   LMP  (LMP Unknown)   SpO2 90%   BMI 34.35 kg/m²

## 2024-06-13 NOTE — ED NOTES
Pt provided with DC paper work and education on new medications. Pt declines questions and ambulated out of ER.

## 2024-06-13 NOTE — ED NOTES
Pt has completed neb treatment and states that she is feeling much better. NC oxygen turned off for RA sat trial.

## 2024-06-13 NOTE — TELEPHONE ENCOUNTER
I contacted patient to follow up on her Nucala start date and was informed that she was in the ER with an exacerbation of asthma. She was Dc'ed home after IV steroids. I sent an additional 10 days of prednisone in case she needs it to help bridge her to getting her Nucala. Will follow up in next few days to see how she is doing.     Arin Whiteside, DO   Pulmonary and Critical Care

## 2024-06-13 NOTE — ED PROVIDER NOTES
"  CHIEF COMPLAINT  Chief Complaint   Patient presents with    Shortness of Breath     Since this AM, HX of asthma and respiratory failure. States she feels like she's not moving air well.        LIMITATION TO HISTORY   None    HPI    Arely Haynes is a 63 y.o. female with a history of asthma  With was admitted to the ICU previously in March  Who is pending immunotherapy for the first time  Who took, then earlier today without relief  Come no shortness of breath  Services morning.  She does have asthma.  Says it notices any fever or chills.  Has been a slight cough.  And it is very typical of her asthma.    HC she is traveling soon and she is concerned that it make it worse she can want to get worse wanted to come in to make sure it was not exacerbated.    Her  who is an occupational physician was also at the bedside.  They are discussing other continuous nebulizer of DuoNeb.    OUTSIDE HISTORIAN(S):  Has been  is at the bedside states that it is not as bad as other previous symptoms.    EXTERNAL RECORDS REVIEWED     She was seen in the clinic with her recent ICU stay continue have asthma bouts.  There is note about her being an immunologic.     Follow-Up       Last seen 04/29/2024      HPI:   \"Arely Haynes is a 63 y.o. female who is referred to the pulmonary clinic for severe asthma.      From my H&P 3/22/2024 during ICU stay   63 y.o. female who presented 3/22/2024 with worsening shortness of breath.  She has a history of asthma for which she takes Advair 250 daily.  A couple of weeks ago patient had an upper respiratory infection and had an increase in her respiratory symptoms.  At that time, she increased her Advair to 500 daily.  She then traveled to [facility] to see her son and he shortness of breath and wheezing increased.  She saw urgent care on Sunday, 3/17/2024 and was given a prednisone taper.  She has been taking the prednisone, however, this morning she had increasing " "work of breathing and presented to the ER.  In the ER, she was noted to be hypoxemic and required high flow nasal cannula.  She was given IV fluids, IV magnesium, continuous DuoNeb, and Solu-Medrol.  She was subsequently admitted to the ICU.  On my examination, patient continued to be dyspneic and very wheezy.  She was started on continuous albuterol which has improved her symptoms.  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  After that time, she was taking Humira up until about 6 months ago.  At this time, she is currently managed with NSAIDs only.  She did see an ILD specialist after her PJP pneumonia who reviewed her imaging and stated that she did not have any lasting damage from the PJP pneumonia.  I have also reviewed her imaging and agree with this     Patient was discharged home 2 days after admission with 2 to 3 L of oxygen, which she has since weaned off.  She has had a kathy few weeks since her discharge and has nearly needed another round of steroids.  She says that the last day or so, her symptoms have started to clear and she is starting to feel much better.  We discussed that she needs to be on a biologic for her asthma as she is very severe and had a significant recent hospitalization.\" - Dr. Whiteside     Today 5/17/24 - Per patient Tezpire was declined by insurance as non-formulary.  She is still feeling awful and just finished a short steroid burst. Eos were 920 in 2021 and have been up to 260 in the last 2 years.  She has been on steroids so often that her eos are low or normal now however that is falsely decreased due to the steroid administration.  She is using high dose Dulera but still needing her nebulizer multiple times a day.      REVIEW OF SYSTEMS  Noted.    PAST MEDICAL HISTORY  Past Medical History:   Diagnosis Date    Anesthesia     1st cousin has malignant " hyperthermia/pt has never had an issue or her sisters    Arthritis     hips knees hands spine    Asthma     prn inhalers    Bowel habit changes     diarrhea    Depression     Daughter passed few years ago    Erosive esophagitis     GERD (gastroesophageal reflux disease)     Heart burn     High cholesterol     Immunocompromised (HCC)     Pain     hips knees    Pneumonia 2018    Psoriasis     Psoriatic arthritis (HCC)        FAMILY HISTORY  Family History   Problem Relation Age of Onset    Hyperlipidemia Mother     Heart Attack Mother     Psychiatric Illness Mother     Heart Disease Mother     Arthritis Mother     Lung Disease Father     Cancer Father     Hyperlipidemia Father     Hyperlipidemia Sister     Cancer Sister         breast    Heart Disease Paternal Grandfather     Hypertension Neg Hx     Diabetes Neg Hx        SOCIAL HISTORY  Social History     Tobacco Use    Smoking status: Never    Smokeless tobacco: Never   Vaping Use    Vaping status: Never Used   Substance Use Topics    Alcohol use: Yes     Alcohol/week: 0.6 - 1.2 oz     Types: 1 - 2 Glasses of wine per week     Comment: one glss of wine at night     Drug use: No     Social History     Substance and Sexual Activity   Drug Use No       SURGICAL HISTORY  Past Surgical History:   Procedure Laterality Date    CO INJECTION,SACROILIAC JOINT Left 7/28/2023    Procedure: LEFT sacroiliac joint injection;  Surgeon: Elvis Aleman M.D.;  [facility];  Service: Pain Management    CO INJ LUMBAR/SACRAL,W/ IMAGING Right 6/2/2023    Procedure: RIGHT L5-S1 interlaminar epidural steroid injection. Patient has tolerated gadolinium;  Surgeon: Elvis Aleman M.D.;  Location: [facility];  Service: Pain Management    CO BRONCHOSCOPY,DIAGNOSTIC  7/9/2021    Procedure: BRONCHOSCOPY;  Surgeon: Myles Vidal M.D.;  Location: SURGERY [facility];  Service: Ent    TENDON REPAIR Left 9/19/2019    Procedure: REPAIR, TENDON-  QUADRICEPS RUTURE REPAIR AND MEYERS;  Surgeon: Jayden Velázquez M.D.;  Location: SURGERY [facility];  Service: Orthopedics    HYSTERECTOMY ROBOTIC XI N/A 7/11/2019    Procedure: HYSTERECTOMY, ROBOT-ASSISTED, USING DA SABINO XI;  Surgeon: Curly Bernal M.D.;  Location: SURGERY [facility];  Service: Gynecology    SALPINGO OOPHORECTOMY Bilateral 7/11/2019    Procedure: SALPINGO-OOPHORECTOMY;  Surgeon: Curly Bernal M.D.;  Location: SURGERY [facility];  Service: Gynecology    GYN SURGERY  1999    c sec    CYSTECTOMY      FUSION, SPINE, LUMBAR, PLIF      HIP REPLACEMENT, TOTAL      Right    OTHER ABDOMINAL SURGERY      appendix    OTHER ORTHOPEDIC SURGERY      left knee    OTHER ORTHOPEDIC SURGERY      right rotator cuff       CURRENT MEDICATIONS  No current facility-administered medications for this encounter.    Current Outpatient Medications:     EPINEPHrine 0.3 MG/0.3ML Solution Prefilled Syringe, Inject the contents of the epipen into the thigh, hold for 3 seconds and release from thigh as needed for anaphylaxis., Disp: 1 Each, Rfl: 6    Mepolizumab (NUCALA) 100 MG/ML Solution Auto-injector, Inject 1 mL under the skin every 4 weeks., Disp: 1 mL, Rfl: 6    predniSONE (DELTASONE) 10 MG Tab, Take 30mg x 3 days, then take 20mg x 3 days, then take 10mg x 3 days, with food, then discontinue., Disp: 18 Tablet, Rfl: 2    pregabalin (LYRICA) 200 MG capsule, Take 1 Capsule by mouth 2 times a day for 90 days., Disp: 180 Capsule, Rfl: 0    ipratropium-albuterol (COMBIVENT RESPIMAT)  MCG/ACT Aero Soln, Inhale 1 Puff 4 times a day., Disp: 4 g, Rfl: 2    mometasone-formoterol (DULERA) 200-5 MCG/ACT Aerosol, Inhale 2 Puffs 2 times a day., Disp: 1 Each, Rfl: 5    loratadine (CLARITIN) 10 MG Tab, Take 10 mg by mouth every day., Disp: , Rfl:     predniSONE (DELTASONE) 20 MG Tab, Take 40 mg by mouth daily for 5 days as needed for asthma attack (Patient not taking: Reported on 5/17/2024), Disp: 10 Tablet, Rfl: 0     metFORMIN ER (GLUCOPHAGE XR) 500 MG TABLET SR 24 HR, Take 1 Tablet by mouth 2 times a day. HOLD THIS MEDICATION UNTIL TUESDAY, Disp: 1 Tablet, Rfl: 0    ipratropium-albuterol (DUONEB) 0.5-2.5 (3) MG/3ML nebulizer solution, Take 3 mL by nebulization every 4 hours. Taper off as tolerated, Disp: 1 Each, Rfl: 0    estradiol (ESTRACE) 0.1 MG/GM vaginal cream, Insert 2 g daily intravaginally for 2 weeks, followed by 1 g two-three times per week, Disp: 42.5 g, Rfl: 3    magnesium oxide (MAG-OX) 400 MG Tab tablet, Take 1 Tablet by mouth every morning., Disp: 90 Tablet, Rfl: 3    etodolac (LODINE) 400 MG tablet, Take 1 Tablet by mouth 2 times a day., Disp: 180 Tablet, Rfl: 3    omeprazole (PRILOSEC) 40 MG delayed-release capsule, Take 1 Capsule by mouth every day., Disp: 90 Capsule, Rfl: 3    escitalopram (LEXAPRO) 20 MG tablet, Take 1 Tablet by mouth every morning. Take with escitalopram 10 mg for a total dose of 30 mg., Disp: 90 Tablet, Rfl: 3    famotidine (PEPCID) 20 MG Tab, Take 1 Tablet by mouth 2 times a day. Indications: Gastroesophageal Reflux Disease, Disp: 180 Tablet, Rfl: 3    lamoTRIgine (LAMICTAL) 100 MG Tab, Take 1 Tablet by mouth every morning., Disp: 90 Tablet, Rfl: 3    rosuvastatin (CRESTOR) 10 MG Tab, Take 1 Tablet by mouth at bedtime., Disp: 90 Tablet, Rfl: 3    montelukast (SINGULAIR) 10 MG Tab, Take 1 Tablet by mouth every day., Disp: 90 Tablet, Rfl: 3    ALLERGIES  Allergies   Allergen Reactions    Bee Venom Anaphylaxis     Pt is allergic to bees.     Iodine Anaphylaxis, Rash and Hives     IV = anaphylaxis, topical = rash  IV = anaphylaxis, topical = rash    Levofloxacin Anaphylaxis    Shellfish-Derived Products Anaphylaxis     LOBSTER       PHYSICAL EXAM  VITAL SIGNS: BP (!) 164/95   Pulse 70   Temp 36.8 °C (98.3 °F) (Temporal)   Resp (!) 24   Ht 1.829 m (6')   Wt 115 kg (253 lb 4.9 oz)   LMP  (LMP Unknown)   SpO2 90%   BMI 34.35 kg/m²   Reviewed and noted.  Patient has a borderline hypoxia.   88-89 on room air  Constitutional: Well developed, Well nourished, slightly anxious.  HENT: Normocephalic, atraumatic, bilateral external ears normal, No intraoral erythema, edema, exudate  Eyes: PERRLA, conjunctiva pink, no scleral icterus.   Cardiovascular: Regular rate and rhythm. No murmurs, rubs or gallops.  No dependent edema or calf tenderness  Respiratory: Lungs clear to auscultation bilaterally.  Diffuse scant wheezes no rhonchi no rales  Abdominal:  Abdomen soft, non-tender, non distended. No rebound, or guarding.    Skin: No erythema, no rash. No wounds or bruising.  Genitourinary: No costovertebral angle tenderness.   Musculoskeletal: no deformities.   Neurologic: Alert, no facial droop noted. All extra ocular muscles intact. Moves all extremities with out weakness noted  Psychiatric: Affect normal, Judgment normal, Mood normal.         MEDICAL DECISION MAKING:  PROBLEMS EVALUATED THIS VISIT:  Shortness of breath.  Started this morning.  Patient has significant history of asthma she admitted the ICU.  Patient has not been on steroids without treatment of medications and she is going vacation which makes her is concerned.  Pulse ox was borderline.  Patient had some scant wheezes there is no fever.  And there is no signs of infection such as rhonchi.      Decision making.  Asthma exacerbation because of either viral or other etiology.  Pneumonia, less likely this is DVT PE patient appears to be low risk by Wells criteria.  Less like this is cardiac in etiology as patient not having cardiac classical symptoms.         PLAN:  EKG  Lab work  Chest x-ray.    RISK:  He is at moderate risk she will get a prescription of steroids for her condition.        RESULTS    LABS Ordered and Reviewed by Me:  Results for orders placed or performed during the hospital encounter of 06/12/24   CBC with Differential   Result Value Ref Range    WBC 7.2 4.8 - 10.8 K/uL    RBC 4.65 4.20 - 5.40 M/uL    Hemoglobin 14.5 12.0 - 16.0  g/dL    Hematocrit 42.8 37.0 - 47.0 %    MCV 92.0 81.4 - 97.8 fL    MCH 31.2 27.0 - 33.0 pg    MCHC 33.9 32.2 - 35.5 g/dL    RDW 44.4 35.9 - 50.0 fL    Platelet Count 233 164 - 446 K/uL    MPV 9.7 9.0 - 12.9 fL    Neutrophils-Polys 51.50 44.00 - 72.00 %    Lymphocytes 33.40 22.00 - 41.00 %    Monocytes 9.20 0.00 - 13.40 %    Eosinophils 4.90 0.00 - 6.90 %    Basophils 0.70 0.00 - 1.80 %    Immature Granulocytes 0.30 0.00 - 0.90 %    Nucleated RBC 0.00 0.00 - 0.20 /100 WBC    Neutrophils (Absolute) 3.71 1.82 - 7.42 K/uL    Lymphs (Absolute) 2.40 1.00 - 4.80 K/uL    Monos (Absolute) 0.66 0.00 - 0.85 K/uL    Eos (Absolute) 0.35 0.00 - 0.51 K/uL    Baso (Absolute) 0.05 0.00 - 0.12 K/uL    Immature Granulocytes (abs) 0.02 0.00 - 0.11 K/uL    NRBC (Absolute) 0.00 K/uL   Comp Metabolic Panel   Result Value Ref Range    Sodium 140 135 - 145 mmol/L    Potassium 3.6 3.6 - 5.5 mmol/L    Chloride 106 96 - 112 mmol/L    Co2 20 20 - 33 mmol/L    Anion Gap 14.0 7.0 - 16.0    Glucose 101 (H) 65 - 99 mg/dL    Bun 27 (H) 8 - 22 mg/dL    Creatinine 0.62 0.50 - 1.40 mg/dL    Calcium 9.9 8.4 - 10.2 mg/dL    Correct Calcium 9.4 8.5 - 10.5 mg/dL    AST(SGOT) 38 12 - 45 U/L    ALT(SGPT) 30 2 - 50 U/L    Alkaline Phosphatase 102 (H) 30 - 99 U/L    Total Bilirubin 0.9 0.1 - 1.5 mg/dL    Albumin 4.6 3.2 - 4.9 g/dL    Total Protein 7.7 6.0 - 8.2 g/dL    Globulin 3.1 1.9 - 3.5 g/dL    A-G Ratio 1.5 g/dL   ESTIMATED GFR   Result Value Ref Range    GFR (CKD-EPI) 99 >60 mL/min/1.73 m 2   EKG   Result Value Ref Range    Report       [facility] Emergency Dept.    Test Date:  2024  Pt Name:    VIOLA CORONADO              Department: [facility]  MRN:        [id number]                      Room:  Gender:     Female                       Technician: [id number]  :        [id number]                   Requested By:ER TRIAGE PROTOCOL  Order #:    [id number]                    Reading MD:    Measurements  Intervals                                 Axis  Rate:       58                           P:          38  FL:         177                          QRS:        -32  QRSD:       107                          T:          52  QT:         444  QTc:        437    Interpretive Statements  Sinus rhythm  Left axis deviation  Consider anterior infarct  Compared to ECG 03/22/2024 09:14:12  Left-axis deviation now present  Myocardial infarct finding now present  Left ventricular hypertrophy no longer present  Q waves no longer present           RADIOLOGY        Radiologist interpretation:   DX-CHEST-PORTABLE (1 VIEW)   Final Result      1.  Linear bibasilar atelectasis.   2.  Cardiomegaly with interstitial prominence.   3.  Low lung volumes.            ED COURSE:    ED Observation Status? No   No noted need for observation for developing issue    INTERVENTIONS BY ME:  Medications   ipratropium-albuterol (DUONEB) nebulizer solution (3 mL Nebulization Given 6/12/24 1900)   albuterol (Proventil) 5 mg/mL nebulizer solution (10 mg/hr Nebulization Given 6/12/24 1911)   methylPREDNISolone (SOLU-MEDROL) 40 MG injection 40 mg (40 mg Intravenous Given 6/12/24 1856)       Response on recheck:  Patient is feeling much better.  Oxygen level has noted to be within normal values.        FINAL DISPO PLAN   Discharge Medication List as of 6/12/2024  8:27 PM        Patient got a refill of her prednisone.    Followup:  [facility], Emergency Dept  [address]  [place] [id number] [phone]  Go to   If symptoms worsen      CONDITION: Stable.  And improved    FINAL IMPRESSION  1. Dyspnea, unspecified type    2. Asthma with acute exacerbation, unspecified asthma severity, unspecified whether persistent    3. Severe persistent asthma with acute exacerbation

## 2024-06-14 ENCOUNTER — ANCILLARY PROCEDURE (OUTPATIENT)
Dept: CARDIOLOGY | Facility: MEDICAL CENTER | Age: 64
End: 2024-06-14
Attending: INTERNAL MEDICINE
Payer: COMMERCIAL

## 2024-06-14 DIAGNOSIS — R06.02 SHORTNESS OF BREATH: ICD-10-CM

## 2024-06-14 LAB — LV EJECT FRACT  99904: 55

## 2024-06-14 PROCEDURE — 93306 TTE W/DOPPLER COMPLETE: CPT | Mod: 26 | Performed by: INTERNAL MEDICINE

## 2024-06-14 PROCEDURE — 93306 TTE W/DOPPLER COMPLETE: CPT

## 2024-06-25 ENCOUNTER — APPOINTMENT (RX ONLY)
Dept: URBAN - METROPOLITAN AREA CLINIC 6 | Facility: CLINIC | Age: 64
Setting detail: DERMATOLOGY
End: 2024-06-25

## 2024-06-25 ENCOUNTER — PATIENT MESSAGE (OUTPATIENT)
Dept: SLEEP MEDICINE | Facility: MEDICAL CENTER | Age: 64
End: 2024-06-25
Payer: COMMERCIAL

## 2024-06-25 DIAGNOSIS — J45.51 SEVERE PERSISTENT ASTHMA WITH ACUTE EXACERBATION: ICD-10-CM

## 2024-06-25 DIAGNOSIS — R06.02 SHORTNESS OF BREATH: ICD-10-CM

## 2024-06-25 DIAGNOSIS — D22 MELANOCYTIC NEVI: ICD-10-CM

## 2024-06-25 DIAGNOSIS — L85.3 XEROSIS CUTIS: ICD-10-CM

## 2024-06-25 DIAGNOSIS — L81.4 OTHER MELANIN HYPERPIGMENTATION: ICD-10-CM

## 2024-06-25 DIAGNOSIS — R93.1 DECREASED CARDIAC EJECTION FRACTION: ICD-10-CM

## 2024-06-25 DIAGNOSIS — L82.1 OTHER SEBORRHEIC KERATOSIS: ICD-10-CM

## 2024-06-25 DIAGNOSIS — Z71.89 OTHER SPECIFIED COUNSELING: ICD-10-CM

## 2024-06-25 DIAGNOSIS — L57.0 ACTINIC KERATOSIS: ICD-10-CM

## 2024-06-25 DIAGNOSIS — D18.0 HEMANGIOMA: ICD-10-CM

## 2024-06-25 PROBLEM — D18.01 HEMANGIOMA OF SKIN AND SUBCUTANEOUS TISSUE: Status: ACTIVE | Noted: 2024-06-25

## 2024-06-25 PROBLEM — D22.72 MELANOCYTIC NEVI OF LEFT LOWER LIMB, INCLUDING HIP: Status: ACTIVE | Noted: 2024-06-25

## 2024-06-25 PROBLEM — D48.5 NEOPLASM OF UNCERTAIN BEHAVIOR OF SKIN: Status: ACTIVE | Noted: 2024-06-25

## 2024-06-25 PROBLEM — D22.62 MELANOCYTIC NEVI OF LEFT UPPER LIMB, INCLUDING SHOULDER: Status: ACTIVE | Noted: 2024-06-25

## 2024-06-25 PROBLEM — D22.5 MELANOCYTIC NEVI OF TRUNK: Status: ACTIVE | Noted: 2024-06-25

## 2024-06-25 PROBLEM — D22.71 MELANOCYTIC NEVI OF RIGHT LOWER LIMB, INCLUDING HIP: Status: ACTIVE | Noted: 2024-06-25

## 2024-06-25 PROBLEM — D22.61 MELANOCYTIC NEVI OF RIGHT UPPER LIMB, INCLUDING SHOULDER: Status: ACTIVE | Noted: 2024-06-25

## 2024-06-25 PROCEDURE — 99213 OFFICE O/P EST LOW 20 MIN: CPT | Mod: 25

## 2024-06-25 PROCEDURE — 11102 TANGNTL BX SKIN SINGLE LES: CPT

## 2024-06-25 PROCEDURE — ? COUNSELING

## 2024-06-25 PROCEDURE — 17000 DESTRUCT PREMALG LESION: CPT | Mod: 59

## 2024-06-25 PROCEDURE — ? LIQUID NITROGEN

## 2024-06-25 PROCEDURE — ? BIOPSY BY SHAVE METHOD

## 2024-06-25 PROCEDURE — 17003 DESTRUCT PREMALG LES 2-14: CPT

## 2024-06-25 ASSESSMENT — LOCATION DETAILED DESCRIPTION DERM
LOCATION DETAILED: RIGHT ANTERIOR DISTAL UPPER ARM
LOCATION DETAILED: RIGHT MID-UPPER BACK
LOCATION DETAILED: LEFT INFERIOR UPPER BACK
LOCATION DETAILED: LEFT PROXIMAL CALF
LOCATION DETAILED: RIGHT DISTAL POSTERIOR THIGH
LOCATION DETAILED: LEFT DORSAL FOOT
LOCATION DETAILED: RIGHT PROXIMAL CALF
LOCATION DETAILED: LEFT PROXIMAL DORSAL FOREARM
LOCATION DETAILED: LEFT DISTAL POSTERIOR THIGH
LOCATION DETAILED: RIGHT PROXIMAL DORSAL FOREARM
LOCATION DETAILED: LEFT MEDIAL MANDIBULAR CHEEK
LOCATION DETAILED: LEFT INFERIOR LATERAL BUCCAL CHEEK
LOCATION DETAILED: LEFT CENTRAL MALAR CHEEK
LOCATION DETAILED: RIGHT MEDIAL INFERIOR CHEST
LOCATION DETAILED: RIGHT VENTRAL DISTAL FOREARM
LOCATION DETAILED: RIGHT VENTRAL PROXIMAL FOREARM
LOCATION DETAILED: LEFT ANTERIOR PROXIMAL THIGH
LOCATION DETAILED: RIGHT ANTERIOR PROXIMAL THIGH
LOCATION DETAILED: RIGHT DORSAL FOOT
LOCATION DETAILED: LEFT ANTERIOR DISTAL UPPER ARM
LOCATION DETAILED: LEFT VENTRAL DISTAL FOREARM
LOCATION DETAILED: RIGHT SUPERIOR LATERAL MIDBACK

## 2024-06-25 ASSESSMENT — LOCATION SIMPLE DESCRIPTION DERM
LOCATION SIMPLE: RIGHT THIGH
LOCATION SIMPLE: RIGHT FOREARM
LOCATION SIMPLE: LEFT CALF
LOCATION SIMPLE: RIGHT UPPER ARM
LOCATION SIMPLE: RIGHT CALF
LOCATION SIMPLE: RIGHT FOOT
LOCATION SIMPLE: RIGHT LOWER BACK
LOCATION SIMPLE: LEFT FOOT
LOCATION SIMPLE: LEFT UPPER ARM
LOCATION SIMPLE: RIGHT POSTERIOR THIGH
LOCATION SIMPLE: LEFT POSTERIOR THIGH
LOCATION SIMPLE: LEFT CHEEK
LOCATION SIMPLE: LEFT UPPER BACK
LOCATION SIMPLE: LEFT THIGH
LOCATION SIMPLE: RIGHT UPPER BACK
LOCATION SIMPLE: LEFT FOREARM
LOCATION SIMPLE: CHEST

## 2024-06-25 ASSESSMENT — LOCATION ZONE DERM
LOCATION ZONE: FEET
LOCATION ZONE: ARM
LOCATION ZONE: TRUNK
LOCATION ZONE: LEG
LOCATION ZONE: FACE

## 2024-06-25 NOTE — PROCEDURE: COUNSELING
Moisturizer Recommendations: Urea cream 20-50%, Am Lactin, Utterly smooth cream
Detail Level: Zone
Detail Level: Generalized
Detail Level: Simple

## 2024-07-03 ENCOUNTER — APPOINTMENT (OUTPATIENT)
Dept: ONCOLOGY | Facility: MEDICAL CENTER | Age: 64
End: 2024-07-03
Attending: INTERNAL MEDICINE
Payer: COMMERCIAL

## 2024-07-03 VITALS
HEIGHT: 72 IN | WEIGHT: 251.32 LBS | HEART RATE: 64 BPM | DIASTOLIC BLOOD PRESSURE: 61 MMHG | TEMPERATURE: 98 F | BODY MASS INDEX: 34.04 KG/M2 | SYSTOLIC BLOOD PRESSURE: 125 MMHG | OXYGEN SATURATION: 98 % | RESPIRATION RATE: 18 BRPM

## 2024-07-03 DIAGNOSIS — J45.50 SEVERE PERSISTENT ASTHMA WITHOUT COMPLICATION: ICD-10-CM

## 2024-07-03 PROCEDURE — 96372 THER/PROPH/DIAG INJ SC/IM: CPT

## 2024-07-03 PROCEDURE — 700111 HCHG RX REV CODE 636 W/ 250 OVERRIDE (IP): Mod: JZ | Performed by: INTERNAL MEDICINE

## 2024-07-03 RX ORDER — EPINEPHRINE 1 MG/ML(1)
0.5 AMPUL (ML) INJECTION PRN
OUTPATIENT
Start: 2024-07-31

## 2024-07-03 RX ORDER — METHYLPREDNISOLONE SODIUM SUCCINATE 125 MG/2ML
125 INJECTION, POWDER, LYOPHILIZED, FOR SOLUTION INTRAMUSCULAR; INTRAVENOUS PRN
OUTPATIENT
Start: 2024-07-31

## 2024-07-03 RX ORDER — DIPHENHYDRAMINE HYDROCHLORIDE 50 MG/ML
50 INJECTION INTRAMUSCULAR; INTRAVENOUS PRN
OUTPATIENT
Start: 2024-07-31

## 2024-07-03 RX ADMIN — MEPOLIZUMAB 100 MG: 100 INJECTION, POWDER, FOR SOLUTION SUBCUTANEOUS at 08:40

## 2024-07-03 ASSESSMENT — FIBROSIS 4 INDEX: FIB4 SCORE: 1.91

## 2024-07-08 DIAGNOSIS — J45.51 SEVERE PERSISTENT ASTHMA WITH ACUTE EXACERBATION: ICD-10-CM

## 2024-07-08 RX ORDER — IPRATROPIUM BROMIDE AND ALBUTEROL 20; 100 UG/1; UG/1
1 SPRAY, METERED RESPIRATORY (INHALATION) 4 TIMES DAILY
Qty: 4 G | Refills: 2 | Status: SHIPPED | OUTPATIENT
Start: 2024-07-08 | End: 2024-07-25 | Stop reason: SDUPTHER

## 2024-07-09 ENCOUNTER — OFFICE VISIT (OUTPATIENT)
Dept: MEDICAL GROUP | Facility: LAB | Age: 64
End: 2024-07-09
Payer: COMMERCIAL

## 2024-07-09 VITALS
HEART RATE: 68 BPM | DIASTOLIC BLOOD PRESSURE: 62 MMHG | OXYGEN SATURATION: 94 % | BODY MASS INDEX: 34.4 KG/M2 | RESPIRATION RATE: 20 BRPM | SYSTOLIC BLOOD PRESSURE: 126 MMHG | TEMPERATURE: 96 F | HEIGHT: 72 IN | WEIGHT: 254 LBS

## 2024-07-09 DIAGNOSIS — G60.9 IDIOPATHIC NEUROPATHY: ICD-10-CM

## 2024-07-09 DIAGNOSIS — E66.9 OBESITY (BMI 30.0-34.9): ICD-10-CM

## 2024-07-09 DIAGNOSIS — I50.20 HEART FAILURE WITH REDUCED EJECTION FRACTION (HCC): ICD-10-CM

## 2024-07-09 DIAGNOSIS — J45.51 SEVERE PERSISTENT ASTHMA WITH ACUTE EXACERBATION: ICD-10-CM

## 2024-07-09 PROCEDURE — 99214 OFFICE O/P EST MOD 30 MIN: CPT | Performed by: FAMILY MEDICINE

## 2024-07-09 PROCEDURE — 3078F DIAST BP <80 MM HG: CPT | Performed by: FAMILY MEDICINE

## 2024-07-09 PROCEDURE — 3074F SYST BP LT 130 MM HG: CPT | Performed by: FAMILY MEDICINE

## 2024-07-09 RX ORDER — TIRZEPATIDE 2.5 MG/.5ML
2.5 INJECTION, SOLUTION SUBCUTANEOUS
Qty: 2 ML | Refills: 0 | Status: SHIPPED | OUTPATIENT
Start: 2024-07-09

## 2024-07-09 RX ORDER — PREGABALIN 100 MG/1
100 CAPSULE ORAL 2 TIMES DAILY
Qty: 60 CAPSULE | Refills: 1 | Status: SHIPPED | OUTPATIENT
Start: 2024-07-09 | End: 2024-08-08

## 2024-07-09 ASSESSMENT — FIBROSIS 4 INDEX: FIB4 SCORE: 1.91

## 2024-07-10 ENCOUNTER — APPOINTMENT (OUTPATIENT)
Dept: MEDICAL GROUP | Facility: LAB | Age: 64
End: 2024-07-10
Payer: COMMERCIAL

## 2024-07-10 ENCOUNTER — HOSPITAL ENCOUNTER (OUTPATIENT)
Facility: MEDICAL CENTER | Age: 64
End: 2024-07-10
Attending: PHYSICIAN ASSISTANT
Payer: COMMERCIAL

## 2024-07-10 PROCEDURE — 87077 CULTURE AEROBIC IDENTIFY: CPT

## 2024-07-10 PROCEDURE — 87186 SC STD MICRODIL/AGAR DIL: CPT

## 2024-07-10 PROCEDURE — 87086 URINE CULTURE/COLONY COUNT: CPT

## 2024-07-12 ENCOUNTER — OFFICE VISIT (OUTPATIENT)
Dept: URGENT CARE | Facility: CLINIC | Age: 64
End: 2024-07-12
Payer: COMMERCIAL

## 2024-07-12 VITALS
HEART RATE: 68 BPM | WEIGHT: 240 LBS | SYSTOLIC BLOOD PRESSURE: 90 MMHG | DIASTOLIC BLOOD PRESSURE: 52 MMHG | BODY MASS INDEX: 32.51 KG/M2 | RESPIRATION RATE: 17 BRPM | OXYGEN SATURATION: 95 % | HEIGHT: 72 IN | TEMPERATURE: 96.5 F

## 2024-07-12 DIAGNOSIS — U07.1 COVID-19 VIRUS INFECTION: ICD-10-CM

## 2024-07-12 PROCEDURE — 99213 OFFICE O/P EST LOW 20 MIN: CPT | Performed by: PHYSICIAN ASSISTANT

## 2024-07-12 PROCEDURE — 3074F SYST BP LT 130 MM HG: CPT | Performed by: PHYSICIAN ASSISTANT

## 2024-07-12 PROCEDURE — 3078F DIAST BP <80 MM HG: CPT | Performed by: PHYSICIAN ASSISTANT

## 2024-07-12 RX ORDER — CEFUROXIME AXETIL 500 MG/1
500 TABLET ORAL DAILY
COMMUNITY
Start: 2024-07-10

## 2024-07-12 ASSESSMENT — FIBROSIS 4 INDEX: FIB4 SCORE: 1.91

## 2024-07-14 ASSESSMENT — ENCOUNTER SYMPTOMS
MYALGIAS: 1
SORE THROAT: 1
ABDOMINAL PAIN: 0
SPUTUM PRODUCTION: 1
HEADACHES: 1
SHORTNESS OF BREATH: 0
FEVER: 1
COUGH: 1
VOMITING: 0
CHILLS: 1
DIARRHEA: 0
NAUSEA: 0

## 2024-07-17 ENCOUNTER — PATIENT MESSAGE (OUTPATIENT)
Dept: MEDICAL GROUP | Facility: LAB | Age: 64
End: 2024-07-17
Payer: COMMERCIAL

## 2024-07-17 RX ORDER — ACYCLOVIR 50 MG/G
1 OINTMENT TOPICAL 3 TIMES DAILY PRN
Qty: 15 G | Refills: 1 | Status: SHIPPED | OUTPATIENT
Start: 2024-07-17

## 2024-07-19 ENCOUNTER — PATIENT MESSAGE (OUTPATIENT)
Dept: MEDICAL GROUP | Facility: LAB | Age: 64
End: 2024-07-19
Payer: COMMERCIAL

## 2024-07-19 DIAGNOSIS — B37.0 THRUSH: ICD-10-CM

## 2024-07-19 RX ORDER — CLOTRIMAZOLE 10 MG/1
10 LOZENGE ORAL
Qty: 35 TROCHE | Refills: 0 | Status: SHIPPED | OUTPATIENT
Start: 2024-07-19 | End: 2024-07-26

## 2024-07-25 DIAGNOSIS — J45.51 SEVERE PERSISTENT ASTHMA WITH ACUTE EXACERBATION: ICD-10-CM

## 2024-07-26 ENCOUNTER — TELEPHONE (OUTPATIENT)
Dept: MEDICAL GROUP | Facility: LAB | Age: 64
End: 2024-07-26
Payer: COMMERCIAL

## 2024-07-26 RX ORDER — IPRATROPIUM BROMIDE AND ALBUTEROL 20; 100 UG/1; UG/1
1 SPRAY, METERED RESPIRATORY (INHALATION) 4 TIMES DAILY
Qty: 4 G | Refills: 2 | Status: SHIPPED | OUTPATIENT
Start: 2024-07-26

## 2024-07-26 NOTE — TELEPHONE ENCOUNTER
Yelena NOBLE,  from [facility], RN nurse , calling regarding pre authorization for Zepbound.  It has not been approved.  Yelena has reached out to the pharmacy and it requires prior authorization and clinical support documentation.  Please process for authorization.   Any questions:    Contact:  Attn:   Pharmacy Affairs, fax #[phone]    Direct phone # [phone]      Yelena's contact  # [phone]  ext. [phone]

## 2024-08-04 ENCOUNTER — PATIENT MESSAGE (OUTPATIENT)
Dept: MEDICAL GROUP | Facility: LAB | Age: 64
End: 2024-08-04
Payer: COMMERCIAL

## 2024-08-04 DIAGNOSIS — N39.0 RECURRENT UTI: ICD-10-CM

## 2024-08-07 ENCOUNTER — HOSPITAL ENCOUNTER (OUTPATIENT)
Dept: LAB | Facility: MEDICAL CENTER | Age: 64
End: 2024-08-07
Attending: FAMILY MEDICINE
Payer: COMMERCIAL

## 2024-08-07 ENCOUNTER — OUTPATIENT INFUSION SERVICES (OUTPATIENT)
Dept: ONCOLOGY | Facility: MEDICAL CENTER | Age: 64
End: 2024-08-07
Attending: INTERNAL MEDICINE
Payer: COMMERCIAL

## 2024-08-07 ENCOUNTER — NON-PROVIDER VISIT (OUTPATIENT)
Dept: MEDICAL GROUP | Facility: LAB | Age: 64
End: 2024-08-07
Payer: COMMERCIAL

## 2024-08-07 ENCOUNTER — OFFICE VISIT (OUTPATIENT)
Dept: SLEEP MEDICINE | Facility: MEDICAL CENTER | Age: 64
End: 2024-08-07
Attending: INTERNAL MEDICINE
Payer: COMMERCIAL

## 2024-08-07 ENCOUNTER — PATIENT MESSAGE (OUTPATIENT)
Dept: MEDICAL GROUP | Facility: LAB | Age: 64
End: 2024-08-07

## 2024-08-07 VITALS
HEIGHT: 72 IN | DIASTOLIC BLOOD PRESSURE: 64 MMHG | WEIGHT: 257 LBS | SYSTOLIC BLOOD PRESSURE: 110 MMHG | BODY MASS INDEX: 34.81 KG/M2 | OXYGEN SATURATION: 93 % | HEART RATE: 68 BPM

## 2024-08-07 DIAGNOSIS — N39.0 RECURRENT UTI: ICD-10-CM

## 2024-08-07 DIAGNOSIS — J45.50 SEVERE PERSISTENT ASTHMA WITHOUT COMPLICATION: ICD-10-CM

## 2024-08-07 DIAGNOSIS — B00.1 COLD SORE: ICD-10-CM

## 2024-08-07 LAB
APPEARANCE UR: ABNORMAL
APPEARANCE UR: NORMAL
BACTERIA #/AREA URNS HPF: ABNORMAL /HPF
BILIRUB UR QL STRIP.AUTO: NEGATIVE
BILIRUB UR STRIP-MCNC: NORMAL MG/DL
COLOR UR AUTO: NORMAL
COLOR UR: YELLOW
EPI CELLS #/AREA URNS HPF: ABNORMAL /HPF
GLUCOSE UR STRIP.AUTO-MCNC: NEGATIVE MG/DL
GLUCOSE UR STRIP.AUTO-MCNC: NEGATIVE MG/DL
KETONES UR STRIP.AUTO-MCNC: NEGATIVE MG/DL
KETONES UR STRIP.AUTO-MCNC: NEGATIVE MG/DL
LEUKOCYTE ESTERASE UR QL STRIP.AUTO: ABNORMAL
LEUKOCYTE ESTERASE UR QL STRIP.AUTO: NORMAL
MICRO URNS: ABNORMAL
MUCOUS THREADS #/AREA URNS HPF: ABNORMAL /HPF
NITRITE UR QL STRIP.AUTO: NEGATIVE
NITRITE UR QL STRIP.AUTO: NEGATIVE
PH UR STRIP.AUTO: 7.5 [PH] (ref 5–8)
PH UR STRIP.AUTO: 8 [PH] (ref 5–8)
PROT UR QL STRIP: 30 MG/DL
PROT UR QL STRIP: 30 MG/DL
RBC # URNS HPF: ABNORMAL /HPF
RBC UR QL AUTO: NEGATIVE
RBC UR QL AUTO: NEGATIVE
RENAL EPI CELLS #/AREA URNS HPF: ABNORMAL /HPF
SP GR UR STRIP.AUTO: 1.01
SP GR UR STRIP.AUTO: 1.02
UROBILINOGEN UR STRIP-MCNC: 0.2 MG/DL
UROBILINOGEN UR STRIP.AUTO-MCNC: 0.2 MG/DL
WBC #/AREA URNS HPF: ABNORMAL /HPF

## 2024-08-07 PROCEDURE — 3078F DIAST BP <80 MM HG: CPT | Performed by: INTERNAL MEDICINE

## 2024-08-07 PROCEDURE — 87086 URINE CULTURE/COLONY COUNT: CPT

## 2024-08-07 PROCEDURE — 87186 SC STD MICRODIL/AGAR DIL: CPT

## 2024-08-07 PROCEDURE — 81001 URINALYSIS AUTO W/SCOPE: CPT

## 2024-08-07 PROCEDURE — 81002 URINALYSIS NONAUTO W/O SCOPE: CPT | Performed by: FAMILY MEDICINE

## 2024-08-07 PROCEDURE — 3074F SYST BP LT 130 MM HG: CPT | Performed by: INTERNAL MEDICINE

## 2024-08-07 PROCEDURE — RXMED WILLOW AMBULATORY MEDICATION CHARGE: Performed by: FAMILY MEDICINE

## 2024-08-07 PROCEDURE — 99211 OFF/OP EST MAY X REQ PHY/QHP: CPT | Performed by: INTERNAL MEDICINE

## 2024-08-07 PROCEDURE — 87077 CULTURE AEROBIC IDENTIFY: CPT

## 2024-08-07 PROCEDURE — 99215 OFFICE O/P EST HI 40 MIN: CPT | Performed by: INTERNAL MEDICINE

## 2024-08-07 RX ORDER — VALACYCLOVIR HYDROCHLORIDE 1 G/1
1000 TABLET, FILM COATED ORAL 2 TIMES DAILY
Qty: 60 TABLET | Refills: 2 | Status: SHIPPED | OUTPATIENT
Start: 2024-08-07 | End: 2024-11-05

## 2024-08-07 RX ORDER — CEPHALEXIN 500 MG/1
500 CAPSULE ORAL 2 TIMES DAILY
Qty: 10 CAPSULE | Refills: 0 | Status: SHIPPED | OUTPATIENT
Start: 2024-08-07 | End: 2024-08-13 | Stop reason: SDUPTHER

## 2024-08-07 ASSESSMENT — ENCOUNTER SYMPTOMS
COUGH: 1
SHORTNESS OF BREATH: 1
HEADACHES: 0
SPUTUM PRODUCTION: 1
NECK PAIN: 0
ORTHOPNEA: 0
FEVER: 0
VOMITING: 0
WEIGHT LOSS: 0
NAUSEA: 0
WHEEZING: 1
PALPITATIONS: 0
CHILLS: 0
DIZZINESS: 0

## 2024-08-07 ASSESSMENT — FIBROSIS 4 INDEX: FIB4 SCORE: 1.91

## 2024-08-07 NOTE — ASSESSMENT & PLAN NOTE
Overall improving, recently had an asthma exacerbation after being exposed to smoke    Continue with Dulera  Continue with Nucala, patient has to skip this week due to UTI  Continue DuoNeb as needed, told patient that she can use continuous DuoNeb if needed for another asthma flare  Patient has prednisone on hand, but has caused significant agitation and insomnia in her and he would like to refrain if we can  Will try to avoid triggers as possible

## 2024-08-07 NOTE — PROGRESS NOTES
Pt arrived with concerns about UA results that were run this morning showing a large amount of leukocytes, she spoke to her MD's office who is running a culture.  Pt also has untreated cold sores and would like to get started on an antibiotic and antiviral, has appt this afternoon with her MD.  Reviewed with [facility] pharmacist who recommends postponing one week.  Pt happy with plan, emailed scheduling to adjust appointments, and pt will check details in My chart.

## 2024-08-07 NOTE — PROGRESS NOTES
Pulmonary Clinic Follow Up Note    Chief Complaint:   Chief Complaint   Patient presents with    Follow-Up     Asthma. Last seen 05/17/24    Results      Echo 06/14/24     HPI:   4/29/2024- Initial Consult  Arely Haynes is a 63 y.o. female who is referred to the pulmonary clinic for severe asthma.     From my H&P 3/22/2024 during ICU stay   63 y.o. female who presented 3/22/2024 with worsening shortness of breath.  She has a history of asthma for which she takes Advair 250 daily.  A couple of weeks ago patient had an upper respiratory infection and had an increase in her respiratory symptoms.  At that time, she increased her Advair to 500 daily.  She then traveled to [facility] to see her son and he shortness of breath and wheezing increased.  She saw urgent care on Sunday, 3/17/2024 and was given a prednisone taper.  She has been taking the prednisone, however, this morning she had increasing work of breathing and presented to the ER.  In the ER, she was noted to be hypoxemic and required high flow nasal cannula.  She was given IV fluids, IV magnesium, continuous DuoNeb, and Solu-Medrol.  She was subsequently admitted to the ICU.  On my examination, patient continued to be dyspneic and very wheezy.  She was started on continuous albuterol which has improved her symptoms.  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  After that time, she was taking Humira up until about 6 months ago.  At this time, she is currently managed with NSAIDs only.  She did see an ILD specialist after her PJP pneumonia who reviewed her imaging and stated that she did not have any lasting damage from the PJP pneumonia.  I have also reviewed her imaging and agree with this    Patient was discharged home 2 days after admission with 2 to 3 L of oxygen, which she has since weaned off.  She has had a kathy few weeks  since her discharge and has nearly needed another round of steroids.  She says that the last day or so, her symptoms have started to clear and she is starting to feel much better.  We discussed that she needs to be on a biologic for her asthma as she is very severe and had a significant recent hospitalization.    8/7/2024  Seen by dee 5/7 and we got her Nucala started.  She has been taking it and overall, thinks that she is much better.  She did have to skip this last dose due to urinary tract infection.  She recently had a flare after being exposed to some campfire.  She has not required any additional steroids.    Past Medical History:   Diagnosis Date    Anesthesia     1st cousin has malignant hyperthermia/pt has never had an issue or her sisters    Arthritis     hips knees hands spine    Asthma     prn inhalers    Bowel habit changes     diarrhea    Depression     Daughter passed few years ago    Erosive esophagitis     GERD (gastroesophageal reflux disease)     Heart burn     High cholesterol     Immunocompromised (HCC)     Pain     hips knees    Pneumonia 2018    Psoriasis     Psoriatic arthritis (HCC)      Social History     Socioeconomic History    Marital status:      Spouse name: Not on file    Number of children: Not on file    Years of education: Not on file    Highest education level: Not on file   Occupational History    Occupation: Flight Nurse/Educator   Tobacco Use    Smoking status: Never    Smokeless tobacco: Never   Vaping Use    Vaping status: Never Used   Substance and Sexual Activity    Alcohol use: Yes     Alcohol/week: 0.6 - 1.2 oz     Types: 1 - 2 Glasses of wine per week     Comment: one glss of wine at night     Drug use: No    Sexual activity: Yes     Comment: Tubal ligation   Other Topics Concern     Service No    Blood Transfusions No    Caffeine Concern No    Occupational Exposure Yes    Hobby Hazards Yes    Sleep Concern Yes    Stress Concern No    Weight Concern  Yes    Special Diet No    Back Care No    Exercise Yes    Bike Helmet Yes    Seat Belt Yes    Self-Exams Yes   Social History Narrative    Not on file     Social Determinants of Health     Financial Resource Strain: Not on file   Food Insecurity: Not on file   Transportation Needs: Not on file   Physical Activity: Not on file   Stress: Not on file   Social Connections: Not on file   Intimate Partner Violence: Not on file   Housing Stability: Not on file        Allergies: Bee venom, Iodine, Levofloxacin, and Shellfish-derived products    Review of Systems   Constitutional:  Negative for chills, fever and weight loss.   Respiratory:  Positive for cough, sputum production, shortness of breath and wheezing.    Cardiovascular:  Negative for chest pain, palpitations and orthopnea.   Gastrointestinal:  Negative for nausea and vomiting.   Musculoskeletal:  Negative for neck pain.   Neurological:  Negative for dizziness and headaches.   Psychiatric/Behavioral:  Negative for depression and suicidal ideas.      Vitals:  /64 (BP Location: Right arm, Patient Position: Sitting, BP Cuff Size: Large adult)   Pulse 68   Ht 1.829 m (6')   Wt 117 kg (257 lb)   SpO2 93%     Physical Exam  Vitals reviewed.   Constitutional:       Appearance: Normal appearance.   HENT:      Head: Normocephalic and atraumatic.   Eyes:      Conjunctiva/sclera: Conjunctivae normal.   Cardiovascular:      Rate and Rhythm: Normal rate and regular rhythm.   Pulmonary:      Effort: Pulmonary effort is normal. No respiratory distress.      Breath sounds: No wheezing.   Abdominal:      Palpations: Abdomen is soft.   Musculoskeletal:      Right lower leg: No edema.      Left lower leg: No edema.   Skin:     General: Skin is warm and dry.   Neurological:      General: No focal deficit present.      Mental Status: She is alert and oriented to person, place, and time. Mental status is at baseline.   Psychiatric:         Mood and Affect: Mood normal.          Behavior: Behavior normal.         Laboratory Data:      Pertinent Studies:    PFTs as reviewed by me personally show:  2018- normal     Imaging as reviewed by me personally show:      Echo:  6/14/2024  CONCLUSIONS  The ejection fraction is measured to be 55 % by Pickett's biplane.  Normal regional wall motion.  Normal right ventricular size and systolic function.  No significant valvular disease.      7/15/2021  CONCLUSIONS  Normal left ventricular chamber size.  Left ventricular ejection fraction is visually estimated to be 65%.  Mild aortic insufficiency.  Normal right ventricular size and systolic function.  Right heart pressures are normal.     Assessment/Plan:    Problem List Items Addressed This Visit          Pediatric Pulmonology Medicine Problems    Severe persistent asthma without complication     Overall improving, recently had an asthma exacerbation after being exposed to smoke    Continue with Dulera  Continue with Nucala, patient has to skip this week due to UTI  Continue DuoNeb as needed, told patient that she can use continuous DuoNeb if needed for another asthma flare  Patient has prednisone on hand, but has caused significant agitation and insomnia in her and he would like to refrain if we can  Will try to avoid triggers as possible            Other    Cold sore     RX for valtrex         Relevant Medications    valacyclovir (VALTREX) 1 GM Tab     Return in about 3 months (around 11/7/2024).     This note was generated using voice recognition software which has a chance of producing errors of grammar and possibly content.  I have made every reasonable attempt to find and correct any obvious errors, but it should be expected that some may not be found prior to finalization of this note.    Time spent in record review prior to patient arrival, reviewing results, and in face-to-face encounter totaled 42 min, excluding any procedures if performed.    Arin Whiteside, DO   Pulmonary and Critical  [facility]

## 2024-08-07 NOTE — PROGRESS NOTES
Kaila Haynes is a 63 y.o. female here for a non-provider visit for a POCT urinalysis. Patient was worried that she had a current urinary tract infection before her immunotherapy session this morning. Patient was relayed the results and she will inform the infusion center to see if it is appropriate for patient to keep the appointment.    If abnormal was an in office provider notified today (if so, indicate provider)? Unknown if results are abnormal, they will be sent to the lab for a culture.    Routed to PCP? Yes

## 2024-08-08 ENCOUNTER — PHARMACY VISIT (OUTPATIENT)
Dept: PHARMACY | Facility: MEDICAL CENTER | Age: 64
End: 2024-08-08
Payer: COMMERCIAL

## 2024-08-13 DIAGNOSIS — J45.909 UNCOMPLICATED ASTHMA, UNSPECIFIED ASTHMA SEVERITY, UNSPECIFIED WHETHER PERSISTENT: ICD-10-CM

## 2024-08-13 DIAGNOSIS — N39.0 RECURRENT UTI: ICD-10-CM

## 2024-08-13 PROBLEM — K52.9 NONINFECTIVE GASTROENTERITIS AND COLITIS, UNSPECIFIED: Status: RESOLVED | Noted: 2023-05-12 | Resolved: 2024-08-13

## 2024-08-13 PROBLEM — K57.30 DVRTCLOS OF LG INT W/O PERFORATION OR ABSCESS W/O BLEEDING: Status: RESOLVED | Noted: 2020-10-26 | Resolved: 2024-08-13

## 2024-08-13 RX ORDER — IPRATROPIUM BROMIDE AND ALBUTEROL SULFATE 2.5; .5 MG/3ML; MG/3ML
3 SOLUTION RESPIRATORY (INHALATION) EVERY 4 HOURS
Qty: 270 ML | Refills: 0 | Status: SHIPPED | OUTPATIENT
Start: 2024-08-13 | End: 2024-08-22 | Stop reason: SDUPTHER

## 2024-08-13 RX ORDER — CEPHALEXIN 500 MG/1
500 CAPSULE ORAL 2 TIMES DAILY
Qty: 10 CAPSULE | Refills: 0 | Status: SHIPPED | OUTPATIENT
Start: 2024-08-13 | End: 2024-08-18

## 2024-08-13 ASSESSMENT — ENCOUNTER SYMPTOMS: DEPRESSION: 0

## 2024-08-13 NOTE — PROGRESS NOTES
Discussed at 's visit today.  Continued dysuria despite 5-day course of Keflex but symptoms are improving.  Will extend course for 10 days.  Plan for repeat urinalysis if symptoms continue after 10-day course.

## 2024-08-14 ENCOUNTER — HOSPITAL ENCOUNTER (OUTPATIENT)
Facility: MEDICAL CENTER | Age: 64
End: 2024-08-14
Attending: FAMILY MEDICINE
Payer: COMMERCIAL

## 2024-08-14 ENCOUNTER — OUTPATIENT INFUSION SERVICES (OUTPATIENT)
Dept: ONCOLOGY | Facility: MEDICAL CENTER | Age: 64
End: 2024-08-14
Attending: INTERNAL MEDICINE
Payer: COMMERCIAL

## 2024-08-14 VITALS
OXYGEN SATURATION: 95 % | HEART RATE: 68 BPM | DIASTOLIC BLOOD PRESSURE: 72 MMHG | TEMPERATURE: 96.7 F | HEIGHT: 72 IN | WEIGHT: 252.43 LBS | SYSTOLIC BLOOD PRESSURE: 147 MMHG | RESPIRATION RATE: 18 BRPM | BODY MASS INDEX: 34.19 KG/M2

## 2024-08-14 DIAGNOSIS — J45.50 SEVERE PERSISTENT ASTHMA WITHOUT COMPLICATION: ICD-10-CM

## 2024-08-14 DIAGNOSIS — N39.0 RECURRENT UTI: ICD-10-CM

## 2024-08-14 PROCEDURE — 96372 THER/PROPH/DIAG INJ SC/IM: CPT

## 2024-08-14 PROCEDURE — 700111 HCHG RX REV CODE 636 W/ 250 OVERRIDE (IP): Mod: JZ | Performed by: INTERNAL MEDICINE

## 2024-08-14 PROCEDURE — 81001 URINALYSIS AUTO W/SCOPE: CPT

## 2024-08-14 RX ORDER — EPINEPHRINE 1 MG/ML(1)
0.5 AMPUL (ML) INJECTION PRN
OUTPATIENT
Start: 2024-08-28

## 2024-08-14 RX ORDER — DIPHENHYDRAMINE HYDROCHLORIDE 50 MG/ML
50 INJECTION INTRAMUSCULAR; INTRAVENOUS PRN
OUTPATIENT
Start: 2024-08-28

## 2024-08-14 RX ORDER — METHYLPREDNISOLONE SODIUM SUCCINATE 125 MG/2ML
125 INJECTION, POWDER, LYOPHILIZED, FOR SOLUTION INTRAMUSCULAR; INTRAVENOUS PRN
OUTPATIENT
Start: 2024-08-28

## 2024-08-14 RX ADMIN — MEPOLIZUMAB 100 MG: 100 INJECTION, POWDER, FOR SOLUTION SUBCUTANEOUS at 17:45

## 2024-08-14 ASSESSMENT — FIBROSIS 4 INDEX: FIB4 SCORE: 1.91

## 2024-08-15 DIAGNOSIS — N39.0 RECURRENT UTI: ICD-10-CM

## 2024-08-15 LAB
APPEARANCE UR: ABNORMAL
BACTERIA #/AREA URNS HPF: NEGATIVE /HPF
BILIRUB UR QL STRIP.AUTO: ABNORMAL
CAOX CRY #/AREA URNS HPF: ABNORMAL /HPF
COLOR UR: ABNORMAL
EPI CELLS #/AREA URNS HPF: ABNORMAL /HPF
GLUCOSE UR STRIP.AUTO-MCNC: NEGATIVE MG/DL
HYALINE CASTS #/AREA URNS LPF: ABNORMAL /LPF
KETONES UR STRIP.AUTO-MCNC: ABNORMAL MG/DL
LEUKOCYTE ESTERASE UR QL STRIP.AUTO: ABNORMAL
MICRO URNS: ABNORMAL
MUCOUS THREADS #/AREA URNS HPF: ABNORMAL /HPF
NITRITE UR QL STRIP.AUTO: NEGATIVE
PH UR STRIP.AUTO: 5 [PH] (ref 5–8)
PROT UR QL STRIP: 30 MG/DL
RBC # URNS HPF: ABNORMAL /HPF
RBC UR QL AUTO: NEGATIVE
RENAL EPI CELLS #/AREA URNS HPF: ABNORMAL /HPF
SP GR UR STRIP.AUTO: 1.04
UROBILINOGEN UR STRIP.AUTO-MCNC: 0.2 MG/DL
WBC #/AREA URNS HPF: ABNORMAL /HPF

## 2024-08-15 NOTE — PROGRESS NOTES
Pt arrived ambulatory for Nucala injection, states she is feeling better after being treated for UTI, currently finished abx.  Nucala given in left arm, bandaid applied.  Dc'd home without incident, will f/u as scheduled. Scheduling emailed to add further appts.

## 2024-08-20 ENCOUNTER — TELEPHONE (OUTPATIENT)
Dept: HEALTH INFORMATION MANAGEMENT | Facility: OTHER | Age: 64
End: 2024-08-20
Payer: COMMERCIAL

## 2024-08-21 ENCOUNTER — TELEPHONE (OUTPATIENT)
Dept: HEALTH INFORMATION MANAGEMENT | Facility: OTHER | Age: 64
End: 2024-08-21
Payer: COMMERCIAL

## 2024-08-22 ENCOUNTER — PATIENT MESSAGE (OUTPATIENT)
Dept: MEDICAL GROUP | Facility: LAB | Age: 64
End: 2024-08-22
Payer: COMMERCIAL

## 2024-08-22 DIAGNOSIS — J45.51 SEVERE PERSISTENT ASTHMA WITH ACUTE EXACERBATION: ICD-10-CM

## 2024-08-22 DIAGNOSIS — J45.909 UNCOMPLICATED ASTHMA, UNSPECIFIED ASTHMA SEVERITY, UNSPECIFIED WHETHER PERSISTENT: ICD-10-CM

## 2024-08-22 RX ORDER — IPRATROPIUM BROMIDE AND ALBUTEROL 20; 100 UG/1; UG/1
1 SPRAY, METERED RESPIRATORY (INHALATION) 4 TIMES DAILY
Qty: 4 G | Refills: 5 | Status: SHIPPED | OUTPATIENT
Start: 2024-08-22

## 2024-08-22 RX ORDER — IPRATROPIUM BROMIDE AND ALBUTEROL SULFATE 2.5; .5 MG/3ML; MG/3ML
3 SOLUTION RESPIRATORY (INHALATION) EVERY 4 HOURS
Qty: 270 ML | Refills: 5 | Status: SHIPPED | OUTPATIENT
Start: 2024-08-22

## 2024-08-22 NOTE — PATIENT COMMUNICATION
Received request via: Pharmacy    Was the patient seen in the last year in this department? Yes    Does the patient have an active prescription (recently filled or refills available) for medication(s) requested? No    Pharmacy Name: [facility]     Does the patient have [facility] Plus and need 100-day supply? (This applies to ALL medications) Patient does not have SCP

## 2024-08-23 ENCOUNTER — HOSPITAL ENCOUNTER (OUTPATIENT)
Dept: LAB | Facility: MEDICAL CENTER | Age: 64
End: 2024-08-23
Attending: INTERNAL MEDICINE
Payer: COMMERCIAL

## 2024-08-23 ENCOUNTER — OFFICE VISIT (OUTPATIENT)
Dept: MEDICAL GROUP | Facility: LAB | Age: 64
End: 2024-08-23
Payer: COMMERCIAL

## 2024-08-23 VITALS
SYSTOLIC BLOOD PRESSURE: 124 MMHG | RESPIRATION RATE: 16 BRPM | OXYGEN SATURATION: 95 % | HEIGHT: 72 IN | WEIGHT: 253 LBS | TEMPERATURE: 98.1 F | DIASTOLIC BLOOD PRESSURE: 72 MMHG | BODY MASS INDEX: 34.27 KG/M2 | HEART RATE: 63 BPM

## 2024-08-23 DIAGNOSIS — R21 SKIN RASH: ICD-10-CM

## 2024-08-23 DIAGNOSIS — Z87.440 HISTORY OF UTI: ICD-10-CM

## 2024-08-23 DIAGNOSIS — J45.50 SEVERE PERSISTENT ASTHMA WITHOUT COMPLICATION: ICD-10-CM

## 2024-08-23 LAB
ALBUMIN SERPL BCP-MCNC: 4.3 G/DL (ref 3.2–4.9)
ALBUMIN/GLOB SERPL: 1.4 G/DL
ALP SERPL-CCNC: 84 U/L (ref 30–99)
ALT SERPL-CCNC: 20 U/L (ref 2–50)
AMORPH CRY #/AREA URNS HPF: PRESENT /HPF
ANION GAP SERPL CALC-SCNC: 15 MMOL/L (ref 7–16)
APPEARANCE UR: ABNORMAL
AST SERPL-CCNC: 31 U/L (ref 12–45)
BACTERIA #/AREA URNS HPF: NEGATIVE /HPF
BASOPHILS # BLD AUTO: 0.9 % (ref 0–1.8)
BASOPHILS # BLD: 0.06 K/UL (ref 0–0.12)
BILIRUB SERPL-MCNC: 0.8 MG/DL (ref 0.1–1.5)
BILIRUB UR QL STRIP.AUTO: NEGATIVE
BUN SERPL-MCNC: 24 MG/DL (ref 8–22)
CALCIUM ALBUM COR SERPL-MCNC: 9.7 MG/DL (ref 8.5–10.5)
CALCIUM SERPL-MCNC: 9.9 MG/DL (ref 8.5–10.5)
CHLORIDE SERPL-SCNC: 108 MMOL/L (ref 96–112)
CO2 SERPL-SCNC: 19 MMOL/L (ref 20–33)
COLOR UR: ABNORMAL
CREAT SERPL-MCNC: 0.62 MG/DL (ref 0.5–1.4)
CRP SERPL HS-MCNC: <0.3 MG/DL (ref 0–0.75)
EOSINOPHIL # BLD AUTO: 0.06 K/UL (ref 0–0.51)
EOSINOPHIL NFR BLD: 0.9 % (ref 0–6.9)
EPI CELLS #/AREA URNS HPF: ABNORMAL /HPF
ERYTHROCYTE [DISTWIDTH] IN BLOOD BY AUTOMATED COUNT: 43.2 FL (ref 35.9–50)
ERYTHROCYTE [SEDIMENTATION RATE] IN BLOOD BY WESTERGREN METHOD: 19 MM/HOUR (ref 0–25)
GFR SERPLBLD CREATININE-BSD FMLA CKD-EPI: 99 ML/MIN/1.73 M 2
GLOBULIN SER CALC-MCNC: 3 G/DL (ref 1.9–3.5)
GLUCOSE SERPL-MCNC: 83 MG/DL (ref 65–99)
GLUCOSE UR STRIP.AUTO-MCNC: NEGATIVE MG/DL
HCT VFR BLD AUTO: 43.2 % (ref 37–47)
HGB BLD-MCNC: 15.1 G/DL (ref 12–16)
HYALINE CASTS #/AREA URNS LPF: ABNORMAL /LPF
IMM GRANULOCYTES # BLD AUTO: 0.01 K/UL (ref 0–0.11)
IMM GRANULOCYTES NFR BLD AUTO: 0.1 % (ref 0–0.9)
KETONES UR STRIP.AUTO-MCNC: ABNORMAL MG/DL
LEUKOCYTE ESTERASE UR QL STRIP.AUTO: ABNORMAL
LYMPHOCYTES # BLD AUTO: 2.35 K/UL (ref 1–4.8)
LYMPHOCYTES NFR BLD: 35 % (ref 22–41)
MCH RBC QN AUTO: 31.7 PG (ref 27–33)
MCHC RBC AUTO-ENTMCNC: 35 G/DL (ref 32.2–35.5)
MCV RBC AUTO: 90.6 FL (ref 81.4–97.8)
MICRO URNS: ABNORMAL
MONOCYTES # BLD AUTO: 0.56 K/UL (ref 0–0.85)
MONOCYTES NFR BLD AUTO: 8.3 % (ref 0–13.4)
MUCOUS THREADS #/AREA URNS HPF: ABNORMAL /HPF
NEUTROPHILS # BLD AUTO: 3.67 K/UL (ref 1.82–7.42)
NEUTROPHILS NFR BLD: 54.8 % (ref 44–72)
NITRITE UR QL STRIP.AUTO: NEGATIVE
NRBC # BLD AUTO: 0 K/UL
NRBC BLD-RTO: 0 /100 WBC (ref 0–0.2)
PH UR STRIP.AUTO: 5.5 [PH] (ref 5–8)
PLATELET # BLD AUTO: 329 K/UL (ref 164–446)
PMV BLD AUTO: 10.1 FL (ref 9–12.9)
POTASSIUM SERPL-SCNC: 4 MMOL/L (ref 3.6–5.5)
PROT SERPL-MCNC: 7.3 G/DL (ref 6–8.2)
PROT UR QL STRIP: NEGATIVE MG/DL
RBC # BLD AUTO: 4.77 M/UL (ref 4.2–5.4)
RBC # URNS HPF: ABNORMAL /HPF
RBC UR QL AUTO: NEGATIVE
RENAL EPI CELLS #/AREA URNS HPF: ABNORMAL /HPF
SODIUM SERPL-SCNC: 142 MMOL/L (ref 135–145)
SP GR UR STRIP.AUTO: 1.03
TRANS CELLS #/AREA URNS HPF: ABNORMAL /HPF
UROBILINOGEN UR STRIP.AUTO-MCNC: 0.2 MG/DL
WBC # BLD AUTO: 6.7 K/UL (ref 4.8–10.8)
WBC #/AREA URNS HPF: ABNORMAL /HPF

## 2024-08-23 PROCEDURE — 80053 COMPREHEN METABOLIC PANEL: CPT

## 2024-08-23 PROCEDURE — 86140 C-REACTIVE PROTEIN: CPT

## 2024-08-23 PROCEDURE — 81001 URINALYSIS AUTO W/SCOPE: CPT

## 2024-08-23 PROCEDURE — 85652 RBC SED RATE AUTOMATED: CPT

## 2024-08-23 PROCEDURE — 85025 COMPLETE CBC W/AUTO DIFF WBC: CPT

## 2024-08-23 PROCEDURE — 36415 COLL VENOUS BLD VENIPUNCTURE: CPT

## 2024-08-23 RX ORDER — METHYLPREDNISOLONE 4 MG
TABLET, DOSE PACK ORAL
Qty: 21 TABLET | Refills: 0 | Status: SHIPPED | OUTPATIENT
Start: 2024-08-23

## 2024-08-23 ASSESSMENT — FIBROSIS 4 INDEX: FIB4 SCORE: 1.91

## 2024-08-23 NOTE — PROGRESS NOTES
CC: Arely Haynes is a 63 y.o. female is suffering from   Chief Complaint   Patient presents with    Rash         SUBJECTIVE:  1. History of UTI  Debo is here for follow-up has a history of urinary tract infections, query possible recurrence    2. Skin rash  Patient with a skin rash after starting antieosinophil medication I have recommended be stopped patient to have Medrol dose pack available    3. Severe persistent asthma without complication  History of severe persistent asthma, prescription written for Medrol to be started    History of Present Illness      Past social, family, history: , former flight nurse  Social History     Tobacco Use    Smoking status: Never    Smokeless tobacco: Never   Vaping Use    Vaping status: Never Used   Substance Use Topics    Alcohol use: Yes     Alcohol/week: 0.6 - 1.2 oz     Types: 1 - 2 Glasses of wine per week     Comment: one glss of wine at night     Drug use: No         MEDICATIONS:    Current Outpatient Medications:     methylPREDNISolone (MEDROL DOSEPAK) 4 MG Tablet Therapy Pack, As directed on the packaging label., Disp: 21 Tablet, Rfl: 0    ipratropium-albuterol (DUONEB) 0.5-2.5 (3) MG/3ML nebulizer solution, Take 3 mL by nebulization every 4 hours., Disp: 270 mL, Rfl: 5    ipratropium-albuterol (COMBIVENT RESPIMAT)  MCG/ACT Aero Soln, Inhale 1 Puff 4 times a day., Disp: 4 g, Rfl: 5    valacyclovir (VALTREX) 1 GM Tab, Take 1 Tablet by mouth 2 times a day for 90 days., Disp: 60 Tablet, Rfl: 2    acyclovir (ZOVIRAX) 5 % Ointment, Apply 1 Application  topically 3 times a day as needed (Cold Sores)., Disp: 15 g, Rfl: 1    cefUROXime (CEFTIN) 500 MG Tab, Take 500 mg by mouth every day., Disp: , Rfl:     POTASSIUM PO, Take 99 mg by mouth every day., Disp: , Rfl:     CALCIUM PO, Take 1 Capsule by mouth every day., Disp: , Rfl:     Nirmatrelvir&Ritonavir 300/100 20 x 150 MG & 10 x 100MG Tablet Therapy Pack, Take 300 mg nirmatrelvir (two 150 mg tablets)  with 100 mg ritonavir (one 100 mg tablet) by mouth, with all three tablets taken together twice daily for 5 days., Disp: 30 Each, Rfl: 0    Tirzepatide-Weight Management (ZEPBOUND) 2.5 MG/0.5ML Solution Auto-injector, Inject 2.5 mg under the skin every 7 days., Disp: 2 mL, Rfl: 0    EPINEPHrine 0.3 MG/0.3ML Solution Prefilled Syringe, Inject the contents of the epipen into the thigh, hold for 3 seconds and release from thigh as needed for anaphylaxis., Disp: 1 Each, Rfl: 6    Mepolizumab (NUCALA) 100 MG/ML Solution Auto-injector, Inject 1 mL under the skin every 4 weeks., Disp: 1 mL, Rfl: 6    mometasone-formoterol (DULERA) 200-5 MCG/ACT Aerosol, Inhale 2 Puffs 2 times a day., Disp: 1 Each, Rfl: 5    loratadine (CLARITIN) 10 MG Tab, Take 10 mg by mouth every day., Disp: , Rfl:     estradiol (ESTRACE) 0.1 MG/GM vaginal cream, Insert 2 g daily intravaginally for 2 weeks, followed by 1 g two-three times per week, Disp: 42.5 g, Rfl: 3    magnesium oxide (MAG-OX) 400 MG Tab tablet, Take 1 Tablet by mouth every morning., Disp: 90 Tablet, Rfl: 3    etodolac (LODINE) 400 MG tablet, Take 1 Tablet by mouth 2 times a day., Disp: 180 Tablet, Rfl: 3    omeprazole (PRILOSEC) 40 MG delayed-release capsule, Take 1 Capsule by mouth every day., Disp: 90 Capsule, Rfl: 3    escitalopram (LEXAPRO) 20 MG tablet, Take 1 Tablet by mouth every morning. Take with escitalopram 10 mg for a total dose of 30 mg., Disp: 90 Tablet, Rfl: 3    famotidine (PEPCID) 20 MG Tab, Take 1 Tablet by mouth 2 times a day. Indications: Gastroesophageal Reflux Disease, Disp: 180 Tablet, Rfl: 3    lamoTRIgine (LAMICTAL) 100 MG Tab, Take 1 Tablet by mouth every morning., Disp: 90 Tablet, Rfl: 3    rosuvastatin (CRESTOR) 10 MG Tab, Take 1 Tablet by mouth at bedtime., Disp: 90 Tablet, Rfl: 3    montelukast (SINGULAIR) 10 MG Tab, Take 1 Tablet by mouth every day., Disp: 90 Tablet, Rfl: 3    PROBLEMS:  Patient Active Problem List    Diagnosis Date Noted    Cold sore  08/07/2024    Acute hypoxemic respiratory failure (HCC) 03/22/2024    Cough 08/05/2023    Chronic diarrhea 08/05/2023    Personal history of colonic polyps 05/12/2023    Hypercholesterolemia 05/12/2023    Hoarseness 05/12/2023    Hemorrhoids without complication 05/12/2023    Hematochezia 05/12/2023    Fecal urgency 05/12/2023    Polyp of stomach and duodenum 01/03/2023    Diaphragmatic hernia without obstruction or gangrene 01/03/2023    Pulmonary nodules 08/16/2021    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[facility]) 07/15/2021    Bradycardia 07/15/2021    GERD (gastroesophageal reflux disease) 07/08/2021    Second degree hemorrhoids 10/26/2020    Polyp of colon 10/26/2020    Major depressive disorder, recurrent, moderate ([facility]) 01/07/2020    CHRISTY (generalized anxiety disorder) 01/07/2020    Seasonal allergies 12/30/2019    H/O: hysterectomy 10/02/2019    Quadriceps tendon rupture, left, initial encounter 09/19/2019    Ovarian cyst 06/25/2019    Psoriasis 03/27/2019    Immunosuppression ([facility]) 03/27/2019    Obesity (BMI 30.0-34.9) 03/27/2019    Menopausal hot flushes 03/27/2019    Psoriatic arthritis ([facility]) 11/30/2018    Familial hypercholesterolemia 11/30/2018    Severe persistent asthma without complication 11/30/2018    History of total right hip arthroplasty 09/22/2017       REVIEW OF SYSTEMS:  Gen.:  No Nausea, Vomiting, fever, Chills.  Heart: No chest pain.  Lungs: Mild shortness of breath.  Psychological: Anxiety regarding her  who apparently having medical issues     PHYSICAL EXAM   Physical Exam       Constitutional: Alert, cooperative, not in acute distress.  Cardiovascular:  Rate Rhythm is regular without murmurs rubs clicks.     Thorax & Lungs: Clear to auscultation, no wheezing, rhonchi, or rales  HENT: Normocephalic, Atraumatic.  Eyes: PERRLA, EOMI, Conjunctiva normal.   Neck: Trachia is midline no swelling of the thyroid.   Lymphatic: No lymphadenopathy noted.   Abdomin: Soft non-tender, no rebound, no  guarding.   Neurologic: Alert & oriented x 3, cranial nerves II through XII are intact, Normal motor function, Normal sensory function, No focal deficits noted.   Psychiatric: Affect normal, Judgment normal, Mood normal.     VITAL SIGNS:/72 (BP Location: Left arm, Patient Position: Sitting, BP Cuff Size: Large adult)   Pulse 63   Temp 36.7 °C (98.1 °F)   Resp 16   Ht 1.829 m (6')   Wt 115 kg (253 lb)   LMP  (LMP Unknown)   SpO2 95%   BMI 34.31 kg/m²     Labs: Reviewed    Assessment:                                                     Plan:  Assessment & Plan       1. History of UTI  Check urinalysis  - POCT Urinalysis  - URINALYSIS,CULTURE IF INDICATED; Future  - CBC WITH DIFFERENTIAL; Future  - Sed Rate; Future  - CRP QUANTITIVE (NON-CARDIAC); Future  - Comp Metabolic Panel; Future  - DX-CHEST-2 VIEWS; Future    2. Skin rash  Start Medrol Dosepak  - methylPREDNISolone (MEDROL DOSEPAK) 4 MG Tablet Therapy Pack; As directed on the packaging label.  Dispense: 21 Tablet; Refill: 0  - CBC WITH DIFFERENTIAL; Future  - Sed Rate; Future  - CRP QUANTITIVE (NON-CARDIAC); Future  - Comp Metabolic Panel; Future  - DX-CHEST-2 VIEWS; Future    3. Severe persistent asthma without complication  Patient to start Medrol Dosepak suspect patient with underlying allergic reaction to Nucala  - methylPREDNISolone (MEDROL DOSEPAK) 4 MG Tablet Therapy Pack; As directed on the packaging label.  Dispense: 21 Tablet; Refill: 0  - CBC WITH DIFFERENTIAL; Future  - Sed Rate; Future  - CRP QUANTITIVE (NON-CARDIAC); Future  - Comp Metabolic Panel; Future  - DX-CHEST-2 VIEWS; Future

## 2024-08-26 ENCOUNTER — APPOINTMENT (OUTPATIENT)
Dept: RADIOLOGY | Facility: MEDICAL CENTER | Age: 64
End: 2024-08-26
Attending: INTERNAL MEDICINE
Payer: COMMERCIAL

## 2024-08-26 DIAGNOSIS — R21 SKIN RASH: ICD-10-CM

## 2024-08-26 DIAGNOSIS — J45.50 SEVERE PERSISTENT ASTHMA WITHOUT COMPLICATION: ICD-10-CM

## 2024-08-26 DIAGNOSIS — Z87.440 HISTORY OF UTI: ICD-10-CM

## 2024-08-26 PROCEDURE — 71046 X-RAY EXAM CHEST 2 VIEWS: CPT

## 2024-09-04 ENCOUNTER — APPOINTMENT (OUTPATIENT)
Dept: ONCOLOGY | Facility: MEDICAL CENTER | Age: 64
End: 2024-09-04
Payer: COMMERCIAL

## 2024-09-09 ENCOUNTER — HOSPITAL ENCOUNTER (OUTPATIENT)
Dept: RADIOLOGY | Facility: MEDICAL CENTER | Age: 64
End: 2024-09-09
Attending: ORTHOPAEDIC SURGERY
Payer: COMMERCIAL

## 2024-09-09 DIAGNOSIS — M17.12 UNILATERAL PRIMARY OSTEOARTHRITIS, LEFT KNEE: ICD-10-CM

## 2024-09-09 DIAGNOSIS — M17.11 PRIMARY LOCALIZED OSTEOARTHROSIS OF THE KNEE, RIGHT: ICD-10-CM

## 2024-09-09 DIAGNOSIS — M17.9 OSTEOARTHRITIS OF KNEE, UNSPECIFIED: ICD-10-CM

## 2024-09-09 PROCEDURE — 73700 CT LOWER EXTREMITY W/O DYE: CPT | Mod: RT

## 2024-09-13 ENCOUNTER — OUTPATIENT INFUSION SERVICES (OUTPATIENT)
Dept: ONCOLOGY | Facility: MEDICAL CENTER | Age: 64
End: 2024-09-13
Attending: INTERNAL MEDICINE
Payer: COMMERCIAL

## 2024-09-13 VITALS
DIASTOLIC BLOOD PRESSURE: 70 MMHG | BODY MASS INDEX: 34.34 KG/M2 | HEIGHT: 72 IN | TEMPERATURE: 97 F | SYSTOLIC BLOOD PRESSURE: 146 MMHG | HEART RATE: 67 BPM | OXYGEN SATURATION: 98 % | RESPIRATION RATE: 18 BRPM | WEIGHT: 253.53 LBS

## 2024-09-13 DIAGNOSIS — J45.50 SEVERE PERSISTENT ASTHMA WITHOUT COMPLICATION: ICD-10-CM

## 2024-09-13 PROCEDURE — 700111 HCHG RX REV CODE 636 W/ 250 OVERRIDE (IP): Mod: JZ | Performed by: INTERNAL MEDICINE

## 2024-09-13 PROCEDURE — 96372 THER/PROPH/DIAG INJ SC/IM: CPT

## 2024-09-13 RX ORDER — METHYLPREDNISOLONE SODIUM SUCCINATE 125 MG/2ML
125 INJECTION, POWDER, LYOPHILIZED, FOR SOLUTION INTRAMUSCULAR; INTRAVENOUS PRN
OUTPATIENT
Start: 2024-10-11

## 2024-09-13 RX ORDER — EPINEPHRINE 1 MG/ML(1)
0.5 AMPUL (ML) INJECTION PRN
OUTPATIENT
Start: 2024-10-11

## 2024-09-13 RX ORDER — DIPHENHYDRAMINE HYDROCHLORIDE 50 MG/ML
50 INJECTION INTRAMUSCULAR; INTRAVENOUS PRN
OUTPATIENT
Start: 2024-10-11

## 2024-09-13 RX ADMIN — MEPOLIZUMAB 100 MG: 100 INJECTION, POWDER, FOR SOLUTION SUBCUTANEOUS at 16:42

## 2024-09-13 ASSESSMENT — FIBROSIS 4 INDEX: FIB4 SCORE: 1.35

## 2024-09-14 NOTE — PROGRESS NOTES
Pt arrived ambulatory to [facility] for Q 4 week Nucala injection for eosinophil asthma. POC discussed with pt and she agrees with plan.    Pt medicated per MAR. Nucala given right back arm. Pt tolerated treatment without s/s adverse reaction. Pt discharged to self care, [facility]. Pt's next appointment confirmed 10/11/2024.

## 2024-09-16 ENCOUNTER — PATIENT MESSAGE (OUTPATIENT)
Dept: MEDICAL GROUP | Facility: LAB | Age: 64
End: 2024-09-16
Payer: COMMERCIAL

## 2024-09-16 DIAGNOSIS — R11.0 NAUSEA: ICD-10-CM

## 2024-09-16 DIAGNOSIS — G60.9 IDIOPATHIC NEUROPATHY: ICD-10-CM

## 2024-09-17 DIAGNOSIS — E78.01 FAMILIAL HYPERCHOLESTEROLEMIA: ICD-10-CM

## 2024-09-17 DIAGNOSIS — J45.40 MODERATE PERSISTENT ASTHMA WITHOUT COMPLICATION: ICD-10-CM

## 2024-09-17 DIAGNOSIS — K21.9 GASTROESOPHAGEAL REFLUX DISEASE, UNSPECIFIED WHETHER ESOPHAGITIS PRESENT: ICD-10-CM

## 2024-09-17 RX ORDER — ONDANSETRON 4 MG/1
4 TABLET, FILM COATED ORAL EVERY 6 HOURS PRN
Qty: 30 TABLET | Refills: 3 | Status: SHIPPED | OUTPATIENT
Start: 2024-09-17

## 2024-09-17 RX ORDER — PREGABALIN 150 MG/1
150 CAPSULE ORAL 2 TIMES DAILY
Qty: 60 CAPSULE | Refills: 0 | Status: SHIPPED | OUTPATIENT
Start: 2024-09-17 | End: 2024-09-17

## 2024-09-17 RX ORDER — PREGABALIN 150 MG/1
150 CAPSULE ORAL 2 TIMES DAILY
Qty: 60 CAPSULE | Refills: 0 | Status: SHIPPED | OUTPATIENT
Start: 2024-09-17 | End: 2024-10-17

## 2024-09-17 RX ORDER — ONDANSETRON 4 MG/1
4 TABLET, FILM COATED ORAL EVERY 6 HOURS PRN
Qty: 30 TABLET | Refills: 3 | Status: SHIPPED | OUTPATIENT
Start: 2024-09-17 | End: 2024-09-17

## 2024-09-18 ENCOUNTER — TELEPHONE (OUTPATIENT)
Dept: MEDICAL GROUP | Facility: LAB | Age: 64
End: 2024-09-18
Payer: COMMERCIAL

## 2024-09-18 RX ORDER — ROSUVASTATIN CALCIUM 10 MG/1
10 TABLET, COATED ORAL
Qty: 90 TABLET | Refills: 3 | Status: SHIPPED | OUTPATIENT
Start: 2024-09-18

## 2024-09-18 RX ORDER — OMEPRAZOLE 40 MG/1
40 CAPSULE, DELAYED RELEASE ORAL DAILY
Qty: 90 CAPSULE | Refills: 3 | Status: SHIPPED | OUTPATIENT
Start: 2024-09-18

## 2024-09-18 RX ORDER — MONTELUKAST SODIUM 10 MG/1
10 TABLET ORAL DAILY
Qty: 90 TABLET | Refills: 3 | Status: SHIPPED | OUTPATIENT
Start: 2024-09-18

## 2024-09-18 NOTE — TELEPHONE ENCOUNTER
Received request via: Pharmacy    Was the patient seen in the last year in this department? Yes8/23/24    Does the patient have an active prescription (recently filled or refills available) for medication(s) requested? No    Pharmacy Name: mail    Does the patient have retirement Plus and need 100-day supply? (This applies to ALL medications) Patient does not have SCP

## 2024-10-02 ENCOUNTER — APPOINTMENT (OUTPATIENT)
Dept: ADMISSIONS | Facility: MEDICAL CENTER | Age: 64
End: 2024-10-02
Payer: COMMERCIAL

## 2024-10-03 ENCOUNTER — PATIENT MESSAGE (OUTPATIENT)
Dept: SLEEP MEDICINE | Facility: MEDICAL CENTER | Age: 64
End: 2024-10-03

## 2024-10-03 DIAGNOSIS — T50.905A ADVERSE EFFECT OF DRUG, INITIAL ENCOUNTER: ICD-10-CM

## 2024-10-03 RX ORDER — DIPHENHYDRAMINE HYDROCHLORIDE 50 MG/ML
25 INJECTION INTRAMUSCULAR; INTRAVENOUS ONCE
OUTPATIENT
Start: 2024-10-07 | End: 2024-10-07

## 2024-10-03 RX ORDER — EPINEPHRINE 1 MG/ML(1)
0.5 AMPUL (ML) INJECTION ONCE
OUTPATIENT
Start: 2024-10-07 | End: 2024-10-07

## 2024-10-04 ENCOUNTER — PRE-ADMISSION TESTING (OUTPATIENT)
Dept: ADMISSIONS | Facility: MEDICAL CENTER | Age: 64
End: 2024-10-04
Attending: ORTHOPAEDIC SURGERY
Payer: COMMERCIAL

## 2024-10-04 VITALS — WEIGHT: 258.38 LBS | HEIGHT: 72 IN | BODY MASS INDEX: 35 KG/M2

## 2024-10-04 DIAGNOSIS — Z01.812 PRE-OPERATIVE LABORATORY EXAMINATION: ICD-10-CM

## 2024-10-04 LAB
ANION GAP SERPL CALC-SCNC: 13 MMOL/L (ref 7–16)
BUN SERPL-MCNC: 26 MG/DL (ref 8–22)
CALCIUM SERPL-MCNC: 9.4 MG/DL (ref 8.4–10.2)
CHLORIDE SERPL-SCNC: 106 MMOL/L (ref 96–112)
CO2 SERPL-SCNC: 20 MMOL/L (ref 20–33)
CREAT SERPL-MCNC: 0.66 MG/DL (ref 0.5–1.4)
ERYTHROCYTE [DISTWIDTH] IN BLOOD BY AUTOMATED COUNT: 44.3 FL (ref 35.9–50)
EST. AVERAGE GLUCOSE BLD GHB EST-MCNC: 105 MG/DL
GFR SERPLBLD CREATININE-BSD FMLA CKD-EPI: 97 ML/MIN/1.73 M 2
GLUCOSE SERPL-MCNC: 87 MG/DL (ref 65–99)
HBA1C MFR BLD: 5.3 % (ref 4–5.6)
HCT VFR BLD AUTO: 40.3 % (ref 37–47)
HGB BLD-MCNC: 14 G/DL (ref 12–16)
MCH RBC QN AUTO: 31.9 PG (ref 27–33)
MCHC RBC AUTO-ENTMCNC: 34.7 G/DL (ref 32.2–35.5)
MCV RBC AUTO: 91.8 FL (ref 81.4–97.8)
PLATELET # BLD AUTO: 265 K/UL (ref 164–446)
PMV BLD AUTO: 10.3 FL (ref 9–12.9)
POTASSIUM SERPL-SCNC: 3.7 MMOL/L (ref 3.6–5.5)
RBC # BLD AUTO: 4.39 M/UL (ref 4.2–5.4)
SCCMEC + MECA PNL NOSE NAA+PROBE: NEGATIVE
SCCMEC + MECA PNL NOSE NAA+PROBE: NEGATIVE
SODIUM SERPL-SCNC: 139 MMOL/L (ref 135–145)
WBC # BLD AUTO: 8.2 K/UL (ref 4.8–10.8)

## 2024-10-04 PROCEDURE — 87641 MR-STAPH DNA AMP PROBE: CPT

## 2024-10-04 PROCEDURE — 36415 COLL VENOUS BLD VENIPUNCTURE: CPT

## 2024-10-04 PROCEDURE — 83036 HEMOGLOBIN GLYCOSYLATED A1C: CPT

## 2024-10-04 PROCEDURE — 85027 COMPLETE CBC AUTOMATED: CPT

## 2024-10-04 PROCEDURE — 87640 STAPH A DNA AMP PROBE: CPT

## 2024-10-04 PROCEDURE — 80048 BASIC METABOLIC PNL TOTAL CA: CPT

## 2024-10-04 ASSESSMENT — FIBROSIS 4 INDEX: FIB4 SCORE: 1.35

## 2024-10-07 ENCOUNTER — TELEPHONE (OUTPATIENT)
Dept: SLEEP MEDICINE | Facility: MEDICAL CENTER | Age: 64
End: 2024-10-07
Payer: COMMERCIAL

## 2024-10-11 ENCOUNTER — APPOINTMENT (OUTPATIENT)
Dept: ONCOLOGY | Facility: MEDICAL CENTER | Age: 64
End: 2024-10-11
Attending: INTERNAL MEDICINE
Payer: COMMERCIAL

## 2024-10-24 DIAGNOSIS — K21.9 GASTROESOPHAGEAL REFLUX DISEASE, UNSPECIFIED WHETHER ESOPHAGITIS PRESENT: ICD-10-CM

## 2024-10-27 DIAGNOSIS — J45.909 UNCOMPLICATED ASTHMA, UNSPECIFIED ASTHMA SEVERITY, UNSPECIFIED WHETHER PERSISTENT: ICD-10-CM

## 2024-10-29 RX ORDER — MOMETASONE FUROATE AND FORMOTEROL FUMARATE DIHYDRATE 200; 5 UG/1; UG/1
AEROSOL RESPIRATORY (INHALATION)
Qty: 39 G | Refills: 3 | Status: SHIPPED | OUTPATIENT
Start: 2024-10-29

## 2024-10-29 RX ORDER — FAMOTIDINE 20 MG/1
TABLET, FILM COATED ORAL
Qty: 180 TABLET | Refills: 3 | Status: SHIPPED | OUTPATIENT
Start: 2024-10-29

## 2024-10-30 ENCOUNTER — TELEPHONE (OUTPATIENT)
Dept: SLEEP MEDICINE | Facility: MEDICAL CENTER | Age: 64
End: 2024-10-30
Payer: COMMERCIAL

## 2024-11-11 DIAGNOSIS — J45.50 SEVERE PERSISTENT ASTHMA WITHOUT COMPLICATION: ICD-10-CM

## 2024-11-13 ENCOUNTER — APPOINTMENT (OUTPATIENT)
Dept: ADMISSIONS | Facility: MEDICAL CENTER | Age: 64
End: 2024-11-13
Attending: ORTHOPAEDIC SURGERY
Payer: COMMERCIAL

## 2024-11-14 ENCOUNTER — PATIENT MESSAGE (OUTPATIENT)
Dept: MEDICAL GROUP | Facility: LAB | Age: 64
End: 2024-11-14
Payer: COMMERCIAL

## 2024-11-14 DIAGNOSIS — G60.9 IDIOPATHIC NEUROPATHY: ICD-10-CM

## 2024-11-14 RX ORDER — PREGABALIN 150 MG/1
150 CAPSULE ORAL EVERY MORNING
Qty: 90 CAPSULE | Refills: 0 | Status: SHIPPED | OUTPATIENT
Start: 2024-11-14 | End: 2025-02-12

## 2024-11-14 RX ORDER — PREGABALIN 200 MG/1
200 CAPSULE ORAL EVERY EVENING
Qty: 90 CAPSULE | Refills: 0 | Status: SHIPPED | OUTPATIENT
Start: 2024-11-14 | End: 2025-02-12

## 2024-11-15 ENCOUNTER — PATIENT MESSAGE (OUTPATIENT)
Dept: SLEEP MEDICINE | Facility: MEDICAL CENTER | Age: 64
End: 2024-11-15
Payer: COMMERCIAL

## 2024-11-15 ENCOUNTER — TELEPHONE (OUTPATIENT)
Dept: PHARMACY | Facility: MEDICAL CENTER | Age: 64
End: 2024-11-15
Payer: COMMERCIAL

## 2024-11-15 NOTE — TELEPHONE ENCOUNTER
Received New Start PA request via [facility]  for dupilumab (DUPIXENT) 200 MG/1.14ML injection . (Quantity:2.28, Day Supply:14)     Insurance: [facility] Benefits/[facility]  Member ID:  [id number]  BIN: [id number]  PCN: [id number]  Group: [id number]     Ran Test claim via [employer] & medication Rejects stating prior authorization is required.

## 2024-11-15 NOTE — TELEPHONE ENCOUNTER
Prior Authorization for dupilumab (DUPIXENT) 200 MG/1.14ML injection (Quantity: 2.28, Days: 14) has been submitted via Fax: [phone]    Insurance: [facility] Benefits     Will follow up in 24-48 business hours.

## 2024-11-17 DIAGNOSIS — F41.9 ANXIETY: ICD-10-CM

## 2024-11-18 ENCOUNTER — PRE-ADMISSION TESTING (OUTPATIENT)
Dept: ADMISSIONS | Facility: MEDICAL CENTER | Age: 64
End: 2024-11-18
Attending: ORTHOPAEDIC SURGERY
Payer: COMMERCIAL

## 2024-11-18 DIAGNOSIS — Z01.810 PRE-OPERATIVE CARDIOVASCULAR EXAMINATION: ICD-10-CM

## 2024-11-18 DIAGNOSIS — Z01.812 PRE-OPERATIVE LABORATORY EXAMINATION: ICD-10-CM

## 2024-11-18 LAB
ANION GAP SERPL CALC-SCNC: 11 MMOL/L (ref 7–16)
BUN SERPL-MCNC: 30 MG/DL (ref 8–22)
CALCIUM SERPL-MCNC: 10.1 MG/DL (ref 8.4–10.2)
CHLORIDE SERPL-SCNC: 106 MMOL/L (ref 96–112)
CO2 SERPL-SCNC: 22 MMOL/L (ref 20–33)
CREAT SERPL-MCNC: 0.78 MG/DL (ref 0.5–1.4)
ERYTHROCYTE [DISTWIDTH] IN BLOOD BY AUTOMATED COUNT: 40.9 FL (ref 35.9–50)
GFR SERPLBLD CREATININE-BSD FMLA CKD-EPI: 84 ML/MIN/1.73 M 2
GLUCOSE SERPL-MCNC: 93 MG/DL (ref 65–99)
HCT VFR BLD AUTO: 43.5 % (ref 37–47)
HGB BLD-MCNC: 15.2 G/DL (ref 12–16)
MCH RBC QN AUTO: 31.3 PG (ref 27–33)
MCHC RBC AUTO-ENTMCNC: 34.9 G/DL (ref 32.2–35.5)
MCV RBC AUTO: 89.5 FL (ref 81.4–97.8)
PLATELET # BLD AUTO: 289 K/UL (ref 164–446)
PMV BLD AUTO: 10 FL (ref 9–12.9)
POTASSIUM SERPL-SCNC: 4.1 MMOL/L (ref 3.6–5.5)
RBC # BLD AUTO: 4.86 M/UL (ref 4.2–5.4)
SCCMEC + MECA PNL NOSE NAA+PROBE: NEGATIVE
SCCMEC + MECA PNL NOSE NAA+PROBE: NEGATIVE
SODIUM SERPL-SCNC: 139 MMOL/L (ref 135–145)
WBC # BLD AUTO: 7.5 K/UL (ref 4.8–10.8)

## 2024-11-18 PROCEDURE — 85027 COMPLETE CBC AUTOMATED: CPT

## 2024-11-18 PROCEDURE — 87640 STAPH A DNA AMP PROBE: CPT

## 2024-11-18 PROCEDURE — 80048 BASIC METABOLIC PNL TOTAL CA: CPT

## 2024-11-18 PROCEDURE — 87641 MR-STAPH DNA AMP PROBE: CPT

## 2024-11-18 PROCEDURE — 36415 COLL VENOUS BLD VENIPUNCTURE: CPT

## 2024-11-20 RX ORDER — ESCITALOPRAM OXALATE 10 MG/1
TABLET ORAL
Qty: 90 TABLET | Refills: 3 | Status: SHIPPED | OUTPATIENT
Start: 2024-11-20

## 2024-11-21 ENCOUNTER — TELEPHONE (OUTPATIENT)
Dept: PHARMACY | Facility: MEDICAL CENTER | Age: 64
End: 2024-11-21
Payer: COMMERCIAL

## 2024-11-21 NOTE — TELEPHONE ENCOUNTER
Received New Start PA request via [facility]  for dupilumab (DUPIXENT) 200 MG/1.14ML injection . (Quantity:2.28, Day Supply:14)     Insurance: [employer]  Member ID:  [id number]  BIN: [id number]   PCN: Unknown  Group: [id number]     Ran Test claim via [place] & medication Rejects stating prior authorization is required.

## 2024-11-21 NOTE — TELEPHONE ENCOUNTER
Prior Authorization for dupilumab (DUPIXENT) 200 MG/1.14ML injection (Quantity: 2.28, Days: 14) has been submitted via Cover My Meds: Key ([id number])    Insurance: [facility]     Will follow up in 24-48 business hours.     This PA was submitted based off insurance card in [employer]. Pt's insurance for pharmacy benefits does not answer calls so I am unable to verify coverage. We may have to try and bill this under the medical benefit if unable to get approval under pharmacy benefit.

## 2024-11-22 NOTE — TELEPHONE ENCOUNTER
Prior Authorization for dupilumab (DUPIXENT) 200 MG/1.14ML injection (Quantity: 2.28, Days: 14) has been submitted via Phone: [phone]    Insurance: [facility]     Will follow up in 24-48 business hours.     I was able to get ahold of insurance and they were able to verify her coverage and submit an urgent PA over the phone. She stated we should hear back on determination by 11/26/24.

## 2024-11-25 ENCOUNTER — OFFICE VISIT (OUTPATIENT)
Dept: SLEEP MEDICINE | Facility: MEDICAL CENTER | Age: 64
End: 2024-11-25
Attending: STUDENT IN AN ORGANIZED HEALTH CARE EDUCATION/TRAINING PROGRAM
Payer: COMMERCIAL

## 2024-11-25 DIAGNOSIS — R91.8 PULMONARY NODULES: ICD-10-CM

## 2024-11-25 DIAGNOSIS — J45.50 SEVERE PERSISTENT ASTHMA WITHOUT COMPLICATION: ICD-10-CM

## 2024-11-25 PROBLEM — J96.01 ACUTE HYPOXEMIC RESPIRATORY FAILURE (HCC): Status: RESOLVED | Noted: 2024-03-22 | Resolved: 2024-11-25

## 2024-11-25 NOTE — PROGRESS NOTES
Pulmonary Clinic Note    Chief Complaint:  Asthma    HPI:   Arely Haynes is a 63 y.o. female who returns to the pulmonary clinic for severe asthma.      Ms. Haynes had an ICU admission for asthma 3/2024 requiring high flow nasal canula and was subsequently started on mepolizumab as an outpatient. However, after a few doses developed rash and facial swelling so mepolizumab discontinued and instead process started for obtaining dupilumab. Labs significant for , IgE 17 (2019).    11/25/24 -     Pulmonary Meds  Dulera 200/5 2 puffs BID  Montelukast 10  Duonebs PRN  Dupilumab 200mg q14d       Past Medical History:   Diagnosis Date    Anesthesia     no self problems; 1st cousin MH    Arthritis     hips knees hands spine    Asthma     prn inhalers    Bronchitis     PCP 2021. Periodic bronchitis    Cataract 2024    Surgery    Depression     Daughter passed few years ago    Erosive esophagitis     GERD (gastroesophageal reflux disease)     Heart burn     Heart murmur Early 20’s    Hiatus hernia syndrome     High cholesterol     Immunocompromised (HCC)     Indigestion GERD 20 years    Infection     10/24 toe antibiotics    Pain     hips knees    Pneumonia 2018    Psoriasis     Psoriatic arthritis (HCC)     Sleep apnea 2022    Snoring     UTI (urinary tract infection)     10/24 antiobiotics       Past Surgical History:   Procedure Laterality Date    CATARACT EXTRACTION WITH IOL Bilateral 2024    DE INJECTION,SACROILIAC JOINT Left 07/28/2023    Procedure: LEFT sacroiliac joint injection;  Surgeon: Elvis Aleman M.D.;  [facility];  Service: Pain Management    DE INJ LUMBAR/SACRAL,W/ IMAGING Right 06/02/2023    Procedure: RIGHT L5-S1 interlaminar epidural steroid injection. Patient has tolerated gadolinium;  Surgeon: Elvis Aleman M.D.;  Location: [facility];  Service: Pain Management    DE BRONCHOSCOPY,DIAGNOSTIC  07/09/2021    Procedure: BRONCHOSCOPY;  Surgeon:  Myles Vidal M.D.;  Location: SURGERY [facility];  Service: Ent    TENDON REPAIR Left 09/19/2019    Procedure: REPAIR, TENDON- QUADRICEPS RUTURE REPAIR AND MEYERS;  Surgeon: Jayden Velázquez M.D.;  Location: SURGERY [facility];  Service: Orthopedics    HYSTERECTOMY ROBOTIC XI N/A 07/11/2019    Procedure: HYSTERECTOMY, ROBOT-ASSISTED, USING DA SABINO XI;  Surgeon: Curly Bernal M.D.;  Location: SURGERY [facility];  Service: Gynecology    SALPINGO OOPHORECTOMY Bilateral 07/11/2019    Procedure: SALPINGO-OOPHORECTOMY;  Surgeon: Curly Bernal M.D.;  Location: SURGERY [facility];  Service: Gynecology    FUSION, SPINE, LUMBAR, PLIF  2004    L4-5    GYN SURGERY  1999    c sec    HIP REPLACEMENT, TOTAL      Right    OTHER      Various ortho. Minor    OTHER ABDOMINAL SURGERY      appendix    OTHER ORTHOPEDIC SURGERY      left knee    OTHER ORTHOPEDIC SURGERY Left     L rotator cuff       Social History     Socioeconomic History    Marital status:      Spouse name: Not on file    Number of children: Not on file    Years of education: Not on file    Highest education level: Not on file   Occupational History    Occupation: Flight Nurse/Educator   Tobacco Use    Smoking status: Never    Smokeless tobacco: Never   Vaping Use    Vaping status: Never Used   Substance and Sexual Activity    Alcohol use: Not Currently     Alcohol/week: 4.8 oz     Types: 8 Glasses of wine per week     Comment: 1-2 glasses 4 times per week    Drug use: Never    Sexual activity: Yes     Comment: Tubal ligation   Other Topics Concern     Service No    Blood Transfusions No    Caffeine Concern No    Occupational Exposure Yes    Hobby Hazards Yes    Sleep Concern Yes    Stress Concern No    Weight Concern Yes    Special Diet No    Back Care No    Exercise Yes    Bike Helmet Yes    Seat Belt Yes    Self-Exams Yes   Social History Narrative    Not on file     Social Drivers of Health     Financial Resource Strain: Not on file    Food Insecurity: Not on file   Transportation Needs: Not on file   Physical Activity: Not on file   Stress: Not on file   Social Connections: Not on file   Intimate Partner Violence: Not on file   Housing Stability: Not on file          Family History   Problem Relation Age of Onset    Hyperlipidemia Mother     Heart Attack Mother     Psychiatric Illness Mother     Heart Disease Mother     Arthritis Mother     Lung Disease Father         Pancoast tumor    Cancer Father         Pancoast    Hyperlipidemia Father     Stroke Father     Hyperlipidemia Sister     Cancer Sister         Breast cancer    Heart Disease Paternal Grandfather     Hypertension Neg Hx     Diabetes Neg Hx        Current Outpatient Medications on File Prior to Visit   Medication Sig Dispense Refill    escitalopram (LEXAPRO) 10 MG Tab TAKE 1 TABLET EVERY MORNING. TAKE WITH ESCITALOPRAM 20 MG FOR A TOTAL DOSE OF 30 MG 90 Tablet 3    pregabalin (LYRICA) 150 MG Cap Take 1 Capsule by mouth every morning for 90 days. 90 Capsule 0    pregabalin (LYRICA) 200 MG capsule Take 1 Capsule by mouth every evening for 90 days. 90 Capsule 0    dupilumab (DUPIXENT) 200 MG/1.14ML injection Inject 2.28 mL under the skin one time for 1 dose. (Patient taking differently: Inject 400 mg under the skin one time. Every 14 days FOLLOW INSTRUCTIONS FROM SURGEON OR SPECIALIST) 2.28 mL 0    [START ON 12/9/2024] dupilumab (DUPIXENT) 200 MG/1.14ML injection Inject 1.14 mL under the skin every 14 days for 28 days. 2.28 mL 11    famotidine (PEPCID) 20 MG Tab TAKE 1 TABLET TWICE A DAY FOR GASTROESOPHAGEAL REFLUX DISEASE (Patient taking differently: MEDICATION INSTRUCTIONS FOR SURGERY/PROCEDURE SCHEDULED FOR 12/10   CONTINUE TAKING MED PRIOR TO SURGERY) 180 Tablet 3    DULERA 200-5 MCG/ACT Aerosol USE 2 INHALATIONS TWICE A DAY (Patient taking differently: MEDICATION INSTRUCTIONS FOR SURGERY/PROCEDURE SCHEDULED FOR 12/10   CONTINUE TAKING MED PRIOR TO SURGERY) 39 g 3    omeprazole  (PRILOSEC) 40 MG delayed-release capsule TAKE 1 CAPSULE DAILY (Patient taking differently: Take 40 mg by mouth every day.   MEDICATION INSTRUCTIONS FOR SURGERY/  OK TO CONTINUE TAKING PRIOR TO SURGERY AND DAY OF SURGERY) 90 Capsule 3    montelukast (SINGULAIR) 10 MG Tab TAKE 1 TABLET DAILY (Patient taking differently: Take 10 mg by mouth every day.   MEDICATION INSTRUCTIONS FOR SURGERY/  OK TO CONTINUE TAKING PRIOR TO SURGERY AND DAY OF SURGERY) 90 Tablet 3    rosuvastatin (CRESTOR) 10 MG Tab TAKE 1 TABLET AT BEDTIME (Patient taking differently: Take 10 mg by mouth at bedtime.   MEDICATION INSTRUCTIONS FOR SURGERY  CONTINUE TAKING MED PRIOR TO SURGERY) 90 Tablet 3    ondansetron (ZOFRAN) 4 MG Tab tablet Take 1 Tablet by mouth every 6 hours as needed for Nausea/Vomiting. 30 Tablet 3    ipratropium-albuterol (DUONEB) 0.5-2.5 (3) MG/3ML nebulizer solution Take 3 mL by nebulization every 4 hours. (Patient taking differently: Take 3 mL by nebulization every 4 hours.   MEDICATION INSTRUCTIONS FOR SURGERY  OK TO CONTINUE TAKING PRIOR TO SURGERY AND DAY OF SURGERY) 270 mL 5    ipratropium-albuterol (COMBIVENT RESPIMAT)  MCG/ACT Aero Soln Inhale 1 Puff 4 times a day. (Patient taking differently: Inhale 1 Puff 4 times a day.   MEDICATION INSTRUCTIONS FOR SURGERY  OK TO CONTINUE TAKING PRIOR TO SURGERY AND DAY OF SURGERY) 4 g 5    POTASSIUM PO Take 99 mg by mouth every day.   CONTINUE TAKING MED PRIOR TO SURGERY      CALCIUM PO Take 1 Capsule by mouth every day.   DO NOT TAKE 7 DAYS PRIOR TO SURGERY      EPINEPHrine 0.3 MG/0.3ML Solution Prefilled Syringe Inject the contents of the epipen into the thigh, hold for 3 seconds and release from thigh as needed for anaphylaxis. 1 Each 6    loratadine (CLARITIN) 10 MG Tab Take 10 mg by mouth every day.   MEDICATION INSTRUCTIONS FOR SURGERY  CONTINUE TAKING MED PRIOR TO SURGERY      magnesium oxide (MAG-OX) 400 MG Tab tablet Take 1 Tablet by mouth every morning. (Patient taking  differently: Take 400 mg by mouth every morning.   Hold day of procedure) 90 Tablet 3    etodolac (LODINE) 400 MG tablet Take 1 Tablet by mouth 2 times a day. (Patient taking differently: Take 400 mg by mouth 2 times a day.   MEDICATION INSTRUCTIONS FOR SURGERY  DO NOT TAKE 5 DAYS PRIOR TO SURGERY) 180 Tablet 3    escitalopram (LEXAPRO) 20 MG tablet Take 1 Tablet by mouth every morning. Take with escitalopram 10 mg for a total dose of 30 mg. (Patient taking differently: Take 20 mg by mouth every morning.     MEDICATION INSTRUCTIONS FOR SURGERY  OK TO CONTINUE TAKING PRIOR TO SURGERY AND DAY OF SURGERY) 90 Tablet 3    lamoTRIgine (LAMICTAL) 100 MG Tab Take 1 Tablet by mouth every morning. (Patient taking differently: Take 100 mg by mouth 2 times a day.   MEDICATION INSTRUCTIONS FOR SURGERY  OK TO CONTINUE TAKING PRIOR TO SURGERY AND DAY OF SURGERY) 90 Tablet 3     No current facility-administered medications on file prior to visit.       Allergies: Bee venom, Iodine, Levofloxacin, Shellfish-derived products, and Nucala [mepolizumab]      Vitals:  There were no vitals taken for this visit.    Physical Exam:  Physical Exam  General - alert, oriented x4  HEENT - normocephalic, trachea midline  Cardiovascular - no JVD, RRR, s1, s2, RRR  Pulmonary - not in respiratory distress, CTAB, no wheezes, rhonchi or rales  Abdominal - soft, nondistended  Skin - hands appear normal, no rashes  Extremities - no lower extremity edema bilaterally  Psych - affect and mood appropriate    Laboratory Data:  As per HPI    Assessment/Plan:    1. Severe persistent asthma without complication        2. Pulmonary nodules          Dupilumab. Allergy workup.  5mm RLL nodule stable from 7/2022 to 7/2023. Low risk. No further followup required.    No follow-ups on file.     This note was generated using voice recognition software which has a chance of producing errors of grammar and possibly content.  I have made every reasonable attempt to find  and correct any obvious errors, but it should be expected that some may not be found prior to finalization of this note.    Time spent in record review prior to patient arrival, reviewing results, and in face-to-face encounter totaled *** min, excluding any procedures if performed.  __________  Reese Jalloh MD  Pulmonary and Critical Care Medicine  Formerly [facility]

## 2024-11-27 ENCOUNTER — PATIENT MESSAGE (OUTPATIENT)
Dept: MEDICAL GROUP | Facility: LAB | Age: 64
End: 2024-11-27
Payer: COMMERCIAL

## 2024-11-27 ENCOUNTER — APPOINTMENT (OUTPATIENT)
Dept: SLEEP MEDICINE | Facility: MEDICAL CENTER | Age: 64
End: 2024-11-27
Attending: STUDENT IN AN ORGANIZED HEALTH CARE EDUCATION/TRAINING PROGRAM
Payer: COMMERCIAL

## 2024-11-27 DIAGNOSIS — Z87.440 HISTORY OF UTI: ICD-10-CM

## 2024-11-28 DIAGNOSIS — L40.50 PSORIATIC ARTHRITIS (HCC): ICD-10-CM

## 2024-11-29 NOTE — TELEPHONE ENCOUNTER
Received request via: Pharmacy    Was the patient seen in the last year in this department? Yes  LOV : 8/23/2024  Does the patient have an active prescription (recently filled or refills available) for medication(s) requested? No    [facility]     Does the patient have [facility] and need 100-day supply? (This applies to ALL medications) Patient does not have SCP

## 2024-12-03 RX ORDER — ETODOLAC 400 MG/1
400 TABLET, FILM COATED ORAL 2 TIMES DAILY
Qty: 180 TABLET | Refills: 3 | Status: SHIPPED | OUTPATIENT
Start: 2024-12-03

## 2024-12-04 ENCOUNTER — HOSPITAL ENCOUNTER (OUTPATIENT)
Facility: MEDICAL CENTER | Age: 64
End: 2024-12-04
Attending: PHYSICIAN ASSISTANT
Payer: COMMERCIAL

## 2024-12-04 PROCEDURE — 87086 URINE CULTURE/COLONY COUNT: CPT

## 2024-12-06 ENCOUNTER — APPOINTMENT (OUTPATIENT)
Dept: ADMISSIONS | Facility: MEDICAL CENTER | Age: 64
End: 2024-12-06
Attending: ORTHOPAEDIC SURGERY
Payer: COMMERCIAL

## 2024-12-07 LAB
BACTERIA UR CULT: NORMAL
SIGNIFICANT IND 70042: NORMAL
SITE SITE: NORMAL
SOURCE SOURCE: NORMAL

## 2024-12-10 ENCOUNTER — APPOINTMENT (OUTPATIENT)
Dept: ADMISSIONS | Facility: MEDICAL CENTER | Age: 64
End: 2024-12-10
Attending: ORTHOPAEDIC SURGERY
Payer: COMMERCIAL

## 2024-12-11 DIAGNOSIS — F41.9 ANXIETY: ICD-10-CM

## 2024-12-12 ENCOUNTER — PRE-ADMISSION TESTING (OUTPATIENT)
Dept: ADMISSIONS | Facility: MEDICAL CENTER | Age: 64
End: 2024-12-12
Attending: ORTHOPAEDIC SURGERY
Payer: COMMERCIAL

## 2024-12-12 VITALS — HEIGHT: 72 IN | WEIGHT: 259.04 LBS | BODY MASS INDEX: 35.09 KG/M2

## 2024-12-12 DIAGNOSIS — Z01.810 PRE-OPERATIVE CARDIOVASCULAR EXAMINATION: ICD-10-CM

## 2024-12-12 DIAGNOSIS — Z01.812 PRE-OPERATIVE LABORATORY EXAMINATION: ICD-10-CM

## 2024-12-12 LAB
EKG IMPRESSION: NORMAL
SCCMEC + MECA PNL NOSE NAA+PROBE: NEGATIVE
SCCMEC + MECA PNL NOSE NAA+PROBE: NEGATIVE

## 2024-12-12 PROCEDURE — 87640 STAPH A DNA AMP PROBE: CPT

## 2024-12-12 PROCEDURE — 93010 ELECTROCARDIOGRAM REPORT: CPT | Performed by: INTERNAL MEDICINE

## 2024-12-12 PROCEDURE — 87641 MR-STAPH DNA AMP PROBE: CPT

## 2024-12-12 PROCEDURE — 93005 ELECTROCARDIOGRAM TRACING: CPT | Mod: TC

## 2024-12-12 RX ORDER — ESCITALOPRAM OXALATE 20 MG/1
TABLET ORAL
Qty: 90 TABLET | Refills: 3 | Status: SHIPPED | OUTPATIENT
Start: 2024-12-12

## 2024-12-12 ASSESSMENT — FIBROSIS 4 INDEX: FIB4 SCORE: 1.56

## 2024-12-12 NOTE — OR NURSING
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  She does not have her reading glasses so she will email her survey questions to [email or web address]

## 2024-12-12 NOTE — TELEPHONE ENCOUNTER
Received request via: Pharmacy    Was the patient seen in the last year in this department? No    Does the patient have an active prescription (recently filled or refills available) for medication(s) requested? No    [facility]    Does the patient have [facility] Plus and need 100-day supply? (This applies to ALL medications) Patient does not have SCP

## 2024-12-12 NOTE — DISCHARGE PLANNING
DISCHARGE PLANNING NOTE - TOTAL JOINT    Procedure:   Procedure Date: * No surgery found *  Insurance: Payor: SHIKHA / Plan: [place] BCBS / Product Type: *No Product type* /    Equipment currently available at home?  front-wheel walker, raised toilet seat, and shower chair  Steps into the home? 1  Steps within the home? 0  Toilet height? ADA  Type of shower? walk-in shower  Home Oxygen? No  Portable tank?    Oxygen Provider:  Is Outpatient Physical Therapy set up after surgery? Yes  Did you take the Total Joint Class and where? Yes, received [facility]ON book.  Who will be your transportation home on day of discharge? , Cedrick    Have you made arrangements to have someone stay with you at home for the first 3 days following discharge, and if so, whom? Yes, Cedrick    Have you notified your surgeon that you do not have transportation or someone to help you after discharge? N/A    Are you planning on going to a transitional care facility, for example a skilled nursing facility, post operatively for rehab, and if so, have you contacted your insurance plan to see if they cover this? No        This writer met with pt. Pt received copy of Home Safety Checklist and the Equipment Resource Guide. Expected process in Recovery Room and dc criteria discussed with pt. All questions answered and verbalizes understanding of all instructions. No dc needs identified at this time. Anticipate dc to home without barriers.

## 2024-12-17 ENCOUNTER — HOSPITAL ENCOUNTER (OUTPATIENT)
Facility: MEDICAL CENTER | Age: 64
End: 2024-12-17
Attending: PHYSICIAN ASSISTANT
Payer: COMMERCIAL

## 2024-12-17 PROCEDURE — 87086 URINE CULTURE/COLONY COUNT: CPT

## 2024-12-19 LAB
BACTERIA UR CULT: NORMAL
SIGNIFICANT IND 70042: NORMAL
SITE SITE: NORMAL
SOURCE SOURCE: NORMAL

## 2024-12-23 PROBLEM — M17.9 OSTEOARTHRITIS, KNEE: Status: ACTIVE | Noted: 2024-12-23

## 2024-12-27 ENCOUNTER — OFFICE VISIT (OUTPATIENT)
Dept: URGENT CARE | Facility: CLINIC | Age: 64
End: 2024-12-27
Payer: COMMERCIAL

## 2024-12-27 VITALS
RESPIRATION RATE: 16 BRPM | DIASTOLIC BLOOD PRESSURE: 72 MMHG | SYSTOLIC BLOOD PRESSURE: 124 MMHG | HEIGHT: 72 IN | HEART RATE: 61 BPM | OXYGEN SATURATION: 96 % | TEMPERATURE: 98.7 F | BODY MASS INDEX: 33.86 KG/M2 | WEIGHT: 250 LBS

## 2024-12-27 DIAGNOSIS — R39.9 UTI SYMPTOMS: ICD-10-CM

## 2024-12-27 LAB
APPEARANCE UR: CLEAR
BILIRUB UR STRIP-MCNC: NORMAL MG/DL
COLOR UR AUTO: YELLOW
GLUCOSE UR STRIP.AUTO-MCNC: NEGATIVE MG/DL
KETONES UR STRIP.AUTO-MCNC: NEGATIVE MG/DL
LEUKOCYTE ESTERASE UR QL STRIP.AUTO: NORMAL
NITRITE UR QL STRIP.AUTO: NEGATIVE
PH UR STRIP.AUTO: 5.5 [PH] (ref 5–8)
PROT UR QL STRIP: 30 MG/DL
RBC UR QL AUTO: NORMAL
SP GR UR STRIP.AUTO: 1.02
UROBILINOGEN UR STRIP-MCNC: 0.2 MG/DL

## 2024-12-27 PROCEDURE — 3074F SYST BP LT 130 MM HG: CPT | Performed by: FAMILY MEDICINE

## 2024-12-27 PROCEDURE — 99213 OFFICE O/P EST LOW 20 MIN: CPT | Performed by: FAMILY MEDICINE

## 2024-12-27 PROCEDURE — 3078F DIAST BP <80 MM HG: CPT | Performed by: FAMILY MEDICINE

## 2024-12-27 PROCEDURE — 81002 URINALYSIS NONAUTO W/O SCOPE: CPT | Performed by: FAMILY MEDICINE

## 2024-12-27 RX ORDER — CEPHALEXIN 500 MG/1
500 CAPSULE ORAL 2 TIMES DAILY
Qty: 14 CAPSULE | Refills: 0 | Status: SHIPPED | OUTPATIENT
Start: 2024-12-27 | End: 2025-01-03

## 2024-12-27 ASSESSMENT — ENCOUNTER SYMPTOMS
FLANK PAIN: 0
MUSCULOSKELETAL NEGATIVE: 1
FEVER: 1
CARDIOVASCULAR NEGATIVE: 1
GASTROINTESTINAL NEGATIVE: 1
NEUROLOGICAL NEGATIVE: 1
EYES NEGATIVE: 1
RESPIRATORY NEGATIVE: 1

## 2024-12-27 ASSESSMENT — FIBROSIS 4 INDEX: FIB4 SCORE: 1.56

## 2024-12-28 NOTE — PROGRESS NOTES
"Subjective:   Arely Haynes is a 64 y.o. female who presents for UTI (Patient coming in for urinary infection, discomfort feeling, burning pain )      UTI  Associated symptoms include a fever.       Review of Systems   Constitutional:  Positive for fever and malaise/fatigue.   HENT: Negative.     Eyes: Negative.    Respiratory: Negative.     Cardiovascular: Negative.    Gastrointestinal: Negative.    Genitourinary:  Positive for dysuria, frequency, hematuria and urgency. Negative for flank pain.   Musculoskeletal: Negative.    Neurological: Negative.    Endo/Heme/Allergies: Negative.        Medications, Allergies, and current problem list reviewed today in Epic.     Objective:     /72   Pulse 61   Temp 37.1 °C (98.7 °F)   Resp 16   Ht 1.854 m (6' 1\")   Wt 113 kg (250 lb)   SpO2 96%     Physical Exam  Vitals and nursing note reviewed.   HENT:      Head: Normocephalic.      Right Ear: Tympanic membrane normal.      Left Ear: Tympanic membrane normal.      Nose: Nose normal.      Mouth/Throat:      Pharynx: Oropharynx is clear.   Cardiovascular:      Rate and Rhythm: Normal rate and regular rhythm.      Pulses: Normal pulses.      Heart sounds: Normal heart sounds.   Pulmonary:      Effort: Pulmonary effort is normal.      Breath sounds: Normal breath sounds.   Abdominal:      General: Abdomen is flat.      Palpations: Abdomen is soft.   Genitourinary:     Vagina: No vaginal discharge.   Neurological:      Mental Status: She is alert.         Assessment/Plan:     Diagnosis and associated orders:     1. UTI symptoms  POCT Urinalysis    cephALEXin (KEFLEX) 500 MG Cap         Comments/MDM:     Will take otc          Differential diagnosis, natural history, supportive care, and indications for immediate follow-up discussed.    Advised the patient to follow-up with the primary care physician for recheck, reevaluation, and consideration of further management.    Please note that this dictation was created " using voice recognition software. I have made a reasonable attempt to correct obvious errors, but I expect that there are errors of grammar and possibly content that I did not discover before finalizing the note.    This note was electronically signed by Maninder Amaro M.D.

## 2024-12-29 DIAGNOSIS — F41.9 ANXIETY: ICD-10-CM

## 2024-12-30 PROBLEM — M17.11 PRIMARY OSTEOARTHRITIS OF RIGHT KNEE: Status: ACTIVE | Noted: 2024-12-23

## 2024-12-30 RX ORDER — LAMOTRIGINE 100 MG/1
100 TABLET ORAL EVERY MORNING
Qty: 90 TABLET | Refills: 0 | Status: SHIPPED | OUTPATIENT
Start: 2024-12-30

## 2024-12-30 NOTE — TELEPHONE ENCOUNTER
Received request via: Pharmacy    Was the patient seen in the last year in this department? Yes    Does the patient have an active prescription (recently filled or refills available) for medication(s) requested? No    [facility]    Does the patient have [facility] Plus and need 100-day supply? (This applies to ALL medications) Patient does not have SCP

## 2025-01-06 ENCOUNTER — APPOINTMENT (OUTPATIENT)
Dept: ADMISSIONS | Facility: MEDICAL CENTER | Age: 65
End: 2025-01-06
Attending: ORTHOPAEDIC SURGERY
Payer: COMMERCIAL

## 2025-01-07 DIAGNOSIS — G60.9 IDIOPATHIC NEUROPATHY: ICD-10-CM

## 2025-01-07 RX ORDER — PREGABALIN 150 MG/1
150 CAPSULE ORAL EVERY MORNING
Qty: 90 CAPSULE | Refills: 0 | Status: SHIPPED | OUTPATIENT
Start: 2025-01-07 | End: 2025-04-07

## 2025-01-07 RX ORDER — PREGABALIN 200 MG/1
200 CAPSULE ORAL EVERY EVENING
Qty: 90 CAPSULE | Refills: 0 | Status: SHIPPED | OUTPATIENT
Start: 2025-01-07 | End: 2025-04-07

## 2025-01-08 ENCOUNTER — PRE-ADMISSION TESTING (OUTPATIENT)
Dept: ADMISSIONS | Facility: MEDICAL CENTER | Age: 65
End: 2025-01-08
Attending: ORTHOPAEDIC SURGERY
Payer: COMMERCIAL

## 2025-01-08 VITALS — WEIGHT: 256.62 LBS | BODY MASS INDEX: 34.76 KG/M2 | HEIGHT: 72 IN

## 2025-01-08 DIAGNOSIS — Z01.812 PRE-OPERATIVE LABORATORY EXAMINATION: ICD-10-CM

## 2025-01-08 LAB
ANION GAP SERPL CALC-SCNC: 15 MMOL/L (ref 7–16)
BUN SERPL-MCNC: 30 MG/DL (ref 8–22)
CALCIUM SERPL-MCNC: 9.9 MG/DL (ref 8.4–10.2)
CHLORIDE SERPL-SCNC: 106 MMOL/L (ref 96–112)
CO2 SERPL-SCNC: 22 MMOL/L (ref 20–33)
CREAT SERPL-MCNC: 0.93 MG/DL (ref 0.5–1.4)
ERYTHROCYTE [DISTWIDTH] IN BLOOD BY AUTOMATED COUNT: 43.3 FL (ref 35.9–50)
GFR SERPLBLD CREATININE-BSD FMLA CKD-EPI: 68 ML/MIN/1.73 M 2
GLUCOSE SERPL-MCNC: 105 MG/DL (ref 65–99)
HCT VFR BLD AUTO: 47.4 % (ref 37–47)
HGB BLD-MCNC: 16 G/DL (ref 12–16)
MCH RBC QN AUTO: 31.2 PG (ref 27–33)
MCHC RBC AUTO-ENTMCNC: 33.8 G/DL (ref 32.2–35.5)
MCV RBC AUTO: 92.4 FL (ref 81.4–97.8)
PLATELET # BLD AUTO: 328 K/UL (ref 164–446)
PMV BLD AUTO: 10 FL (ref 9–12.9)
POTASSIUM SERPL-SCNC: 4.2 MMOL/L (ref 3.6–5.5)
RBC # BLD AUTO: 5.13 M/UL (ref 4.2–5.4)
SCCMEC + MECA PNL NOSE NAA+PROBE: NEGATIVE
SCCMEC + MECA PNL NOSE NAA+PROBE: NEGATIVE
SODIUM SERPL-SCNC: 143 MMOL/L (ref 135–145)
WBC # BLD AUTO: 8.6 K/UL (ref 4.8–10.8)

## 2025-01-08 PROCEDURE — 87640 STAPH A DNA AMP PROBE: CPT

## 2025-01-08 PROCEDURE — 36415 COLL VENOUS BLD VENIPUNCTURE: CPT

## 2025-01-08 PROCEDURE — 87641 MR-STAPH DNA AMP PROBE: CPT

## 2025-01-08 PROCEDURE — 85027 COMPLETE CBC AUTOMATED: CPT

## 2025-01-08 PROCEDURE — 80048 BASIC METABOLIC PNL TOTAL CA: CPT

## 2025-01-08 ASSESSMENT — FIBROSIS 4 INDEX: FIB4 SCORE: 1.56

## 2025-01-08 NOTE — DISCHARGE PLANNING
DISCHARGE PLANNING NOTE - TOTAL JOINT    Procedure: Procedure(s):  RIGHT TOTAL KNEE ARTHROPLASTY  Procedure Date: 2/4/2025  Insurance: Payor: SHIKHA / Plan: [place] BCBS / Product Type: *No Product type* /    Equipment currently available at home?  front-wheel walker, raised toilet seat, and shower chair, Shower seat, hand-held shower head, Polar Ice Machine  Steps into the home? 1  Steps within the home? 0  Toilet height? ADA  Type of shower? walk-in shower  Home Oxygen? No  Portable tank?    Oxygen Provider:  Planning same day discharge: Yes    Is Outpatient Physical Therapy set up after surgery? Yes, set up at [facility]  Did you take the Total Joint Class and where? Yes, received NAON book.  Who will be your transportation home on day of discharge? - Cedrick    Have you made arrangements to have someone stay with you at home for the first 3 days following discharge, and if so, whom? - Cedrick    Have you notified your surgeon that you do not have transportation or someone to help you after discharge? N/A    Are you planning on going to a transitional care facility, for example a skilled nursing facility, post operatively for rehab, and if so, have you contacted your insurance plan to see if they cover this? N/A      Met with the pt. Pt given a copy of Home Safety Checklist, Equipment Resource Guide, CHG scrub kit and instructions. Expected process in Recovery Room and dc criteria discussed with pt. All questions answered and verbalizes understanding of all instructions. No dc needs identified at this time. Anticipate dc to home without barriers.

## 2025-01-08 NOTE — PREADMIT AVS NOTE
Current Medications   Medication Instructions         pregabalin (LYRICA) 200 MG capsule Continue taking medication as prescribed, including morning of procedure     lamoTRIgine (LAMICTAL) 100 MG Tab Continue taking medication as prescribed, including morning of procedure          Mirabegron ER 50 MG TABLET SR 24 HR Continue taking medication as prescribed, including morning of procedure                              escitalopram (LEXAPRO) 20 MG tablet Continue taking medication as prescribed, including morning of procedure     etodolac (LODINE) 400 MG tablet Stop 5 days before surgery    escitalopram (LEXAPRO) 10 MG Tab Continue taking medication as prescribed, including morning of procedure     famotidine (PEPCID) 20 MG Tab Continue taking medication as prescribed, including morning of procedure     DULERA 200-5 MCG/ACT Aerosol Continue taking medication as prescribed, including morning of procedure     omeprazole (PRILOSEC) 40 MG delayed-release capsule Continue taking medication as prescribed, including morning of procedure     montelukast (SINGULAIR) 10 MG Tab Continue until night before surgery    rosuvastatin (CRESTOR) 10 MG Tab Continue until night before surgery    ipratropium-albuterol (DUONEB) 0.5-2.5 (3) MG/3ML nebulizer solution Continue taking medication as prescribed, including morning of procedure     ipratropium-albuterol (COMBIVENT RESPIMAT)  MCG/ACT Aero Soln Continue taking medication as prescribed, including morning of procedure     POTASSIUM PO Continue taking medication as prescribed, including morning of procedure     CALCIUM PO Stop 7 days before surgery    EPINEPHrine 0.3 MG/0.3ML Solution Prefilled Syringe As needed medication, may take if needed, including morning of procedure     loratadine (CLARITIN) 10 MG Tab As needed medication, may take if needed, including morning of procedure     magnesium oxide (MAG-OX) 400 MG Tab tablet Stop 7 days before surgery

## 2025-01-09 ENCOUNTER — TELEMEDICINE (OUTPATIENT)
Dept: MEDICAL GROUP | Facility: LAB | Age: 65
End: 2025-01-09
Payer: COMMERCIAL

## 2025-01-09 VITALS — HEIGHT: 72 IN | WEIGHT: 249.12 LBS | OXYGEN SATURATION: 94 % | BODY MASS INDEX: 33.74 KG/M2 | HEART RATE: 68 BPM

## 2025-01-09 DIAGNOSIS — M17.0 PRIMARY OSTEOARTHRITIS OF BOTH KNEES: ICD-10-CM

## 2025-01-09 DIAGNOSIS — E66.811 OBESITY (BMI 30.0-34.9): ICD-10-CM

## 2025-01-09 DIAGNOSIS — J45.50 SEVERE PERSISTENT ASTHMA WITHOUT COMPLICATION: ICD-10-CM

## 2025-01-09 PROCEDURE — 99214 OFFICE O/P EST MOD 30 MIN: CPT | Mod: 95 | Performed by: FAMILY MEDICINE

## 2025-01-09 ASSESSMENT — FIBROSIS 4 INDEX: FIB4 SCORE: 1.37

## 2025-01-09 NOTE — PROGRESS NOTES
Virtual Visit: Established Patient   This visit was conducted via Teams using secure and encrypted videoconferencing technology.   The patient was in their home in the [facility].    The patient's identity was confirmed and verbal consent was obtained for this virtual visit.    Subjective:   CC:   Chief Complaint   Patient presents with    Other     Update on bilateral knee replacements and weight.      Arely Haynes is a 64 y.o. female presenting for evaluation and management of weight, asthma, knee arthritis.  Right total knee arthroplasty next month and surely thereafter she would like to proceed with getting the left done as well.  Knee pain has recently limited exercise and she has gained weight.  Has worked on some diet changes including eliminating alcohol, limiting calories from drinks, limiting carbs.  Has had a tough time with regular exercise as knee pain limits her duration especially with more intense exercise.    Following with pulmonology for asthma and also recently saw allergy.  Wanting to start Dupixent after the knee surgery.  Otherwise using Dulera daily.      ROS   See HPI    Current medicines (including changes today)  Current Outpatient Medications   Medication Sig Dispense Refill    pregabalin (LYRICA) 150 MG Cap Take 1 Capsule by mouth every morning for 90 days. 90 Capsule 0    pregabalin (LYRICA) 200 MG capsule Take 1 Capsule by mouth every evening for 90 days. 90 Capsule 0    lamoTRIgine (LAMICTAL) 100 MG Tab TAKE 1 TABLET EVERY MORNING 90 Tablet 0    Naloxone (NARCAN) 4 MG/0.1ML Liquid CALL 911. SPR CONTENTS OF ONE SPRAYER (0.1ML) INTO ONE NOSTRIL. REPEAT IN 2-3 MIN IF SYMPTOMS OF OPIOID EMERGENCY PERSIST, ALTERNATE NOSTRILS      Mirabegron ER 50 MG TABLET SR 24 HR Take 1 Tablet by mouth every day.      valacyclovir (VALTREX) 1 GM Tab Take 1 Tablet by mouth 2 times a day. (Patient not taking: Reported on 1/8/2025)      EPINEPHrine (EPIPEN) 0.3 MG/0.3ML Solution Auto-injector  solution for injection INJECT 1 PEN IN THE MUSCLE ONE TIME AS DIRECTED FOR ANAPHYLAXIS      pregabalin (LYRICA) 200 MG capsule Take 1 Capsule by mouth 2 times a day.      pregabalin (LYRICA) 100 MG Cap Take 100 mg by mouth.      dupilumab (DUPIXENT) 200 MG/1.14ML injection Inject 1.14 mL under the skin every 14 days for 28 days. (Patient not taking: Reported on 1/8/2025) 2.28 mL 3    escitalopram (LEXAPRO) 20 MG tablet TAKE 1 TABLET EVERY MORNING. TAKE WITH ESCITALOPRAM 10 MG FOR A TOTAL DOSE OF 30 MG 90 Tablet 3    etodolac (LODINE) 400 MG tablet TAKE 1 TABLET TWICE A DAY (Patient taking differently: Take 400 mg by mouth 2 times a day. MEDICATION INSTRUCTIONS FOR SURGERY/PROCEDURE SCHEDULED FOR 12/31/2024   DO NOT TAKE 5 DAYS PRIOR TO SURGERY) 180 Tablet 3    escitalopram (LEXAPRO) 10 MG Tab TAKE 1 TABLET EVERY MORNING. TAKE WITH ESCITALOPRAM 20 MG FOR A TOTAL DOSE OF 30 MG 90 Tablet 3    famotidine (PEPCID) 20 MG Tab TAKE 1 TABLET TWICE A DAY FOR GASTROESOPHAGEAL REFLUX DISEASE (Patient taking differently: MEDICATION INSTRUCTIONS FOR SURGERY/PROCEDURE SCHEDULED FOR 12/31/24  CONTINUE TAKING MED PRIOR TO SURGERY) 180 Tablet 3    DULERA 200-5 MCG/ACT Aerosol USE 2 INHALATIONS TWICE A DAY (Patient taking differently: MEDICATION INSTRUCTIONS FOR SURGERY/PROCEDURE SCHEDULED FOR 12/31/2024  CONTINUE TAKING MED PRIOR TO SURGERY) 39 g 3    omeprazole (PRILOSEC) 40 MG delayed-release capsule TAKE 1 CAPSULE DAILY (Patient taking differently: Take 40 mg by mouth every day.   MEDICATION INSTRUCTIONS FOR SURGERY 12/31/2024  OK TO CONTINUE TAKING PRIOR TO SURGERY AND DAY OF SURGERY) 90 Capsule 3    montelukast (SINGULAIR) 10 MG Tab TAKE 1 TABLET DAILY (Patient taking differently: Take 10 mg by mouth every evening.   MEDICATION INSTRUCTIONS FOR SURGERY 12/31/2024  OK TO CONTINUE TAKING PRIOR TO SURGERY AND DAY OF SURGERY) 90 Tablet 3    rosuvastatin (CRESTOR) 10 MG Tab TAKE 1 TABLET AT BEDTIME (Patient taking differently: Take  10 mg by mouth at bedtime.   MEDICATION INSTRUCTIONS FOR SURGERY 12/31/2024  CONTINUE TAKING MED PRIOR TO SURGERY) 90 Tablet 3    ipratropium-albuterol (DUONEB) 0.5-2.5 (3) MG/3ML nebulizer solution Take 3 mL by nebulization every 4 hours. (Patient taking differently: Take 3 mL by nebulization every 4 hours.   MEDICATION INSTRUCTIONS FOR SURGERY 12/31/2024  OK TO CONTINUE TAKING PRIOR TO SURGERY AND DAY OF SURGERY) 270 mL 5    ipratropium-albuterol (COMBIVENT RESPIMAT)  MCG/ACT Aero Soln Inhale 1 Puff 4 times a day. (Patient taking differently: Inhale 1 Puff 4 times a day.   MEDICATION INSTRUCTIONS FOR SURGERY 12/31/2024  OK TO CONTINUE TAKING PRIOR TO SURGERY AND DAY OF SURGERY) 4 g 5    POTASSIUM PO Take 99 mg by mouth every day.   CONTINUE TAKING MED PRIOR TO SURGERY      CALCIUM PO Take 1 Capsule by mouth every day.   DO NOT TAKE 7 DAYS PRIOR TO SURGERY      EPINEPHrine 0.3 MG/0.3ML Solution Prefilled Syringe Inject the contents of the epipen into the thigh, hold for 3 seconds and release from thigh as needed for anaphylaxis. (Patient taking differently: Inject the contents of the epipen into the thigh, hold for 3 seconds and release from thigh as needed for anaphylaxis    MEDICATION INSTRUCTIONS FOR SURGERY/PROCEDURE SCHEDULED FOR 12/31/2024   CONTINUE TAKING MED PRIOR TO SURGERY IF NEEDED AND NOTIFY MD) 1 Each 6    loratadine (CLARITIN) 10 MG Tab Take 10 mg by mouth every day.   MEDICATION INSTRUCTIONS FOR SURGERY 12/31/2024  CONTINUE TAKING MED PRIOR TO SURGERY      magnesium oxide (MAG-OX) 400 MG Tab tablet Take 1 Tablet by mouth every morning. (Patient taking differently: Take 400 mg by mouth every morning.   MEDICATION INSTRUCTIONS FOR SURGERY/PROCEDURE SCHEDULED FOR 12/31/2024   DO NOT TAKE 7 DAYS PRIOR TO SURGERY) 90 Tablet 3     No current facility-administered medications for this visit.       Patient Active Problem List    Diagnosis Date Noted    Osteoarthritis, knee 12/23/2024    Primary  osteoarthritis of right knee 12/23/2024    Cold sore 08/07/2024    Cough 08/05/2023    Chronic diarrhea 08/05/2023    History of colonic polyps 05/12/2023    Hypercholesterolemia 05/12/2023    Hoarseness 05/12/2023    Hemorrhoids without complication 05/12/2023    Hematochezia 05/12/2023    Fecal urgency 05/12/2023    Polyp of stomach and duodenum 01/03/2023    Diaphragmatic hernia without obstruction or gangrene 01/03/2023    Pulmonary nodules 08/16/2021    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Formerly [facility]) 07/15/2021    Bradycardia 07/15/2021    GERD (gastroesophageal reflux disease) 07/08/2021    Second degree hemorrhoids 10/26/2020    Polyp of colon 10/26/2020    Major depressive disorder, recurrent, moderate ([facility]) 01/07/2020    CHRISTY (generalized anxiety disorder) 01/07/2020    Seasonal allergies 12/30/2019    H/O: hysterectomy 10/02/2019    Quadriceps tendon rupture, left, initial encounter 09/19/2019    Ovarian cyst 06/25/2019    Psoriasis 03/27/2019    Immunosuppression (Former[facility]) 03/27/2019    Obesity (BMI 30.0-34.9) 03/27/2019    Menopausal hot flushes 03/27/2019    Psoriatic arthritis ([facility]) 11/30/2018    Familial hypercholesterolemia 11/30/2018    Severe persistent asthma without complication 11/30/2018    History of total right hip arthroplasty 09/22/2017        Objective:   LMP  (LMP Unknown)     Physical Exam:  Constitutional: Alert, no distress, well-groomed.  Skin: No rashes in visible areas.  Eye: Round. Conjunctiva clear, lids normal. No icterus.   ENMT: Lips pink without lesions, good dentition, moist mucous membranes. Phonation normal.  Neck: No masses, no thyromegaly. Moves freely without pain.  Respiratory: Unlabored respiratory effort, no cough or audible wheeze  Psych: Alert and oriented x3, normal affect and mood.     Assessment and Plan:   The following treatment plan was discussed:     1. Obesity (BMI 30.0-34.9)  Recent weight gain secondary to limited activity with knee arthritis.  Discussed trying to  work around her current exercise limitations with pain and trying longer episodes of lower intensity.  We also discussed diet strategies, agree with trying to limit simple carbs.  Discussed trial of calorie counting through [facility] to try to identify calorie dense foods that may be contributing.    2. Primary osteoarthritis of both knees  Right TKA planned next month that she is planning to try to get the left done shortly thereafter.    3. Severe persistent asthma without complication  Currently stable, following with pulmonology.  Continue Dulera daily, duonebs PRN, planning to start Dupixent after recent surgery.

## 2025-01-15 ENCOUNTER — TELEPHONE (OUTPATIENT)
Dept: SLEEP MEDICINE | Facility: MEDICAL CENTER | Age: 65
End: 2025-01-15
Payer: MEDICARE

## 2025-01-15 DIAGNOSIS — R06.02 SHORTNESS OF BREATH: ICD-10-CM

## 2025-01-15 NOTE — TELEPHONE ENCOUNTER
RE: Dupxient    Message: Spoke with pt. Pt has received her Dupixent prescription.   Question 1) wanted to schedule an appt to get her first injection in-office.  Question 2) Pt is going to be having a Total Knee replacement on 02/04/24. Was told/recommended by the pharmacist to hold the Dupixent until after her knee replacement.  Pt wanted to know how long should she went after her surgery to start the Dupixent?

## 2025-01-20 ENCOUNTER — HOSPITAL ENCOUNTER (OUTPATIENT)
Dept: LAB | Facility: MEDICAL CENTER | Age: 65
End: 2025-01-20
Attending: INTERNAL MEDICINE
Payer: COMMERCIAL

## 2025-01-20 LAB
ALBUMIN SERPL BCP-MCNC: 4.5 G/DL (ref 3.2–4.9)
ALBUMIN/GLOB SERPL: 1.5 G/DL
ALP SERPL-CCNC: 115 U/L (ref 30–99)
ALT SERPL-CCNC: 28 U/L (ref 2–50)
ANION GAP SERPL CALC-SCNC: 13 MMOL/L (ref 7–16)
AST SERPL-CCNC: 31 U/L (ref 12–45)
BASOPHILS # BLD AUTO: 0.6 % (ref 0–1.8)
BASOPHILS # BLD: 0.04 K/UL (ref 0–0.12)
BILIRUB SERPL-MCNC: 0.4 MG/DL (ref 0.1–1.5)
BUN SERPL-MCNC: 23 MG/DL (ref 8–22)
CALCIUM ALBUM COR SERPL-MCNC: 9 MG/DL (ref 8.5–10.5)
CALCIUM SERPL-MCNC: 9.4 MG/DL (ref 8.5–10.5)
CHLORIDE SERPL-SCNC: 106 MMOL/L (ref 96–112)
CO2 SERPL-SCNC: 24 MMOL/L (ref 20–33)
CREAT SERPL-MCNC: 0.69 MG/DL (ref 0.5–1.4)
EOSINOPHIL # BLD AUTO: 0.12 K/UL (ref 0–0.51)
EOSINOPHIL NFR BLD: 1.8 % (ref 0–6.9)
ERYTHROCYTE [DISTWIDTH] IN BLOOD BY AUTOMATED COUNT: 43.1 FL (ref 35.9–50)
GFR SERPLBLD CREATININE-BSD FMLA CKD-EPI: 96 ML/MIN/1.73 M 2
GLOBULIN SER CALC-MCNC: 3 G/DL (ref 1.9–3.5)
GLUCOSE SERPL-MCNC: 66 MG/DL (ref 65–99)
HCT VFR BLD AUTO: 44.9 % (ref 37–47)
HGB BLD-MCNC: 14.9 G/DL (ref 12–16)
IMM GRANULOCYTES # BLD AUTO: 0.02 K/UL (ref 0–0.11)
IMM GRANULOCYTES NFR BLD AUTO: 0.3 % (ref 0–0.9)
LYMPHOCYTES # BLD AUTO: 2.49 K/UL (ref 1–4.8)
LYMPHOCYTES NFR BLD: 36.6 % (ref 22–41)
MCH RBC QN AUTO: 30.8 PG (ref 27–33)
MCHC RBC AUTO-ENTMCNC: 33.2 G/DL (ref 32.2–35.5)
MCV RBC AUTO: 93 FL (ref 81.4–97.8)
MONOCYTES # BLD AUTO: 0.52 K/UL (ref 0–0.85)
MONOCYTES NFR BLD AUTO: 7.6 % (ref 0–13.4)
NEUTROPHILS # BLD AUTO: 3.61 K/UL (ref 1.82–7.42)
NEUTROPHILS NFR BLD: 53.1 % (ref 44–72)
NRBC # BLD AUTO: 0 K/UL
NRBC BLD-RTO: 0 /100 WBC (ref 0–0.2)
PLATELET # BLD AUTO: 249 K/UL (ref 164–446)
PMV BLD AUTO: 9.9 FL (ref 9–12.9)
POTASSIUM SERPL-SCNC: 3.8 MMOL/L (ref 3.6–5.5)
PROT SERPL-MCNC: 7.5 G/DL (ref 6–8.2)
RBC # BLD AUTO: 4.83 M/UL (ref 4.2–5.4)
SODIUM SERPL-SCNC: 143 MMOL/L (ref 135–145)
WBC # BLD AUTO: 6.8 K/UL (ref 4.8–10.8)

## 2025-01-20 PROCEDURE — 85025 COMPLETE CBC W/AUTO DIFF WBC: CPT

## 2025-01-20 PROCEDURE — 86003 ALLG SPEC IGE CRUDE XTRC EA: CPT

## 2025-01-20 PROCEDURE — 86581 STRPTCS PNEUM ANTB SEROT IA: CPT

## 2025-01-20 PROCEDURE — 82785 ASSAY OF IGE: CPT

## 2025-01-20 PROCEDURE — 82784 ASSAY IGA/IGD/IGG/IGM EACH: CPT

## 2025-01-20 PROCEDURE — 86162 COMPLEMENT TOTAL (CH50): CPT

## 2025-01-20 PROCEDURE — 80053 COMPREHEN METABOLIC PANEL: CPT

## 2025-01-20 PROCEDURE — 86317 IMMUNOASSAY INFECTIOUS AGENT: CPT

## 2025-01-20 PROCEDURE — 36415 COLL VENOUS BLD VENIPUNCTURE: CPT

## 2025-01-21 DIAGNOSIS — R74.8 ALKALINE PHOSPHATASE ELEVATION: ICD-10-CM

## 2025-01-22 LAB
A ALTERNATA IGE QN: <0.1 KU/L
A FUMIGATUS IGE QN: 0.11 KU/L
BERMUDA GRASS IGE QN: <0.1 KU/L
BOXELDER IGE QN: <0.1 KU/L
C DIPHTHERIAE IGG SER-ACNC: 0.1 IU/ML
C SPHAEROSPERMUM IGE QN: <0.1 KU/L
C TETANI TOXOID IGG SERPL IA-ACNC: 1.2 IU/ML
CAT DANDER IGE QN: <0.1 KU/L
CH50 SERPL-ACNC: 43.4 U/ML (ref 38.7–89.9)
CMN PIGWEED IGE QN: <0.1 KU/L
COMMON RAGWEED IGE QN: <0.1 KU/L
COTTONWOOD IGE QN: <0.1 KU/L
D FARINAE IGE QN: <0.1 KU/L
D PTERONYSS IGE QN: <0.1 KU/L
DEPRECATED MISC ALLERGEN IGE RAST QL: NORMAL
DOG DANDER IGE QN: <0.1 KU/L
IGA SERPL-MCNC: 382 MG/DL (ref 68–408)
IGE SERPL-ACNC: 34 KU/L
IGG SERPL-MCNC: 857 MG/DL (ref 768–1632)
IGM SERPL-MCNC: 130 MG/DL (ref 35–263)
M RACEMOSUS IGE QN: <0.1 KU/L
MOUSE EPITH IGE QN: <0.1 KU/L
MT JUNIPER IGE QN: 0.21 KU/L
MUGWORT IGE QN: <0.1 KU/L
OLIVE POLN IGE QN: <0.1 KU/L
P NOTATUM IGE QN: <0.1 KU/L
ROACH IGE QN: <0.1 KU/L
SALTWORT IGE QN: <0.1 KU/L
TEST NAME 95000: NORMAL
TIMOTHY IGE QN: 0.12 KU/L
WHITE ELM IGE QN: <0.1 KU/L
WHITE MULBERRY IGE QN: <0.1 KU/L
WHITE OAK IGE QN: <0.1 KU/L

## 2025-01-23 LAB
S PN DA SERO 19F IGG SER-MCNC: 1.93 UG/ML
S PNEUM DA 1 IGG SER-MCNC: 2.2 UG/ML
S PNEUM DA 10A IGG SER-MCNC: 0.11 UG/ML
S PNEUM DA 11A IGG SER-MCNC: 0.12 UG/ML
S PNEUM DA 12F IGG SER-MCNC: 0.12 UG/ML
S PNEUM DA 14 IGG SER-MCNC: 0.41 UG/ML
S PNEUM DA 15B IGG SER-MCNC: 0.29 UG/ML
S PNEUM DA 17F IGG SER-MCNC: 1.31 UG/ML
S PNEUM DA 18C IGG SER-MCNC: 0.31 UG/ML
S PNEUM DA 19A IGG SER-MCNC: 8.51 UG/ML
S PNEUM DA 2 IGG SER-MCNC: 0.44 UG/ML
S PNEUM DA 20A IGG SER-MCNC: 14.19 UG/ML
S PNEUM DA 22F IGG SER-MCNC: 0.11 UG/ML
S PNEUM DA 23F IGG SER-MCNC: 0.2 UG/ML
S PNEUM DA 3 IGG SER-MCNC: 0.32 UG/ML
S PNEUM DA 33F IGG SER-MCNC: 1.61 UG/ML
S PNEUM DA 4 IGG SER-MCNC: 0.42 UG/ML
S PNEUM DA 5 IGG SER-MCNC: 0.63 UG/ML
S PNEUM DA 6B IGG SER-MCNC: 0.2 UG/ML
S PNEUM DA 7F IGG SER-MCNC: 0.1 UG/ML
S PNEUM DA 8 IGG SER-MCNC: 0.34 UG/ML
S PNEUM DA 9N IGG SER-MCNC: 0.7 UG/ML
S PNEUM DA 9V IGG SER-MCNC: 0.38 UG/ML
S PNEUM SEROTYPE IGG SER-IMP: NORMAL

## 2025-01-30 PROCEDURE — RXMED WILLOW AMBULATORY MEDICATION CHARGE: Performed by: STUDENT IN AN ORGANIZED HEALTH CARE EDUCATION/TRAINING PROGRAM

## 2025-01-30 RX ORDER — TRAMADOL HYDROCHLORIDE 50 MG/1
50-75 TABLET ORAL EVERY 6 HOURS PRN
Qty: 35 TABLET | Refills: 0 | Status: SHIPPED | OUTPATIENT
Start: 2025-02-04 | End: 2025-02-11

## 2025-01-30 RX ORDER — ASPIRIN 81 MG/1
81 TABLET, CHEWABLE ORAL 2 TIMES DAILY
Qty: 56 TABLET | Refills: 0
Start: 2025-01-30 | End: 2025-02-27

## 2025-01-30 RX ORDER — DOCUSATE SODIUM 100 MG/1
100 CAPSULE, LIQUID FILLED ORAL 2 TIMES DAILY
Qty: 60 CAPSULE | Refills: 0 | Status: SHIPPED | OUTPATIENT
Start: 2025-02-04 | End: 2025-03-06

## 2025-01-30 RX ORDER — OXYCODONE HYDROCHLORIDE 5 MG/1
5-7.5 TABLET ORAL EVERY 6 HOURS PRN
Qty: 42 TABLET | Refills: 0 | Status: SHIPPED | OUTPATIENT
Start: 2025-02-04 | End: 2025-02-14

## 2025-01-30 RX ORDER — ACETAMINOPHEN 500 MG
1000 TABLET ORAL 3 TIMES DAILY PRN
Qty: 90 TABLET | Refills: 0
Start: 2025-01-30 | End: 2025-03-01

## 2025-01-30 NOTE — DISCHARGE INSTRUCTIONS
ACTIVITY: Rest and take it easy for the first 24 hours.  A responsible adult is recommended to remain with you during that time.  It is normal to feel sleepy.  We encourage you to not do anything that requires balance, judgment or coordination.    MILD FLU-LIKE SYMPTOMS ARE NORMAL. YOU MAY EXPERIENCE GENERALIZED MUSCLE ACHES, THROAT IRRITATION, HEADACHE AND/OR SOME NAUSEA.    FOR 24 HOURS DO NOT:  Drive, operate machinery or run household appliances.  Drink beer or alcoholic beverages.   Make important decisions or sign legal documents.    SPECIAL INSTRUCTIONS:   Patient will be discharged home and follow up with Dr. Hensley in 2 weeks, for which the patient already has scheduled.    You may call or text Dr. Hensley' medical assistant during business hours at [phone].  You may call the emergency ALYSIA line during nights/weekends/holidays if needed at [phone].    Instructions:  -Leave the bandage in place until your followup appointment in 2 weeks.  -May shower with bandage in place, if bandage becomes soaked underneath please remove and call the office immediately.  -No baths/tubs/pools/submersion of the wound for now.  -Call if there is significant drainage beneath the bandage    -Put as much weight as comfortable on the operative leg.  Use a walker to assist with balance to avoid any falls.  -It is important to start moving and stretching right away, otherwise the joint can stiffen.    -Take your blood clot prevention medication for 4 weeks after surgery (81 mg of Aspirin, twice daily). This is over the counter and will not be prescribed  -Use ice frequently to help with pain and swelling control  -Pain management:  Standard pain regimen should be:  Ice as much as possible.  Apply the ice anywhere you feel pain.  Resume Etodolac day after surgery  Tylenol (acetaminophen) - 1000mg every 8 hours. Take this automatically on a scheduled basis. This is over the counter and will not be prescribed  Tramadol - 1-2  capsules every 6 hours. Take this automatically on a scheduled basis until no longer needed for pain.  Oxycodone - 1-2 tablets every 6 hours only if needed.  You would take the oxycodone 3 hours after the Tramadol, such that you are taking one or the other every 3 hours        -Try to limit the amount of oxycodone since it tends to cause constipation, drowsiness, etc.  But if you need it, take it.        -100mg of Colace (docusate) will be prescribed. Take this twice a day while still taking Tramadol or Oxycodone. Can discontinue after opiates are no longer being taken.  If bowel movement has not occurred in 48 hours please add in a laxative called Senna (over the counter) twice daily in conjunction with the colace. If no bowel movement is produced 48 hours after adding in Senna please start Milk of Magnesia (over the counter).     AGGRESSIVE RANGE OF MOTION.  Do a quad set a million times per day.  Have a family member push down on the thigh and shin every 20 minutes with 2-3 pillows under the heel to help stretch the knee straight.  Flex the knee as far as possible every 20 minutes.    TOTAL KNEE REPLACEMENT, AFTER-CARE GUIDELINES     These instructions provide you with information on caring for yourself and your knee after surgery. Your health care provider may also give you instructions that are more specific. Your treatment was planned and performed according to current medical practices but problems sometimes occur. Call your health care provider if you have any problems or questions.     WHAT TO EXPECT AFTER THE PROCEDURE   After your procedure, your knee will typically be stiff, sore, and bruised. This will improve over time.     Pain   Follow your home pain management plan as discussed with your nurse and as directed by your provider.   It is important to follow any scheduled pain medications for maximal pain relief.   If prescribed opioid medication, the goal is to use opioids only as needed and to wean  off prescription pain medicine as soon as possible.   Ice can be used for pain control.   Put ice in a plastic bag.   Place a towel between your skin and the bag.   Leave the ice on for 20 minutes, 2-3 times a day at a minimum.   Most patients are off the pain pills by 3 weeks. If your pain continues to be severe, follow up with your provider.     Infection   Knee joint infections occur in fewer than 2% of patients. The most common causes of infection following total knee replacement surgery are from bacteria that enter the bloodstream during dental procedures, urinary tract infections, or skin infections. These bacteria can lodge around your knee replacement and cause an infection.   Keep the incision as clean and dry as possible.   Always wash your hands before touching your incision.   Avoid dental care for 3 months after surgery. Your provider may recommend taking a dose of antibiotics an hour prior to any dental procedure. After 2 years, most providers recommend antibiotics only before an extensive procedure. Ask your provider what they recommend.   Signs and symptoms of infection include low-grade fever, redness, pain, swelling and drainage from your incision. Notify your provider IMMEDIATELY if you develop ANY of these symptoms.     Post op Disturbances   Bowel Habits - Constipation is extremely common and caused by a combination of anesthesia, lack of mobility, dehydration and pain medicine. Use stool softeners or laxatives if necessary. It is important not to ignore this problem as bowel obstructions can be a serious complication after joint replacement surgery.   Mood/Energy Level - Many patients experience a lack of energy and endurance for up to 2-3 months after surgery. Some people feel down and can even become depressed. This is likely due to postoperative anemia, change in activity level, lack of sleep, pain medicine and just the emotional reaction to the surgery itself that is a big disruption in a  person’s life. This usually passes. If symptoms persist, follow up with your primary care provider.  Returning to Work - Your provider will give you specific instructions based on your profession. Generally, if you work a sedentary job requiring little standing or walking, most patients may return within 2-6 weeks. Manual labor jobs involving walking, lifting and standing may take 3-4 months. Your provider’s office can provide a release to part-time or light duty work early on in your recovery and progress you to full duty as able.   Driving - You can begin driving once cleared by your provider, provided you are no longer taking narcotic pain medication or any other medications that impair driving. Discuss the length of time expected with your provider as returning to driving depends on things such as your vehicle, which knee was replaced (right or left), and knee motion, strength and reflexes returning appropriately.   Avoiding falls - A fall during the first few weeks after surgery can damage your new knee and may result in a need for further surgery.  throw rugs and tack down loose carpeting. Be aware of floor hazards such as pets, small objects or uneven surfaces. Notify your provider of any falls.   Airport Metal Detectors - The sensitivity of metal detectors varies and it is likely that your prosthesis will cause an alarm. Inform the  of your artificial joint.     Diet   Resume your normal diet as tolerated.   It is important to achieve a healthy nutritional status by eating a well-balanced diet on a regular basis.   Your provider may recommend that you take iron and vitamin supplements.   Continue to drink plenty of fluids.     Shower/Bathing   You may shower as soon as you get home from the hospital unless otherwise instructed.   Keep your incision out of water to prevent infection. To keep the incision dry when showering, cover it with a plastic bag or plastic wrap. If your bandage is  waterproof, this may not be necessary. o Pat incision dry if it gets wet. Do not rub. Notify your provider.   Do not submerge in a bath until cleared by your provider. Your staples must be out and the incision completely healed.     Dressing Change: Only change your dressing if directed by your provider.   Wash hands.   Open all dressing change materials.   Remove old dressing and discard.   Inspect incision for signs of irritation or infection including redness, increase in clear drainage, yellow/green drainage, odor and surrounding skin hot to touch. Notify your provider if present.    the new dressing by one corner and lay over the incision. Be careful not to touch the inside of the dressing that will lay over the incision.   Secure in place as instructed. Swelling/Bruising   Swelling is normal after knee replacement and can involve the thigh, knee, calf and foot.   Swelling can last from 3-6 months.   To reduce swelling, elevate your leg higher than your heart while reclining. The first week you are home you should elevate your leg an equal amount of time as you are active.   The swelling is usually worse after you go home since you are upright for longer periods of time.   Bruising often does not appear until after you arrive home and can be quite dramatic- appearing purple, black, or green. Bruising is typically not concerning and will subside without any treatment.     Blood Clot Prevention   Your treatment plan includes multiple preventative measures to decrease the risk of blood clots in the legs (DVTs) and the less common, but serious, clots that travel to the lungs (pulmonary emboli). Most patients are at standard risk for them, but people who are at higher risk include those who have had previous clots, a family history of clotting, smoking, diabetes, obesity, advanced age, use estrogen and/or live a sedentary lifestyle.     Signs of blood clots in legs include - Swelling in thigh, calf or ankle  that does not go down with elevation. Pain, heat and tenderness in calf, back of calf or groin area. NOTE: blood clots can occur in either leg.   Signs of blood clots in lungs include - Sudden increased shortness of breath, sudden onset of chest pain, and localized chest pain with coughing.   If you experience any of the above symptoms, notify your provider and seek medical attention immediately.   You received anticoagulant therapy (blood thinners) in the hospital. Continue the prescribed blood-thinning medication at home, as directed by your provider.   Your risk for developing a clot continues for up to 2-3 months after surgery. Avoid prolonged sitting and dehydration (long air trips and car trips). If you do take a trip during this time, please get up, move around every 1-1.5 hours, and discuss all travel plans with your provider.     Activity   Once home, stay active. The key is not to overdo it. While you can expect some good days and some bad days, you should notice a gradual improvement and a gradual increase in your endurance over the next 6 to 12 months. Exercise is a critical component of recovery, particularly during the first few weeks after surgery.     Normal activities of daily living - Expect to resume most within 3 to 6 weeks following surgery. Some pain with activity and at night is common for several weeks after surgery. Walk as much as you like once your doctor gives permission to proceed, but remember that walking is no substitute for the exercises your doctor and physical therapist prescribe. Use a walker, crutches or cane to assist with walking until you can walk smoothly (minimal or no limp) without assistance.   Physical Therapy Exercises - Follow your home exercise program as instructed by your physical therapist during your hospital stay. Call and set up outpatient physical therapy appointments per your provider’s recommendations. Physical therapy after the hospital stay focuses on  increasing your range of motion, strengthening your muscles and improving your gait/walking pattern. Contact your provider for the referral to outpatient physical therapy if you have not yet received this. ?   Riding a stationary bicycle can help maintain muscle tone and keep your knee flexible. Begin stationary bicycling as directed by your physical therapist or provider.   Sexual Activity - Your provider can tell you when it safe to resume sexual activity.   Sleeping Positions - You can safely sleep on your back, on either side, or on your stomach.   Other Activities - Lower impact activities are preferred. Consult your provider if you have specific questions.     Infection statistic resource:   https://www.[employer]/contents/prosthetic-joint-infection-epidemiology-microbiology-clinical-manifestations-and-diagnosis     DIET: To avoid nausea, slowly advance diet as tolerated, avoiding spicy or greasy foods for the first day.  Add more substantial food to your diet according to your physician's instructions.    INCREASE FLUIDS AND FIBER TO AVOID CONSTIPATION.    FOLLOW-UP APPOINTMENT:  A follow-up appointment should be arranged with your doctor; call to schedule.    You should CALL YOUR PHYSICIAN if you develop:  Fever greater than 101 degrees F.  Pain not relieved by medication, or persistent nausea or vomiting.  Excessive bleeding (blood soaking through dressing) or unexpected drainage from the wound.  Extreme redness or swelling around the incision site, drainage of pus or foul smelling drainage.  Inability to urinate or empty your bladder within 8 hours.  Problems with breathing or chest pain.    You should call 911 if you develop problems with breathing or chest pain.  If you are unable to contact your doctor or surgical center, you should go to the nearest emergency room or urgent care center.  Physician's telephone #: ***    If any questions arise, call your doctor.  If your doctor is not available, please  feel free to call the Surgical Center at [phone].     A registered nurse may call you a few days after your surgery to see how you are doing after your procedure.    MEDICATIONS: Resume taking daily medication.  Take prescribed pain medication with food.  If no medication is prescribed, you may take non-aspirin pain medication if needed.  PAIN MEDICATION CAN BE VERY CONSTIPATING.  Take a stool softener or laxative such as senokot, pericolace, or milk of magnesia if needed.    Last pain medication given at 09:00 am (5 mg of oxycodone)    If your physician has prescribed pain medication that includes Acetaminophen (Tylenol), do not take additional Acetaminophen (Tylenol) while taking the prescribed medication.      Peripheral Nerve Block Discharge Instructions from Same Day Surgery and Inpatient :    What to Expect - Lower Extremity  The block may cause you to experience numbness and weakness in your hip and thigh, thigh and knee, or calf and foot on the same side as your surgery  Numbness, tingling and / or weakness are all normal. For some people, this may be an unpleasant sensation  These issues will be resolved when the local anesthetic wears off   You may experience numbness and tingling in your thigh on the same side as your surgery if the block medicine was injected at your groin area  Numbness will make it difficult to walk  You may have problems with balance and walking so be very careful   Follow your surgeon's direction regarding weight bearing on your surgical limb  Be very careful with your numb limb  Precautions  The numbness may affect your balance  Be careful when walking or moving around  Your leg may be weak: be very careful putting weight on it  If your surgeon did not specify a time, you should not bear weight for 24 hours  Be sure to ask for help when you need it  It is better to have help than to fall and hurt yourself  Prevent Injury  Protect the limb like a baby  Beware of exposing your  "limb to extreme heat or cold or trauma  The limb may be injured without you noticing because it is numb  Keep the limb elevated whenever possible  Do not sleep on the limb  Change the position of the limb regularly  Avoid putting pressure on your surgical limb  Pain Control  The initial block on the day of surgery will make your extremity feel \"numb\"  Any consecutive injection including prior to discharge from the hospital will make your extremity feel \"numb\"  You may feel an aching or burning when the local anesthesia starts to wear off  Take pain pills as prescribed by your surgeon  Call your surgeon or anesthesiologist if you do not have adequate pain control  "

## 2025-02-04 ENCOUNTER — HOSPITAL ENCOUNTER (OUTPATIENT)
Facility: MEDICAL CENTER | Age: 65
End: 2025-02-04
Attending: ORTHOPAEDIC SURGERY | Admitting: ORTHOPAEDIC SURGERY
Payer: COMMERCIAL

## 2025-02-04 ENCOUNTER — ANESTHESIA EVENT (OUTPATIENT)
Dept: SURGERY | Facility: MEDICAL CENTER | Age: 65
End: 2025-02-04
Payer: COMMERCIAL

## 2025-02-04 ENCOUNTER — PHARMACY VISIT (OUTPATIENT)
Dept: PHARMACY | Facility: MEDICAL CENTER | Age: 65
End: 2025-02-04
Payer: COMMERCIAL

## 2025-02-04 ENCOUNTER — ANESTHESIA (OUTPATIENT)
Dept: SURGERY | Facility: MEDICAL CENTER | Age: 65
End: 2025-02-04
Payer: COMMERCIAL

## 2025-02-04 VITALS
HEART RATE: 60 BPM | OXYGEN SATURATION: 95 % | DIASTOLIC BLOOD PRESSURE: 56 MMHG | HEIGHT: 72 IN | RESPIRATION RATE: 18 BRPM | SYSTOLIC BLOOD PRESSURE: 109 MMHG | WEIGHT: 257.94 LBS | TEMPERATURE: 97.5 F | BODY MASS INDEX: 34.94 KG/M2

## 2025-02-04 DIAGNOSIS — G89.18 POST-OPERATIVE PAIN: ICD-10-CM

## 2025-02-04 DIAGNOSIS — M17.11 PRIMARY OSTEOARTHRITIS OF RIGHT KNEE: ICD-10-CM

## 2025-02-04 PROCEDURE — A9270 NON-COVERED ITEM OR SERVICE: HCPCS | Performed by: ANESTHESIOLOGY

## 2025-02-04 PROCEDURE — 700111 HCHG RX REV CODE 636 W/ 250 OVERRIDE (IP): Performed by: ORTHOPAEDIC SURGERY

## 2025-02-04 PROCEDURE — 160002 HCHG RECOVERY MINUTES (STAT): Performed by: ORTHOPAEDIC SURGERY

## 2025-02-04 PROCEDURE — 700102 HCHG RX REV CODE 250 W/ 637 OVERRIDE(OP): Performed by: ANESTHESIOLOGY

## 2025-02-04 PROCEDURE — 160048 HCHG OR STATISTICAL LEVEL 1-5: Performed by: ORTHOPAEDIC SURGERY

## 2025-02-04 PROCEDURE — 160029 HCHG SURGERY MINUTES - 1ST 30 MINS LEVEL 4: Performed by: ORTHOPAEDIC SURGERY

## 2025-02-04 PROCEDURE — 700101 HCHG RX REV CODE 250: Performed by: ANESTHESIOLOGY

## 2025-02-04 PROCEDURE — 160009 HCHG ANES TIME/MIN: Performed by: ORTHOPAEDIC SURGERY

## 2025-02-04 PROCEDURE — 700105 HCHG RX REV CODE 258: Performed by: ORTHOPAEDIC SURGERY

## 2025-02-04 PROCEDURE — 700111 HCHG RX REV CODE 636 W/ 250 OVERRIDE (IP): Performed by: ANESTHESIOLOGY

## 2025-02-04 PROCEDURE — RXMED WILLOW AMBULATORY MEDICATION CHARGE: Performed by: STUDENT IN AN ORGANIZED HEALTH CARE EDUCATION/TRAINING PROGRAM

## 2025-02-04 PROCEDURE — 27447 TOTAL KNEE ARTHROPLASTY: CPT | Mod: ASROC,RT | Performed by: STUDENT IN AN ORGANIZED HEALTH CARE EDUCATION/TRAINING PROGRAM

## 2025-02-04 PROCEDURE — 502000 HCHG MISC OR IMPLANTS RC 0278: Performed by: ORTHOPAEDIC SURGERY

## 2025-02-04 PROCEDURE — 160046 HCHG PACU - 1ST 60 MINS PHASE II: Performed by: ORTHOPAEDIC SURGERY

## 2025-02-04 PROCEDURE — 700111 HCHG RX REV CODE 636 W/ 250 OVERRIDE (IP): Mod: JZ | Performed by: ANESTHESIOLOGY

## 2025-02-04 PROCEDURE — C1776 JOINT DEVICE (IMPLANTABLE): HCPCS | Performed by: ORTHOPAEDIC SURGERY

## 2025-02-04 PROCEDURE — 97535 SELF CARE MNGMENT TRAINING: CPT

## 2025-02-04 PROCEDURE — 27447 TOTAL KNEE ARTHROPLASTY: CPT | Mod: RT | Performed by: ORTHOPAEDIC SURGERY

## 2025-02-04 PROCEDURE — 160035 HCHG PACU - 1ST 60 MINS PHASE I: Performed by: ORTHOPAEDIC SURGERY

## 2025-02-04 PROCEDURE — 160047 HCHG PACU  - EA ADDL 30 MINS PHASE II: Performed by: ORTHOPAEDIC SURGERY

## 2025-02-04 PROCEDURE — 160025 RECOVERY II MINUTES (STATS): Performed by: ORTHOPAEDIC SURGERY

## 2025-02-04 PROCEDURE — 160041 HCHG SURGERY MINUTES - EA ADDL 1 MIN LEVEL 4: Performed by: ORTHOPAEDIC SURGERY

## 2025-02-04 PROCEDURE — C1713 ANCHOR/SCREW BN/BN,TIS/BN: HCPCS | Performed by: ORTHOPAEDIC SURGERY

## 2025-02-04 PROCEDURE — 97162 PT EVAL MOD COMPLEX 30 MIN: CPT

## 2025-02-04 PROCEDURE — 160036 HCHG PACU - EA ADDL 30 MINS PHASE I: Performed by: ORTHOPAEDIC SURGERY

## 2025-02-04 PROCEDURE — 64447 NJX AA&/STRD FEMORAL NRV IMG: CPT | Performed by: ORTHOPAEDIC SURGERY

## 2025-02-04 DEVICE — CEMENT BONE SIMPLEX W/GENTAICIN (10/PK): Type: IMPLANTABLE DEVICE | Site: KNEE | Status: FUNCTIONAL

## 2025-02-04 DEVICE — IMPLANTABLE DEVICE: Type: IMPLANTABLE DEVICE | Site: KNEE | Status: FUNCTIONAL

## 2025-02-04 RX ORDER — IPRATROPIUM BROMIDE AND ALBUTEROL SULFATE 2.5; .5 MG/3ML; MG/3ML
3 SOLUTION RESPIRATORY (INHALATION)
Status: CANCELLED | OUTPATIENT
Start: 2025-02-04

## 2025-02-04 RX ORDER — MEPERIDINE HYDROCHLORIDE 25 MG/ML
6.25 INJECTION INTRAMUSCULAR; INTRAVENOUS; SUBCUTANEOUS
Status: DISCONTINUED | OUTPATIENT
Start: 2025-02-04 | End: 2025-02-04 | Stop reason: HOSPADM

## 2025-02-04 RX ORDER — ACETAMINOPHEN 500 MG
1000 TABLET ORAL ONCE
Status: DISCONTINUED | OUTPATIENT
Start: 2025-02-04 | End: 2025-02-04 | Stop reason: HOSPADM

## 2025-02-04 RX ORDER — TRANEXAMIC ACID 100 MG/ML
INJECTION, SOLUTION INTRAVENOUS PRN
Status: DISCONTINUED | OUTPATIENT
Start: 2025-02-04 | End: 2025-02-04 | Stop reason: SURG

## 2025-02-04 RX ORDER — POLYETHYLENE GLYCOL 3350 17 G/17G
1 POWDER, FOR SOLUTION ORAL 2 TIMES DAILY PRN
Status: CANCELLED | OUTPATIENT
Start: 2025-02-04

## 2025-02-04 RX ORDER — ROPIVACAINE HYDROCHLORIDE 5 MG/ML
INJECTION, SOLUTION EPIDURAL; INFILTRATION; PERINEURAL
Status: DISCONTINUED | OUTPATIENT
Start: 2025-02-04 | End: 2025-02-04 | Stop reason: HOSPADM

## 2025-02-04 RX ORDER — HALOPERIDOL 5 MG/ML
1 INJECTION INTRAMUSCULAR EVERY 6 HOURS PRN
Status: CANCELLED | OUTPATIENT
Start: 2025-02-04

## 2025-02-04 RX ORDER — ESCITALOPRAM OXALATE 10 MG/1
10 TABLET ORAL DAILY
Status: CANCELLED | OUTPATIENT
Start: 2025-02-04

## 2025-02-04 RX ORDER — BISACODYL 10 MG
10 SUPPOSITORY, RECTAL RECTAL
Status: CANCELLED | OUTPATIENT
Start: 2025-02-04

## 2025-02-04 RX ORDER — OXYCODONE HCL 5 MG/5 ML
5 SOLUTION, ORAL ORAL
Status: COMPLETED | OUTPATIENT
Start: 2025-02-04 | End: 2025-02-04

## 2025-02-04 RX ORDER — ONDANSETRON 2 MG/ML
4 INJECTION INTRAMUSCULAR; INTRAVENOUS
Status: DISCONTINUED | OUTPATIENT
Start: 2025-02-04 | End: 2025-02-04 | Stop reason: HOSPADM

## 2025-02-04 RX ORDER — LABETALOL HYDROCHLORIDE 5 MG/ML
5 INJECTION, SOLUTION INTRAVENOUS
Status: DISCONTINUED | OUTPATIENT
Start: 2025-02-04 | End: 2025-02-04 | Stop reason: HOSPADM

## 2025-02-04 RX ORDER — HYDROMORPHONE HYDROCHLORIDE 1 MG/ML
0.5 INJECTION, SOLUTION INTRAMUSCULAR; INTRAVENOUS; SUBCUTANEOUS
Status: CANCELLED | OUTPATIENT
Start: 2025-02-04

## 2025-02-04 RX ORDER — ACETAMINOPHEN 325 MG/1
TABLET ORAL PRN
Status: DISCONTINUED | OUTPATIENT
Start: 2025-02-04 | End: 2025-02-04 | Stop reason: SURG

## 2025-02-04 RX ORDER — TRAMADOL HYDROCHLORIDE 50 MG/1
50 TABLET ORAL EVERY 4 HOURS PRN
Status: CANCELLED | OUTPATIENT
Start: 2025-02-04

## 2025-02-04 RX ORDER — OXYCODONE HYDROCHLORIDE 5 MG/1
5 TABLET ORAL
Status: CANCELLED | OUTPATIENT
Start: 2025-02-04

## 2025-02-04 RX ORDER — CEFAZOLIN SODIUM 1 G/3ML
INJECTION, POWDER, FOR SOLUTION INTRAMUSCULAR; INTRAVENOUS PRN
Status: DISCONTINUED | OUTPATIENT
Start: 2025-02-04 | End: 2025-02-04 | Stop reason: HOSPADM

## 2025-02-04 RX ORDER — VANCOMYCIN HYDROCHLORIDE 1 G/20ML
INJECTION, POWDER, LYOPHILIZED, FOR SOLUTION INTRAVENOUS
Status: COMPLETED | OUTPATIENT
Start: 2025-02-04 | End: 2025-02-04

## 2025-02-04 RX ORDER — DIPHENHYDRAMINE HYDROCHLORIDE 50 MG/ML
12.5 INJECTION INTRAMUSCULAR; INTRAVENOUS
Status: DISCONTINUED | OUTPATIENT
Start: 2025-02-04 | End: 2025-02-04 | Stop reason: HOSPADM

## 2025-02-04 RX ORDER — CELECOXIB 200 MG/1
200 CAPSULE ORAL ONCE
Status: DISCONTINUED | OUTPATIENT
Start: 2025-02-04 | End: 2025-02-04 | Stop reason: HOSPADM

## 2025-02-04 RX ORDER — ROCURONIUM BROMIDE 10 MG/ML
INJECTION, SOLUTION INTRAVENOUS PRN
Status: DISCONTINUED | OUTPATIENT
Start: 2025-02-04 | End: 2025-02-04 | Stop reason: SURG

## 2025-02-04 RX ORDER — AMOXICILLIN 250 MG
1 CAPSULE ORAL
Status: CANCELLED | OUTPATIENT
Start: 2025-02-04

## 2025-02-04 RX ORDER — ALBUTEROL SULFATE 5 MG/ML
2.5 SOLUTION RESPIRATORY (INHALATION)
Status: DISCONTINUED | OUTPATIENT
Start: 2025-02-04 | End: 2025-02-04 | Stop reason: HOSPADM

## 2025-02-04 RX ORDER — HYDROMORPHONE HYDROCHLORIDE 1 MG/ML
0.2 INJECTION, SOLUTION INTRAMUSCULAR; INTRAVENOUS; SUBCUTANEOUS
Status: DISCONTINUED | OUTPATIENT
Start: 2025-02-04 | End: 2025-02-04 | Stop reason: HOSPADM

## 2025-02-04 RX ORDER — OMEPRAZOLE 20 MG/1
40 CAPSULE, DELAYED RELEASE ORAL DAILY
Status: CANCELLED | OUTPATIENT
Start: 2025-02-04

## 2025-02-04 RX ORDER — IBUPROFEN 400 MG/1
800 TABLET, FILM COATED ORAL 3 TIMES DAILY PRN
Status: CANCELLED | OUTPATIENT
Start: 2025-02-07

## 2025-02-04 RX ORDER — MIDAZOLAM HYDROCHLORIDE 1 MG/ML
INJECTION INTRAMUSCULAR; INTRAVENOUS PRN
Status: DISCONTINUED | OUTPATIENT
Start: 2025-02-04 | End: 2025-02-04 | Stop reason: SURG

## 2025-02-04 RX ORDER — ACETAMINOPHEN 500 MG
1000 TABLET ORAL EVERY 6 HOURS
Status: CANCELLED | OUTPATIENT
Start: 2025-02-04 | End: 2025-02-09

## 2025-02-04 RX ORDER — ONDANSETRON 2 MG/ML
4 INJECTION INTRAMUSCULAR; INTRAVENOUS EVERY 4 HOURS PRN
Status: CANCELLED | OUTPATIENT
Start: 2025-02-04

## 2025-02-04 RX ORDER — DIPHENHYDRAMINE HCL 25 MG
25 TABLET ORAL EVERY 6 HOURS PRN
Status: CANCELLED | OUTPATIENT
Start: 2025-02-04

## 2025-02-04 RX ORDER — HYDROMORPHONE HYDROCHLORIDE 1 MG/ML
0.1 INJECTION, SOLUTION INTRAMUSCULAR; INTRAVENOUS; SUBCUTANEOUS
Status: DISCONTINUED | OUTPATIENT
Start: 2025-02-04 | End: 2025-02-04 | Stop reason: HOSPADM

## 2025-02-04 RX ORDER — DEXAMETHASONE SODIUM PHOSPHATE 4 MG/ML
4 INJECTION, SOLUTION INTRA-ARTICULAR; INTRALESIONAL; INTRAMUSCULAR; INTRAVENOUS; SOFT TISSUE
Status: CANCELLED | OUTPATIENT
Start: 2025-02-04

## 2025-02-04 RX ORDER — ESCITALOPRAM OXALATE 10 MG/1
20 TABLET ORAL
Status: CANCELLED | OUTPATIENT
Start: 2025-02-04

## 2025-02-04 RX ORDER — DIPHENHYDRAMINE HYDROCHLORIDE 50 MG/ML
25 INJECTION INTRAMUSCULAR; INTRAVENOUS EVERY 6 HOURS PRN
Status: CANCELLED | OUTPATIENT
Start: 2025-02-04

## 2025-02-04 RX ORDER — HYDROMORPHONE HYDROCHLORIDE 1 MG/ML
0.4 INJECTION, SOLUTION INTRAMUSCULAR; INTRAVENOUS; SUBCUTANEOUS
Status: DISCONTINUED | OUTPATIENT
Start: 2025-02-04 | End: 2025-02-04 | Stop reason: HOSPADM

## 2025-02-04 RX ORDER — DIPHENHYDRAMINE HCL 25 MG
25 TABLET ORAL NIGHTLY PRN
Status: CANCELLED | OUTPATIENT
Start: 2025-02-05

## 2025-02-04 RX ORDER — BUPIVACAINE HYDROCHLORIDE 5 MG/ML
INJECTION, SOLUTION EPIDURAL; INTRACAUDAL PRN
Status: DISCONTINUED | OUTPATIENT
Start: 2025-02-04 | End: 2025-02-04 | Stop reason: SURG

## 2025-02-04 RX ORDER — MONTELUKAST SODIUM 10 MG/1
10 TABLET ORAL DAILY
Status: CANCELLED | OUTPATIENT
Start: 2025-02-04

## 2025-02-04 RX ORDER — HALOPERIDOL 5 MG/ML
1 INJECTION INTRAMUSCULAR
Status: DISCONTINUED | OUTPATIENT
Start: 2025-02-04 | End: 2025-02-04 | Stop reason: HOSPADM

## 2025-02-04 RX ORDER — CELECOXIB 100 MG/1
CAPSULE ORAL PRN
Status: DISCONTINUED | OUTPATIENT
Start: 2025-02-04 | End: 2025-02-04 | Stop reason: SURG

## 2025-02-04 RX ORDER — EPHEDRINE SULFATE 50 MG/ML
5 INJECTION, SOLUTION INTRAVENOUS
Status: DISCONTINUED | OUTPATIENT
Start: 2025-02-04 | End: 2025-02-04 | Stop reason: HOSPADM

## 2025-02-04 RX ORDER — IPRATROPIUM BROMIDE AND ALBUTEROL SULFATE 2.5; .5 MG/3ML; MG/3ML
3 SOLUTION RESPIRATORY (INHALATION) EVERY 4 HOURS
Status: CANCELLED | OUTPATIENT
Start: 2025-02-04

## 2025-02-04 RX ORDER — SCOPOLAMINE 1 MG/3D
1 PATCH, EXTENDED RELEASE TRANSDERMAL
Status: CANCELLED | OUTPATIENT
Start: 2025-02-04

## 2025-02-04 RX ORDER — OXYCODONE HCL 5 MG/5 ML
10 SOLUTION, ORAL ORAL
Status: COMPLETED | OUTPATIENT
Start: 2025-02-04 | End: 2025-02-04

## 2025-02-04 RX ORDER — ASPIRIN 81 MG/1
81 TABLET ORAL 2 TIMES DAILY
Status: CANCELLED | OUTPATIENT
Start: 2025-02-04

## 2025-02-04 RX ORDER — ONDANSETRON 2 MG/ML
INJECTION INTRAMUSCULAR; INTRAVENOUS PRN
Status: DISCONTINUED | OUTPATIENT
Start: 2025-02-04 | End: 2025-02-04 | Stop reason: SURG

## 2025-02-04 RX ORDER — MIDAZOLAM HYDROCHLORIDE 1 MG/ML
1 INJECTION INTRAMUSCULAR; INTRAVENOUS
Status: DISCONTINUED | OUTPATIENT
Start: 2025-02-04 | End: 2025-02-04 | Stop reason: HOSPADM

## 2025-02-04 RX ORDER — DOCUSATE SODIUM 100 MG/1
100 CAPSULE, LIQUID FILLED ORAL 2 TIMES DAILY
Status: CANCELLED | OUTPATIENT
Start: 2025-02-04

## 2025-02-04 RX ORDER — PREGABALIN 25 MG/1
150 CAPSULE ORAL 2 TIMES DAILY
Status: CANCELLED | OUTPATIENT
Start: 2025-02-04

## 2025-02-04 RX ORDER — HYDRALAZINE HYDROCHLORIDE 20 MG/ML
5 INJECTION INTRAMUSCULAR; INTRAVENOUS
Status: DISCONTINUED | OUTPATIENT
Start: 2025-02-04 | End: 2025-02-04 | Stop reason: HOSPADM

## 2025-02-04 RX ORDER — SODIUM CHLORIDE, SODIUM LACTATE, POTASSIUM CHLORIDE, CALCIUM CHLORIDE 600; 310; 30; 20 MG/100ML; MG/100ML; MG/100ML; MG/100ML
INJECTION, SOLUTION INTRAVENOUS CONTINUOUS
Status: ACTIVE | OUTPATIENT
Start: 2025-02-04 | End: 2025-02-04

## 2025-02-04 RX ORDER — KETOROLAC TROMETHAMINE 15 MG/ML
INJECTION, SOLUTION INTRAMUSCULAR; INTRAVENOUS PRN
Status: DISCONTINUED | OUTPATIENT
Start: 2025-02-04 | End: 2025-02-04 | Stop reason: SURG

## 2025-02-04 RX ORDER — LIDOCAINE HYDROCHLORIDE 20 MG/ML
INJECTION, SOLUTION EPIDURAL; INFILTRATION; INTRACAUDAL; PERINEURAL PRN
Status: DISCONTINUED | OUTPATIENT
Start: 2025-02-04 | End: 2025-02-04 | Stop reason: SURG

## 2025-02-04 RX ORDER — SODIUM PHOSPHATE,MONO-DIBASIC 19G-7G/118
1 ENEMA (ML) RECTAL
Status: CANCELLED | OUTPATIENT
Start: 2025-02-04

## 2025-02-04 RX ORDER — OMEPRAZOLE 20 MG/1
20 CAPSULE, DELAYED RELEASE ORAL 2 TIMES DAILY PRN
Status: CANCELLED | OUTPATIENT
Start: 2025-02-04 | End: 2025-02-14

## 2025-02-04 RX ORDER — KETOROLAC TROMETHAMINE 15 MG/ML
15 INJECTION, SOLUTION INTRAMUSCULAR; INTRAVENOUS EVERY 6 HOURS
Status: CANCELLED | OUTPATIENT
Start: 2025-02-04 | End: 2025-02-07

## 2025-02-04 RX ORDER — OXYCODONE HYDROCHLORIDE 10 MG/1
10 TABLET ORAL
Status: CANCELLED | OUTPATIENT
Start: 2025-02-04

## 2025-02-04 RX ORDER — AMOXICILLIN 250 MG
1 CAPSULE ORAL NIGHTLY
Status: CANCELLED | OUTPATIENT
Start: 2025-02-04

## 2025-02-04 RX ORDER — CEFAZOLIN SODIUM 1 G/3ML
2 INJECTION, POWDER, FOR SOLUTION INTRAMUSCULAR; INTRAVENOUS ONCE
Status: DISCONTINUED | OUTPATIENT
Start: 2025-02-04 | End: 2025-02-04 | Stop reason: HOSPADM

## 2025-02-04 RX ORDER — FAMOTIDINE 20 MG/1
20 TABLET, FILM COATED ORAL DAILY
Status: CANCELLED | OUTPATIENT
Start: 2025-02-04

## 2025-02-04 RX ORDER — EPINEPHRINE 1 MG/ML(1)
AMPUL (ML) INJECTION
Status: DISCONTINUED | OUTPATIENT
Start: 2025-02-04 | End: 2025-02-04 | Stop reason: HOSPADM

## 2025-02-04 RX ORDER — ACETAMINOPHEN 500 MG
1000 TABLET ORAL EVERY 6 HOURS PRN
Status: CANCELLED | OUTPATIENT
Start: 2025-02-09

## 2025-02-04 RX ORDER — KETOROLAC TROMETHAMINE 30 MG/ML
INJECTION, SOLUTION INTRAMUSCULAR; INTRAVENOUS
Status: DISCONTINUED | OUTPATIENT
Start: 2025-02-04 | End: 2025-02-04 | Stop reason: HOSPADM

## 2025-02-04 RX ORDER — DEXAMETHASONE SODIUM PHOSPHATE 4 MG/ML
INJECTION, SOLUTION INTRA-ARTICULAR; INTRALESIONAL; INTRAMUSCULAR; INTRAVENOUS; SOFT TISSUE PRN
Status: DISCONTINUED | OUTPATIENT
Start: 2025-02-04 | End: 2025-02-04 | Stop reason: SURG

## 2025-02-04 RX ADMIN — CEFAZOLIN 2 G: 1 INJECTION, POWDER, FOR SOLUTION INTRAMUSCULAR; INTRAVENOUS at 07:36

## 2025-02-04 RX ADMIN — FENTANYL CITRATE 100 MCG: 50 INJECTION, SOLUTION INTRAMUSCULAR; INTRAVENOUS at 07:39

## 2025-02-04 RX ADMIN — SODIUM CHLORIDE, POTASSIUM CHLORIDE, SODIUM LACTATE AND CALCIUM CHLORIDE: 600; 310; 30; 20 INJECTION, SOLUTION INTRAVENOUS at 07:36

## 2025-02-04 RX ADMIN — KETOROLAC TROMETHAMINE 15 MG: 15 INJECTION, SOLUTION INTRAMUSCULAR; INTRAVENOUS at 08:25

## 2025-02-04 RX ADMIN — MIDAZOLAM HYDROCHLORIDE 2 MG: 1 INJECTION, SOLUTION INTRAMUSCULAR; INTRAVENOUS at 07:27

## 2025-02-04 RX ADMIN — ACETAMINOPHEN 1000 MG: 325 TABLET ORAL at 07:26

## 2025-02-04 RX ADMIN — BUPIVACAINE HYDROCHLORIDE 20 ML: 5 INJECTION, SOLUTION EPIDURAL; INTRACAUDAL at 07:28

## 2025-02-04 RX ADMIN — FENTANYL CITRATE 25 MCG: 50 INJECTION, SOLUTION INTRAMUSCULAR; INTRAVENOUS at 09:31

## 2025-02-04 RX ADMIN — LIDOCAINE HYDROCHLORIDE 100 MG: 20 INJECTION, SOLUTION EPIDURAL; INFILTRATION; INTRACAUDAL; PERINEURAL at 07:28

## 2025-02-04 RX ADMIN — TRANEXAMIC ACID 1000 MG: 100 INJECTION, SOLUTION INTRAVENOUS at 07:45

## 2025-02-04 RX ADMIN — FENTANYL CITRATE 50 MCG: 50 INJECTION, SOLUTION INTRAMUSCULAR; INTRAVENOUS at 08:36

## 2025-02-04 RX ADMIN — PROPOFOL 200 MG: 10 INJECTION, EMULSION INTRAVENOUS at 07:39

## 2025-02-04 RX ADMIN — CELECOXIB 200 MG: 100 CAPSULE ORAL at 07:26

## 2025-02-04 RX ADMIN — ONDANSETRON 4 MG: 2 INJECTION INTRAMUSCULAR; INTRAVENOUS at 08:25

## 2025-02-04 RX ADMIN — SUGAMMADEX 200 MG: 100 INJECTION, SOLUTION INTRAVENOUS at 08:40

## 2025-02-04 RX ADMIN — FENTANYL CITRATE 25 MCG: 50 INJECTION, SOLUTION INTRAMUSCULAR; INTRAVENOUS at 09:00

## 2025-02-04 RX ADMIN — KETAMINE HYDROCHLORIDE 50 MG: 50 INJECTION INTRAMUSCULAR; INTRAVENOUS at 07:43

## 2025-02-04 RX ADMIN — TRANEXAMIC ACID 1000 MG: 100 INJECTION, SOLUTION INTRAVENOUS at 08:25

## 2025-02-04 RX ADMIN — ROCURONIUM BROMIDE 50 MG: 50 INJECTION, SOLUTION INTRAVENOUS at 07:39

## 2025-02-04 RX ADMIN — DEXAMETHASONE SODIUM PHOSPHATE 10 MG: 4 INJECTION INTRA-ARTICULAR; INTRALESIONAL; INTRAMUSCULAR; INTRAVENOUS; SOFT TISSUE at 07:45

## 2025-02-04 RX ADMIN — FENTANYL CITRATE 25 MCG: 50 INJECTION, SOLUTION INTRAMUSCULAR; INTRAVENOUS at 09:15

## 2025-02-04 RX ADMIN — OXYCODONE HYDROCHLORIDE 5 MG: 5 SOLUTION ORAL at 09:00

## 2025-02-04 ASSESSMENT — COGNITIVE AND FUNCTIONAL STATUS - GENERAL
MOBILITY SCORE: 21
CLIMB 3 TO 5 STEPS WITH RAILING: A LITTLE
SUGGESTED CMS G CODE MODIFIER MOBILITY: CJ
WALKING IN HOSPITAL ROOM: A LITTLE
STANDING UP FROM CHAIR USING ARMS: A LITTLE

## 2025-02-04 ASSESSMENT — FIBROSIS 4 INDEX: FIB4 SCORE: 1.53

## 2025-02-04 ASSESSMENT — PAIN DESCRIPTION - PAIN TYPE
TYPE: SURGICAL PAIN

## 2025-02-04 ASSESSMENT — GAIT ASSESSMENTS
DISTANCE (FEET): 150
GAIT LEVEL OF ASSIST: SUPERVISED
DEVIATION: ANTALGIC
ASSISTIVE DEVICE: FRONT WHEEL WALKER

## 2025-02-04 ASSESSMENT — PAIN SCALES - GENERAL: PAIN_LEVEL: 6

## 2025-02-04 NOTE — OR NURSING
1050: Pt arrive to stage 2 via gurney. Dressed and up to chair. Denies  nausea. States pain is tolerable, 4/10. Stable on RA. Family present.    1200: pt CLEARED BY pt TO dc HOME.     1215: DC education provided to pt and pt family, both verbalized understanding. D/Chele to care of family post uneventful stay in PACU 2. Assisted out to car in .

## 2025-02-04 NOTE — ANESTHESIA PREPROCEDURE EVALUATION
Case: [id number] Date/Time: 02/04/25 0730    Procedure: RIGHT TOTAL KNEE ARTHROPLASTY    Diagnosis: Primary osteoarthritis of right knee [M17.11]    Pre-op diagnosis: Primary osteoarthritis of right knee [M17.11]    Location: [place] [address] / SURGERY [facility]    Surgeons: Harsh Hensley M.D.            Relevant Problems   PULMONARY   (positive) 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[facility])   (positive) Severe persistent asthma without complication      CARDIAC   (positive) Hemorrhoids without complication   (positive) Second degree hemorrhoids      GI   (positive) GERD (gastroesophageal reflux disease)      Other   (positive) Psoriatic arthritis ([facility])       Physical Exam    Airway   Mallampati: I  TM distance: >3 FB  Neck ROM: full       Cardiovascular - normal exam     Dental - normal exam           Pulmonary   Breath sounds clear to auscultation     Abdominal   (+) obese     Neurological - normal exam                 Anesthesia Plan    ASA 2       Plan - general and peripheral nerve block     Peripheral nerve block will be post-op pain control  Airway plan will be ETT          Induction: intravenous    Postoperative Plan: Postoperative administration of opioids is intended.    Pertinent diagnostic labs and testing reviewed    Informed Consent:    Anesthetic plan and risks discussed with patient.

## 2025-02-04 NOTE — LETTER
December 30, 2024    Patient Name: Arely Haynes  Surgeon Name: Harsh Hensley M.D.  Surgery Facility: [facility] ([address] [facility])  Surgery Date: 2/4/2025    The time of your surgery is not final and may change up to and until the day of your surgery. You will be contacted 24-48 hours prior to your surgery date with your check-in and surgery time.    If you will not be at one of the below numbers please call the surgery scheduler at [phone]  Preferred Phone: [phone]    BEFORE YOUR SURGERY   Pre Registration and/or Lab Work must be done within and no earlier than 28 days prior to your surgery date. Your scheduled facility will contact you regarding all required preregistration and/or lab work. If you have not been contacted within 7 days of your scheduled procedure please call [facility] at [phone] for an appointment as soon as possible.    DAY OF YOUR SURGERY  Nothing to eat or drink after midnight     Refrain from smoking any substance after midnight prior to surgery. Smoking may interfere with the anesthetic and frequently produces nausea during the recovery period.    Continue taking all lifesaving medications. Including the morning of your surgery with small sip of water.    Please do NOT take on the day of surgery:  Diuretics: examples- furosemide (Lasix), spironolactone, hydrochlorothiazide  ACE-inhibitors: examples- lisinopril, ramipril, enalapril  “ARBs”: examples- losartan, Olmesartan, valsartan    Please arrive at the hospital/surgery center at the check-in time provided.     An adult will need to bring you and take you home after your surgery.     AFTER YOUR SURGERY  Post op Appointment:   Date: 02/19/2025   Time: 09:30AM   With: Harsh Hensley MD   Location: [address] [place] [id number]    - Therapy- Your first appointment should be 4-5  day(s) after your surgery. For your convenience we have 4 Physical  Therapy locations: [facility], [place], and [facility]. Call our office ASAP to schedule an appointment at [phone] or take the enclosed Therapy Prescription to a facility of your choice.  - No dental work for 3-6 months after your surgery.  - Post Surgery - You will need someone to drive you home  - Post Surgery - You will need someone to stay with you for 24 hours  - You must have someone provide transportation post surgery and someone to monitor you for at least 24 hours post-surgery. If you don't have either of these your appointment will be canceled.     TIME OFF WORK  FMLA or Disability forms can be faxed directly to: [phone] or you may drop them off at [address] [place] [id number]. Our office charges a $35.00 fee per form. Forms will be completed within 10 business days of drop off and payment received. For the status of your forms you may contact our disability office directly [phone].    MEDICATION INSTRUCTIONS **Please read section completely**  The following medications should be stopped a minimum of 10 days prior to surgery:  All over the counter, Supplements & Herbal medications    Anorectics: Phentermine (Adipex-P, Lomaira and Suprenza), Phentermine-topiramate (Qsymia), Bupropion-naltrexone (Contrave)    **If you are on Bupropion for anxiety/depression, please continue this medication up until the day of surgery.     Opiod Partial Agonists/Opioid Antagonists: Buprenorphine (Suboxone, Belbuca, Butrans, Probuphine Implant, Sublocade), Naltrexone (ReVia, Vivitrol), Naloxone    Amphetamines: Dextroamphetamine/Amphetamine (Adderall, Mydayis), Methylphenidate Hydrochloride (Concerta, Metadate, Methylin, Ritalin)    The following medications should be stopped 5 days prior to surgery:  Blood Thinners: Any Aspirin, Aspirin products, anti-inflammatories such as ibuprofen and any blood thinners such as Coumadin and Plavix. Please consult your prescribing physician if  you are on life saving blood thinners, in regards to when to stop medications prior to surgery.     The following medications should be stopped a minimum of 3 days prior to surgery:  PDE-5 inhibitors: Sildenafil (Viagra), Tadalafil (Cialis), Vardenafil (Levitra), Avanafil (Stendra)    MAO Inhibitors: Rasagiline (Azilect), Selegiline (Eldepryl, Emsam, Selapar), Isocarboxazid (Marplan), Phenelzine (Nardil)

## 2025-02-04 NOTE — OR NURSING
0850 Pt arrived s/p right knee arthroplasty. Pt drowsy, respirations spontaneous. Surgical dressings to right knee. Toes to the affected extremity are pink and warm, brisk cap refill observed, pedal pulse palpable. Pt reports 5/10 pain to her right knee. Pt denies nausea.    0905 Pt reports 6/10 pain to her right knee. Pt denies nausea. IV and oral pain medications administered.     0915 Another dose of IV pain medication administered. Pt's son updated.    0918 Report to ERIC Urrutia.    0935 Resumed care of the pt. Pt resting quietly with her eyes closed.    0950 No changes.     1005 Pt reports 5/10 tolerable pain to her right knee. Pt denies nausea.     1015 Pt instructed in use of inspirometer.     1020 Pt reports 4/10 tolerable pain. Pt denies nausea.    1035 No changes.     1040 Report to stage 2 ERIC Leonard.    1050 Pt ambulated to stage 2 with the assistance of a walker.

## 2025-02-04 NOTE — ANESTHESIA POSTPROCEDURE EVALUATION
Patient: Arely Haynes    Procedure Summary       Date: 02/04/25 Room / Location:  OR  / SURGERY [facility]    Anesthesia Start: 0736 Anesthesia Stop: 0854    Procedure: RIGHT TOTAL KNEE ARTHROPLASTY (Right: Knee) Diagnosis:       Primary osteoarthritis of right knee      (Primary osteoarthritis of right knee)    Surgeons: Harsh Hensley M.D. Responsible Provider: Radha Leach M.D.    Anesthesia Type: general, peripheral nerve block ASA Status: 2            Final Anesthesia Type: general, peripheral nerve block  Last vitals  BP   Blood Pressure : (!) 142/68    Temp   36.4 °C (97.5 °F)    Pulse   63   Resp   16    SpO2   95 %      Anesthesia Post Evaluation    Patient location during evaluation: PACU  Patient participation: complete - patient participated  Level of consciousness: awake and alert  Pain score: 6    Airway patency: patent  Anesthetic complications: no  Cardiovascular status: hemodynamically stable  Respiratory status: acceptable  Hydration status: euvolemic    PONV: none          No notable events documented.     Nurse Pain Score: 6 (NPRS)

## 2025-02-04 NOTE — THERAPY
Physical Therapy   Initial Evaluation     Patient Name: Arely Haynes  Age:  64 y.o., Sex:  female  Medical Record #: [id number]  Today's Date: 2/4/2025     Precautions  Precautions: (P) Weight Bearing As Tolerated Right Lower Extremity    Assessment  Patient is 64 y.o. female who was seen s/p right TKA with WBAT orders for right lower extremity. Pt was agreeable to therapy evaluation and presented with safe upright functional mobility with AD use after therapy session. Pt was provided with education on elevation, icing, positioning, and supine/seated therapeutic exercises. Pt was able to return demo safe gait mechanics with use of AD on flat level surfaces with appropriate heel to toe gait mechanics. Pt was able to return demo safe mechanics in order to navigate stairs with use of FWW. Pt was able to demonstrate safe use of AD during transfers/transitions and general locomotion with no maddie LOB. Pt has no acute skilled PT needs at this time, anticipate pt to d/c home once medically clear with recs for FWW use and OP therapy services.     The pt was provided with the following skilled therapy txt: Pt was provided with VC, demo, and facilitation during gait training in order to return demo terminal knee extension and appropriate heel strike/flexion of knee in order to return demo appropriate heel to toe gait mechanics. Pt was provided with demo and VC to go up/down steps with safe mechanics with use of AD. Pt was provided with VC and TC for appropriate quad contraction and mechanics during supine/seated therapeutic exercises. Pt was provided with education on intensity, frequency, and duration of exercises.    Plan    Physical Therapy Initial Treatment Plan   Duration: (P) Evaluation only    DC Equipment Recommendations: (P) Front-Wheel Walker  Discharge Recommendations: (P) Recommend outpatient physical therapy services to address higher level deficits     Objective       02/04/25 1201   Initial Contact Note     Initial Contact Note Order Received and Verified, Evaluation Only - Patient Does Not Require Further Acute Physical Therapy at this Time.  However, May Benefit from Post Acute Therapy for Higher Level Functional Deficits.   Precautions   Precautions Weight Bearing As Tolerated Right Lower Extremity   Pain 0 - 10 Group   Location Knee   Location Orientation Right   Description Aching   Therapist Pain Assessment Prior to Activity;During Activity;Post Activity;Nurse Notified;4   Prior Living Situation   Prior Services None   Housing / Facility 1 Story House   Steps Into Home 1   Steps In Home 0   Equipment Owned Front-Wheel Walker   Lives with - Patient's Self Care Capacity Spouse   Comments spouse is supportive and can assist upon d/c to home   Prior Level of Functional Mobility   Bed Mobility Independent   Transfer Status Independent   Ambulation Independent   Ambulation Distance   (community)   Assistive Devices Used None   Stairs Independent   Comments reports of an IPLOF   History of Falls   History of Falls No   Cognition    Cognition / Consciousness WDL   Passive ROM Upper Body   Passive ROM Upper Body WDL   Active ROM Upper Body   Active ROM Upper Body  WDL   Strength Upper Body   Upper Body Strength  WDL   Sensation Upper Body   Upper Extremity Sensation  WDL   Upper Body Muscle Tone   Upper Body Muscle Tone  WDL   Passive ROM Lower Body   Passive ROM Lower Body X   Comments limited due to pain, able to demo 0-90 deg of R knee ROM   Active ROM Lower Body    Active ROM Lower Body  X   Comments same as above   Strength Lower Body   Lower Body Strength  X   Comments limited due to pain, able to demo functional strength for B LE   Sensation Lower Body   Lower Extremity Sensation   WDL   Lower Body Muscle Tone   Lower Body Muscle Tone  WDL   Neurological Concerns   Neurological Concerns No   Coordination Upper Body   Coordination WDL   Coordination Lower Body    Coordination Lower Body  WDL   Balance Assessment    Sitting Balance (Static) Good   Sitting Balance (Dynamic) Fair +   Standing Balance (Static) Good   Standing Balance (Dynamic) Fair +   Weight Shift Sitting Good   Weight Shift Standing Fair   Comments w/fww use, no maddie LOB noted   Bed Mobility    Comments found up in chair   Gait Analysis   Gait Level Of Assist Supervised   Assistive Device Front Wheel Walker   Distance (Feet) 150   # of Times Distance was Traveled 1   Deviation Antalgic   # of Stairs Climbed 2   Level of Assist with Stairs Supervised   Weight Bearing Status WBAT R LE   Functional Mobility   Sit to Stand Standby Assist   Bed, Chair, Wheelchair Transfer Supervised   Transfer Method Stand Step   Mobility sit<>stand, ambulation, stairs, transfer back to chair   Comments had slight LOB upon standing, however, able to self correct, encouraged to use hand on armrest of chair prior to standing rather than hanging onto FWW   6 Clicks Assessment - How much HELP from from another person do you currently need... (If the patient hasn't done an activity recently, how much help from another person do you think he/she would need if he/she tried?)   Turning from your back to your side while in a flat bed without using bedrails? 4   Moving from lying on your back to sitting on the side of a flat bed without using bedrails? 4   Moving to and from a bed to a chair (including a wheelchair)? 4   Standing up from a chair using your arms (e.g., wheelchair, or bedside chair)? 3   Walking in hospital room? 3   Climbing 3-5 steps with a railing? 3   6 clicks Mobility Score 21   Activity Tolerance   Sitting in Chair functional   Sitting Edge of Bed NT   Standing 10 mins   Comments no adverse events noted   Edema / Skin Assessment   Edema / Skin  Not Assessed   Education Group   Education Provided Role of Physical Therapist;Exercises - Supine;Exercises - Seated;Gait Training;Stair Training;Use of Assistive Device   Role of Physical Therapist Patient Response  Patient;Acceptance;Explanation;Demonstration;Verbal Demonstration;Action Demonstration   Gait Training Patient Response Patient;Acceptance;Explanation;Demonstration;Verbal Demonstration;Action Demonstration   Stair Training Patient Response Patient;Acceptance;Explanation;Demonstration;Verbal Demonstration;Action Demonstration   Use of Assistive Device Patient Response Patient;Acceptance;Explanation;Demonstration;Verbal Demonstration;Action Demonstration   Exercises - Supine Patient Response Patient;Acceptance;Explanation;Demonstration;Handout;Action Demonstration;Verbal Demonstration   Exercises - Seated Patient Response Patient;Acceptance;Explanation;Demonstration;Handout;Verbal Demonstration;Action Demonstration   Physical Therapy Initial Treatment Plan    Duration Evaluation only   Anticipated Discharge Equipment and Recommendations   DC Equipment Recommendations Front-Wheel Walker   Discharge Recommendations Recommend outpatient physical therapy services to address higher level deficits   Interdisciplinary Plan of Care Collaboration   IDT Collaboration with  Nursing   Patient Position at End of Therapy Seated;Call Light within Reach;Phone within Reach;Tray Table within Reach;Family / Friend in Room   Collaboration Comments aware of visit and recs   Session Information   Date / Session Number  2/4 eval only

## 2025-02-04 NOTE — ANESTHESIA PROCEDURE NOTES
Airway    Date/Time: 2/4/2025 7:40 AM    Performed by: Radha Leach M.D.  Authorized by: Radha Leach M.D.    Location:  OR  Urgency:  Elective  Indications for Airway Management:  Anesthesia      Spontaneous Ventilation: absent    Sedation Level:  Deep  Preoxygenated: Yes    Patient Position:  Sniffing and reverse Trendelenburg  Mask Difficulty Assessment:  1 - vent by mask  Final Airway Type:  Endotracheal airway  Final Endotracheal Airway:  ETT  Cuffed: Yes    Technique Used for Successful ETT Placement:  Direct laryngoscopy  Devices/Methods Used in Placement:  Intubating stylet    Insertion Site:  Oral  Blade Type:  Robb  Laryngoscope Blade/Videolaryngoscope Blade Size:  3  ETT Size (mm):  7.0  Measured from:  Teeth  ETT to Teeth (cm):  22  Placement Verified by: auscultation and capnometry    Cormack-Lehane Classification:  Grade IIa - partial view of glottis  Number of Attempts at Approach:  1

## 2025-02-04 NOTE — H&P
Surgery Orthopedic History & Physical Note    Date  2/4/2025    Primary Care Physician  Joe Espinal M.D.    CC  Pre-Op Diagnosis Codes:      * Primary osteoarthritis of right knee [M17.11]    HPI  This is a 64 y.o. female who presents for a TKA.    Past Medical History:   Diagnosis Date    Anesthesia     no self problems; 1st cousin MH    Arthritis     hips knees hands spine    Asthma     prn inhalers    Bronchitis     PCP 2021. Periodic bronchitis    Cataract 2024    Surgery    Concussion     Depression     Daughter passed few years ago    Erosive esophagitis     GERD (gastroesophageal reflux disease)     Heart burn     Heart murmur Early 20’s    Hiatus hernia syndrome     High cholesterol     Immunocompromised (HCC)     Indigestion GERD 20 years    Infection     10/24 toe antibiotics    Pain     hips knees    Pneumonia 2018    Psoriasis     Psoriatic arthritis (HCC)     Sleep apnea 2022    Snoring     UTI (urinary tract infection)     10/24 antiobiotics       Past Surgical History:   Procedure Laterality Date    CATARACT EXTRACTION WITH IOL Bilateral 2024    NM INJECTION,SACROILIAC JOINT Left 07/28/2023    Procedure: LEFT sacroiliac joint injection;  Surgeon: Elvis Aleman M.D.;  [facility];  Service: Pain Management    NM INJ LUMBAR/SACRAL,W/ IMAGING Right 06/02/2023    Procedure: RIGHT L5-S1 interlaminar epidural steroid injection. Patient has tolerated gadolinium;  Surgeon: Elvis Aleman M.D.;  [facility];  Service: Pain Management    NM BRONCHOSCOPY,DIAGNOSTIC  07/09/2021    Procedure: BRONCHOSCOPY;  Surgeon: Myles Vidal M.D.;  Location: [facility];  Service: Ent    TENDON REPAIR Left 09/19/2019    Procedure: REPAIR, TENDON- QUADRICEPS RUTURE REPAIR AND MEYERS;  Surgeon: Jayden Velázquez M.D.;  Location: [facility];  Service: Orthopedics    HYSTERECTOMY ROBOTIC XI N/A 07/11/2019    Procedure: HYSTERECTOMY,  ROBOT-ASSISTED, USING DA SABINO XI;  Surgeon: Curly Bernal M.D.;  Location: SURGERY [facility];  Service: Gynecology    SALPINGO OOPHORECTOMY Bilateral 07/11/2019    Procedure: SALPINGO-OOPHORECTOMY;  Surgeon: Curly Bernal M.D.;  Location: SURGERY [facility];  Service: Gynecology    FUSION, SPINE, LUMBAR, PLIF  2004    L4-5    GYN SURGERY  1999    c sec    HIP REPLACEMENT, TOTAL      Right    ORIF, KNEE      Left patellar tendon repair    OTHER      Various ortho. Minor    OTHER ABDOMINAL SURGERY      appendix    OTHER ORTHOPEDIC SURGERY      left knee    OTHER ORTHOPEDIC SURGERY Left     L rotator cuff    SHOULDER SURGERY  1980    Rotator cuff repair l       No current facility-administered medications for this encounter.       Social History     Socioeconomic History    Marital status:      Spouse name: Not on file    Number of children: Not on file    Years of education: Not on file    Highest education level: Bachelor's degree (e.g., BA, AB, BS)   Occupational History    Occupation: Flight Nurse/Educator   Tobacco Use    Smoking status: Never     Passive exposure: Never    Smokeless tobacco: Never   Vaping Use    Vaping status: Never Used   Substance and Sexual Activity    Alcohol use: Yes     Alcohol/week: 3.6 oz     Types: 6 Glasses of wine per week     Comment: 1-2 glasses 4 times per week    Drug use: Never    Sexual activity: Yes     Comment: Tubal ligation   Other Topics Concern     Service No    Blood Transfusions No    Caffeine Concern No    Occupational Exposure Yes    Hobby Hazards Yes    Sleep Concern Yes    Stress Concern No    Weight Concern Yes    Special Diet No    Back Care No    Exercise Yes    Bike Helmet Yes    Seat Belt Yes    Self-Exams Yes   Social History Narrative    Not on file     Social Drivers of Health     Financial Resource Strain: Low Risk  (1/1/2025)    Overall Financial Resource Strain (CARDIA)     Difficulty of Paying Living Expenses: Not hard at all    Food Insecurity: No Food Insecurity (1/1/2025)    Hunger Vital Sign     Worried About Running Out of Food in the Last Year: Never true     Ran Out of Food in the Last Year: Never true   Transportation Needs: No Transportation Needs (1/1/2025)    PRAPARE - Transportation     Lack of Transportation (Medical): No     Lack of Transportation (Non-Medical): No   Physical Activity: Unknown (1/1/2025)    Exercise Vital Sign     Days of Exercise per Week: 0 days     Minutes of Exercise per Session: Not on file   Stress: No Stress Concern Present (1/1/2025)    [facility] of Occupational Health - Occupational Stress Questionnaire     Feeling of Stress : Only a little   Social Connections: Socially Integrated (1/1/2025)    Social Connection and Isolation Panel [NHANES]     Frequency of Communication with Friends and Family: More than three times a week     Frequency of Social Gatherings with Friends and Family: Twice a week     Attends Zoroastrianism Services: More than 4 times per year     Active Member of Clubs or Organizations: Yes     Attends Club or Organization Meetings: More than 4 times per year     Marital Status:    Intimate Partner Violence: Not on file   Housing Stability: Unknown (1/1/2025)    Housing Stability Vital Sign     Unable to Pay for Housing in the Last Year: No     Number of Times Moved in the Last Year: Not on file     Homeless in the Last Year: No       Family History   Problem Relation Age of Onset    Hyperlipidemia Mother     Heart Attack Mother     Psychiatric Illness Mother     Heart Disease Mother     Arthritis Mother     Lung Disease Father         Pancoast tumor    Cancer Father         Pancoast    Hyperlipidemia Father     Stroke Father     Hyperlipidemia Sister     Cancer Sister         Breast cancer    Heart Disease Paternal Grandfather     Hypertension Neg Hx     Diabetes Neg Hx        Allergies  Bee venom, Iodine, Levofloxacin, Nucala [mepolizumab], and Shellfish-derived  products    Review of Systems  Negative    Physical Exam  Regular rate and rhythm  Nonlabored breathing  Abdomen soft and nontender   Neurovascular intact distally  Skin is in good condition    Vital Signs                          Labs:                    Radiology:  No orders to display         Assessment/Plan:  Pre-Op Diagnosis Codes:      * Primary osteoarthritis of right knee [M17.11]  Procedure(s):  RIGHT TOTAL KNEE ARTHROPLASTY

## 2025-02-04 NOTE — OP REPORT
Preop Diagnosis: Advanced right knee arthritis  Postop Diagnosis:  Same  Procedure: Right total knee arthroplasty  Surgeon: Dr. Harsh Hensley MD  Assistant: Bertrand Bertrand PA-C  Anesthesia: Dr. Leach. General + Adductor canal block  Estimated Blood Loss: 50cc  Drains: None  Complications: None    Implants: Custom [id number]/Conformis with cemented femur, tibia, and oval 41 patella with a 6 PS insert     Indications: Arely Haynes is a 64 y.o. female who has had severe progressive knee pain that failed conservative management.  There were severe degenerative changes on radiographs.  We discussed the options and she was ultimately indicated for knee replacement.  We discussed the risk of bleeding, transfusion, pain, neurovascular injury, stiffness, fracture, infection, wound complication, loosening of the parts over time, blood clots, and medical complication.  No guarantees were made and she elected to proceed.    Pertinent Findings and Releases: There were severe degenerative changes especially of the medial and PF compartments.  At the end of the case, the knee easily came to full extension and flexed up to calf/thigh impingement with the arthrotomy closed.  The patella tracked centrally.    Informed consent and site marking were confirmed in preop.  An adductor canal block had been performed by the anesthesiologist, and then she was brought back to the OR where anesthesia was performed. Supine positioning was perfmored with all bony prominences well padded with a padded tourniquet on the thigh.  The extremity was prepped and draped in the usual sterile fashion. I performed a pre-procedure timeout to confirm we had the correct patient, side, site, procedure, and presence of necessary personal and equipment.  I confirmed that Ancef and tranexamic acid were given.  The tourniquet was then inflated.    A midline incision was made medial to the tibial tubercle centered over patella taken down to the deep capsule of  the knee. A short medial parapatellar arthrotomy was performed.  The fat pad was released. The patella was subluxated and the knee was flexed. The remnants of the anterior horn of the medial and lateral meniscus were removed as well as the ACL and PCL from the intercondylar notch. Bone cuts were made with the printed blocks, making sure we had proper fit and position.  The menisci and posterior osteophytes were removed and the back of the knee was injected. Trials were placed, the knee was reduced with a trial insert, it was taken through a range of motion, and found to be stable in the varus/valgus plane 0-30 degrees of flexion and the AP plane at 90 degrees of flexion. Attention was then turned to the patella. A symmetric resection was performed to recreate native patella height and appropriately sized. All extraneous osteophyte and synovium were removed.  With a trial in place, the patella tracked  centrally using the no thumbs technique. All trial components were removed. The real components were opened on the back table. The bone ends were copiously lavaged and dried. Two packs of cement were mixed and the real components were cemented into place.  The knee was reduced with a trial insert in place and the cement was allowed to cure.    Once the cement cured, the tourniquet was released and meticulous hemostasis was obtained. All extraneous cement was removed from around the knee taking particular care to remove extraneous cement from the posterior aspect of the knee. The real insert was placed. Stability and patellar tracking were excellent. The knee was then copiously irrigated. One gram of Vancomycin was left in the wound.  The arthrotomy was closed with number 2 running barbed suture, and the wound was closed in layers.  An occlusive silver dressing was applied and the patient was turned over to anesthesia in stable condition without any apparent intraoperative complications.    Plan:  WBAT with a walker  (which will need to be provided if they do not already have one due to weakness, imbalance, and fall risk), PT/OT evaluation, Aspirin 81mg BID for 4 weeks for DVT prophylaxis, Leave dressing in place until followup.

## 2025-02-04 NOTE — ANESTHESIA PROCEDURE NOTES
Peripheral Block    Date/Time: 2/4/2025 7:28 AM    Performed by: Radha Leach M.D.  Authorized by: Radha Leach M.D.    Patient Location:  Pre-op  Start Time:  2/4/2025 7:28 AM  Reason for Block: at surgeon's request and post-op pain management ONLY    patient identified, IV checked, site marked, risks and benefits discussed, surgical consent, monitors and equipment checked, pre-op evaluation and timeout performed    Patient Position:  Supine  Prep: ChloraPrep    Monitoring:  Heart rate, continuous pulse ox and cardiac monitor  Block Region:  Lower Extremity  Lower Extremity - Block Type:  Selective FEMORAL nerve block at the Adductor Canal    Laterality:  Right  Procedures: ultrasound guided  Image captured, interpreted and electronically stored.  Local Infiltration:  Lidocaine  Strength:  1 %  Dose:  3 ml  Block Type:  Single-shot  Needle Length:  100mm  Needle Gauge:  21 G  Needle Localization:  Ultrasound guidance  Ultrasound picture in chart  Injection Assessment:  Negative aspiration for heme, no paresthesia on injection, incremental injection and local visualized surrounding nerve on ultrasound  Evidence of intravascular injection: No

## 2025-02-04 NOTE — OR NURSING
0920 Received report from Thu REYES, assumed care of pt. Pt arousable on calling, with non-labored and spontaneous respirations via 8L mask. Pt appears comfortable resting with eyes closed.   0930 pt c/o pain persists 6/10 plan to medicate see MAR.     0935 Report to Thu REYES

## 2025-02-04 NOTE — ANESTHESIA TIME REPORT
Anesthesia Start and Stop Event Times       Date Time Event    2/4/2025 0732 Ready for Procedure     0736 Anesthesia Start     0854 Anesthesia Stop          Responsible Staff  02/04/25      Name Role Begin End    Radha Leach M.D. Anesth 0736 0854          Overtime Reason:  no overtime (within assigned shift)    Comments:

## 2025-02-27 ENCOUNTER — PATIENT MESSAGE (OUTPATIENT)
Dept: MEDICAL GROUP | Facility: LAB | Age: 65
End: 2025-02-27
Payer: MEDICARE

## 2025-02-27 DIAGNOSIS — L40.50 PSORIATIC ARTHRITIS (HCC): ICD-10-CM

## 2025-02-27 DIAGNOSIS — G89.29 CHRONIC SI JOINT PAIN: ICD-10-CM

## 2025-02-27 DIAGNOSIS — M53.3 CHRONIC SI JOINT PAIN: ICD-10-CM

## 2025-03-03 DIAGNOSIS — G60.9 IDIOPATHIC NEUROPATHY: ICD-10-CM

## 2025-03-04 RX ORDER — PREGABALIN 200 MG/1
200 CAPSULE ORAL EVERY EVENING
Qty: 90 CAPSULE | Refills: 0 | Status: SHIPPED | OUTPATIENT
Start: 2025-03-04 | End: 2025-06-02

## 2025-03-04 RX ORDER — PREGABALIN 150 MG/1
CAPSULE ORAL
Qty: 90 CAPSULE | Refills: 0 | Status: SHIPPED | OUTPATIENT
Start: 2025-03-04 | End: 2025-06-02

## 2025-03-04 NOTE — PROGRESS NOTES
Verbal consent was acquired by the patient to use [employer] ambient listening note generation during this visit Yes     NEW PATIENT VISIT     Interventional Spine and Pain  Physiatry (Physical Medicine and Rehabilitation)     Date of Service: See Epic  Chief Complaint:   Chief Complaint   Patient presents with    New Patient     Psoriatic arthritis (HCC)      Referring Provider: Joe Espinal   Patient Name: Arely Haynes   : 1960   MRN: [id number]     History:     HPI:     Arely Haynes is a 64 y.o. female with history of psoriatic arthritis, L4-5 PSF, right knee arthroplasty 2025, right hip arthroplasty, GERD who presents to clinic for evaluation of SI joint pain. She was previously seen by Dr. Aleman who performed SI joint injections, last done on the left on 23.     History of Present Illness  The patient is a 64-year-old female who presents today for evaluation of SI joint pain. She has a longstanding history of SI joint pain and has previously undergone left SI joint injections, most recently in 2023 by Dr. Aleman. Pain is typically around 5 to 8 out of 10 in intensity and is worse with sitting, standing, twisting, and walking downstairs and somewhat relieved with walking and laying down. She underwent a right knee replacement on 2025 and is currently in physical therapy for this. She is accompanied by her . The patient reports that the last SI joint injection provided relief for approximately one year. However, she has been experiencing progressive health issues, predominantly respiratory, which have led to a decline in her overall health status. Concurrently, she has been experiencing an escalation in her left SI joint pain, which has worsened following her right total knee replacement surgery. Additionally, she reports the onset of right SI joint pain, which she attributes to her altered gait post-surgery, and is similar in location and character to her  left SI joint pain. She also experiences paresthesia in the right foot and lower leg, which, while not severe, is noticeable. She has a history of falls, the most significant of which occurred a year ago during a backpacking trip where she tripped, fell, and lost consciousness after hitting her head. This incident was followed by a severe headache and nausea. Prior to her recent knee surgery, she experienced another fall outside a [employer]. She reports occasional foot dragging, particularly on the right side, and suspects radiculopathy. An EMG conducted at the [facility] revealed peroneal and sural nerve involvement. Her last back injection was administered at the [facility] approximately a year ago. She has a history of cauda equina and has struggled with rehabilitation post-surgery. Despite being an active individual, she has had to cease certain activities such as riding due to her back condition. She also reports difficulty with walking and hiking. She has gained significant weight, which she is unable to lose due to her inability to exercise sufficiently. She believes that losing 20 to 30 pounds would enable her to resume her previous level of activity. She feels that both her back pain and numbness on the right are limiting her. She has been attempting to regain lost muscle mass. She is currently on pregabalin 200 mg at night and 150 mg in the morning. She occasionally takes muscle relaxers, which help her sleep, but does not take them regularly due to cognitive side effects. She reports poor sleep quality and worsening symptoms in the morning. She was diagnosed with psoriatic arthritis at [facility]. She is currently not walking much and reports issues with foot tipping and rolling. She has been advised to consult a podiatrist for potential surgical intervention but is reluctant to undergo another surgery. She is interested in exploring orthotics as a potential solution. She is eager to  lose weight and return to her previous level of activity. She has been approved for Zepbound by her insurance company but requires 3 months of diet and exercise history. She is also considering abdominoplasty. She has made dietary modifications, including reducing her red meat intake and increasing her vegetable consumption. She is currently on aspirin and is wondering if she needs to discontinue it prior to her injections. She has a known allergy to IODINE.     She has a history of frequent UTIs and bladder incontinence, likely stress incontinence, and is currently under the care of a urologist. She reports a loss of sensation during urination. She is scheduled for further studies.      Red Flags ROS:   Fever, Chills, Sweats: Denies  Involuntary Weight Loss: Denies        PMHx:   Past Medical History:   Diagnosis Date    Anesthesia     no self problems; 1st cousin MH    Arthritis     hips knees hands spine    Asthma     prn inhalers    Bronchitis     PCP 2021. Periodic bronchitis    Cataract 2024    Surgery    Concussion     Depression     Daughter passed few years ago    Erosive esophagitis     GERD (gastroesophageal reflux disease)     Heart burn     Heart murmur Early 20’s    Hiatus hernia syndrome     High cholesterol     Immunocompromised (HCC)     Indigestion GERD 20 years    Infection     10/24 toe antibiotics    Pain     hips knees    Pneumonia 2018    Psoriasis     Psoriatic arthritis (HCC)     Sleep apnea 2022    Snoring     UTI (urinary tract infection)     10/24 antiobiotics       Current Outpatient Medications on File Prior to Visit   Medication Sig Dispense Refill    aspirin (ASA) 325 MG Tab Take 81 mg by mouth 2 times a day.      pregabalin (LYRICA) 200 MG capsule Take 1 Capsule by mouth every evening for 90 days. 90 Capsule 0    pregabalin (LYRICA) 150 MG Cap TAKE 1 CAPSULE EVERY MORNING (TAKING 150 MG IN THE MORNING  MG IN THE EVENING) 90 Capsule 0    pregabalin (LYRICA) 200 MG capsule Take  1 Capsule by mouth every evening for 90 days. 90 Capsule 0    lamoTRIgine (LAMICTAL) 100 MG Tab TAKE 1 TABLET EVERY MORNING 90 Tablet 0    EPINEPHrine (EPIPEN) 0.3 MG/0.3ML Solution Auto-injector solution for injection INJECT 1 PEN IN THE MUSCLE ONE TIME AS DIRECTED FOR ANAPHYLAXIS      pregabalin (LYRICA) 200 MG capsule Take 1 Capsule by mouth 2 times a day.      pregabalin (LYRICA) 100 MG Cap Take 100 mg by mouth.      escitalopram (LEXAPRO) 20 MG tablet TAKE 1 TABLET EVERY MORNING. TAKE WITH ESCITALOPRAM 10 MG FOR A TOTAL DOSE OF 30 MG 90 Tablet 3    etodolac (LODINE) 400 MG tablet TAKE 1 TABLET TWICE A DAY (Patient taking differently: Take 400 mg by mouth 2 times a day. MEDICATION INSTRUCTIONS FOR SURGERY/PROCEDURE SCHEDULED FOR 12/31/2024   DO NOT TAKE 5 DAYS PRIOR TO SURGERY) 180 Tablet 3    escitalopram (LEXAPRO) 10 MG Tab TAKE 1 TABLET EVERY MORNING. TAKE WITH ESCITALOPRAM 20 MG FOR A TOTAL DOSE OF 30 MG 90 Tablet 3    famotidine (PEPCID) 20 MG Tab TAKE 1 TABLET TWICE A DAY FOR GASTROESOPHAGEAL REFLUX DISEASE (Patient taking differently: MEDICATION INSTRUCTIONS FOR SURGERY/PROCEDURE SCHEDULED FOR 12/31/24  CONTINUE TAKING MED PRIOR TO SURGERY) 180 Tablet 3    DULERA 200-5 MCG/ACT Aerosol USE 2 INHALATIONS TWICE A DAY (Patient taking differently: MEDICATION INSTRUCTIONS FOR SURGERY/PROCEDURE SCHEDULED FOR 12/31/2024  CONTINUE TAKING MED PRIOR TO SURGERY) 39 g 3    omeprazole (PRILOSEC) 40 MG delayed-release capsule TAKE 1 CAPSULE DAILY (Patient taking differently: Take 40 mg by mouth every day.   MEDICATION INSTRUCTIONS FOR SURGERY 12/31/2024  OK TO CONTINUE TAKING PRIOR TO SURGERY AND DAY OF SURGERY) 90 Capsule 3    montelukast (SINGULAIR) 10 MG Tab TAKE 1 TABLET DAILY (Patient taking differently: Take 10 mg by mouth every evening.   MEDICATION INSTRUCTIONS FOR SURGERY 12/31/2024  OK TO CONTINUE TAKING PRIOR TO SURGERY AND DAY OF SURGERY) 90 Tablet 3    rosuvastatin (CRESTOR) 10 MG Tab TAKE 1 TABLET AT  BEDTIME (Patient taking differently: Take 10 mg by mouth at bedtime.   MEDICATION INSTRUCTIONS FOR SURGERY 12/31/2024  CONTINUE TAKING MED PRIOR TO SURGERY) 90 Tablet 3    ipratropium-albuterol (COMBIVENT RESPIMAT)  MCG/ACT Aero Soln Inhale 1 Puff 4 times a day. (Patient taking differently: Inhale 1 Puff 4 times a day.   MEDICATION INSTRUCTIONS FOR SURGERY 12/31/2024  OK TO CONTINUE TAKING PRIOR TO SURGERY AND DAY OF SURGERY) 4 g 5    POTASSIUM PO Take 99 mg by mouth every day.   CONTINUE TAKING MED PRIOR TO SURGERY      CALCIUM PO Take 1 Capsule by mouth every day.   DO NOT TAKE 7 DAYS PRIOR TO SURGERY      magnesium oxide (MAG-OX) 400 MG Tab tablet Take 1 Tablet by mouth every morning. 90 Tablet 3    dupilumab (DUPIXENT) 200 MG/1.14ML injection       docusate sodium (COLACE) 100 MG Cap Take 1 Capsule by mouth 2 times a day for 30 days. 60 Capsule 0    Naloxone (NARCAN) 4 MG/0.1ML Liquid CALL 911. SPR CONTENTS OF ONE SPRAYER (0.1ML) INTO ONE NOSTRIL. REPEAT IN 2-3 MIN IF SYMPTOMS OF OPIOID EMERGENCY PERSIST, ALTERNATE NOSTRILS      Mirabegron ER 50 MG TABLET SR 24 HR Take 1 Tablet by mouth every day. (Patient not taking: Reported on 3/5/2025)      valacyclovir (VALTREX) 1 GM Tab Take 1 Tablet by mouth 2 times a day.      ipratropium-albuterol (DUONEB) 0.5-2.5 (3) MG/3ML nebulizer solution Take 3 mL by nebulization every 4 hours. (Patient taking differently: Take 3 mL by nebulization every 4 hours.   MEDICATION INSTRUCTIONS FOR SURGERY 12/31/2024  OK TO CONTINUE TAKING PRIOR TO SURGERY AND DAY OF SURGERY) 270 mL 5    EPINEPHrine 0.3 MG/0.3ML Solution Prefilled Syringe Inject the contents of the epipen into the thigh, hold for 3 seconds and release from thigh as needed for anaphylaxis. (Patient taking differently: Inject the contents of the epipen into the thigh, hold for 3 seconds and release from thigh as needed for anaphylaxis    MEDICATION INSTRUCTIONS FOR SURGERY/PROCEDURE SCHEDULED FOR 12/31/2024    CONTINUE TAKING MED PRIOR TO SURGERY IF NEEDED AND NOTIFY MD) 1 Each 6    loratadine (CLARITIN) 10 MG Tab Take 10 mg by mouth every day.   MEDICATION INSTRUCTIONS FOR SURGERY 12/31/2024  CONTINUE TAKING MED PRIOR TO SURGERY (Patient not taking: Reported on 3/5/2025)       No current facility-administered medications on file prior to visit.        PSHx:   Past Surgical History:   Procedure Laterality Date    KNEE ARTHROPLASTY TOTAL Right 2/4/2025    Procedure: RIGHT TOTAL KNEE ARTHROPLASTY;  Surgeon: Harsh Hensley M.D.;  [facility];  Service: Orthopedics    CATARACT EXTRACTION WITH IOL Bilateral 2024    AK INJECTION,SACROILIAC JOINT Left 07/28/2023    Procedure: LEFT sacroiliac joint injection;  Surgeon: Elvis Aleman M.D.;  [facility];  Service: Pain Management    AK INJ LUMBAR/SACRAL,W/ IMAGING Right 06/02/2023    Procedure: RIGHT L5-S1 interlaminar epidural steroid injection. Patient has tolerated gadolinium;  Surgeon: Elvis Aleman M.D.;  Location[facility];  Service: Pain Management    AK BRONCHOSCOPY,DIAGNOSTIC  07/09/2021    Procedure: BRONCHOSCOPY;  Surgeon: Myles Vidal M.D.;  Location: [facility];  Service: Ent    TENDON REPAIR Left 09/19/2019    Procedure: REPAIR, TENDON- QUADRICEPS RUTURE REPAIR AND MEYERS;  Surgeon: Jayden Velázquez M.D.;  Location: [facility];  Service: Orthopedics    HYSTERECTOMY ROBOTIC XI N/A 07/11/2019    Procedure: HYSTERECTOMY, ROBOT-ASSISTED, USING DA SABINO XI;  Surgeon: Curly Bernal M.D.;  Location: [facility];  Service: Gynecology    SALPINGO OOPHORECTOMY Bilateral 07/11/2019    Procedure: SALPINGO-OOPHORECTOMY;  Surgeon: Curly Bernal M.D.;  Location: [facility];  Service: Gynecology    FUSION, SPINE, LUMBAR, PLIF  2004    L4-5    GYN SURGERY  1999    c sec    HIP REPLACEMENT, TOTAL      Right    ORIF, KNEE      Left patellar tendon repair    OTHER       Various ortho. Minor    OTHER ABDOMINAL SURGERY      appendix    OTHER ORTHOPEDIC SURGERY      left knee    OTHER ORTHOPEDIC SURGERY Left     L rotator cuff    SHOULDER SURGERY  1980    Rotator cuff repair l       Family history   Family History   Problem Relation Age of Onset    Hyperlipidemia Mother     Heart Attack Mother     Psychiatric Illness Mother     Heart Disease Mother     Arthritis Mother     Lung Disease Father         Pancoast tumor    Cancer Father         Pancoast    Hyperlipidemia Father     Stroke Father     Hyperlipidemia Sister     Cancer Sister         Breast cancer    Heart Disease Paternal Grandfather     Hypertension Neg Hx     Diabetes Neg Hx          Medications: Reviewed on [facility]  Outpatient Medications Marked as Taking for the 3/5/25 encounter (Office Visit) with Lani Greene M.D.   Medication Sig Dispense Refill    aspirin (ASA) 325 MG Tab Take 81 mg by mouth 2 times a day.      pregabalin (LYRICA) 200 MG capsule Take 1 Capsule by mouth every evening for 90 days. 90 Capsule 0    pregabalin (LYRICA) 150 MG Cap TAKE 1 CAPSULE EVERY MORNING (TAKING 150 MG IN THE MORNING  MG IN THE EVENING) 90 Capsule 0    pregabalin (LYRICA) 200 MG capsule Take 1 Capsule by mouth every evening for 90 days. 90 Capsule 0    lamoTRIgine (LAMICTAL) 100 MG Tab TAKE 1 TABLET EVERY MORNING 90 Tablet 0    EPINEPHrine (EPIPEN) 0.3 MG/0.3ML Solution Auto-injector solution for injection INJECT 1 PEN IN THE MUSCLE ONE TIME AS DIRECTED FOR ANAPHYLAXIS      pregabalin (LYRICA) 200 MG capsule Take 1 Capsule by mouth 2 times a day.      pregabalin (LYRICA) 100 MG Cap Take 100 mg by mouth.      escitalopram (LEXAPRO) 20 MG tablet TAKE 1 TABLET EVERY MORNING. TAKE WITH ESCITALOPRAM 10 MG FOR A TOTAL DOSE OF 30 MG 90 Tablet 3    etodolac (LODINE) 400 MG tablet TAKE 1 TABLET TWICE A DAY (Patient taking differently: Take 400 mg by mouth 2 times a day. MEDICATION INSTRUCTIONS FOR SURGERY/PROCEDURE SCHEDULED FOR  12/31/2024   DO NOT TAKE 5 DAYS PRIOR TO SURGERY) 180 Tablet 3    escitalopram (LEXAPRO) 10 MG Tab TAKE 1 TABLET EVERY MORNING. TAKE WITH ESCITALOPRAM 20 MG FOR A TOTAL DOSE OF 30 MG 90 Tablet 3    famotidine (PEPCID) 20 MG Tab TAKE 1 TABLET TWICE A DAY FOR GASTROESOPHAGEAL REFLUX DISEASE (Patient taking differently: MEDICATION INSTRUCTIONS FOR SURGERY/PROCEDURE SCHEDULED FOR 12/31/24  CONTINUE TAKING MED PRIOR TO SURGERY) 180 Tablet 3    DULERA 200-5 MCG/ACT Aerosol USE 2 INHALATIONS TWICE A DAY (Patient taking differently: MEDICATION INSTRUCTIONS FOR SURGERY/PROCEDURE SCHEDULED FOR 12/31/2024  CONTINUE TAKING MED PRIOR TO SURGERY) 39 g 3    omeprazole (PRILOSEC) 40 MG delayed-release capsule TAKE 1 CAPSULE DAILY (Patient taking differently: Take 40 mg by mouth every day.   MEDICATION INSTRUCTIONS FOR SURGERY 12/31/2024  OK TO CONTINUE TAKING PRIOR TO SURGERY AND DAY OF SURGERY) 90 Capsule 3    montelukast (SINGULAIR) 10 MG Tab TAKE 1 TABLET DAILY (Patient taking differently: Take 10 mg by mouth every evening.   MEDICATION INSTRUCTIONS FOR SURGERY 12/31/2024  OK TO CONTINUE TAKING PRIOR TO SURGERY AND DAY OF SURGERY) 90 Tablet 3    rosuvastatin (CRESTOR) 10 MG Tab TAKE 1 TABLET AT BEDTIME (Patient taking differently: Take 10 mg by mouth at bedtime.   MEDICATION INSTRUCTIONS FOR SURGERY 12/31/2024  CONTINUE TAKING MED PRIOR TO SURGERY) 90 Tablet 3    ipratropium-albuterol (COMBIVENT RESPIMAT)  MCG/ACT Aero Soln Inhale 1 Puff 4 times a day. (Patient taking differently: Inhale 1 Puff 4 times a day.   MEDICATION INSTRUCTIONS FOR SURGERY 12/31/2024  OK TO CONTINUE TAKING PRIOR TO SURGERY AND DAY OF SURGERY) 4 g 5    POTASSIUM PO Take 99 mg by mouth every day.   CONTINUE TAKING MED PRIOR TO SURGERY      CALCIUM PO Take 1 Capsule by mouth every day.   DO NOT TAKE 7 DAYS PRIOR TO SURGERY      magnesium oxide (MAG-OX) 400 MG Tab tablet Take 1 Tablet by mouth every morning. 90 Tablet 3        Allergies:   Allergies    Allergen Reactions    Bee Venom Anaphylaxis     Pt is allergic to bees.     Iodine Anaphylaxis, Hives and Rash     IV = anaphylaxis, no topical rxn.   IV = anaphylaxis,     Levofloxacin Anaphylaxis    Nucala [Mepolizumab] Rash and Swelling     Suspect allergic reaction to Nucala, not totally diagnosed    Shellfish-Derived Products Anaphylaxis     LOBSTER       Social Hx:   Social History     Socioeconomic History    Marital status:      Spouse name: Not on file    Number of children: Not on file    Years of education: Not on file    Highest education level: Bachelor's degree (e.g., BA, AB, BS)   Occupational History    Occupation: Flight Nurse/Educator   Tobacco Use    Smoking status: Never     Passive exposure: Never    Smokeless tobacco: Never   Vaping Use    Vaping status: Never Used   Substance and Sexual Activity    Alcohol use: Yes     Alcohol/week: 3.6 oz     Types: 6 Glasses of wine per week     Comment: 1-2 glasses 4 times per week    Drug use: Never    Sexual activity: Yes     Comment: Tubal ligation   Other Topics Concern     Service No    Blood Transfusions No    Caffeine Concern No    Occupational Exposure Yes    Hobby Hazards Yes    Sleep Concern Yes    Stress Concern No    Weight Concern Yes    Special Diet No    Back Care No    Exercise Yes    Bike Helmet Yes    Seat Belt Yes    Self-Exams Yes   Social History Narrative    Not on file     Social Drivers of Health     Financial Resource Strain: Low Risk  (1/1/2025)    Overall Financial Resource Strain (CARDIA)     Difficulty of Paying Living Expenses: Not hard at all   Food Insecurity: No Food Insecurity (1/1/2025)    Hunger Vital Sign     Worried About Running Out of Food in the Last Year: Never true     Ran Out of Food in the Last Year: Never true   Transportation Needs: No Transportation Needs (1/1/2025)    PRAPARE - Transportation     Lack of Transportation (Medical): No     Lack of Transportation (Non-Medical): No   Physical  Activity: Unknown (1/1/2025)    Exercise Vital Sign     Days of Exercise per Week: 0 days     Minutes of Exercise per Session: Not on file   Stress: No Stress Concern Present (1/1/2025)    [employer] [place] [employer] - Occupational Stress Questionnaire     Feeling of Stress : Only a little   Social Connections: Socially Integrated (1/1/2025)    Social Connection and Isolation Panel [NHANES]     Frequency of Communication with Friends and Family: More than three times a week     Frequency of Social Gatherings with Friends and Family: Twice a week     Attends [employer] Services: More than 4 times per year     Active Member of Clubs or Organizations: Yes     Attends Club or Organization Meetings: More than 4 times per year     Marital Status:    Intimate Partner Violence: Not on file   Housing Stability: Unknown (1/1/2025)    Housing Stability Vital Sign     Unable to Pay for Housing in the Last Year: No     Number of Times Moved in the Last Year: Not on file     Homeless in the Last Year: No          EXAMINATION     Physical Exam:   Vitals: /84 (BP Location: Right arm, Patient Position: Sitting, BP Cuff Size: Adult)   Pulse 62   Temp 36.8 °C (98.2 °F) (Temporal)   Ht 1.829 m (6')   Wt 115 kg (253 lb 8.5 oz)   SpO2 99%       Constitutional:   Body Habitus: Body mass index is 34.38 kg/m².  Cooperation: Fully cooperates with exam  Appearance: Well-groomed, well-nourished  Eyes: No scleral icterus to suggest severe liver disease, no proptosis to suggest severe hyperthyroid  ENT: No obvious auditory deficits, no obvious tongue lesions, tongue midline, no facial droop   Respiratory:  Breathing comfortable on room air, no audible wheezing  Cardiovascular: Skin appears well-perfused  Psychiatric: Appropriate affect  Gait: normal gait, mildly antalgic. Difficulty with toe walking on right.    Neurologic:  Strength:    Bilateral LE 5/5 in hip flexors, knee extensors and flexors, ankle dorsiflexors  and plantarflexors, great toe extensors   Reflexes: 2+ in bilateral patella and achilles   Sensation: decreased sensation to light touch right L4 and L5 distribution distally  MSK:?Has?preserved?active lumbar ROM    Special?tests:    Negative slump test bilaterally   Positive thigh thrust, OPAL, distraction bilaterally   Positive facet loading maneuvers bilaterally       MEDICAL DECISION MAKING    Medical Records Review: See under HPI section    DATA    Labs:   Lab Results   Component Value Date/Time    SODIUM 143 01/20/2025 11:46 AM    POTASSIUM 3.8 01/20/2025 11:46 AM    CHLORIDE 106 01/20/2025 11:46 AM    CO2 24 01/20/2025 11:46 AM    ANION 13.0 01/20/2025 11:46 AM    GLUCOSE 66 01/20/2025 11:46 AM    BUN 23 (H) 01/20/2025 11:46 AM    CREATININE 0.69 01/20/2025 11:46 AM    CALCIUM 9.4 01/20/2025 11:46 AM    ASTSGOT 31 01/20/2025 11:46 AM    ALTSGPT 28 01/20/2025 11:46 AM    TBILIRUBIN 0.4 01/20/2025 11:46 AM    ALBUMIN 4.5 01/20/2025 11:46 AM    TOTPROTEIN 7.5 01/20/2025 11:46 AM    GLOBULIN 3.0 01/20/2025 11:46 AM    AGRATIO 1.5 01/20/2025 11:46 AM       Lab Results   Component Value Date/Time    PROTHROMBTM 14.1 07/02/2023 11:10 PM    INR 1.05 07/02/2023 11:10 PM        Lab Results   Component Value Date/Time    WBC 6.8 01/20/2025 11:46 AM    RBC 4.83 01/20/2025 11:46 AM    HEMOGLOBIN 14.9 01/20/2025 11:46 AM    HEMATOCRIT 44.9 01/20/2025 11:46 AM    MCV 93.0 01/20/2025 11:46 AM    MCH 30.8 01/20/2025 11:46 AM    MCHC 33.2 01/20/2025 11:46 AM    MPV 9.9 01/20/2025 11:46 AM    NEUTSPOLYS 53.10 01/20/2025 11:46 AM    LYMPHOCYTES 36.60 01/20/2025 11:46 AM    MONOCYTES 7.60 01/20/2025 11:46 AM    EOSINOPHILS 1.80 01/20/2025 11:46 AM    BASOPHILS 0.60 01/20/2025 11:46 AM        Lab Results   Component Value Date/Time    HBA1C 5.3 10/04/2024 01:16 PM        Imaging:   I personally reviewed the following images, below are my independent reads:  MRI Lumbar Spine 4/9/23: L4-5 posterior fusion. L1 hemangioma. Grade 1  L3-4 retrolisthesis, L4-5 anterolisthesis. L3-4 moderate canal moderate to severe right and mild to moderate left subarticular stenosis. L4-5 mild bilateral subarticular stenosis. L5-S1 mild bilateral subarticular stenosis. Multilevel facet arthropathy. Bilateral sacroiliac joint arthropathy.      IMAGING radiology reads: I reviewed the following radiology reports    Results for orders placed during the hospital encounter of 04/09/23    MR-LUMBAR SPINE-WITH & W/O    Impression  1.  Postsurgical changes at L4-L5 redemonstrated  2.  Multilevel multifactorial degenerative changes  3.  No areas of high-grade central canal narrowing  4.  Areas of central canal and neural foraminal narrowing as described above           Diagnosis   Visit Diagnoses     ICD-10-CM   1. SI (sacroiliac) joint dysfunction  M53.3   2. Lumbar radiculopathy  M54.16   3. Psoriatic arthritis (HCC)  L40.50         ASSESSMENT AND PLAN:  Arely Haynes is a 64 y.o. female who presents to clinic for evaluation of SI joint pain.     Kaila was seen today for new patient.    Diagnoses and all orders for this visit:    SI (sacroiliac) joint dysfunction  -     Referral to Physical Medicine Rehab  -     Referral to Physical Therapy    Lumbar radiculopathy  -     Referral for Orthotics  -     Referral to Physical Therapy    Psoriatic arthritis (HCC)      Assessment & Plan  Bilateral sacroiliac (SI) joint pain  Bilateral low back pain without radiation into the lower extremities  Psoriatic arthritis  Patient has a longstanding history of SI joint pain, exacerbated by recent health issues and a right total knee replacement. The pain is typically 5-8 out of 10 in intensity, worse with sitting, standing, twisting, and walking downstairs, and somewhat relieved with walking and laying down. She previously experienced approximately a year of pain improvement after left SI joint injection in 7/2023, but unfortunately has now had return of this left-sided pain as  well as development of similar pain on the right side. Given the severity of her pain as well as previous excellent response to SI joint injections, patient is appropriate for bilateral SI joint injections which she is interested in. An injection for the bilateral SI joints has been ordered. She is advised to discontinue aspirin 7 days prior to the procedure, which she will complete in 2 weeks in the setting of her recent right knee replacement. Physical therapy focusing on the lumbar spine and SI joints, in addition to her knee, will be initiated post-injection. She is encouraged to incorporate walking into her routine for weight loss once her pain is better managed.     Lumbar radiculopathy  Patient has chronic right lower extremity numbness and weakness in the setting of lumbar radiculopathy, leading to several falls in the last year. I have placed a referral to see an orthotist to be fitted for custom orthotics that could be beneficial for her increased ankle supination.      Physical Therapy: I ordered physical therapy to focus on strengthening and stretching for the low back and SI joints after SI joint injections    Medications: Continue pregabalin and PRN tizanidine    Interventional Treatment: Bilateral fluoroscopic-guided SI joint injections ordered    Referrals: To orthotics for custom shoe inserts     Follow-up: For bilateral SI joint injections, then 3 weeks after injections in clinic      Please note that this dictation was created using voice recognition software. I have made every reasonable attempt to correct obvious errors but there may be errors of grammar and content that I may have overlooked prior to finalization of this note.      Lani Greene MD  Physical Medicine and Rehabilitation  Interventional Spine and Sports Physiatry  [facility]       KIMBERLY Espinal M.D.   Joe Salinas* .      I spent a total of 43 minutes on this visit including reviewing previous  notes and imaging, in-room history and physical exam of the patient, counseling the patient on likely diagnosis and plan, ordering diagnostic tests, adjusting medications, and documenting the findings in the note.

## 2025-03-04 NOTE — H&P (VIEW-ONLY)
Verbal consent was acquired by the patient to use [employer] ambient listening note generation during this visit Yes     NEW PATIENT VISIT     Interventional Spine and Pain  Physiatry (Physical Medicine and Rehabilitation)     Date of Service: See Epic  Chief Complaint:   Chief Complaint   Patient presents with    New Patient     Psoriatic arthritis (HCC)      Referring Provider: Joe Espinal   Patient Name: Arely Haynes   : 1960   MRN: [id number]     History:     HPI:     Arely Haynes is a 64 y.o. female with history of psoriatic arthritis, L4-5 PSF, right knee arthroplasty 2025, right hip arthroplasty, GERD who presents to clinic for evaluation of SI joint pain. She was previously seen by Dr. Aleman who performed SI joint injections, last done on the left on 23.     History of Present Illness  The patient is a 64-year-old female who presents today for evaluation of SI joint pain. She has a longstanding history of SI joint pain and has previously undergone left SI joint injections, most recently in 2023 by Dr. Aleman. Pain is typically around 5 to 8 out of 10 in intensity and is worse with sitting, standing, twisting, and walking downstairs and somewhat relieved with walking and laying down. She underwent a right knee replacement on 2025 and is currently in physical therapy for this. She is accompanied by her . The patient reports that the last SI joint injection provided relief for approximately one year. However, she has been experiencing progressive health issues, predominantly respiratory, which have led to a decline in her overall health status. Concurrently, she has been experiencing an escalation in her left SI joint pain, which has worsened following her right total knee replacement surgery. Additionally, she reports the onset of right SI joint pain, which she attributes to her altered gait post-surgery, and is similar in location and character to her  left SI joint pain. She also experiences paresthesia in the right foot and lower leg, which, while not severe, is noticeable. She has a history of falls, the most significant of which occurred a year ago during a backpacking trip where she tripped, fell, and lost consciousness after hitting her head. This incident was followed by a severe headache and nausea. Prior to her recent knee surgery, she experienced another fall outside a [employer]. She reports occasional foot dragging, particularly on the right side, and suspects radiculopathy. An EMG conducted at the [facility] revealed peroneal and sural nerve involvement. Her last back injection was administered at the [facility] approximately a year ago. She has a history of cauda equina and has struggled with rehabilitation post-surgery. Despite being an active individual, she has had to cease certain activities such as riding due to her back condition. She also reports difficulty with walking and hiking. She has gained significant weight, which she is unable to lose due to her inability to exercise sufficiently. She believes that losing 20 to 30 pounds would enable her to resume her previous level of activity. She feels that both her back pain and numbness on the right are limiting her. She has been attempting to regain lost muscle mass. She is currently on pregabalin 200 mg at night and 150 mg in the morning. She occasionally takes muscle relaxers, which help her sleep, but does not take them regularly due to cognitive side effects. She reports poor sleep quality and worsening symptoms in the morning. She was diagnosed with psoriatic arthritis at [place]. She is currently not walking much and reports issues with foot tipping and rolling. She has been advised to consult a podiatrist for potential surgical intervention but is reluctant to undergo another surgery. She is interested in exploring orthotics as a potential solution. She is eager to  lose weight and return to her previous level of activity. She has been approved for Zepbound by her insurance company but requires 3 months of diet and exercise history. She is also considering abdominoplasty. She has made dietary modifications, including reducing her red meat intake and increasing her vegetable consumption. She is currently on aspirin and is wondering if she needs to discontinue it prior to her injections. She has a known allergy to IODINE.     She has a history of frequent UTIs and bladder incontinence, likely stress incontinence, and is currently under the care of a urologist. She reports a loss of sensation during urination. She is scheduled for further studies.      Red Flags ROS:   Fever, Chills, Sweats: Denies  Involuntary Weight Loss: Denies        PMHx:   Past Medical History:   Diagnosis Date    Anesthesia     no self problems; 1st cousin MH    Arthritis     hips knees hands spine    Asthma     prn inhalers    Bronchitis     PCP 2021. Periodic bronchitis    Cataract 2024    Surgery    Concussion     Depression     Daughter passed few years ago    Erosive esophagitis     GERD (gastroesophageal reflux disease)     Heart burn     Heart murmur Early 20’s    Hiatus hernia syndrome     High cholesterol     Immunocompromised (HCC)     Indigestion GERD 20 years    Infection     10/24 toe antibiotics    Pain     hips knees    Pneumonia 2018    Psoriasis     Psoriatic arthritis (HCC)     Sleep apnea 2022    Snoring     UTI (urinary tract infection)     10/24 antiobiotics       Current Outpatient Medications on File Prior to Visit   Medication Sig Dispense Refill    aspirin (ASA) 325 MG Tab Take 81 mg by mouth 2 times a day.      pregabalin (LYRICA) 200 MG capsule Take 1 Capsule by mouth every evening for 90 days. 90 Capsule 0    pregabalin (LYRICA) 150 MG Cap TAKE 1 CAPSULE EVERY MORNING (TAKING 150 MG IN THE MORNING  MG IN THE EVENING) 90 Capsule 0    pregabalin (LYRICA) 200 MG capsule Take  1 Capsule by mouth every evening for 90 days. 90 Capsule 0    lamoTRIgine (LAMICTAL) 100 MG Tab TAKE 1 TABLET EVERY MORNING 90 Tablet 0    EPINEPHrine (EPIPEN) 0.3 MG/0.3ML Solution Auto-injector solution for injection INJECT 1 PEN IN THE MUSCLE ONE TIME AS DIRECTED FOR ANAPHYLAXIS      pregabalin (LYRICA) 200 MG capsule Take 1 Capsule by mouth 2 times a day.      pregabalin (LYRICA) 100 MG Cap Take 100 mg by mouth.      escitalopram (LEXAPRO) 20 MG tablet TAKE 1 TABLET EVERY MORNING. TAKE WITH ESCITALOPRAM 10 MG FOR A TOTAL DOSE OF 30 MG 90 Tablet 3    etodolac (LODINE) 400 MG tablet TAKE 1 TABLET TWICE A DAY (Patient taking differently: Take 400 mg by mouth 2 times a day. MEDICATION INSTRUCTIONS FOR SURGERY/PROCEDURE SCHEDULED FOR 12/31/2024   DO NOT TAKE 5 DAYS PRIOR TO SURGERY) 180 Tablet 3    escitalopram (LEXAPRO) 10 MG Tab TAKE 1 TABLET EVERY MORNING. TAKE WITH ESCITALOPRAM 20 MG FOR A TOTAL DOSE OF 30 MG 90 Tablet 3    famotidine (PEPCID) 20 MG Tab TAKE 1 TABLET TWICE A DAY FOR GASTROESOPHAGEAL REFLUX DISEASE (Patient taking differently: MEDICATION INSTRUCTIONS FOR SURGERY/PROCEDURE SCHEDULED FOR 12/31/24  CONTINUE TAKING MED PRIOR TO SURGERY) 180 Tablet 3    DULERA 200-5 MCG/ACT Aerosol USE 2 INHALATIONS TWICE A DAY (Patient taking differently: MEDICATION INSTRUCTIONS FOR SURGERY/PROCEDURE SCHEDULED FOR 12/31/2024  CONTINUE TAKING MED PRIOR TO SURGERY) 39 g 3    omeprazole (PRILOSEC) 40 MG delayed-release capsule TAKE 1 CAPSULE DAILY (Patient taking differently: Take 40 mg by mouth every day.   MEDICATION INSTRUCTIONS FOR SURGERY 12/31/2024  OK TO CONTINUE TAKING PRIOR TO SURGERY AND DAY OF SURGERY) 90 Capsule 3    montelukast (SINGULAIR) 10 MG Tab TAKE 1 TABLET DAILY (Patient taking differently: Take 10 mg by mouth every evening.   MEDICATION INSTRUCTIONS FOR SURGERY 12/31/2024  OK TO CONTINUE TAKING PRIOR TO SURGERY AND DAY OF SURGERY) 90 Tablet 3    rosuvastatin (CRESTOR) 10 MG Tab TAKE 1 TABLET AT  BEDTIME (Patient taking differently: Take 10 mg by mouth at bedtime.   MEDICATION INSTRUCTIONS FOR SURGERY 12/31/2024  CONTINUE TAKING MED PRIOR TO SURGERY) 90 Tablet 3    ipratropium-albuterol (COMBIVENT RESPIMAT)  MCG/ACT Aero Soln Inhale 1 Puff 4 times a day. (Patient taking differently: Inhale 1 Puff 4 times a day.   MEDICATION INSTRUCTIONS FOR SURGERY 12/31/2024  OK TO CONTINUE TAKING PRIOR TO SURGERY AND DAY OF SURGERY) 4 g 5    POTASSIUM PO Take 99 mg by mouth every day.   CONTINUE TAKING MED PRIOR TO SURGERY      CALCIUM PO Take 1 Capsule by mouth every day.   DO NOT TAKE 7 DAYS PRIOR TO SURGERY      magnesium oxide (MAG-OX) 400 MG Tab tablet Take 1 Tablet by mouth every morning. 90 Tablet 3    dupilumab (DUPIXENT) 200 MG/1.14ML injection       docusate sodium (COLACE) 100 MG Cap Take 1 Capsule by mouth 2 times a day for 30 days. 60 Capsule 0    Naloxone (NARCAN) 4 MG/0.1ML Liquid CALL 911. SPR CONTENTS OF ONE SPRAYER (0.1ML) INTO ONE NOSTRIL. REPEAT IN 2-3 MIN IF SYMPTOMS OF OPIOID EMERGENCY PERSIST, ALTERNATE NOSTRILS      Mirabegron ER 50 MG TABLET SR 24 HR Take 1 Tablet by mouth every day. (Patient not taking: Reported on 3/5/2025)      valacyclovir (VALTREX) 1 GM Tab Take 1 Tablet by mouth 2 times a day.      ipratropium-albuterol (DUONEB) 0.5-2.5 (3) MG/3ML nebulizer solution Take 3 mL by nebulization every 4 hours. (Patient taking differently: Take 3 mL by nebulization every 4 hours.   MEDICATION INSTRUCTIONS FOR SURGERY 12/31/2024  OK TO CONTINUE TAKING PRIOR TO SURGERY AND DAY OF SURGERY) 270 mL 5    EPINEPHrine 0.3 MG/0.3ML Solution Prefilled Syringe Inject the contents of the epipen into the thigh, hold for 3 seconds and release from thigh as needed for anaphylaxis. (Patient taking differently: Inject the contents of the epipen into the thigh, hold for 3 seconds and release from thigh as needed for anaphylaxis    MEDICATION INSTRUCTIONS FOR SURGERY/PROCEDURE SCHEDULED FOR 12/31/2024    CONTINUE TAKING MED PRIOR TO SURGERY IF NEEDED AND NOTIFY MD) 1 Each 6    loratadine (CLARITIN) 10 MG Tab Take 10 mg by mouth every day.   MEDICATION INSTRUCTIONS FOR SURGERY 12/31/2024  CONTINUE TAKING MED PRIOR TO SURGERY (Patient not taking: Reported on 3/5/2025)       No current facility-administered medications on file prior to visit.        PSHx:   Past Surgical History:   Procedure Laterality Date    KNEE ARTHROPLASTY TOTAL Right 2/4/2025    Procedure: RIGHT TOTAL KNEE ARTHROPLASTY;  Surgeon: Harsh Hensley M.D.;  Location: [facility];  Service: Orthopedics    CATARACT EXTRACTION WITH IOL Bilateral 2024    NH INJECTION,SACROILIAC JOINT Left 07/28/2023    Procedure: LEFT sacroiliac joint injection;  Surgeon: Elvis Aleman M.D.;  [facility];  Service: Pain Management    NH INJ LUMBAR/SACRAL,W/ IMAGING Right 06/02/2023    Procedure: RIGHT L5-S1 interlaminar epidural steroid injection. Patient has tolerated gadolinium;  Surgeon: Elvis Aleman M.D.;  Location[facility];  Service: Pain Management    NH BRONCHOSCOPY,DIAGNOSTIC  07/09/2021    Procedure: BRONCHOSCOPY;  Surgeon: Myles Vidal M.D.;  Location: [facility];  Service: Ent    TENDON REPAIR Left 09/19/2019    Procedure: REPAIR, TENDON- QUADRICEPS RUTURE REPAIR AND MEYERS;  Surgeon: Jayden Velázquez M.D.;  Location: [facility];  Service: Orthopedics    HYSTERECTOMY ROBOTIC XI N/A 07/11/2019    Procedure: HYSTERECTOMY, ROBOT-ASSISTED, USING DA SABINO XI;  Surgeon: Curly Bernal M.D.;  Location: [facility];  Service: Gynecology    SALPINGO OOPHORECTOMY Bilateral 07/11/2019    Procedure: SALPINGO-OOPHORECTOMY;  Surgeon: Curly Bernal M.D.;  Location: [facility];  Service: Gynecology    FUSION, SPINE, LUMBAR, PLIF  2004    L4-5    GYN SURGERY  1999    c sec    HIP REPLACEMENT, TOTAL      Right    ORIF, KNEE      Left patellar tendon repair    OTHER       Various ortho. Minor    OTHER ABDOMINAL SURGERY      appendix    OTHER ORTHOPEDIC SURGERY      left knee    OTHER ORTHOPEDIC SURGERY Left     L rotator cuff    SHOULDER SURGERY  1980    Rotator cuff repair l       Family history   Family History   Problem Relation Age of Onset    Hyperlipidemia Mother     Heart Attack Mother     Psychiatric Illness Mother     Heart Disease Mother     Arthritis Mother     Lung Disease Father         Pancoast tumor    Cancer Father         Pancoast    Hyperlipidemia Father     Stroke Father     Hyperlipidemia Sister     Cancer Sister         Breast cancer    Heart Disease Paternal Grandfather     Hypertension Neg Hx     Diabetes Neg Hx          Medications: Reviewed on [facility]  Outpatient Medications Marked as Taking for the 3/5/25 encounter (Office Visit) with Lani Greene M.D.   Medication Sig Dispense Refill    aspirin (ASA) 325 MG Tab Take 81 mg by mouth 2 times a day.      pregabalin (LYRICA) 200 MG capsule Take 1 Capsule by mouth every evening for 90 days. 90 Capsule 0    pregabalin (LYRICA) 150 MG Cap TAKE 1 CAPSULE EVERY MORNING (TAKING 150 MG IN THE MORNING  MG IN THE EVENING) 90 Capsule 0    pregabalin (LYRICA) 200 MG capsule Take 1 Capsule by mouth every evening for 90 days. 90 Capsule 0    lamoTRIgine (LAMICTAL) 100 MG Tab TAKE 1 TABLET EVERY MORNING 90 Tablet 0    EPINEPHrine (EPIPEN) 0.3 MG/0.3ML Solution Auto-injector solution for injection INJECT 1 PEN IN THE MUSCLE ONE TIME AS DIRECTED FOR ANAPHYLAXIS      pregabalin (LYRICA) 200 MG capsule Take 1 Capsule by mouth 2 times a day.      pregabalin (LYRICA) 100 MG Cap Take 100 mg by mouth.      escitalopram (LEXAPRO) 20 MG tablet TAKE 1 TABLET EVERY MORNING. TAKE WITH ESCITALOPRAM 10 MG FOR A TOTAL DOSE OF 30 MG 90 Tablet 3    etodolac (LODINE) 400 MG tablet TAKE 1 TABLET TWICE A DAY (Patient taking differently: Take 400 mg by mouth 2 times a day. MEDICATION INSTRUCTIONS FOR SURGERY/PROCEDURE SCHEDULED FOR  12/31/2024   DO NOT TAKE 5 DAYS PRIOR TO SURGERY) 180 Tablet 3    escitalopram (LEXAPRO) 10 MG Tab TAKE 1 TABLET EVERY MORNING. TAKE WITH ESCITALOPRAM 20 MG FOR A TOTAL DOSE OF 30 MG 90 Tablet 3    famotidine (PEPCID) 20 MG Tab TAKE 1 TABLET TWICE A DAY FOR GASTROESOPHAGEAL REFLUX DISEASE (Patient taking differently: MEDICATION INSTRUCTIONS FOR SURGERY/PROCEDURE SCHEDULED FOR 12/31/24  CONTINUE TAKING MED PRIOR TO SURGERY) 180 Tablet 3    DULERA 200-5 MCG/ACT Aerosol USE 2 INHALATIONS TWICE A DAY (Patient taking differently: MEDICATION INSTRUCTIONS FOR SURGERY/PROCEDURE SCHEDULED FOR 12/31/2024  CONTINUE TAKING MED PRIOR TO SURGERY) 39 g 3    omeprazole (PRILOSEC) 40 MG delayed-release capsule TAKE 1 CAPSULE DAILY (Patient taking differently: Take 40 mg by mouth every day.   MEDICATION INSTRUCTIONS FOR SURGERY 12/31/2024  OK TO CONTINUE TAKING PRIOR TO SURGERY AND DAY OF SURGERY) 90 Capsule 3    montelukast (SINGULAIR) 10 MG Tab TAKE 1 TABLET DAILY (Patient taking differently: Take 10 mg by mouth every evening.   MEDICATION INSTRUCTIONS FOR SURGERY 12/31/2024  OK TO CONTINUE TAKING PRIOR TO SURGERY AND DAY OF SURGERY) 90 Tablet 3    rosuvastatin (CRESTOR) 10 MG Tab TAKE 1 TABLET AT BEDTIME (Patient taking differently: Take 10 mg by mouth at bedtime.   MEDICATION INSTRUCTIONS FOR SURGERY 12/31/2024  CONTINUE TAKING MED PRIOR TO SURGERY) 90 Tablet 3    ipratropium-albuterol (COMBIVENT RESPIMAT)  MCG/ACT Aero Soln Inhale 1 Puff 4 times a day. (Patient taking differently: Inhale 1 Puff 4 times a day.   MEDICATION INSTRUCTIONS FOR SURGERY 12/31/2024  OK TO CONTINUE TAKING PRIOR TO SURGERY AND DAY OF SURGERY) 4 g 5    POTASSIUM PO Take 99 mg by mouth every day.   CONTINUE TAKING MED PRIOR TO SURGERY      CALCIUM PO Take 1 Capsule by mouth every day.   DO NOT TAKE 7 DAYS PRIOR TO SURGERY      magnesium oxide (MAG-OX) 400 MG Tab tablet Take 1 Tablet by mouth every morning. 90 Tablet 3        Allergies:   Allergies    Allergen Reactions    Bee Venom Anaphylaxis     Pt is allergic to bees.     Iodine Anaphylaxis, Hives and Rash     IV = anaphylaxis, no topical rxn.   IV = anaphylaxis,     Levofloxacin Anaphylaxis    Nucala [Mepolizumab] Rash and Swelling     Suspect allergic reaction to Nucala, not totally diagnosed    Shellfish-Derived Products Anaphylaxis     LOBSTER       Social Hx:   Social History     Socioeconomic History    Marital status:      Spouse name: Not on file    Number of children: Not on file    Years of education: Not on file    Highest education level: Bachelor's degree (e.g., BA, AB, BS)   Occupational History    Occupation: Flight Nurse/Educator   Tobacco Use    Smoking status: Never     Passive exposure: Never    Smokeless tobacco: Never   Vaping Use    Vaping status: Never Used   Substance and Sexual Activity    Alcohol use: Yes     Alcohol/week: 3.6 oz     Types: 6 Glasses of wine per week     Comment: 1-2 glasses 4 times per week    Drug use: Never    Sexual activity: Yes     Comment: Tubal ligation   Other Topics Concern     Service No    Blood Transfusions No    Caffeine Concern No    Occupational Exposure Yes    Hobby Hazards Yes    Sleep Concern Yes    Stress Concern No    Weight Concern Yes    Special Diet No    Back Care No    Exercise Yes    Bike Helmet Yes    Seat Belt Yes    Self-Exams Yes   Social History Narrative    Not on file     Social Drivers of Health     Financial Resource Strain: Low Risk  (1/1/2025)    Overall Financial Resource Strain (CARDIA)     Difficulty of Paying Living Expenses: Not hard at all   Food Insecurity: No Food Insecurity (1/1/2025)    Hunger Vital Sign     Worried About Running Out of Food in the Last Year: Never true     Ran Out of Food in the Last Year: Never true   Transportation Needs: No Transportation Needs (1/1/2025)    PRAPARE - Transportation     Lack of Transportation (Medical): No     Lack of Transportation (Non-Medical): No   Physical  Activity: Unknown (1/1/2025)    Exercise Vital Sign     Days of Exercise per Week: 0 days     Minutes of Exercise per Session: Not on file   Stress: No Stress Concern Present (1/1/2025)    [employer] - Occupational Stress Questionnaire     Feeling of Stress : Only a little   Social Connections: Socially Integrated (1/1/2025)    Social Connection and Isolation Panel [NHANES]     Frequency of Communication with Friends and Family: More than three times a week     Frequency of Social Gatherings with Friends and Family: Twice a week     Attends Shinto Services: More than 4 times per year     Active Member of Clubs or Organizations: Yes     Attends Club or Organization Meetings: More than 4 times per year     Marital Status:    Intimate Partner Violence: Not on file   Housing Stability: Unknown (1/1/2025)    Housing Stability Vital Sign     Unable to Pay for Housing in the Last Year: No     Number of Times Moved in the Last Year: Not on file     Homeless in the Last Year: No          EXAMINATION     Physical Exam:   Vitals: /84 (BP Location: Right arm, Patient Position: Sitting, BP Cuff Size: Adult)   Pulse 62   Temp 36.8 °C (98.2 °F) (Temporal)   Ht 1.829 m (6')   Wt 115 kg (253 lb 8.5 oz)   SpO2 99%       Constitutional:   Body Habitus: Body mass index is 34.38 kg/m².  Cooperation: Fully cooperates with exam  Appearance: Well-groomed, well-nourished  Eyes: No scleral icterus to suggest severe liver disease, no proptosis to suggest severe hyperthyroid  ENT: No obvious auditory deficits, no obvious tongue lesions, tongue midline, no facial droop   Respiratory:  Breathing comfortable on room air, no audible wheezing  Cardiovascular: Skin appears well-perfused  Psychiatric: Appropriate affect  Gait: normal gait, mildly antalgic. Difficulty with toe walking on right.    Neurologic:  Strength:    Bilateral LE 5/5 in hip flexors, knee extensors and flexors, ankle dorsiflexors  and plantarflexors, great toe extensors   Reflexes: 2+ in bilateral patella and achilles   Sensation: decreased sensation to light touch right L4 and L5 distribution distally  MSK:?Has?preserved?active lumbar ROM    Special?tests:    Negative slump test bilaterally   Positive thigh thrust, OPAL, distraction bilaterally   Positive facet loading maneuvers bilaterally       MEDICAL DECISION MAKING    Medical Records Review: See under HPI section    DATA    Labs:   Lab Results   Component Value Date/Time    SODIUM 143 01/20/2025 11:46 AM    POTASSIUM 3.8 01/20/2025 11:46 AM    CHLORIDE 106 01/20/2025 11:46 AM    CO2 24 01/20/2025 11:46 AM    ANION 13.0 01/20/2025 11:46 AM    GLUCOSE 66 01/20/2025 11:46 AM    BUN 23 (H) 01/20/2025 11:46 AM    CREATININE 0.69 01/20/2025 11:46 AM    CALCIUM 9.4 01/20/2025 11:46 AM    ASTSGOT 31 01/20/2025 11:46 AM    ALTSGPT 28 01/20/2025 11:46 AM    TBILIRUBIN 0.4 01/20/2025 11:46 AM    ALBUMIN 4.5 01/20/2025 11:46 AM    TOTPROTEIN 7.5 01/20/2025 11:46 AM    GLOBULIN 3.0 01/20/2025 11:46 AM    AGRATIO 1.5 01/20/2025 11:46 AM       Lab Results   Component Value Date/Time    PROTHROMBTM 14.1 07/02/2023 11:10 PM    INR 1.05 07/02/2023 11:10 PM        Lab Results   Component Value Date/Time    WBC 6.8 01/20/2025 11:46 AM    RBC 4.83 01/20/2025 11:46 AM    HEMOGLOBIN 14.9 01/20/2025 11:46 AM    HEMATOCRIT 44.9 01/20/2025 11:46 AM    MCV 93.0 01/20/2025 11:46 AM    MCH 30.8 01/20/2025 11:46 AM    MCHC 33.2 01/20/2025 11:46 AM    MPV 9.9 01/20/2025 11:46 AM    NEUTSPOLYS 53.10 01/20/2025 11:46 AM    LYMPHOCYTES 36.60 01/20/2025 11:46 AM    MONOCYTES 7.60 01/20/2025 11:46 AM    EOSINOPHILS 1.80 01/20/2025 11:46 AM    BASOPHILS 0.60 01/20/2025 11:46 AM        Lab Results   Component Value Date/Time    HBA1C 5.3 10/04/2024 01:16 PM        Imaging:   I personally reviewed the following images, below are my independent reads:  MRI Lumbar Spine 4/9/23: L4-5 posterior fusion. L1 hemangioma. Grade 1  L3-4 retrolisthesis, L4-5 anterolisthesis. L3-4 moderate canal moderate to severe right and mild to moderate left subarticular stenosis. L4-5 mild bilateral subarticular stenosis. L5-S1 mild bilateral subarticular stenosis. Multilevel facet arthropathy. Bilateral sacroiliac joint arthropathy.      IMAGING radiology reads: I reviewed the following radiology reports    Results for orders placed during the hospital encounter of 04/09/23    MR-LUMBAR SPINE-WITH & W/O    Impression  1.  Postsurgical changes at L4-L5 redemonstrated  2.  Multilevel multifactorial degenerative changes  3.  No areas of high-grade central canal narrowing  4.  Areas of central canal and neural foraminal narrowing as described above           Diagnosis   Visit Diagnoses     ICD-10-CM   1. SI (sacroiliac) joint dysfunction  M53.3   2. Lumbar radiculopathy  M54.16   3. Psoriatic arthritis (HCC)  L40.50         ASSESSMENT AND PLAN:  Arely Haynes is a 64 y.o. female who presents to clinic for evaluation of SI joint pain.     Kaila was seen today for new patient.    Diagnoses and all orders for this visit:    SI (sacroiliac) joint dysfunction  -     Referral to Physical Medicine Rehab  -     Referral to Physical Therapy    Lumbar radiculopathy  -     Referral for Orthotics  -     Referral to Physical Therapy    Psoriatic arthritis (HCC)      Assessment & Plan  Bilateral sacroiliac (SI) joint pain  Bilateral low back pain without radiation into the lower extremities  Psoriatic arthritis  Patient has a longstanding history of SI joint pain, exacerbated by recent health issues and a right total knee replacement. The pain is typically 5-8 out of 10 in intensity, worse with sitting, standing, twisting, and walking downstairs, and somewhat relieved with walking and laying down. She previously experienced approximately a year of pain improvement after left SI joint injection in 7/2023, but unfortunately has now had return of this left-sided pain as  well as development of similar pain on the right side. Given the severity of her pain as well as previous excellent response to SI joint injections, patient is appropriate for bilateral SI joint injections which she is interested in. An injection for the bilateral SI joints has been ordered. She is advised to discontinue aspirin 7 days prior to the procedure, which she will complete in 2 weeks in the setting of her recent right knee replacement. Physical therapy focusing on the lumbar spine and SI joints, in addition to her knee, will be initiated post-injection. She is encouraged to incorporate walking into her routine for weight loss once her pain is better managed.     Lumbar radiculopathy  Patient has chronic right lower extremity numbness and weakness in the setting of lumbar radiculopathy, leading to several falls in the last year. I have placed a referral to see an orthotist to be fitted for custom orthotics that could be beneficial for her increased ankle supination.      Physical Therapy: I ordered physical therapy to focus on strengthening and stretching for the low back and SI joints after SI joint injections    Medications: Continue pregabalin and PRN tizanidine    Interventional Treatment: Bilateral fluoroscopic-guided SI joint injections ordered    Referrals: To orthotics for custom shoe inserts     Follow-up: For bilateral SI joint injections, then 3 weeks after injections in clinic      Please note that this dictation was created using voice recognition software. I have made every reasonable attempt to correct obvious errors but there may be errors of grammar and content that I may have overlooked prior to finalization of this note.      Lani Greene MD  Physical Medicine and Rehabilitation  Interventional Spine and Sports Physiatry  [facility]       KIMBERLY Espinal M.D.   Joe Salinas* .      I spent a total of 43 minutes on this visit including reviewing previous  notes and imaging, in-room history and physical exam of the patient, counseling the patient on likely diagnosis and plan, ordering diagnostic tests, adjusting medications, and documenting the findings in the note.

## 2025-03-05 ENCOUNTER — OFFICE VISIT (OUTPATIENT)
Dept: PHYSICAL MEDICINE AND REHAB | Facility: MEDICAL CENTER | Age: 65
End: 2025-03-05
Payer: MEDICARE

## 2025-03-05 VITALS
BODY MASS INDEX: 34.34 KG/M2 | OXYGEN SATURATION: 99 % | HEIGHT: 72 IN | WEIGHT: 253.53 LBS | TEMPERATURE: 98.2 F | HEART RATE: 62 BPM | DIASTOLIC BLOOD PRESSURE: 84 MMHG | SYSTOLIC BLOOD PRESSURE: 124 MMHG

## 2025-03-05 DIAGNOSIS — M53.3 SI (SACROILIAC) JOINT DYSFUNCTION: Primary | ICD-10-CM

## 2025-03-05 DIAGNOSIS — M54.16 LUMBAR RADICULOPATHY: ICD-10-CM

## 2025-03-05 DIAGNOSIS — L40.50 PSORIATIC ARTHRITIS (HCC): ICD-10-CM

## 2025-03-05 PROCEDURE — 3079F DIAST BP 80-89 MM HG: CPT | Performed by: STUDENT IN AN ORGANIZED HEALTH CARE EDUCATION/TRAINING PROGRAM

## 2025-03-05 PROCEDURE — 3074F SYST BP LT 130 MM HG: CPT | Performed by: STUDENT IN AN ORGANIZED HEALTH CARE EDUCATION/TRAINING PROGRAM

## 2025-03-05 PROCEDURE — 99215 OFFICE O/P EST HI 40 MIN: CPT | Performed by: STUDENT IN AN ORGANIZED HEALTH CARE EDUCATION/TRAINING PROGRAM

## 2025-03-05 RX ORDER — ASPIRIN 325 MG
81 TABLET ORAL 2 TIMES DAILY
COMMUNITY

## 2025-03-05 ASSESSMENT — PATIENT HEALTH QUESTIONNAIRE - PHQ9
5. POOR APPETITE OR OVEREATING: 2 - MORE THAN HALF THE DAYS
CLINICAL INTERPRETATION OF PHQ2 SCORE: 2
SUM OF ALL RESPONSES TO PHQ QUESTIONS 1-9: 10

## 2025-03-05 ASSESSMENT — FIBROSIS 4 INDEX: FIB4 SCORE: 1.53

## 2025-03-06 DIAGNOSIS — G60.9 IDIOPATHIC NEUROPATHY: ICD-10-CM

## 2025-03-06 RX ORDER — PREGABALIN 150 MG/1
CAPSULE ORAL
Qty: 90 CAPSULE | Refills: 0 | OUTPATIENT
Start: 2025-03-06 | End: 2025-06-04

## 2025-03-06 NOTE — TELEPHONE ENCOUNTER
Received request via: Pharmacy    Was the patient seen in the last year in this department? Yes    Does the patient have an active prescription (recently filled or refills available) for medication(s) requested? No    [facility]     Does the patient have FPC Plus and need 100-day supply? (This applies to ALL medications) Patient does not have SCP

## 2025-03-10 NOTE — Clinical Note
REFERRAL APPROVAL NOTICE         Sent on March 10, 2025                   Kaila Wahlyovany  [address] [place] [id number]                   Dear Ms. Haynes,    After a careful review of the medical information and benefit coverage, [facility] has processed your referral. See below for additional details.    If applicable, you must be actively enrolled with your insurance for coverage of the authorized service. If you have any questions regarding your coverage, please contact your insurance directly.    REFERRAL INFORMATION   Referral #:  [id number]  Referred-To Department    Referred-By Provider:  Physical Medicine and Rehab    Lani Greene M.D.   Pain Management       [address]  [address]  [facility] [id number] [phone] [address] [facility] [address]  [phone]    Referral Start Date:  03/05/2025  Referral End Date:   06/10/2025             SCHEDULING  If you do not already have an appointment, please call [phone] to make an appointment.     MORE INFORMATION  If you do not already have a [employer] account, sign up at: [email or web address]  You can access your medical information, make appointments, see lab results, billing information, and more.  If you have questions regarding this referral, please contact  the [facility] Referrals department at:             [phone]. Monday - Friday 8:00AM - 5:00PM.     Sincerely,    [facility]

## 2025-03-11 NOTE — Clinical Note
REFERRAL APPROVAL NOTICE         Sent on March 11, 2025                   Kaila Haynes  [address] [place] [id number]                   Dear Ms. Haynes,    After a careful review of the medical information and benefit coverage, [facility] has processed your referral. See below for additional details.    If applicable, you must be actively enrolled with your insurance for coverage of the authorized service. If you have any questions regarding your coverage, please contact your insurance directly.    REFERRAL INFORMATION   Referral #:  [id number]  Referred-To Provider    Referred-By Provider:  Orthotics    Lani Greene M.D.   [facility]      [address]  [address]  [place] [place] [address]  [phone] [address]  [employer] [place] [address]  [phone]    Referral Start Date:  03/05/2025  Referral End Date:   03/05/2026             SCHEDULING  If you do not already have an appointment, please call [phone] to make an appointment.     MORE INFORMATION  If you do not already have a [employer] account, sign up at: [email or web address]  You can access your medical information, make appointments, see lab results, billing information, and more.  If you have questions regarding this referral, please contact  the [facility] Referrals department at:             [phone]. Monday - Friday 8:00AM - 5:00PM.     Sincerely,    [facility]

## 2025-03-11 NOTE — Clinical Note
REFERRAL APPROVAL NOTICE         Sent on March 11, 2025                   Kaila Haynes  [address] [place] [id number]                   Dear MsCami Ivy,    After a careful review of the medical information and benefit coverage, [facility] has processed your referral. See below for additional details.    If applicable, you must be actively enrolled with your insurance for coverage of the authorized service. If you have any questions regarding your coverage, please contact your insurance directly.    REFERRAL INFORMATION   Referral #:  [id number]  Referred-To Provider    Referred-By Provider:  Physical Therapy    Lani Greene M.D.   [facility]      [address]  [address]  [place] [facility] [address]  [phone] [address]  [facility] [address]  [phone]    Referral Start Date:  03/05/2025  Referral End Date:   03/05/2026             SCHEDULING  If you do not already have an appointment, please call [phone] to make an appointment.     MORE INFORMATION  If you do not already have a [employer] account, sign up at: [email or web address]  You can access your medical information, make appointments, see lab results, billing information, and more.  If you have questions regarding this referral, please contact  the [facility] Referrals department at:             [phone]. Monday - Friday 8:00AM - 5:00PM.     Sincerely,    [facility]

## 2025-03-12 ENCOUNTER — TELEPHONE (OUTPATIENT)
Dept: MEDICAL GROUP | Facility: LAB | Age: 65
End: 2025-03-12
Payer: MEDICARE

## 2025-03-12 NOTE — TELEPHONE ENCOUNTER
VOICEMAIL  1. Caller Name: [facility]                      Call Back Number: [phone]    2. Message: [facility] is requesting that someone call them back and give them the day supply for the Pregablin 150mg. They stated that it is required per state regulation.     3. Patient approves office to leave a detailed voicemail/MyChart message: N\A

## 2025-03-21 ENCOUNTER — PATIENT MESSAGE (OUTPATIENT)
Dept: MEDICAL GROUP | Facility: LAB | Age: 65
End: 2025-03-21
Payer: MEDICARE

## 2025-03-21 DIAGNOSIS — L40.50 PSORIATIC ARTHRITIS (HCC): ICD-10-CM

## 2025-03-21 RX ORDER — TRIAMCINOLONE ACETONIDE 1 MG/G
1 CREAM TOPICAL 2 TIMES DAILY
Qty: 45 G | Refills: 1 | Status: SHIPPED | OUTPATIENT
Start: 2025-03-21

## 2025-03-21 RX ORDER — TRIAMCINOLONE ACETONIDE 1 MG/ML
LOTION TOPICAL
Start: 2025-03-21

## 2025-03-21 RX ORDER — TRIAMCINOLONE ACETONIDE 1 MG/G
OINTMENT TOPICAL
Qty: 3 EACH | Refills: 0
Start: 2025-03-21

## 2025-03-21 NOTE — PATIENT COMMUNICATION
Received request via: Patient    Was the patient seen in the last year in this department? Yes    Does the patient have an active prescription (recently filled or refills available) for medication(s) requested? No    [facility]     Does the patient have FPC Plus and need 100-day supply? (This applies to ALL medications) Patient does not have SCP

## 2025-03-25 ENCOUNTER — HOSPITAL ENCOUNTER (OUTPATIENT)
Facility: REHABILITATION | Age: 65
End: 2025-03-25
Attending: STUDENT IN AN ORGANIZED HEALTH CARE EDUCATION/TRAINING PROGRAM | Admitting: STUDENT IN AN ORGANIZED HEALTH CARE EDUCATION/TRAINING PROGRAM
Payer: COMMERCIAL

## 2025-03-25 ENCOUNTER — APPOINTMENT (OUTPATIENT)
Dept: RADIOLOGY | Facility: REHABILITATION | Age: 65
End: 2025-03-25
Attending: STUDENT IN AN ORGANIZED HEALTH CARE EDUCATION/TRAINING PROGRAM
Payer: COMMERCIAL

## 2025-03-25 VITALS
OXYGEN SATURATION: 97 % | SYSTOLIC BLOOD PRESSURE: 138 MMHG | TEMPERATURE: 97.3 F | RESPIRATION RATE: 16 BRPM | BODY MASS INDEX: 34.4 KG/M2 | HEIGHT: 72 IN | HEART RATE: 62 BPM | WEIGHT: 253.97 LBS | DIASTOLIC BLOOD PRESSURE: 68 MMHG

## 2025-03-25 PROCEDURE — G0260 INJ FOR SACROILIAC JT ANESTH: HCPCS

## 2025-03-25 PROCEDURE — A9579 GAD-BASE MR CONTRAST NOS,1ML: HCPCS | Mod: JZ

## 2025-03-25 PROCEDURE — 77002 NEEDLE LOCALIZATION BY XRAY: CPT

## 2025-03-25 PROCEDURE — 700117 HCHG RX CONTRAST REV CODE 255: Mod: JZ

## 2025-03-25 PROCEDURE — 700111 HCHG RX REV CODE 636 W/ 250 OVERRIDE (IP): Mod: JZ

## 2025-03-25 RX ORDER — DEXAMETHASONE SODIUM PHOSPHATE 10 MG/ML
INJECTION, SOLUTION INTRAMUSCULAR; INTRAVENOUS
Status: COMPLETED
Start: 2025-03-25 | End: 2025-03-25

## 2025-03-25 RX ORDER — LIDOCAINE HYDROCHLORIDE 10 MG/ML
INJECTION, SOLUTION EPIDURAL; INFILTRATION; INTRACAUDAL; PERINEURAL
Status: COMPLETED
Start: 2025-03-25 | End: 2025-03-25

## 2025-03-25 RX ADMIN — GADOTERIDOL 10 ML: 279.3 INJECTION, SOLUTION INTRAVENOUS at 13:10

## 2025-03-25 RX ADMIN — DEXAMETHASONE SODIUM PHOSPHATE 10 MG: 10 INJECTION, SOLUTION INTRAMUSCULAR; INTRAVENOUS at 13:10

## 2025-03-25 RX ADMIN — LIDOCAINE HYDROCHLORIDE 10 ML: 10 INJECTION, SOLUTION EPIDURAL; INFILTRATION; INTRACAUDAL; PERINEURAL at 13:10

## 2025-03-25 ASSESSMENT — FIBROSIS 4 INDEX: FIB4 SCORE: 1.53

## 2025-03-25 ASSESSMENT — PAIN DESCRIPTION - PAIN TYPE: TYPE: CHRONIC PAIN

## 2025-03-25 NOTE — OP REPORT
Date of Service: 3/25/2025    Patient: Arely Haynes 64 y.o. female     MRN: [id number]     Physician/s: Lani Greene MD    Pre-operative Diagnosis: Sacroiliac dysfunction    Post-operative Diagnosis: Sacroiliac dysfunction     Procedure: BILATERAL  Sacroiliac joint injection    Description of procedure:    The risks, benefits, and alternatives of the procedure were reviewed and discussed with the patient.  Written informed consent was freely obtained. A pre-procedural time-out was conducted by the physician verifying patient’s identity, procedure to be performed, procedure site and side, and allergy verification. Appropriate equipment was determined to be in place for the procedure.     In the fluoroscopy suite the patient was placed in a prone position and the skin was prepped and draped in the usual sterile fashion.     The fluoroscope was placed over the right and left  sacroiliac joint at the appropriate angle for joint entrance, and the target for injection was marked. A 27g needle was placed into each of the marked level(s), and approx 1mL of 1% Lidocaine was injected subcutaneously into the epidermal and dermal layers. The needle was removed. A 25g 3.5 inch needle was then placed down to the level of bone at the posterior aspect of the joints. The needle was then carefully advanced into the joint capsule. The needle tip was then verified by a lateral view. In the AP view, contrast dye was used to highlight the joint line while the fluoroscope was running live. Following negative aspiration, approx 1.5mL of 1% lidocaine with 0.5mL of 10mg/mL dexamethasone was then injected into each joint. The needle was removed intact after restyleted.     The patient's low back was covered with a 4x4 gauze, the area was cleansed with sterile normal saline, and a dressing was applied. There were no complications noted.     Preprocedure pain 5/10 NRS index pain  Postprocedure pain 0/10 NRS index pain    Follow-up as  scheduled    Lani Greene MD  Physical Medicine and Rehabilitation  Interventional Spine and Sports Physiatry  [facility]       CPT  sacroiliac joint with fluoroscopy [id number]. Please note that facilities often bill  instead of the physician code [id number].

## 2025-03-25 NOTE — INTERVAL H&P NOTE
Consented Procedure: RIGHT and LEFT  sacroiliac joint injection with fluoroscopic guidance  I have examined the patient, provided the risks, benefits, and alternatives to the procedure(s) indicated on the signed consent form, and the patient wishes to proceed.    H&P reviewed. The patient was examined and there are no changes to the H&P      Lani Greene M.D.  03/25/25 1:01 PM

## 2025-03-26 ENCOUNTER — TELEPHONE (OUTPATIENT)
Dept: PHYSICAL MEDICINE AND REHAB | Facility: MEDICAL CENTER | Age: 65
End: 2025-03-26
Payer: MEDICARE

## 2025-03-26 NOTE — TELEPHONE ENCOUNTER
Called for post-sp check-up. Pt reported the following regarding the procedure site: RIGHT and LEFT  sacroiliac joint injection with fluoroscopic guidance    Change in pain?: LVM    Concerns?: LVM    Confirmed [facility] appt?: SHEILA, 4/18

## 2025-04-04 ENCOUNTER — OFFICE VISIT (OUTPATIENT)
Dept: MEDICAL GROUP | Facility: LAB | Age: 65
End: 2025-04-04
Payer: COMMERCIAL

## 2025-04-04 VITALS
WEIGHT: 253 LBS | SYSTOLIC BLOOD PRESSURE: 106 MMHG | TEMPERATURE: 97.8 F | DIASTOLIC BLOOD PRESSURE: 60 MMHG | HEART RATE: 65 BPM | BODY MASS INDEX: 34.27 KG/M2 | OXYGEN SATURATION: 97 % | RESPIRATION RATE: 12 BRPM | HEIGHT: 72 IN

## 2025-04-04 DIAGNOSIS — Z12.31 ENCOUNTER FOR SCREENING MAMMOGRAM FOR MALIGNANT NEOPLASM OF BREAST: ICD-10-CM

## 2025-04-04 DIAGNOSIS — J45.50 SEVERE PERSISTENT ASTHMA WITHOUT COMPLICATION: ICD-10-CM

## 2025-04-04 DIAGNOSIS — J01.00 ACUTE NON-RECURRENT MAXILLARY SINUSITIS: ICD-10-CM

## 2025-04-04 DIAGNOSIS — Z78.0 POST-MENOPAUSAL: ICD-10-CM

## 2025-04-04 PROCEDURE — 3078F DIAST BP <80 MM HG: CPT | Performed by: FAMILY MEDICINE

## 2025-04-04 PROCEDURE — 99214 OFFICE O/P EST MOD 30 MIN: CPT | Performed by: FAMILY MEDICINE

## 2025-04-04 PROCEDURE — 3074F SYST BP LT 130 MM HG: CPT | Performed by: FAMILY MEDICINE

## 2025-04-04 ASSESSMENT — FIBROSIS 4 INDEX: FIB4 SCORE: 1.53

## 2025-04-04 NOTE — PROGRESS NOTES
Subjective:     CC: Cough, congestion    HPI:   Kaila 63-year-old female with a complex medical history  that includes psoriatic arthritis, history of PCP pneumonia, chronic immunosuppression, ILD, and severe asthma times with 10-12 days of cough and congestion that is worsening.  Does feel like there was some initial improvement but then the second worsening.  Had fever a few days ago but this has since resolved.  No sore throat.  No trouble breathing and she does not feel like asthma is flared.  She is using Combivent as needed and taking Mucinex.    Current Outpatient Medications   Medication Sig Dispense Refill    amoxicillin-clavulanate (AUGMENTIN) 875-125 MG Tab Take 1 Tablet by mouth 2 times a day for 5 days. 10 Tablet 0    valacyclovir (VALTREX) 1 GM Tab Take 1 Tablet by mouth 2 times a day.      ipratropium-albuterol (DUONEB) 0.5-2.5 (3) MG/3ML nebulizer solution Take 3 mL by nebulization every 4 hours. 270 mL 5    triamcinolone acetonide (KENALOG) 0.1 % Cream Apply 1 Application topically 2 times a day. 45 g 1    pregabalin (LYRICA) 200 MG capsule Take 1 Capsule by mouth every evening for 90 days. 90 Capsule 0    pregabalin (LYRICA) 150 MG Cap TAKE 1 CAPSULE EVERY MORNING (TAKING 150 MG IN THE MORNING  MG IN THE EVENING) 90 Capsule 0    pregabalin (LYRICA) 200 MG capsule Take 1 Capsule by mouth every evening for 90 days. 90 Capsule 0    lamoTRIgine (LAMICTAL) 100 MG Tab TAKE 1 TABLET EVERY MORNING 90 Tablet 0    Mirabegron ER 50 MG TABLET SR 24 HR Take 1 Tablet by mouth every day.      EPINEPHrine (EPIPEN) 0.3 MG/0.3ML Solution Auto-injector solution for injection INJECT 1 PEN IN THE MUSCLE ONE TIME AS DIRECTED FOR ANAPHYLAXIS      pregabalin (LYRICA) 200 MG capsule Take 1 Capsule by mouth 2 times a day.      pregabalin (LYRICA) 100 MG Cap Take 100 mg by mouth.      escitalopram (LEXAPRO) 20 MG tablet TAKE 1 TABLET EVERY MORNING. TAKE WITH ESCITALOPRAM 10 MG FOR A TOTAL DOSE OF 30 MG 90 Tablet 3     etodolac (LODINE) 400 MG tablet TAKE 1 TABLET TWICE A DAY (Patient taking differently: Take 400 mg by mouth 2 times a day. MEDICATION INSTRUCTIONS FOR SURGERY/PROCEDURE SCHEDULED FOR 12/31/2024   DO NOT TAKE 5 DAYS PRIOR TO SURGERY) 180 Tablet 3    escitalopram (LEXAPRO) 10 MG Tab TAKE 1 TABLET EVERY MORNING. TAKE WITH ESCITALOPRAM 20 MG FOR A TOTAL DOSE OF 30 MG 90 Tablet 3    famotidine (PEPCID) 20 MG Tab TAKE 1 TABLET TWICE A DAY FOR GASTROESOPHAGEAL REFLUX DISEASE 180 Tablet 3    DULERA 200-5 MCG/ACT Aerosol USE 2 INHALATIONS TWICE A DAY (Patient taking differently: MEDICATION INSTRUCTIONS FOR SURGERY/PROCEDURE SCHEDULED FOR 12/31/2024  CONTINUE TAKING MED PRIOR TO SURGERY) 39 g 3    omeprazole (PRILOSEC) 40 MG delayed-release capsule TAKE 1 CAPSULE DAILY 90 Capsule 3    montelukast (SINGULAIR) 10 MG Tab TAKE 1 TABLET DAILY (Patient taking differently: Take 10 mg by mouth every evening.   MEDICATION INSTRUCTIONS FOR SURGERY 12/31/2024  OK TO CONTINUE TAKING PRIOR TO SURGERY AND DAY OF SURGERY) 90 Tablet 3    rosuvastatin (CRESTOR) 10 MG Tab TAKE 1 TABLET AT BEDTIME 90 Tablet 3    ipratropium-albuterol (COMBIVENT RESPIMAT)  MCG/ACT Aero Soln Inhale 1 Puff 4 times a day. (Patient taking differently: Inhale 1 Puff 4 times a day.   MEDICATION INSTRUCTIONS FOR SURGERY 12/31/2024  OK TO CONTINUE TAKING PRIOR TO SURGERY AND DAY OF SURGERY) 4 g 5    POTASSIUM PO Take 99 mg by mouth every day.   CONTINUE TAKING MED PRIOR TO SURGERY      loratadine (CLARITIN) 10 MG Tab Take 10 mg by mouth every day.   MEDICATION INSTRUCTIONS FOR SURGERY 12/31/2024  CONTINUE TAKING MED PRIOR TO SURGERY      magnesium oxide (MAG-OX) 400 MG Tab tablet Take 1 Tablet by mouth every morning. 90 Tablet 3     No current facility-administered medications for this visit.       Medications, past medical history, allergies, and social history have been reviewed and updated.      Objective:       Exam:  /60 (BP Location: Left arm, Patient  Position: Sitting, BP Cuff Size: Adult)   Pulse 65   Temp 36.6 °C (97.8 °F) (Temporal)   Resp 12   Ht 1.829 m (6')   Wt 115 kg (253 lb)   LMP  (LMP Unknown)   SpO2 97%   BMI 34.31 kg/m²  Body mass index is 34.31 kg/m².    Constitutional: Alert. Well appearing. No distress.  Skin: Warm, dry, good turgor, no visible rashes.  ENMT: Moist mucous membranes. Normal dentition.  Tympanic membranes are clear.  There is mild cervical LAD.  Oropharynx is clear.  Thick nasal congestion present.  Respiratory: Normal effort.  Has mild bilateral end expiratory wheezing but good air movement bilaterally.  No crackles or rhonchi.  Cardiovascular: Regular rate and rhythm.  Neuro: Moves all four extremities. No facial droop.  Psych: Answers questions appropriately. Normal affect and mood.    Assessment & Plan:     64 y.o. female with the following -     1. Acute non-recurrent maxillary sinusitis  Worsening cough and congestion not improving over the last 10 to 12 days.  Thick mucus on exam.  Does think that she had second worsening.  Given second worsening and persistent symptoms will cover for sinusitis.  - amoxicillin-clavulanate (AUGMENTIN) 875-125 MG Tab; Take 1 Tablet by mouth 2 times a day for 5 days.  Dispense: 10 Tablet; Refill: 0    2. Severe persistent asthma without complication  Minimal wheezing with good air movement on exam and SpO2 97% on RA today.  Do not think steroid course warranted for exacerbation and she will continue with current inhalers.    3. Encounter for screening mammogram for malignant neoplasm of breast  - MA-SCREENING MAMMO BILAT W/TOMOSYNTHESIS W/CAD; Future    4. Post-menopausal  - DS-BONE DENSITY STUDY (DEXA); Future      Please note that this note was created using voice recognition software.

## 2025-04-06 ASSESSMENT — ENCOUNTER SYMPTOMS
DEPRESSION: 0
CHILLS: 0
NAUSEA: 0
ORTHOPNEA: 0
NECK PAIN: 0
WHEEZING: 1
HEADACHES: 0
WEIGHT LOSS: 0
DIZZINESS: 0
SHORTNESS OF BREATH: 1
FEVER: 0
VOMITING: 0
COUGH: 1
PALPITATIONS: 0

## 2025-04-07 ENCOUNTER — OFFICE VISIT (OUTPATIENT)
Dept: SLEEP MEDICINE | Facility: MEDICAL CENTER | Age: 65
End: 2025-04-07
Attending: INTERNAL MEDICINE
Payer: COMMERCIAL

## 2025-04-07 VITALS
DIASTOLIC BLOOD PRESSURE: 62 MMHG | HEIGHT: 72 IN | WEIGHT: 253 LBS | BODY MASS INDEX: 34.27 KG/M2 | OXYGEN SATURATION: 96 % | SYSTOLIC BLOOD PRESSURE: 110 MMHG | HEART RATE: 73 BPM

## 2025-04-07 DIAGNOSIS — J32.9 SINUSITIS, UNSPECIFIED CHRONICITY, UNSPECIFIED LOCATION: ICD-10-CM

## 2025-04-07 DIAGNOSIS — J45.51 SEVERE PERSISTENT ASTHMA WITH ACUTE EXACERBATION: ICD-10-CM

## 2025-04-07 DIAGNOSIS — J45.50 SEVERE PERSISTENT ASTHMA WITHOUT COMPLICATION: ICD-10-CM

## 2025-04-07 PROCEDURE — 99215 OFFICE O/P EST HI 40 MIN: CPT | Performed by: INTERNAL MEDICINE

## 2025-04-07 PROCEDURE — 99212 OFFICE O/P EST SF 10 MIN: CPT | Performed by: INTERNAL MEDICINE

## 2025-04-07 RX ORDER — IPRATROPIUM BROMIDE AND ALBUTEROL 20; 100 UG/1; UG/1
1 SPRAY, METERED RESPIRATORY (INHALATION) 4 TIMES DAILY
Qty: 4 G | Refills: 0 | Status: SHIPPED | OUTPATIENT
Start: 2025-04-07 | End: 2025-04-07 | Stop reason: SDUPTHER

## 2025-04-07 RX ORDER — PREDNISONE 20 MG/1
40 TABLET ORAL DAILY
Qty: 20 TABLET | Refills: 1 | Status: SHIPPED | OUTPATIENT
Start: 2025-04-07 | End: 2025-04-17

## 2025-04-07 RX ORDER — IPRATROPIUM BROMIDE AND ALBUTEROL 20; 100 UG/1; UG/1
1 SPRAY, METERED RESPIRATORY (INHALATION) 4 TIMES DAILY
Qty: 4 G | Refills: 11 | Status: SHIPPED | OUTPATIENT
Start: 2025-04-07

## 2025-04-07 ASSESSMENT — ENCOUNTER SYMPTOMS: SPUTUM PRODUCTION: 0

## 2025-04-07 ASSESSMENT — FIBROSIS 4 INDEX: FIB4 SCORE: 1.53

## 2025-04-07 NOTE — PROGRESS NOTES
Pulmonary Clinic Follow Up Note    Chief Complaint:   Chief Complaint   Patient presents with    Follow-Up     Last seen 11/25/24 w/ Dr. Jalloh for Severe persistent asthma without complication     HPI:   4/29/2024- Initial Consult  Arely Haynes is a 63 y.o. female who is referred to the pulmonary clinic for severe asthma.     From my H&P 3/22/2024 during ICU stay   63 y.o. female who presented 3/22/2024 with worsening shortness of breath.  She has a history of asthma for which she takes Advair 250 daily.  A couple of weeks ago patient had an upper respiratory infection and had an increase in her respiratory symptoms.  At that time, she increased her Advair to 500 daily.  She then traveled to [facility] to see her son and he shortness of breath and wheezing increased.  She saw urgent care on Sunday, 3/17/2024 and was given a prednisone taper.  She has been taking the prednisone, however, this morning she had increasing work of breathing and presented to the ER.  In the ER, she was noted to be hypoxemic and required high flow nasal cannula.  She was given IV fluids, IV magnesium, continuous DuoNeb, and Solu-Medrol.  She was subsequently admitted to the ICU.  On my examination, patient continued to be dyspneic and very wheezy.  She was started on continuous albuterol which has improved her symptoms.  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  After that time, she was taking Humira up until about 6 months ago.  At this time, she is currently managed with NSAIDs only.  She did see an ILD specialist after her PJP pneumonia who reviewed her imaging and stated that she did not have any lasting damage from the PJP pneumonia.  I have also reviewed her imaging and agree with this    Patient was discharged home 2 days after admission with 2 to 3 L of oxygen, which she has since weaned off.  She has  had a kathy few weeks since her discharge and has nearly needed another round of steroids.  She says that the last day or so, her symptoms have started to clear and she is starting to feel much better.  We discussed that she needs to be on a biologic for her asthma as she is very severe and had a significant recent hospitalization.    8/7/2024  Seen by dee 5/7 and we got her Nucala started.  She has been taking it and overall, thinks that she is much better.  She did have to skip this last dose due to urinary tract infection.  She recently had a flare after being exposed to some campfire.  She has not required any additional steroids.    4/7/2025  In October, patient had developed a rash to Nucala x 2 doses.  We changed her to Dupixent to be administered at the infusion center, which was severely delayed due to insurance constraints.  She had approval to take it in early February, but was pending a knee replacement and was advised by pharmacy to wait until after the surgery to start the Dupixent.   Today, she presents with severe shortness of breath, cough, wheezing.  She had a respiratory viral illness little more than a week ago and then was subsequently diagnosed with a sinus infection and started on antibiotics.  She started to develop wheezing over the last couple of days and now feels like she has significant chest tightness.  She is in mild distress at this time.  She does not feel like she needs to be hospitalized, but certainly needs to start steroids.    Past Medical History:   Diagnosis Date    Anesthesia     no self problems; 1st cousin MH    Arthritis     hips knees hands spine    Asthma     prn inhalers    Bronchitis     PCP 2021. Periodic bronchitis    Cataract 2024    Surgery    Concussion     Depression     Daughter passed few years ago    Erosive esophagitis     GERD (gastroesophageal reflux disease)     Heart burn     Heart murmur Early 20’s    Hiatus hernia syndrome     High cholesterol      Immunocompromised (HCC)     Indigestion GERD 20 years    Infection     10/24 toe antibiotics    Pain     hips knees    Pneumonia 2018    Psoriasis     Psoriatic arthritis (HCC)     Sleep apnea 2022    Snoring     UTI (urinary tract infection)     10/24 antiobiotics     Social History     Socioeconomic History    Marital status:      Spouse name: Not on file    Number of children: Not on file    Years of education: Not on file    Highest education level: Bachelor's degree (e.g., BA, AB, BS)   Occupational History    Occupation: Flight Nurse/Educator   Tobacco Use    Smoking status: Never     Passive exposure: Never    Smokeless tobacco: Never   Vaping Use    Vaping status: Never Used   Substance and Sexual Activity    Alcohol use: Yes     Alcohol/week: 3.6 oz     Types: 6 Glasses of wine per week     Comment: 1-2 glasses 4 times per week    Drug use: Never    Sexual activity: Yes     Comment: Tubal ligation   Other Topics Concern     Service No    Blood Transfusions No    Caffeine Concern No    Occupational Exposure Yes    Hobby Hazards Yes    Sleep Concern Yes    Stress Concern No    Weight Concern Yes    Special Diet No    Back Care No    Exercise Yes    Bike Helmet Yes    Seat Belt Yes    Self-Exams Yes   Social History Narrative    Not on file     Social Drivers of Health     Financial Resource Strain: Low Risk  (1/1/2025)    Overall Financial Resource Strain (CARDIA)     Difficulty of Paying Living Expenses: Not hard at all   Food Insecurity: No Food Insecurity (1/1/2025)    Hunger Vital Sign     Worried About Running Out of Food in the Last Year: Never true     Ran Out of Food in the Last Year: Never true   Transportation Needs: No Transportation Needs (1/1/2025)    PRAPARE - Transportation     Lack of Transportation (Medical): No     Lack of Transportation (Non-Medical): No   Physical Activity: Unknown (1/1/2025)    Exercise Vital Sign     Days of Exercise per Week: 0 days     Minutes of  Exercise per Session: Not on file   Stress: No Stress Concern Present (1/1/2025)    [place] [employer] - Occupational Stress Questionnaire     Feeling of Stress : Only a little   Social Connections: Socially Integrated (1/1/2025)    Social Connection and Isolation Panel [NHANES]     Frequency of Communication with Friends and Family: More than three times a week     Frequency of Social Gatherings with Friends and Family: Twice a week     Attends Sabianism Services: More than 4 times per year     Active Member of Clubs or Organizations: Yes     Attends Club or Organization Meetings: More than 4 times per year     Marital Status:    Intimate Partner Violence: Not on file   Housing Stability: Unknown (1/1/2025)    Housing Stability Vital Sign     Unable to Pay for Housing in the Last Year: No     Number of Times Moved in the Last Year: Not on file     Homeless in the Last Year: No        Allergies: Bee venom, Iodine, Levofloxacin, and Shellfish-derived products    Review of Systems   Constitutional:  Negative for chills, fever and weight loss.   Respiratory:  Positive for cough, shortness of breath and wheezing. Negative for sputum production.    Cardiovascular:  Negative for chest pain, palpitations and orthopnea.   Gastrointestinal:  Negative for nausea and vomiting.   Musculoskeletal:  Negative for neck pain.   Neurological:  Negative for dizziness and headaches.   Psychiatric/Behavioral:  Negative for depression and suicidal ideas.      Vitals:  /62 (BP Location: Left arm, Patient Position: Sitting, BP Cuff Size: Adult)   Pulse 73   Ht 1.829 m (6')   Wt 115 kg (253 lb)   SpO2 96%     Physical Exam  Vitals reviewed.   Constitutional:       Appearance: Normal appearance.   HENT:      Head: Normocephalic and atraumatic.   Eyes:      Conjunctiva/sclera: Conjunctivae normal.   Cardiovascular:      Rate and Rhythm: Normal rate and regular rhythm.   Pulmonary:      Effort:  Respiratory distress (mild) present.      Breath sounds: Decreased breath sounds present. No wheezing.   Abdominal:      Palpations: Abdomen is soft.   Musculoskeletal:      Right lower leg: No edema.      Left lower leg: No edema.   Skin:     General: Skin is warm and dry.   Neurological:      General: No focal deficit present.      Mental Status: She is alert and oriented to person, place, and time. Mental status is at baseline.   Psychiatric:         Mood and Affect: Mood normal.         Behavior: Behavior normal.         Laboratory Data:      Pertinent Studies:    PFTs as reviewed by me personally show:  2018- normal     Imaging as reviewed by me personally show:      Echo:  6/14/2024  CONCLUSIONS  The ejection fraction is measured to be 55 % by Pickett's biplane.  Normal regional wall motion.  Normal right ventricular size and systolic function.  No significant valvular disease.      7/15/2021  CONCLUSIONS  Normal left ventricular chamber size.  Left ventricular ejection fraction is visually estimated to be 65%.  Mild aortic insufficiency.  Normal right ventricular size and systolic function.  Right heart pressures are normal.     Assessment/Plan:    Problem List Items Addressed This Visit       Severe persistent asthma without complication    With exacerbation    Start prednisone 40 mg x 10 days, also given a refill  Continue with Latesha, will do these every 4 hours while awake  Combivent refill given today  Continue with Dulera  Will start Dupixent when she is out of an exacerbation, this has been approved  Patient agrees to present to the ER should she need to, she will monitor her saturations  Continue with allergy treatments         Relevant Medications    predniSONE (DELTASONE) 20 MG Tab    amoxicillin-clavulanate (AUGMENTIN) 875-125 MG Tab    ipratropium-albuterol (COMBIVENT RESPIMAT)  MCG/ACT Aero Soln     Other Visit Diagnoses         Sinusitis, unspecified chronicity, unspecified location         Relevant Medications    amoxicillin-clavulanate (AUGMENTIN) 875-125 MG Tab      Severe persistent asthma with acute exacerbation        Relevant Medications    ipratropium-albuterol (COMBIVENT RESPIMAT)  MCG/ACT Aero Soln            Return in about 3 months (around 7/7/2025).     This note was generated using voice recognition software which has a chance of producing errors of grammar and possibly content.  I have made every reasonable attempt to find and correct any obvious errors, but it should be expected that some may not be found prior to finalization of this note.    Time spent in record review prior to patient arrival, reviewing results, and in face-to-face encounter totaled 45 min, excluding any procedures if performed.    Arin Whiteside,    Pulmonary and Critical Care  [facility]

## 2025-04-07 NOTE — ASSESSMENT & PLAN NOTE
With exacerbation    Start prednisone 40 mg x 10 days, also given a refill  Continue with DuoNebs, will do these every 4 hours while awake  Combivent refill given today  Continue with Dulera  Will start Dupixent when she is out of an exacerbation, this has been approved  Patient agrees to present to the ER should she need to, she will monitor her saturations  Continue with allergy treatments

## 2025-04-17 ENCOUNTER — APPOINTMENT (OUTPATIENT)
Dept: URBAN - METROPOLITAN AREA CLINIC 6 | Facility: CLINIC | Age: 65
Setting detail: DERMATOLOGY
End: 2025-04-17

## 2025-04-17 NOTE — PROGRESS NOTES
Verbal consent was acquired by the patient to use MANDIETellme ambient listening note generation during this visit Yes     FOLLOW-UP PATIENT VISIT    Interventional Spine and Pain  Physiatry (Physical Medicine and Rehabilitation)     Date of Service: 25   Chief Complaint: No chief complaint on file.     Patient Name: Arely Haynes   : 1960   MRN: [id number]       PRIOR HISTORY    Please see new patient note by Dr. Greene for more details.     Interval History  Patient identification: Arely Haynes,  1960, with history of psoriatic arthritis, L4-5 PSF, right knee arthroplasty 2025, right hip arthroplasty, GERD, who presents today for follow-up of SI joint pain.    Orthotics, PT    History of Present Illness      Procedures:  3/25/25: Bilateral SI joint steroid injections       Red Flags ROS: ***  Fever, Chills, Sweats: Denies  Involuntary Weight Loss: Denies  Bladder Incontinence: Denies  Bowel Incontinence: Denies  Saddle Anesthesia: Denies      PMHx:   Past Medical History:   Diagnosis Date    Anesthesia     no self problems; 1st cousin MH    Arthritis     hips knees hands spine    Asthma     prn inhalers    Bronchitis     PCP . Periodic bronchitis    Cataract     Surgery    Concussion     Depression     Daughter passed few years ago    Erosive esophagitis     GERD (gastroesophageal reflux disease)     Heart burn     Heart murmur Early s    Hiatus hernia syndrome     High cholesterol     Immunocompromised (HCC)     Indigestion GERD 20 years    Infection     10/24 toe antibiotics    Pain     hips knees    Pneumonia 2018    Psoriasis     Psoriatic arthritis (HCC)     Sleep apnea     Snoring     UTI (urinary tract infection)     10/24 antiobiotics    Wheezing        PSHx:   Past Surgical History:   Procedure Laterality Date    ID INJECTION,SACROILIAC JOINT Bilateral 3/25/2025    Procedure: RIGHT and LEFT  sacroiliac joint injection with fluoroscopic guidance;  Surgeon:  Lani Greene M.D.;  [facility];  Service: Pain Management    KNEE ARTHROPLASTY TOTAL Right 2/4/2025    Procedure: RIGHT TOTAL KNEE ARTHROPLASTY;  Surgeon: Harsh Hensley M.D.;  Location: [facility];  Service: Orthopedics    CATARACT EXTRACTION WITH IOL Bilateral 2024    HI INJECTION,SACROILIAC JOINT Left 07/28/2023    Procedure: LEFT sacroiliac joint injection;  Surgeon: Elvis Aleman M.D.;  [facility];  Service: Pain Management    HI INJ LUMBAR/SACRAL,W/ IMAGING Right 06/02/2023    Procedure: RIGHT L5-S1 interlaminar epidural steroid injection. Patient has tolerated gadolinium;  Surgeon: Elvis Aleman M.D.;  [facility]: [facility];  Service: Pain Management    HI BRONCHOSCOPY,DIAGNOSTIC  07/09/2021    Procedure: BRONCHOSCOPY;  Surgeon: Myles Vidal M.D.;  Location: [facility];  Service: Ent    TENDON REPAIR Left 09/19/2019    Procedure: REPAIR, TENDON- QUADRICEPS RUTURE REPAIR AND MEYERS;  Surgeon: Jayden Velázquez M.D.;  Location: [facility];  Service: Orthopedics    HYSTERECTOMY ROBOTIC XI N/A 07/11/2019    Procedure: HYSTERECTOMY, ROBOT-ASSISTED, USING DA SABINO XI;  Surgeon: Curly Bernal M.D.;  Location: [facility];  Service: Gynecology    SALPINGO OOPHORECTOMY Bilateral 07/11/2019    Procedure: SALPINGO-OOPHORECTOMY;  Surgeon: Curly Bernal M.D.;  Location: [facility];  Service: Gynecology    FUSION, SPINE, LUMBAR, PLIF  2004    L4-5    GYN SURGERY  1999    c sec    HIP REPLACEMENT, TOTAL      Right    ORIF, KNEE      Left patellar tendon repair    OTHER      Various ortho. Minor    OTHER ABDOMINAL SURGERY      appendix    OTHER ORTHOPEDIC SURGERY      left knee    OTHER ORTHOPEDIC SURGERY Left     L rotator cuff    SHOULDER SURGERY  1980    Rotator cuff repair l       Family history   Family History   Problem Relation Age of Onset    Hyperlipidemia Mother     Heart Attack  Mother     Psychiatric Illness Mother     Heart Disease Mother     Arthritis Mother     Lung Disease Father         Pancoast tumor    Cancer Father         Pancoast    Hyperlipidemia Father     Stroke Father     Hyperlipidemia Sister     Cancer Sister         Breast cancer    Heart Disease Paternal Grandfather     Hypertension Neg Hx     Diabetes Neg Hx        Medications:   No outpatient medications have been marked as taking for the 4/18/25 encounter (Appointment) with Lani Greene M.D..        Current Outpatient Medications on File Prior to Visit   Medication Sig Dispense Refill    predniSONE (DELTASONE) 20 MG Tab Take 2 Tablets by mouth every day for 10 days. 20 Tablet 1    ipratropium-albuterol (COMBIVENT RESPIMAT)  MCG/ACT Aero Soln Inhale 1 Puff 4 times a day. 4 g 11    triamcinolone acetonide (KENALOG) 0.1 % Cream Apply 1 Application topically 2 times a day. 45 g 1    pregabalin (LYRICA) 200 MG capsule Take 1 Capsule by mouth every evening for 90 days. 90 Capsule 0    pregabalin (LYRICA) 150 MG Cap TAKE 1 CAPSULE EVERY MORNING (TAKING 150 MG IN THE MORNING  MG IN THE EVENING) 90 Capsule 0    lamoTRIgine (LAMICTAL) 100 MG Tab TAKE 1 TABLET EVERY MORNING 90 Tablet 0    Mirabegron ER 50 MG TABLET SR 24 HR Take 1 Tablet by mouth every day.      valacyclovir (VALTREX) 1 GM Tab Take 1 Tablet by mouth 2 times a day.      EPINEPHrine (EPIPEN) 0.3 MG/0.3ML Solution Auto-injector solution for injection INJECT 1 PEN IN THE MUSCLE ONE TIME AS DIRECTED FOR ANAPHYLAXIS      pregabalin (LYRICA) 200 MG capsule Take 1 Capsule by mouth 2 times a day.      pregabalin (LYRICA) 100 MG Cap Take 100 mg by mouth.      escitalopram (LEXAPRO) 20 MG tablet TAKE 1 TABLET EVERY MORNING. TAKE WITH ESCITALOPRAM 10 MG FOR A TOTAL DOSE OF 30 MG 90 Tablet 3    etodolac (LODINE) 400 MG tablet TAKE 1 TABLET TWICE A DAY (Patient taking differently: Take 400 mg by mouth 2 times a day. MEDICATION INSTRUCTIONS FOR SURGERY/PROCEDURE  SCHEDULED FOR 12/31/2024   DO NOT TAKE 5 DAYS PRIOR TO SURGERY) 180 Tablet 3    escitalopram (LEXAPRO) 10 MG Tab TAKE 1 TABLET EVERY MORNING. TAKE WITH ESCITALOPRAM 20 MG FOR A TOTAL DOSE OF 30 MG 90 Tablet 3    famotidine (PEPCID) 20 MG Tab TAKE 1 TABLET TWICE A DAY FOR GASTROESOPHAGEAL REFLUX DISEASE 180 Tablet 3    DULERA 200-5 MCG/ACT Aerosol USE 2 INHALATIONS TWICE A DAY (Patient taking differently: MEDICATION INSTRUCTIONS FOR SURGERY/PROCEDURE SCHEDULED FOR 12/31/2024  CONTINUE TAKING MED PRIOR TO SURGERY) 39 g 3    omeprazole (PRILOSEC) 40 MG delayed-release capsule TAKE 1 CAPSULE DAILY 90 Capsule 3    montelukast (SINGULAIR) 10 MG Tab TAKE 1 TABLET DAILY (Patient taking differently: Take 10 mg by mouth every evening.   MEDICATION INSTRUCTIONS FOR SURGERY 12/31/2024  OK TO CONTINUE TAKING PRIOR TO SURGERY AND DAY OF SURGERY) 90 Tablet 3    rosuvastatin (CRESTOR) 10 MG Tab TAKE 1 TABLET AT BEDTIME 90 Tablet 3    ipratropium-albuterol (DUONEB) 0.5-2.5 (3) MG/3ML nebulizer solution Take 3 mL by nebulization every 4 hours. 270 mL 5    POTASSIUM PO Take 99 mg by mouth every day.   CONTINUE TAKING MED PRIOR TO SURGERY      loratadine (CLARITIN) 10 MG Tab Take 10 mg by mouth every day.   MEDICATION INSTRUCTIONS FOR SURGERY 12/31/2024  CONTINUE TAKING MED PRIOR TO SURGERY      magnesium oxide (MAG-OX) 400 MG Tab tablet Take 1 Tablet by mouth every morning. 90 Tablet 3     No current facility-administered medications on file prior to visit.       Allergies:   Allergies   Allergen Reactions    Bee Venom Anaphylaxis     Pt is allergic to bees.     Iodine Anaphylaxis, Hives and Rash     IV = anaphylaxis, no topical rxn.   IV = anaphylaxis,     Levofloxacin Anaphylaxis    Nucala [Mepolizumab] Rash and Swelling     Suspect allergic reaction to Nucala, not totally diagnosed    Shellfish-Derived Products Anaphylaxis     LOBSTER       Social Hx:   Social History     Socioeconomic History    Marital status:      Spouse  name: Not on file    Number of children: Not on file    Years of education: Not on file    Highest education level: Bachelor's degree (e.g., BA, AB, BS)   Occupational History    Occupation: Flight Nurse/Educator   Tobacco Use    Smoking status: Never     Passive exposure: Never    Smokeless tobacco: Never   Vaping Use    Vaping status: Never Used   Substance and Sexual Activity    Alcohol use: Yes     Alcohol/week: 3.6 oz     Types: 6 Glasses of wine per week     Comment: 1-2 glasses 4 times per week    Drug use: Never    Sexual activity: Yes     Comment: Tubal ligation   Other Topics Concern     Service No    Blood Transfusions No    Caffeine Concern No    Occupational Exposure Yes    Hobby Hazards Yes    Sleep Concern Yes    Stress Concern No    Weight Concern Yes    Special Diet No    Back Care No    Exercise Yes    Bike Helmet Yes    Seat Belt Yes    Self-Exams Yes   Social History Narrative    Not on file     Social Drivers of Health     Financial Resource Strain: Low Risk  (1/1/2025)    Overall Financial Resource Strain (CARDIA)     Difficulty of Paying Living Expenses: Not hard at all   Food Insecurity: No Food Insecurity (1/1/2025)    Hunger Vital Sign     Worried About Running Out of Food in the Last Year: Never true     Ran Out of Food in the Last Year: Never true   Transportation Needs: No Transportation Needs (1/1/2025)    PRAPARE - Transportation     Lack of Transportation (Medical): No     Lack of Transportation (Non-Medical): No   Physical Activity: Unknown (1/1/2025)    Exercise Vital Sign     Days of Exercise per Week: 0 days     Minutes of Exercise per Session: Not on file   Stress: No Stress Concern Present (1/1/2025)    Citizen of the [place] of Occupational Health - Occupational Stress Questionnaire     Feeling of Stress : Only a little   Social Connections: Socially Integrated (1/1/2025)    Social Connection and Isolation Panel [NHANES]     Frequency of Communication with Friends and  Family: More than three times a week     Frequency of Social Gatherings with Friends and Family: Twice a week     Attends Rastafari Services: More than 4 times per year     Active Member of Clubs or Organizations: Yes     Attends Club or Organization Meetings: More than 4 times per year     Marital Status:    Intimate Partner Violence: Not on file   Housing Stability: Unknown (2025)    Housing Stability Vital Sign     Unable to Pay for Housing in the Last Year: No     Number of Times Moved in the Last Year: Not on file     Homeless in the Last Year: No         EXAMINATION     Physical Exam:   Vitals: There were no vitals taken for this visit.    Physical Exam         Constitutional:   Body Habitus: There is no height or weight on file to calculate BMI.  Cooperation: Fully cooperates with exam  Appearance: Well-groomed, well-nourished  Respiratory:  Breathing comfortable on room air, no audible wheezing  Cardiovascular: Skin appears well-perfused  Psychiatric: Appropriate affect  Gait: normal gait, no use of ambulatory device, nonantalgic. [place] ***    Neurologic:  Strength:    Bilateral UE 5/5 in shoulder abductors, elbow extensors and flexors, wrist extensors, finger abductors, and finger flexors   Bilateral LE 5/5 in hip flexors, knee extensors and flexors, ankle dorsiflexors and plantarflexors, great toe extensors   Reflexes: 2+ in bilateral patella and achilles (brachioradialis, biceps, triceps)   Sensation: grossly intact bilaterally dermatomes L3-S1 (C5-T1)   MSK:?No?TTP across axial spinous processes and paraspinals bilaterally, has?preserved?active lumbar ROM with pain with ***     Special?tests:    Negative slump test bilaterally   Negative FADIR, log roll bilaterally   Negative thigh thrust, OPAL bilaterally   Negative facet loading maneuvers bilaterally       MEDICAL DECISION MAKING    DATA    Labs:   Lab Results   Component Value Date/Time    SODIUM 143 2025 11:46 AM     POTASSIUM 3.8 01/20/2025 11:46 AM    CHLORIDE 106 01/20/2025 11:46 AM    CO2 24 01/20/2025 11:46 AM    GLUCOSE 66 01/20/2025 11:46 AM    BUN 23 (H) 01/20/2025 11:46 AM    CREATININE 0.69 01/20/2025 11:46 AM        Lab Results   Component Value Date/Time    PROTHROMBTM 14.1 07/02/2023 11:10 PM    INR 1.05 07/02/2023 11:10 PM        Lab Results   Component Value Date/Time    WBC 6.8 01/20/2025 11:46 AM    RBC 4.83 01/20/2025 11:46 AM    HEMOGLOBIN 14.9 01/20/2025 11:46 AM    HEMATOCRIT 44.9 01/20/2025 11:46 AM    MCV 93.0 01/20/2025 11:46 AM    MCH 30.8 01/20/2025 11:46 AM    MCHC 33.2 01/20/2025 11:46 AM    MPV 9.9 01/20/2025 11:46 AM    NEUTSPOLYS 53.10 01/20/2025 11:46 AM    LYMPHOCYTES 36.60 01/20/2025 11:46 AM    MONOCYTES 7.60 01/20/2025 11:46 AM    EOSINOPHILS 1.80 01/20/2025 11:46 AM    BASOPHILS 0.60 01/20/2025 11:46 AM        Lab Results   Component Value Date/Time    HBA1C 5.3 10/04/2024 01:16 PM          Imaging:   Prior personal imaging review:  MRI Lumbar Spine 4/9/23: L4-5 posterior fusion. L1 hemangioma. Grade 1 L3-4 retrolisthesis, L4-5 anterolisthesis. L3-4 moderate canal moderate to severe right and mild to moderate left subarticular stenosis. L4-5 mild bilateral subarticular stenosis. L5-S1 mild bilateral subarticular stenosis. Multilevel facet arthropathy. Bilateral sacroiliac joint arthropathy.        IMAGING radiology reads: I reviewed the following radiology reports    Results for orders placed during the hospital encounter of 04/09/23     MR-LUMBAR SPINE-WITH & W/O     Impression  1.  Postsurgical changes at L4-L5 redemonstrated  2.  Multilevel multifactorial degenerative changes  3.  No areas of high-grade central canal narrowing  4.  Areas of central canal and neural foraminal narrowing as described above            DIAGNOSIS   {No diagnosis found. (Refresh or delete this SmartLink)}      ASSESSMENT and PLAN:     Arely Joya Ivy is a 64 y.o. female who returns to clinic for follow-up of  ***.    There are no diagnoses linked to this encounter.    Assessment & Plan        -***    Follow up: {cktime follow up:24330}    Thank you for allowing me to participate in the care of this patient. If you have any questions please not hesitate to contact me.         Please note that this dictation was created using voice recognition software. I have made every reasonable attempt to correct obvious errors but there may be errors of grammar and content that I may have overlooked prior to finalization of this note.    Lani Greene MD  Interventional Spine and Sports Physiatry  Physical Medicine and Rehabilitation  [facility]

## 2025-04-18 ENCOUNTER — APPOINTMENT (OUTPATIENT)
Dept: URGENT CARE | Facility: CLINIC | Age: 65
End: 2025-04-18
Payer: COMMERCIAL

## 2025-04-18 ENCOUNTER — APPOINTMENT (OUTPATIENT)
Dept: PHYSICAL MEDICINE AND REHAB | Facility: MEDICAL CENTER | Age: 65
End: 2025-04-18
Payer: MEDICARE

## 2025-04-18 ENCOUNTER — OFFICE VISIT (OUTPATIENT)
Dept: URGENT CARE | Facility: CLINIC | Age: 65
End: 2025-04-18
Payer: COMMERCIAL

## 2025-04-18 ENCOUNTER — HOSPITAL ENCOUNTER (OUTPATIENT)
Facility: MEDICAL CENTER | Age: 65
End: 2025-04-18
Attending: PHYSICIAN ASSISTANT
Payer: COMMERCIAL

## 2025-04-18 VITALS
HEIGHT: 72 IN | HEART RATE: 80 BPM | RESPIRATION RATE: 16 BRPM | TEMPERATURE: 97.9 F | DIASTOLIC BLOOD PRESSURE: 79 MMHG | OXYGEN SATURATION: 94 % | SYSTOLIC BLOOD PRESSURE: 124 MMHG | WEIGHT: 253 LBS | BODY MASS INDEX: 34.27 KG/M2

## 2025-04-18 DIAGNOSIS — R39.9 LOWER URINARY TRACT SYMPTOMS (LUTS): Primary | ICD-10-CM

## 2025-04-18 DIAGNOSIS — R39.9 LOWER URINARY TRACT SYMPTOMS (LUTS): ICD-10-CM

## 2025-04-18 LAB
APPEARANCE UR: CLEAR
BILIRUB UR STRIP-MCNC: NORMAL MG/DL
COLOR UR AUTO: YELLOW
GLUCOSE UR STRIP.AUTO-MCNC: NEGATIVE MG/DL
KETONES UR STRIP.AUTO-MCNC: NEGATIVE MG/DL
LEUKOCYTE ESTERASE UR QL STRIP.AUTO: NORMAL
NITRITE UR QL STRIP.AUTO: NEGATIVE
PH UR STRIP.AUTO: 6 [PH] (ref 5–8)
PROT UR QL STRIP: 100 MG/DL
RBC UR QL AUTO: NORMAL
SP GR UR STRIP.AUTO: 1.02
UROBILINOGEN UR STRIP-MCNC: 0.2 MG/DL

## 2025-04-18 PROCEDURE — 87077 CULTURE AEROBIC IDENTIFY: CPT

## 2025-04-18 PROCEDURE — 99214 OFFICE O/P EST MOD 30 MIN: CPT | Performed by: PHYSICIAN ASSISTANT

## 2025-04-18 PROCEDURE — 87086 URINE CULTURE/COLONY COUNT: CPT

## 2025-04-18 PROCEDURE — 3074F SYST BP LT 130 MM HG: CPT | Performed by: PHYSICIAN ASSISTANT

## 2025-04-18 PROCEDURE — 87186 SC STD MICRODIL/AGAR DIL: CPT

## 2025-04-18 PROCEDURE — 3078F DIAST BP <80 MM HG: CPT | Performed by: PHYSICIAN ASSISTANT

## 2025-04-18 PROCEDURE — 81002 URINALYSIS NONAUTO W/O SCOPE: CPT | Performed by: PHYSICIAN ASSISTANT

## 2025-04-18 RX ORDER — CEFDINIR 300 MG/1
300 CAPSULE ORAL 2 TIMES DAILY
Qty: 14 CAPSULE | Refills: 0 | Status: SHIPPED | OUTPATIENT
Start: 2025-04-18 | End: 2025-04-25

## 2025-04-18 RX ORDER — PHENAZOPYRIDINE HYDROCHLORIDE 200 MG/1
200 TABLET, FILM COATED ORAL 3 TIMES DAILY PRN
Qty: 6 TABLET | Refills: 0 | Status: SHIPPED | OUTPATIENT
Start: 2025-04-18

## 2025-04-18 ASSESSMENT — ENCOUNTER SYMPTOMS
ABDOMINAL PAIN: 0
DIARRHEA: 0
FEVER: 0
CHILLS: 1
NAUSEA: 0
VOMITING: 0
HEADACHES: 0
FLANK PAIN: 0
BACK PAIN: 0

## 2025-04-18 ASSESSMENT — FIBROSIS 4 INDEX: FIB4 SCORE: 1.53

## 2025-04-18 NOTE — PROGRESS NOTES
Subjective     Kaila Haynes is a 64 y.o. female who presents with UTI            Dysuria   This is a new problem. The current episode started in the past 7 days (2-3 days). The problem occurs every urination. The problem has been gradually worsening. The quality of the pain is described as burning. The pain is moderate. There has been no fever. There is No history of pyelonephritis. Associated symptoms include chills, frequency and urgency. Pertinent negatives include no discharge, flank pain, hematuria, hesitancy, nausea or vomiting. She has tried nothing for the symptoms. Her past medical history is significant for recurrent UTIs.         Past Medical History:   Diagnosis Date    Anesthesia     no self problems; 1st cousin MH    Arthritis     hips knees hands spine    Asthma     prn inhalers    Bronchitis     PCP 2021. Periodic bronchitis    Cataract 2024    Surgery    Concussion     Depression     Daughter passed few years ago    Erosive esophagitis     GERD (gastroesophageal reflux disease)     Heart burn     Heart murmur Early 20’s    Hiatus hernia syndrome     High cholesterol     Immunocompromised (HCC)     Indigestion GERD 20 years    Infection     10/24 toe antibiotics    Pain     hips knees    Pneumonia 2018    Psoriasis     Psoriatic arthritis (HCC)     Sleep apnea 2022    Snoring     UTI (urinary tract infection)     10/24 antiobiotics    Wheezing        Past Surgical History:   Procedure Laterality Date    MD INJECTION,SACROILIAC JOINT Bilateral 3/25/2025    Procedure: RIGHT and LEFT  sacroiliac joint injection with fluoroscopic guidance;  Surgeon: Lani Greene M.D.;  Location: [facility];  Service: Pain Management    KNEE ARTHROPLASTY TOTAL Right 2/4/2025    Procedure: RIGHT TOTAL KNEE ARTHROPLASTY;  Surgeon: Harsh Hensley M.D.;  Location: SURGERY [facility];  Service: Orthopedics    CATARACT EXTRACTION WITH IOL Bilateral 2024    MD INJECTION,SACROILIAC JOINT Left  07/28/2023    Procedure: LEFT sacroiliac joint injection;  Surgeon: Elvis Aleman M.D.;  Location: [facility];  Service: Pain Management    NV INJ LUMBAR/SACRAL,W/ IMAGING Right 06/02/2023    Procedure: RIGHT L5-S1 interlaminar epidural steroid injection. Patient has tolerated gadolinium;  Surgeon: Elvis Aleman M.D.;  Location: [facility];  Service: Pain Management    NV BRONCHOSCOPY,DIAGNOSTIC  07/09/2021    Procedure: BRONCHOSCOPY;  Surgeon: Myles Vidal M.D.;  Location: SURGERY [facility];  Service: Ent    TENDON REPAIR Left 09/19/2019    Procedure: REPAIR, TENDON- QUADRICEPS RUTURE REPAIR AND MEYERS;  Surgeon: Jayden Velázquez M.D.;  Location: [facility];  Service: Orthopedics    HYSTERECTOMY ROBOTIC XI N/A 07/11/2019    Procedure: HYSTERECTOMY, ROBOT-ASSISTED, USING DA SABINO XI;  Surgeon: Curly Bernal M.D.;  Location: SURGERY [facility];  Service: Gynecology    SALPINGO OOPHORECTOMY Bilateral 07/11/2019    Procedure: SALPINGO-OOPHORECTOMY;  Surgeon: Curly Bernal M.D.;  Location: SURGERY [facility];  Service: Gynecology    FUSION, SPINE, LUMBAR, PLIF  2004    L4-5    GYN SURGERY  1999    c sec    HIP REPLACEMENT, TOTAL      Right    ORIF, KNEE      Left patellar tendon repair    OTHER      Various ortho. Minor    OTHER ABDOMINAL SURGERY      appendix    OTHER ORTHOPEDIC SURGERY      left knee    OTHER ORTHOPEDIC SURGERY Left     L rotator cuff    SHOULDER SURGERY  1980    Rotator cuff repair l       Family History   Problem Relation Age of Onset    Hyperlipidemia Mother     Heart Attack Mother     Psychiatric Illness Mother     Heart Disease Mother     Arthritis Mother     Lung Disease Father         Pancoast tumor    Cancer Father         Pancoast    Hyperlipidemia Father     Stroke Father     Hyperlipidemia Sister     Cancer Sister         Breast cancer    Heart Disease Paternal Grandfather     Hypertension Neg Hx     Diabetes Neg Hx       Allergies:  Bee venom, Iodine, Levofloxacin, Nucala [mepolizumab], and Shellfish-derived products    Medications, Allergies, and current problem list reviewed today in Epic    Review of Systems   Constitutional:  Positive for chills and malaise/fatigue. Negative for fever.   Gastrointestinal:  Negative for abdominal pain, diarrhea, nausea and vomiting.   Genitourinary:  Positive for dysuria, frequency and urgency. Negative for flank pain, hematuria and hesitancy.   Musculoskeletal:  Negative for back pain.   Neurological:  Negative for headaches.        All other systems reviewed and are negative.         Objective     /79   Pulse 80   Temp 36.6 °C (97.9 °F)   Resp 16   Ht 1.829 m (6')   Wt 115 kg (253 lb)   LMP  (LMP Unknown)   SpO2 94%   BMI 34.31 kg/m²      Physical Exam  Constitutional:       General: She is not in acute distress.     Appearance: She is not ill-appearing.   HENT:      Head: Normocephalic and atraumatic.   Eyes:      Conjunctiva/sclera: Conjunctivae normal.   Cardiovascular:      Rate and Rhythm: Normal rate and regular rhythm.   Pulmonary:      Effort: Pulmonary effort is normal. No respiratory distress.      Breath sounds: No stridor. No wheezing.   Abdominal:      Tenderness: There is no right CVA tenderness or left CVA tenderness.   Skin:     General: Skin is warm and dry.   Neurological:      General: No focal deficit present.      Mental Status: She is alert and oriented to person, place, and time.   Psychiatric:         Mood and Affect: Mood normal.         Behavior: Behavior normal.         Thought Content: Thought content normal.         Judgment: Judgment normal.               Lab Results   Component Value Date/Time    POCCOLOR yellow 04/18/2025 09:55 AM    POCAPPEAR clear 04/18/2025 09:55 AM    POCLEUKEST small 04/18/2025 09:55 AM    POCNITRITE negative 04/18/2025 09:55 AM    POCUROBILIGE 0.2 04/18/2025 09:55 AM    POCPROTEIN 100 04/18/2025 09:55 AM    POCURPH 6.0  04/18/2025 09:55 AM    POCBLOOD moderate 04/18/2025 09:55 AM    POCSPGRV 1.025 04/18/2025 09:55 AM    POCKETONES negative 04/18/2025 09:55 AM    POCBILIRUBIN large 04/18/2025 09:55 AM    POCGLUCUA negative 04/18/2025 09:55 AM                           Assessment & Plan  Lower urinary tract symptoms (LUTS)    Orders:    POCT Urinalysis    URINE CULTURE(NEW); Future    cefdinir (OMNICEF) 300 MG Cap; Take 1 Capsule by mouth 2 times a day for 7 days.    cefTRIAXone (Rocephin) 500 mg in lidocaine (Xylocaine) 1 % 2 mL for IM use    phenazopyridine (PYRIDIUM) 200 MG Tab; Take 1 Tablet by mouth 3 times a day as needed for Moderate Pain.     Differential diagnoses, Supportive care, and indications for immediate follow-up discussed with patient.   Pathogenesis of diagnosis discussed including typical length and natural progression.   Instructed to return to clinic or nearest emergency department for any change in condition, further concerns, or worsening of symptoms.  The patient demonstrated a good understanding and agreed with the treatment plan.    Gina Hoffman P.A.-C.'

## 2025-04-21 DIAGNOSIS — F41.9 ANXIETY: ICD-10-CM

## 2025-04-22 ENCOUNTER — OFFICE VISIT (OUTPATIENT)
Dept: SLEEP MEDICINE | Facility: MEDICAL CENTER | Age: 65
End: 2025-04-22
Payer: COMMERCIAL

## 2025-04-22 ENCOUNTER — TELEPHONE (OUTPATIENT)
Dept: SLEEP MEDICINE | Facility: MEDICAL CENTER | Age: 65
End: 2025-04-22
Payer: COMMERCIAL

## 2025-04-22 VITALS
OXYGEN SATURATION: 96 % | RESPIRATION RATE: 22 BRPM | SYSTOLIC BLOOD PRESSURE: 110 MMHG | DIASTOLIC BLOOD PRESSURE: 60 MMHG | BODY MASS INDEX: 34.67 KG/M2 | HEART RATE: 72 BPM | WEIGHT: 256 LBS | HEIGHT: 72 IN

## 2025-04-22 DIAGNOSIS — G47.33 OSA (OBSTRUCTIVE SLEEP APNEA): ICD-10-CM

## 2025-04-22 DIAGNOSIS — J45.50 SEVERE PERSISTENT ASTHMA WITHOUT COMPLICATION: ICD-10-CM

## 2025-04-22 DIAGNOSIS — K21.9 GASTROESOPHAGEAL REFLUX DISEASE, UNSPECIFIED WHETHER ESOPHAGITIS PRESENT: ICD-10-CM

## 2025-04-22 DIAGNOSIS — J38.3 VOCAL CORD DYSFUNCTION: ICD-10-CM

## 2025-04-22 DIAGNOSIS — F41.9 ANXIETY: ICD-10-CM

## 2025-04-22 PROCEDURE — 99214 OFFICE O/P EST MOD 30 MIN: CPT

## 2025-04-22 PROCEDURE — 99215 OFFICE O/P EST HI 40 MIN: CPT

## 2025-04-22 RX ORDER — DUPILUMAB 300 MG/2ML
300 INJECTION, SOLUTION SUBCUTANEOUS
Qty: 4 ML | Refills: 13 | Status: SHIPPED
Start: 2025-04-22 | End: 2025-04-24

## 2025-04-22 RX ORDER — DUPILUMAB 300 MG/2ML
600 INJECTION, SOLUTION SUBCUTANEOUS ONCE
Qty: 2 EACH | Refills: 0 | Status: SHIPPED | OUTPATIENT
Start: 2025-04-22 | End: 2025-04-22

## 2025-04-22 RX ORDER — LAMOTRIGINE 100 MG/1
100 TABLET ORAL EVERY MORNING
Qty: 90 TABLET | Refills: 3 | Status: SHIPPED | OUTPATIENT
Start: 2025-04-22

## 2025-04-22 RX ORDER — LAMOTRIGINE 100 MG/1
100 TABLET ORAL EVERY MORNING
Qty: 90 TABLET | Refills: 3 | Status: SHIPPED | OUTPATIENT
Start: 2025-04-22 | End: 2025-04-22 | Stop reason: SDUPTHER

## 2025-04-22 RX ORDER — PREDNISONE 10 MG/1
10 TABLET ORAL DAILY
Qty: 30 TABLET | Refills: 1 | Status: SHIPPED | OUTPATIENT
Start: 2025-04-22

## 2025-04-22 RX ORDER — PANTOPRAZOLE SODIUM 40 MG/1
40 TABLET, DELAYED RELEASE ORAL 2 TIMES DAILY
Qty: 180 TABLET | Refills: 1 | Status: SHIPPED | OUTPATIENT
Start: 2025-04-22

## 2025-04-22 ASSESSMENT — ENCOUNTER SYMPTOMS
SPUTUM PRODUCTION: 0
CHILLS: 0
HEMOPTYSIS: 0
WEIGHT LOSS: 0
COUGH: 0
FEVER: 0
WHEEZING: 1
STRIDOR: 0
PALPITATIONS: 0
HEARTBURN: 1
SHORTNESS OF BREATH: 1
DIZZINESS: 0
DEPRESSION: 0

## 2025-04-22 ASSESSMENT — FIBROSIS 4 INDEX: FIB4 SCORE: 1.53

## 2025-04-22 NOTE — TELEPHONE ENCOUNTER
Received request via: Patient    Was the patient seen in the last year in this department? Yes    Does the patient have an active prescription (recently filled or refills available) for medication(s) requested? No    [facility]     Does the patient have USP Plus and need 100-day supply? (This applies to ALL medications) Patient does not have SCP

## 2025-04-22 NOTE — ASSESSMENT & PLAN NOTE
Previous endoscopy 3-4 years ago  Omeprazole + Famotidine with suboptimal control   Worse at night, triggering asthma    Plan:   Pantoprazole 40mg BID until controlled, then taper down  Refer to GI for endoscopy    Orders:    Referral to Gastroenterology    Referral to ENT    pantoprazole (PROTONIX) 40 MG Tablet Delayed Response; Take 1 Tablet by mouth 2 times a day. Ok to decrease to once daily once GERD symptoms are controlled

## 2025-04-22 NOTE — ASSESSMENT & PLAN NOTE
Chronic. Severe, frequently on oral steroids  Likely triggered by severe GERD vs vocal cord spasms vs untreated IGGY  6 hospitalizations for acute respiratory failure/asthma exacerbation  Previously on Nucala with skin rash   Now requiring daily prednisone d/t uncontrolled symptoms     Symptoms requiring PRN tx per week: every 4 hours   Nocturnal symptoms in the past month: frequent  Triggers: GERD, vocal cord spasm, untreated IGGY   Exacerbations in the last year: 5    Routine Tx: Dulera 200 + Dupixent + Prednisone 10mg daily  PRN Tx: Albuterol     Educated pt on avoidance of any personal triggers.   Use air purifier with HEPA filter if available.   Educated pt on routine exercise if tolerated, using a YONAS prior.   Encourage annual COVID and flu vaccinations, and wash hands regularly to prevent infection.    Orders:    CT-CHEST (THORAX) W/O; Future    predniSONE (DELTASONE) 10 MG Tab; Take 1 Tablet by mouth every day.    dupilumab (DUPIXENT) 300 MG/2ML injection; Inject 4 mL under the skin one time for 1 dose.    dupilumab (DUPIXENT) 300 MG/2ML injection; Inject 2 mL under the skin every 14 days.

## 2025-04-22 NOTE — PROGRESS NOTES
Pulmonary Clinic follow up    Date of Service: 4/22/2025    Reason for follow up:  Shortness of Breath (Sx 3-5 days ) and Follow-Up (4/7/25 Stevo Severe persistent asthma without complication)    History of Present Illness:     Arely Haynes is a 64 y.o. female being evaluated in clinic today for f/u on severe persistent asthma. Last seen by Dr. Whiteside on 4/7/2025. PMH includes HLD, GERD, hemorrhoids, RTHA, major depression, psoriatic arthritis, seasonal allergies. Patient was hospitalized for asthma last year in July 2024 for respiratory failure. She reports a total of 6 hospitalizations for respiratory failure / asthma exacerbations in her life. Patient was previously on Nucala but had a skin reaction that did not resolve when staying out of the sun, but resolved once the injections were stopped. Patient was recently treated again with oral steroids, which her symptoms returned within a few days of discontinuation. Upon further discussion, patient's symptoms are worse when she is laying down at night although she is using her nebulizer every 4 hours with minimal relief. Patient does have a history of GERD, suboptimally controlled on omeprazole + pepcid with last endoscopy 3-4 years ago. Patient feels like there may be some vocal cord spasms at night too, which could be from persistent reflux. Patient sleeps with HOB elevated at 15-20 degrees plus an additional pillow. She has a hx of IGGY on CPAP but stopped 3 years ago or so after she couldn't tolerate a full face mask. Patient has not recently been seen by ENT and does not remember if she has had a laryngoscopy in the past. Patient does also report a hx of PCP pneumonia 2/2 psoriatic arthritis tx Humira.     MMRC Grade:   0- Breathless only during strenuous exercise  1- Short of breath when hurrying or going up a small hill  2- Walks slower than friends due to breathlessness, has to stop at own pace  3- Stops to catch breath on level ground  after 100m  4- Breathless with ambulating around house or ADLs     Review of Systems   Constitutional:  Negative for chills, fever and weight loss.   Respiratory:  Positive for shortness of breath and wheezing. Negative for cough, hemoptysis, sputum production and stridor.    Cardiovascular:  Negative for chest pain, palpitations and leg swelling.   Gastrointestinal:  Positive for heartburn.   Skin:  Negative for rash.   Neurological:  Negative for dizziness.   Endo/Heme/Allergies:  Positive for environmental allergies.   Psychiatric/Behavioral:  Negative for depression.        Current Outpatient Medications on File Prior to Visit   Medication Sig Dispense Refill    lamoTRIgine (LAMICTAL) 100 MG Tab Take 1 Tablet by mouth every morning. 90 Tablet 3    cefdinir (OMNICEF) 300 MG Cap Take 1 Capsule by mouth 2 times a day for 7 days. 14 Capsule 0    phenazopyridine (PYRIDIUM) 200 MG Tab Take 1 Tablet by mouth 3 times a day as needed for Moderate Pain. 6 Tablet 0    ipratropium-albuterol (COMBIVENT RESPIMAT)  MCG/ACT Aero Soln Inhale 1 Puff 4 times a day. 4 g 11    pregabalin (LYRICA) 200 MG capsule Take 1 Capsule by mouth every evening for 90 days. 90 Capsule 0    pregabalin (LYRICA) 150 MG Cap TAKE 1 CAPSULE EVERY MORNING (TAKING 150 MG IN THE MORNING  MG IN THE EVENING) 90 Capsule 0    Mirabegron ER 50 MG TABLET SR 24 HR Take 1 Tablet by mouth every day.      EPINEPHrine (EPIPEN) 0.3 MG/0.3ML Solution Auto-injector solution for injection INJECT 1 PEN IN THE MUSCLE ONE TIME AS DIRECTED FOR ANAPHYLAXIS      pregabalin (LYRICA) 200 MG capsule Take 1 Capsule by mouth 2 times a day.      pregabalin (LYRICA) 100 MG Cap Take 100 mg by mouth.      escitalopram (LEXAPRO) 20 MG tablet TAKE 1 TABLET EVERY MORNING. TAKE WITH ESCITALOPRAM 10 MG FOR A TOTAL DOSE OF 30 MG 90 Tablet 3    etodolac (LODINE) 400 MG tablet TAKE 1 TABLET TWICE A DAY (Patient taking differently: Take 400 mg by mouth 2 times a day. MEDICATION  INSTRUCTIONS FOR SURGERY/PROCEDURE SCHEDULED FOR 12/31/2024   DO NOT TAKE 5 DAYS PRIOR TO SURGERY) 180 Tablet 3    escitalopram (LEXAPRO) 10 MG Tab TAKE 1 TABLET EVERY MORNING. TAKE WITH ESCITALOPRAM 20 MG FOR A TOTAL DOSE OF 30 MG 90 Tablet 3    famotidine (PEPCID) 20 MG Tab TAKE 1 TABLET TWICE A DAY FOR GASTROESOPHAGEAL REFLUX DISEASE 180 Tablet 3    DULERA 200-5 MCG/ACT Aerosol USE 2 INHALATIONS TWICE A DAY (Patient taking differently: MEDICATION INSTRUCTIONS FOR SURGERY/PROCEDURE SCHEDULED FOR 12/31/2024  CONTINUE TAKING MED PRIOR TO SURGERY) 39 g 3    montelukast (SINGULAIR) 10 MG Tab TAKE 1 TABLET DAILY (Patient taking differently: Take 10 mg by mouth every evening.   MEDICATION INSTRUCTIONS FOR SURGERY 12/31/2024  OK TO CONTINUE TAKING PRIOR TO SURGERY AND DAY OF SURGERY) 90 Tablet 3    rosuvastatin (CRESTOR) 10 MG Tab TAKE 1 TABLET AT BEDTIME 90 Tablet 3    ipratropium-albuterol (DUONEB) 0.5-2.5 (3) MG/3ML nebulizer solution Take 3 mL by nebulization every 4 hours. 270 mL 5    POTASSIUM PO Take 99 mg by mouth every day.   CONTINUE TAKING MED PRIOR TO SURGERY      loratadine (CLARITIN) 10 MG Tab Take 10 mg by mouth every day.   MEDICATION INSTRUCTIONS FOR SURGERY 12/31/2024  CONTINUE TAKING MED PRIOR TO SURGERY      magnesium oxide (MAG-OX) 400 MG Tab tablet Take 1 Tablet by mouth every morning. 90 Tablet 3    triamcinolone acetonide (KENALOG) 0.1 % Cream Apply 1 Application topically 2 times a day. (Patient not taking: Reported on 4/22/2025) 45 g 1    valacyclovir (VALTREX) 1 GM Tab Take 1 Tablet by mouth 2 times a day. (Patient not taking: Reported on 4/22/2025)       No current facility-administered medications on file prior to visit.       Social History     Tobacco Use    Smoking status: Never     Passive exposure: Never    Smokeless tobacco: Never   Vaping Use    Vaping status: Never Used   Substance Use Topics    Alcohol use: Yes     Alcohol/week: 3.6 oz     Types: 6 Glasses of wine per week      Comment: 1-2 glasses 4 times per week    Drug use: Never        Past Medical History:   Diagnosis Date    Anesthesia     no self problems; 1st cousin MH    Arthritis     hips knees hands spine    Asthma     prn inhalers    Bronchitis     PCP 2021. Periodic bronchitis    Cataract 2024    Surgery    Concussion     Depression     Daughter passed few years ago    Erosive esophagitis     GERD (gastroesophageal reflux disease)     Heart burn     Heart murmur Early 20’s    Hiatus hernia syndrome     High cholesterol     Immunocompromised (HCC)     Indigestion GERD 20 years    Infection     10/24 toe antibiotics    Pain     hips knees    Pneumonia 2018    Psoriasis     Psoriatic arthritis (HCC)     Sleep apnea 2022    Snoring     UTI (urinary tract infection)     10/24 antiobiotics    Wheezing        Past Surgical History:   Procedure Laterality Date    MN INJECTION,SACROILIAC JOINT Bilateral 3/25/2025    Procedure: RIGHT and LEFT  sacroiliac joint injection with fluoroscopic guidance;  Surgeon: Lani Greene M.D.;  [facility];  Service: Pain Management    KNEE ARTHROPLASTY TOTAL Right 2/4/2025    Procedure: RIGHT TOTAL KNEE ARTHROPLASTY;  Surgeon: Harsh Hensley M.D.;  Location: SURGERY [facility];  Service: Orthopedics    CATARACT EXTRACTION WITH IOL Bilateral 2024    MN INJECTION,SACROILIAC JOINT Left 07/28/2023    Procedure: LEFT sacroiliac joint injection;  Surgeon: Elvis Aleman M.D.;  [facility];  Service: Pain Management    MN INJ LUMBAR/SACRAL,W/ IMAGING Right 06/02/2023    Procedure: RIGHT L5-S1 interlaminar epidural steroid injection. Patient has tolerated gadolinium;  Surgeon: Elvis Aleman M.D.;  [facility];  Service: Pain Management    MN BRONCHOSCOPY,DIAGNOSTIC  07/09/2021    Procedure: BRONCHOSCOPY;  Surgeon: Myles Vidal M.D.;  Location: SURGERY [facility];  Service: Ent    TENDON REPAIR Left 09/19/2019     Procedure: REPAIR, TENDON- QUADRICEPS RUTURE REPAIR AND MEYERS;  Surgeon: Jayden eVlázquez M.D.;  Location: SURGERY [facility];  Service: Orthopedics    HYSTERECTOMY ROBOTIC XI N/A 07/11/2019    Procedure: HYSTERECTOMY, ROBOT-ASSISTED, USING DA SABINO XI;  Surgeon: Curly Bernal M.D.;  Location: SURGERY [facility];  Service: Gynecology    SALPINGO OOPHORECTOMY Bilateral 07/11/2019    Procedure: SALPINGO-OOPHORECTOMY;  Surgeon: Curly Bernal M.D.;  Location: SURGERY [facility];  Service: Gynecology    FUSION, SPINE, LUMBAR, PLIF  2004    L4-5    GYN SURGERY  1999    c sec    HIP REPLACEMENT, TOTAL      Right    ORIF, KNEE      Left patellar tendon repair    OTHER      Various ortho. Minor    OTHER ABDOMINAL SURGERY      appendix    OTHER ORTHOPEDIC SURGERY      left knee    OTHER ORTHOPEDIC SURGERY Left     L rotator cuff    SHOULDER SURGERY  1980    Rotator cuff repair l       Bee venom, Iodine, Levofloxacin, Nucala [mepolizumab], and Shellfish-derived products    Family History   Problem Relation Age of Onset    Hyperlipidemia Mother     Heart Attack Mother     Psychiatric Illness Mother     Heart Disease Mother     Arthritis Mother     Lung Disease Father         Pancoast tumor    Cancer Father         Pancoast    Hyperlipidemia Father     Stroke Father     Hyperlipidemia Sister     Cancer Sister         Breast cancer    Heart Disease Paternal Grandfather     Hypertension Neg Hx     Diabetes Neg Hx      /60 (BP Location: Right arm, Patient Position: Sitting, BP Cuff Size: Adult)   Pulse 72   Resp (!) 22   Ht 1.829 m (6')   Wt 116 kg (256 lb)   SpO2 96%      Physical Exam  Constitutional:       General: She is not in acute distress.  HENT:      Head: Normocephalic.      Nose: Nose normal.      Mouth/Throat:      Mouth: Mucous membranes are moist.   Eyes:      Conjunctiva/sclera: Conjunctivae normal.   Cardiovascular:      Rate and Rhythm: Normal rate and regular rhythm.      Heart sounds: No  murmur heard.  Pulmonary:      Effort: No respiratory distress.      Breath sounds: Examination of the right-middle field reveals wheezing. Wheezing present.      Comments: (+) RML expiratory wheeze  Abdominal:      General: There is no distension.   Musculoskeletal:      Right lower leg: No edema.      Left lower leg: No edema.   Skin:     General: Skin is warm and dry.      Capillary Refill: Capillary refill takes less than 2 seconds.      Nails: There is no clubbing.   Neurological:      General: No focal deficit present.      Mental Status: She is alert and oriented to person, place, and time.   Psychiatric:         Mood and Affect: Mood normal.         Results:    PFT 12/28/2018:        Interpretation:     Lung function testing was completed on December 28, 2018.  Spirometry was normal.  No bronchodilator response.  No significant restriction or hyperinflation noted.  Oxygen transfer was normal.  Flow volume loop looks normal as well with good effort noted      CT chest 7/3/2023:    FINDINGS:     The visualized portion of the thyroid appear within normal limits.  The trachea and main stem airways are normal in caliber. There are no pathologically enlarged mediastinal lymph nodes.     The heart and pericardium appear within normal limits.   The aorta and its main branch vessels are normal in caliber and configuration.  The pulmonary arteries are well opacified without visualized filling defect.     The pulmonary parenchyma demonstrates no acute abnormality.    Echocardiogram 6/14/2024:    FINDINGS  Left Ventricle  The left ventricle is mildly dilated. Normal left ventricular wall   thickness. The ejection fraction is measured to be 55 % by Pickett's   biplane. Normal regional wall motion. Normal diastolic function.     Right Ventricle  Normal right ventricular size and systolic function.     Right Atrium  Normal inferior vena cava size and inspiratory collapse.     Left Atrium  Normal left atrial size. Left  atrial volume index is 25.7  mL/sq m.     Mitral Valve  Structurally normal mitral valve. No mitral stenosis. Trace mitral   regurgitation.     Aortic Valve  Tricuspid aortic valve. No aortic valve stenosis. Trace aortic   insufficiency.     Tricuspid Valve  Structurally normal tricuspid valve. No tricuspid stenosis.No tricuspid   regurgitation. Unable to estimate pulmonary artery pressure due to an   inadequate tricuspid regurgitant jet.     Pulmonic Valve  Structurally normal pulmonic valve without significant stenosis or   regurgitation.     Pericardium  No pericardial effusion.     Aorta  Normal aortic root for body surface area. The ascending aorta is   borderline dilated with a diameter of  3.75cm.     PSG 10/18/2022:     Impression:  1.  Severe obstructive sleep apnea seen on previous sleep study  2.  Respiratory events did improve with CPAP therapy  3.  Was tried on BiPAP did improve respiratory events during REM sleep however central apneas appeared to become present once back into N2 sleep  4.  Central apneas accounted for 25.7 percent of respiratory events  5.  PLM's noted  6.  No supine REM sleep was seen  7.  Respiratory events worse during REM sleep     Recommendations:  I recommend auto CPAP 6 to 15 cm.  May also consider auto BiPAP therapy EPAP 13 IPAP 19 pressure support 4.  Patient used a small AirFit N 30i     Latest Reference Range & Units 01/20/25 11:46   Eos (Absolute) 0.00 - 0.51 K/uL 0.12      Latest Reference Range & Units 01/20/25 11:46   Ige, Qn <=214 kU/L 34      Latest Reference Range & Units 01/20/25 11:46   M006 Alternaria Tenius <=0.34 kU/L <0.10   M003 Aspergillus Fumigatus <=0.34 kU/L 0.11   G2 B[place] Grass <=0.34 kU/L <0.10   E001 Cat Hair-Dander Standard <=0.34 kU/L <0.10   T6 [place] <=0.34 kU/L 0.21   Cockroach Allergen <=0.34 kU/L <0.10   W14 Pigweed <=0.34 kU/L <0.10   W1 Ragweed Short <=0.34 kU/L <0.10   Lykens <=0.34 kU/L <0.10   D002 D Farinae Mite <=0.34 kU/L  <0.10   D1 D Pteronyssinus <=0.34 kU/L <0.10   E5 Dog Dander <=0.34 kU/L <0.10   T008 Elm American -White <=0.34 kU/L <0.10   Hormodendrum <=0.34 kU/L <0.10   Immunocap Score  See Note   Maple McIntosh <=0.34 kU/L <0.10   Mouse Epithelium Allergen <=0.34 kU/L <0.10   Mucor Racemosus <=0.34 kU/L <0.10   Mugwort <=0.34 kU/L <0.10   T7 [place] White Tree <=0.34 kU/L <0.10   T9 Broussard Tree <=0.34 kU/L <0.10   M001 Penicillium Notatum <=0.34 kU/L <0.10   Russian Thistle <=0.34 kU/L <0.10   See Grass, Allergen <=0.34 kU/L 0.12   T70 White [place] <=0.34 kU/L <0.10     Vaccinations:    Covid: complete 2024  Flu: due  Pneumonia: complete  RSV: complete      Assessment & Plan  Severe persistent asthma without complication      Chronic. Severe, frequently on oral steroids  Likely triggered by severe GERD vs vocal cord spasms vs untreated IGGY  6 hospitalizations for acute respiratory failure/asthma exacerbation  Previously on Nucala with skin rash   Now requiring daily prednisone d/t uncontrolled symptoms     Symptoms requiring PRN tx per week: every 4 hours   Nocturnal symptoms in the past month: frequent  Triggers: GERD, vocal cord spasm, untreated IGGY   Exacerbations in the last year: 5    Routine Tx: Dulera 200 + Dupixent + Prednisone 10mg daily  PRN Tx: Albuterol     Educated pt on avoidance of any personal triggers.   Use air purifier with HEPA filter if available.   Educated pt on routine exercise if tolerated, using a YONAS prior.   Encourage annual COVID and flu vaccinations, and wash hands regularly to prevent infection.    Orders:    CT-CHEST (THORAX) W/O; Future    predniSONE (DELTASONE) 10 MG Tab; Take 1 Tablet by mouth every day.    dupilumab (DUPIXENT) 300 MG/2ML injection; Inject 4 mL under the skin one time for 1 dose.    dupilumab (DUPIXENT) 300 MG/2ML injection; Inject 2 mL under the skin every 14 days.    IGGY (obstructive sleep apnea)    Previously on auto CPAP 6-15 cm H2O  Tried full face mask but couldn't  tolerate    Will order nasal pillow mask  May need repeat sleep study     Orders:    DME CPAP    Gastroesophageal reflux disease, unspecified whether esophagitis present    Previous endoscopy 3-4 years ago  Omeprazole + Famotidine with suboptimal control   Worse at night, triggering asthma    Plan:   Pantoprazole 40mg BID until controlled, then taper down  Refer to GI for endoscopy    Orders:    Referral to Gastroenterology    Referral to ENT    pantoprazole (PROTONIX) 40 MG Tablet Delayed Response; Take 1 Tablet by mouth 2 times a day. Ok to decrease to once daily once GERD symptoms are controlled    Vocal cord dysfunction    Likely 2/2 GERD  May also be triggering asthma    Plan:   Refer to ENT for laryngoscopy          Return in about 1 month (around 5/22/2025), or if symptoms worsen or fail to improve, for f/u on asthma with Dr. Whiteside .     This note was generated using voice recognition software which has a chance of producing errors of grammar and possibly content.  I have made every reasonable attempt to find and correct any obvious errors, but it should be expected that some may not be found prior to finalization of this note.    Time spent in record review prior to patient arrival, reviewing results, and in face-to-face encounter totaled 45 min, excluding any procedures if performed.    Asael KIRBY  [facility]

## 2025-04-22 NOTE — TELEPHONE ENCOUNTER
Drug name: dupilumab (DUPIXENT) 200 MG/1.14ML injection    I called & spoke with April from pharmacy services with [facility] @ [phone].    Patient's plan is restricted to fill all specialty medications only through [facility], EXCEPT for Dupixent.    DUPIXENT was placed on a restriction by member to be filled at [facility] pharmacy ([phone]) effective 12/2024 till 01/2026    Thank you,   Luna Arenas, [facility]  Pharmacy Liaison

## 2025-04-22 NOTE — TELEPHONE ENCOUNTER
Received request via: Pharmacy    Was the patient seen in the last year in this department? Yes    Does the patient have an active prescription (recently filled or refills available) for medication(s) requested? No    Pharm[facility] Name: [facility]    Does the patient have [facility] and need 100-day supply? (This applies to ALL medications) Patient does not have SCP

## 2025-04-23 ENCOUNTER — HOSPITAL ENCOUNTER (OUTPATIENT)
Dept: RADIOLOGY | Facility: MEDICAL CENTER | Age: 65
End: 2025-04-23
Attending: INTERNAL MEDICINE
Payer: COMMERCIAL

## 2025-04-23 DIAGNOSIS — G47.33 OSA (OBSTRUCTIVE SLEEP APNEA): ICD-10-CM

## 2025-04-23 DIAGNOSIS — R06.02 SHORTNESS OF BREATH: ICD-10-CM

## 2025-04-23 PROCEDURE — 71046 X-RAY EXAM CHEST 2 VIEWS: CPT

## 2025-04-23 RX ORDER — TIRZEPATIDE 2.5 MG/.5ML
0.5 INJECTION, SOLUTION SUBCUTANEOUS
Qty: 2 ML | Refills: 2 | Status: SHIPPED | OUTPATIENT
Start: 2025-04-23

## 2025-04-23 NOTE — Clinical Note
REFERRAL APPROVAL NOTICE         Sent on April 23, 2025                   Kaila Joya Ivy  [address] [place] [id number]                   Dear MsCami Haynes,    After a careful review of the medical information and benefit coverage, [facility] has processed your referral. See below for additional details.    If applicable, you must be actively enrolled with your insurance for coverage of the authorized service. If you have any questions regarding your coverage, please contact your insurance directly.    REFERRAL INFORMATION   Referral #:  [id number]  Referred-To Provider    Referred-By Provider:  Gastroenterology    MARITZA Jose   [facility]      [address]  [address]  [place] [address]  [phone] [address] [facility] [place] [id number] [phone]    Referral Start Date:  04/22/2025  Referral End Date:   04/22/2026             SCHEDULING  If you do not already have an appointment, please call [phone] to make an appointment.     MORE INFORMATION  If you do not already have a [employer] account, sign up at: [email or web address]  You can access your medical information, make appointments, see lab results, billing information, and more.  If you have questions regarding this referral, please contact  the [facility] Referrals department at:             [phone]. Monday - Friday 8:00AM - 5:00PM.     Sincerely,    [facility]

## 2025-04-23 NOTE — Clinical Note
REFERRAL APPROVAL NOTICE         Sent on April 23, 2025                   Kaila Joya Ivy  [address] [place] [id number]                   Dear MsCami Haynes,    After a careful review of the medical information and benefit coverage, [facility] has processed your referral. See below for additional details.    If applicable, you must be actively enrolled with your insurance for coverage of the authorized service. If you have any questions regarding your coverage, please contact your insurance directly.    REFERRAL INFORMATION   Referral #:  [id number]  Referred-To Provider    Referred-By Provider:  Otolaryngology    MARITZA Jose   [facility]      [address]  [address]  [facility] [id number]  [phone] [address]  [facility] [address]  [phone]    Referral Start Date:  04/22/2025  Referral End Date:   04/22/2026             SCHEDULING  If you do not already have an appointment, please call [phone] to make an appointment.     MORE INFORMATION  If you do not already have a [employer] account, sign up at: [email or web address]  You can access your medical information, make appointments, see lab results, billing information, and more.  If you have questions regarding this referral, please contact  the [facility] Referrals department at:             [phone]. Monday - Friday 8:00AM - 5:00PM.     Sincerely,    [facility]

## 2025-04-24 ENCOUNTER — TELEPHONE (OUTPATIENT)
Dept: SLEEP MEDICINE | Facility: MEDICAL CENTER | Age: 65
End: 2025-04-24

## 2025-04-24 ENCOUNTER — OFFICE VISIT (OUTPATIENT)
Dept: MEDICAL GROUP | Facility: LAB | Age: 65
End: 2025-04-24
Payer: COMMERCIAL

## 2025-04-24 VITALS
DIASTOLIC BLOOD PRESSURE: 58 MMHG | HEIGHT: 72 IN | HEART RATE: 66 BPM | SYSTOLIC BLOOD PRESSURE: 110 MMHG | BODY MASS INDEX: 34.62 KG/M2 | TEMPERATURE: 98 F | WEIGHT: 255.6 LBS | RESPIRATION RATE: 14 BRPM | OXYGEN SATURATION: 94 %

## 2025-04-24 DIAGNOSIS — E66.811 OBESITY (BMI 30.0-34.9): ICD-10-CM

## 2025-04-24 DIAGNOSIS — Z79.899 POLYPHARMACY: ICD-10-CM

## 2025-04-24 DIAGNOSIS — K21.9 GASTROESOPHAGEAL REFLUX DISEASE, UNSPECIFIED WHETHER ESOPHAGITIS PRESENT: ICD-10-CM

## 2025-04-24 DIAGNOSIS — J45.51 SEVERE PERSISTENT ASTHMA WITH ACUTE EXACERBATION: Primary | ICD-10-CM

## 2025-04-24 DIAGNOSIS — J45.50 SEVERE PERSISTENT ASTHMA WITHOUT COMPLICATION: ICD-10-CM

## 2025-04-24 PROCEDURE — 3074F SYST BP LT 130 MM HG: CPT | Performed by: FAMILY MEDICINE

## 2025-04-24 PROCEDURE — 99215 OFFICE O/P EST HI 40 MIN: CPT | Performed by: FAMILY MEDICINE

## 2025-04-24 PROCEDURE — 3078F DIAST BP <80 MM HG: CPT | Performed by: FAMILY MEDICINE

## 2025-04-24 RX ORDER — TEZEPELUMAB-EKKO 210 MG/1.9ML
210 INJECTION, SOLUTION SUBCUTANEOUS
Qty: 1.91 ML | Refills: 12 | Status: SHIPPED | OUTPATIENT
Start: 2025-04-24

## 2025-04-24 ASSESSMENT — FIBROSIS 4 INDEX: FIB4 SCORE: 1.53

## 2025-04-24 NOTE — PROGRESS NOTES
Subjective:     CC: Breathing    HPI:   Kaila is a 64-year-old female with a complex medical history that includes severe asthma, psoriatic arthritis, history of PCP pneumonia, chronic immunosuppression, and ILD who presents with continued issues with chronic trouble breathing.  This is especially worsened over the last 10 days.  Episodes of severe coughing at night with acute worsening of trouble breathing.  Continues to have significant dyspnea on exertion limiting exercise.  Following with pulmonology and has been on prednisone course, DuoNebs, combivent, Dulera, planning to start Dupixent when out of flare.  GERD regimen was also increased due to concern this was trigger and referral was placed to ENT for laryngoscopy.  Hoarse voice and throat clearing issues with recent flare.      She also has continued concern with weight gain as exercise has been very limited with her persistent breathing issues.  Trying to get GLP-1 approved for obesity and sleep apnea.      Current Outpatient Medications   Medication Sig Dispense Refill    Tirzepatide-Weight Management (ZEPBOUND) 2.5 MG/0.5ML Solution Auto-injector Inject 0.5 mL under the skin every 7 days. 2 mL 2    lamoTRIgine (LAMICTAL) 100 MG Tab Take 1 Tablet by mouth every morning. 90 Tablet 3    predniSONE (DELTASONE) 10 MG Tab Take 1 Tablet by mouth every day. 30 Tablet 1    pantoprazole (PROTONIX) 40 MG Tablet Delayed Response Take 1 Tablet by mouth 2 times a day. Ok to decrease to once daily once GERD symptoms are controlled 180 Tablet 1    dupilumab (DUPIXENT) 300 MG/2ML injection Inject 2 mL under the skin every 14 days. 4 mL 13    cefdinir (OMNICEF) 300 MG Cap Take 1 Capsule by mouth 2 times a day for 7 days. 14 Capsule 0    phenazopyridine (PYRIDIUM) 200 MG Tab Take 1 Tablet by mouth 3 times a day as needed for Moderate Pain. 6 Tablet 0    ipratropium-albuterol (COMBIVENT RESPIMAT)  MCG/ACT Aero Soln Inhale 1 Puff 4 times a day. 4 g 11    triamcinolone  acetonide (KENALOG) 0.1 % Cream Apply 1 Application topically 2 times a day. (Patient not taking: Reported on 4/22/2025) 45 g 1    pregabalin (LYRICA) 200 MG capsule Take 1 Capsule by mouth every evening for 90 days. 90 Capsule 0    pregabalin (LYRICA) 150 MG Cap TAKE 1 CAPSULE EVERY MORNING (TAKING 150 MG IN THE MORNING  MG IN THE EVENING) 90 Capsule 0    Mirabegron ER 50 MG TABLET SR 24 HR Take 1 Tablet by mouth every day.      valacyclovir (VALTREX) 1 GM Tab Take 1 Tablet by mouth 2 times a day. (Patient not taking: Reported on 4/22/2025)      EPINEPHrine (EPIPEN) 0.3 MG/0.3ML Solution Auto-injector solution for injection INJECT 1 PEN IN THE MUSCLE ONE TIME AS DIRECTED FOR ANAPHYLAXIS      pregabalin (LYRICA) 200 MG capsule Take 1 Capsule by mouth 2 times a day.      pregabalin (LYRICA) 100 MG Cap Take 100 mg by mouth.      escitalopram (LEXAPRO) 20 MG tablet TAKE 1 TABLET EVERY MORNING. TAKE WITH ESCITALOPRAM 10 MG FOR A TOTAL DOSE OF 30 MG 90 Tablet 3    etodolac (LODINE) 400 MG tablet TAKE 1 TABLET TWICE A DAY (Patient taking differently: Take 400 mg by mouth 2 times a day. MEDICATION INSTRUCTIONS FOR SURGERY/PROCEDURE SCHEDULED FOR 12/31/2024   DO NOT TAKE 5 DAYS PRIOR TO SURGERY) 180 Tablet 3    escitalopram (LEXAPRO) 10 MG Tab TAKE 1 TABLET EVERY MORNING. TAKE WITH ESCITALOPRAM 20 MG FOR A TOTAL DOSE OF 30 MG 90 Tablet 3    famotidine (PEPCID) 20 MG Tab TAKE 1 TABLET TWICE A DAY FOR GASTROESOPHAGEAL REFLUX DISEASE 180 Tablet 3    DULERA 200-5 MCG/ACT Aerosol USE 2 INHALATIONS TWICE A DAY (Patient taking differently: MEDICATION INSTRUCTIONS FOR SURGERY/PROCEDURE SCHEDULED FOR 12/31/2024  CONTINUE TAKING MED PRIOR TO SURGERY) 39 g 3    montelukast (SINGULAIR) 10 MG Tab TAKE 1 TABLET DAILY (Patient taking differently: Take 10 mg by mouth every evening.   MEDICATION INSTRUCTIONS FOR SURGERY 12/31/2024  OK TO CONTINUE TAKING PRIOR TO SURGERY AND DAY OF SURGERY) 90 Tablet 3    rosuvastatin (CRESTOR) 10 MG  Tab TAKE 1 TABLET AT BEDTIME 90 Tablet 3    ipratropium-albuterol (DUONEB) 0.5-2.5 (3) MG/3ML nebulizer solution Take 3 mL by nebulization every 4 hours. 270 mL 5    POTASSIUM PO Take 99 mg by mouth every day.   CONTINUE TAKING MED PRIOR TO SURGERY      loratadine (CLARITIN) 10 MG Tab Take 10 mg by mouth every day.   MEDICATION INSTRUCTIONS FOR SURGERY 12/31/2024  CONTINUE TAKING MED PRIOR TO SURGERY      magnesium oxide (MAG-OX) 400 MG Tab tablet Take 1 Tablet by mouth every morning. 90 Tablet 3     No current facility-administered medications for this visit.       Medications, past medical history, allergies, and social history have been reviewed and updated.      Objective:       Exam:  /58 (BP Location: Left arm, Patient Position: Sitting, BP Cuff Size: Adult)   Pulse 66   Temp 36.7 °C (98 °F) (Temporal)   Resp 14   Ht 1.829 m (6')   Wt 116 kg (255 lb 9.6 oz)   LMP  (LMP Unknown)   SpO2 94%   BMI 34.67 kg/m²  Body mass index is 34.67 kg/m².    Constitutional: Alert. Well appearing. No distress.  Skin: Warm, dry, good turgor, no visible rashes.  ENMT: Moist mucous membranes. Normal dentition.  Respiratory: Normal effort.  Air movement reduced bilaterally, there is coughing with deep breaths.  No wheezing noted.  Cardiovascular: Regular rate and rhythm. Normal S1/S2. No murmurs, rubs or gallops.   Neuro: Moves all four extremities. No facial droop.  Psych: Answers questions appropriately. Normal affect and mood.    Assessment & Plan:     64 y.o. female with the following -     1. Severe persistent asthma without complication  Continued exacerbation with dyspnea on exertion, episodes of significantly worsened trouble breathing at night.  Possibly triggered by GERD as below and agree with more aggressive GERD treatment, ENT referral.  We also discussed continued lifestyle measures for GERD.  She does have history of PCP pneumonia and ILD and will further evaluate with HRCT and update PFTs as well.   Continue current prednisone, Dulera, Combivent, DuoNebs per pulmonology.  - CT-CHEST, HIGH RESOLUTION LUNG; Future  - PULMONARY FUNCTION TESTS -Test requested: Complete Pulmonary Function Test; Include MIPS/MEPS? Yes; Future    2. Gastroesophageal reflux disease, unspecified whether esophagitis present  Likely contributing to asthma as above.  Continue with increased Protonix dose and H2 blocker.    3. Polypharmacy  Patient and  would like polypharmacy review with pharmacist and referral is placed.  - Referral to Pharmacotherapy Service    4. Obesity (BMI 30.0-34.9)  Difficulty losing weight as exercise is inhibited by her asthma.  Tirzepatide sent by pulmonology and trying to prior authorize for obesity and sleep apnea.  We also discussed self-pay options through [employer].  Continue with calorie limitation, exercise as tolerated.    My total time spent caring for the patient on the day of the encounter was >40 minutes.   This does not include time spent on separately billable procedures/tests.      Please note that this note was created using voice recognition software.

## 2025-04-24 NOTE — Clinical Note
HUNG wanted to keep you in the loop.  I saw Kaila with her  [facility] today.  They expressed some concern regarding additional workup for underlying cause of the continued symptoms.  It looks like a chest CT was ordered but they were under the impression this was an HRCT so I put in a new order.  I also ordered PFTs.  Let me know if you think not needed or inappropriate.    Thanks, Lei

## 2025-04-24 NOTE — TELEPHONE ENCOUNTER
I called patient back, regarding some concerns on her Dupixent auto injector.    [facility] reached out to patient, stating it is currently unavailable.  And they only have the vials available.    Kaila is requesting for change from Dupixent to Tezspire instead.     She stated, she's already had a conversation with provider about this possible change.    Kaila, is also requesting for a new C-Pap & a home visit.    Thank you,   Luna Arenas, [facility]  Pharmacy Liaison

## 2025-04-24 NOTE — TELEPHONE ENCOUNTER
DOCUMENTATION OF PAR STATUS:(Key: [id number])  Rx #: [id number]    1. Name of Medication & Dose: Zepbound 2.5MG/0.5ML pen-injectors     2. Name of Prescription Coverage Company & phone #: [employer]    3. Date Prior Auth Submitted: 04/24/25    4. What information was given to obtain insurance decision?     5. Prior Auth Status? Pending    6. Patient Notified: N\A

## 2025-04-25 ENCOUNTER — TELEPHONE (OUTPATIENT)
Dept: SLEEP MEDICINE | Facility: MEDICAL CENTER | Age: 65
End: 2025-04-25
Payer: COMMERCIAL

## 2025-04-25 NOTE — TELEPHONE ENCOUNTER
Received New Start request via [employer]  for tezepelumab-ekko (TEZSPIRE) 210 MG/1.91ML injection . (Quantity:1.91mL, Day Supply:28)     Insurance: [employer]  Member ID:  [id number]  BIN: [id number]  PCN: [id number]  Group: [id number]     Ran Test claim via [place] & medication Rejects stating prior authorization is required.    Thank you,   Luna Arenas, [facility]  Pharmacy Liaison

## 2025-04-25 NOTE — Clinical Note
REFERRAL APPROVAL NOTICE         Sent on April 25, 2025                   Kaila Wahlyovany  [address] [place] [id number]                   Dear MsCami Ivy,    After a careful review of the medical information and benefit coverage, [facility] has processed your referral. See below for additional details.    If applicable, you must be actively enrolled with your insurance for coverage of the authorized service. If you have any questions regarding your coverage, please contact your insurance directly.    REFERRAL INFORMATION   Referral #:  [id number]  Referred-To Department    Referred-By Provider:  Pulmonary and Sleep Medicine    Joe Espinal M.D.   Pulmonary Rehab [facility]      [address]  [place] [address]  [phone] [address]  [place] [address]  [phone]    Referral Start Date:  04/24/2025  Referral End Date:   04/24/2026             SCHEDULING  If you do not already have an appointment, please call [phone] to make an appointment.     MORE INFORMATION  If you do not already have a [facility] account, sign up at: [email or web address]  You can access your medical information, make appointments, see lab results, billing information, and more.  If you have questions regarding this referral, please contact  the [facility] Referrals department at:             [phone]. Monday - Friday 8:00AM - 5:00PM.     Sincerely,    [facility]

## 2025-04-25 NOTE — TELEPHONE ENCOUNTER
Prior Authorization for ezepelumab-ekko (TEZSPIRE) 210 MG/1.91ML injection  (Quantity: 1.91 mL, Days: 28) has been submitted via Phone: [phone]    Insurance: [employer] ([employer])     **I had to call & was able to talk to Ashlee with clinical services from [employer], to RE-SUBMIT PA OVER THE PHONE, due to CoverMyMeds was not submitting electronically.  Insurance has been having some system issues with transmission of PAs with Cover MyMeds.    PA Case Ref # [id number]    Will follow up in 24-48 business hours.     Thank you,   Luna Arenas, [facility]  Pharmacy Liaison

## 2025-04-25 NOTE — TELEPHONE ENCOUNTER
Prior Authorization for tezepelumab-ekko (TEZSPIRE) 210 MG/1.91ML injection  (Quantity: 1.91mL, Days: 28) has been submitted via Cover My Meds: Key ([id number])    Insurance: [employer] ([place])    Will follow up in 24-48 business hours.     Thank you,   Luna Arenas, [facility]  Pharmacy Liaison

## 2025-04-25 NOTE — TELEPHONE ENCOUNTER
Per covermymeds, need to call pt's insur. Plan.    Called pt's prescription insurance plan#[phone]  PA form was incorrect. I need to [employer].     RESUBMITTED PA THROUGH [employer]-SUBMITTED CLINICAL TO PA  (Key: [id number])

## 2025-04-28 NOTE — TELEPHONE ENCOUNTER
FINAL PRIOR AUTHORIZATION STATUS:    1.  Name of Medication & Dose: Zepbound 2.5m/ 0.5ml pen-injector     2. Prior Auth Status: Denied.  Reason: Medical denial    3. Action Taken: Pharmacy Notified: N\A Patient Notified: N\A

## 2025-04-30 ENCOUNTER — HOSPITAL ENCOUNTER (OUTPATIENT)
Dept: RADIOLOGY | Facility: MEDICAL CENTER | Age: 65
End: 2025-04-30
Attending: FAMILY MEDICINE
Payer: COMMERCIAL

## 2025-04-30 DIAGNOSIS — J45.50 SEVERE PERSISTENT ASTHMA WITHOUT COMPLICATION: ICD-10-CM

## 2025-04-30 PROCEDURE — 71250 CT THORAX DX C-: CPT

## 2025-05-01 ENCOUNTER — RESULTS FOLLOW-UP (OUTPATIENT)
Dept: MEDICAL GROUP | Facility: LAB | Age: 65
End: 2025-05-01
Payer: COMMERCIAL

## 2025-05-01 ENCOUNTER — PATIENT MESSAGE (OUTPATIENT)
Dept: SLEEP MEDICINE | Facility: MEDICAL CENTER | Age: 65
End: 2025-05-01
Payer: COMMERCIAL

## 2025-05-01 DIAGNOSIS — R91.8 PULMONARY NODULES: ICD-10-CM

## 2025-05-01 DIAGNOSIS — J45.51 SEVERE PERSISTENT ASTHMA WITH ACUTE EXACERBATION: ICD-10-CM

## 2025-05-01 RX ORDER — TIOTROPIUM BROMIDE INHALATION SPRAY 3.12 UG/1
5 SPRAY, METERED RESPIRATORY (INHALATION) DAILY
Qty: 4 G | Refills: 11 | Status: SHIPPED | OUTPATIENT
Start: 2025-05-01 | End: 2025-05-02 | Stop reason: SDUPTHER

## 2025-05-02 ENCOUNTER — NON-PROVIDER VISIT (OUTPATIENT)
Dept: SLEEP MEDICINE | Facility: MEDICAL CENTER | Age: 65
End: 2025-05-02
Attending: FAMILY MEDICINE
Payer: COMMERCIAL

## 2025-05-02 ENCOUNTER — DOCUMENTATION (OUTPATIENT)
Dept: PHARMACY | Facility: MEDICAL CENTER | Age: 65
End: 2025-05-02
Payer: COMMERCIAL

## 2025-05-02 ENCOUNTER — PHARMACY VISIT (OUTPATIENT)
Dept: PHARMACY | Facility: MEDICAL CENTER | Age: 65
End: 2025-05-02
Payer: COMMERCIAL

## 2025-05-02 VITALS — HEIGHT: 71 IN | BODY MASS INDEX: 35.7 KG/M2 | WEIGHT: 255 LBS

## 2025-05-02 DIAGNOSIS — J45.50 SEVERE PERSISTENT ASTHMA WITHOUT COMPLICATION: ICD-10-CM

## 2025-05-02 DIAGNOSIS — J45.51 SEVERE PERSISTENT ASTHMA WITH ACUTE EXACERBATION: ICD-10-CM

## 2025-05-02 PROCEDURE — 94726 PLETHYSMOGRAPHY LUNG VOLUMES: CPT | Performed by: FAMILY MEDICINE

## 2025-05-02 PROCEDURE — 94729 DIFFUSING CAPACITY: CPT | Performed by: FAMILY MEDICINE

## 2025-05-02 PROCEDURE — RXMED WILLOW AMBULATORY MEDICATION CHARGE

## 2025-05-02 PROCEDURE — 94060 EVALUATION OF WHEEZING: CPT | Performed by: FAMILY MEDICINE

## 2025-05-02 PROCEDURE — RXMED WILLOW AMBULATORY MEDICATION CHARGE: Performed by: INTERNAL MEDICINE

## 2025-05-02 RX ORDER — TIOTROPIUM BROMIDE INHALATION SPRAY 3.12 UG/1
5 SPRAY, METERED RESPIRATORY (INHALATION) DAILY
Qty: 4 G | Refills: 11 | Status: SHIPPED | OUTPATIENT
Start: 2025-05-02

## 2025-05-02 ASSESSMENT — FIBROSIS 4 INDEX: FIB4 SCORE: 1.53

## 2025-05-02 NOTE — PROGRESS NOTES
PHARMACIST CLINICAL ONBOARDING - Clinical Onboarding -   Result = Complete, Next card status: New Start    Therapeutic Category: Asthma  ICD10:   Diagnosis Details: Severe persistent asthma with acute exacerbation [J45.51]  Medication(s) associated with Therapeutic Category: Tezspire Solution Auto-injector 210 MG/1.91ML  Medication(s) prescribed for FDA approved indication?   -Tezspire Solution Auto-injector 210 MG/1.91ML: Yes  Full drug therapy: Tezspire 210mg/1.91mL solution auto-injector pen injecting 1.91mLs (210mg) under the skin every 4 weeks   Past treatments: Nucala, Spiriva, Prednisone, Combivent, Dulera, Montelukast, DuoNeb  Goals of therapy: Promote or maintain optimal therapy administration and adherence, Mitigation of side effects, Maintain lung function, Prevent respiratory exacerbations and need for ER/UC care, Reduce YONAS use and use of corticosteroids in corticosteroid dependent patient    Regimen Appropriateness Review: Asthma  Any concerning drug and/or non-drug allergies? No  Any concerning drug interactions (drug-drug or dietary)? No  Any concern with prescribed dose (appropriateness, renal, and hepatic adjustments needed)? No  Any comorbidities, past medical history, precautions, or contraindications that pose a medication safety concern? No  Any relevant lab work needed that affects the initial start date? No  Any issues with patient's ability to self-administer medication? No      Patient declined initial clinical pharmacist counseling.

## 2025-05-02 NOTE — TELEPHONE ENCOUNTER
Have we ever prescribed this med? Yes. Attempt 3    Last OV: 04/22/25 w/ Asael KIRBY    Next OV: 05/22/25 w/ Asael KIRBY    Associated Diagnoses: Severe persistent asthma with acute exacerbation    Requested Prescriptions     Pending Prescriptions Disp Refills    tiotropium (SPIRIVA RESPIMAT) 2.5 MCG/ACT Aero Soln 4 g 11     Sig: Inhale 2 Inhalations every day. Assemble and prime.

## 2025-05-02 NOTE — PROCEDURES
Technician: ALY Fregoso    Technician Comment:  Good patient effort & cooperation.  The results of this test meet the ATS/ERS standards for acceptability & reproducibility.  Test was performed on the Audax Medical Body Plethysmograph-Elite DX system.  Predicted values were GLI-2012 for spirometry, GLI-2020 for DLCO, ITS for Lung Volumes.  The DLCO was uncorrected for Hgb.  A bronchodilator of Albuterol HFA -2puffs via spacer administered.  DLCO performed during dilation period.    Interpretation:  FVC 3.88 L, Z-score 0.15  FEV1 3.16 L, Z-score 0.55  FEV1/FVC 81%, Z-score 0.53  TLC 6.48 L, Z-score 0.71  RV 2.46 L, Z-score 0.02  DLCO 25.86 mL/min/mmHg, Z score 0.77  Flow-volume loop: Normal    Spirometry is normal and shows no airflow obstruction.  No bronchodilator response is observed which does not preclude a clinical response to bronchodilator therapy.  Lung volumes are normal.  Gas exchange is normal.    Conclusion: normal PFTs    Reese Jalloh MD  Pulmonary and Critical Care Medicine  [facility]

## 2025-05-02 NOTE — TELEPHONE ENCOUNTER
Pt presented in office today asking for her  Spiriva Rx to be sent to [facility] pharmacy  [address]

## 2025-05-07 ENCOUNTER — PATIENT MESSAGE (OUTPATIENT)
Dept: MEDICAL GROUP | Facility: LAB | Age: 65
End: 2025-05-07
Payer: COMMERCIAL

## 2025-05-07 DIAGNOSIS — L40.50 PSORIATIC ARTHRITIS (HCC): ICD-10-CM

## 2025-05-07 RX ORDER — ETODOLAC 400 MG/1
400 TABLET, FILM COATED ORAL 2 TIMES DAILY
Qty: 60 TABLET | Refills: 0 | Status: SHIPPED | OUTPATIENT
Start: 2025-05-07

## 2025-05-08 ENCOUNTER — TELEPHONE (OUTPATIENT)
Dept: HEALTH INFORMATION MANAGEMENT | Facility: OTHER | Age: 65
End: 2025-05-08
Payer: COMMERCIAL

## 2025-05-08 ENCOUNTER — RESULTS FOLLOW-UP (OUTPATIENT)
Dept: MEDICAL GROUP | Facility: LAB | Age: 65
End: 2025-05-08
Payer: COMMERCIAL

## 2025-05-14 ENCOUNTER — DOCUMENTATION (OUTPATIENT)
Dept: PHARMACY | Facility: MEDICAL CENTER | Age: 65
End: 2025-05-14
Payer: COMMERCIAL

## 2025-05-14 NOTE — PROGRESS NOTES
PHARMACIST CLINICAL MANAGEMENT - INITIAL CLINICAL ASSESSMENT -   Result = Complete, Next card status: Stable/Non-Priority Follow Up    INITIAL CLINICAL ASSESSMENT  Tezspire Solution Auto-injector 210 MG/1.91ML - Asthma - Modified initial    Encounter Information: Telephonic assessment, 05/14/2025, Patient    SUBJECTIVE  Signs and symptoms of condition   -Asthma: Reviewed, shortness of breath upon exertion     Functional Limitations: Breathing difficulties/SOB    High risk features: None    Communicable infections: No communicable infections    OBJECTIVE  Clinically relevant medication and/or disease state labs   -Asthma: Reviewed, Exacerbation(s) (since last assessment): none  Active smoker/care plan? N/A; never smoker   Inhaler/device/neb technique review? Defer; prior counseling   Asthma Action Plan in place? 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 If necessary, TEZSPIRE may be kept at room temperature between up to 77?F (25°C) for a maximum of 30 days. After removal from the refrigerator, TEZSPIRE must be used within 30 days or discarded. Store TEZSPIRE in original  carton to protect from light until time of use. Do not freeze. Do not shake. Do not expose to heat.    Reported missed doses in the past month   -Tezspire Solution Auto-injector 210 MG/1.91ML: Following treatment plan as prescribed    Adherence strategies   -Tezspire Solution Auto-injector 210 MG/1.91ML: Reviewed, no missed doses; use calendar to inject every 4 weeks     Missed dose management   -Tezspire Solution Auto-injector 210 MG/1.91ML: Reviewed, If a dose is missed, administer the dose as soon as possible. Thereafter, continue (resume) dosing on the usual day of administration. If the next dose is already due, then administer as planned.    Potential common side effects   -Tezspire Solution Auto-injector 210 MG/1.91ML: Patient declined    Potential common side effect mitigation strategies   -Tezspire Solution Auto-injector 210 MG/1.91ML: Patient declined    Serious side effects, precautions, and contraindications   -Tezspire Solution Auto-injector 210 MG/1.91ML: Patient declined    Status of current side effects   -Tezspire Solution Auto-injector 210 MG/1.91ML: No side effects reported    Non-pharmacologic drug and disease state counseling   -Asthma: Clinician deferred    Relevant immunization recommendations: Reviewed, recommended PCV20     In the last 4 weeks has the patient missed any days of work, school, or planned activities due to their condition?   -Asthma: No    Medication list reconciliation: Reviewed-EMR inaccurate, - Holding off on starting Spiriva at this time   - Prednisone now down to 10mg/day     When to report changes or new additions to medication list: Educated patient    Clinically significant drug interactions: No drug interactions identified    Common drug interactions and dietary avoidance   -Tezspire Solution Auto-injector 210 MG/1.91ML: Reviewed, Avoid live vaccines     PLAN  Patient's overall status:    -Asthma: Improved    Medication appropriateness   -Tezspire Solution  Auto-injector 210 MG/1.91ML: Appropriate    Medication status   -Tezspire Solution Auto-injector 210 MG/1.91ML: Continue Therapy    Anticipated or confirmed medication start date   -Tezspire Solution Auto-injector 210 MG/1.91ML: 05/03/2025    Intended duration of medication   -Tezspire Solution Auto-injector 210 MG/1.91ML: Reviewed, until progression, toxicity, or no longer clinically beneficial      Goals of therapy   -Asthma: Promote or maintain optimal therapy administration and adherence, Mitigation of side effects, Maintain lung function, Prevent respiratory exacerbations and need for ER/UC care, Reduce YONAS use and use of corticosteroids in corticosteroid dependent patient    Understanding/agreement of the goals of therapy   -Asthma: Agrees/understands goals of therapy    Reasons for selecting patient declined, clinician deferred, or unable to assess: declined side effect review citing no side effects; deferred wellness/lifestyle as patient traveling on vacation    Additional summary notes: Spoke with Kaila for modified first cycle call on Tezspire. She completed her initial injection on 5/3/25 without any difficulty as she is a retired flight nurse. She had quesitons related to GI distress with Tezspire where we reviewed medication labeling/prescribing information where GI related side effects were not noted. She has been able to reduce her rescue inhaler usage over the past week while tapering down on Prednisone. She is currently exploring [place] where she may use her rescue inhaler before hiking and once more if symptomatic. She thanked me for the outreach with no further questions.

## 2025-05-22 ENCOUNTER — OFFICE VISIT (OUTPATIENT)
Dept: SLEEP MEDICINE | Facility: MEDICAL CENTER | Age: 65
End: 2025-05-22
Payer: COMMERCIAL

## 2025-05-22 VITALS
DIASTOLIC BLOOD PRESSURE: 68 MMHG | HEIGHT: 72 IN | OXYGEN SATURATION: 94 % | BODY MASS INDEX: 34.67 KG/M2 | WEIGHT: 256 LBS | RESPIRATION RATE: 20 BRPM | HEART RATE: 67 BPM | SYSTOLIC BLOOD PRESSURE: 112 MMHG

## 2025-05-22 DIAGNOSIS — J45.51 SEVERE PERSISTENT ASTHMA WITH ACUTE EXACERBATION: Primary | ICD-10-CM

## 2025-05-22 DIAGNOSIS — G47.33 OSA (OBSTRUCTIVE SLEEP APNEA): ICD-10-CM

## 2025-05-22 DIAGNOSIS — K21.9 GASTROESOPHAGEAL REFLUX DISEASE, UNSPECIFIED WHETHER ESOPHAGITIS PRESENT: ICD-10-CM

## 2025-05-22 PROCEDURE — 3078F DIAST BP <80 MM HG: CPT

## 2025-05-22 PROCEDURE — 99214 OFFICE O/P EST MOD 30 MIN: CPT

## 2025-05-22 PROCEDURE — 3074F SYST BP LT 130 MM HG: CPT

## 2025-05-22 RX ORDER — PREDNISONE 5 MG/1
5 TABLET ORAL 3 TIMES DAILY
Qty: 90 TABLET | Refills: 1 | Status: SHIPPED | OUTPATIENT
Start: 2025-05-22

## 2025-05-22 RX ORDER — SULFAMETHOXAZOLE AND TRIMETHOPRIM 800; 160 MG/1; MG/1
1 TABLET ORAL
Qty: 13 TABLET | Refills: 1 | Status: SHIPPED | OUTPATIENT
Start: 2025-05-23

## 2025-05-22 ASSESSMENT — ENCOUNTER SYMPTOMS
STRIDOR: 0
FEVER: 0
WEIGHT LOSS: 0
SHORTNESS OF BREATH: 1
HEMOPTYSIS: 0
DEPRESSION: 0
PALPITATIONS: 0
CHILLS: 0
HEARTBURN: 1
SPUTUM PRODUCTION: 0
DIZZINESS: 0
COUGH: 0
WHEEZING: 1

## 2025-05-22 ASSESSMENT — FIBROSIS 4 INDEX: FIB4 SCORE: 1.53

## 2025-05-22 NOTE — PROGRESS NOTES
Pulmonary Clinic follow up    Date of Service: 4/22/2025    Reason for follow up:  Follow-Up (4/22/25 [facility] for Severe persistent asthma without complication/) and Results (CT Chest 4/30)    History of Present Illness:     Arely Haynes is a 64 y.o. female being evaluated in clinic today for f/u on severe persistent asthma. Last seen by Dr. Whiteside on 4/7/2025. PMH includes HLD, GERD, hemorrhoids, RTHA, major depression, psoriatic arthritis, seasonal allergies. Patient was hospitalized for asthma last year in July 2024 for respiratory failure. She reports a total of 6 hospitalizations for respiratory failure / asthma exacerbations in her life. Patient was previously on Nucala but had a skin reaction that did not resolve when staying out of the sun, but resolved once the injections were stopped. Upon further discussion, patient's symptoms are worse when she is laying down at night although she is using her nebulizer every 4 hours with minimal relief. Patient does have a history of GERD, suboptimally controlled on omeprazole + pepcid with last endoscopy 3-4 years ago, next is scheduled in June 2025. Patient feels like there may be some vocal cord spasms at night too, which could be from persistent reflux. Patient sleeps with HOB elevated at 15-20 degrees plus an additional pillow. She has a hx of IGGY on CPAP but stopped 3 years ago or so after she couldn't tolerate a full face mask, which we are currently trying to get her back on treatment. Patient has not recently been seen by ENT and does not remember if she has had a laryngoscopy in the past. Patient does also report a hx of PCP pneumonia 2/2 psoriatic arthritis tx Humira. Patient has been on daily prednisone varying from 10-20mg for since March 2025. She received her first injection of Tezspire and is due for her second on May 31, 2025. She had 2 concerns of aspiration, the first one likely resulting in the finding on the HRCT on 4/30/2025 with small  ground glass nodularity on the RLL that appears at the terminal airway that was likely concerning for aspiration and less likely solid nodule as the mediastinal views show only a scant area of solid component. She is feeling very fatigued every afternoon, which may be related to her IGGY but she also feels tightness in her chest that she generally does with her asthma. She does wake up gasping at times and coughing suggesting a microaspiration component triggering her asthma requiring further evaluation from GI.    MMRC Grade:   0- Breathless only during strenuous exercise  1- Short of breath when hurrying or going up a small hill  2- Walks slower than friends due to breathlessness, has to stop at own pace  3- Stops to catch breath on level ground after 100m  4- Breathless with ambulating around house or ADLs     Review of Systems   Constitutional:  Negative for chills, fever and weight loss.   Respiratory:  Positive for shortness of breath and wheezing. Negative for cough, hemoptysis, sputum production and stridor.    Cardiovascular:  Negative for chest pain, palpitations and leg swelling.   Gastrointestinal:  Positive for heartburn.   Skin:  Negative for rash.   Neurological:  Negative for dizziness.   Endo/Heme/Allergies:  Positive for environmental allergies.   Psychiatric/Behavioral:  Negative for depression.        Current Outpatient Medications on File Prior to Visit   Medication Sig Dispense Refill    etodolac (LODINE) 400 MG tablet Take 1 Tablet by mouth 2 times a day. 60 Tablet 0    tiotropium (SPIRIVA RESPIMAT) 2.5 MCG/ACT Aero Soln Inhale 2 Inhalations by mouth every day. 4 g 11    tiotropium (SPIRIVA RESPIMAT) 2.5 MCG/ACT Aero Soln Inhale 2 Inhalations every day. Assemble and prime. 4 g 11    tezepelumab-ekko (TEZSPIRE) 210 MG/1.91ML injection Inject 1.91 mL under the skin every 4 weeks. 1.91 mL 12    Tirzepatide-Weight Management (ZEPBOUND) 2.5 MG/0.5ML Solution Auto-injector Inject 0.5 mL under the  skin every 7 days. 2 mL 2    lamoTRIgine (LAMICTAL) 100 MG Tab Take 1 Tablet by mouth every morning. 90 Tablet 3    pantoprazole (PROTONIX) 40 MG Tablet Delayed Response Take 1 Tablet by mouth 2 times a day. Ok to decrease to once daily once GERD symptoms are controlled 180 Tablet 1    phenazopyridine (PYRIDIUM) 200 MG Tab Take 1 Tablet by mouth 3 times a day as needed for Moderate Pain. 6 Tablet 0    ipratropium-albuterol (COMBIVENT RESPIMAT)  MCG/ACT Aero Soln Inhale 1 Puff 4 times a day. 4 g 11    triamcinolone acetonide (KENALOG) 0.1 % Cream Apply 1 Application topically 2 times a day. 45 g 1    pregabalin (LYRICA) 200 MG capsule Take 1 Capsule by mouth every evening for 90 days. 90 Capsule 0    pregabalin (LYRICA) 150 MG Cap TAKE 1 CAPSULE EVERY MORNING (TAKING 150 MG IN THE MORNING  MG IN THE EVENING) 90 Capsule 0    Mirabegron ER 50 MG TABLET SR 24 HR Take 1 Tablet by mouth every day.      valacyclovir (VALTREX) 1 GM Tab Take 1 Tablet by mouth 2 times a day.      EPINEPHrine (EPIPEN) 0.3 MG/0.3ML Solution Auto-injector solution for injection INJECT 1 PEN IN THE MUSCLE ONE TIME AS DIRECTED FOR ANAPHYLAXIS      pregabalin (LYRICA) 200 MG capsule Take 1 Capsule by mouth 2 times a day.      pregabalin (LYRICA) 100 MG Cap Take 100 mg by mouth.      escitalopram (LEXAPRO) 20 MG tablet TAKE 1 TABLET EVERY MORNING. TAKE WITH ESCITALOPRAM 10 MG FOR A TOTAL DOSE OF 30 MG 90 Tablet 3    escitalopram (LEXAPRO) 10 MG Tab TAKE 1 TABLET EVERY MORNING. TAKE WITH ESCITALOPRAM 20 MG FOR A TOTAL DOSE OF 30 MG 90 Tablet 3    famotidine (PEPCID) 20 MG Tab TAKE 1 TABLET TWICE A DAY FOR GASTROESOPHAGEAL REFLUX DISEASE 180 Tablet 3    DULERA 200-5 MCG/ACT Aerosol USE 2 INHALATIONS TWICE A DAY (Patient taking differently: MEDICATION INSTRUCTIONS FOR SURGERY/PROCEDURE SCHEDULED FOR 12/31/2024  CONTINUE TAKING MED PRIOR TO SURGERY) 39 g 3    montelukast (SINGULAIR) 10 MG Tab TAKE 1 TABLET DAILY (Patient taking differently:  Take 10 mg by mouth every evening.   MEDICATION INSTRUCTIONS FOR SURGERY 12/31/2024  OK TO CONTINUE TAKING PRIOR TO SURGERY AND DAY OF SURGERY) 90 Tablet 3    rosuvastatin (CRESTOR) 10 MG Tab TAKE 1 TABLET AT BEDTIME 90 Tablet 3    ipratropium-albuterol (DUONEB) 0.5-2.5 (3) MG/3ML nebulizer solution Take 3 mL by nebulization every 4 hours. 270 mL 5    POTASSIUM PO Take 99 mg by mouth every day.   CONTINUE TAKING MED PRIOR TO SURGERY      loratadine (CLARITIN) 10 MG Tab Take 10 mg by mouth every day.   MEDICATION INSTRUCTIONS FOR SURGERY 12/31/2024  CONTINUE TAKING MED PRIOR TO SURGERY      magnesium oxide (MAG-OX) 400 MG Tab tablet Take 1 Tablet by mouth every morning. 90 Tablet 3     No current facility-administered medications on file prior to visit.       Social History     Tobacco Use    Smoking status: Never     Passive exposure: Never    Smokeless tobacco: Never   Vaping Use    Vaping status: Never Used   Substance Use Topics    Alcohol use: Yes     Alcohol/week: 3.6 oz     Types: 6 Glasses of wine per week     Comment: 1-2 glasses 4 times per week    Drug use: Never        Past Medical History:   Diagnosis Date    Anesthesia     no self problems; 1st cousin MH    Arthritis     hips knees hands spine    Asthma     prn inhalers    Bronchitis     PCP 2021. Periodic bronchitis    Cataract 2024    Surgery    Concussion     Depression     Daughter passed few years ago    Erosive esophagitis     GERD (gastroesophageal reflux disease)     Heart burn     Heart murmur Early 20’s    Hiatus hernia syndrome     High cholesterol     Immunocompromised (HCC)     Indigestion GERD 20 years    Infection     10/24 toe antibiotics    Pain     hips knees    Pneumonia 2018    Psoriasis     Psoriatic arthritis (HCC)     Sleep apnea 2022    Snoring     UTI (urinary tract infection)     10/24 antiobiotics    Wheezing        Past Surgical History:   Procedure Laterality Date    MS INJECTION,SACROILIAC JOINT Bilateral 3/25/2025     Procedure: RIGHT and LEFT  sacroiliac joint injection with fluoroscopic guidance;  Surgeon: Lani Greene M.D.;  [facility];  Service: Pain Management    KNEE ARTHROPLASTY TOTAL Right 2/4/2025    Procedure: RIGHT TOTAL KNEE ARTHROPLASTY;  Surgeon: Harsh Hensley M.D.;  Location: [facility];  Service: Orthopedics    CATARACT EXTRACTION WITH IOL Bilateral 2024    NY INJECTION,SACROILIAC JOINT Left 07/28/2023    Procedure: LEFT sacroiliac joint injection;  Surgeon: Elvis Aleman M.D.;  [facility];  Service: Pain Management    NY INJ LUMBAR/SACRAL,W/ IMAGING Right 06/02/2023    Procedure: RIGHT L5-S1 interlaminar epidural steroid injection. Patient has tolerated gadolinium;  Surgeon: Elvis Aleman M.D.;  [facility];  Service: Pain Management    NY BRONCHOSCOPY,DIAGNOSTIC  07/09/2021    Procedure: BRONCHOSCOPY;  Surgeon: Myles Vidal M.D.;  Location: [facility];  Service: Ent    TENDON REPAIR Left 09/19/2019    Procedure: REPAIR, TENDON- QUADRICEPS RUTURE REPAIR AND MEYERS;  Surgeon: Jayden Velázquez M.D.;  Location: [facility];  Service: Orthopedics    HYSTERECTOMY ROBOTIC XI N/A 07/11/2019    Procedure: HYSTERECTOMY, ROBOT-ASSISTED, USING DA SABINO XI;  Surgeon: Curly Bernal M.D.;  Location: [facility];  Service: Gynecology    SALPINGO OOPHORECTOMY Bilateral 07/11/2019    Procedure: SALPINGO-OOPHORECTOMY;  Surgeon: Curly Bernal M.D.;  Location: [facility];  Service: Gynecology    FUSION, SPINE, LUMBAR, PLIF  2004    L4-5    GYN SURGERY  1999    c sec    HIP REPLACEMENT, TOTAL      Right    ORIF, KNEE      Left patellar tendon repair    OTHER      Various ortho. Minor    OTHER ABDOMINAL SURGERY      appendix    OTHER ORTHOPEDIC SURGERY      left knee    OTHER ORTHOPEDIC SURGERY Left     L rotator cuff    SHOULDER SURGERY  1980    Rotator cuff repair l       Bee venom,  Iodine, Levofloxacin, Nucala [mepolizumab], and Shellfish-derived products    Family History   Problem Relation Age of Onset    Hyperlipidemia Mother     Heart Attack Mother     Psychiatric Illness Mother     Heart Disease Mother     Arthritis Mother     Lung Disease Father         Pancoast tumor    Cancer Father         Pancoast    Hyperlipidemia Father     Stroke Father     Hyperlipidemia Sister     Cancer Sister         Breast cancer    Heart Disease Paternal Grandfather     Hypertension Neg Hx     Diabetes Neg Hx      /68 (BP Location: Right arm, Patient Position: Sitting, BP Cuff Size: Adult)   Pulse 67   Resp 20   Ht 1.829 m (6')   Wt 116 kg (256 lb)   SpO2 94%      Physical Exam  Constitutional:       General: She is not in acute distress.  HENT:      Head: Normocephalic.      Nose: Nose normal.      Mouth/Throat:      Mouth: Mucous membranes are moist.   Eyes:      Conjunctiva/sclera: Conjunctivae normal.   Cardiovascular:      Rate and Rhythm: Normal rate and regular rhythm.      Heart sounds: No murmur heard.  Pulmonary:      Effort: No respiratory distress.      Breath sounds: No wheezing.   Abdominal:      General: There is no distension.   Musculoskeletal:      Right lower leg: No edema.      Left lower leg: No edema.   Skin:     General: Skin is warm and dry.      Capillary Refill: Capillary refill takes less than 2 seconds.      Nails: There is no clubbing.   Neurological:      General: No focal deficit present.      Mental Status: She is alert and oriented to person, place, and time.   Psychiatric:         Mood and Affect: Mood normal.       Results:    PFT 12/28/2018:        Interpretation:     Lung function testing was completed on December 28, 2018.  Spirometry was normal.  No bronchodilator response.  No significant restriction or hyperinflation noted.  Oxygen transfer was normal.  Flow volume loop looks normal as well with good effort noted      CT chest 7/3/2023:    FINDINGS:     The  visualized portion of the thyroid appear within normal limits.  The trachea and main stem airways are normal in caliber. There are no pathologically enlarged mediastinal lymph nodes.     The heart and pericardium appear within normal limits.   The aorta and its main branch vessels are normal in caliber and configuration.  The pulmonary arteries are well opacified without visualized filling defect.     The pulmonary parenchyma demonstrates no acute abnormality.    HRCT 5/1/2025:     small ground glass nodularity on the RLL that appears at the terminal airway that was likely concerning for aspiration and less likely solid nodule as the mediastinal views show only a scant area of solid component.    Echocardiogram 6/14/2024:    FINDINGS  Left Ventricle  The left ventricle is mildly dilated. Normal left ventricular wall   thickness. The ejection fraction is measured to be 55 % by Pickett's   biplane. Normal regional wall motion. Normal diastolic function.     Right Ventricle  Normal right ventricular size and systolic function.     Right Atrium  Normal inferior vena cava size and inspiratory collapse.     Left Atrium  Normal left atrial size. Left atrial volume index is 25.7  mL/sq m.     Mitral Valve  Structurally normal mitral valve. No mitral stenosis. Trace mitral   regurgitation.     Aortic Valve  Tricuspid aortic valve. No aortic valve stenosis. Trace aortic   insufficiency.     Tricuspid Valve  Structurally normal tricuspid valve. No tricuspid stenosis.No tricuspid   regurgitation. Unable to estimate pulmonary artery pressure due to an   inadequate tricuspid regurgitant jet.     Pulmonic Valve  Structurally normal pulmonic valve without significant stenosis or   regurgitation.     Pericardium  No pericardial effusion.     Aorta  Normal aortic root for body surface area. The ascending aorta is   borderline dilated with a diameter of  3.75cm.     PSG 10/18/2022:     Impression:  1.  Severe obstructive sleep apnea  seen on previous sleep study  2.  Respiratory events did improve with CPAP therapy  3.  Was tried on BiPAP did improve respiratory events during REM sleep however central apneas appeared to become present once back into N2 sleep  4.  Central apneas accounted for 25.7 percent of respiratory events  5.  PLM's noted  6.  No supine REM sleep was seen  7.  Respiratory events worse during REM sleep     Recommendations:  I recommend auto CPAP 6 to 15 cm.  May also consider auto BiPAP therapy EPAP 13 IPAP 19 pressure support 4.  Patient used a small AirFit N 30i     Latest Reference Range & Units 01/20/25 11:46   Eos (Absolute) 0.00 - 0.51 K/uL 0.12      Latest Reference Range & Units 01/20/25 11:46   Ige, Qn <=214 kU/L 34      Latest Reference Range & Units 01/20/25 11:46   M006 Alternaria Tenius <=0.34 kU/L <0.10   M003 Aspergillus Fumigatus <=0.34 kU/L 0.11   G2 Bermuda Grass <=0.34 kU/L <0.10   E001 Cat Hair-Dander Standard <=0.34 kU/L <0.10   T6 [place] <=0.34 kU/L 0.21   Cockroach Allergen <=0.34 kU/L <0.10   W14 Pigweed <=0.34 kU/L <0.10   W1 Ragweed Short <=0.34 kU/L <0.10   [place] <=0.34 kU/L <0.10   D002 D Farinae Mite <=0.34 kU/L <0.10   D1 D Pteronyssinus <=0.34 kU/L <0.10   E5 Dog Dander <=0.34 kU/L <0.10   T008 Elm American -White <=0.34 kU/L <0.10   Hormodendrum <=0.34 kU/L <0.10   Immunocap Score  See Note   Maple [place] <=0.34 kU/L <0.10   Mouse Epithelium Allergen <=0.34 kU/L <0.10   Mucor Racemosus <=0.34 kU/L <0.10   Mugwort <=0.34 kU/L <0.10   T7 [place] White Tree <=0.34 kU/L <0.10   T9 [place] Tree <=0.34 kU/L <0.10   M001 Penicillium Notatum <=0.34 kU/L <0.10   Russian Thistle <=0.34 kU/L <0.10   See Grass, Allergen <=0.34 kU/L 0.12   T70 White [place] <=0.34 kU/L <0.10     Vaccinations:    Covid: complete 2024  Flu: due  Pneumonia: complete  RSV: complete      Assessment & Plan  Severe persistent asthma with acute exacerbation    Chronic. Severe, frequently on oral steroids  Likely  triggered by severe GERD vs vocal cord spasms vs untreated IGGY  6 hospitalizations for acute respiratory failure/asthma exacerbation  Previously on Nucala with skin rash, tolerating Tezspire  Now requiring daily prednisone d/t uncontrolled symptoms ranging 10-15mg, on PJP prophylaxis d/t hx of PJP even though she is under the 20mg threshold daily dose      Symptoms requiring PRN tx per week: every 4 hours   Nocturnal symptoms in the past month: frequent  Triggers: GERD, vocal cord spasm, untreated IGGY   Exacerbations in the last year: 5     Routine Tx: Dulera 200 + Spiriva + Tezspire + Prednisone 10-15 mg daily  PRN Tx: Albuterol      Educated pt on avoidance of any personal triggers.   Use air purifier with HEPA filter if available.   Educated pt on routine exercise if tolerated, using a YONAS prior.   Encourage annual COVID and flu vaccinations, and wash hands regularly to prevent infection.    Referral placed to [facility] for tertiary evaluation d/t patient's severe symptoms refractory to tx.     Orders:    predniSONE (DELTASONE) 5 MG Tab; Take 1 Tablet by mouth in the morning, at noon, and at bedtime.    sulfamethoxazole-trimethoprim (BACTRIM DS) 800-160 MG tablet; Take 1 Tablet by mouth every Monday, Wednesday, and Friday. Take until gone.    MPO AND PR3 WITH REFLEX TO ANCA; Future    CBC WITH DIFFERENTIAL; Future    Comp Metabolic Panel; Future    Referral to Pulmonary and Sleep Medicine    IGGY (obstructive sleep apnea)    Referral to sleep medicine. Patient also interested in Zepbound for her IGGY. Medication was previously denied by Sleep Medicine team can call [phone] ext [phone] to discuss with her  after they evaluate her.     Orders:    Referral to Pulmonary and Sleep Medicine    Gastroesophageal reflux disease, unspecified whether esophagitis present    Continue Pantoprazole 40mg BID and likely will need endoscopy while at [facility]  Could also benefit from laryngoscopy to evaluate vocal cord  function.           Return in about 4 weeks (around 6/19/2025), or if symptoms worsen or fail to improve, for f/u on severe asthma. Please schedule with sleep medicine as soon as possible .     This note was generated using voice recognition software which has a chance of producing errors of grammar and possibly content.  I have made every reasonable attempt to find and correct any obvious errors, but it should be expected that some may not be found prior to finalization of this note.    Time spent in record review prior to patient arrival, reviewing results, and in face-to-face encounter totaled 38 min, excluding any procedures if performed.    Asael KIRBY  [facility]

## 2025-05-22 NOTE — ASSESSMENT & PLAN NOTE
Continue Pantoprazole 40mg BID and likely will need endoscopy while at [facility]  Could also benefit from laryngoscopy to evaluate vocal cord function.

## 2025-05-23 NOTE — Clinical Note
REFERRAL APPROVAL NOTICE         Sent on May 23, 2025                   Kaila Joya Ivy  [address] [place] [id number]                   Dear MsCami Haynes,    After a careful review of the medical information and benefit coverage, [facility] has processed your referral. See below for additional details.    If applicable, you must be actively enrolled with your insurance for coverage of the authorized service. If you have any questions regarding your coverage, please contact your insurance directly.    REFERRAL INFORMATION   Referral #:  [id number]  Referred-To Provider    Referred-By Provider:  Pulmonary Medicine    MARITZA Jose   [facility]      [address]  [facility] [address]  [facility] [address]  [phone] [address] [place] [address]  [phone]    Referral Start Date:  05/22/2025  Referral End Date:   05/22/2026             SCHEDULING  If you do not already have an appointment, please call [phone] to make an appointment.     MORE INFORMATION  If you do not already have a [employer] account, sign up at: [email or web address]  You can access your medical information, make appointments, see lab results, billing information, and more.  If you have questions regarding this referral, please contact  the [facility] Referrals department at:             [phone]. Monday - Friday 8:00AM - 5:00PM.     Sincerely,    [facility]

## 2025-05-25 PROCEDURE — RXMED WILLOW AMBULATORY MEDICATION CHARGE

## 2025-05-29 ENCOUNTER — PHARMACY VISIT (OUTPATIENT)
Dept: PHARMACY | Facility: MEDICAL CENTER | Age: 65
End: 2025-05-29
Payer: COMMERCIAL

## 2025-05-29 NOTE — Clinical Note
REFERRAL APPROVAL NOTICE         Sent on May 29, 2025                   Kaila Haynes  [address] [place] [id number]                   Dear MsCami Ivy,    After a careful review of the medical information and benefit coverage, [facility] has processed your referral. See below for additional details.    If applicable, you must be actively enrolled with your insurance for coverage of the authorized service. If you have any questions regarding your coverage, please contact your insurance directly.    REFERRAL INFORMATION   Referral #:  [id number]  Referred-To Department    Referred-By Provider:  Pulmonary and Sleep Medicine    MARITZA Jose   Pulmonary/sleep [facility]      [address]  [address]  [place] [address]  [phone] [address]  [place] [id number] [phone]    Referral Start Date:  05/22/2025  Referral End Date:   05/22/2026           SCHEDULING  If you do not already have an appointment, please call [phone] to make an appointment.   MORE INFORMATION  As a reminder, [facility] ownership has changed, meaning this location is now owned and operated by [facility]. As such, we want to clarify that our patients should expect to receive two separate bills for the services received at [facility] - one representing the [facility] facility fees as the owner of the establishment, and the other to represent the physician's services and subsequent fees. You can speak with your insurance carrier for a pricing estimate by calling the customer service number on the back of your card and ask about charges for a hospital outpatient visit.  If you do not already have a [employer] account, sign up at: [email or web address]  You can access your medical information, make appointments, see lab results, billing  information, and more.  If you have questions regarding this referral, please contact  the [facility] department at:             [phone]. Monday - Friday 7:30AM - 5:00PM.      Sincerely,  [facility]

## 2025-05-30 ENCOUNTER — TELEPHONE (OUTPATIENT)
Dept: SLEEP MEDICINE | Facility: MEDICAL CENTER | Age: 65
End: 2025-05-30
Payer: COMMERCIAL

## 2025-05-30 NOTE — TELEPHONE ENCOUNTER
Roselyn from [employer] called needing to speak with juanjose canales about the patient machine order.        Roselyn is requesting to speak with the ma to discuss missing info for the order she is also requesting if its possible to fax over the [employer] [facility].    If possible please contact roselyn to discuss order details.      Nurse case jessica Dempsey  Phone:[phone] [place] [phone]    Fax: [phone]

## 2025-06-02 ENCOUNTER — HOSPITAL ENCOUNTER (OUTPATIENT)
Dept: RADIOLOGY | Facility: MEDICAL CENTER | Age: 65
End: 2025-06-02
Attending: FAMILY MEDICINE
Payer: COMMERCIAL

## 2025-06-02 DIAGNOSIS — Z78.0 POST-MENOPAUSAL: ICD-10-CM

## 2025-06-02 DIAGNOSIS — Z12.31 ENCOUNTER FOR SCREENING MAMMOGRAM FOR MALIGNANT NEOPLASM OF BREAST: ICD-10-CM

## 2025-06-02 PROCEDURE — 77080 DXA BONE DENSITY AXIAL: CPT

## 2025-06-02 PROCEDURE — 77063 BREAST TOMOSYNTHESIS BI: CPT

## 2025-06-02 NOTE — Clinical Note
REFERRAL APPROVAL NOTICE         Sent on June 2, 2025                   Kaila Wahlyovany  [address] [place] [id number]                   Dear MsCami Haynes,    After a careful review of the medical information and benefit coverage, [facility] has processed your referral. See below for additional details.    If applicable, you must be actively enrolled with your insurance for coverage of the authorized service. If you have any questions regarding your coverage, please contact your insurance directly.    REFERRAL INFORMATION   Referral #:  [id number]  Referred-To Provider    Referred-By Provider:  Sleep Studies    Joe Espinal M.D.   [facility]      [address] [facility]  [address]  [place] [id number] [phone] [id number]  [facility] [address]  [phone]    Referral Start Date:  05/29/2025  Referral End Date:   05/29/2026             SCHEDULING  If you do not already have an appointment, please call [phone] to make an appointment.     MORE INFORMATION  If you do not already have a [email or web address] account, sign up at: [email or web address]  You can access your medical information, make appointments, see lab results, billing information, and more.  If you have questions regarding this referral, please contact  the [facility] Referrals department at:             [phone]. Monday - Friday 8:00AM - 5:00PM.     Sincerely,    [facility]

## 2025-06-03 ENCOUNTER — PATIENT MESSAGE (OUTPATIENT)
Dept: SLEEP MEDICINE | Facility: MEDICAL CENTER | Age: 65
End: 2025-06-03
Payer: COMMERCIAL

## 2025-06-03 ENCOUNTER — TELEPHONE (OUTPATIENT)
Dept: HEALTH INFORMATION MANAGEMENT | Facility: OTHER | Age: 65
End: 2025-06-03
Payer: COMMERCIAL

## 2025-06-03 DIAGNOSIS — J45.51 SEVERE PERSISTENT ASTHMA WITH ACUTE EXACERBATION: ICD-10-CM

## 2025-06-03 RX ORDER — IPRATROPIUM BROMIDE AND ALBUTEROL 20; 100 UG/1; UG/1
1 SPRAY, METERED RESPIRATORY (INHALATION) 4 TIMES DAILY
Qty: 12 G | Refills: 3 | Status: SHIPPED | OUTPATIENT
Start: 2025-06-03

## 2025-06-04 ENCOUNTER — RESULTS FOLLOW-UP (OUTPATIENT)
Dept: MEDICAL GROUP | Facility: LAB | Age: 65
End: 2025-06-04
Payer: COMMERCIAL

## 2025-06-05 ENCOUNTER — PATIENT MESSAGE (OUTPATIENT)
Dept: SLEEP MEDICINE | Facility: MEDICAL CENTER | Age: 65
End: 2025-06-05
Payer: COMMERCIAL

## 2025-06-05 NOTE — TELEPHONE ENCOUNTER
Called and spoke with Lei with [employer]. Informed him of this information and the information pt has provided. He is going to contact that number first to see what is happening since they have an approval from the insurance on their end.    Called and spoke with pt. Informed pt of this. She understood. She will follow up tomorrow with Lei at [employer].

## 2025-06-05 NOTE — PATIENT COMMUNICATION
Called and spoke with pt. Informed pt we don't submit authorization for her CPAP to her insurance company, she would need to contact [employer] to submit the information to her insurance. . She said she did and they won't. Recommended pt to let have her insurance company send a request to our Medical records. She said they did. Informed her I don't see one scanned in her chart. Pt said she is just going to return her CPAP machine to them. Informed pt if she does return it, she would need to redo testing.

## 2025-06-13 ENCOUNTER — APPOINTMENT (OUTPATIENT)
Facility: MEDICAL CENTER | Age: 65
End: 2025-06-13
Payer: COMMERCIAL

## 2025-06-16 ENCOUNTER — OFFICE VISIT (OUTPATIENT)
Dept: SLEEP MEDICINE | Facility: MEDICAL CENTER | Age: 65
End: 2025-06-16
Payer: COMMERCIAL

## 2025-06-16 VITALS
SYSTOLIC BLOOD PRESSURE: 114 MMHG | HEART RATE: 61 BPM | BODY MASS INDEX: 34.67 KG/M2 | HEIGHT: 72 IN | DIASTOLIC BLOOD PRESSURE: 70 MMHG | WEIGHT: 256 LBS | OXYGEN SATURATION: 95 %

## 2025-06-16 DIAGNOSIS — J45.51 SEVERE PERSISTENT ASTHMA WITH ACUTE EXACERBATION: Primary | ICD-10-CM

## 2025-06-16 DIAGNOSIS — G47.33 OSA (OBSTRUCTIVE SLEEP APNEA): ICD-10-CM

## 2025-06-16 DIAGNOSIS — L30.9 ECZEMA, UNSPECIFIED TYPE: ICD-10-CM

## 2025-06-16 PROCEDURE — 3078F DIAST BP <80 MM HG: CPT

## 2025-06-16 PROCEDURE — 99214 OFFICE O/P EST MOD 30 MIN: CPT

## 2025-06-16 PROCEDURE — 3074F SYST BP LT 130 MM HG: CPT

## 2025-06-16 PROCEDURE — 99213 OFFICE O/P EST LOW 20 MIN: CPT

## 2025-06-16 RX ORDER — TRIAMCINOLONE ACETONIDE 1 MG/G
1 CREAM TOPICAL 2 TIMES DAILY
Qty: 45 G | Refills: 6 | Status: SHIPPED | OUTPATIENT
Start: 2025-06-16

## 2025-06-16 ASSESSMENT — ENCOUNTER SYMPTOMS
DEPRESSION: 0
SHORTNESS OF BREATH: 0
DIZZINESS: 0
CHILLS: 0
HEARTBURN: 1
SPUTUM PRODUCTION: 0
HEMOPTYSIS: 0
FEVER: 0
WEIGHT LOSS: 0
WHEEZING: 0
PALPITATIONS: 0
COUGH: 0
STRIDOR: 0

## 2025-06-16 ASSESSMENT — FIBROSIS 4 INDEX: FIB4 SCORE: 1.53

## 2025-06-16 NOTE — PROGRESS NOTES
Pulmonary Clinic follow up    Date of Service: 6/16/2025     Reason for follow up:  Follow-Up (Last seen 05/22/25/Dx: Severe persistent asthma with acute exacerbation) and Results (LABS not completed /Cancelled 06/13/25 appt for draw)    History of Present Illness:     Arely Haynes is a 64 y.o. female being evaluated in clinic today for f/u on severe persistent asthma. Last seen by Dr. Whiteside on 4/7/2025. PMH includes HLD, GERD, hemorrhoids, RTHA, major depression, psoriatic arthritis, seasonal allergies. Patient was hospitalized for asthma July 2024 for respiratory failure. She reports a total of 6 hospitalizations for respiratory failure / asthma exacerbations in her life. Patient was previously on Nucala but had a skin reaction that did not resolve when staying out of the sun, but resolved once the injections were stopped. Upon further discussion, patient's symptoms are worse when she is laying down at night. Patient does have a history of GERD, suboptimally controlled on pantoprazole. Patient feels like there may be some vocal cord spasms at night too, which could be from persistent reflux. Patient sleeps with HOB elevated at 15-20 degrees plus an additional pillow. She has a hx of IGGY on CPAP. Patient has not recently been seen by ENT and does not remember if she has had a laryngoscopy in the past. Patient does have a hx of PCP pneumonia 2/2 psoriatic arthritis tx Humira. She had 2 concerns of aspiration, the first one likely resulting in the finding on the HRCT on 4/30/2025 with small ground glass nodularity on the RLL that appears at the terminal airway that was likely concerning for aspiration and less likely solid nodule as the mediastinal views show only a scant area of solid component. She is feeling very fatigued every afternoon, which may be related to her IGGY. She does wake up gasping at times and coughing suggesting a microaspiration component triggering her asthma requiring further  evaluation from GI. Patient was on daily prednisone from March 2025 through June 2025 but is now stable without daily use. Patient has been on Tezspire since May 2025 and feels this has significantly helped her symptoms. She has been accepted to [facility] for evaluation and first appointment is in the next 4-6 weeks. Pending sleep study and appt with sleep medicine. Endoscopy will be performed while at [facility].     MMRC Grade:   0- Breathless only during strenuous exercise  1- Short of breath when hurrying or going up a small hill  2- Walks slower than friends due to breathlessness, has to stop at own pace  3- Stops to catch breath on level ground after 100m  4- Breathless with ambulating around house or ADLs     Review of Systems   Constitutional:  Negative for chills, fever and weight loss.   Respiratory:  Negative for cough, hemoptysis, sputum production, shortness of breath, wheezing and stridor.    Cardiovascular:  Negative for chest pain, palpitations and leg swelling.   Gastrointestinal:  Positive for heartburn.   Skin:  Negative for rash.   Neurological:  Negative for dizziness.   Endo/Heme/Allergies:  Positive for environmental allergies.   Psychiatric/Behavioral:  Negative for depression.        Current Outpatient Medications on File Prior to Visit   Medication Sig Dispense Refill    ipratropium-albuterol (COMBIVENT RESPIMAT)  MCG/ACT Aero Soln Inhale 1 Puff 4 times a day. 12 g 3    etodolac (LODINE) 400 MG tablet Take 1 Tablet by mouth 2 times a day. 60 Tablet 0    tezepelumab-ekko (TEZSPIRE) 210 MG/1.91ML injection Inject 1.91 mL under the skin every 4 weeks. 1.91 mL 12    lamoTRIgine (LAMICTAL) 100 MG Tab Take 1 Tablet by mouth every morning. 90 Tablet 3    pantoprazole (PROTONIX) 40 MG Tablet Delayed Response Take 1 Tablet by mouth 2 times a day. Ok to decrease to once daily once GERD symptoms are controlled 180 Tablet 1    Mirabegron ER 50 MG TABLET SR 24 HR Take 1 Tablet by mouth every day.       EPINEPHrine (EPIPEN) 0.3 MG/0.3ML Solution Auto-injector solution for injection INJECT 1 PEN IN THE MUSCLE ONE TIME AS DIRECTED FOR ANAPHYLAXIS      pregabalin (LYRICA) 200 MG capsule Take 1 Capsule by mouth 2 times a day.      pregabalin (LYRICA) 100 MG Cap Take 100 mg by mouth.      escitalopram (LEXAPRO) 20 MG tablet TAKE 1 TABLET EVERY MORNING. TAKE WITH ESCITALOPRAM 10 MG FOR A TOTAL DOSE OF 30 MG 90 Tablet 3    escitalopram (LEXAPRO) 10 MG Tab TAKE 1 TABLET EVERY MORNING. TAKE WITH ESCITALOPRAM 20 MG FOR A TOTAL DOSE OF 30 MG 90 Tablet 3    famotidine (PEPCID) 20 MG Tab TAKE 1 TABLET TWICE A DAY FOR GASTROESOPHAGEAL REFLUX DISEASE 180 Tablet 3    DULERA 200-5 MCG/ACT Aerosol USE 2 INHALATIONS TWICE A DAY (Patient taking differently: MEDICATION INSTRUCTIONS FOR SURGERY/PROCEDURE SCHEDULED FOR 12/31/2024  CONTINUE TAKING MED PRIOR TO SURGERY) 39 g 3    montelukast (SINGULAIR) 10 MG Tab TAKE 1 TABLET DAILY (Patient taking differently: Take 10 mg by mouth every evening.   MEDICATION INSTRUCTIONS FOR SURGERY 12/31/2024  OK TO CONTINUE TAKING PRIOR TO SURGERY AND DAY OF SURGERY) 90 Tablet 3    rosuvastatin (CRESTOR) 10 MG Tab TAKE 1 TABLET AT BEDTIME 90 Tablet 3    ipratropium-albuterol (DUONEB) 0.5-2.5 (3) MG/3ML nebulizer solution Take 3 mL by nebulization every 4 hours. 270 mL 5    POTASSIUM PO Take 99 mg by mouth every day.   CONTINUE TAKING MED PRIOR TO SURGERY      loratadine (CLARITIN) 10 MG Tab Take 10 mg by mouth every day.   MEDICATION INSTRUCTIONS FOR SURGERY 12/31/2024  CONTINUE TAKING MED PRIOR TO SURGERY      magnesium oxide (MAG-OX) 400 MG Tab tablet Take 1 Tablet by mouth every morning. 90 Tablet 3    predniSONE (DELTASONE) 5 MG Tab Take 1 Tablet by mouth in the morning, at noon, and at bedtime. 90 Tablet 1    sulfamethoxazole-trimethoprim (BACTRIM DS) 800-160 MG tablet Take 1 Tablet by mouth every Monday, Wednesday, and Friday. Take until gone. 13 Tablet 1    tiotropium (SPIRIVA RESPIMAT) 2.5  MCG/ACT Aero Soln Inhale 2 Inhalations by mouth every day. 4 g 11    tiotropium (SPIRIVA RESPIMAT) 2.5 MCG/ACT Aero Soln Inhale 2 Inhalations every day. Assemble and prime. 4 g 11    Tirzepatide-Weight Management (ZEPBOUND) 2.5 MG/0.5ML Solution Auto-injector Inject 0.5 mL under the skin every 7 days. 2 mL 2    phenazopyridine (PYRIDIUM) 200 MG Tab Take 1 Tablet by mouth 3 times a day as needed for Moderate Pain. 6 Tablet 0    valacyclovir (VALTREX) 1 GM Tab Take 1 Tablet by mouth 2 times a day.       No current facility-administered medications on file prior to visit.       Social History     Tobacco Use    Smoking status: Never     Passive exposure: Never    Smokeless tobacco: Never   Vaping Use    Vaping status: Never Used   Substance Use Topics    Alcohol use: Yes     Alcohol/week: 3.6 oz     Types: 6 Glasses of wine per week     Comment: 1-2 glasses 4 times per week    Drug use: Never        Past Medical History:   Diagnosis Date    Anesthesia     no self problems; 1st cousin MH    Arthritis     hips knees hands spine    Asthma     prn inhalers    Bronchitis     PCP 2021. Periodic bronchitis    Cataract 2024    Surgery    Concussion     Depression     Daughter passed few years ago    Erosive esophagitis     GERD (gastroesophageal reflux disease)     Heart burn     Heart murmur Early 20’s    Hiatus hernia syndrome     High cholesterol     Immunocompromised (HCC)     Indigestion GERD 20 years    Infection     10/24 toe antibiotics    Pain     hips knees    Pneumonia 2018    Psoriasis     Psoriatic arthritis (HCC)     Sleep apnea 2022    Snoring     UTI (urinary tract infection)     10/24 antiobiotics    Wheezing        Past Surgical History:   Procedure Laterality Date    NC INJECTION,SACROILIAC JOINT Bilateral 3/25/2025    Procedure: RIGHT and LEFT  sacroiliac joint injection with fluoroscopic guidance;  Surgeon: Lani Greene M.D.;  [facility];  Service: Pain Management    KNEE  ARTHROPLASTY TOTAL Right 2/4/2025    Procedure: RIGHT TOTAL KNEE ARTHROPLASTY;  Surgeon: Harsh Hensley M.D.;  Location: [facility];  Service: Orthopedics    CATARACT EXTRACTION WITH IOL Bilateral 2024    VT INJECTION,SACROILIAC JOINT Left 07/28/2023    Procedure: LEFT sacroiliac joint injection;  Surgeon: Elvis Aleman M.D.;  Location: [facility];  Service: Pain Management    VT INJ LUMBAR/SACRAL,W/ IMAGING Right 06/02/2023    Procedure: RIGHT L5-S1 interlaminar epidural steroid injection. Patient has tolerated gadolinium;  Surgeon: Elvis Aleman M.D.;  [facility];  Service: Pain Management    VT BRONCHOSCOPY,DIAGNOSTIC  07/09/2021    Procedure: BRONCHOSCOPY;  Surgeon: Myles Vidal M.D.;  Location: [facility];  Service: Ent    TENDON REPAIR Left 09/19/2019    Procedure: REPAIR, TENDON- QUADRICEPS RUTURE REPAIR AND MEYERS;  Surgeon: Jayden Velázquez M.D.;  Location: [facility];  Service: Orthopedics    HYSTERECTOMY ROBOTIC XI N/A 07/11/2019    Procedure: HYSTERECTOMY, ROBOT-ASSISTED, USING DA SABINO XI;  Surgeon: Curly Bernal M.D.;  Location: [facility];  Service: Gynecology    SALPINGO OOPHORECTOMY Bilateral 07/11/2019    Procedure: SALPINGO-OOPHORECTOMY;  Surgeon: Curly Bernal M.D.;  Location: [facility];  Service: Gynecology    FUSION, SPINE, LUMBAR, PLIF  2004    L4-5    GYN SURGERY  1999    c sec    HIP REPLACEMENT, TOTAL      Right    ORIF, KNEE      Left patellar tendon repair    OTHER      Various ortho. Minor    OTHER ABDOMINAL SURGERY      appendix    OTHER ORTHOPEDIC SURGERY      left knee    OTHER ORTHOPEDIC SURGERY Left     L rotator cuff    SHOULDER SURGERY  1980    Rotator cuff repair l       Bee venom, Iodine, Levofloxacin, Nucala [mepolizumab], and Shellfish-derived products    Family History   Problem Relation Age of Onset    Hyperlipidemia Mother     Heart Attack Mother     Psychiatric  Illness Mother     Heart Disease Mother     Arthritis Mother     Lung Disease Father         Pancoast tumor    Cancer Father         Pancoast    Hyperlipidemia Father     Stroke Father     Hyperlipidemia Sister     Cancer Sister         Breast cancer    Heart Disease Paternal Grandfather     Hypertension Neg Hx     Diabetes Neg Hx      /70 (BP Location: Right arm, Patient Position: Sitting, BP Cuff Size: Adult)   Pulse 61   Ht 1.829 m (6')   Wt 116 kg (256 lb)   SpO2 95%      Physical Exam  Constitutional:       General: She is not in acute distress.  HENT:      Head: Normocephalic.      Nose: Nose normal.      Mouth/Throat:      Mouth: Mucous membranes are moist.   Eyes:      Conjunctiva/sclera: Conjunctivae normal.   Cardiovascular:      Rate and Rhythm: Normal rate and regular rhythm.      Heart sounds: No murmur heard.  Pulmonary:      Effort: No respiratory distress.      Breath sounds: No wheezing.   Abdominal:      General: There is no distension.   Musculoskeletal:      Right lower leg: No edema.      Left lower leg: No edema.   Skin:     General: Skin is warm and dry.      Capillary Refill: Capillary refill takes less than 2 seconds.      Nails: There is no clubbing.   Neurological:      General: No focal deficit present.      Mental Status: She is alert and oriented to person, place, and time.   Psychiatric:         Mood and Affect: Mood normal.       Results:    PFT 12/28/2018:        Interpretation:     Lung function testing was completed on December 28, 2018.  Spirometry was normal.  No bronchodilator response.  No significant restriction or hyperinflation noted.  Oxygen transfer was normal.  Flow volume loop looks normal as well with good effort noted      CT chest 7/3/2023:    FINDINGS:     The visualized portion of the thyroid appear within normal limits.  The trachea and main stem airways are normal in caliber. There are no pathologically enlarged mediastinal lymph nodes.     The heart and  pericardium appear within normal limits.   The aorta and its main branch vessels are normal in caliber and configuration.  The pulmonary arteries are well opacified without visualized filling defect.     The pulmonary parenchyma demonstrates no acute abnormality.    HRCT 5/1/2025:     small ground glass nodularity on the RLL that appears at the terminal airway that was likely concerning for aspiration and less likely solid nodule as the mediastinal views show only a scant area of solid component.    Echocardiogram 6/14/2024:    FINDINGS  Left Ventricle  The left ventricle is mildly dilated. Normal left ventricular wall   thickness. The ejection fraction is measured to be 55 % by Pickett's   biplane. Normal regional wall motion. Normal diastolic function.     Right Ventricle  Normal right ventricular size and systolic function.     Right Atrium  Normal inferior vena cava size and inspiratory collapse.     Left Atrium  Normal left atrial size. Left atrial volume index is 25.7  mL/sq m.     Mitral Valve  Structurally normal mitral valve. No mitral stenosis. Trace mitral   regurgitation.     Aortic Valve  Tricuspid aortic valve. No aortic valve stenosis. Trace aortic   insufficiency.     Tricuspid Valve  Structurally normal tricuspid valve. No tricuspid stenosis.No tricuspid   regurgitation. Unable to estimate pulmonary artery pressure due to an   inadequate tricuspid regurgitant jet.     Pulmonic Valve  Structurally normal pulmonic valve without significant stenosis or   regurgitation.     Pericardium  No pericardial effusion.     Aorta  Normal aortic root for body surface area. The ascending aorta is   borderline dilated with a diameter of  3.75cm.     PSG 10/18/2022:     Impression:  1.  Severe obstructive sleep apnea seen on previous sleep study  2.  Respiratory events did improve with CPAP therapy  3.  Was tried on BiPAP did improve respiratory events during REM sleep however central apneas appeared to become  present once back into N2 sleep  4.  Central apneas accounted for 25.7 percent of respiratory events  5.  PLM's noted  6.  No supine REM sleep was seen  7.  Respiratory events worse during REM sleep     Recommendations:  I recommend auto CPAP 6 to 15 cm.  May also consider auto BiPAP therapy EPAP 13 IPAP 19 pressure support 4.  Patient used a small AirFit N 30i     Latest Reference Range & Units 01/20/25 11:46   Eos (Absolute) 0.00 - 0.51 K/uL 0.12      Latest Reference Range & Units 01/20/25 11:46   Ige, Qn <=214 kU/L 34      Latest Reference Range & Units 01/20/25 11:46   M006 Alternaria Tenius <=0.34 kU/L <0.10   M003 Aspergillus Fumigatus <=0.34 kU/L 0.11   G2 Bermuda Grass <=0.34 kU/L <0.10   E001 Cat Hair-Dander Standard <=0.34 kU/L <0.10   T6 [place] <=0.34 kU/L 0.21   Cockroach Allergen <=0.34 kU/L <0.10   W14 Pigweed <=0.34 kU/L <0.10   W1 Ragweed Short <=0.34 kU/L <0.10   Spring <=0.34 kU/L <0.10   D002 D Farinae Mite <=0.34 kU/L <0.10   D1 D Pteronyssinus <=0.34 kU/L <0.10   E5 Dog Dander <=0.34 kU/L <0.10   T008 Elm American -White <=0.34 kU/L <0.10   Hormodendrum <=0.34 kU/L <0.10   Immunocap Score  See Note   Maple Cecil <=0.34 kU/L <0.10   Mouse Epithelium Allergen <=0.34 kU/L <0.10   Mucor Racemosus <=0.34 kU/L <0.10   Mugwort <=0.34 kU/L <0.10   T7 [place] White Tree <=0.34 kU/L <0.10   T9 [place] Tree <=0.34 kU/L <0.10   M001 Penicillium Notatum <=0.34 kU/L <0.10   Russian Thistle <=0.34 kU/L <0.10   See Grass, Allergen <=0.34 kU/L 0.12   T70 White Alpine <=0.34 kU/L <0.10     Vaccinations:    Covid: complete 2024  Flu: due  Pneumonia: complete  RSV: complete      Assessment & Plan  Severe persistent asthma with acute exacerbation    Chronic. Severe, frequently on oral steroids  Likely triggered by severe GERD vs vocal cord spasms vs untreated IGGY  6 hospitalizations for acute respiratory failure/asthma exacerbation  Previously on Nucala with skin rash.      Symptoms requiring PRN tx  per week: now only using Combivent, less than daily  Nocturnal symptoms in the past month: 3  Triggers: GERD, vocal cord spasm, untreated IGGY   Exacerbations in the last year: 5     Routine Tx: Dulera 200 + Tezspire  PRN Tx: Albuterol + Combivent     Educated pt on avoidance of any personal triggers.   Use air purifier with HEPA filter if available.   Educated pt on routine exercise if tolerated, using a YONAS prior.   Encourage annual COVID and flu vaccinations, and wash hands regularly to prevent infection.         Eczema, unspecified type    Worse seasonally but responds to triamcinolone.     Orders:    triamcinolone acetonide (KENALOG) 0.1 % Cream; Apply 1 Application topically 2 times a day.    IGGY (obstructive sleep apnea)    Pending sleep study and appt with sleep medicine.     Orders:    Polysomnography Titration; Future      Return in about 3 months (around 9/16/2025), or if symptoms worsen or fail to improve, for f/u on [facility] referral.     This note was generated using voice recognition software which has a chance of producing errors of grammar and possibly content.  I have made every reasonable attempt to find and correct any obvious errors, but it should be expected that some may not be found prior to finalization of this note.    Time spent in record review prior to patient arrival, reviewing results, and in face-to-face encounter totaled 31 min, excluding any procedures if performed.    Asael KIRBY  [facility]

## 2025-06-17 ENCOUNTER — PATIENT MESSAGE (OUTPATIENT)
Dept: SLEEP MEDICINE | Facility: MEDICAL CENTER | Age: 65
End: 2025-06-17
Payer: COMMERCIAL

## 2025-06-19 ENCOUNTER — HOSPITAL ENCOUNTER (OUTPATIENT)
Facility: MEDICAL CENTER | Age: 65
End: 2025-06-19
Attending: FAMILY MEDICINE
Payer: COMMERCIAL

## 2025-06-19 DIAGNOSIS — R39.9 UTI SYMPTOMS: ICD-10-CM

## 2025-06-19 DIAGNOSIS — R39.9 UTI SYMPTOMS: Primary | ICD-10-CM

## 2025-06-19 LAB
APPEARANCE UR: ABNORMAL
BACTERIA #/AREA URNS HPF: ABNORMAL /HPF
BILIRUB UR QL STRIP.AUTO: ABNORMAL
CASTS URNS QL MICRO: ABNORMAL /LPF (ref 0–2)
COLOR UR: ABNORMAL
EPITHELIAL CELLS 1715: ABNORMAL /HPF (ref 0–5)
GLUCOSE UR STRIP.AUTO-MCNC: NEGATIVE MG/DL
KETONES UR STRIP.AUTO-MCNC: 15 MG/DL
LEUKOCYTE ESTERASE UR QL STRIP.AUTO: ABNORMAL
MICRO URNS: ABNORMAL
MUCOUS THREADS URNS QL MICRO: PRESENT /HPF
NITRITE UR QL STRIP.AUTO: NEGATIVE
PH UR STRIP.AUTO: 5 [PH] (ref 5–8)
PROT UR QL STRIP: 30 MG/DL
RBC # URNS HPF: ABNORMAL /HPF (ref 0–2)
RBC UR QL AUTO: NEGATIVE
SP GR UR STRIP.AUTO: 1.04
UROBILINOGEN UR STRIP.AUTO-MCNC: 1 EU/DL
WBC #/AREA URNS HPF: >100 /HPF

## 2025-06-19 PROCEDURE — 81001 URINALYSIS AUTO W/SCOPE: CPT

## 2025-06-19 PROCEDURE — RXMED WILLOW AMBULATORY MEDICATION CHARGE

## 2025-06-19 PROCEDURE — 87086 URINE CULTURE/COLONY COUNT: CPT

## 2025-06-19 RX ORDER — CEFDINIR 300 MG/1
300 CAPSULE ORAL 2 TIMES DAILY
Qty: 14 CAPSULE | Refills: 0 | Status: SHIPPED
Start: 2025-06-19 | End: 2025-06-26

## 2025-06-19 NOTE — PROGRESS NOTES
Brief discussion today during 's visit.  Currently with UTI symptoms.  Urinalysis with reflex culture ordered.  Rx for cefdinir to start pending UA results.  Last UA with TMP-SMX resistant E. coli.

## 2025-06-20 ENCOUNTER — RESULTS FOLLOW-UP (OUTPATIENT)
Dept: MEDICAL GROUP | Facility: LAB | Age: 65
End: 2025-06-20
Payer: COMMERCIAL

## 2025-06-21 LAB
BACTERIA UR CULT: NORMAL
SIGNIFICANT IND 70042: NORMAL
SITE SITE: NORMAL
SOURCE SOURCE: NORMAL

## 2025-06-23 ENCOUNTER — APPOINTMENT (OUTPATIENT)
Dept: URBAN - METROPOLITAN AREA CLINIC 35 | Facility: CLINIC | Age: 65
Setting detail: DERMATOLOGY
End: 2025-06-23

## 2025-06-23 DIAGNOSIS — L20.89 OTHER ATOPIC DERMATITIS: ICD-10-CM

## 2025-06-23 PROCEDURE — ? ADDITIONAL NOTES

## 2025-06-23 PROCEDURE — ? PRESCRIPTION

## 2025-06-23 PROCEDURE — ? TREATMENT REGIMEN

## 2025-06-23 PROCEDURE — ? COUNSELING

## 2025-06-23 RX ORDER — TRIAMCINOLONE ACETONIDE 1 MG/G
1 CREAM TOPICAL BID
Qty: 454 | Refills: 0 | Status: ERX | COMMUNITY
Start: 2025-06-23

## 2025-06-23 RX ADMIN — TRIAMCINOLONE ACETONIDE 1: 1 CREAM TOPICAL at 00:00

## 2025-06-23 ASSESSMENT — LOCATION SIMPLE DESCRIPTION DERM
LOCATION SIMPLE: LEFT ELBOW
LOCATION SIMPLE: RIGHT POSTERIOR THIGH
LOCATION SIMPLE: RIGHT FOREARM
LOCATION SIMPLE: RIGHT HAND
LOCATION SIMPLE: LEFT HAND
LOCATION SIMPLE: LEFT POSTERIOR THIGH

## 2025-06-23 ASSESSMENT — LOCATION ZONE DERM
LOCATION ZONE: ARM
LOCATION ZONE: HAND
LOCATION ZONE: LEG

## 2025-06-23 ASSESSMENT — BSA ECZEMA: % BODY COVERED IN ECZEMA: 15

## 2025-06-23 ASSESSMENT — SEVERITY ASSESSMENT 2020: SEVERITY 2020: MODERATE

## 2025-06-23 ASSESSMENT — LOCATION DETAILED DESCRIPTION DERM
LOCATION DETAILED: RIGHT PROXIMAL DORSAL FOREARM
LOCATION DETAILED: LEFT DISTAL POSTERIOR THIGH
LOCATION DETAILED: LEFT ELBOW
LOCATION DETAILED: LEFT ULNAR DORSAL HAND
LOCATION DETAILED: RIGHT DISTAL POSTERIOR THIGH
LOCATION DETAILED: RIGHT DORSAL MIDDLE METACARPOPHALANGEAL JOINT

## 2025-06-24 ENCOUNTER — HOSPITAL ENCOUNTER (EMERGENCY)
Facility: MEDICAL CENTER | Age: 65
End: 2025-06-24
Attending: EMERGENCY MEDICINE
Payer: COMMERCIAL

## 2025-06-24 ENCOUNTER — APPOINTMENT (OUTPATIENT)
Dept: RADIOLOGY | Facility: MEDICAL CENTER | Age: 65
End: 2025-06-24
Attending: EMERGENCY MEDICINE
Payer: COMMERCIAL

## 2025-06-24 VITALS
WEIGHT: 255.51 LBS | DIASTOLIC BLOOD PRESSURE: 67 MMHG | SYSTOLIC BLOOD PRESSURE: 132 MMHG | HEIGHT: 72 IN | HEART RATE: 88 BPM | RESPIRATION RATE: 20 BRPM | TEMPERATURE: 97.5 F | BODY MASS INDEX: 34.61 KG/M2 | OXYGEN SATURATION: 94 %

## 2025-06-24 DIAGNOSIS — J22 LOWER RESPIRATORY TRACT INFECTION: ICD-10-CM

## 2025-06-24 DIAGNOSIS — J45.51 SEVERE PERSISTENT ASTHMA WITH ACUTE EXACERBATION: Primary | ICD-10-CM

## 2025-06-24 LAB
ALBUMIN SERPL BCP-MCNC: 4.4 G/DL (ref 3.2–4.9)
ALBUMIN/GLOB SERPL: 1.4 G/DL
ALP SERPL-CCNC: 97 U/L (ref 30–99)
ALT SERPL-CCNC: 24 U/L (ref 2–50)
ANION GAP SERPL CALC-SCNC: 14 MMOL/L (ref 7–16)
AST SERPL-CCNC: 33 U/L (ref 12–45)
BASOPHILS # BLD AUTO: 0.8 % (ref 0–1.8)
BASOPHILS # BLD: 0.04 K/UL (ref 0–0.12)
BILIRUB SERPL-MCNC: 0.9 MG/DL (ref 0.1–1.5)
BUN SERPL-MCNC: 22 MG/DL (ref 8–22)
CALCIUM ALBUM COR SERPL-MCNC: 9.5 MG/DL (ref 8.5–10.5)
CALCIUM SERPL-MCNC: 9.8 MG/DL (ref 8.5–10.5)
CHLORIDE SERPL-SCNC: 107 MMOL/L (ref 96–112)
CO2 SERPL-SCNC: 21 MMOL/L (ref 20–33)
CREAT SERPL-MCNC: 0.82 MG/DL (ref 0.5–1.4)
EKG IMPRESSION: NORMAL
EOSINOPHIL # BLD AUTO: 0.09 K/UL (ref 0–0.51)
EOSINOPHIL NFR BLD: 1.8 % (ref 0–6.9)
ERYTHROCYTE [DISTWIDTH] IN BLOOD BY AUTOMATED COUNT: 48.4 FL (ref 35.9–50)
GFR SERPLBLD CREATININE-BSD FMLA CKD-EPI: 79 ML/MIN/1.73 M 2
GLOBULIN SER CALC-MCNC: 3.1 G/DL (ref 1.9–3.5)
GLUCOSE SERPL-MCNC: 104 MG/DL (ref 65–99)
HCT VFR BLD AUTO: 44 % (ref 37–47)
HGB BLD-MCNC: 14.6 G/DL (ref 12–16)
IMM GRANULOCYTES # BLD AUTO: 0.02 K/UL (ref 0–0.11)
IMM GRANULOCYTES NFR BLD AUTO: 0.4 % (ref 0–0.9)
LACTATE SERPL-SCNC: 1.7 MMOL/L (ref 0.5–2)
LYMPHOCYTES # BLD AUTO: 2.3 K/UL (ref 1–4.8)
LYMPHOCYTES NFR BLD: 46.2 % (ref 22–41)
MCH RBC QN AUTO: 31.3 PG (ref 27–33)
MCHC RBC AUTO-ENTMCNC: 33.2 G/DL (ref 32.2–35.5)
MCV RBC AUTO: 94.2 FL (ref 81.4–97.8)
MONOCYTES # BLD AUTO: 0.43 K/UL (ref 0–0.85)
MONOCYTES NFR BLD AUTO: 8.6 % (ref 0–13.4)
NEUTROPHILS # BLD AUTO: 2.1 K/UL (ref 1.82–7.42)
NEUTROPHILS NFR BLD: 42.2 % (ref 44–72)
NRBC # BLD AUTO: 0 K/UL
NRBC BLD-RTO: 0 /100 WBC (ref 0–0.2)
NT-PROBNP SERPL IA-MCNC: 48 PG/ML (ref 0–125)
PLATELET # BLD AUTO: 235 K/UL (ref 164–446)
PMV BLD AUTO: 10 FL (ref 9–12.9)
POTASSIUM SERPL-SCNC: 3.8 MMOL/L (ref 3.6–5.5)
PROT SERPL-MCNC: 7.5 G/DL (ref 6–8.2)
RBC # BLD AUTO: 4.67 M/UL (ref 4.2–5.4)
SODIUM SERPL-SCNC: 142 MMOL/L (ref 135–145)
TROPONIN T SERPL-MCNC: 11 NG/L (ref 6–19)
WBC # BLD AUTO: 5 K/UL (ref 4.8–10.8)

## 2025-06-24 PROCEDURE — 94760 N-INVAS EAR/PLS OXIMETRY 1: CPT

## 2025-06-24 PROCEDURE — A9270 NON-COVERED ITEM OR SERVICE: HCPCS | Performed by: EMERGENCY MEDICINE

## 2025-06-24 PROCEDURE — 87077 CULTURE AEROBIC IDENTIFY: CPT

## 2025-06-24 PROCEDURE — 87040 BLOOD CULTURE FOR BACTERIA: CPT | Mod: 91

## 2025-06-24 PROCEDURE — 700111 HCHG RX REV CODE 636 W/ 250 OVERRIDE (IP): Performed by: EMERGENCY MEDICINE

## 2025-06-24 PROCEDURE — 96365 THER/PROPH/DIAG IV INF INIT: CPT

## 2025-06-24 PROCEDURE — 700102 HCHG RX REV CODE 250 W/ 637 OVERRIDE(OP): Performed by: EMERGENCY MEDICINE

## 2025-06-24 PROCEDURE — 83605 ASSAY OF LACTIC ACID: CPT

## 2025-06-24 PROCEDURE — 700101 HCHG RX REV CODE 250: Performed by: EMERGENCY MEDICINE

## 2025-06-24 PROCEDURE — 99284 EMERGENCY DEPT VISIT MOD MDM: CPT

## 2025-06-24 PROCEDURE — 87015 SPECIMEN INFECT AGNT CONCNTJ: CPT

## 2025-06-24 PROCEDURE — 700101 HCHG RX REV CODE 250

## 2025-06-24 PROCEDURE — 71045 X-RAY EXAM CHEST 1 VIEW: CPT

## 2025-06-24 PROCEDURE — 94640 AIRWAY INHALATION TREATMENT: CPT

## 2025-06-24 PROCEDURE — 83880 ASSAY OF NATRIURETIC PEPTIDE: CPT

## 2025-06-24 PROCEDURE — 93005 ELECTROCARDIOGRAM TRACING: CPT | Mod: TC | Performed by: EMERGENCY MEDICINE

## 2025-06-24 PROCEDURE — 96375 TX/PRO/DX INJ NEW DRUG ADDON: CPT

## 2025-06-24 PROCEDURE — 84484 ASSAY OF TROPONIN QUANT: CPT

## 2025-06-24 PROCEDURE — 80053 COMPREHEN METABOLIC PANEL: CPT

## 2025-06-24 PROCEDURE — 85025 COMPLETE CBC W/AUTO DIFF WBC: CPT

## 2025-06-24 PROCEDURE — 36415 COLL VENOUS BLD VENIPUNCTURE: CPT

## 2025-06-24 RX ORDER — PREDNISONE 20 MG/1
20 TABLET ORAL 3 TIMES DAILY
Qty: 15 TABLET | Refills: 0 | Status: SHIPPED | OUTPATIENT
Start: 2025-06-24 | End: 2025-06-29

## 2025-06-24 RX ORDER — METHYLPREDNISOLONE SODIUM SUCCINATE 125 MG/2ML
125 INJECTION, POWDER, LYOPHILIZED, FOR SOLUTION INTRAMUSCULAR; INTRAVENOUS ONCE
Status: COMPLETED | OUTPATIENT
Start: 2025-06-24 | End: 2025-06-24

## 2025-06-24 RX ORDER — MAGNESIUM SULFATE HEPTAHYDRATE 40 MG/ML
2 INJECTION, SOLUTION INTRAVENOUS ONCE
Status: COMPLETED | OUTPATIENT
Start: 2025-06-24 | End: 2025-06-24

## 2025-06-24 RX ORDER — MAGNESIUM SULFATE 1 G/100ML
1 INJECTION INTRAVENOUS ONCE
Status: DISCONTINUED | OUTPATIENT
Start: 2025-06-24 | End: 2025-06-24

## 2025-06-24 RX ORDER — DOXYCYCLINE 100 MG/1
100 TABLET ORAL ONCE
Status: COMPLETED | OUTPATIENT
Start: 2025-06-24 | End: 2025-06-24

## 2025-06-24 RX ORDER — DOXYCYCLINE 100 MG/1
100 CAPSULE ORAL 2 TIMES DAILY
Qty: 14 CAPSULE | Refills: 0 | Status: ACTIVE | OUTPATIENT
Start: 2025-06-24 | End: 2025-07-01

## 2025-06-24 RX ORDER — ALBUTEROL SULFATE 5 MG/ML
SOLUTION RESPIRATORY (INHALATION)
Status: COMPLETED
Start: 2025-06-24 | End: 2025-06-24

## 2025-06-24 RX ADMIN — DOXYCYCLINE 100 MG: 100 TABLET, FILM COATED ORAL at 12:03

## 2025-06-24 RX ADMIN — METHYLPREDNISOLONE SODIUM SUCCINATE 125 MG: 125 INJECTION, POWDER, FOR SOLUTION INTRAMUSCULAR; INTRAVENOUS at 10:38

## 2025-06-24 RX ADMIN — HYOSCYAMINE SULFATE 30 ML: 0.12 ELIXIR ORAL at 11:07

## 2025-06-24 RX ADMIN — MAGNESIUM SULFATE HEPTAHYDRATE 2 G: 2 INJECTION, SOLUTION INTRAVENOUS at 10:39

## 2025-06-24 RX ADMIN — IPRATROPIUM BROMIDE 0.5 MG: 0.5 SOLUTION RESPIRATORY (INHALATION) at 10:51

## 2025-06-24 RX ADMIN — ALBUTEROL SULFATE 20 MG: 2.5 SOLUTION RESPIRATORY (INHALATION) at 10:51

## 2025-06-24 ASSESSMENT — FIBROSIS 4 INDEX: FIB4 SCORE: 1.53

## 2025-06-24 NOTE — ED TRIAGE NOTES
Chief Complaint   Patient presents with    Asthma     C/o increased difficulty breathing x2 weeks and became progressively worse today    Pending specialist in [place] for ongoing Asthma     /75   Pulse 67   Temp 36.3 °C (97.4 °F) (Temporal)   Resp 18   Ht 1.829 m (6')   Wt 116 kg (255 lb 8.2 oz)   LMP  (LMP Unknown)   SpO2 95%   BMI 34.65 kg/m²     Pt ambulated to ED w/ visitor for c/o ongoing increasing shortness of breath r/t hx of Asthma, states has used inhaler and neb w/o relief.

## 2025-06-24 NOTE — ED NOTES
Pt ambulated with pulse ox with good readings. Never dropped below 92% on RA. HR 88. Pt states she feels much improved and wishes to go home. ERP notified. MD At bedside now speaking with patient.

## 2025-06-24 NOTE — ED PROVIDER NOTES
ED Provider Note    CHIEF COMPLAINT  Chief Complaint   Patient presents with    Asthma     C/o increased difficulty breathing x2 weeks and became progressively worse today    Pending specialist in [place] for ongoing Asthma       EXTERNAL RECORDS REVIEWED  Extensive, reviewed pulmonary note and messages to pulmonary.  The patient has extensive severe asthma, has been on steroids for last several months.  A last pulmonary note of June 16    HPI/ROS  LIMITATION TO HISTORY   Select: : None  OUTSIDE HISTORIAN(S):   who is a physician is at the bedside providing additional history    Arely Haynes is a 64 y.o. female who presents to the ED with shortness of breath.  The patient states her breathing is gotten worse over the last several weeks but worse over the last 4 to 5 days, she is producing yellow-tinged sputum.  She has been using her nebulizer every 1-2 hours.  She had a bad night sleeping with worsening shortness of breath.  Denies any fevers.  She does have some chest pain, diffuse pressure throughout the chest.  No leg pain or swelling.  She has been on an extended prednisone taper, currently off.  She has started had an immunotherapy for her asthma.  She has had CT scans, last CT scan was approximately 6 weeks ago that did not show any interstitial lung disease.    PAST MEDICAL HISTORY   has a past medical history of Anesthesia, Arthritis, Asthma, Bronchitis, Cataract (2024), Concussion, Depression, Erosive esophagitis, GERD (gastroesophageal reflux disease), Heart burn, Heart murmur (Early 20’s), Hiatus hernia syndrome, High cholesterol, Immunocompromised ([facility]), Indigestion (GERD 20 years), Infection, Pain, Pneumonia (2018), Psoriasis, Psoriatic arthritis ([facility]), Sleep apnea (2022), Snoring, UTI (urinary tract infection), and Wheezing.    SURGICAL HISTORY   has a past surgical history that includes fusion, spine, lumbar, plif (2004); hip replacement, total; other abdominal surgery; gyn surgery  (1999); other orthopedic surgery; other orthopedic surgery (Left); hysterectomy robotic xi (N/A, 07/11/2019); salpingo oophorectomy (Bilateral, 07/11/2019); tendon repair (Left, 09/19/2019); bronchoscopy,diagnostic (07/09/2021); inj lumbar/sacral,w/ imaging (Right, 06/02/2023); injection,sacroiliac joint (Left, 07/28/2023); other; cataract extraction with iol (Bilateral, 2024); shoulder surgery (1980); orif, knee; knee arthroplasty total (Right, 2/4/2025); and injection,sacroiliac joint (Bilateral, 3/25/2025).    FAMILY HISTORY  Family History   Problem Relation Age of Onset    Hyperlipidemia Mother     Heart Attack Mother     Psychiatric Illness Mother     Heart Disease Mother     Arthritis Mother     Lung Disease Father         Pancoast tumor    Cancer Father         Pancoast    Hyperlipidemia Father     Stroke Father     Hyperlipidemia Sister     Cancer Sister         Breast cancer    Heart Disease Paternal Grandfather     Hypertension Neg Hx     Diabetes Neg Hx        SOCIAL HISTORY  Social History     Tobacco Use    Smoking status: Never     Passive exposure: Never    Smokeless tobacco: Never   Vaping Use    Vaping status: Never Used   Substance and Sexual Activity    Alcohol use: Yes     Alcohol/week: 3.6 oz     Types: 6 Glasses of wine per week    Drug use: Never    Sexual activity: Yes     Comment: Tubal ligation       CURRENT MEDICATIONS  Home Medications    **Home medications have not yet been reviewed for this encounter**       Audit from Redirected Encounters    **Home medications have not yet been reviewed for this encounter**         ALLERGIES  Allergies[1]    PHYSICAL EXAM  VITAL SIGNS: /67   Pulse 88   Temp 36.4 °C (97.5 °F) (Temporal)   Resp 20   Ht 1.829 m (6')   Wt 116 kg (255 lb 8.2 oz)   LMP  (LMP Unknown)   SpO2 94%   BMI 34.65 kg/m²    Well-developed and nourished 64-year-old female who appears in moderate respiratory distress  Atraumatic, normocephalic, oropharynx is clear  Neck  is supple  Chest with tachypnea, diffuse inspiratory and expiratory wheezes throughout, retractions  Regular rhythm  Abdomen soft nontender  Extremities no edema  Neuro awake alert speech is clear    EKG/LABS  Results for orders placed or performed during the hospital encounter of 06/24/25   CBC with Differential    Collection Time: 06/24/25 10:13 AM   Result Value Ref Range    WBC 5.0 4.8 - 10.8 K/uL    RBC 4.67 4.20 - 5.40 M/uL    Hemoglobin 14.6 12.0 - 16.0 g/dL    Hematocrit 44.0 37.0 - 47.0 %    MCV 94.2 81.4 - 97.8 fL    MCH 31.3 27.0 - 33.0 pg    MCHC 33.2 32.2 - 35.5 g/dL    RDW 48.4 35.9 - 50.0 fL    Platelet Count 235 164 - 446 K/uL    MPV 10.0 9.0 - 12.9 fL    Neutrophils-Polys 42.20 (L) 44.00 - 72.00 %    Lymphocytes 46.20 (H) 22.00 - 41.00 %    Monocytes 8.60 0.00 - 13.40 %    Eosinophils 1.80 0.00 - 6.90 %    Basophils 0.80 0.00 - 1.80 %    Immature Granulocytes 0.40 0.00 - 0.90 %    Nucleated RBC 0.00 0.00 - 0.20 /100 WBC    Neutrophils (Absolute) 2.10 1.82 - 7.42 K/uL    Lymphs (Absolute) 2.30 1.00 - 4.80 K/uL    Monos (Absolute) 0.43 0.00 - 0.85 K/uL    Eos (Absolute) 0.09 0.00 - 0.51 K/uL    Baso (Absolute) 0.04 0.00 - 0.12 K/uL    Immature Granulocytes (abs) 0.02 0.00 - 0.11 K/uL    NRBC (Absolute) 0.00 K/uL   Complete Metabolic Panel    Collection Time: 06/24/25 10:13 AM   Result Value Ref Range    Sodium 142 135 - 145 mmol/L    Potassium 3.8 3.6 - 5.5 mmol/L    Chloride 107 96 - 112 mmol/L    Co2 21 20 - 33 mmol/L    Anion Gap 14.0 7.0 - 16.0    Glucose 104 (H) 65 - 99 mg/dL    Bun 22 8 - 22 mg/dL    Creatinine 0.82 0.50 - 1.40 mg/dL    Calcium 9.8 8.5 - 10.5 mg/dL    Correct Calcium 9.5 8.5 - 10.5 mg/dL    AST(SGOT) 33 12 - 45 U/L    ALT(SGPT) 24 2 - 50 U/L    Alkaline Phosphatase 97 30 - 99 U/L    Total Bilirubin 0.9 0.1 - 1.5 mg/dL    Albumin 4.4 3.2 - 4.9 g/dL    Total Protein 7.5 6.0 - 8.2 g/dL    Globulin 3.1 1.9 - 3.5 g/dL    A-G Ratio 1.4 g/dL   Troponin    Collection Time: 06/24/25  10:13 AM   Result Value Ref Range    Troponin T 11 6 - 19 ng/L   proBrain Natriuretic Peptide, NT    Collection Time: 25 10:13 AM   Result Value Ref Range    NT-proBNP 48 0 - 125 pg/mL   ESTIMATED GFR    Collection Time: 25 10:13 AM   Result Value Ref Range    GFR (CKD-EPI) 79 >60 mL/min/1.73 m 2   Lactic Acid    Collection Time: 25 10:43 AM   Result Value Ref Range    Lactic Acid 1.7 0.5 - 2.0 mmol/L   EKG    Collection Time: 25 12:09 PM   Result Value Ref Range    Report       [facility]    Test Date:  2025  Pt Name:    VIOLA CORONADO              Department: [facility]  MRN:        [id number]                      [facility]  Gender:     Female                       Technician: [id number]  :        1960                   Requested By:PURA MUSE  Order #:    [id number]                    Reading MD: PURA MUSE MD    Measurements  Intervals                                Axis  Rate:       68                           P:          49  MS:         160                          QRS:        -30  QRSD:       103                          T:          95  QT:         403  QTc:        429    Interpretive Statements  Sinus rhythm  Inferior infarct, old  Baseline wander in lead(s) V3  Compared to ECG 2024 09:00:07  Sinus bradycardia no longer present  Myocardial infarct finding still present  Electronically Signed On 2025 12:09:28 PDT by PURA MUSE MD       *Note: Due to a large number of results and/or encounters for the requested time period, some results have not been displayed. A complete set of results can be found in Results Review.      I have independently interpreted this EKG    RADIOLOGY/PROCEDURES   I have independently interpreted the diagnostic imaging associated with this visit and am waiting the final reading from the radiologist.   My preliminary interpretation is as follows: No large  pneumonia    Radiologist interpretation:  DX-CHEST-PORTABLE (1 VIEW)   Final Result      Mild bibasilar atelectasis versus infiltrate, left greater than right.          COURSE & MEDICAL DECISION MAKING    ASSESSMENT, COURSE AND PLAN  Care Narrative: Patient is coming in with moderate severe asthma exacerbation.  She has been having persistent asthma symptoms for a prolonged period.  Due to the patient's breathing I will evaluate for sepsis, give the patient continuous nebulizer, magnesium, steroids.  Will check a chest x-ray.    Give the patient continuous nebulizer, steroids, 2 g of magnesium asthma protocol.  Discussed case with pharmacy about magnesium.  Reevaluation afterwards the patient is feeling much improved, ambulated the patient, her O2 saturations were 92%.  The patient wishes to go home.  Due to the patient's chest x-ray with questionable mild lower respiratory infection I will put the patient on doxycycline.  Family is comfortable with the patient being discharged home.  Will give the patient a prescription for doxycycline, steroids, have the patient return with worsening symptoms.    DISPOSITION AND DISCUSSIONS    Discussion of management with other QHP or appropriate source(s): Pharmacy       Escalation of care considered, and ultimately not performed:acute inpatient care management, however at this time, the patient is most appropriate for outpatient management    Decision tools and prescription drugs considered including, but not limited to: Antibiotics  .    FINAL DIAGNOSIS  1. Severe persistent asthma with acute exacerbation    2. Lower respiratory tract infection         Electronically signed by: Chano Bacon M.D., 6/24/2025 10:26 AM           [1]   Allergies  Allergen Reactions    Bee Venom Anaphylaxis     Pt is allergic to bees.     Iodine Anaphylaxis, Hives and Rash     IV = anaphylaxis, no topical rxn.   IV = anaphylaxis,     Levofloxacin Anaphylaxis    Nucala [Mepolizumab] Rash and  Swelling     Suspect allergic reaction to Nucala, not totally diagnosed    Shellfish-Derived Products Anaphylaxis     LOBSTER

## 2025-06-25 ENCOUNTER — PHARMACY VISIT (OUTPATIENT)
Dept: PHARMACY | Facility: MEDICAL CENTER | Age: 65
End: 2025-06-25
Payer: COMMERCIAL

## 2025-06-26 DIAGNOSIS — Z29.89 NEED FOR PNEUMOCYSTIS PROPHYLAXIS: Primary | ICD-10-CM

## 2025-06-26 RX ORDER — SULFAMETHOXAZOLE AND TRIMETHOPRIM 800; 160 MG/1; MG/1
1 TABLET ORAL
Qty: 39 TABLET | Refills: 1 | Status: SHIPPED | OUTPATIENT
Start: 2025-06-27

## 2025-06-26 NOTE — ED NOTES
ED Positive Culture Follow-up/Notification Note:    Date: 6/26/2025     Patient seen in the ED on 6/24/2025 for severe asthma exacerbation and pneumonia. She reported SOB, yellow-tinged sputum, and chest pain. She denied fevers, leg pain, or swelling. Physical exam significant for tachypnea, diffuse inspiratory and expiratory wheezes throughout with retractions noted.   1. Severe persistent asthma with acute exacerbation    2. Lower respiratory tract infection       In the ED, patient received doxycycline 100 mg PO x 1 dose     Discharge Medication List as of 6/24/2025 12:18 PM        START taking these medications    Details   doxycycline (MONODOX) 100 MG capsule Take 1 Capsule by mouth 2 times a day for 7 days., Disp-14 Capsule, R-0, Normal      predniSONE (DELTASONE) 20 MG Tab Take 1 Tablet by mouth in the morning, at noon, and at bedtime for 5 days. Disp-15 Tablet, R-0, Normal           Allergies: Bee venom, Iodine, Levofloxacin, Nucala [mepolizumab], and Shellfish-derived products     Vitals:    06/24/25 1058 06/24/25 1102 06/24/25 1202 06/24/25 1207   BP:  136/62 132/67    Pulse: 85 79 70 88   Resp:    20   Temp:    36.4 °C (97.5 °F)   TempSrc:    Temporal   SpO2: 95% 92% 89% 94%   Weight:       Height:         Final cultures:   Results       Procedure Component Value Units Date/Time    Blood Culture - Draw one from central line and one from peripheral site [[id number]]  (Abnormal) Collected: 06/24/25 1103    Order Status: Completed Specimen: Blood from Line Updated: 06/25/25 2154     Significant Indicator POS     Source BLD     Site Peripheral     Culture Result Growth detected by automated blood culture system. 06/25/2025  18:07  Gram Stain: Gram positive rods.  Unable to identify by Music[employer], additional studies  pending.      Blood Culture - Draw one from central line and one from peripheral site [[id number]] Collected: 06/24/25 1043    Order Status: Completed Specimen: Blood from Peripheral Updated:  06/24/25 1219     Significant Indicator NEG     Source BLD     Site PERIPHERAL     Culture Result No Growth  Note: Blood cultures are incubated for 5 days and  are monitored continuously.Positive blood cultures  are called to the RN and reported as soon as  they are identified.  Blood culture testing and Gram stain, if indicated, are  performed at [facility],  [address] [place] [address].  Positive blood  cultures are sent to [facility],  [address] [place] [address], for organism identification  and susceptibility testing.      Urinalysis [[id number]] Collected: 06/24/25 0000    Order Status: Canceled Specimen: Urine     Urine Culture (New) [[id number]] Collected: 06/24/25 0000    Order Status: Canceled Specimen: Urine             Plan:   1 out of 2 sets of blood cultures collected on 6/24 positive for gram positive rods. Suspect contamination given no obvious risk factors for gram positive naomie bacteremia. Called to notify patient of preliminary positive blood culture results. Explained that this may reflect contamination but advised strict return precautions. Patient verbalized understanding. She stated she is currently at Dr. Espinal's office who has access to [employer] and that her  is a physician who will be monitoring closely for signs and symptoms of systemic infection.      Heidi Quinn, PharmD

## 2025-06-27 DIAGNOSIS — J45.51 SEVERE PERSISTENT ASTHMA WITH ACUTE EXACERBATION: Primary | ICD-10-CM

## 2025-06-27 LAB
BACTERIA BLD CULT: ABNORMAL
BACTERIA BLD CULT: ABNORMAL
SIGNIFICANT IND 70042: ABNORMAL
SITE SITE: ABNORMAL
SOURCE SOURCE: ABNORMAL

## 2025-06-29 LAB
BACTERIA BLD CULT: NORMAL
SIGNIFICANT IND 70042: NORMAL
SITE SITE: NORMAL
SOURCE SOURCE: NORMAL

## 2025-06-30 ENCOUNTER — APPOINTMENT (OUTPATIENT)
Dept: URBAN - METROPOLITAN AREA CLINIC 35 | Facility: CLINIC | Age: 65
Setting detail: DERMATOLOGY
End: 2025-06-30

## 2025-06-30 DIAGNOSIS — L20.89 OTHER ATOPIC DERMATITIS: ICD-10-CM | Status: IMPROVED

## 2025-06-30 DIAGNOSIS — L85.3 XEROSIS CUTIS: ICD-10-CM

## 2025-06-30 DIAGNOSIS — L82.1 OTHER SEBORRHEIC KERATOSIS: ICD-10-CM

## 2025-06-30 PROCEDURE — ? COUNSELING

## 2025-06-30 PROCEDURE — ? TREATMENT REGIMEN

## 2025-06-30 PROCEDURE — ? PRESCRIPTION

## 2025-06-30 PROCEDURE — ? ADDITIONAL NOTES

## 2025-06-30 RX ORDER — TACROLIMUS 1 MG/G
THIN LAYER OINTMENT TOPICAL
Qty: 100 | Refills: 3 | Status: ERX | COMMUNITY
Start: 2025-06-30

## 2025-06-30 RX ORDER — TRIAMCINOLONE ACETONIDE 1 MG/G
THIN LAYER CREAM TOPICAL BID
Qty: 80 | Refills: 3 | Status: ERX

## 2025-06-30 RX ADMIN — TACROLIMUS THIN LAYER: 1 OINTMENT TOPICAL at 00:00

## 2025-06-30 ASSESSMENT — LOCATION DETAILED DESCRIPTION DERM
LOCATION DETAILED: RIGHT PROXIMAL DORSAL FOREARM
LOCATION DETAILED: LEFT ELBOW
LOCATION DETAILED: RIGHT DISTAL POSTERIOR THIGH
LOCATION DETAILED: LEFT DISTAL POSTERIOR THIGH
LOCATION DETAILED: LEFT MEDIAL PLANTAR HEEL
LOCATION DETAILED: RIGHT DORSAL MIDDLE METACARPOPHALANGEAL JOINT
LOCATION DETAILED: LEFT ULNAR DORSAL HAND

## 2025-06-30 ASSESSMENT — LOCATION SIMPLE DESCRIPTION DERM
LOCATION SIMPLE: LEFT HAND
LOCATION SIMPLE: LEFT ELBOW
LOCATION SIMPLE: RIGHT POSTERIOR THIGH
LOCATION SIMPLE: RIGHT HAND
LOCATION SIMPLE: LEFT PLANTAR SURFACE
LOCATION SIMPLE: RIGHT FOREARM
LOCATION SIMPLE: LEFT POSTERIOR THIGH

## 2025-06-30 ASSESSMENT — BSA ECZEMA: % BODY COVERED IN ECZEMA: 3

## 2025-06-30 ASSESSMENT — LOCATION ZONE DERM
LOCATION ZONE: LEG
LOCATION ZONE: ARM
LOCATION ZONE: FEET
LOCATION ZONE: HAND

## 2025-06-30 ASSESSMENT — SEVERITY ASSESSMENT 2020: SEVERITY 2020: ALMOST CLEAR

## 2025-06-30 NOTE — PROCEDURE: ADDITIONAL NOTES
Additional Notes: 06/23/25 pt was on prednisone on and off for about 3 months. Tezpire is biological therapy for asthma, First dose of tezpire first week of May, second dose on 31st of may. Stopped prednisone a week later and rash began. rash began about 9th-10th of June. Pt reports being hospitalized on and off as a child for eczema/psoriasis. Recommended Vanicream and Vaseline for moisturizing. Recommended not going back on prednisone. Discussed possible patch testing. Pt gave phone number for physician, Kim [place]. \\n6/30/25 pt is back on prednisone 40 mg a day and will taper slowly.  
Render Risk Assessment In Note?: no
Detail Level: Simple
Additional Notes: Recommended urea cream

## 2025-07-01 ENCOUNTER — OFFICE VISIT (OUTPATIENT)
Dept: SLEEP MEDICINE | Facility: MEDICAL CENTER | Age: 65
End: 2025-07-01
Attending: INTERNAL MEDICINE
Payer: COMMERCIAL

## 2025-07-01 VITALS
HEART RATE: 56 BPM | DIASTOLIC BLOOD PRESSURE: 62 MMHG | OXYGEN SATURATION: 95 % | HEIGHT: 72 IN | BODY MASS INDEX: 35.62 KG/M2 | WEIGHT: 263 LBS | SYSTOLIC BLOOD PRESSURE: 112 MMHG

## 2025-07-01 DIAGNOSIS — Z29.89 NEED FOR PNEUMOCYSTIS PROPHYLAXIS: ICD-10-CM

## 2025-07-01 DIAGNOSIS — J45.51 SEVERE PERSISTENT ASTHMA WITH ACUTE EXACERBATION: Primary | ICD-10-CM

## 2025-07-01 PROCEDURE — 3078F DIAST BP <80 MM HG: CPT | Performed by: INTERNAL MEDICINE

## 2025-07-01 PROCEDURE — 99212 OFFICE O/P EST SF 10 MIN: CPT | Performed by: INTERNAL MEDICINE

## 2025-07-01 PROCEDURE — 99214 OFFICE O/P EST MOD 30 MIN: CPT | Performed by: INTERNAL MEDICINE

## 2025-07-01 PROCEDURE — 3074F SYST BP LT 130 MM HG: CPT | Performed by: INTERNAL MEDICINE

## 2025-07-01 RX ORDER — SULFAMETHOXAZOLE AND TRIMETHOPRIM 800; 160 MG/1; MG/1
1 TABLET ORAL
Qty: 39 TABLET | Refills: 0 | Status: SHIPPED | OUTPATIENT
Start: 2025-07-02 | End: 2025-07-24

## 2025-07-01 RX ORDER — ALBUTEROL SULFATE AND BUDESONIDE 90; 80 UG/1; UG/1
1-2 AEROSOL, METERED RESPIRATORY (INHALATION) EVERY 4 HOURS
Qty: 10.7 G | Refills: 3 | Status: SHIPPED | OUTPATIENT
Start: 2025-07-01

## 2025-07-01 RX ORDER — PREDNISONE 10 MG/1
TABLET ORAL
Qty: 30 TABLET | Refills: 0 | Status: SHIPPED | OUTPATIENT
Start: 2025-07-01 | End: 2025-07-24

## 2025-07-01 RX ORDER — PREDNISONE 20 MG/1
20 TABLET ORAL 2 TIMES DAILY
COMMUNITY
End: 2025-07-24

## 2025-07-01 ASSESSMENT — ENCOUNTER SYMPTOMS
EYE REDNESS: 0
EYE PAIN: 0
DIAPHORESIS: 0
DIZZINESS: 0
WHEEZING: 1
BLURRED VISION: 0
HEMOPTYSIS: 0
DOUBLE VISION: 0
BACK PAIN: 0
CONSTIPATION: 0
STRIDOR: 0
MYALGIAS: 0
EYE DISCHARGE: 0
PALPITATIONS: 0
WEIGHT LOSS: 0
DEPRESSION: 0
CHILLS: 0
CLAUDICATION: 0
ABDOMINAL PAIN: 0
TREMORS: 0
FALLS: 0
SORE THROAT: 0
HEADACHES: 0
FEVER: 0
VOMITING: 0
SHORTNESS OF BREATH: 1
ORTHOPNEA: 0
SPUTUM PRODUCTION: 0
WEAKNESS: 0
NECK PAIN: 0
NAUSEA: 0
SPEECH CHANGE: 0
PHOTOPHOBIA: 0
FOCAL WEAKNESS: 0
DIARRHEA: 0
SINUS PAIN: 0
COUGH: 1
PND: 0
HEARTBURN: 0

## 2025-07-01 ASSESSMENT — FIBROSIS 4 INDEX: FIB4 SCORE: 1.86

## 2025-07-02 NOTE — PROGRESS NOTES
"Chief Complaint   Patient presents with    Follow-Up     LAST SEEN 6/16/25 BY MIRTA CALVO    Results     ED 6/24/25  CXR 6/24/25         HPI: This patient is a 64 y.o. female whom is followed in our clinic for asthma last seen by Asael KIRBY, on 6/16/25. 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 Since that time she has had persistent and recurrent respiratory issues for which she was seen by pulmonary at [facility] in 2022 and was tx for \"asthma\" or small airways disease with normal air flows based on spirometry from 2021 and 2023. She had what was felt to be an allergic rxn 6/23/23 and seen in ED and tx with pepcid, IV soludmedrol and d/c'd to home then admitted roughly one week later on 7/2/23 with fever to 40 at that time and tx for colitis.  In March of 2024 she was admitted for acute hypoxemic respiratory failure after Rocephin given for UTI and was concerned for allergic rxn. She was ultimately tx for asthma exacerbation but has had poor control of sxs since that time despite trial of biologic therapy with nucala (rash) and more recently, Tezpsire. She She was seen in ED on 6/24/25 for sxs of acute exacerbation and started on prednisone 60 mg, currently on 40 mg but having significant side effects to these. She is on dulera 200 bid, has been prescribed spriva but not currently taking. She uses combivent every 4-6 hours but still have cough, diffuse wheezing and cannot lay flat due to SOB. HRCT from 4/30 showed no ILD but did show GG nodule in the anterior RLL and a few smaller nodules in RLL. No airway thickening although there was mild bronchiectasis. She has plans to see providers at [facility] next month for comprehensive evaluation next month. She does have IGGY, currently untreated and severe GERD with breakthrough sxs " on therapy with PPI. She is completing a course of doxycycline after ED visit on 6/45 and is on prophylactic bactrim for PJP while on high dose prednisone. She  has had peripheral eos in the past (2021 here). Serum immunoglobulins normal recently.     Past Medical History[1]    Social History     Socioeconomic History    Marital status:      Spouse name: Not on file    Number of children: Not on file    Years of education: Not on file    Highest education level: Bachelor's degree (e.g., BA, AB, BS)   Occupational History    Occupation: Flight Nurse/Educator   Tobacco Use    Smoking status: Never     Passive exposure: Never    Smokeless tobacco: Never   Vaping Use    Vaping status: Never Used   Substance and Sexual Activity    Alcohol use: Yes     Alcohol/week: 3.6 oz     Types: 6 Glasses of wine per week    Drug use: Never    Sexual activity: Yes     Comment: Tubal ligation   Other Topics Concern     Service No    Blood Transfusions No    Caffeine Concern No    Occupational Exposure Yes    Hobby Hazards Yes    Sleep Concern Yes    Stress Concern No    Weight Concern Yes    Special Diet No    Back Care No    Exercise Yes    Bike Helmet Yes    Seat Belt Yes    Self-Exams Yes   Social History Narrative    Not on file     Social Drivers of Health     Financial Resource Strain: Low Risk  (1/1/2025)    Overall Financial Resource Strain (CARDIA)     Difficulty of Paying Living Expenses: Not hard at all   Food Insecurity: No Food Insecurity (1/1/2025)    Hunger Vital Sign     Worried About Running Out of Food in the Last Year: Never true     Ran Out of Food in the Last Year: Never true   Transportation Needs: No Transportation Needs (1/1/2025)    PRAPARE - Transportation     Lack of Transportation (Medical): No     Lack of Transportation (Non-Medical): No   Physical Activity: Unknown (1/1/2025)    Exercise Vital Sign     Days of Exercise per Week: 0 days     Minutes of Exercise per Session: Not on file    Stress: No Stress Concern Present (1/1/2025)    [employer] [place] [employer] - Occupational Stress Questionnaire     Feeling of Stress : Only a little   Social Connections: Socially Integrated (1/1/2025)    Social Connection and Isolation Panel [NHANES]     Frequency of Communication with Friends and Family: More than three times a week     Frequency of Social Gatherings with Friends and Family: Twice a week     Attends [employer]ism Services: More than 4 times per year     Active Member of Clubs or Organizations: Yes     Attends Club or Organization Meetings: More than 4 times per year     Marital Status:    Intimate Partner Violence: Not on file   Housing Stability: Unknown (1/1/2025)    Housing Stability Vital Sign     Unable to Pay for Housing in the Last Year: No     Number of Times Moved in the Last Year: Not on file     Homeless in the Last Year: No       Family History   Problem Relation Age of Onset    Hyperlipidemia Mother     Heart Attack Mother     Psychiatric Illness Mother     Heart Disease Mother     Arthritis Mother     Lung Disease Father         Pancoast tumor    Cancer Father         Pancoast    Hyperlipidemia Father     Stroke Father     Hyperlipidemia Sister     Cancer Sister         Breast cancer    Heart Disease Paternal Grandfather     Hypertension Neg Hx     Diabetes Neg Hx        Medications Ordered Prior to Encounter[2]    Bee venom, Iodine, Levofloxacin, Nucala [mepolizumab], and Shellfish-derived products      ROS:   Review of Systems   Constitutional:  Negative for chills, diaphoresis, fever, malaise/fatigue and weight loss.   HENT:  Negative for congestion, ear discharge, ear pain, hearing loss, nosebleeds, sinus pain, sore throat and tinnitus.    Eyes:  Negative for blurred vision, double vision, photophobia, pain, discharge and redness.   Respiratory:  Positive for cough, shortness of breath and wheezing. Negative for hemoptysis, sputum production and stridor.     Cardiovascular:  Negative for chest pain, palpitations, orthopnea, claudication, leg swelling and PND.   Gastrointestinal:  Negative for abdominal pain, constipation, diarrhea, heartburn, nausea and vomiting.   Genitourinary:  Negative for dysuria and urgency.   Musculoskeletal:  Negative for back pain, falls, joint pain, myalgias and neck pain.   Skin:  Negative for itching and rash.   Neurological:  Negative for dizziness, tremors, speech change, focal weakness, weakness and headaches.   Endo/Heme/Allergies:  Negative for environmental allergies.   Psychiatric/Behavioral:  Negative for depression.        /62 (BP Location: Right arm, Patient Position: Sitting, BP Cuff Size: Large adult)   Pulse (!) 56   Ht 1.829 m (6')   Wt 119 kg (263 lb)   SpO2 95%   Physical Exam  Constitutional:       General: She is not in acute distress.     Appearance: Normal appearance. She is well-developed and normal weight.   HENT:      Head: Normocephalic and atraumatic.      Right Ear: External ear normal.      Left Ear: External ear normal.      Nose: Nose normal. No congestion.      Mouth/Throat:      Mouth: Mucous membranes are moist.      Pharynx: Oropharynx is clear. No oropharyngeal exudate.   Eyes:      General: No scleral icterus.     Extraocular Movements: Extraocular movements intact.      Conjunctiva/sclera: Conjunctivae normal.      Pupils: Pupils are equal, round, and reactive to light.   Neck:      Vascular: No JVD.      Trachea: No tracheal deviation.   Cardiovascular:      Rate and Rhythm: Normal rate and regular rhythm.      Heart sounds: Normal heart sounds. No murmur heard.     No friction rub. No gallop.   Pulmonary:      Effort: Pulmonary effort is normal. No accessory muscle usage or respiratory distress.      Breath sounds: No wheezing or rales.      Comments: B/l end expiratory wheezes throughout  Abdominal:      General: There is no distension.      Palpations: Abdomen is soft.      Tenderness:  There is no abdominal tenderness.   Musculoskeletal:         General: No tenderness or deformity. Normal range of motion.      Cervical back: Normal range of motion and neck supple.      Right lower leg: No edema.      Left lower leg: No edema.   Lymphadenopathy:      Cervical: No cervical adenopathy.   Skin:     General: Skin is warm and dry.      Findings: No rash.      Nails: There is no clubbing.   Neurological:      Mental Status: She is alert and oriented to person, place, and time.      Cranial Nerves: No cranial nerve deficit.      Gait: Gait normal.   Psychiatric:         Behavior: Behavior normal.         PFTs as reviewed by me personally: as per hPI    Imaging as reviewed by me personally:  as per hPI    Assessment:  1. Severe persistent asthma with acute exacerbation  Albuterol-Budesonide (AIRSUPRA) 90-80 MCG/ACT Aerosol    predniSONE (DELTASONE) 10 MG Tab      2. Need for pneumocystis prophylaxis  sulfamethoxazole-trimethoprim (BACTRIM DS) 800-160 MG tablet          Plan:  Not clear to me if this is just asthma or reflux induced airway inflammation. PFT from May was normal despite being symptomatic. I will see if we can taper steroid dose but add ICS with air supra in lieu of combivent and have her start spiriva. She will have GI evaluation while at [facility] which I think is important. Continue dulera. Steroid taper over the next 13 days and f/u with me in 4 weeks  Has bactrim while on prednisone >20 mg daily  Return in about 4 weeks (around 7/29/2025) for asthma.             [1]   Past Medical History:  Diagnosis Date    Anesthesia     no self problems; 1st cousin MH    Arthritis     hips knees hands spine    Asthma     prn inhalers    Bronchitis     PCP 2021. Periodic bronchitis    Cataract 2024    Surgery    Concussion     Depression     Daughter passed few years ago    Erosive esophagitis     GERD (gastroesophageal reflux disease)     Heart burn     Heart murmur Early 20’s    Hiatus hernia syndrome      High cholesterol     Immunocompromised ([facility])     Indigestion GERD 20 years    Infection     10/24 toe antibiotics    Pain     hips knees    Pneumonia 2018    Psoriasis     Psoriatic arthritis ([facility])     Sleep apnea 2022    Snoring     UTI (urinary tract infection)     10/24 antiobiotics    Wheezing    [2]   Current Outpatient Medications on File Prior to Visit   Medication Sig Dispense Refill    predniSONE (DELTASONE) 20 MG Tab Take 20 mg by mouth 2 times a day.      doxycycline (MONODOX) 100 MG capsule Take 1 Capsule by mouth 2 times a day for 7 days. 14 Capsule 0    triamcinolone acetonide (KENALOG) 0.1 % Cream Apply 1 Application topically 2 times a day. 45 g 6    ipratropium-albuterol (COMBIVENT RESPIMAT)  MCG/ACT Aero Soln Inhale 1 Puff 4 times a day. 12 g 3    etodolac (LODINE) 400 MG tablet Take 1 Tablet by mouth 2 times a day. 60 Tablet 0    tezepelumab-ekko (TEZSPIRE) 210 MG/1.91ML injection Inject 1.91 mL under the skin every 4 weeks. 1.91 mL 12    lamoTRIgine (LAMICTAL) 100 MG Tab Take 1 Tablet by mouth every morning. 90 Tablet 3    pantoprazole (PROTONIX) 40 MG Tablet Delayed Response Take 1 Tablet by mouth 2 times a day. Ok to decrease to once daily once GERD symptoms are controlled 180 Tablet 1    Mirabegron ER 50 MG TABLET SR 24 HR Take 1 Tablet by mouth every day.      EPINEPHrine (EPIPEN) 0.3 MG/0.3ML Solution Auto-injector solution for injection INJECT 1 PEN IN THE MUSCLE ONE TIME AS DIRECTED FOR ANAPHYLAXIS      pregabalin (LYRICA) 200 MG capsule Take 1 Capsule by mouth 2 times a day.      pregabalin (LYRICA) 100 MG Cap Take 100 mg by mouth.      escitalopram (LEXAPRO) 20 MG tablet TAKE 1 TABLET EVERY MORNING. TAKE WITH ESCITALOPRAM 10 MG FOR A TOTAL DOSE OF 30 MG 90 Tablet 3    escitalopram (LEXAPRO) 10 MG Tab TAKE 1 TABLET EVERY MORNING. TAKE WITH ESCITALOPRAM 20 MG FOR A TOTAL DOSE OF 30 MG 90 Tablet 3    famotidine (PEPCID) 20 MG Tab TAKE 1 TABLET TWICE A DAY FOR GASTROESOPHAGEAL REFLUX  DISEASE 180 Tablet 3    DULERA 200-5 MCG/ACT Aerosol USE 2 INHALATIONS TWICE A DAY (Patient taking differently: MEDICATION INSTRUCTIONS FOR SURGERY/PROCEDURE SCHEDULED FOR 12/31/2024  CONTINUE TAKING MED PRIOR TO SURGERY) 39 g 3    montelukast (SINGULAIR) 10 MG Tab TAKE 1 TABLET DAILY (Patient taking differently: Take 10 mg by mouth every evening.   MEDICATION INSTRUCTIONS FOR SURGERY 12/31/2024  OK TO CONTINUE TAKING PRIOR TO SURGERY AND DAY OF SURGERY) 90 Tablet 3    rosuvastatin (CRESTOR) 10 MG Tab TAKE 1 TABLET AT BEDTIME 90 Tablet 3    ipratropium-albuterol (DUONEB) 0.5-2.5 (3) MG/3ML nebulizer solution Take 3 mL by nebulization every 4 hours. 270 mL 5    POTASSIUM PO Take 99 mg by mouth every day.   CONTINUE TAKING MED PRIOR TO SURGERY      loratadine (CLARITIN) 10 MG Tab Take 10 mg by mouth every day.   MEDICATION INSTRUCTIONS FOR SURGERY 12/31/2024  CONTINUE TAKING MED PRIOR TO SURGERY      magnesium oxide (MAG-OX) 400 MG Tab tablet Take 1 Tablet by mouth every morning. 90 Tablet 3     No current facility-administered medications on file prior to visit.

## 2025-07-07 ENCOUNTER — PATIENT MESSAGE (OUTPATIENT)
Dept: SLEEP MEDICINE | Facility: MEDICAL CENTER | Age: 65
End: 2025-07-07
Payer: COMMERCIAL

## 2025-07-15 ENCOUNTER — APPOINTMENT (OUTPATIENT)
Dept: LAB | Facility: MEDICAL CENTER | Age: 65
End: 2025-07-15
Payer: COMMERCIAL

## 2025-07-16 ENCOUNTER — HOSPITAL ENCOUNTER (OUTPATIENT)
Dept: LAB | Facility: MEDICAL CENTER | Age: 65
End: 2025-07-16
Attending: PHYSICIAN ASSISTANT
Payer: COMMERCIAL

## 2025-07-16 ENCOUNTER — OFFICE VISIT (OUTPATIENT)
Dept: MEDICAL GROUP | Facility: LAB | Age: 65
End: 2025-07-16
Payer: COMMERCIAL

## 2025-07-16 VITALS
RESPIRATION RATE: 16 BRPM | TEMPERATURE: 96.9 F | HEART RATE: 82 BPM | HEIGHT: 72 IN | BODY MASS INDEX: 34.73 KG/M2 | DIASTOLIC BLOOD PRESSURE: 64 MMHG | WEIGHT: 256.4 LBS | OXYGEN SATURATION: 96 % | SYSTOLIC BLOOD PRESSURE: 138 MMHG

## 2025-07-16 DIAGNOSIS — F41.1 GAD (GENERALIZED ANXIETY DISORDER): Primary | ICD-10-CM

## 2025-07-16 DIAGNOSIS — F51.04 PSYCHOPHYSIOLOGICAL INSOMNIA: ICD-10-CM

## 2025-07-16 LAB
ALBUMIN SERPL BCP-MCNC: 4.4 G/DL (ref 3.2–4.9)
ALBUMIN/GLOB SERPL: 1.6 G/DL
ALP SERPL-CCNC: 84 U/L (ref 30–99)
ALT SERPL-CCNC: 35 U/L (ref 2–50)
ANION GAP SERPL CALC-SCNC: 12 MMOL/L (ref 7–16)
AST SERPL-CCNC: 37 U/L (ref 12–45)
BASOPHILS # BLD AUTO: 1.2 % (ref 0–1.8)
BASOPHILS # BLD: 0.08 K/UL (ref 0–0.12)
BILIRUB SERPL-MCNC: 0.8 MG/DL (ref 0.1–1.5)
BUN SERPL-MCNC: 17 MG/DL (ref 8–22)
CALCIUM ALBUM COR SERPL-MCNC: 9.3 MG/DL (ref 8.5–10.5)
CALCIUM SERPL-MCNC: 9.6 MG/DL (ref 8.5–10.5)
CHLORIDE SERPL-SCNC: 109 MMOL/L (ref 96–112)
CO2 SERPL-SCNC: 19 MMOL/L (ref 20–33)
CREAT SERPL-MCNC: 0.77 MG/DL (ref 0.5–1.4)
CRP SERPL HS-MCNC: 1.7 MG/L (ref 0–3)
EOSINOPHIL # BLD AUTO: 0.11 K/UL (ref 0–0.51)
EOSINOPHIL NFR BLD: 1.6 % (ref 0–6.9)
ERYTHROCYTE [DISTWIDTH] IN BLOOD BY AUTOMATED COUNT: 48.5 FL (ref 35.9–50)
ERYTHROCYTE [SEDIMENTATION RATE] IN BLOOD BY WESTERGREN METHOD: 13 MM/HOUR (ref 0–25)
GFR SERPLBLD CREATININE-BSD FMLA CKD-EPI: 85 ML/MIN/1.73 M 2
GLOBULIN SER CALC-MCNC: 2.7 G/DL (ref 1.9–3.5)
GLUCOSE SERPL-MCNC: 98 MG/DL (ref 65–99)
HCT VFR BLD AUTO: 43.7 % (ref 37–47)
HGB BLD-MCNC: 14.8 G/DL (ref 12–16)
IMM GRANULOCYTES # BLD AUTO: 0.05 K/UL (ref 0–0.11)
IMM GRANULOCYTES NFR BLD AUTO: 0.7 % (ref 0–0.9)
LYMPHOCYTES # BLD AUTO: 1.91 K/UL (ref 1–4.8)
LYMPHOCYTES NFR BLD: 27.8 % (ref 22–41)
MCH RBC QN AUTO: 31.1 PG (ref 27–33)
MCHC RBC AUTO-ENTMCNC: 33.9 G/DL (ref 32.2–35.5)
MCV RBC AUTO: 91.8 FL (ref 81.4–97.8)
MONOCYTES # BLD AUTO: 0.62 K/UL (ref 0–0.85)
MONOCYTES NFR BLD AUTO: 9 % (ref 0–13.4)
NEUTROPHILS # BLD AUTO: 4.1 K/UL (ref 1.82–7.42)
NEUTROPHILS NFR BLD: 59.7 % (ref 44–72)
NRBC # BLD AUTO: 0 K/UL
NRBC BLD-RTO: 0 /100 WBC (ref 0–0.2)
PLATELET # BLD AUTO: 240 K/UL (ref 164–446)
PMV BLD AUTO: 10.1 FL (ref 9–12.9)
POTASSIUM SERPL-SCNC: 4 MMOL/L (ref 3.6–5.5)
PROT SERPL-MCNC: 7.1 G/DL (ref 6–8.2)
RBC # BLD AUTO: 4.76 M/UL (ref 4.2–5.4)
SODIUM SERPL-SCNC: 140 MMOL/L (ref 135–145)
WBC # BLD AUTO: 6.9 K/UL (ref 4.8–10.8)

## 2025-07-16 PROCEDURE — 85652 RBC SED RATE AUTOMATED: CPT

## 2025-07-16 PROCEDURE — 82784 ASSAY IGA/IGD/IGG/IGM EACH: CPT

## 2025-07-16 PROCEDURE — 3078F DIAST BP <80 MM HG: CPT | Performed by: FAMILY MEDICINE

## 2025-07-16 PROCEDURE — 86581 STRPTCS PNEUM ANTB SEROT IA: CPT

## 2025-07-16 PROCEDURE — 99214 OFFICE O/P EST MOD 30 MIN: CPT | Performed by: FAMILY MEDICINE

## 2025-07-16 PROCEDURE — 36415 COLL VENOUS BLD VENIPUNCTURE: CPT

## 2025-07-16 PROCEDURE — 80053 COMPREHEN METABOLIC PANEL: CPT

## 2025-07-16 PROCEDURE — 82785 ASSAY OF IGE: CPT

## 2025-07-16 PROCEDURE — 86355 B CELLS TOTAL COUNT: CPT

## 2025-07-16 PROCEDURE — 86141 C-REACTIVE PROTEIN HS: CPT | Mod: GA

## 2025-07-16 PROCEDURE — 85025 COMPLETE CBC W/AUTO DIFF WBC: CPT

## 2025-07-16 PROCEDURE — 86765 RUBEOLA ANTIBODY: CPT

## 2025-07-16 PROCEDURE — 3075F SYST BP GE 130 - 139MM HG: CPT | Performed by: FAMILY MEDICINE

## 2025-07-16 PROCEDURE — 86356 MONONUCLEAR CELL ANTIGEN: CPT

## 2025-07-16 PROCEDURE — 86359 T CELLS TOTAL COUNT: CPT

## 2025-07-16 PROCEDURE — 86360 T CELL ABSOLUTE COUNT/RATIO: CPT

## 2025-07-16 PROCEDURE — 86357 NK CELLS TOTAL COUNT: CPT

## 2025-07-16 PROCEDURE — 86317 IMMUNOASSAY INFECTIOUS AGENT: CPT

## 2025-07-16 RX ORDER — TRAZODONE HYDROCHLORIDE 50 MG/1
50 TABLET ORAL NIGHTLY
Qty: 90 TABLET | Refills: 1 | Status: SHIPPED | OUTPATIENT
Start: 2025-07-16

## 2025-07-16 RX ORDER — HYDROXYZINE HYDROCHLORIDE 25 MG/1
25 TABLET, FILM COATED ORAL 3 TIMES DAILY PRN
Qty: 30 TABLET | Refills: 3 | Status: SHIPPED | OUTPATIENT
Start: 2025-07-16

## 2025-07-16 ASSESSMENT — FIBROSIS 4 INDEX: FIB4 SCORE: 1.86

## 2025-07-16 NOTE — PATIENT COMMUNICATION
Ella from [employer] (they manage pt's insurance plan) called this morning stating that the PA for Rx has not been received by pt's pharmacy ([facility]). She provided current phone numbers:     Provider Services : [phone]    Prior Authorization: [id number]    PA Fax: [phone]    I see RENETTA Arndt submitted PA on 7/14/25 and informed pt it could take up to 5 days in some cases.

## 2025-07-17 ENCOUNTER — TELEPHONE (OUTPATIENT)
Dept: SLEEP MEDICINE | Facility: MEDICAL CENTER | Age: 65
End: 2025-07-17
Payer: COMMERCIAL

## 2025-07-17 NOTE — TELEPHONE ENCOUNTER
DOCUMENTATION OF PAR STATUS:    1. Name of Medication & Dose: Airsupra    2. Name of Prescription Coverage Company & phone #: [employer], CASE ID# [id number]    3. Date Prior Auth Submitted: 7/17/25    4. What information was given to obtain insurance decision? FAILED/TRIED MEDICATIONS & OFFICE NOTES    5. Prior Auth Status? APPROVED, SCANNED IN MEDIA.    6. Patient Notified: yes

## 2025-07-18 LAB
ANNOTATION COMMENT IMP: NORMAL
C TETANI TOXOID IGG SERPL IA-ACNC: 1.5 IU/ML
CD19 CELLS # BLD: 115 CELLS/UL (ref 94–533)
CD19 CELLS NFR BLD: 7.2 % (ref 3.9–25.5)
CD19 CELLS NFR SPEC: 6 % (ref 5–21)
CD20 CELLS # BLD: 115 CELLS/UL (ref 92–530)
CD20 CELLS NFR BLD: 99.7 % (ref 97.1–99.8)
CD3 CELLS # BLD: 1787 CELLS/UL (ref 660–2200)
CD3 CELLS NFR SPEC: 81 % (ref 62–89)
CD3+CD4+ CELLS # BLD: 1079 CELLS/UL (ref 490–1600)
CD3+CD4+ CELLS NFR BLD: 49 % (ref 35–68)
CD3+CD4+ CELLS/CD3+CD8+ CLL BLD: 1.53 RATIO (ref 0.8–6.17)
CD3+CD8+ CELLS # BLD: 701 CELLS/UL (ref 150–1050)
CD3+CD8+ CELLS NFR SPEC: 32 % (ref 10–46)
CD3-CD16+CD56+ CELLS # SPEC: 270 CELLS/UL (ref 74–620)
CD3-CD16+CD56+ CELLS NFR SPEC: 12 % (ref 5–28)
CELLS.CD3-CD19+ [#/VOLUME] IN BLOOD: 132 CELLS/UL (ref 74–510)
IGE SERPL-ACNC: 26 KU/L
MEV IGG SER-ACNC: 115 AU/ML
VIABLE CELLS NFR BLD: 76 %

## 2025-07-18 PROCEDURE — RXMED WILLOW AMBULATORY MEDICATION CHARGE

## 2025-07-19 DIAGNOSIS — G60.9 IDIOPATHIC NEUROPATHY: ICD-10-CM

## 2025-07-19 LAB
IGA SERPL-MCNC: 342 MG/DL (ref 68–408)
IGG SERPL-MCNC: 759 MG/DL (ref 768–1632)
IGM SERPL-MCNC: 77 MG/DL (ref 35–263)
S PN DA SERO 19F IGG SER-MCNC: 1.25 UG/ML
S PNEUM DA 1 IGG SER-MCNC: 2.11 UG/ML
S PNEUM DA 10A IGG SER-MCNC: 0.07 UG/ML
S PNEUM DA 11A IGG SER-MCNC: 0.04 UG/ML
S PNEUM DA 12F IGG SER-MCNC: 0.1 UG/ML
S PNEUM DA 14 IGG SER-MCNC: 0.23 UG/ML
S PNEUM DA 15B IGG SER-MCNC: 0.11 UG/ML
S PNEUM DA 17F IGG SER-MCNC: 1.35 UG/ML
S PNEUM DA 18C IGG SER-MCNC: 0.18 UG/ML
S PNEUM DA 19A IGG SER-MCNC: 3.26 UG/ML
S PNEUM DA 2 IGG SER-MCNC: 0.19 UG/ML
S PNEUM DA 20A IGG SER-MCNC: 4.66 UG/ML
S PNEUM DA 22F IGG SER-MCNC: 0.16 UG/ML
S PNEUM DA 23F IGG SER-MCNC: 0.26 UG/ML
S PNEUM DA 3 IGG SER-MCNC: 0.32 UG/ML
S PNEUM DA 33F IGG SER-MCNC: 1.22 UG/ML
S PNEUM DA 4 IGG SER-MCNC: 0.3 UG/ML
S PNEUM DA 5 IGG SER-MCNC: 0.46 UG/ML
S PNEUM DA 6B IGG SER-MCNC: 0.14 UG/ML
S PNEUM DA 7F IGG SER-MCNC: 0.08 UG/ML
S PNEUM DA 8 IGG SER-MCNC: 0.28 UG/ML
S PNEUM DA 9N IGG SER-MCNC: 0.64 UG/ML
S PNEUM DA 9V IGG SER-MCNC: 0.19 UG/ML
S PNEUM SEROTYPE IGG SER-IMP: NORMAL

## 2025-07-21 RX ORDER — PREGABALIN 200 MG/1
200 CAPSULE ORAL EVERY EVENING
Qty: 90 CAPSULE | Refills: 0 | Status: SHIPPED | OUTPATIENT
Start: 2025-07-21 | End: 2025-10-19

## 2025-07-21 RX ORDER — PREGABALIN 150 MG/1
CAPSULE ORAL
Qty: 90 CAPSULE | Refills: 0 | Status: SHIPPED | OUTPATIENT
Start: 2025-07-21 | End: 2025-07-24

## 2025-07-21 NOTE — TELEPHONE ENCOUNTER
Received request via: Pharmacy    Was the patient seen in the last year in this department? Yes    Does the patient have an active prescription (recently filled or refills available) for medication(s) requested? No    [facility]    Does the patient have detention Plus and need 100-day supply? (This applies to ALL medications) Patient does not have SCP

## 2025-07-24 ENCOUNTER — TELEMEDICINE (OUTPATIENT)
Dept: MEDICAL GROUP | Facility: LAB | Age: 65
End: 2025-07-24
Payer: COMMERCIAL

## 2025-07-24 DIAGNOSIS — G60.9 IDIOPATHIC NEUROPATHY: ICD-10-CM

## 2025-07-24 DIAGNOSIS — R10.11 RUQ PAIN: Primary | ICD-10-CM

## 2025-07-24 RX ORDER — PREGABALIN 150 MG/1
150 CAPSULE ORAL DAILY
Qty: 90 CAPSULE | Refills: 0 | Status: SHIPPED | OUTPATIENT
Start: 2025-07-24 | End: 2025-10-22

## 2025-07-24 NOTE — PROGRESS NOTES
Virtual Visit: Established Patient   This visit was conducted via Teams using secure and encrypted videoconferencing technology.   The patient was in their home in the [facility].    The patient's identity was confirmed and verbal consent was obtained for this virtual visit.     Subjective:   CC:   Chief Complaint   Patient presents with    RUQ Pain       Arely Haynes is a 64 y.o. female presenting for evaluation and management of:    Right upper quadrant pain.  This has been going on for quite some time.  Worse after eating but in general just a vague sense of discomfort in the right upper quadrant into the epigastric area at times as well.  No vomiting.  No diarrhea.  She is leaving to [place] for lung specialty.  She does have a high resolution CT scan ordered with them.  She does have a thought that this might be pleural in nature on the right lower side but she is not sure.  No recent fevers.  Long history of asthma and lung issues.  History of respiratory failure.    ROS   See above.    Current medicines (including changes today)  Current Medications[1]    Patient Active Problem List    Diagnosis Date Noted    Osteoarthritis, knee 12/23/2024    Primary osteoarthritis of right knee 12/23/2024    Cold sore 08/07/2024    Cough 08/05/2023    Chronic diarrhea 08/05/2023    History of colonic polyps 05/12/2023    Hypercholesterolemia 05/12/2023    Hoarseness 05/12/2023    Hemorrhoids without complication 05/12/2023    Hematochezia 05/12/2023    Fecal urgency 05/12/2023    Polyp of stomach and duodenum 01/03/2023    Diaphragmatic hernia without obstruction or gangrene 01/03/2023    Pulmonary nodules 08/16/2021    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HCC) 07/15/2021    Bradycardia 07/15/2021    GERD (gastroesophageal reflux disease) 07/08/2021    Second degree hemorrhoids 10/26/2020    Polyp of colon 10/26/2020    Major depressive disorder, recurrent, moderate (HCC) 01/07/2020    CHRISTY (generalized anxiety  disorder) 01/07/2020    Seasonal allergies 12/30/2019    H/O: hysterectomy 10/02/2019    Quadriceps tendon rupture, left, initial encounter 09/19/2019    Ovarian cyst 06/25/2019    Psoriasis 03/27/2019    Immunosuppression (HCC) 03/27/2019    Obesity (BMI 30.0-34.9) 03/27/2019    Menopausal hot flushes 03/27/2019    Psoriatic arthritis (HCC) 11/30/2018    Familial hypercholesterolemia 11/30/2018    Severe persistent asthma without complication 11/30/2018    History of total right hip arthroplasty 09/22/2017        Objective:   LMP  (LMP Unknown)     Physical Exam:  Constitutional: Alert, no distress, well-groomed.  Skin: No rashes in visible areas.  Eye: Round. Conjunctiva clear, lids normal. No icterus.   ENMT: Lips pink without lesions, good dentition, moist mucous membranes. Phonation normal.  Neck: No masses, no thyromegaly. Moves freely without pain.  Respiratory: Unlabored respiratory effort, no cough or audible wheeze  Psych: Alert and oriented x3, normal affect and mood.     Assessment and Plan:   The following treatment plan was discussed:     1. RUQ pain  - US-RUQ; Future    Discussed starting with an ultrasound.  She recently did have labs done which did not show any elevated liver enzymes or alk phos.  CBC was similarly unremarkable.  Will go and start with an ultrasound but we did discuss the possibility of moving towards a HIDA scan if the anatomy looks normal but were still concerned about function.  Will let her know results of the ultrasound we were going to proceed.  If she does have sludge and stones present and is symptomatic we will have her get scheduled with surgery for discussion of possible removal.  Follow-up: No follow-ups on file.              [1]   Current Outpatient Medications   Medication Sig Dispense Refill    pregabalin (LYRICA) 150 MG Cap TAKE 1 CAPSULE EVERY MORNING (TAKING 150 MG IN THE MORNING  MG IN THE EVENING) 90 Capsule 0    pregabalin (LYRICA) 200 MG capsule Take 1  Capsule by mouth every evening for 90 days. 90 Capsule 0    traZODone (DESYREL) 50 MG Tab Take 1 Tablet by mouth every evening. 90 Tablet 1    hydrOXYzine HCl (ATARAX) 25 MG Tab Take 1 Tablet by mouth 3 times a day as needed for Itching. 30 Tablet 3    Albuterol-Budesonide (AIRSUPRA) 90-80 MCG/ACT Aerosol Inhale 1-2 Puffs every 4 hours. 10.7 g 3    triamcinolone acetonide (KENALOG) 0.1 % Cream Apply 1 Application topically 2 times a day. 45 g 6    etodolac (LODINE) 400 MG tablet Take 1 Tablet by mouth 2 times a day. 60 Tablet 0    tezepelumab-ekko (TEZSPIRE) 210 MG/1.91ML injection Inject 1.91 mL under the skin every 4 weeks. 1.91 mL 12    lamoTRIgine (LAMICTAL) 100 MG Tab Take 1 Tablet by mouth every morning. 90 Tablet 3    pantoprazole (PROTONIX) 40 MG Tablet Delayed Response Take 1 Tablet by mouth 2 times a day. Ok to decrease to once daily once GERD symptoms are controlled 180 Tablet 1    Mirabegron ER 50 MG TABLET SR 24 HR Take 1 Tablet by mouth every day.      EPINEPHrine (EPIPEN) 0.3 MG/0.3ML Solution Auto-injector solution for injection INJECT 1 PEN IN THE MUSCLE ONE TIME AS DIRECTED FOR ANAPHYLAXIS      pregabalin (LYRICA) 100 MG Cap Take 100 mg by mouth.      escitalopram (LEXAPRO) 20 MG tablet TAKE 1 TABLET EVERY MORNING. TAKE WITH ESCITALOPRAM 10 MG FOR A TOTAL DOSE OF 30 MG 90 Tablet 3    escitalopram (LEXAPRO) 10 MG Tab TAKE 1 TABLET EVERY MORNING. TAKE WITH ESCITALOPRAM 20 MG FOR A TOTAL DOSE OF 30 MG 90 Tablet 3    famotidine (PEPCID) 20 MG Tab TAKE 1 TABLET TWICE A DAY FOR GASTROESOPHAGEAL REFLUX DISEASE 180 Tablet 3    DULERA 200-5 MCG/ACT Aerosol USE 2 INHALATIONS TWICE A DAY 39 g 3    montelukast (SINGULAIR) 10 MG Tab TAKE 1 TABLET DAILY 90 Tablet 3    rosuvastatin (CRESTOR) 10 MG Tab TAKE 1 TABLET AT BEDTIME 90 Tablet 3    ipratropium-albuterol (DUONEB) 0.5-2.5 (3) MG/3ML nebulizer solution Take 3 mL by nebulization every 4 hours. 270 mL 5    loratadine (CLARITIN) 10 MG Tab Take 10 mg by mouth  every day.   MEDICATION INSTRUCTIONS FOR SURGERY 12/31/2024  CONTINUE TAKING MED PRIOR TO SURGERY       No current facility-administered medications for this visit.

## 2025-08-01 ENCOUNTER — PHARMACY VISIT (OUTPATIENT)
Dept: PHARMACY | Facility: MEDICAL CENTER | Age: 65
End: 2025-08-01
Payer: COMMERCIAL

## 2025-08-01 ENCOUNTER — OFFICE VISIT (OUTPATIENT)
Dept: SLEEP MEDICINE | Facility: MEDICAL CENTER | Age: 65
End: 2025-08-01
Attending: INTERNAL MEDICINE
Payer: COMMERCIAL

## 2025-08-01 VITALS
DIASTOLIC BLOOD PRESSURE: 66 MMHG | OXYGEN SATURATION: 96 % | BODY MASS INDEX: 34.67 KG/M2 | HEART RATE: 68 BPM | HEIGHT: 72 IN | SYSTOLIC BLOOD PRESSURE: 128 MMHG | WEIGHT: 256 LBS

## 2025-08-01 DIAGNOSIS — J45.51 SEVERE PERSISTENT ASTHMA WITH ACUTE EXACERBATION: Primary | ICD-10-CM

## 2025-08-01 DIAGNOSIS — K21.9 GASTROESOPHAGEAL REFLUX DISEASE, UNSPECIFIED WHETHER ESOPHAGITIS PRESENT: ICD-10-CM

## 2025-08-01 PROCEDURE — 99212 OFFICE O/P EST SF 10 MIN: CPT | Performed by: INTERNAL MEDICINE

## 2025-08-01 PROCEDURE — 99214 OFFICE O/P EST MOD 30 MIN: CPT | Performed by: INTERNAL MEDICINE

## 2025-08-01 PROCEDURE — 3074F SYST BP LT 130 MM HG: CPT | Performed by: INTERNAL MEDICINE

## 2025-08-01 PROCEDURE — 3078F DIAST BP <80 MM HG: CPT | Performed by: INTERNAL MEDICINE

## 2025-08-01 RX ORDER — SODIUM CHLORIDE FOR INHALATION 7 %
4 VIAL, NEBULIZER (ML) INHALATION 2 TIMES DAILY PRN
Qty: 24 ML | Refills: 6 | Status: SHIPPED | OUTPATIENT
Start: 2025-08-01

## 2025-08-01 ASSESSMENT — ENCOUNTER SYMPTOMS
STRIDOR: 0
VOMITING: 0
HEARTBURN: 0
DIAPHORESIS: 0
BACK PAIN: 0
DEPRESSION: 0
EYE PAIN: 0
CONSTIPATION: 0
NECK PAIN: 0
COUGH: 1
SHORTNESS OF BREATH: 1
FEVER: 0
CHILLS: 0
ABDOMINAL PAIN: 0
FOCAL WEAKNESS: 0
BLURRED VISION: 0
PHOTOPHOBIA: 0
EYE DISCHARGE: 0
HEMOPTYSIS: 0
TREMORS: 0
FALLS: 0
MYALGIAS: 0
SINUS PAIN: 0
SPEECH CHANGE: 0
CLAUDICATION: 0
SPUTUM PRODUCTION: 0
NAUSEA: 0
WEAKNESS: 0
DOUBLE VISION: 0
EYE REDNESS: 0
DIZZINESS: 0
WHEEZING: 1
WEIGHT LOSS: 0
HEADACHES: 0
SORE THROAT: 0
PND: 0
PALPITATIONS: 0
DIARRHEA: 0
ORTHOPNEA: 0

## 2025-08-01 ASSESSMENT — FIBROSIS 4 INDEX: FIB4 SCORE: 1.69

## 2025-08-04 DIAGNOSIS — F51.04 PSYCHOPHYSIOLOGICAL INSOMNIA: ICD-10-CM

## 2025-08-04 RX ORDER — TRAZODONE HYDROCHLORIDE 50 MG/1
50 TABLET ORAL NIGHTLY
Qty: 90 TABLET | Refills: 1 | Status: SHIPPED | OUTPATIENT
Start: 2025-08-04

## 2025-08-19 ENCOUNTER — PATIENT MESSAGE (OUTPATIENT)
Dept: MEDICAL GROUP | Facility: LAB | Age: 65
End: 2025-08-19
Payer: COMMERCIAL

## 2025-08-19 PROCEDURE — RXMED WILLOW AMBULATORY MEDICATION CHARGE

## 2025-08-19 RX ORDER — ACYCLOVIR 50 MG/G
1 OINTMENT TOPICAL 3 TIMES DAILY PRN
Qty: 15 G | Refills: 1 | Status: SHIPPED | OUTPATIENT
Start: 2025-08-19

## 2025-08-20 ENCOUNTER — PHARMACY VISIT (OUTPATIENT)
Dept: PHARMACY | Facility: MEDICAL CENTER | Age: 65
End: 2025-08-20
Payer: COMMERCIAL

## 2025-08-20 ENCOUNTER — APPOINTMENT (OUTPATIENT)
Dept: SLEEP MEDICINE | Facility: MEDICAL CENTER | Age: 65
End: 2025-08-20
Payer: COMMERCIAL

## 2025-08-20 ENCOUNTER — HOSPITAL ENCOUNTER (OUTPATIENT)
Dept: RADIOLOGY | Facility: MEDICAL CENTER | Age: 65
End: 2025-08-20
Attending: FAMILY MEDICINE
Payer: COMMERCIAL

## 2025-08-20 DIAGNOSIS — R10.11 RUQ PAIN: ICD-10-CM

## 2025-08-20 PROCEDURE — 76705 ECHO EXAM OF ABDOMEN: CPT

## 2025-08-21 ENCOUNTER — RESULTS FOLLOW-UP (OUTPATIENT)
Dept: MEDICAL GROUP | Facility: LAB | Age: 65
End: 2025-08-21
Payer: COMMERCIAL

## 2025-08-21 ENCOUNTER — TELEPHONE (OUTPATIENT)
Dept: SLEEP MEDICINE | Facility: MEDICAL CENTER | Age: 65
End: 2025-08-21
Payer: COMMERCIAL

## 2025-08-23 ENCOUNTER — OFFICE VISIT (OUTPATIENT)
Dept: URGENT CARE | Facility: CLINIC | Age: 65
End: 2025-08-23
Payer: COMMERCIAL

## 2025-08-23 ENCOUNTER — HOSPITAL ENCOUNTER (OUTPATIENT)
Facility: MEDICAL CENTER | Age: 65
End: 2025-08-23
Attending: PHYSICIAN ASSISTANT
Payer: COMMERCIAL

## 2025-08-23 VITALS
DIASTOLIC BLOOD PRESSURE: 70 MMHG | OXYGEN SATURATION: 97 % | TEMPERATURE: 96.9 F | RESPIRATION RATE: 14 BRPM | BODY MASS INDEX: 34.67 KG/M2 | WEIGHT: 256 LBS | SYSTOLIC BLOOD PRESSURE: 126 MMHG | HEIGHT: 72 IN | HEART RATE: 68 BPM

## 2025-08-23 DIAGNOSIS — N30.00 ACUTE CYSTITIS WITHOUT HEMATURIA: Primary | ICD-10-CM

## 2025-08-23 DIAGNOSIS — N30.00 ACUTE CYSTITIS WITHOUT HEMATURIA: ICD-10-CM

## 2025-08-23 LAB
APPEARANCE UR: NORMAL
BILIRUB UR STRIP-MCNC: NORMAL MG/DL
COLOR UR AUTO: NORMAL
GLUCOSE UR STRIP.AUTO-MCNC: NEGATIVE MG/DL
KETONES UR STRIP.AUTO-MCNC: NEGATIVE MG/DL
LEUKOCYTE ESTERASE UR QL STRIP.AUTO: NORMAL
NITRITE UR QL STRIP.AUTO: POSITIVE
PH UR STRIP.AUTO: 5 [PH] (ref 5–8)
PROT UR QL STRIP: NEGATIVE MG/DL
RBC UR QL AUTO: NORMAL
SP GR UR STRIP.AUTO: 1.02
UROBILINOGEN UR STRIP-MCNC: 0.2 MG/DL

## 2025-08-23 PROCEDURE — 3074F SYST BP LT 130 MM HG: CPT | Performed by: PHYSICIAN ASSISTANT

## 2025-08-23 PROCEDURE — 87086 URINE CULTURE/COLONY COUNT: CPT

## 2025-08-23 PROCEDURE — 3078F DIAST BP <80 MM HG: CPT | Performed by: PHYSICIAN ASSISTANT

## 2025-08-23 PROCEDURE — 99213 OFFICE O/P EST LOW 20 MIN: CPT | Performed by: PHYSICIAN ASSISTANT

## 2025-08-23 PROCEDURE — 81002 URINALYSIS NONAUTO W/O SCOPE: CPT | Performed by: PHYSICIAN ASSISTANT

## 2025-08-23 PROCEDURE — 87077 CULTURE AEROBIC IDENTIFY: CPT

## 2025-08-23 PROCEDURE — 87186 SC STD MICRODIL/AGAR DIL: CPT

## 2025-08-23 RX ORDER — CEFDINIR 300 MG/1
300 CAPSULE ORAL 2 TIMES DAILY
Qty: 20 CAPSULE | Refills: 0 | Status: SHIPPED | OUTPATIENT
Start: 2025-08-23 | End: 2025-09-02

## 2025-08-23 ASSESSMENT — ENCOUNTER SYMPTOMS
CHILLS: 0
VOMITING: 0
HEADACHES: 0
ABDOMINAL PAIN: 0
FEVER: 0
NAUSEA: 0
FLANK PAIN: 0
DIARRHEA: 0

## 2025-08-23 ASSESSMENT — FIBROSIS 4 INDEX: FIB4 SCORE: 1.69

## 2025-08-27 ENCOUNTER — TELEMEDICINE (OUTPATIENT)
Dept: MEDICAL GROUP | Facility: LAB | Age: 65
End: 2025-08-27
Payer: COMMERCIAL

## 2025-08-27 VITALS — BODY MASS INDEX: 34.67 KG/M2 | HEIGHT: 72 IN | WEIGHT: 256 LBS

## 2025-08-27 DIAGNOSIS — K21.9 GASTROESOPHAGEAL REFLUX DISEASE, UNSPECIFIED WHETHER ESOPHAGITIS PRESENT: ICD-10-CM

## 2025-08-27 DIAGNOSIS — R49.0 HOARSENESS OF VOICE: ICD-10-CM

## 2025-08-27 DIAGNOSIS — J45.50 SEVERE PERSISTENT ASTHMA WITHOUT COMPLICATION: Primary | ICD-10-CM

## 2025-08-27 DIAGNOSIS — B37.0 THRUSH: ICD-10-CM

## 2025-08-27 RX ORDER — FLUCONAZOLE 100 MG/1
TABLET ORAL
Qty: 8 TABLET | Refills: 0 | Status: SHIPPED | OUTPATIENT
Start: 2025-08-27

## 2025-08-27 ASSESSMENT — LIFESTYLE VARIABLES
HOW OFTEN DO YOU HAVE SIX OR MORE DRINKS ON ONE OCCASION: NEVER
AUDIT-C TOTAL SCORE: 3
HOW OFTEN DO YOU HAVE A DRINK CONTAINING ALCOHOL: 2-3 TIMES A WEEK
SKIP TO QUESTIONS 9-10: 1
HOW MANY STANDARD DRINKS CONTAINING ALCOHOL DO YOU HAVE ON A TYPICAL DAY: 1 OR 2

## 2025-08-27 ASSESSMENT — FIBROSIS 4 INDEX: FIB4 SCORE: 1.69

## 2025-08-28 ENCOUNTER — APPOINTMENT (OUTPATIENT)
Dept: URBAN - METROPOLITAN AREA CLINIC 6 | Facility: CLINIC | Age: 65
Setting detail: DERMATOLOGY
End: 2025-08-28

## 2025-08-28 DIAGNOSIS — L30.9 DERMATITIS, UNSPECIFIED: ICD-10-CM

## 2025-08-28 DIAGNOSIS — K14.0 GLOSSITIS: ICD-10-CM

## 2025-08-28 PROCEDURE — ? COUNSELING - GLOSSITIS

## 2025-08-28 PROCEDURE — ? ADDITIONAL NOTES

## 2025-08-28 PROCEDURE — ? COUNSELING

## 2025-08-28 PROCEDURE — ? PRESCRIPTION

## 2025-08-28 RX ORDER — KETOCONAZOLE 20 MG/G
CREAM TOPICAL
Qty: 30 | Refills: 1 | Status: ERX | COMMUNITY
Start: 2025-08-28

## 2025-08-28 RX ADMIN — KETOCONAZOLE: 20 CREAM TOPICAL at 00:00

## 2025-08-28 RX ADMIN — MUPIROCIN: 20 OINTMENT TOPICAL at 00:00

## 2025-08-28 ASSESSMENT — LOCATION DETAILED DESCRIPTION DERM
LOCATION DETAILED: LEFT ANTERIOR TONGUE
LOCATION DETAILED: LEFT BUCCAL MUCOSA
LOCATION DETAILED: RIGHT ANTERIOR TONGUE

## 2025-08-28 ASSESSMENT — LOCATION ZONE DERM: LOCATION ZONE: MUCOUS_MEMBRANE

## 2025-08-28 ASSESSMENT — LOCATION SIMPLE DESCRIPTION DERM
LOCATION SIMPLE: ANTERIOR TONGUE
LOCATION SIMPLE: LEFT BUCCAL MUCOSA

## 2025-08-29 RX ORDER — MUPIROCIN 20 MG/G
OINTMENT TOPICAL
Qty: 22 | Refills: 1 | Status: ERX | COMMUNITY
Start: 2025-08-28

## (undated) DEVICE — TRAY SKIN SCRUB PVP WET (20EA/CA) PART #DYND70356 DISCONTINUED

## (undated) DEVICE — SLEEVE, VASO, THIGH, MED

## (undated) DEVICE — NEEDLE SPINAL NON-SAFETY 18 GA X 3 IN (25EA/BX)

## (undated) DEVICE — SENSOR SPO2 NEO LNCS ADHESIVE (20/BX) SEE USER NOTES

## (undated) DEVICE — GLOVE SZ 6.5 BIOGEL PI MICRO - PF LF (50PR/BX)

## (undated) DEVICE — KIT ROOM DECONTAMINATION

## (undated) DEVICE — CHLORAPREP 26 ML APPLICATOR - ORANGE TINT(25/CA)

## (undated) DEVICE — BLADE SAGITTAL SAW DUAL CUT 25.0 X 90.0 X 1.27MM (1/EA)

## (undated) DEVICE — BOVIE NEEDLE TIP INSULATD NON-SAFETY 2CM (50/PK)

## (undated) DEVICE — DRAPE COLUMN  BOX OF 20

## (undated) DEVICE — CANNULA O2 COMFORT SOFT EAR ADULT 7 FT TUBING (50/CA)

## (undated) DEVICE — CATHETER FOLEY 22 FR 30CC  - (12EA/CA)

## (undated) DEVICE — SYRINGE SAFETY 10 ML 18 GA X 1 1/2 BLUNT LL (100/BX 4BX/CA)

## (undated) DEVICE — LACTATED RINGERS INJ 1000 ML - (14EA/CA 60CA/PF)

## (undated) DEVICE — Device

## (undated) DEVICE — WATER IRRIGATION STERILE 1000ML (12EA/CA)

## (undated) DEVICE — SUTURE GENERAL

## (undated) DEVICE — GLOVE BIOGEL SZ 8 SURGICAL PF LTX - (50PR/BX 4BX/CA)

## (undated) DEVICE — SODIUM CHL IRRIGATION 0.9% 1000ML (12EA/CA)

## (undated) DEVICE — SUTURE 2-0 MONOCRYL PLUS UNDYED CT-1 1 X 36 (36EA/BX)"

## (undated) DEVICE — NEEDLE DRIVER MEGA SUTURECUT DA VINCI 15X'S REUSABLE

## (undated) DEVICE — GLOVE SZ 7 BIOGEL PI MICRO - PF LF (50PR/BX 4BX/CA)

## (undated) DEVICE — PAD PREP 24 X 48 CUFFED - (100/CA)

## (undated) DEVICE — HANDPIECE 10FT INTPLS SCT PLS IRRIGATION HAND CONTROL SET (6/PK)

## (undated) DEVICE — NEPTUNE 4 PORT MANIFOLD - (20/PK)

## (undated) DEVICE — BLADE SURGICAL #15 - (50/BX 3BX/CA)

## (undated) DEVICE — TOWEL STOP TIMEOUT SAFETY FLAG (40EA/CA)

## (undated) DEVICE — SET EXTENSION WITH 2 PORTS (48EA/CA) ***PART #2C8610 IS A SUBSTITUTE*****

## (undated) DEVICE — GLOVE BIOGEL PI INDICATOR SZ 8.0 SURGICAL PF LF -(50/BX 4BX/CA)

## (undated) DEVICE — DRESSING SURG 3 X 8 (OWENS) - (50/BX)

## (undated) DEVICE — BLADE SAGITTAL 34MM

## (undated) DEVICE — SUTURE 3-0 MONOCRYL PLUS PS-1 - 27 INCH (36/BX)

## (undated) DEVICE — SUTURE 0 VICRYL PLUS CT-2 - 27 INCH (36/BX)

## (undated) DEVICE — SYRINGE ASEPTO - (50EA/CA

## (undated) DEVICE — GLOVE BIOGEL PI ORTHO SZ 7.5 PF LF (40PR/BX)

## (undated) DEVICE — SET I.V. EXTENSION DIAL-A- - FLOW REGULATOR (48EA/CA)

## (undated) DEVICE — FORCEPS PROGRASP DA VINCI 10X'S REUSABLE

## (undated) DEVICE — SUTURE 5 ETHIBOND V-37 (12PK/BX)

## (undated) DEVICE — GOWN WARMING STANDARD FLEX - (30/CA)

## (undated) DEVICE — DRAPE LARGE 3 QUARTER - (20/CA)

## (undated) DEVICE — SYRINGE 30 ML LL (56/BX)

## (undated) DEVICE — HEAD HOLDER JUNIOR/ADULT

## (undated) DEVICE — ROBOTIC SURGERY SERVICES

## (undated) DEVICE — GOWN SURGEONS X-LARGE - DISP. (30/CA)

## (undated) DEVICE — PACK TRENGUARD 450 PROCEDURE (12EA/CA)

## (undated) DEVICE — TUBE CONNECT SUCTION CLEAR 120 X 1/4" (50EA/CA)"

## (undated) DEVICE — SET LEADWIRE 5 LEAD BEDSIDE DISPOSABLE ECG (1SET OF 5/EA)

## (undated) DEVICE — GLOVE BIOGEL PI ORTHO SZ 7 PF LF (40PR/BX)

## (undated) DEVICE — DRAPE ARM  BOX OF 20

## (undated) DEVICE — ELECTRODE DUAL RETURN W/ CORD - (50/PK)

## (undated) DEVICE — TOURNIQUET CUFF 34 X 4 ONE PORT DISP - STERILE (10/BX)

## (undated) DEVICE — AQUACEL 4X4

## (undated) DEVICE — TROCAR Z THREAD 12 X 100 - BLADED (6/BX)

## (undated) DEVICE — MASK ANESTHESIA ADULT  - (100/CA)

## (undated) DEVICE — NEEDLE INSUFFLATION FOR STEP - (12/BX)

## (undated) DEVICE — CANISTER SUCTION 3000ML MECHANICAL FILTER AUTO SHUTOFF MEDI-VAC NONSTERILE LF DISP  (40EA/CA)

## (undated) DEVICE — BLOCK

## (undated) DEVICE — PACK LOWER EXTREMITY - (2/CA)

## (undated) DEVICE — SENSOR OXIMETER ADULT SPO2 RD SET (20EA/BX)

## (undated) DEVICE — SUTURE QUILL 0 PDO 14X14 - (12/BX)

## (undated) DEVICE — WATER IRRIG. STER 3000 ML - (4/CA)

## (undated) DEVICE — ELECTRODE 850 FOAM ADHESIVE - HYDROGEL RADIOTRNSPRNT (50/PK)

## (undated) DEVICE — TUBING CLEARLINK DUO-VENT - C-FLO (48EA/CA)

## (undated) DEVICE — FIBERWIRE 5.0 - 12/BX ORDER IN MULTIPLES OF 12

## (undated) DEVICE — CANISTER SUCTION RIGID RED 1500CC (40EA/CA)

## (undated) DEVICE — TIP INTPLS HFLO ML ORFC BTRY - (12/CS) FOR SURGILAV

## (undated) DEVICE — ARMREST CRADLE FOAM - (2PR/PK 12PR/CA)

## (undated) DEVICE — FORCEPS MARYLAND BIPOLAR DA VINCI 10X'S REUSABLE

## (undated) DEVICE — WATER IRRIG. STER. 1500 ML - (9/CA)

## (undated) DEVICE — SPATULA PERMANENT CAUTERY DA VINCI 10X'S REUSABLE

## (undated) DEVICE — GLOVE, LITE (PAIR)

## (undated) DEVICE — SUCTION INSTRUMENT YANKAUER BULBOUS TIP W/O VENT (50EA/CA)

## (undated) DEVICE — GLOVE BIOGEL SZ 6.5 SURGICAL PF LTX (50PR/BX 4BX/CA)

## (undated) DEVICE — SEAL 5MM-8MM UNIVERSAL  BOX OF 10

## (undated) DEVICE — BANDAGE ELASTIC 6 IN X 5 YDS - LATEX FREE (10/BX)

## (undated) DEVICE — PADDING CAST 6 IN X 4 YDS - SOF-ROLL (6RL/BG 6BG/CA)

## (undated) DEVICE — TUBE E-T HI-LO CUFF 7.5MM (10EA/PK)

## (undated) DEVICE — GLOVE BIOGEL PI INDICATOR SZ 7.0 SURGICAL PF LF - (50/BX 4BX/CA)

## (undated) DEVICE — BLADE 90X18X1.27MM SAW SAGITTAL

## (undated) DEVICE — SUTURE 1 VICRYL PLUS CT-1 - 18 INCH (12/BX)

## (undated) DEVICE — KIT ANESTHESIA W/CIRCUIT & 3/LT BAG W/FILTER (20EA/CA)

## (undated) DEVICE — GLOVE BIOGEL PI INDICATOR SZ 6.5 SURGICAL PF LF - (50/BX 4BX/CA)

## (undated) DEVICE — SET SUCTION/IRRIGATION WITH DISPOSABLE TIP (6/CA )PART #0250-070-520 IS A SUB

## (undated) DEVICE — DERMABOND ADVANCED - (12EA/BX)

## (undated) DEVICE — PACK GYN DAVINCI (2EA/CA)

## (undated) DEVICE — PAD OR TABLE DA VINCI 2IN X 20IN X 72IN - (12EA/CA)

## (undated) DEVICE — HUMID-VENT HEAT AND MOISTURE EXCHANGE- (50/BX)

## (undated) DEVICE — GLOVE SZ 6 BIOGEL PI MICRO - PF LF (50PR/BX 4BX/CA)

## (undated) DEVICE — STOCKINETTE IMPERVIOUS 12X48 - STERILELF (10/CA)"